# Patient Record
Sex: FEMALE | Race: WHITE | Employment: OTHER | URBAN - METROPOLITAN AREA
[De-identification: names, ages, dates, MRNs, and addresses within clinical notes are randomized per-mention and may not be internally consistent; named-entity substitution may affect disease eponyms.]

---

## 2017-03-15 ENCOUNTER — OFFICE VISIT (OUTPATIENT)
Dept: ENDOCRINOLOGY | Facility: CLINIC | Age: 52
End: 2017-03-15

## 2017-03-15 VITALS
HEIGHT: 69 IN | DIASTOLIC BLOOD PRESSURE: 86 MMHG | WEIGHT: 257 LBS | BODY MASS INDEX: 38.06 KG/M2 | SYSTOLIC BLOOD PRESSURE: 137 MMHG | HEART RATE: 71 BPM

## 2017-03-15 DIAGNOSIS — F64.9 GENDER IDENTITY DISORDER: ICD-10-CM

## 2017-03-15 RX ORDER — ESTRADIOL 2 MG/1
TABLET ORAL
Qty: 90 TABLET | Refills: 3 | Status: SHIPPED | OUTPATIENT
Start: 2017-03-15 | End: 2018-03-27

## 2017-03-15 NOTE — LETTER
3/15/2017       RE: Kylie Piper  [address] [facility] [address]     Dear Colleague,    Thank you for referring your patient, Kylie Piper, to the [facility] ENDOCRINOLOGY at [facility]. Please see a copy of my visit note below.    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      HISTORY OF PRESENT ILLNESS:  Ms. Des Austin is here for a followup visit.  We typically see her about every 6 months.  We saw her last September.  At that time, she was recovering from a gender reassignment surgery.  We continued her estradiol 2 mg daily.      She returns to follow up.  She reports things are going well.  She has had no major issues or problems, as far as hormone therapy since we last saw her.      She continues to get some facial hair.  She does shave daily.  Otherwise, she has noticed decreased body hair.  She reports no new headaches, severe or frequent headaches, no problems with leg swelling and no GI complaints.      She does not smoke tobacco.      For other medications, she is now using medical cannabis for chronic pain and depression.  She has been using it about a month or so now, and she feels it is helpful.  She has prescription for vortioxetine, vitamin D supplement, propranolol that she takes somewhat as needed for higher blood pressure, lamotrigine, in addition to her Estradiol 2 mg daily.  She is also on Topamax and simvastatin.      REVIEW OF SYSTEMS:  As above.  She has regained some weight since her last visit.  She attributes that to having more food choices available in the house.      PHYSICAL EXAMINATION:     VITAL SIGNS:  Her weight is 257 pounds.  This is the same weight she was a year ago, but she has gained about 30 pounds since her last visit.  Pulse  71, blood pressure 137/86.    GENERAL:  She has a generally feminine appearance.  Face is smooth.   CHEST:  Clear breath sounds.   CARDIAC:  Regular rate and rhythm, normal heart sounds.   ABDOMEN:  Soft and nontender, no hepatosplenomegaly.   EXTREMITIES:  She has no lower extremity edema.      LABORATORY TESTS:  We reviewed her recent records since her last visit with us.  She had some laboratory tests done in October.  She had normal electrolytes, creatinine of 0.82, calcium was normal at 8.8.  She had a lipid panel done a year ago with an LDL of 101 and triglyceride of 143.      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  The patient has been on feminizing hormone therapy since 2012.  She has also undergone orchiectomy and gender reassignment surgery.      Ms. Des Austin has a reasonable level of feminization.  Given her age, we are not overly aggressive in her estrogen replacement.  We had discussed a trial back on transdermal estrogen at last visit, but she had had problems as far as the cost, and in addition she had difficulty getting adequate levels with the patch; therefore, for now we are continuing with the oral estradiol.      PLAN:   1.  Continue current estradiol 2 mg orally once a day.   2.  We will check an estradiol level today, since it has been a year since her last check.  Otherwise, I do not think she needs any additional monitoring tests at this time.   3.  We will plan to see her back in 6 months to follow up.         Again, thank you for allowing me to participate in the care of your patient.      Sincerely,    Dallas Lo MD

## 2017-03-15 NOTE — MR AVS SNAPSHOT
After Visit Summary   3/15/2017    Kylie Piper    MRN: [id number]           Patient Information     Date Of Birth          1965        Visit Information        Provider Department      3/15/2017 7:30 AM Dallas Lo MD [facility]        Today's Diagnoses     Gender identity disorder          Care Instructions    Dr. Lo will follow up with results of lab test.        Follow-ups after your visit        Follow-up notes from your care team     Return in about 6 months (around 9/15/2017).      Your next 10 appointments already scheduled     Mar 15, 2017  8:00 AM CDT   LAB with  LAB   [facility] ([facility])    [address] [place] [id number] [phone]           Patient must bring picture ID.  Patient should be prepared to give a urine specimen  Please do not eat 10-12 hours before your appointment if you are coming in fasting for labs on lipids, cholesterol, or glucose (sugar).  Pregnant women should follow their Care Team instructions. Water with medications is okay. Do not drink coffee or other fluids.   If you have concerns about taking  your medications, please ask at office or if scheduling via [employer], send a message by clicking on Secure Messaging, Message Your Care Team.            Sep 06, 2017 11:00 AM CDT   (Arrive by 10:45 AM)   RETURN ENDOCRINE with Dallas Lo MD   [facility] ([facility])    [address] [place] [address] [phone]              Future tests that were ordered for you today     Open Future Orders        Priority Expected Expires Ordered    Estradiol ultrasensitive Routine  3/15/2018 3/15/2017            Who to contact     Please call your clinic at [phone] to:    Ask questions about your health    Make or cancel appointments    Discuss your medicines    Learn about your test results    Speak to your  "doctor   If you have compliments or concerns about an experience at your clinic, or if you wish to file a complaint, please contact [facility] Patient Relations at [phone] or email us at [email or web address]         Additional Information About Your Visit        MyChart Information     [employer] gives you secure access to your electronic health record. If you see a primary care provider, you can also send messages to your care team and make appointments. If you have questions, please call your primary care clinic.  If you do not have a primary care provider, please call [phone] and they will assist you.      Or[employer]Flex is an electronic gateway that provides easy, online access to your medical records. With [employer], you can request a clinic appointment, read your test results, renew a prescription or communicate with your care team.     To access your existing account, please contact your [facility] or call [phone] for assistance.        Care EveryWhere ID     This is your Care EveryWhere ID. This could be used by other organizations to access your [place] medical records  [id number]        Your Vitals Were     Pulse Height BMI (Body Mass Index)             71 1.753 m (5' 9\") 37.95 kg/m2          Blood Pressure from Last 3 Encounters:   03/15/17 137/86   11/03/16 98/64   10/19/16 116/77    Weight from Last 3 Encounters:   03/15/17 116.6 kg (257 lb)   11/03/16 105.7 kg (233 lb)   10/19/16 105.1 kg (231 lb 12.8 oz)                 Today's Medication Changes          These changes are accurate as of: 3/15/17  7:52 AM.  If you have any questions, ask your nurse or doctor.               These medicines have changed or have updated prescriptions.        Dose/Directions    traZODone 100 MG tablet   Commonly known as:  DESYREL   This may have changed:  how much to take   Used for:  Insomnia, unspecified insomnia        Dose:  100 mg   Take 1 " tablet (100 mg) by mouth nightly as needed for sleep   Quantity:  30 tablet   Refills:  1         Stop taking these medicines if you haven't already. Please contact your care team if you have questions.     ADVIL PO   Stopped by:  Dallas Lo MD                Where to get your medicines      These medications were sent to [id number] - [place], [place] - [address] [address]  [address] [address]    Hours:  test fax successful 4/5/04 kr Phone:  [phone]     estradiol 2 MG tablet                Primary Care Provider Office Phone # Fax #    Kristin Miner PA-C [phone]       [facility] [place] [address]  [place] [address]        Thank you!     Thank you for choosing [facility]  for your care. Our goal is always to provide you with excellent care. Hearing back from our patients is one way we can continue to improve our services. Please take a few minutes to complete the written survey that you may receive in the mail after your visit with us. Thank you!             Your Updated Medication List - Protect others around you: Learn how to safely use, store and throw away your medicines at www.disposemymeds.org.          This list is accurate as of: 3/15/17  7:52 AM.  Always use your most recent med list.                   Brand Name Dispense Instructions for use    estradiol 2 MG tablet    ESTRACE    90 tablet    1 tablet daily       ferrous sulfate 325 (65 FE) MG tablet    IRON     Take by mouth daily (with breakfast)       furosemide 20 MG tablet    LASIX    90 tablet    Take 1 tablet (20 mg) by mouth daily       LAMOTRIGINE PO      Take 150 mg by mouth daily       LORAZEPAM PO      Take 0.5 mg by mouth every 2 hours as needed for anxiety       * medical cannabis inhalation (Patient's own supply.  Not a prescription)      Inhale into the lungs 2 times daily (This is NOT a prescription, and does not certify that the patient has a  qualifying medical condition for medical cannabis.  The purpose of this order is  to document that the patient reports taking medical cannabis.)  MORNING AND AT NIGHT       * medical cannabis (Patient's own supply.  Not a prescription)      1 capsule 2 times daily (This is NOT a prescription, and does not certify that the patient has a qualifying medical condition for medical cannabis.  The purpose of this order is  to document that the patient reports taking medical cannabis.)  Morning and afternoon       MELATONIN PO          Multi-vitamin Tabs tablet      Take 1 tablet by mouth daily       oxyCODONE-acetaminophen  MG per tablet    PERCOCET    60 tablet    Take 1 tablet by mouth every 4 hours as needed for moderate to severe pain       PROPRANOLOL HCL PO      Take 20 mg by mouth as needed for high blood pressure       simvastatin 20 MG tablet    ZOCOR    90 tablet    Take 1 tablet (20 mg) by mouth At Bedtime       topiramate 200 MG tablet    TOPAMAX    180 tablet    Take 1 tablet (200 mg) by mouth 2 times daily       traZODone 100 MG tablet    DESYREL    30 tablet    Take 1 tablet (100 mg) by mouth nightly as needed for sleep       TRINTELLIX 10 MG tablet   Generic drug:  vortioxetine      Take 10 mg by mouth daily       venlafaxine 150 MG 24 hr capsule    EFFEXOR-XR     300 mg       VISTARIL PO      Take 100 mg by mouth 4 times daily as needed       VITAMIN B 12 PO          VITAMIN D (CHOLECALCIFEROL) PO      Take 5,000 Units by mouth daily       * Notice:  This list has 2 medication(s) that are the same as other medications prescribed for you. Read the directions carefully, and ask your doctor or other care provider to review them with you.

## 2017-03-15 NOTE — PROGRESS NOTES
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      HISTORY OF PRESENT ILLNESS:  Ms. Des Austin is here for a followup visit.  We typically see her about every 6 months.  We saw her last September.  At that time, she was recovering from a gender reassignment surgery.  We continued her estradiol 2 mg daily.      She returns to follow up.  She reports things are going well.  She has had no major issues or problems, as far as hormone therapy since we last saw her.      She continues to get some facial hair.  She does shave daily.  Otherwise, she has noticed decreased body hair.  She reports no new headaches, severe or frequent headaches, no problems with leg swelling and no GI complaints.      She does not smoke tobacco.      For other medications, she is now using medical cannabis for chronic pain and depression.  She has been using it about a month or so now, and she feels it is helpful.  She has prescription for vortioxetine, vitamin D supplement, propranolol that she takes somewhat as needed for higher blood pressure, lamotrigine, in addition to her Estradiol 2 mg daily.  She is also on Topamax and simvastatin.      REVIEW OF SYSTEMS:  As above.  She has regained some weight since her last visit.  She attributes that to having more food choices available in the house.      PHYSICAL EXAMINATION:     VITAL SIGNS:  Her weight is 257 pounds.  This is the same weight she was a year ago, but she has gained about 30 pounds since her last visit.  Pulse 71, blood pressure 137/86.    GENERAL:  She has a generally feminine appearance.  Face is smooth.   CHEST:  Clear breath sounds.   CARDIAC:  Regular rate and rhythm, normal heart sounds.   ABDOMEN:  Soft and nontender, no hepatosplenomegaly.   EXTREMITIES:  She has no lower extremity edema.       LABORATORY TESTS:  We reviewed her recent records since her last visit with us.  She had some laboratory tests done in October.  She had normal electrolytes, creatinine of 0.82, calcium was normal at 8.8.  She had a lipid panel done a year ago with an LDL of 101 and triglyceride of 143.      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  The patient has been on feminizing hormone therapy since 2012.  She has also undergone orchiectomy and gender reassignment surgery.      Ms. Des Austin has a reasonable level of feminization.  Given her age, we are not overly aggressive in her estrogen replacement.  We had discussed a trial back on transdermal estrogen at last visit, but she had had problems as far as the cost, and in addition she had difficulty getting adequate levels with the patch; therefore, for now we are continuing with the oral estradiol.      PLAN:   1.  Continue current estradiol 2 mg orally once a day.   2.  We will check an estradiol level today, since it has been a year since her last check.  Otherwise, I do not think she needs any additional monitoring tests at this time.   3.  We will plan to see her back in 6 months to follow up.

## 2017-03-15 NOTE — NURSING NOTE
"Chief Complaint   Patient presents with     RECHECK     ABNORMAL HORMONE LEVELS F/U        Initial /86 (BP Location: Right arm, Patient Position: Chair, Cuff Size: Adult Regular)  Pulse 71  Ht 1.753 m (5' 9\")  Wt 116.6 kg (257 lb)  BMI 37.95 kg/m2 Estimated body mass index is 37.95 kg/(m^2) as calculated from the following:    Height as of this encounter: 1.753 m (5' 9\").    Weight as of this encounter: 116.6 kg (257 lb).  Medication Reconciliation: complete         Keisha Baer CMA     "

## 2017-03-22 LAB — ESTRADIOL SERPL HS-MCNC: 45 PG/ML

## 2017-03-23 DIAGNOSIS — G89.4 CHRONIC PAIN DISORDER: ICD-10-CM

## 2017-03-23 DIAGNOSIS — M79.89 LEG SWELLING: ICD-10-CM

## 2017-03-23 DIAGNOSIS — M79.7 FIBROMYALGIA: ICD-10-CM

## 2017-03-23 RX ORDER — FUROSEMIDE 20 MG
20 TABLET ORAL DAILY
Qty: 30 TABLET | Refills: 0 | Status: SHIPPED | OUTPATIENT
Start: 2017-03-23 | End: 2017-05-04

## 2017-03-23 RX ORDER — TOPIRAMATE 200 MG/1
200 TABLET, FILM COATED ORAL 2 TIMES DAILY
Qty: 60 TABLET | Refills: 0 | Status: SHIPPED | OUTPATIENT
Start: 2017-03-23 | End: 2018-10-16

## 2017-05-04 DIAGNOSIS — M79.89 LEG SWELLING: ICD-10-CM

## 2017-05-04 RX ORDER — FUROSEMIDE 20 MG
TABLET ORAL
Qty: 60 TABLET | Refills: 0 | Status: SHIPPED | OUTPATIENT
Start: 2017-05-04 | End: 2017-07-14

## 2017-05-04 NOTE — TELEPHONE ENCOUNTER
FUROSEMIDE      Last Written Prescription Date: 3-23-17  Last Fill Quantity: 30, # refills: 0  Last Office Visit with [facility], [facility] or [facility] prescribing provider: 3-15-17       Potassium   Date Value Ref Range Status   10/19/2016 3.6 3.4 - 5.3 mmol/L Final     Creatinine   Date Value Ref Range Status   10/19/2016 0.82 0.52 - 1.04 mg/dL Final     BP Readings from Last 3 Encounters:   03/15/17 137/86   11/03/16 98/64   10/19/16 116/77

## 2017-05-05 DIAGNOSIS — E78.5 HYPERLIPIDEMIA LDL GOAL <130: ICD-10-CM

## 2017-05-05 NOTE — TELEPHONE ENCOUNTER
simvastatin     Last Written Prescription Date: 17  Last Fill Quantity: 90, # refills: 0  Last Office Visit with [facility], [facility] or [facility] prescribing provider: 10/19/16  Prescription has .       Lab Results   Component Value Date    CHOL 161 2016     Lab Results   Component Value Date    HDL 31 2016     Lab Results   Component Value Date     2016     Lab Results   Component Value Date    TRIG 143 2016     Lab Results   Component Value Date    CHOLHDLRATIO 7.9 10/20/2015

## 2017-05-07 RX ORDER — SIMVASTATIN 20 MG
20 TABLET ORAL AT BEDTIME
Qty: 90 TABLET | Refills: 3 | Status: SHIPPED | OUTPATIENT
Start: 2017-05-07 | End: 2018-04-26

## 2017-06-02 ENCOUNTER — OFFICE VISIT (OUTPATIENT)
Dept: FAMILY MEDICINE | Facility: CLINIC | Age: 52
End: 2017-06-02
Payer: COMMERCIAL

## 2017-06-02 ENCOUNTER — RADIANT APPOINTMENT (OUTPATIENT)
Dept: GENERAL RADIOLOGY | Facility: CLINIC | Age: 52
End: 2017-06-02
Attending: PHYSICIAN ASSISTANT
Payer: COMMERCIAL

## 2017-06-02 ENCOUNTER — TELEPHONE (OUTPATIENT)
Dept: FAMILY MEDICINE | Facility: CLINIC | Age: 52
End: 2017-06-02

## 2017-06-02 VITALS
DIASTOLIC BLOOD PRESSURE: 81 MMHG | BODY MASS INDEX: 37.21 KG/M2 | SYSTOLIC BLOOD PRESSURE: 132 MMHG | OXYGEN SATURATION: 95 % | TEMPERATURE: 97.4 F | HEART RATE: 80 BPM | WEIGHT: 252 LBS

## 2017-06-02 DIAGNOSIS — R05.9 COUGH: ICD-10-CM

## 2017-06-02 DIAGNOSIS — J20.9 ACUTE BRONCHITIS WITH SYMPTOMS > 10 DAYS: Primary | ICD-10-CM

## 2017-06-02 PROCEDURE — 99214 OFFICE O/P EST MOD 30 MIN: CPT | Performed by: PHYSICIAN ASSISTANT

## 2017-06-02 PROCEDURE — 71020 XR CHEST 2 VW: CPT

## 2017-06-02 RX ORDER — BENZONATATE 200 MG/1
200 CAPSULE ORAL 3 TIMES DAILY PRN
Qty: 21 CAPSULE | Refills: 0 | Status: SHIPPED | OUTPATIENT
Start: 2017-06-02 | End: 2017-07-31

## 2017-06-02 RX ORDER — AZITHROMYCIN 250 MG/1
TABLET, FILM COATED ORAL
Qty: 6 TABLET | Refills: 0 | Status: SHIPPED | OUTPATIENT
Start: 2017-06-02 | End: 2017-06-02 | Stop reason: SINTOL

## 2017-06-02 ASSESSMENT — ANXIETY QUESTIONNAIRES
1. FEELING NERVOUS, ANXIOUS, OR ON EDGE: SEVERAL DAYS
6. BECOMING EASILY ANNOYED OR IRRITABLE: SEVERAL DAYS
3. WORRYING TOO MUCH ABOUT DIFFERENT THINGS: NOT AT ALL
5. BEING SO RESTLESS THAT IT IS HARD TO SIT STILL: SEVERAL DAYS
IF YOU CHECKED OFF ANY PROBLEMS ON THIS QUESTIONNAIRE, HOW DIFFICULT HAVE THESE PROBLEMS MADE IT FOR YOU TO DO YOUR WORK, TAKE CARE OF THINGS AT HOME, OR GET ALONG WITH OTHER PEOPLE: VERY DIFFICULT
GAD7 TOTAL SCORE: 5
2. NOT BEING ABLE TO STOP OR CONTROL WORRYING: NOT AT ALL
7. FEELING AFRAID AS IF SOMETHING AWFUL MIGHT HAPPEN: NOT AT ALL

## 2017-06-02 ASSESSMENT — PATIENT HEALTH QUESTIONNAIRE - PHQ9: 5. POOR APPETITE OR OVEREATING: MORE THAN HALF THE DAYS

## 2017-06-02 NOTE — PROGRESS NOTES
SUBJECTIVE:                                                    Kylie Piper is a 51 year old female who presents to clinic today for the following health issues:      ENT Symptoms             Symptoms: cc Present Absent Comment   Fever/Chills  x  Intermittent fevers - subjective    Fatigue  x     Muscle Aches   x    Eye Irritation   x    Sneezing   x    Nasal Arnoldo/Drg  x     Sinus Pressure/Pain   x    Loss of smell   x    Dental pain   x    Sore Throat   x    Swollen Glands   x    Ear Pain/Fullness   x    Cough  x  Productive mostly - no blood   Wheeze  x  Constant - history of asthma as a child   Chest Pain  x  From coughing   Shortness of breath  x  With movement mostly    Rash   x    Other  x       Symptom duration:  x 4 weeks - intermittent, persistent symptoms for 1 week   Symptom severity:  1-10 rated bad   Treatments tried:  mucinex robitussin DM    Contacts:  daughter and grandson     She has not needed any inhalers since 2001.        Problem list and histories reviewed & adjusted, as indicated.  Additional history: as documented    Patient Active Problem List   Diagnosis     Gender identity disorder     Hyperlipidemia LDL goal <130     Abnormal results of liver function studies     Chronic pain disorder     Hypertension goal BP (blood pressure) < 130/80     Insomnia     Bipolar 2 disorder (H)     Health Care Home     Obesity     H/O hypogonadism     Breast cancer screening     Fibromyalgia     Glenohumeral arthritis     Other general symptoms(780.99)     Vitamin D deficiency     Primary osteoarthritis of left shoulder     Anxiety     Cervical spondylosis without myelopathy     Past Surgical History:   Procedure Laterality Date     [facility] REF[facility]       COLONOSCOPY  8/8/2013    Procedure: COLONOSCOPY;  COLONSCOPY SCREEN/ SE;  Surgeon: Santino Sun MD;  Location: [facility] OR     COSMETIC SURGERY       MANDIBLE SURGERY  1986     ORCHIECTOMY INGUINAL BILATERAL  7/16/2013     Procedure: ORCHIECTOMY INGUINAL BILATERAL;  Bilateral Simple Inguinal Orchiectomy ;  Surgeon: Jan Garrett MD;  Location: [facility] OR       Social History   Substance Use Topics     Smoking status: Never Smoker     Smokeless tobacco: Never Used     Alcohol use Yes      Comment: occasional//2 per month     Family History   Problem Relation Age of Onset     CANCER Father      skin     DIABETES Father      HEART DISEASE Other      Alzheimer Disease Paternal Grandmother      DIABETES Sister          Current Outpatient Prescriptions   Medication Sig Dispense Refill     simvastatin (ZOCOR) 20 MG tablet Take 1 tablet (20 mg) by mouth At Bedtime 90 tablet 3     furosemide (LASIX) 20 MG tablet TAKE 1 TABLET (20 MG) BY MOUTH DAILY 60 tablet 0     topiramate (TOPAMAX) 200 MG tablet Take 1 tablet (200 mg) by mouth 2 times daily 60 tablet 0     vortioxetine (TRINTELLIX) 10 MG tablet Take 10 mg by mouth daily       medical cannabis inhalation (Patient's own supply.  Not a prescription) Inhale into the lungs 2 times daily (This is NOT a prescription, and does not certify that the patient has a qualifying medical condition for medical cannabis.  The purpose of this order is  to document that the patient reports taking medical cannabis.)    MORNING AND AT NIGHT       medical cannabis (Patient's own supply.  Not a prescription) 1 capsule 2 times daily (This is NOT a prescription, and does not certify that the patient has a qualifying medical condition for medical cannabis.  The purpose of this order is  to document that the patient reports taking medical cannabis.)    Morning and afternoon       estradiol (ESTRACE) 2 MG tablet 1 tablet daily 90 tablet 3     oxyCODONE-acetaminophen (PERCOCET)  MG per tablet Take 1 tablet by mouth every 4 hours as needed for moderate to severe pain 60 tablet 0     VITAMIN D, CHOLECALCIFEROL, PO Take 5,000 Units by mouth daily       ferrous sulfate (IRON) 325 (65 FE) MG tablet Take by mouth  daily (with breakfast)       Cyanocobalamin (VITAMIN B 12 PO)        multivitamin, therapeutic with minerals (MULTI-VITAMIN) TABS Take 1 tablet by mouth daily       PROPRANOLOL HCL PO Take 20 mg by mouth as needed for high blood pressure       LAMOTRIGINE PO Take 150 mg by mouth daily        MELATONIN PO        LORAZEPAM PO Take 0.5 mg by mouth every 2 hours as needed for anxiety       traZODone (DESYREL) 100 MG tablet Take 1 tablet (100 mg) by mouth nightly as needed for sleep (Patient taking differently: Take 200 mg by mouth nightly as needed for sleep ) 30 tablet 1     HydrOXYzine Pamoate (VISTARIL PO) Take 100 mg by mouth 4 times daily as needed        venlafaxine (EFFEXOR-XR) 150 MG 24 hr capsule 300 mg   0     Allergies   Allergen Reactions     Amiodarone      Caused pain fatigue/amplified anxiety and depression     Fluoxetine Other (See Comments)     Night terrors     Tetracycline      Teeth rattle/saliva acidic     BP Readings from Last 3 Encounters:   06/02/17 132/81   03/15/17 137/86   11/03/16 98/64    Wt Readings from Last 3 Encounters:   06/02/17 252 lb (114.3 kg)   03/15/17 257 lb (116.6 kg)   11/03/16 233 lb (105.7 kg)                    Reviewed and updated as needed this visit by clinical staff       Reviewed and updated as needed this visit by Provider         ROS:  Constitutional, HEENT, cardiovascular, pulmonary, and integumentary systems are negative, except as otherwise noted.    OBJECTIVE:                                                    /81  Pulse 80  Temp 97.4  F (36.3  C) (Oral)  Wt 252 lb (114.3 kg)  SpO2 95%  BMI 37.21 kg/m2  Body mass index is 37.21 kg/(m^2).  GENERAL: healthy, alert and no distress  EYES: Eyes grossly normal to inspection  HENT: normal cephalic/atraumatic, ear canals and TM's normal, nose and mouth without ulcers or lesions, rhinorrhea clear and yellow, oropharynx clear and oral mucous membranes moist  NECK: no adenopathy, no asymmetry, masses, or scars and  thyroid normal to palpation  RESP: no rales , no rhonchi and expiratory wheezes throughout; able to speak in full sentences, no use of accessory muscles, no signs of respiratory distress  CV: regular rate and rhythm, normal S1 S2, no S3 or S4, no murmur, click or rub, no peripheral edema and peripheral pulses strong  MS: no gross musculoskeletal defects noted, no edema  SKIN: no suspicious lesions or rashes    Diagnostic Test Results:  CXR - unremarkable, no obvious infiltrate. Radiology read is pending     ASSESSMENT/PLAN:                                                        ICD-10-CM    1. Acute bronchitis with symptoms > 10 days J20.9 azithromycin (ZITHROMAX) 250 MG tablet     benzonatate (TESSALON) 200 MG capsule   2. Cough R05 XR Chest 2 Views     benzonatate (TESSALON) 200 MG capsule       Patient Instructions   Rest and increase fluids.    Sleep with head of bed elevated at night. Have a humidifier running at night    Alternate motrin and tylenol as needed for discomfort.    Perform nasal saline rinses to help decrease nasal congestion or breath in steam multiple times daily.     May take an over the counter antihistamine (claritin or zyrtec) daily to help decrease production of nasal secretions.    Take the tessalon perles as prescribed for the cough.    Follow up with primary care provider early next week if no improvement of symptoms. Sooner if any worsening of symptoms.           Ladonna Ferro PA-C  [facility]

## 2017-06-02 NOTE — NURSING NOTE
"Chief Complaint   Patient presents with     URI       Initial /81  Pulse 80  Temp 97.4  F (36.3  C) (Oral)  Wt 252 lb (114.3 kg)  SpO2 95%  BMI 37.21 kg/m2 Estimated body mass index is 37.21 kg/(m^2) as calculated from the following:    Height as of 3/15/17: 5' 9\" (1.753 m).    Weight as of this encounter: 252 lb (114.3 kg).  Medication Reconciliation: complete     Mili Son cma      "

## 2017-06-02 NOTE — TELEPHONE ENCOUNTER
Please send pending prescription of Augmentin to pharmacy of choice for patient. Thank you.    Ladonna Ferro PA-C

## 2017-06-02 NOTE — MR AVS SNAPSHOT
After Visit Summary   6/2/2017    Kylie Piper    MRN: [id number]           Patient Information     Date Of Birth          1965        Visit Information        Provider Department      6/2/2017 1:40 PM Ladonna Ferro PA-C [facility]        Today's Diagnoses     Acute bronchitis with symptoms > 10 days    -  1    Cough          Care Instructions    Rest and increase fluids.    Sleep with head of bed elevated at night. Have a humidifier running at night    Alternate motrin and tylenol as needed for discomfort.    Perform nasal saline rinses to help decrease nasal congestion or breath in steam multiple times daily.     May take an over the counter antihistamine (claritin or zyrtec) daily to help decrease production of nasal secretions.    Take the tessalon perles as prescribed for the cough.    Follow up with primary care provider early next week if no improvement of symptoms. Sooner if any worsening of symptoms.               Follow-ups after your visit        Your next 10 appointments already scheduled     Sep 20, 2017 10:00 AM CDT   (Arrive by 9:45 AM)   RETURN ENDOCRINE with Dallas Lo MD   [facility] ([facility])    [address] [place] [id number] [phone]              Who to contact     If you have questions or need follow up information about today's clinic visit or your schedule please contact [facility] directly at [phone].  Normal or non-critical lab and imaging results will be communicated to you by MyChart, letter or phone within 4 business days after the clinic has received the results. If you do not hear from us within 7 days, please contact the clinic through MyChart or phone. If you have a critical or abnormal lab result, we will notify you by phone as soon as possible.  Submit refill requests through [employer] or call your pharmacy and they will forward the  refill request to us. Please allow 3 business days for your refill to be completed.          Additional Information About Your Visit        [employer] Information     [employer] gives you secure access to your electronic health record. If you see a primary care provider, you can also send messages to your care team and make appointments. If you have questions, please call your primary care clinic.  If you do not have a primary care provider, please call [phone] and they will assist you.        Care EveryWhere ID     This is your Care EveryWhere ID. This could be used by other organizations to access your [facility] medical records  [id number]        Your Vitals Were     Pulse Temperature Pulse Oximetry BMI (Body Mass Index)          80 97.4  F (36.3  C) (Oral) 95% 37.21 kg/m2         Blood Pressure from Last 3 Encounters:   06/02/17 132/81   03/15/17 137/86   11/03/16 98/64    Weight from Last 3 Encounters:   06/02/17 252 lb (114.3 kg)   03/15/17 257 lb (116.6 kg)   11/03/16 233 lb (105.7 kg)              We Performed the Following     XR Chest 2 Views          Today's Medication Changes          These changes are accurate as of: 6/2/17  2:28 PM.  If you have any questions, ask your nurse or doctor.               Start taking these medicines.        Dose/Directions    azithromycin 250 MG tablet   Commonly known as:  ZITHROMAX   Used for:  Acute bronchitis with symptoms > 10 days   Started by:  Ladonna Ferro PA-C        Two tablets first day, then one tablet daily for four days.   Quantity:  6 tablet   Refills:  0       benzonatate 200 MG capsule   Commonly known as:  TESSALON   Used for:  Acute bronchitis with symptoms > 10 days, Cough   Started by:  Ladonna Ferro PA-C        Dose:  200 mg   Take 1 capsule (200 mg) by mouth 3 times daily as needed for cough   Quantity:  21 capsule   Refills:  0         These medicines have changed or have updated prescriptions.        Dose/Directions    traZODone  100 MG tablet   Commonly known as:  DESYREL   This may have changed:  how much to take   Used for:  Insomnia, unspecified insomnia        Dose:  100 mg   Take 1 tablet (100 mg) by mouth nightly as needed for sleep   Quantity:  30 tablet   Refills:  1            Where to get your medicines      These medications were sent to [id number] - [place], [place] - [address] [address] [address] [place] [address]    Hours:  test fax successful 4/5/04 kr Phone:  [phone]     azithromycin 250 MG tablet    benzonatate 200 MG capsule                Primary Care Provider Office Phone # Fax #    Kristin Miner PA-C [phone]       [facility] [place] [address]  [place] [address]        Thank you!     Thank you for choosing [facility]  for your care. Our goal is always to provide you with excellent care. Hearing back from our patients is one way we can continue to improve our services. Please take a few minutes to complete the written survey that you may receive in the mail after your visit with us. Thank you!             Your Updated Medication List - Protect others around you: Learn how to safely use, store and throw away your medicines at www.disposemymeds.org.          This list is accurate as of: 6/2/17  2:28 PM.  Always use your most recent med list.                   Brand Name Dispense Instructions for use    azithromycin 250 MG tablet    ZITHROMAX    6 tablet    Two tablets first day, then one tablet daily for four days.       benzonatate 200 MG capsule    TESSALON    21 capsule    Take 1 capsule (200 mg) by mouth 3 times daily as needed for cough       estradiol 2 MG tablet    ESTRACE    90 tablet    1 tablet daily       ferrous sulfate 325 (65 FE) MG tablet    IRON     Take by mouth daily (with breakfast)       furosemide 20 MG tablet    LASIX    60 tablet    TAKE 1 TABLET (20 MG) BY MOUTH DAILY       LAMOTRIGINE PO      Take 150 mg by mouth daily        LORAZEPAM PO      Take 0.5 mg by mouth every 2 hours as needed for anxiety       * medical cannabis inhalation (Patient's own supply.  Not a prescription)      Inhale into the lungs 2 times daily (This is NOT a prescription, and does not certify that the patient has a qualifying medical condition for medical cannabis.  The purpose of this order is  to document that the patient reports taking medical cannabis.)  MORNING AND AT NIGHT       * medical cannabis (Patient's own supply.  Not a prescription)      1 capsule 2 times daily (This is NOT a prescription, and does not certify that the patient has a qualifying medical condition for medical cannabis.  The purpose of this order is  to document that the patient reports taking medical cannabis.)  Morning and afternoon       MELATONIN PO          Multi-vitamin Tabs tablet      Take 1 tablet by mouth daily       oxyCODONE-acetaminophen  MG per tablet    PERCOCET    60 tablet    Take 1 tablet by mouth every 4 hours as needed for moderate to severe pain       PROPRANOLOL HCL PO      Take 20 mg by mouth as needed for high blood pressure       simvastatin 20 MG tablet    ZOCOR    90 tablet    Take 1 tablet (20 mg) by mouth At Bedtime       topiramate 200 MG tablet    TOPAMAX    60 tablet    Take 1 tablet (200 mg) by mouth 2 times daily       traZODone 100 MG tablet    DESYREL    30 tablet    Take 1 tablet (100 mg) by mouth nightly as needed for sleep       TRINTELLIX 10 MG tablet   Generic drug:  vortioxetine      Take 10 mg by mouth daily       venlafaxine 150 MG 24 hr capsule    EFFEXOR-XR     300 mg       VISTARIL PO      Take 100 mg by mouth 4 times daily as needed       VITAMIN B 12 PO          VITAMIN D (CHOLECALCIFEROL) PO      Take 5,000 Units by mouth daily       * Notice:  This list has 2 medication(s) that are the same as other medications prescribed for you. Read the directions carefully, and ask your doctor or other care provider to review them with you.

## 2017-06-02 NOTE — PATIENT INSTRUCTIONS
Rest and increase fluids.    Sleep with head of bed elevated at night. Have a humidifier running at night    Alternate motrin and tylenol as needed for discomfort.    Perform nasal saline rinses to help decrease nasal congestion or breath in steam multiple times daily.     May take an over the counter antihistamine (claritin or zyrtec) daily to help decrease production of nasal secretions.    Take the tessalon perles as prescribed for the cough.    Follow up with primary care provider early next week if no improvement of symptoms. Sooner if any worsening of symptoms.

## 2017-06-02 NOTE — TELEPHONE ENCOUNTER
Sent order to pharmacy with note to discontinue zithromax due to interactions.     Luna Bolaños RN

## 2017-06-02 NOTE — TELEPHONE ENCOUNTER
Reason for Call:  Other prescription    Detailed comments: Lalitha calling from [facility], states that prescription from today Zithromax interacts with Effexor and Trazadone and its called QT Prolongation. [facility] [phone]    Phone Number Patient can be reached at: Cell number on file:    Telephone Information:   Mobile [phone]       Best Time: ANY    Can we leave a detailed message on this number? YES    Call taken on 6/2/2017 at 3:17 PM by Rocío Starr

## 2017-06-03 ASSESSMENT — ANXIETY QUESTIONNAIRES: GAD7 TOTAL SCORE: 5

## 2017-06-03 ASSESSMENT — PATIENT HEALTH QUESTIONNAIRE - PHQ9: SUM OF ALL RESPONSES TO PHQ QUESTIONS 1-9: 7

## 2017-07-06 ENCOUNTER — TRANSFERRED RECORDS (OUTPATIENT)
Dept: HEALTH INFORMATION MANAGEMENT | Facility: CLINIC | Age: 52
End: 2017-07-06

## 2017-07-14 DIAGNOSIS — M79.89 LEG SWELLING: ICD-10-CM

## 2017-07-17 RX ORDER — FUROSEMIDE 20 MG
TABLET ORAL
Qty: 60 TABLET | Refills: 0 | Status: SHIPPED | OUTPATIENT
Start: 2017-07-17 | End: 2017-09-27

## 2017-07-17 NOTE — TELEPHONE ENCOUNTER
FUROSEMIDE      Last Written Prescription Date: 5-4-17  Last Fill Quantity: 60, # refills: 0  Last Office Visit with [facility], [facility] or [facility] prescribing provider: 6-2-17       Potassium   Date Value Ref Range Status   10/19/2016 3.6 3.4 - 5.3 mmol/L Final     Creatinine   Date Value Ref Range Status   10/19/2016 0.82 0.52 - 1.04 mg/dL Final     BP Readings from Last 3 Encounters:   06/02/17 132/81   03/15/17 137/86   11/03/16 98/64

## 2017-07-17 NOTE — TELEPHONE ENCOUNTER
Routing refill request to provider for review/approval because:  Pt due for annual exam in August.   Medication pended for approval, 60 day supply with reminder.

## 2017-07-31 ENCOUNTER — TELEPHONE (OUTPATIENT)
Dept: PALLIATIVE MEDICINE | Facility: CLINIC | Age: 52
End: 2017-07-31

## 2017-07-31 ENCOUNTER — OFFICE VISIT (OUTPATIENT)
Dept: FAMILY MEDICINE | Facility: CLINIC | Age: 52
End: 2017-07-31
Payer: COMMERCIAL

## 2017-07-31 VITALS
SYSTOLIC BLOOD PRESSURE: 133 MMHG | HEART RATE: 78 BPM | OXYGEN SATURATION: 97 % | WEIGHT: 259.6 LBS | BODY MASS INDEX: 38.45 KG/M2 | DIASTOLIC BLOOD PRESSURE: 70 MMHG | TEMPERATURE: 98.9 F | HEIGHT: 69 IN

## 2017-07-31 DIAGNOSIS — G89.4 CHRONIC PAIN DISORDER: ICD-10-CM

## 2017-07-31 DIAGNOSIS — Z12.83 SKIN CANCER SCREENING: ICD-10-CM

## 2017-07-31 DIAGNOSIS — J01.00 ACUTE NON-RECURRENT MAXILLARY SINUSITIS: Primary | ICD-10-CM

## 2017-07-31 PROCEDURE — 99213 OFFICE O/P EST LOW 20 MIN: CPT | Performed by: PHYSICIAN ASSISTANT

## 2017-07-31 RX ORDER — ARIPIPRAZOLE 5 MG/1
15 TABLET ORAL EVERY MORNING
COMMUNITY
End: 2019-08-13

## 2017-07-31 NOTE — NURSING NOTE
"Chief Complaint   Patient presents with     Sinus Problem       Initial /70  Pulse 78  Temp 98.9  F (37.2  C) (Oral)  Ht 5' 9\" (1.753 m)  Wt 259 lb 9.6 oz (117.8 kg)  SpO2 97%  BMI 38.34 kg/m2 Estimated body mass index is 38.34 kg/(m^2) as calculated from the following:    Height as of this encounter: 5' 9\" (1.753 m).    Weight as of this encounter: 259 lb 9.6 oz (117.8 kg).  Medication Reconciliation: complete   Kennedi Abdalla MA      "

## 2017-07-31 NOTE — PROGRESS NOTES
SUBJECTIVE:                                                    Kylie Piper is a 52 year old female who presents to clinic today for the following health issues:    ENT Symptoms             Symptoms: cc Present Absent Comment   Fever/Chills   x    Fatigue   x    Muscle Aches   x    Eye Irritation   x    Sneezing  x     Nasal Arnoldo/Drg  x     Sinus Pressure/Pain  x     Loss of smell   x    Dental pain  x     Sore Throat   x    Swollen Glands   x    Ear Pain/Fullness  x     Cough  x     Wheeze  x     Chest Pain   x    Shortness of breath  x  Mild    Rash   x    Other   x      Symptom duration:  x1.5 weeks    Symptom severity:  moderate, getting better   Treatments tried:  sudafed    Contacts:  none            Problem list and histories reviewed & adjusted, as indicated.  Additional history: as documented    Patient Active Problem List   Diagnosis     Gender identity disorder     Hyperlipidemia LDL goal <130     Abnormal results of liver function studies     Chronic pain disorder     Hypertension goal BP (blood pressure) < 130/80     Insomnia     Bipolar 2 disorder (H)     Health Care Home     Obesity     H/O hypogonadism     Breast cancer screening     Fibromyalgia     Glenohumeral arthritis     General symptoms NEC     Vitamin D deficiency     Primary osteoarthritis of left shoulder     Anxiety     Cervical spondylosis without myelopathy     Past Surgical History:   Procedure Laterality Date     [facility] REFERR[facility]       COLONOSCOPY  8/8/2013    Procedure: COLONOSCOPY;  COLONSCOPY SCREEN/ SE;  Surgeon: Santino Sun MD;  Location: [facility] OR     COSMETIC SURGERY       MANDIBLE SURGERY  1986     ORCHIECTOMY INGUINAL BILATERAL  7/16/2013    Procedure: ORCHIECTOMY INGUINAL BILATERAL;  Bilateral Simple Inguinal Orchiectomy ;  Surgeon: Jan Garrett MD;  Location:  OR       Social History   Substance Use Topics     Smoking status: Never Smoker     Smokeless tobacco: Never Used  "    Alcohol use Yes      Comment: occasional//2 per month     Family History   Problem Relation Age of Onset     CANCER Father      skin     DIABETES Father      HEART DISEASE Other      Alzheimer Disease Paternal Grandmother      DIABETES Sister              Reviewed and updated as needed this visit by clinical staffTobacco  Allergies  Meds  Med Hx  Surg Hx  Fam Hx  Soc Hx      Reviewed and updated as needed this visit by Provider         ROS:  Constitutional, HEENT, pulmonary systems are negative, except as otherwise noted.      OBJECTIVE:                                                    /70  Pulse 78  Temp 98.9  F (37.2  C) (Oral)  Ht 5' 9\" (1.753 m)  Wt 259 lb 9.6 oz (117.8 kg)  SpO2 97%  BMI 38.34 kg/m2  Body mass index is 38.34 kg/(m^2).  GENERAL APPEARANCE: alert, no distress and over weight  EYES: Eyes grossly normal to inspection and conjunctivae and sclerae normal  HENT: ear canals and TM's normal and nose and mouth without ulcers or lesions  RESP: lungs clear to auscultation - no rales, rhonchi or wheezes  CV: regular rates and rhythm, normal S1 S2, no S3 or S4 and no murmur, click or rub  LYMPHATICS: normal ant/post cervical and supraclavicular nodes       ASSESSMENT:                                                      1. Acute non-recurrent maxillary sinusitis    2. Chronic pain disorder    3. Skin cancer screening         PLAN:                                                    Augmentin BID x10 days. Supportive therapy also discussed. Follow up if symptoms fail to improve or worsen.      Referral for pain management renewed.   Derm referral placed per patient's request.     The patient was in agreement with the plan today and had no questions or concerns prior to leaving the clinic.     Kristin Miner PA-C  [facility]"

## 2017-07-31 NOTE — LETTER
July 31, 2017    Kylie Piper  [address]  [place] [address]    Dear Kylie,                                                                 Welcome to the [facility].  We are located on the 2nd floor ([address]) of the [facility], located at [address] [place], [place] [address], [place] [address].    Your appointment at the [facility] has been scheduled on November 7th at 2:00pm with Stanislav Rayo MD.    At your first visit, you will meet your team of caregivers who will help you to develop pain management strategies that will last a lifetime. You will meet with our support staff to review your insurance information, and collect your co-payment if required by your insurance company. You will also meet with a medical pain specialist and care coordinator who will assess your pain and develop a plan of care for your successful pain rehabilitation. You should expect to spend 1-2 hours at your first visit with us. Usually, patients work with us for a period of 6-12 months, and eventually return to their primary doctor once their pain management has stabilized.      To help us make your visit go as smoothly as possible, please bring the following items with you on your visit:     Completed Pain Questionnaire enclosed in this packet.  If you do not bring the completed questionnaire, we may have to reschedule your appointment.  List of any medicines that you are currently taking or have been prescribed  Important NON-[facility] medical information such as medical records or tests results (X-rays, or laboratory tests)  Your health insurance card  Financial resources to cover your co-payment or balance due at the time of service (cash, personal check, Visa, and MasterCard are acceptable methods of payment)     Due to the demand for new patient evaluations, you must notify the scheduling department 48 hours in advance if you are not able to keep this  appointment. Failure to do so could affect your ability to reschedule with our clinic. Please be aware that we will not prescribe any medications at your first visit.     Please call [phone] with any questions regarding your appointment. We look forward to meeting you and working to address your health care needs.     Sincerely,    [facility]

## 2017-07-31 NOTE — MR AVS SNAPSHOT
After Visit Summary   7/31/2017    Kylie Piper    MRN: [id number]           Patient Information     Date Of Birth          1965        Visit Information        Provider Department      7/31/2017 10:20 AM Kristin Miner PA-C [facility]        Today's Diagnoses     Chronic pain disorder    -  1    Acute non-recurrent maxillary sinusitis        Skin cancer screening           Follow-ups after your visit        Additional Services     DERMATOLOGY REFERRAL       Your provider has referred you to: FMG: [facility] [phone]   [email or web address]  UMP: [facility] [place] [phone]   [email or web address]    Please be aware that coverage of these services is subject to the terms and limitations of your health insurance plan.  Call member services at your health plan with any benefit or coverage questions.      Please bring the following with you to your appointment:    (1) Any X-Rays, CTs or MRIs which have been performed.  Contact the facility where they were done to arrange for  prior to your scheduled appointment.    (2) List of current medications  (3) This referral request   (4) Any documents/labs given to you for this referral            PAIN MANAGEMENT CENTER ([place]) REFERRAL       Your provider has referred you to the [facility].    Reason for Referral: Comprehensive Evaluation and Management    Please complete the following questions:    What is your diagnosis for the patient's pain? Chronic pain disorder    Do you have any specific questions for the pain specialist? No - patient previously seen by Dr. Moscoso    Are there any red flags that may impact the assessment or management of the patient? None    **ANY DIAGNOSTIC TESTS THAT ARE NOT IN EPIC SHOULD BE SENT TO THE PAIN CENTER**    Please note the  Pre-Op Pain Consult must be scheduled 2-3 weeks prior to the patient's surgery.  Patient's trying to schedule within 2 weeks of surgery may not be accommodated.     Pre-Op Pain Consults are only good for 30 days.    REGARDING OPIOID MEDICATIONS:  We will always address appropriateness of opioid pain medications, but we generally will not automatically take on a prescribing role. When we do take on prescribing of opioids for chronic pain, it is in collaboration with the referring physician for an intermediate period of time (months), with an expectation that the primary physician or provider will assume the prescribing role if medications are effective at stable doses with demonstrated compliance.  Therefore, please do not assume that your prescribing responsibilities end on the day of pain clinic consultation.  Is this agreeable to you? YES    For any questions, contact the [facility] at [phone].    Please be aware that coverage of these services is subject to the terms and limitations of your health insurance plan.  Call member services at your health plan with any benefit or coverage questions.      Please bring the following with you to your appointment:    (1) Any X-Rays, CTs or MRIs which have been performed.  Contact the facility where they were done to arrange for  prior to your scheduled appointment.    (2) List of current medications   (3) This referral request   (4) Any documents/labs given to you for this referral                  Your next 10 appointments already scheduled     Sep 20, 2017 10:00 AM CDT   (Arrive by 9:45 AM)   RETURN ENDOCRINE with Dallas Lo MD   [facility] ([facility])    [address] [place] [address]  3rd [facility] [address] [phone]              Who to contact     Normal or non-critical lab and imaging results will be communicated to you by MyChart, letter or phone within 4 business days after  "the clinic has received the results. If you do not hear from us within 7 days, please contact the clinic through [employer] or phone. If you have a critical or abnormal lab result, we will notify you by phone as soon as possible.  Submit refill requests through [employer] or call your pharmacy and they will forward the refill request to us. Please allow 3 business days for your refill to be completed.          If you need to speak with a  for additional information , please call: [phone]             Additional Information About Your Visit        [employer] Information     [employer] gives you secure access to your electronic health record. If you see a primary care provider, you can also send messages to your care team and make appointments. If you have questions, please call your primary care clinic.  If you do not have a primary care provider, please call [phone] and they will assist you.        Care EveryWhere ID     This is your Care EveryWhere ID. This could be used by other organizations to access your [place] medical records  [id number]        Your Vitals Were     Pulse Temperature Height Pulse Oximetry BMI (Body Mass Index)       78 98.9  F (37.2  C) (Oral) 5' 9\" (1.753 m) 97% 38.34 kg/m2        Blood Pressure from Last 3 Encounters:   07/31/17 133/70   06/02/17 132/81   03/15/17 137/86    Weight from Last 3 Encounters:   07/31/17 259 lb 9.6 oz (117.8 kg)   06/02/17 252 lb (114.3 kg)   03/15/17 257 lb (116.6 kg)              We Performed the Following     DERMATOLOGY REFERRAL     PAIN MANAGEMENT CENTER ([place]) REFERRAL          Today's Medication Changes          These changes are accurate as of: 7/31/17 10:37 AM.  If you have any questions, ask your nurse or doctor.               Start taking these medicines.        Dose/Directions    amoxicillin-clavulanate 875-125 MG per tablet   Commonly known as:  AUGMENTIN   Used for:  Acute non-recurrent maxillary sinusitis   Started by:  Kristofer, " Kristin Tariq PA-C        Dose:  1 tablet   Take 1 tablet by mouth 2 times daily   Quantity:  20 tablet   Refills:  0         These medicines have changed or have updated prescriptions.        Dose/Directions    traZODone 100 MG tablet   Commonly known as:  DESYREL   This may have changed:  how much to take   Used for:  Insomnia, unspecified insomnia        Dose:  100 mg   Take 1 tablet (100 mg) by mouth nightly as needed for sleep   Quantity:  30 tablet   Refills:  1            Where to get your medicines      These medications were sent to [id number] - [place], [place] - [address]  [address], [address] [place] [address]    Hours:  test fax successful 4/5/04 kr Phone:  [phone]     amoxicillin-clavulanate 875-125 MG per tablet                Primary Care Provider Office Phone # Fax #    Kristin Miner PA-C [phone]       [facility] [address] [facility] [address]        Equal Access to Services     [facility] AH: Hadii aad ku hadasho Soomaali, waaxda luqadaha, qaybta kaalmada adeegyada, waxay idiin hayaan adeeg kharash laqueenie . So [facility] [phone].    ATENCIÓN: Si habla español, tiene a wheeler disposición servicios gratuitos de asistencia lingüística. [facility] [phone].    We comply with applicable federal civil rights laws and [place] laws. We do not discriminate on the basis of race, color, national origin, age, disability sex, sexual orientation or gender identity.            Thank you!     Thank you for choosing [facility]  for your care. Our goal is always to provide you with excellent care. Hearing back from our patients is one way we can continue to improve our services. Please take a few minutes to complete the written survey that you may receive in the mail after your visit with us. Thank you!             Your Updated Medication List - Protect others around you: Learn how to safely use, store and throw away your medicines at  www.disposemymeds.org.          This list is accurate as of: 7/31/17 10:37 AM.  Always use your most recent med list.                   Brand Name Dispense Instructions for use Diagnosis    ABILIFY 5 MG tablet   Generic drug:  ARIPiprazole      Take 5 mg by mouth daily        amoxicillin-clavulanate 875-125 MG per tablet    AUGMENTIN    20 tablet    Take 1 tablet by mouth 2 times daily    Acute non-recurrent maxillary sinusitis       estradiol 2 MG tablet    ESTRACE    90 tablet    1 tablet daily    Gender identity disorder       ferrous sulfate 325 (65 FE) MG tablet    IRON     Take by mouth daily (with breakfast)        furosemide 20 MG tablet    LASIX    60 tablet    TAKE 1 TABLET BY MOUTH ONCE DAILY    Leg swelling       LAMOTRIGINE PO      Take 150 mg by mouth daily        LORAZEPAM PO      Take 0.5 mg by mouth every 2 hours as needed for anxiety        * medical cannabis inhalation (Patient's own supply.  Not a prescription)      Inhale into the lungs 2 times daily (This is NOT a prescription, and does not certify that the patient has a qualifying medical condition for medical cannabis.  The purpose of this order is  to document that the patient reports taking medical cannabis.)  MORNING AND AT NIGHT        * medical cannabis (Patient's own supply.  Not a prescription)      1 capsule 2 times daily (This is NOT a prescription, and does not certify that the patient has a qualifying medical condition for medical cannabis.  The purpose of this order is  to document that the patient reports taking medical cannabis.)  Morning and afternoon        MELATONIN PO           Multi-vitamin Tabs tablet      Take 1 tablet by mouth daily        simvastatin 20 MG tablet    ZOCOR    90 tablet    Take 1 tablet (20 mg) by mouth At Bedtime    Hyperlipidemia LDL goal <130       topiramate 200 MG tablet    TOPAMAX    60 tablet    Take 1 tablet (200 mg) by mouth 2 times daily    Chronic pain disorder, Fibromyalgia       traZODone 100  MG tablet    DESYREL    30 tablet    Take 1 tablet (100 mg) by mouth nightly as needed for sleep    Insomnia, unspecified insomnia       venlafaxine 150 MG 24 hr capsule    EFFEXOR-XR     300 mg        VISTARIL PO      Take 100 mg by mouth 4 times daily as needed    Gender identity disorder, General symptoms NEC, Unspecified vitamin D deficiency       VITAMIN B 12 PO           VITAMIN D (CHOLECALCIFEROL) PO      Take 5,000 Units by mouth daily        * Notice:  This list has 2 medication(s) that are the same as other medications prescribed for you. Read the directions carefully, and ask your doctor or other care provider to review them with you.

## 2017-09-20 ENCOUNTER — OFFICE VISIT (OUTPATIENT)
Dept: ENDOCRINOLOGY | Facility: CLINIC | Age: 52
End: 2017-09-20

## 2017-09-20 VITALS
HEART RATE: 80 BPM | HEIGHT: 69 IN | BODY MASS INDEX: 39.8 KG/M2 | WEIGHT: 268.7 LBS | SYSTOLIC BLOOD PRESSURE: 121 MMHG | DIASTOLIC BLOOD PRESSURE: 82 MMHG

## 2017-09-20 DIAGNOSIS — F64.9 GENDER IDENTITY DISORDER: Primary | ICD-10-CM

## 2017-09-20 DIAGNOSIS — F64.9 GENDER IDENTITY DISORDER: ICD-10-CM

## 2017-09-20 ASSESSMENT — ENCOUNTER SYMPTOMS
TREMORS: 1
FEVER: 0
MYALGIAS: 1
MUSCLE CRAMPS: 1
BLOOD IN STOOL: 0
CHILLS: 0
EYE WATERING: 0
DIARRHEA: 1
INCREASED ENERGY: 0
ALTERED TEMPERATURE REGULATION: 1
WEIGHT GAIN: 1
INSOMNIA: 1
MEMORY LOSS: 1
HALLUCINATIONS: 0
LOSS OF CONSCIOUSNESS: 0
NAIL CHANGES: 1
TINGLING: 1
NIGHT SWEATS: 0
DEPRESSION: 1
STIFFNESS: 1
JAUNDICE: 0
RECTAL PAIN: 0
PANIC: 1
NAUSEA: 0
BLOATING: 1
POOR WOUND HEALING: 0
SKIN CHANGES: 0
EYE REDNESS: 0
EYE IRRITATION: 0
WEAKNESS: 1
POLYDIPSIA: 0
POLYPHAGIA: 0
SPEECH CHANGE: 0
ARTHRALGIAS: 1
NECK PAIN: 1
DOUBLE VISION: 0
JOINT SWELLING: 1
EYE PAIN: 1
RECTAL BLEEDING: 0
BOWEL INCONTINENCE: 0
DISTURBANCES IN COORDINATION: 0
BACK PAIN: 1
HEADACHES: 0
MUSCLE WEAKNESS: 1
SEIZURES: 0
NUMBNESS: 1
NERVOUS/ANXIOUS: 1
ABDOMINAL PAIN: 0
HEARTBURN: 0
CONSTIPATION: 1
VOMITING: 0
PARALYSIS: 0
WEIGHT LOSS: 1
DECREASED CONCENTRATION: 1
FATIGUE: 1

## 2017-09-20 ASSESSMENT — PAIN SCALES - GENERAL: PAINLEVEL: NO PAIN (0)

## 2017-09-20 NOTE — MR AVS SNAPSHOT
After Visit Summary   9/20/2017    Kylie Piper    MRN: [id number]           Patient Information     Date Of Birth          1965        Visit Information        Provider Department      9/20/2017 10:00 AM Dallas Lo MD [facility]        Today's Diagnoses     Gender identity disorder    -  1      Care Instructions    Dr. Lo will follow up with results of lab tests.    If you want to follow up with Dr Mckeon that would be fine.  If you would like to continue coming here to the [facility] we would have you transition to our [facility].  If you would like to see them, let Dr. Lo know and he can arrange the referral.  Dr. Berkowitz is the physician there who manages the hormone therapy.          Follow-ups after your visit        Follow-up notes from your care team     Return Pt will decide whether to continue fu here at  or elsewhere.      Your next 10 appointments already scheduled     Oct 02, 2017  6:15 PM CDT   [facility] Visit with Lily Bertrand PA-C   [facility]    [address] [address] [phone]            Nov 07, 2017  2:00 PM CST   New Visit with Stanislav Rayo MD   [facility] ([facility])    [address] [address] [phone]              Future tests that were ordered for you today     Open Future Orders        Priority Expected Expires Ordered    Estradiol ultrasensitive Routine  9/21/2018 9/20/2017            Who to contact     Please call your clinic at [phone] to:    Ask questions about your health    Make or cancel appointments    Discuss your medicines    Learn about your test results    Speak to your doctor   If you have compliments or concerns about an experience at your clinic, or if you wish to file a complaint, please contact [facility] Patient Relations at  "[phone] or email us at [email or web address]         Additional Information About Your Visit        [email or web address] Information     [employer] gives you secure access to your electronic health record. If you see a primary care provider, you can also send messages to your care team and make appointments. If you have questions, please call your primary care clinic.  If you do not have a primary care provider, please call [phone] and they will assist you.      [employer] is an electronic gateway that provides easy, online access to your medical records. With [employer], you can request a clinic appointment, read your test results, renew a prescription or communicate with your care team.     To access your existing account, please contact your [facility] or call [phone] for assistance.        Care EveryWhere ID     This is your Care EveryWhere ID. This could be used by other organizations to access your [place] medical records  [id number]        Your Vitals Were     Pulse Height BMI (Body Mass Index)             80 1.753 m (5' 9\") 39.68 kg/m2          Blood Pressure from Last 3 Encounters:   09/20/17 121/82   07/31/17 133/70   06/02/17 132/81    Weight from Last 3 Encounters:   09/20/17 121.9 kg (268 lb 11.2 oz)   07/31/17 117.8 kg (259 lb 9.6 oz)   06/02/17 114.3 kg (252 lb)                 Today's Medication Changes          These changes are accurate as of: 9/20/17 10:35 AM.  If you have any questions, ask your nurse or doctor.               These medicines have changed or have updated prescriptions.        Dose/Directions    traZODone 100 MG tablet   Commonly known as:  DESYREL   This may have changed:  how much to take   Used for:  Insomnia, unspecified insomnia        Dose:  100 mg   Take 1 tablet (100 mg) by mouth nightly as needed for sleep   Quantity:  30 tablet   Refills:  1                Primary Care Provider Office Phone # Fax #    Kristin Miner PA-C " [phone]       [address]  [place] [address]        Equal Access to Services     KRISSY WORTHINGTON : Hadindia dedra piedra joanna Gross, waazarda luqjuan manuel, tana kagentryda yuniel, riley juarez. So [facility] [phone].    ATENCIÓN: Si habla español, tiene a wheeler disposición servicios gratuitos de asistencia lingüística. Llame al [phone].    We comply with applicable federal civil rights laws and [place] laws. We do not discriminate on the basis of race, color, national origin, age, disability sex, sexual orientation or gender identity.            Thank you!     Thank you for choosing [facility]  for your care. Our goal is always to provide you with excellent care. Hearing back from our patients is one way we can continue to improve our services. Please take a few minutes to complete the written survey that you may receive in the mail after your visit with us. Thank you!             Your Updated Medication List - Protect others around you: Learn how to safely use, store and throw away your medicines at www.disposemymeds.org.          This list is accurate as of: 9/20/17 10:35 AM.  Always use your most recent med list.                   Brand Name Dispense Instructions for use Diagnosis    ABILIFY 5 MG tablet   Generic drug:  ARIPiprazole      Take 5 mg by mouth daily        amoxicillin-clavulanate 875-125 MG per tablet    AUGMENTIN    20 tablet    Take 1 tablet by mouth 2 times daily    Acute non-recurrent maxillary sinusitis       estradiol 2 MG tablet    ESTRACE    90 tablet    1 tablet daily    Gender identity disorder       ferrous sulfate 325 (65 FE) MG tablet    IRON     Take by mouth daily (with breakfast)        furosemide 20 MG tablet    LASIX    60 tablet    TAKE 1 TABLET BY MOUTH ONCE DAILY    Leg swelling       LAMOTRIGINE PO      Take 150 mg by mouth daily        LORAZEPAM PO      Take 0.5 mg by mouth every 2 hours as needed for anxiety        *  medical cannabis inhalation (Patient's own supply.  Not a prescription)      Inhale into the lungs 2 times daily (This is NOT a prescription, and does not certify that the patient has a qualifying medical condition for medical cannabis.  The purpose of this order is  to document that the patient reports taking medical cannabis.)  MORNING AND AT NIGHT        * medical cannabis (Patient's own supply.  Not a prescription)      1 capsule 2 times daily (This is NOT a prescription, and does not certify that the patient has a qualifying medical condition for medical cannabis.  The purpose of this order is  to document that the patient reports taking medical cannabis.)  Morning and afternoon        MELATONIN PO           Multi-vitamin Tabs tablet      Take 1 tablet by mouth daily        simvastatin 20 MG tablet    ZOCOR    90 tablet    Take 1 tablet (20 mg) by mouth At Bedtime    Hyperlipidemia LDL goal <130       topiramate 200 MG tablet    TOPAMAX    60 tablet    Take 1 tablet (200 mg) by mouth 2 times daily    Chronic pain disorder, Fibromyalgia       traZODone 100 MG tablet    DESYREL    30 tablet    Take 1 tablet (100 mg) by mouth nightly as needed for sleep    Insomnia, unspecified insomnia       venlafaxine 150 MG 24 hr capsule    EFFEXOR-XR     300 mg        VISTARIL PO      Take 100 mg by mouth 4 times daily as needed    Gender identity disorder, General symptoms NEC, Unspecified vitamin D deficiency       VITAMIN B 12 PO           VITAMIN D (CHOLECALCIFEROL) PO      Take 5,000 Units by mouth daily        * Notice:  This list has 2 medication(s) that are the same as other medications prescribed for you. Read the directions carefully, and ask your doctor or other care provider to review them with you.

## 2017-09-20 NOTE — PATIENT INSTRUCTIONS
Dr. Lo will follow up with results of lab tests.    If you want to follow up with Dr Mckeon that would be fine.  [facility].  If you would like to see them, let Dr. Lo know and he can arrange the referral.  Dr. Berkowitz is the physician there who manages the hormone therapy.

## 2017-09-20 NOTE — LETTER
9/20/2017       RE: Kylie Piper  [address] [address] [facility] [address]     Dear Colleague,    Thank you for referring your patient, Kylie Piper, to the [facility] ENDO[facility]OLOGY at [facility]. Please see a copy of my visit note below.    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      HISTORY OF PRESENT ILLNESS:  Ms. Des Austin is here for a followup visit.  We typically see her about every 6 months.  We saw her last March.  At that time, she was maintained on her regimen of oral estradiol replacement. She declined changing to the patch due to past high costs and concern for lower estradiol lab values.   We continued her estradiol 2 mg daily.      She returns to follow up.  She reports things are going well.  She has had no major issues or problems, as far as hormone therapy since we last saw her. She has gained about 10 lbs since her last visit which she is not happy about. She tries to follow a low calorie diet (900-1200 camilo/day). She cannot exercise much due to chronic diffuse pain. She is on topiramate 200mg BID for wt loss/appetite suppression, but she was started on abilify May or June which helped significantly with her mental health/mood, but may be associated with wt gain.      She continues to get some facial hair but she is not bothered by it.  She does shave daily.  Otherwise, she has noticed decreased body hair.  She reports no new headaches, severe or frequent headaches, no problems with leg swelling, no chest pain/OLIVAREZ and no GI complaints.      She does not smoke tobacco.        Diet recall:  Breakfast - 730a - egg, tana pocket breakfast sandwich low camilo  Snack - not usually, occasionally veggies  Lunch - 12-1pm - 1/2 sandwich, piece chicken w/  "veggie  Snack - occasionally, leftover chicken, hard boiled egg, rye cracker   Supper - pork chop grilled, brussel sprouts, asparagus, tossed salad  Snack - porkchop small portion      Current Outpatient Prescriptions   Medication     ARIPiprazole (ABILIFY) 5 MG tablet     amoxicillin-clavulanate (AUGMENTIN) 875-125 MG per tablet     furosemide (LASIX) 20 MG tablet     simvastatin (ZOCOR) 20 MG tablet     topiramate (TOPAMAX) 200 MG tablet     medical cannabis inhalation (Patient's own supply.  Not a prescription)     medical cannabis (Patient's own supply.  Not a prescription)     estradiol (ESTRACE) 2 MG tablet     VITAMIN D, CHOLECALCIFEROL, PO     ferrous sulfate (IRON) 325 (65 FE) MG tablet     Cyanocobalamin (VITAMIN B 12 PO)     multivitamin, therapeutic with minerals (MULTI-VITAMIN) TABS     LAMOTRIGINE PO     MELATONIN PO     LORAZEPAM PO     traZODone (DESYREL) 100 MG tablet     HydrOXYzine Pamoate (VISTARIL PO)     venlafaxine (EFFEXOR-XR) 150 MG 24 hr capsule     No current facility-administered medications for this visit.      OTC/herbal meds: medical cannabis for depression/chronic pain, no other       REVIEW OF SYSTEMS:  As above.  She has some weight since her last visit.  She attributes that to having more food choices available in the house.      PHYSICAL EXAMINATION:     VITAL SIGNS:  /82  Pulse 80  Ht 1.753 m (5' 9\")  Wt 121.9 kg (268 lb 11.2 oz)  BMI 39.68 kg/m2  Her weight is 268 pounds.  Up from 257 at last visit.    GENERAL:  She has a generally feminine appearance.  Face is smooth.   HEENT: normal peripheral vision  Breasts: Significant fatty component.  Bo 4, no mass palpable.  CHEST:  Clear breath sounds.   CARDIAC:  Regular rate and rhythm, normal heart sounds.   ABDOMEN:  Soft and nontender, no hepatosplenomegaly.   EXTREMITIES:  She has no lower extremity edema.      LABORATORY TESTS:  We reviewed her recent records since her last visit with us.  Her last labs were drawn " by us, her estradiol level was 45pg/mL.      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  The patient has been on feminizing hormone therapy since 2012.  She has also undergone orchiectomy and gender reassignment surgery in 2016.     Ms. Des Austin has a reasonable level of feminization.  Given her age, we are not overly aggressive in her estrogen replacement.  We again discussed a trial of transdermal estrogen today, but she had had problems as far as the cost, and in addition she had difficulty getting adequate levels with the patch; therefore, for now we are continuing with the low dose oral estradiol.      PLAN:   1.  Continue current estradiol 2 mg orally once a day for now.   2.  We will check an estradiol level today, and will change dose if necessary.   3.  Discussed Dr. Cash alf, patient will discuss transition of care to her gynecologist Dr. Mckeon next week at her visit. If Dr. Mckeon is unable to manage the hormonal replacement for any reason, we would recommend follow up with Dr. Avinash Berkowitz at the [facility] at the [facility].      Patient was discussed with attending Dr. Wally Gross MD  Fellow in Diabetes, Endocrinology, and Metabolism  PGY4  Pager [phone]      Patient seen by me with fellow Dr. Gross.  Pt on relatively low dose of estradiol given her age and hx of gonadectomy.  Has had no adverse effects; we would prefer change to transdermal but pt wishes to continue oral for now,.  Given dose and levels, feel this is reasonable.  Findings and plan as above.    Miguel Lo MD   [phone]

## 2017-09-20 NOTE — NURSING NOTE
Chief Complaint   Patient presents with     RECHECK     return hormone levels      Brenda Figueroa CMA

## 2017-09-20 NOTE — PROGRESS NOTES
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      HISTORY OF PRESENT ILLNESS:  Ms. Des Austin is here for a followup visit.  We typically see her about every 6 months.  We saw her last March.  At that time, she was maintained on her regimen of oral estradiol replacement. She declined changing to the patch due to past high costs and concern for lower estradiol lab values.   We continued her estradiol 2 mg daily.      She returns to follow up.  She reports things are going well.  She has had no major issues or problems, as far as hormone therapy since we last saw her. She has gained about 10 lbs since her last visit which she is not happy about. She tries to follow a low calorie diet (900-1200 camilo/day). She cannot exercise much due to chronic diffuse pain. She is on topiramate 200mg BID for wt loss/appetite suppression, but she was started on abilify May or June which helped significantly with her mental health/mood, but may be associated with wt gain.      She continues to get some facial hair but she is not bothered by it.  She does shave daily.  Otherwise, she has noticed decreased body hair.  She reports no new headaches, severe or frequent headaches, no problems with leg swelling, no chest pain/OLIVAREZ and no GI complaints.      She does not smoke tobacco.        Diet recall:  Breakfast - 730a - egg, tana pocket breakfast sandwich low camilo  Snack - not usually, occasionally veggies  Lunch - 12-1pm - 1/2 sandwich, piece chicken w/ veggie  Snack - occasionally, leftover chicken, hard boiled egg, rye cracker   Supper - pork chop grilled, brussel sprouts, asparagus, tossed salad  Snack - porkchop small portion      Current Outpatient Prescriptions   Medication     ARIPiprazole (ABILIFY) 5 MG tablet     amoxicillin-clavulanate (AUGMENTIN)  "875-125 MG per tablet     furosemide (LASIX) 20 MG tablet     simvastatin (ZOCOR) 20 MG tablet     topiramate (TOPAMAX) 200 MG tablet     medical cannabis inhalation (Patient's own supply.  Not a prescription)     medical cannabis (Patient's own supply.  Not a prescription)     estradiol (ESTRACE) 2 MG tablet     VITAMIN D, CHOLECALCIFEROL, PO     ferrous sulfate (IRON) 325 (65 FE) MG tablet     Cyanocobalamin (VITAMIN B 12 PO)     multivitamin, therapeutic with minerals (MULTI-VITAMIN) TABS     LAMOTRIGINE PO     MELATONIN PO     LORAZEPAM PO     traZODone (DESYREL) 100 MG tablet     HydrOXYzine Pamoate (VISTARIL PO)     venlafaxine (EFFEXOR-XR) 150 MG 24 hr capsule     No current facility-administered medications for this visit.      OTC/herbal meds: medical cannabis for depression/chronic pain, no other       REVIEW OF SYSTEMS:  As above.  She has some weight since her last visit.  She attributes that to having more food choices available in the house.      PHYSICAL EXAMINATION:     VITAL SIGNS:  /82  Pulse 80  Ht 1.753 m (5' 9\")  Wt 121.9 kg (268 lb 11.2 oz)  BMI 39.68 kg/m2  Her weight is 268 pounds.  Up from 257 at last visit.    GENERAL:  She has a generally feminine appearance.  Face is smooth.   HEENT: normal peripheral vision  Breasts: Significant fatty component.  Bo 4, no mass palpable.  CHEST:  Clear breath sounds.   CARDIAC:  Regular rate and rhythm, normal heart sounds.   ABDOMEN:  Soft and nontender, no hepatosplenomegaly.   EXTREMITIES:  She has no lower extremity edema.      LABORATORY TESTS:  We reviewed her recent records since her last visit with us.  Her last labs were drawn by us, her estradiol level was 45pg/mL.      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  The patient has been on feminizing hormone therapy since 2012.  She has also undergone orchiectomy and gender reassignment surgery in 2016.     Ms. Des Austin has a " reasonable level of feminization.  Given her age, we are not overly aggressive in her estrogen replacement.  We again discussed a trial of transdermal estrogen today, but she had had problems as far as the cost, and in addition she had difficulty getting adequate levels with the patch; therefore, for now we are continuing with the low dose oral estradiol.      PLAN:   1.  Continue current estradiol 2 mg orally once a day for now.   2.  We will check an estradiol level today, and will change dose if necessary.   3.  Discussed Dr. Cash halfway, patient will discuss transition of care to her gynecologist Dr. Mckeon next week at her visit. If Dr. Mckeon is unable to manage the hormonal replacement for any reason, we would recommend follow up with Dr. Avin[facility] Berkowitz at the [facility] at the [facility].      Patient was discussed with attending Dr. Wally Gross MD  Fellow in Diabetes, Endocrinology, and Metabolism  PGY4  Pager [phone]      Patient seen by me with fellow Dr. Gross.  Pt on relatively low dose of estradiol given her age and hx of gonadectomy.  Has had no adverse effects; we would prefer change to transdermal but pt wishes to continue oral for now,.  Given dose and levels, feel this is reasonable.  Findings and plan as above.    Miguel Lo MD   [phone]

## 2017-09-27 DIAGNOSIS — M79.89 LEG SWELLING: ICD-10-CM

## 2017-09-27 DIAGNOSIS — I10 HYPERTENSION GOAL BP (BLOOD PRESSURE) < 130/80: Primary | ICD-10-CM

## 2017-09-27 NOTE — TELEPHONE ENCOUNTER
FUROSEMIDE      Last Written Prescription Date: 7-17-17  Last Fill Quantity: 60, # refills: 0  Last Office Visit with [facility], [facility] or [facility] prescribing provider: 9-20-17       Potassium   Date Value Ref Range Status   10/19/2016 3.6 3.4 - 5.3 mmol/L Final     Creatinine   Date Value Ref Range Status   10/19/2016 0.82 0.52 - 1.04 mg/dL Final     BP Readings from Last 3 Encounters:   09/20/17 121/82   07/31/17 133/70   06/02/17 132/81

## 2017-09-27 NOTE — TELEPHONE ENCOUNTER
Routing refill request to provider for review/approval because:  Pt due for labs in October.   Medication pended 60 tabs. Due you also want her to come in for labs or make an office visit.

## 2017-09-28 LAB — ESTRADIOL SERPL HS-MCNC: 39 PG/ML

## 2017-09-28 RX ORDER — FUROSEMIDE 20 MG
20 TABLET ORAL DAILY
Qty: 90 TABLET | Refills: 3 | Status: SHIPPED | OUTPATIENT
Start: 2017-09-28 | End: 2018-10-16

## 2017-09-29 DIAGNOSIS — Z13.220 LIPID SCREENING: ICD-10-CM

## 2017-09-29 DIAGNOSIS — Z79.899 HIGH RISK MEDICATION USE: ICD-10-CM

## 2017-09-29 DIAGNOSIS — R60.9 EDEMA: ICD-10-CM

## 2017-09-29 DIAGNOSIS — Z51.81 THERAPEUTIC DRUG MONITORING: ICD-10-CM

## 2017-09-29 DIAGNOSIS — E55.9 VITAMIN D DEFICIENCY: ICD-10-CM

## 2017-09-29 DIAGNOSIS — Z86.39 HISTORY OF VITAMIN D DEFICIENCY: ICD-10-CM

## 2017-09-29 DIAGNOSIS — Z13.29 SCREENING FOR ENDOCRINE DISORDER: ICD-10-CM

## 2017-09-29 DIAGNOSIS — Z51.81 ENCOUNTER FOR THERAPEUTIC DRUG MONITORING: ICD-10-CM

## 2017-09-29 DIAGNOSIS — Z13.1 SCREENING FOR DIABETES MELLITUS: ICD-10-CM

## 2017-09-29 DIAGNOSIS — F31.81 BIPOLAR 2 DISORDER (H): Primary | ICD-10-CM

## 2017-09-29 DIAGNOSIS — F41.1 GENERALIZED ANXIETY DISORDER: ICD-10-CM

## 2017-09-29 LAB
ALBUMIN SERPL-MCNC: 3.4 G/DL (ref 3.4–5)
ALP SERPL-CCNC: 59 U/L (ref 40–150)
ALT SERPL W P-5'-P-CCNC: 85 U/L (ref 0–50)
ANION GAP SERPL CALCULATED.3IONS-SCNC: 10 MMOL/L (ref 3–14)
AST SERPL W P-5'-P-CCNC: 46 U/L (ref 0–45)
BASOPHILS # BLD AUTO: 0.1 10E9/L (ref 0–0.2)
BASOPHILS NFR BLD AUTO: 0.5 %
BILIRUB SERPL-MCNC: 0.2 MG/DL (ref 0.2–1.3)
BUN SERPL-MCNC: 19 MG/DL (ref 7–30)
CALCIUM SERPL-MCNC: 8.2 MG/DL (ref 8.5–10.1)
CHLORIDE SERPL-SCNC: 107 MMOL/L (ref 94–109)
CHOLEST SERPL-MCNC: 196 MG/DL
CO2 SERPL-SCNC: 24 MMOL/L (ref 20–32)
CREAT SERPL-MCNC: 0.84 MG/DL (ref 0.52–1.04)
DEPRECATED CALCIDIOL+CALCIFEROL SERPL-MC: 42 UG/L (ref 20–75)
DIFFERENTIAL METHOD BLD: NORMAL
EOSINOPHIL # BLD AUTO: 0.4 10E9/L (ref 0–0.7)
EOSINOPHIL NFR BLD AUTO: 4.6 %
ERYTHROCYTE [DISTWIDTH] IN BLOOD BY AUTOMATED COUNT: 13.7 % (ref 10–15)
GFR SERPL CREATININE-BSD FRML MDRD: 71 ML/MIN/1.7M2
GLUCOSE SERPL-MCNC: 101 MG/DL (ref 70–99)
HBA1C MFR BLD: 5.4 % (ref 4.3–6)
HCT VFR BLD AUTO: 38.8 % (ref 35–47)
HDLC SERPL-MCNC: 36 MG/DL
HGB BLD-MCNC: 12.6 G/DL (ref 11.7–15.7)
LDLC SERPL CALC-MCNC: 111 MG/DL
LYMPHOCYTES # BLD AUTO: 3.2 10E9/L (ref 0.8–5.3)
LYMPHOCYTES NFR BLD AUTO: 33.7 %
MCH RBC QN AUTO: 29 PG (ref 26.5–33)
MCHC RBC AUTO-ENTMCNC: 32.5 G/DL (ref 31.5–36.5)
MCV RBC AUTO: 89 FL (ref 78–100)
MONOCYTES # BLD AUTO: 1 10E9/L (ref 0–1.3)
MONOCYTES NFR BLD AUTO: 10.1 %
NEUTROPHILS # BLD AUTO: 4.8 10E9/L (ref 1.6–8.3)
NEUTROPHILS NFR BLD AUTO: 51.1 %
NONHDLC SERPL-MCNC: 160 MG/DL
PLATELET # BLD AUTO: 400 10E9/L (ref 150–450)
POTASSIUM SERPL-SCNC: 4 MMOL/L (ref 3.4–5.3)
PROT SERPL-MCNC: 7.4 G/DL (ref 6.8–8.8)
RBC # BLD AUTO: 4.34 10E12/L (ref 3.8–5.2)
SODIUM SERPL-SCNC: 141 MMOL/L (ref 133–144)
TRIGL SERPL-MCNC: 246 MG/DL
TSH SERPL DL<=0.005 MIU/L-ACNC: 2.57 MU/L (ref 0.4–4)
WBC # BLD AUTO: 9.4 10E9/L (ref 4–11)

## 2017-09-29 PROCEDURE — 80201 ASSAY OF TOPIRAMATE: CPT | Mod: 90 | Performed by: NURSE PRACTITIONER

## 2017-09-29 PROCEDURE — 80061 LIPID PANEL: CPT | Performed by: NURSE PRACTITIONER

## 2017-09-29 PROCEDURE — 99000 SPECIMEN HANDLING OFFICE-LAB: CPT | Performed by: NURSE PRACTITIONER

## 2017-09-29 PROCEDURE — 82306 VITAMIN D 25 HYDROXY: CPT | Performed by: NURSE PRACTITIONER

## 2017-09-29 PROCEDURE — 80175 DRUG SCREEN QUAN LAMOTRIGINE: CPT | Mod: 90 | Performed by: NURSE PRACTITIONER

## 2017-09-29 PROCEDURE — 36415 COLL VENOUS BLD VENIPUNCTURE: CPT | Performed by: NURSE PRACTITIONER

## 2017-09-29 PROCEDURE — 80050 GENERAL HEALTH PANEL: CPT | Performed by: NURSE PRACTITIONER

## 2017-09-29 PROCEDURE — 83036 HEMOGLOBIN GLYCOSYLATED A1C: CPT | Performed by: NURSE PRACTITIONER

## 2017-09-30 LAB
LAMOTRIGINE SERPL-MCNC: 1.2 UG/ML (ref 2.5–15)
TOPIRAMATE SERPL-MCNC: 7.1 UG/ML (ref 5–20)

## 2017-10-02 ENCOUNTER — OFFICE VISIT (OUTPATIENT)
Dept: DERMATOLOGY | Facility: CLINIC | Age: 52
End: 2017-10-02
Payer: COMMERCIAL

## 2017-10-02 VITALS — DIASTOLIC BLOOD PRESSURE: 79 MMHG | HEIGHT: 69 IN | SYSTOLIC BLOOD PRESSURE: 133 MMHG | HEART RATE: 99 BPM

## 2017-10-02 DIAGNOSIS — L28.1 PRURIGO NODULARIS: Primary | ICD-10-CM

## 2017-10-02 DIAGNOSIS — L82.1 SEBORRHEIC KERATOSIS: ICD-10-CM

## 2017-10-02 DIAGNOSIS — L81.4 LENTIGO: ICD-10-CM

## 2017-10-02 DIAGNOSIS — D22.9 NEVUS: ICD-10-CM

## 2017-10-02 DIAGNOSIS — D18.00 ANGIOMA: ICD-10-CM

## 2017-10-02 DIAGNOSIS — L85.8 KP (KERATOSIS PILARIS): ICD-10-CM

## 2017-10-02 PROCEDURE — 99204 OFFICE O/P NEW MOD 45 MIN: CPT | Performed by: PHYSICIAN ASSISTANT

## 2017-10-02 RX ORDER — TRIAMCINOLONE ACETONIDE 1 MG/G
OINTMENT TOPICAL
Qty: 80 G | Refills: 3 | Status: SHIPPED | OUTPATIENT
Start: 2017-10-02 | End: 2019-08-13

## 2017-10-02 NOTE — PROGRESS NOTES
HPI:   Kylie Piper is a 52 year old female who presents for Full skin cancer screening.  chief complaint  Last Skin Exam: n/a      1st Baseline: yes  Personal HX of Skin Cancer: no   Personal HX of Malignant Melanoma: no   Family HX of Skin Cancer / Malignant Melanoma: Yes father and brother with unknown type of skin CA  Personal HX of Atypical Moles:   no  Risk factors: history of burns; one blistering sunburn  New / Changing lesions: few areas  Social History:   On review of systems, there are no further skin complaints, patient is feeling otherwise well.  See patient intake sheet.  ROS of the following were done and are negative: Constitutional, Eyes, Ears, Nose,   Mouth, Throat, Cardiovascular, Respiratory, GI, Genitourinary, Musculoskeletal,   Psychiatric, Endocrine, Allergic/Immunologic.    PHYSICAL EXAM:   Weight:  BP:   Skin exam performed as follows: Type 2 skin. Mood appropriate  Alert and Oriented X 3. Well developed, well nourished in no distress.  General appearance: Normal  Head including face: Normal  Eyes: conjunctiva and lids: Normal  Mouth: Lips, teeth, gums: Normal  Neck: Normal  Chest-breast/axillae: Normal  Back: Normal  Spleen and liver: Normal  Cardiovascular: Exam of peripheral vascular system by observation for swelling, varicosities, edema: Normal  Genitalia: groin, buttocks: Normal  Extremities: digits/nails (clubbing): Normal  Eccrine and Apocrine glands: Normal  Right upper extremity: Normal  Left upper extremity: Normal  Right lower extremity: Normal  Left lower extremity: Normal  Skin: Scalp and body hair: See below    Pt deferred exam of breasts, groin, buttocks: No    Other physical findings:  1. Multiple pigmented macules on extremities and trunk  2. Multiple pigmented macules on face, trunk and extremities  3. Multiple vascular papules on trunk, arms and legs  4. Multiple scattered keratotic plaques  5. Lichenified nodule on left anterior thigh, left posterior thigh  6.  Perifollicular erythema on bilateral thighs       Except as noted above, no other signs of skin cancer or melanoma.     ASSESSMENT/PLAN:   Benign Full skin cancer screening today. . Patient with history of none  Advised on monthly self exams and 1 year  Patient Education: Appropriate brochures given.    Multiple benign appearing nevi on arms, legs and trunk. Discussed ABCDEs of melanoma and sunscreen.   Multiple lentigos on arms, legs and trunk. Advised benign, no treatment needed.  Multiple scattered angiomas. Advised benign, no treatment needed.   Seborrheic keratosis on arms, legs and trunk. Advised benign, no treatment needed.  LSC/prurigo nodule on left anterior thigh and left posterior thigh - advised. Start TAC ointment BID x 2-3 weeks - advised to avoid rubbing/touching as much as possible.   Keratosis pilaris - bilateral thighs. Discussed chronic eczematous condition. Discussed need for gentle emollients and soaps. Start OTC AmLactin or CeraVe SA daily PRN        Follow-up: yearly FSE/PRN sooner    1.) Patient was asked about new and changing moles. YES  2.) Patient received a complete physical skin examination: YES  3.) Patient was counseled to perform a monthly self skin examination: YES  Scribed By: Lily Bertrand MS, PAYaminiC

## 2017-10-02 NOTE — MR AVS SNAPSHOT
After Visit Summary   10/2/2017    Kylie Piper    MRN: [id number]           Patient Information     Date Of Birth          1965        Visit Information        Provider Department      10/2/2017 6:15 PM Lily Bertrand PA-C [facility]        Today's Diagnoses     Prurigo nodularis    -  1      Care Instructions    For bumps on the backs of the legs, try OTC AmLactin or CeraVe SA 1-2 times per day when needed.           Follow-ups after your visit        Your next 10 appointments already scheduled     Nov 07, 2017  2:00 PM CST   New Visit with Stanislav Rayo MD   [facility] ([facility])    [address] [address] [phone]              Who to contact     If you have questions or need follow up information about today's clinic visit or your schedule please contact [facility] directly at [phone].  Normal or non-critical lab and imaging results will be communicated to you by Bitybean llchart, letter or phone within 4 business days after the clinic has received the results. If you do not hear from us within 7 days, please contact the clinic through Bitybean llchart or phone. If you have a critical or abnormal lab result, we will notify you by phone as soon as possible.  Submit refill requests through [facility] or call your pharmacy and they will forward the refill request to us. Please allow 3 business days for your refill to be completed.          Additional Information About Your Visit        MyChart Information     [facility] gives you secure access to your electronic health record. If you see a primary care provider, you can also send messages to your care team and make appointments. If you have questions, please call your primary care clinic.  If you do not have a primary care provider, please call [phone] and they will assist you.        Care EveryWhere ID     This is your Care EveryWhere ID. This  "could be used by other organizations to access your [place] medical records  [id number]        Your Vitals Were     Pulse Height                99 1.753 m (5' 9\")           Blood Pressure from Last 3 Encounters:   10/02/17 133/79   09/20/17 121/82   07/31/17 133/70    Weight from Last 3 Encounters:   09/20/17 121.9 kg (268 lb 11.2 oz)   07/31/17 117.8 kg (259 lb 9.6 oz)   06/02/17 114.3 kg (252 lb)              Today, you had the following     No orders found for display         Today's Medication Changes          These changes are accurate as of: 10/2/17  6:29 PM.  If you have any questions, ask your nurse or doctor.               Start taking these medicines.        Dose/Directions    triamcinolone 0.1 % ointment   Commonly known as:  KENALOG   Used for:  Prurigo nodularis   Started by:  Lily Bertrand PA-C        Apply to AA BID x 3-4 weeks then PRN   Quantity:  80 g   Refills:  3         These medicines have changed or have updated prescriptions.        Dose/Directions    traZODone 100 MG tablet   Commonly known as:  DESYREL   This may have changed:  how much to take   Used for:  Insomnia, unspecified insomnia        Dose:  100 mg   Take 1 tablet (100 mg) by mouth nightly as needed for sleep   Quantity:  30 tablet   Refills:  1            Where to get your medicines      These medications were sent to [id number] - [place] - [address] [address]  [address] [address]    Hours:  test fax successful 4/5/04  Phone:  [phone]     triamcinolone 0.1 % ointment                Primary Care Provider Office Phone # Fax #    Kristin Miner PA-C [phone] [id number] [id number]        Equal Access to Services     KRISSY WORTHINGTON AH: Dominik Gross, waazarda luqadaha, qaybta kaalmada adeegyahans, riley juarez. [facility] [phone].    ATENCIÓN: Si krunal mcneill, tiene a wheeler disposición servicios " rhonda de asistencia lingüística. Jay cedeno [phone].    We comply with applicable federal civil rights laws and [place] laws. We do not discriminate on the basis of race, color, national origin, age, disability, sex, sexual orientation, or gender identity.            Thank you!     Thank you for choosing [facility]  for your care. Our goal is always to provide you with excellent care. Hearing back from our patients is one way we can continue to improve our services. Please take a few minutes to complete the written survey that you may receive in the mail after your visit with us. Thank you!             Your Updated Medication List - Protect others around you: Learn how to safely use, store and throw away your medicines at www.disposemymeds.org.          This list is accurate as of: 10/2/17  6:29 PM.  Always use your most recent med list.                   Brand Name Dispense Instructions for use Diagnosis    ABILIFY 5 MG tablet   Generic drug:  ARIPiprazole      Take 5 mg by mouth daily        amoxicillin-clavulanate 875-125 MG per tablet    AUGMENTIN    20 tablet    Take 1 tablet by mouth 2 times daily    Acute non-recurrent maxillary sinusitis       estradiol 2 MG tablet    ESTRACE    90 tablet    1 tablet daily    Gender identity disorder       ferrous sulfate 325 (65 FE) MG tablet    IRON     Take by mouth daily (with breakfast)        furosemide 20 MG tablet    LASIX    90 tablet    Take 1 tablet (20 mg) by mouth daily - DUE FOR LABS    Leg swelling       LAMOTRIGINE PO      Take 150 mg by mouth daily        LORAZEPAM PO      Take 0.5 mg by mouth every 2 hours as needed for anxiety        * medical cannabis inhalation (Patient's own supply.  Not a prescription)      Inhale into the lungs 2 times daily (This is NOT a prescription, and does not certify that the patient has a qualifying medical condition for medical cannabis.  The purpose of this order is  to document that the patient  reports taking medical cannabis.)  MORNING AND AT NIGHT        * medical cannabis (Patient's own supply.  Not a prescription)      1 capsule 2 times daily (This is NOT a prescription, and does not certify that the patient has a qualifying medical condition for medical cannabis.  The purpose of this order is  to document that the patient reports taking medical cannabis.)  Morning and afternoon        MELATONIN PO           Multi-vitamin Tabs tablet      Take 1 tablet by mouth daily        simvastatin 20 MG tablet    ZOCOR    90 tablet    Take 1 tablet (20 mg) by mouth At Bedtime    Hyperlipidemia LDL goal <130       topiramate 200 MG tablet    TOPAMAX    60 tablet    Take 1 tablet (200 mg) by mouth 2 times daily    Chronic pain disorder, Fibromyalgia       traZODone 100 MG tablet    DESYREL    30 tablet    Take 1 tablet (100 mg) by mouth nightly as needed for sleep    Insomnia, unspecified insomnia       triamcinolone 0.1 % ointment    KENALOG    80 g    Apply to AA BID x 3-4 weeks then PRN    Prurigo nodularis       venlafaxine 150 MG 24 hr capsule    EFFEXOR-XR     300 mg        VISTARIL PO      Take 100 mg by mouth 4 times daily as needed    Gender identity disorder, Other general symptoms(780.99), Unspecified vitamin D deficiency       VITAMIN B 12 PO           VITAMIN D (CHOLECALCIFEROL) PO      Take 5,000 Units by mouth daily        * Notice:  This list has 2 medication(s) that are the same as other medications prescribed for you. Read the directions carefully, and ask your doctor or other care provider to review them with you.

## 2017-10-02 NOTE — NURSING NOTE
"Chief Complaint   Patient presents with     Derm Problem     skin check       Initial /79  Pulse 99  Ht 1.753 m (5' 9\") Estimated body mass index is 39.68 kg/(m^2) as calculated from the following:    Height as of 9/20/17: 1.753 m (5' 9\").    Weight as of 9/20/17: 121.9 kg (268 lb 11.2 oz).  Medication Reconciliation: complete    "

## 2017-10-11 ENCOUNTER — PRE VISIT (OUTPATIENT)
Dept: SURGERY | Facility: CLINIC | Age: 52
End: 2017-10-11

## 2017-10-11 NOTE — TELEPHONE ENCOUNTER
APPT INFORMATION    Date & Time:  10/18/17 at 7AM   Reason for Appt:  NBS   Referring Name/Clinic:  Kristin Kristofer -- [facility]    Yes / No COMMENT / NOTES DATE & ACTION   Patient Contacted?  No Records in Epic                    RECORDS CLINIC NAME  (use N/A if no records ) DATE & ACTION RECEIVED RECS & IMG? Y/N   (may include other helpful notes)   External Clinics:  N/A                 Internal Clinics:  [facility]   yes - records in Epic

## 2017-10-13 ENCOUNTER — CARE COORDINATION (OUTPATIENT)
Dept: SURGERY | Facility: CLINIC | Age: 52
End: 2017-10-13

## 2017-10-13 NOTE — PROGRESS NOTES
Patient contacted. phone  am  I am contacting you to confirm your appointment with  Xin NEWBERRY at 700 am on 10/18. Check in time at 645 am.    Please complete your questionnaire at least one day prior to your appointment. If you're unable to complete the questionnaire prior to your appointment please arrive 30 min prior to check-in time.    If questionarre is not completed your appointment may be canceled.     If you cannot make it to this appointment and would like to reschedule, please call [phone] option 1. For nursing questions please press option #3.    [facility], 4th floor.  [address] [place] [address]    It's a pleasure being involved in your health care      Sincerely,   Autumn Steve LPN  Bariatric and General Surgery

## 2017-10-18 ENCOUNTER — ALLIED HEALTH/NURSE VISIT (OUTPATIENT)
Dept: SURGERY | Facility: CLINIC | Age: 52
End: 2017-10-18

## 2017-10-18 ENCOUNTER — OFFICE VISIT (OUTPATIENT)
Dept: SURGERY | Facility: CLINIC | Age: 52
End: 2017-10-18

## 2017-10-18 VITALS
DIASTOLIC BLOOD PRESSURE: 80 MMHG | OXYGEN SATURATION: 100 % | HEIGHT: 69 IN | SYSTOLIC BLOOD PRESSURE: 136 MMHG | BODY MASS INDEX: 40.18 KG/M2 | TEMPERATURE: 97.9 F | HEART RATE: 81 BPM | WEIGHT: 271.3 LBS

## 2017-10-18 DIAGNOSIS — E66.01 MORBID OBESITY (H): Primary | ICD-10-CM

## 2017-10-18 DIAGNOSIS — I10 HYPERTENSION GOAL BP (BLOOD PRESSURE) < 130/80: ICD-10-CM

## 2017-10-18 DIAGNOSIS — M79.89 LEG SWELLING: ICD-10-CM

## 2017-10-18 LAB
ALBUMIN SERPL-MCNC: 3.8 G/DL (ref 3.4–5)
ALP SERPL-CCNC: 69 U/L (ref 40–150)
ALT SERPL W P-5'-P-CCNC: 106 U/L (ref 0–50)
ANION GAP SERPL CALCULATED.3IONS-SCNC: 8 MMOL/L (ref 3–14)
AST SERPL W P-5'-P-CCNC: 64 U/L (ref 0–45)
BILIRUB SERPL-MCNC: 0.4 MG/DL (ref 0.2–1.3)
BUN SERPL-MCNC: 20 MG/DL (ref 7–30)
CALCIUM SERPL-MCNC: 9 MG/DL (ref 8.5–10.1)
CHLORIDE SERPL-SCNC: 105 MMOL/L (ref 94–109)
CO2 SERPL-SCNC: 24 MMOL/L (ref 20–32)
CREAT SERPL-MCNC: 0.95 MG/DL (ref 0.52–1.04)
CREAT UR-MCNC: 136 MG/DL
ERYTHROCYTE [DISTWIDTH] IN BLOOD BY AUTOMATED COUNT: 13.2 % (ref 10–15)
GFR SERPL CREATININE-BSD FRML MDRD: 62 ML/MIN/1.7M2
GLUCOSE SERPL-MCNC: 99 MG/DL (ref 70–99)
HCT VFR BLD AUTO: 42.3 % (ref 35–47)
HGB BLD-MCNC: 13.6 G/DL (ref 11.7–15.7)
MCH RBC QN AUTO: 28.2 PG (ref 26.5–33)
MCHC RBC AUTO-ENTMCNC: 32.2 G/DL (ref 31.5–36.5)
MCV RBC AUTO: 88 FL (ref 78–100)
MICROALBUMIN UR-MCNC: 8 MG/L
MICROALBUMIN/CREAT UR: 5.58 MG/G CR (ref 0–25)
PLATELET # BLD AUTO: 402 10E9/L (ref 150–450)
POTASSIUM SERPL-SCNC: 4.5 MMOL/L (ref 3.4–5.3)
PROT SERPL-MCNC: 7.9 G/DL (ref 6.8–8.8)
PTH-INTACT SERPL-MCNC: 53 PG/ML (ref 12–72)
RBC # BLD AUTO: 4.83 10E12/L (ref 3.8–5.2)
SODIUM SERPL-SCNC: 136 MMOL/L (ref 133–144)
WBC # BLD AUTO: 12.4 10E9/L (ref 4–11)

## 2017-10-18 ASSESSMENT — PAIN SCALES - GENERAL: PAINLEVEL: MODERATE PAIN (5)

## 2017-10-18 NOTE — MR AVS SNAPSHOT
After Visit Summary   10/18/2017    Kylie Piper    MRN: [id number]           Patient Information     Date Of Birth          1965        Visit Information        Provider Department      10/18/2017 7:00 AM Xin Costa PA-C [facility]        Today's Diagnoses     Morbid obesity (H)    -  1      Care Instructions    See Lily Nieto for bariatric psych eval  Letter from psychiatrist  Clearance and plan from pain clinic  Clearance from cardiology  Labs today  Make appt with medical weight management for new consult to have in case not a surgical candidate  Our team will review psych eval and letter from psychiatrist and our team will review in interdisciplinary conference    Bariatric Task List  Status:  Is patient a candidate for bariatric surgery?:    -  Possible   Cleared to schedule surgeon consult?:    -  Meet and greet after psych   Status:  surgery evaluation in process -     Surgeon: Dr Shah -     Tentative surgery month/year: April 2018 -        Insurance: Insurance:  [email or web address] -        Patient Info: Initial Weight:  271 lb 4.8 oz -     Date of Initial Weight/Height:  10/18/2017 -     Goal Weight (lbs):  261 -     Required Weight Loss:  10 -     Surgery Type:  sleeve gastrectomy -     Multidisciplinary Meeting:  Needed -        Dietician Visits: Structured weight loss required by insurance?:  Yes -     Number of Visits:  6 -     Dietician Visit 1:  Completed -     Dietician Visit 2:  Needed -     Dietician Visit 3:  Needed -     Dietician Visit 4:  Needed -     Dietician Visit 5:  Needed -     Dietician Visit 6:  Needed -        Psychological Evaluation: Psych eval:  Needed -     Psychiatrist letter of support:  Needed -        Lab Work: Complete Blood Count:  Needed -     Comprehensive Metabolic Panel:  Needed -     Vitamin D:  Needed -     Hgb A1c:  Needed -        Consults/ Clearance: Cardiac:  Needed - hx of afib   Pain: Needed -     Medical  "Weight Management: Needed -        PCP: Establish care with PCP:  Completed -     Follow up with PCP:    -     PCP letter of support:  Needed -        Patient Education:  Information Session:  Completed -     Given \"Making your decision\" handout?:  Yes -     Given support group information?:  Yes -     Attended support group?:  Completed -     Support plan in place?:  Needed -     Research consents signed?:  Yes -        Final Tasks:  Before surgery online class:  Needed -     Before surgery online class website link:  [email or web address]   After surgery online class:  Needed -     After surgery online class website link:  [email or web address]   Nurse visit for weigh-in and information:  Needed -     Pre-assessment clinic visit with anesthesia team for H&P:  Needed -     Final labs (Hgb, plt, T&S, UA):  Needed -        Notes:   -                   Follow-ups after your visit        Your next 10 appointments already scheduled     Nov 07, 2017  2:00 PM CST   New Visit with Stanislav Rayo MD   [facility] [address] [address]   [phone]            Nov 08, 2017 11:00 AM CST   (Arrive by 10:45 AM)   NUTRITION VISIT with Merle Zamudio RD   [facility] ([facility])    [address] [place] [address]   [phone]            Nov 29, 2017 11:00 AM CST   (Arrive by 10:45 AM)   NUTRITION VISIT with Merle Zamudio RD   [facility] ([facility])    [address] [place] [address]   [phone]            Jan 19, 2018 11:00 AM CST   (Arrive by 10:45 AM)   Bariatric Surgery Evaluation Testing with Lily Nieto, PhD   [facility] ([facility])    [address] [place] [address]" [facility] [address]   [phone]            Jan 19, 2018 12:00 PM CST   (Arrive by 11:45 AM)   Bariatric Surgery Evaluation Interview with Lily Nieto, PhD   [facility] ([facility])    [address] [place] [address]   [phone]            Jan 22, 2018 11:00 AM CST   (Arrive by 10:45 AM)   New Patient Visit with Minh Mancilla MD   [facility] ([facility])    [address] [place] [address] [phone]              Who to contact     Please call your clinic at [phone] to:    Ask questions about your health    Make or cancel appointments    Discuss your medicines    Learn about your test results    Speak to your doctor   If you have compliments or concerns about an experience at your clinic, or if you wish to file a complaint, please contact [facility] Patient Relations at [phone] or email us at [email or web address]         Additional Information About Your Visit        [employer] Information     Omnistreamt gives you secure access to your electronic health record. If you see a primary care provider, you can also send messages to your care team and make appointments. If you have questions, please call your primary care clinic.  If you do not have a primary care provider, please call [phone] and they will assist you.      [email or web address] is an electronic gateway that provides easy, online access to your medical records. With [email or web address], you can request a clinic appointment, read your test results, renew a prescription or communicate with your care team.     To access your existing account, please contact your [facility] Clinic or call [phone] for assistance.        Care EveryWhere ID     This is your Care EveryWhere ID. This could be used by other organizations to  "access your [place] medical records  [id number]        Your Vitals Were     Pulse Temperature Height Pulse Oximetry BMI (Body Mass Index)       81 97.9  F (36.6  C) 1.753 m (5' 9\") 100% 40.06 kg/m2        Blood Pressure from Last 3 Encounters:   10/18/17 136/80   10/02/17 133/79   09/20/17 121/82    Weight from Last 3 Encounters:   10/18/17 123.1 kg (271 lb 4.8 oz)   09/20/17 121.9 kg (268 lb 11.2 oz)   07/31/17 117.8 kg (259 lb 9.6 oz)              We Performed the Following     CBC with platelets     Comprehensive metabolic panel     Parathyroid Hormone Intact          Today's Medication Changes          These changes are accurate as of: 10/18/17  8:34 AM.  If you have any questions, ask your nurse or doctor.               These medicines have changed or have updated prescriptions.        Dose/Directions    traZODone 100 MG tablet   Commonly known as:  DESYREL   This may have changed:  how much to take   Used for:  Insomnia, unspecified insomnia        Dose:  100 mg   Take 1 tablet (100 mg) by mouth nightly as needed for sleep   Quantity:  30 tablet   Refills:  1                Primary Care Provider Office Phone # Fax #    Kristin Miner PA-C [phone]       [address]  [place] [address]        Equal Access to Services     [facility] Hospital[facility] AH: Hadii dedra piedra hadasho Soellie, waaxda luqadaha, qaybta kaalmada yuniel, riley juarez. So [facility] [phone].    ATENCIÓN: Si habla español, tiene a wheeler disposición servicios gratuitos de asistencia lingüística. Jay al [phone].    We comply with applicable federal civil rights laws and [place] laws. We do not discriminate on the basis of race, color, national origin, age, disability, sex, sexual orientation, or gender identity.            Thank you!     Thank you for choosing [facility]  for your care. Our goal is always to provide you with excellent care. Hearing back from our patients " is one way we can continue to improve our services. Please take a few minutes to complete the written survey that you may receive in the mail after your visit with us. Thank you!             Your Updated Medication List - Protect others around you: Learn how to safely use, store and throw away your medicines at www.disposemymeds.org.          This list is accurate as of: 10/18/17  8:34 AM.  Always use your most recent med list.                   Brand Name Dispense Instructions for use Diagnosis    ABILIFY 5 MG tablet   Generic drug:  ARIPiprazole      Take 5 mg by mouth daily        amoxicillin-clavulanate 875-125 MG per tablet    AUGMENTIN    20 tablet    Take 1 tablet by mouth 2 times daily    Acute non-recurrent maxillary sinusitis       estradiol 2 MG tablet    ESTRACE    90 tablet    1 tablet daily    Gender identity disorder       ferrous sulfate 325 (65 FE) MG tablet    IRON     Take by mouth daily (with breakfast)        furosemide 20 MG tablet    LASIX    90 tablet    Take 1 tablet (20 mg) by mouth daily - DUE FOR LABS    Leg swelling       LAMOTRIGINE PO      Take 150 mg by mouth daily        LORAZEPAM PO      Take 0.5 mg by mouth every 2 hours as needed for anxiety        * medical cannabis inhalation (Patient's own supply.  Not a prescription)      Inhale into the lungs 2 times daily (This is NOT a prescription, and does not certify that the patient has a qualifying medical condition for medical cannabis.  The purpose of this order is  to document that the patient reports taking medical cannabis.)  MORNING AND AT NIGHT        * medical cannabis (Patient's own supply.  Not a prescription)      1 capsule 2 times daily (This is NOT a prescription, and does not certify that the patient has a qualifying medical condition for medical cannabis.  The purpose of this order is  to document that the patient reports taking medical cannabis.)  Morning and afternoon        Multi-vitamin Tabs tablet      Take 1 tablet  by mouth daily        simvastatin 20 MG tablet    ZOCOR    90 tablet    Take 1 tablet (20 mg) by mouth At Bedtime    Hyperlipidemia LDL goal <130       topiramate 200 MG tablet    TOPAMAX    60 tablet    Take 1 tablet (200 mg) by mouth 2 times daily    Chronic pain disorder, Fibromyalgia       traZODone 100 MG tablet    DESYREL    30 tablet    Take 1 tablet (100 mg) by mouth nightly as needed for sleep    Insomnia, unspecified insomnia       triamcinolone 0.1 % ointment    KENALOG    80 g    Apply to AA BID x 3-4 weeks then PRN    Prurigo nodularis       venlafaxine 150 MG 24 hr capsule    EFFEXOR-XR     300 mg        VITAMIN B 12 PO           VITAMIN D (CHOLECALCIFEROL) PO      Take 5,000 Units by mouth daily        * Notice:  This list has 2 medication(s) that are the same as other medications prescribed for you. Read the directions carefully, and ask your doctor or other care provider to review them with you.

## 2017-10-18 NOTE — PATIENT INSTRUCTIONS
GOALS:  Relating To Eating:  Eat slowly (20-30 minutes per meal), chewing foods well (25 chews per bite/applesauce consistency)  Focus on lean protein and non-starchy vegetables/whole fruit at each meal with no more than 1 cup of carbs or 2 slices of bread  Record food intake prior to eating throughout the day.  May use [employer]  or FitBit.    Relating to beverages:  Work on  fluids from meals by 30 minutes.     Relating to activity:  Increase activity as able depending upon knee pain. Try upper body exercises    Relating to cravings:  Rid your environment of trigger foods.

## 2017-10-18 NOTE — PATIENT INSTRUCTIONS
"See Lilymic Dominguezmaciej for bariatric psych eval  Letter from psychiatrist  Clearance and plan from pain clinic  Clearance from cardiology  Labs today  Make appt with medical weight management for new consult to have in case not a surgical candidate  Our team will review psych eval and letter from psychiatrist and our team will review in interdisciplinary conference    Bariatric Task List  Status:  Is patient a candidate for bariatric surgery?:    -  Possible   Cleared to schedule surgeon consult?:    -  Meet and greet after psych   Status:  surgery evaluation in process -     Surgeon: Dr Shah -     Tentative surgery month/year: April 2018 -        Insurance: Insurance:  [email or web address] -        Patient Info: Initial Weight:  271 lb 4.8 oz -     Date of Initial Weight/Height:  10/18/2017 -     Goal Weight (lbs):  261 -     Required Weight Loss:  10 -     Surgery Type:  sleeve gastrectomy -     Multidisciplinary Meeting:  Needed -        Dietician Visits: Structured weight loss required by insurance?:  Yes -     Number of Visits:  6 -     Dietician Visit 1:  Completed -     Dietician Visit 2:  Needed -     Dietician Visit 3:  Needed -     Dietician Visit 4:  Needed -     Dietician Visit 5:  Needed -     Dietician Visit 6:  Needed -        Psychological Evaluation: Psych eval:  Needed -     Psychiatrist letter of support:  Needed -        Lab Work: Complete Blood Count:  Needed -     Comprehensive Metabolic Panel:  Needed -     Vitamin D:  Needed -     Hgb A1c:  Needed -        Consults/ Clearance: Cardiac:  Needed - hx of afib   Pain: Needed -     Medical Weight Management: Needed -        PCP: Establish care with PCP:  Completed -     Follow up with PCP:    -     PCP letter of support:  Needed -        Patient Education:  Information Session:  Completed -     Given \"Making your decision\" handout?:  Yes -     Given support group information?:  Yes -     Attended support group?:  Completed -     Support plan in place?:  Needed -   "   Research consents signed?:  Yes -        Final Tasks:  Before surgery online class:  Needed -     Before surgery online class website link:  [email or web address]   After surgery online class:  Needed -     After surgery online class website link:  [email or web address]   Nurse visit for weigh-in and information:  Needed -     Pre-assessment clinic visit with anesthesia team for H&P:  Needed -     Final labs (Hgb, plt, T&S, UA):  Needed -        Notes:   -

## 2017-10-18 NOTE — LETTER
October 25, 2017      TO: Kylie Piper  [address] [place] [address]         Dear Ms. Kylie Piper,    We received and reviewed your test results.  You may receive more than one letter if we receive the results on multiple days.  Please share all lab and test results with your primary care provider and keep a copy for your own records.      Please follow up with your primary care provider regarding elevated AST and ALT and white blood cell count.    If you have any questions, feel free contact us at the Call Center [phone].      Resulted Orders   Comprehensive metabolic panel   Result Value Ref Range    Sodium 136 133 - 144 mmol/L    Potassium 4.5 3.4 - 5.3 mmol/L    Chloride 105 94 - 109 mmol/L    Carbon Dioxide 24 20 - 32 mmol/L    Anion Gap 8 3 - 14 mmol/L    Glucose 99 70 - 99 mg/dL    Urea Nitrogen 20 7 - 30 mg/dL    Creatinine 0.95 0.52 - 1.04 mg/dL    GFR Estimate 62 >60 mL/min/1.7m2      Comment:      Non  GFR Calc    GFR Estimate If Black 74 >60 mL/min/1.7m2      Comment:       GFR Calc    Calcium 9.0 8.5 - 10.1 mg/dL    Bilirubin Total 0.4 0.2 - 1.3 mg/dL    Albumin 3.8 3.4 - 5.0 g/dL    Protein Total 7.9 6.8 - 8.8 g/dL    Alkaline Phosphatase 69 40 - 150 U/L     (H) 0 - 50 U/L    AST 64 (H) 0 - 45 U/L   CBC with platelets   Result Value Ref Range    WBC 12.4 (H) 4.0 - 11.0 10e9/L    RBC Count 4.83 3.8 - 5.2 10e12/L    Hemoglobin 13.6 11.7 - 15.7 g/dL    Hematocrit 42.3 35.0 - 47.0 %    MCV 88 78 - 100 fl    MCH 28.2 26.5 - 33.0 pg    MCHC 32.2 31.5 - 36.5 g/dL    RDW 13.2 10.0 - 15.0 %    Platelet Count 402 150 - 450 10e9/L   Parathyroid Hormone Intact   Result Value Ref Range    Parathyroid Hormone Intact 53 12 - 72 pg/mL           Sincerely,      Xin Costa PA-C

## 2017-10-18 NOTE — PROGRESS NOTES
"New Bariatric Nutrition Consultation Note    Reason For Visit: Nutrition Assessment    Kylie Piper is a 52 year-old presenting today for new bariatric nutrition consult.  Pt is interested in laparoscopic sleeve gastrectomy.  This is pt's first of 6 required nutrition visits prior to surgery. Pt referred by Xin SLAUGHTER) on 10/18/17.      Patient is accompanied by wife who shared that she previously had a RNY GB.    She is interested in having weight loss surgery for the following reasons:  To improve overall health and wellbeing (knees, joints, breathing difficulty; needing a diuretic and low-sodium diet)    Support System Reviewed With Patient 10/17/2017   Who do you have in your support network that can be available to help you for the first 2 weeks after surgery? yes   Who can you count on for support throughout your weight loss surgery journey? yes       ANTHROPOMETRICS:    Height as of an earlier encounter on 10/18/17: 1.753 m (5' 9\").    Weight as of an earlier encounter on 10/18/17: 123.1 kg (271 lb 4.8 oz) with BMI of 40.06.     Required weight loss goal pre-op: -10 lbs from initial consult weight (goal weight 261.3 lbs or less before surgery)       10/17/2017   I have tried the following methods to lose weight Watching portions or calories, Exercise, Atkins type diet (low carb/high protein), Pre packaged meals ex: Nutrisystem, OTC Medications       Weight Loss Questions Reviewed With Patient 10/17/2017   How long have you been overweight? Since early childhood       SUPPLEMENT INFORMATION:  Liquid MVI; hair,skin, nails (biotin), Ca, Probiotics (acidophillus), B12, 1000 International Units vitamin D3, Mg  *Pt is on topiramate to help with appetite control.    NUTRITION HISTORY:  Recall Diet Questions Reviewed With Patient 10/17/2017   Describe what you typically consume for breakfast (typical or most recent): 1 egg and 1 slice dry multi grain bread   Describe what you typically consume for " lunch (typical or most recent): 2 hard boiled eggs on 1 slice of dry multi grain bread   Describe what you typically consume for supper (typical or most recent): smoked pork roast and sweet potatoes, broccoli/carrots/green beans/corn/lettuce/tomatoes/sprouts   Describe what you typically consume as snacks (typical or most recent): sunflower seeds (a few teaspoons/day)   How many ounces of water, or other low calorie drinks, do you drink daily (8 oz=1 glass)? 64 oz or more   How many ounces of caffeine (coffee, tea, pop) do you drink daily (8 oz=1 glass)? 16 oz   How often do you drink alcohol? Monthly or less   If you do drink alcohol, how many drinks might you have in a day? (one drink = 5 oz. wine, 1 can/bottle of beer, 1 shot liquor) 1 or 2   *Pt is willing to abstain from alcohol after surgery.      Eating Habits 10/17/2017   Do you have any dietary restrictions? Yes   Low-sodium, low-fat diet   Do you currently binge eat (eat a large amount of food in a short time)? Yes   Do you get up to eat after falling asleep? Yes (infrequently - she will have an egg or other protein)   What foods do you crave? chocolate, protein, chips       Dining Out History Reviewed With Patient 10/17/2017   How often do you dine out? Around once a week.   Where do you dine out? (select all that apply) sit-down restaurants   What types of food do you order when you dine out? grilled food       Physical Activity Reviewed With Patient 10/17/2017   How often do you exercise? Less than 1 time per week   What keeps you from being more active?  Pain, Shortness of breath, Too tired   *Pt sometimes tracks steps and heart rate on FitBit.  *Pt can only exercise her right arm (OA in joints of left arm).   *Pt is limited also be knee pain.    NUTRITION DIAGNOSIS:  Obesity r/t long history of self-monitoring deficit and excessive energy intake aeb BMI >30.    INTERVENTION:  Intervention Provided/Education Provided on post-op diet guidelines,  vitamins/minerals essential post-operatively, GI anatomy of bariatric surgeries, ways to help prepare for post-op diet guidelines pre-operatively, portion/calorie-control, mindful eating, exercise.  Provided pt with list of goals RD contact information.      Questions Reviewed With Patient 10/17/2017   How ready are you to make changes regarding your weight? Number 1 = Not ready at all to make changes up to 10 = very ready. 10   How confident are you that you can change? 1 = Not confident that you will be successful making changes up to 10 = very confident. 10     Patient Understanding: good  Expected Compliance: good    GOALS:  Relating To Eating:  Eat slowly (20-30 minutes per meal), chewing foods well (25 chews per bite/applesauce consistency)  Focus on lean protein and non-starchy vegetables/whole fruit at each meal with no more than 1 cup of carbs or 2 slices of bread  Record food intake prior to eating throughout the day.  May use [employer].com  or FitBit.    Relating to beverages:  Work on  fluids from meals by 30 minutes.     Relating to activity:  Increase activity as able depending upon knee pain. Try upper body exercises    Relating to cravings:  Rid your environment of trigger foods.     Follow-Up: 1 month    Time spent with patient: 30 minutes.  Merle Zamudio, MS, RDN, LDN, CLT  Pager: [phone]

## 2017-10-18 NOTE — NURSING NOTE
"(   Chief Complaint   Patient presents with     Consult     NBS    )    ( Weight: 271 lb 4.8 oz )  ( Height: 5' 9\" )  ( BMI (Calculated): 40.15 )  ( Initial Weight: 268 lb 11.2 oz )  ( Cumulative weight loss (lbs): -2.6 )  (   )  (   )  (   )  (   )    ( BP: 136/80 )  (   )  ( Temp: 97.9  F (36.6  C) )  (   )  ( Pulse: 81 )  (   )  ( SpO2: 100 % )    (   Patient Active Problem List   Diagnosis     Gender identity disorder     Hyperlipidemia LDL goal <130     Abnormal results of liver function studies     Chronic pain disorder     Hypertension goal BP (blood pressure) < 130/80     Insomnia     Bipolar 2 disorder (H)     Health Care Home     Obesity     H/O hypogonadism     Breast cancer screening     Fibromyalgia     Glenohumeral arthritis     Other general symptoms(780.99)     Vitamin D deficiency     Primary osteoarthritis of left shoulder     Anxiety     Cervical spondylosis without myelopathy    )  (   Current Outpatient Prescriptions   Medication Sig Dispense Refill     triamcinolone (KENALOG) 0.1 % ointment Apply to AA BID x 3-4 weeks then PRN 80 g 3     furosemide (LASIX) 20 MG tablet Take 1 tablet (20 mg) by mouth daily - DUE FOR LABS 90 tablet 3     ARIPiprazole (ABILIFY) 5 MG tablet Take 5 mg by mouth daily       amoxicillin-clavulanate (AUGMENTIN) 875-125 MG per tablet Take 1 tablet by mouth 2 times daily 20 tablet 0     simvastatin (ZOCOR) 20 MG tablet Take 1 tablet (20 mg) by mouth At Bedtime 90 tablet 3     topiramate (TOPAMAX) 200 MG tablet Take 1 tablet (200 mg) by mouth 2 times daily 60 tablet 0     medical cannabis inhalation (Patient's own supply.  Not a prescription) Inhale into the lungs 2 times daily (This is NOT a prescription, and does not certify that the patient has a qualifying medical condition for medical cannabis.  The purpose of this order is  to document that the patient reports taking medical cannabis.)    MORNING AND AT NIGHT       medical cannabis (Patient's own supply.  Not a " prescription) 1 capsule 2 times daily (This is NOT a prescription, and does not certify that the patient has a qualifying medical condition for medical cannabis.  The purpose of this order is  to document that the patient reports taking medical cannabis.)    Morning and afternoon       estradiol (ESTRACE) 2 MG tablet 1 tablet daily 90 tablet 3     VITAMIN D, CHOLECALCIFEROL, PO Take 5,000 Units by mouth daily       ferrous sulfate (IRON) 325 (65 FE) MG tablet Take by mouth daily (with breakfast)       Cyanocobalamin (VITAMIN B 12 PO)        multivitamin, therapeutic with minerals (MULTI-VITAMIN) TABS Take 1 tablet by mouth daily       LAMOTRIGINE PO Take 150 mg by mouth daily        LORAZEPAM PO Take 0.5 mg by mouth every 2 hours as needed for anxiety       traZODone (DESYREL) 100 MG tablet Take 1 tablet (100 mg) by mouth nightly as needed for sleep (Patient taking differently: Take 50 mg by mouth nightly as needed for sleep ) 30 tablet 1     venlafaxine (EFFEXOR-XR) 150 MG 24 hr capsule 300 mg   0    )  ( Diabetes Eval:    )    ( Pain Eval:  Moderate Pain (5) )    ( Wound Eval:       )    (   History   Smoking Status     Never Smoker   Smokeless Tobacco     Never Used    )    ( Signed By:  Jennifer Baron; October 18, 2017; 6:59 AM )

## 2017-10-18 NOTE — LETTER
"10/18/2017       RE: Kylie Piper  [address] [place] [address]     Dear Colleague,    Thank you for referring your patient, Kylie Piper, to the [facility] at [facility]. Please see a copy of my visit note below.    New Bariatric Surgery Consultation Note    RE: Kylie Piper  MR#: [id number]  : 1965      Referring provider:       10/17/2017   Who referred you? Kristin Hayward       Chief Complaint/Reason for visit: evaluation for possible weight loss surgery    Dear Kristin Miner PA-C (General),    I had the pleasure of seeing your patient, Kylie Piper, to evaluate her obesity and consider her for possible weight loss surgery. As you know, Kylie Piper is 52 year old.  She has a height of 5' 9\", a weight of 271 lbs 4.8 oz, and calculated Body mass index is 40.06 kg/(m^2).      HISTORY OF PRESENT ILLNESS:  Weight Loss History Reviewed with Patient 10/17/2017   How long have you been overweight? Since early childhood   What is the most that you have ever weighed? 296   What is the most weight you have lost? 70   I have tried the following methods to lose weight Watching portions or calories, Exercise, Atkins type diet (low carb/high protein), Pre packaged meals ex: Nutrisystem, OTC Medications   I have tried the following weight loss medications? (Check all that apply) Topamax/Topiramate   Have you ever had weight loss surgery? No     CO-MORBIDITIES OF OBESITY INCLUDE:     10/17/2017   I have the following co-morbidities associated with obesity: High Cholesterol, Fatty Liver, Weight Bearing Joint Pain       PAST MEDICAL HISTORY:  Past Medical History:   Diagnosis Date     Asthma     Resolved in  per pt     Atrial fib/flutter, transient     S/p cardioversion, and formerly on medications but due to intolerance and allergies, not currently on any medications- reports yearly " eval with Echo and Ekg?     Chronic pain     LUE pain     Gender identity disorder Started Rx 2012     H/O hypogonadism Gonadectomy 2013     Hyperlipidemia      Hypertension      Psoriasis        PAST SURGICAL HISTORY:  Past Surgical History:   Procedure Laterality Date     [facility] REFERR[facility]       COLONOSCOPY  8/8/2013    Procedure: COLONOSCOPY;  COLONSCOPY SCREEN/ SE;  Surgeon: Santino Sun MD;  Location: [facility] OR     COSMETIC SURGERY       MANDIBLE SURGERY  1986     ORCHIECTOMY INGUINAL BILATERAL  7/16/2013    Procedure: ORCHIECTOMY INGUINAL BILATERAL;  Bilateral Simple Inguinal Orchiectomy ;  Surgeon: Jan Garrett MD;  Location: [facility] OR       FAMILY HISTORY:   Family History   Problem Relation Age of Onset     CANCER Father      skin     DIABETES Father      HEART DISEASE Other      Alzheimer Disease Paternal Grandmother      DIABETES Sister        SOCIAL HISTORY:   Social History Questions Reviewed With Patient 10/17/2017   Which best describes your employment status (select all that apply) I am disabled   Which best describes your marital status:    Do you have children? Yes   Who do you have in your support network that can be available to help you for the first 2 weeks after surgery? yes   Who can you count on for support throughout your weight loss surgery journey? yes   Can you afford 3 meals a day?  Yes   Can you afford 50-60 dollars a month for vitamins? Yes   Last time working was 2004.  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    3 children with 2 previous marriages 28, 25, 9       HABITS:     10/17/2017   How often do you drink alcohol? Monthly or less   If you do drink alcohol, how many drinks might you have in a day? (one drink = 5 oz. wine, 1 can/bottle of beer, 1 shot liquor) 1 or 2   Do you currently use any of the  following Nicotine products? No   Have you ever used any of the following nicotine products? No   Have you or are you currently using street drugs or prescription strength medication for which you do not have a prescription for? No   Do you have a history of chemical dependency (alcohol or drug abuse)? No   Current use of medical marijuana for chronic pain.    PSYCHOLOGICAL HISTORY:   Psychological History Reviewed With Patient 10/17/2017   Have you ever attempted suicide? In the last 5 to 10 years., More than 10 years ago.   Have you had thoughts of suicide in the past year? No   Have you ever been hospitalized for mental illness or a suicide attempt? Never.   Do you have a history of chronic pain? Yes   Have you ever been diagnosed with fibromyalgia? Yes   Are you currently being treated for any of the following? (select all that apply) Depression, Anxiety, Bipolar   Are you currently seeing a therapist or counselor?  No   Are you currently seeing a psychiatrist? Yes   Last suicide attempt Jan 2011 when he came out as a woman.  Works with a psychiatrist but no longer a therapist.    ROS:     10/17/2017   Skin:  Leg swelling   HEENT: Headaches, Dizziness/lightheadedness   If you answered yes to missing teeth, please indicate how many: 0   Musculoskeletal: Joint Pain, Back pain, Limited mobility, Swelling of legs, Arthritis   Cardiovascular: Shortness of breath with activity   Pulmonary: Shortness of breath with activity   Gastrointestinal: Constipation   Genitourinary: None of the above   Hematological: None of the above   Neurological: None of the above   Female only: Post-menopausal       EATING BEHAVIORS:     10/17/2017   Have you or anyone else thought that you had an eating disorder? Yes   If you answered yes to the previous eating disorder question, select the types that apply from this list: Binge Eating   Do you currently binge eat (eat a large amount of food in a short time)? Yes   Do you get up to eat  "after falling asleep? Yes       EXERCISE:     10/17/2017   How often do you exercise? Less than 1 time per week   What keeps you from being more active?  Pain, Shortness of breath, Too tired       MEDICATIONS:  Current Outpatient Prescriptions   Medication     triamcinolone (KENALOG) 0.1 % ointment     furosemide (LASIX) 20 MG tablet     ARIPiprazole (ABILIFY) 5 MG tablet     amoxicillin-clavulanate (AUGMENTIN) 875-125 MG per tablet     simvastatin (ZOCOR) 20 MG tablet     topiramate (TOPAMAX) 200 MG tablet     medical cannabis inhalation (Patient's own supply.  Not a prescription)     medical cannabis (Patient's own supply.  Not a prescription)     estradiol (ESTRACE) 2 MG tablet     VITAMIN D, CHOLECALCIFEROL, PO     ferrous sulfate (IRON) 325 (65 FE) MG tablet     Cyanocobalamin (VITAMIN B 12 PO)     multivitamin, therapeutic with minerals (MULTI-VITAMIN) TABS     LAMOTRIGINE PO     LORAZEPAM PO     traZODone (DESYREL) 100 MG tablet     venlafaxine (EFFEXOR-XR) 150 MG 24 hr capsule     No current facility-administered medications for this visit.        ALLERGIES:  Allergies   Allergen Reactions     Amiodarone      Caused pain fatigue/amplified anxiety and depression     Fluoxetine Other (See Comments)     Night terrors     Tetracycline      Teeth rattle/saliva acidic       LABS/IMAGING/MEDICAL RECORDS REVIEW:     PHYSICAL EXAM:  /80 (BP Location: Left arm, Patient Position: Chair, Cuff Size: Adult Large)  Pulse 81  Temp 97.9  F (36.6  C)  Ht 5' 9\"  Wt 271 lb 4.8 oz  SpO2 100%  BMI 40.06 kg/m2  General: NAD  Neurologic: A & O x 3, gait normal  Head: normocephalic, atraumatic  HEENT: PERRL, EOMI.   Respiratory: respirations unlabored  Abdomen: centrally Obese, Soft NT ND   Extremities: No LE swelling   Skin: warm and dry.  No rashes on exposed skin  Psychiatric: Mentation and Affect appear normal    In summary, Kylie Piper has Class III obesity with a body mass index of Body mass index is " 40.06 kg/(m^2). kg/m2 and the comorbidities stated above. She completed an informational seminar and is a possible candidate for the laparoscopic gastric sleeve.  I have concerns about bipolar type II and chronic pain.  Mental health has been stable since 2011 when she transitioned to a female.  She sees [place] [facility].  She will have to complete the following pre-requisites:  See Lily Nieto for bariatric psych eval  Letter from psychiatrist  Clearance and plan from pain clinic  Clearance from cardiology  Labs today  Make appt with medical weight management for new consult to have in case not a surgical candidate  Our team will review psych eval and letter from psychiatrist and our team will review in interdisciplinary conference    Bariatric Task List  Status:  Is patient a candidate for bariatric surgery?:    -  Possible   Cleared to schedule surgeon consult?:    -  Meet and greet after psych   Status:  surgery evaluation in process -     Surgeon: Dr Shah -     Tentative surgery month/year: April 2018 -        Insurance: Insurance:  [employer] -        Patient Info: Initial Weight:  271 lb 4.8 oz -     Date of Initial Weight/Height:  10/18/2017 -     Goal Weight (lbs):  261 -     Required Weight Loss:  10 -     Surgery Type:  sleeve gastrectomy -     Multidisciplinary Meeting:  Needed -        Dietician Visits: Structured weight loss required by insurance?:  Yes -     Number of Visits:  6 -     Dietician Visit 1:  Completed -     Dietician Visit 2:  Needed -     Dietician Visit 3:  Needed -     Dietician Visit 4:  Needed -     Dietician Visit 5:  Needed -     Dietician Visit 6:  Needed -        Psychological Evaluation: Psych eval:  Needed -     Psychiatrist letter of support:  Needed -        Lab Work: Complete Blood Count:  Needed -     Comprehensive Metabolic Panel:  Needed -     Vitamin D:  Needed -     Hgb A1c:  Needed -        Consults/ Clearance: Cardiac:  Needed - hx of afib   Pain: Needed -    "  Medical Weight Management: Needed -        PCP: Establish care with PCP:  Completed -     Follow up with PCP:    -     PCP letter of support:  Needed -        Patient Education:  Information Session:  Completed -     Given \"Making your decision\" handout?:  Yes -     Given support group information?:  Yes -     Attended support group?:  Completed -     Support plan in place?:  Needed -     Research consents signed?:  Yes -        Final Tasks:  Before surgery online class:  Needed -     Before surgery online class website link:  [email or web address]   After surgery online class:  Needed -     After surgery online class website link:  [email or web address]   Nurse visit for weigh-in and information:  Needed -     Pre-assessment clinic visit with anesthesia team for H&P:  Needed -     Final labs (Hgb, plt, T&S, UA):  Needed -        Notes:   -       Today in the office we discussed gastric sleeve surgery. Pre-operative, perioperative, and postoperative processes, management, and follow up were addressed.  Risks and benefits were outlined including the risk of death, staple line leak (1-2%), PE, DVT, ulcer, worsening GERD, N/V, stricture, hernia, wound infection, weight regain, and vitamin deficiencies. I emphasized exercise and activity along with appropriate food choice as the main foundation for weight loss with surgery providing surgical reinforcement of this.  All questions were answered.  A goal sheet and support group handout were given to the patient.      Once the patient has completed the requirements in their task list and there are no further recommendations, the pt will be allowed to see the surgeon of her choice for consultation on the laparoscopic gastric sleeve surgery. Patient verbalizes understanding of the process to surgery and expectations for the postoperative period including the need for lifelong lifestyle changes, vitamin supplementation, and laboratory " monitoring.     Sincerely,    Xin Costa PA-C    I spent a total of 40 minutes face to face with Kylie during today's office visit. Over 50% of this time was spent counseling the patient and/or coordinating care.            Again, thank you for allowing me to participate in the care of your patient.      Sincerely,    Xin Costa PA-C

## 2017-10-18 NOTE — PROGRESS NOTES
"New Bariatric Surgery Consultation Note    RE: Kylie Piper  MR#: [id number]  : 1965      Referring provider:       10/17/2017   Who referred you? Kristin Hayward       Chief Complaint/Reason for visit: evaluation for possible weight loss surgery    Dear Kristin Miner PA-C (General),    I had the pleasure of seeing your patient, Kylie Piper, to evaluate her obesity and consider her for possible weight loss surgery. As you know, Kylie Piper is 52 year old.  She has a height of 5' 9\", a weight of 271 lbs 4.8 oz, and calculated Body mass index is 40.06 kg/(m^2).      HISTORY OF PRESENT ILLNESS:  Weight Loss History Reviewed with Patient 10/17/2017   How long have you been overweight? Since early childhood   What is the most that you have ever weighed? 296   What is the most weight you have lost? 70   I have tried the following methods to lose weight Watching portions or calories, Exercise, Atkins type diet (low carb/high protein), Pre packaged meals ex: Nutrisystem, OTC Medications   I have tried the following weight loss medications? (Check all that apply) Topamax/Topiramate   Have you ever had weight loss surgery? No     CO-MORBIDITIES OF OBESITY INCLUDE:     10/17/2017   I have the following co-morbidities associated with obesity: High Cholesterol, Fatty Liver, Weight Bearing Joint Pain       PAST MEDICAL HISTORY:  Past Medical History:   Diagnosis Date     Asthma     Resolved in  per pt     Atrial fib/flutter, transient     S/p cardioversion, and formerly on medications but due to intolerance and allergies, not currently on any medications- reports yearly eval with Echo and Ekg?     Chronic pain     LUE pain     Gender identity disorder Started Rx      H/O hypogonadism Gonadectomy 2013     Hyperlipidemia      Hypertension      Psoriasis        PAST SURGICAL HISTORY:  Past Surgical History:   Procedure Laterality Date     [facility] REF[facility]       " COLONOSCOPY  8/8/2013    Procedure: COLONOSCOPY;  COLONSCOPY SCREEN/ SE;  Surgeon: Santino Sun MD;  Location: [facility] OR     COSMETIC SURGERY       MANDIBLE SURGERY  1986     ORCHIECTOMY INGUINAL BILATERAL  7/16/2013    Procedure: ORCHIECTOMY INGUINAL BILATERAL;  Bilateral Simple Inguinal Orchiectomy ;  Surgeon: Jan Garrett MD;  Location: [facility] OR       FAMILY HISTORY:   Family History   Problem Relation Age of Onset     CANCER Father      skin     DIABETES Father      HEART DISEASE Other      Alzheimer Disease Paternal Grandmother      DIABETES Sister        SOCIAL HISTORY:   Social History Questions Reviewed With Patient 10/17/2017   Which best describes your employment status (select all that apply) I am disabled   Which best describes your marital status:    Do you have children? Yes   Who do you have in your support network that can be available to help you for the first 2 weeks after surgery? yes   Who can you count on for support throughout your weight loss surgery journey? yes   Can you afford 3 meals a day?  Yes   Can you afford 50-60 dollars a month for vitamins? Yes   Last time working was 2004.  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    3 children with 2 previous marriages 28, 25, 9       HABITS:     10/17/2017   How often do you drink alcohol? Monthly or less   If you do drink alcohol, how many drinks might you have in a day? (one drink = 5 oz. wine, 1 can/bottle of beer, 1 shot liquor) 1 or 2   Do you currently use any of the following Nicotine products? No   Have you ever used any of the following nicotine products? No   Have you or are you currently using street drugs or prescription strength medication for which you do not have a prescription for? No   Do you have a history of chemical dependency (alcohol or drug abuse)? No    Current use of medical marijuana for chronic pain.    PSYCHOLOGICAL HISTORY:   Psychological History Reviewed With Patient 10/17/2017   Have you ever attempted suicide? In the last 5 to 10 years., More than 10 years ago.   Have you had thoughts of suicide in the past year? No   Have you ever been hospitalized for mental illness or a suicide attempt? Never.   Do you have a history of chronic pain? Yes   Have you ever been diagnosed with fibromyalgia? Yes   Are you currently being treated for any of the following? (select all that apply) Depression, Anxiety, Bipolar   Are you currently seeing a therapist or counselor?  No   Are you currently seeing a psychiatrist? Yes   Last suicide attempt Jan 2011 when he came out as a woman.  Works with a psychiatrist but no longer a therapist.    ROS:     10/17/2017   Skin:  Leg swelling   HEENT: Headaches, Dizziness/lightheadedness   If you answered yes to missing teeth, please indicate how many: 0   Musculoskeletal: Joint Pain, Back pain, Limited mobility, Swelling of legs, Arthritis   Cardiovascular: Shortness of breath with activity   Pulmonary: Shortness of breath with activity   Gastrointestinal: Constipation   Genitourinary: None of the above   Hematological: None of the above   Neurological: None of the above   Female only: Post-menopausal       EATING BEHAVIORS:     10/17/2017   Have you or anyone else thought that you had an eating disorder? Yes   If you answered yes to the previous eating disorder question, select the types that apply from this list: Binge Eating   Do you currently binge eat (eat a large amount of food in a short time)? Yes   Do you get up to eat after falling asleep? Yes       EXERCISE:     10/17/2017   How often do you exercise? Less than 1 time per week   What keeps you from being more active?  Pain, Shortness of breath, Too tired       MEDICATIONS:  Current Outpatient Prescriptions   Medication     triamcinolone (KENALOG) 0.1 % ointment      "furosemide (LASIX) 20 MG tablet     ARIPiprazole (ABILIFY) 5 MG tablet     amoxicillin-clavulanate (AUGMENTIN) 875-125 MG per tablet     simvastatin (ZOCOR) 20 MG tablet     topiramate (TOPAMAX) 200 MG tablet     medical cannabis inhalation (Patient's own supply.  Not a prescription)     medical cannabis (Patient's own supply.  Not a prescription)     estradiol (ESTRACE) 2 MG tablet     VITAMIN D, CHOLECALCIFEROL, PO     ferrous sulfate (IRON) 325 (65 FE) MG tablet     Cyanocobalamin (VITAMIN B 12 PO)     multivitamin, therapeutic with minerals (MULTI-VITAMIN) TABS     LAMOTRIGINE PO     LORAZEPAM PO     traZODone (DESYREL) 100 MG tablet     venlafaxine (EFFEXOR-XR) 150 MG 24 hr capsule     No current facility-administered medications for this visit.        ALLERGIES:  Allergies   Allergen Reactions     Amiodarone      Caused pain fatigue/amplified anxiety and depression     Fluoxetine Other (See Comments)     Night terrors     Tetracycline      Teeth rattle/saliva acidic       LABS/IMAGING/MEDICAL RECORDS REVIEW:     PHYSICAL EXAM:  /80 (BP Location: Left arm, Patient Position: Chair, Cuff Size: Adult Large)  Pulse 81  Temp 97.9  F (36.6  C)  Ht 5' 9\"  Wt 271 lb 4.8 oz  SpO2 100%  BMI 40.06 kg/m2  General: NAD  Neurologic: A & O x 3, gait normal  Head: normocephalic, atraumatic  HEENT: PERRL, EOMI.   Respiratory: respirations unlabored  Abdomen: centrally Obese, Soft NT ND   Extremities: No LE swelling   Skin: warm and dry.  No rashes on exposed skin  Psychiatric: Mentation and Affect appear normal    In summary, Kylie Piper has Class III obesity with a body mass index of Body mass index is 40.06 kg/(m^2). kg/m2 and the comorbidities stated above. She completed an informational seminar and is a possible candidate for the laparoscopic gastric sleeve.  I have concerns about bipolar type II and chronic pain.  Mental health has been stable since 2011 when she transitioned to a female.  She sees " "[facility].  She will have to complete the following pre-requisites:  See Lily Nieto for bariatric psych eval  Letter from psychiatrist  Clearance and plan from pain clinic  Clearance from cardiology  Labs today  Make appt with medical weight management for new consult to have in case not a surgical candidate  Our team will review psych eval and letter from psychiatrist and our team will review in interdisciplinary conference    Bariatric Task List  Status:  Is patient a candidate for bariatric surgery?:    -  Possible   Cleared to schedule surgeon consult?:    -  Meet and greet after psych   Status:  surgery evaluation in process -     Surgeon: Dr Shah -     Tentative surgery month/year: April 2018 -        Insurance: Insurance:  [employer] -        Patient Info: Initial Weight:  271 lb 4.8 oz -     Date of Initial Weight/Height:  10/18/2017 -     Goal Weight (lbs):  261 -     Required Weight Loss:  10 -     Surgery Type:  sleeve gastrectomy -     Multidisciplinary Meeting:  Needed -        Dietician Visits: Structured weight loss required by insurance?:  Yes -     Number of Visits:  6 -     Dietician Visit 1:  Completed -     Dietician Visit 2:  Needed -     Dietician Visit 3:  Needed -     Dietician Visit 4:  Needed -     Dietician Visit 5:  Needed -     Dietician Visit 6:  Needed -        Psychological Evaluation: Psych eval:  Needed -     Psychiatrist letter of support:  Needed -        Lab Work: Complete Blood Count:  Needed -     Comprehensive Metabolic Panel:  Needed -     Vitamin D:  Needed -     Hgb A1c:  Needed -        Consults/ Clearance: Cardiac:  Needed - hx of afib   Pain: Needed -     Medical Weight Management: Needed -        PCP: Establish care with PCP:  Completed -     Follow up with PCP:    -     PCP letter of support:  Needed -        Patient Education:  Information Session:  Completed -     Given \"Making your decision\" handout?:  Yes -     Given support group information?:  Yes -   "   Attended support group?:  Completed -     Support plan in place?:  Needed -     Research consents signed?:  Yes -        Final Tasks:  Before surgery online class:  Needed -     Before surgery online class website link:  [email or web address]   After surgery online class:  Needed -     After surgery online class website link:  [email or web address]   Nurse visit for weigh-in and information:  Needed -     Pre-assessment clinic visit with anesthesia team for H&P:  Needed -     Final labs (Hgb, plt, T&S, UA):  Needed -        Notes:   -       Today in the office we discussed gastric sleeve surgery. Pre-operative, perioperative, and postoperative processes, management, and follow up were addressed.  Risks and benefits were outlined including the risk of death, staple line leak (1-2%), PE, DVT, ulcer, worsening GERD, N/V, stricture, hernia, wound infection, weight regain, and vitamin deficiencies. I emphasized exercise and activity along with appropriate food choice as the main foundation for weight loss with surgery providing surgical reinforcement of this.  All questions were answered.  A goal sheet and support group handout were given to the patient.      Once the patient has completed the requirements in their task list and there are no further recommendations, the pt will be allowed to see the surgeon of her choice for consultation on the laparoscopic gastric sleeve surgery. Patient verbalizes understanding of the process to surgery and expectations for the postoperative period including the need for lifelong lifestyle changes, vitamin supplementation, and laboratory monitoring.     Sincerely,    Xin Costa PA-C    I spent a total of 40 minutes face to face with Kylie during today's office visit. Over 50% of this time was spent counseling the patient and/or coordinating care.

## 2017-11-07 ENCOUNTER — OFFICE VISIT (OUTPATIENT)
Dept: PALLIATIVE MEDICINE | Facility: CLINIC | Age: 52
End: 2017-11-07
Payer: COMMERCIAL

## 2017-11-07 VITALS
DIASTOLIC BLOOD PRESSURE: 80 MMHG | SYSTOLIC BLOOD PRESSURE: 121 MMHG | BODY MASS INDEX: 40.17 KG/M2 | WEIGHT: 272 LBS | HEART RATE: 80 BPM

## 2017-11-07 DIAGNOSIS — M19.012 PRIMARY OSTEOARTHRITIS OF LEFT SHOULDER: ICD-10-CM

## 2017-11-07 DIAGNOSIS — M25.512 CHRONIC LEFT SHOULDER PAIN: ICD-10-CM

## 2017-11-07 DIAGNOSIS — M54.5 CHRONIC BILATERAL LOW BACK PAIN, WITH SCIATICA PRESENCE UNSPECIFIED: Primary | ICD-10-CM

## 2017-11-07 DIAGNOSIS — G89.29 CHRONIC LEFT SHOULDER PAIN: ICD-10-CM

## 2017-11-07 DIAGNOSIS — M17.12 PRIMARY OSTEOARTHRITIS OF LEFT KNEE: ICD-10-CM

## 2017-11-07 DIAGNOSIS — G89.4 CHRONIC PAIN DISORDER: ICD-10-CM

## 2017-11-07 DIAGNOSIS — M47.812 CERVICAL SPONDYLOSIS WITHOUT MYELOPATHY: ICD-10-CM

## 2017-11-07 DIAGNOSIS — F64.9 GENDER IDENTITY DISORDER: ICD-10-CM

## 2017-11-07 DIAGNOSIS — G89.29 CHRONIC BILATERAL LOW BACK PAIN, WITH SCIATICA PRESENCE UNSPECIFIED: Primary | ICD-10-CM

## 2017-11-07 DIAGNOSIS — M79.7 FIBROMYALGIA: ICD-10-CM

## 2017-11-07 PROCEDURE — 99215 OFFICE O/P EST HI 40 MIN: CPT | Performed by: ANESTHESIOLOGY

## 2017-11-07 ASSESSMENT — PATIENT HEALTH QUESTIONNAIRE - PHQ9: SUM OF ALL RESPONSES TO PHQ QUESTIONS 1-9: 6

## 2017-11-07 ASSESSMENT — PAIN SCALES - GENERAL: PAINLEVEL: EXTREME PAIN (8)

## 2017-11-07 NOTE — MR AVS SNAPSHOT
After Visit Summary   11/7/2017    Kylie Piper    MRN: [id number]           Patient Information     Date Of Birth          1965        Visit Information        Provider Department      11/7/2017 2:00 PM Stanislav Morton MD [facility]        Today's Diagnoses     Chronic bilateral low back pain, with sciatica presence unspecified    -  1    Chronic left shoulder pain          Care Instructions    Assessment:  1.  Chronic pain syndrome  2.  Chronic neck and back pain  3.  Left shoulder pain/impingement  4.  Chronic left knee pain  5.  Osteoarthritis in multiple joints  6.  Cervical spondylosis and degenerative disc disease  7.  Myofascial pain  8.  Sacroiliac joint pain  9.  Lumbar facet joint pain  10.  Medical cannabis use      Plan:  Diagnosis reviewed, treatment option addressed, and risk/benefits discussed.  Self-care instructions given.  I am recommending a multidisciplinary treatment plan to help this patient better manage her pain.      1. Physical Therapy: continue home exercise program - trial of home Yoga, Pilates, or Raz Chi  2. Clinical Health Psychologist to address issues of relaxation, behavioral change, coping style, and other factors important to improvement: continue behavioral health treatments  3. Diagnostic Studies:   1. Lumbar spine MRI  2. Left shoulder MRI  4. Medication Management:   1. Continue Medical Cannabis per dispensing pharmacy - will recertify  2. Continue Abilify 5 mg daily  3. Continue Topamax 200 mg BID  4. Continue Trazodone 50 mg QHS  5. Continue Effexor-XR as prescribed  5. Further procedures recommended:   1. Consider subacromial bursa injection and/or acromioclavicular joint injection  2. Consider left knee joint injection  3. Consider Sacroiliac joint injections vs lumbar facet joint injections  4. Consider repeat cervical medial branch procedures  6. Acupuncture: consider in the future  7. Urine toxicology screen today:  deferred - not prescribing opioids   8. Recommendations/follow-up for PCP:  Continue current treatment  9. Release of information: n/a  10. Follow up: for injection and in 3 months in follow up      ----------------------------------------------------------------  Nurse Triage line:  [phone]   Call this number with any questions or concerns. You may leave a detailed message anytime. Calls are typically returned Monday through Friday between 8 AM and 4:30 PM. We usually get back to you within 2 business days depending on the issue/request.       Medication refills:    For non-narcotic medications, call your pharmacy directly to request a refill. The pharmacy will contact the [facility] for authorization. Please allow 3-4 days for these refills to be processed.     For narcotic refills, call the nurse triage line or send a [employer] message. Please contact us 7-10 days before your refill is due. The message MUST include the name of the specific medication(s) requested and how you would like to receive the prescription(s). The options are as follows:    Pain Clinic staff can mail the prescription to your pharmacy. Please tell us the name of the pharmacy.    You may pick the prescription up at the Pain Clinic (tell us the location) or during a clinic visit with your pain provider    Pain Clinic staff can deliver the prescription to the [facility] pharmacy in the clinic building. Please tell us the location.      Scheduling number: [phone].  Call this number to schedule or change appointments.    We believe regular attendance is key to your success in our program.    Any time you are unable to keep your appointment we ask that you call us at least 24 hours in advance to let us know. This will allow us to offer the appointment time to another patient.               Follow-ups after your visit        Additional Services     PAIN INJECTION EVAL/TREAT/FOLLOW UP                 Your next 10 appointments  already scheduled     Nov 08, 2017 11:00 AM CST   (Arrive by 10:45 AM)   NUTRITION VISIT with Merle Zamudio RD   [facility] ([facility])    [address] [place] [id number]   [phone]            Nov 29, 2017 11:00 AM CST   (Arrive by 10:45 AM)   NUTRITION VISIT with Merle Zamudio RD   [facility] ([facility])    [address] [place] [address]   [phone]            Jan 19, 2018 11:00 AM CST   (Arrive by 10:45 AM)   Bariatric Surgery Evaluation Testing with Lily Nieto, PhD   [facility] ([facility])    [address] [place] [id number]   [phone]            Jan 19, 2018 12:00 PM CST   (Arrive by 11:45 AM)   Bariatric Surgery Evaluation Interview with Lily Nieto, PhD   [facility] ([facility])    [address] [place] [id number]   [phone]            Jan 22, 2018 11:00 AM CST   (Arrive by 10:45 AM)   New Patient Visit with Minh Mancilla MD   [facility] ([facility])    [address] [place] [id number]   [phone]              Future tests that were ordered for you today     Open Future Orders        Priority Expected Expires Ordered    MR Lumbar Spine w/o Contrast Routine  11/7/2018 11/7/2017    MR Shoulder Left w/o Contrast Routine  11/7/2018 11/7/2017            Who to contact     If you have questions or need follow up information about today's clinic visit or your schedule please contact [facility] [place] directly at [phone].  Normal or non-critical lab and imaging results will be communicated to you by MyChart, letter or phone within 4 business days after the clinic  has received the results. If you do not hear from us within 7 days, please contact the clinic through [employer] or phone. If you have a critical or abnormal lab result, we will notify you by phone as soon as possible.  Submit refill requests through [employer] or call your pharmacy and they will forward the refill request to us. Please allow 3 business days for your refill to be completed.          Additional Information About Your Visit        [employer] Information     [employer] gives you secure access to your electronic health record. If you see a primary care provider, you can also send messages to your care team and make appointments. If you have questions, please call your primary care clinic.  If you do not have a primary care provider, please call [phone] and they will assist you.        Care EveryWhere ID     This is your Care EveryWhere ID. This could be used by other organizations to access your [facility] medical records  [id number]        Your Vitals Were     Pulse BMI (Body Mass Index)                80 40.17 kg/m2           Blood Pressure from Last 3 Encounters:   11/07/17 121/80   10/18/17 136/80   10/02/17 133/79    Weight from Last 3 Encounters:   11/07/17 123.4 kg (272 lb)   10/18/17 123.1 kg (271 lb 4.8 oz)   09/20/17 121.9 kg (268 lb 11.2 oz)              We Performed the Following     PAIN INJECTION EVAL/TREAT/FOLLOW UP          Today's Medication Changes          These changes are accurate as of: 11/7/17  2:51 PM.  If you have any questions, ask your nurse or doctor.               These medicines have changed or have updated prescriptions.        Dose/Directions    traZODone 100 MG tablet   Commonly known as:  DESYREL   This may have changed:  how much to take   Used for:  Insomnia, unspecified insomnia        Dose:  100 mg   Take 1 tablet (100 mg) by mouth nightly as needed for sleep   Quantity:  30 tablet   Refills:  1                Primary Care Provider Office Phone # Fax #    Kristin Tariq  JES Miner [phone]       [id number]  [place] [address]        Equal Access to Services     SHARON WORTHINGTON : Hadii dedra piedra jairoo Soellie, waazarda luqadaha, qaybta kaalmada adeharris, riley gonzalez labraydonmic larry. So [facility] [phone].    ATENCIÓN: Si habla español, tiene a wheeler disposición servicios gratuitos de asistencia lingüística. Llame al [phone].    We comply with applicable federal civil rights laws and [place] laws. We do not discriminate on the basis of race, color, national origin, age, disability, sex, sexual orientation, or gender identity.            Thank you!     Thank you for choosing [facility]  for your care. Our goal is always to provide you with excellent care. Hearing back from our patients is one way we can continue to improve our services. Please take a few minutes to complete the written survey that you may receive in the mail after your visit with us. Thank you!             Your Updated Medication List - Protect others around you: Learn how to safely use, store and throw away your medicines at www.disposemymeds.org.          This list is accurate as of: 11/7/17  2:51 PM.  Always use your most recent med list.                   Brand Name Dispense Instructions for use Diagnosis    ABILIFY 5 MG tablet   Generic drug:  ARIPiprazole      Take 5 mg by mouth daily        amoxicillin-clavulanate 875-125 MG per tablet    AUGMENTIN    20 tablet    Take 1 tablet by mouth 2 times daily    Acute non-recurrent maxillary sinusitis       estradiol 2 MG tablet    ESTRACE    90 tablet    1 tablet daily    Gender identity disorder       ferrous sulfate 325 (65 FE) MG tablet    IRON     Take by mouth daily (with breakfast)        furosemide 20 MG tablet    LASIX    90 tablet    Take 1 tablet (20 mg) by mouth daily - DUE FOR LABS    Leg swelling       LAMOTRIGINE PO      Take 150 mg by mouth daily        LORAZEPAM PO      Take 0.5 mg by mouth every 2  hours as needed for anxiety        * medical cannabis inhalation (Patient's own supply.  Not a prescription)      Inhale into the lungs 2 times daily (This is NOT a prescription, and does not certify that the patient has a qualifying medical condition for medical cannabis.  The purpose of this order is  to document that the patient reports taking medical cannabis.)  MORNING AND AT NIGHT        * medical cannabis (Patient's own supply.  Not a prescription)      1 capsule 2 times daily (This is NOT a prescription, and does not certify that the patient has a qualifying medical condition for medical cannabis.  The purpose of this order is  to document that the patient reports taking medical cannabis.)  Morning and afternoon        Multi-vitamin Tabs tablet      Take 1 tablet by mouth daily        simvastatin 20 MG tablet    ZOCOR    90 tablet    Take 1 tablet (20 mg) by mouth At Bedtime    Hyperlipidemia LDL goal <130       topiramate 200 MG tablet    TOPAMAX    60 tablet    Take 1 tablet (200 mg) by mouth 2 times daily    Chronic pain disorder, Fibromyalgia       traZODone 100 MG tablet    DESYREL    30 tablet    Take 1 tablet (100 mg) by mouth nightly as needed for sleep    Insomnia, unspecified insomnia       triamcinolone 0.1 % ointment    KENALOG    80 g    Apply to AA BID x 3-4 weeks then PRN    Prurigo nodularis       venlafaxine 150 MG 24 hr capsule    EFFEXOR-XR     300 mg        VITAMIN B 12 PO           VITAMIN D (CHOLECALCIFEROL) PO      Take 5,000 Units by mouth daily        * Notice:  This list has 2 medication(s) that are the same as other medications prescribed for you. Read the directions carefully, and ask your doctor or other care provider to review them with you.

## 2017-11-07 NOTE — PATIENT INSTRUCTIONS
Assessment:  1.  Chronic pain syndrome  2.  Chronic neck and back pain  3.  Left shoulder pain/impingement  4.  Chronic left knee pain  5.  Osteoarthritis in multiple joints  6.  Cervical spondylosis and degenerative disc disease  7.  Myofascial pain  8.  Sacroiliac joint pain  9.  Lumbar facet joint pain  10.  Medical cannabis use      Plan:  Diagnosis reviewed, treatment option addressed, and risk/benefits discussed.  Self-care instructions given.  I am recommending a multidisciplinary treatment plan to help this patient better manage her pain.      1. Physical Therapy: continue home exercise program - trial of home Yoga, Pilates, or Raz Chi  2. Clinical Health Psychologist to address issues of relaxation, behavioral change, coping style, and other factors important to improvement: continue behavioral health treatments  3. Diagnostic Studies:   1. Lumbar spine MRI  2. Left shoulder MRI  4. Medication Management:   1. Continue Medical Cannabis per dispensing pharmacy - will recertify  2. Continue Abilify 5 mg daily  3. Continue Topamax 200 mg BID  4. Continue Trazodone 50 mg QHS  5. Continue Effexor-XR as prescribed  5. Further procedures recommended:   1. Consider subacromial bursa injection and/or acromioclavicular joint injection  2. Consider left knee joint injection  3. Consider Sacroiliac joint injections vs lumbar facet joint injections  4. Consider repeat cervical medial branch procedures  6. Acupuncture: consider in the future  7. Urine toxicology screen today: deferred - not prescribing opioids   8. Recommendations/follow-up for PCP:  Continue current treatment  9. Release of information: n/a  10. Follow up: for injection and in 3 months in follow up      ----------------------------------------------------------------  Nurse Triage line:  [phone]   Call this number with any questions or concerns. You may leave a detailed message anytime. Calls are typically returned Monday through Friday between 8  AM and 4:30 PM. We usually get back to you within 2 business days depending on the issue/request.       Medication refills:    For non-narcotic medications, call your pharmacy directly to request a refill. The pharmacy will contact the [facility] for authorization. Please allow 3-4 days for these refills to be processed.     For narcotic refills, call the nurse triage line or send a P[employer] message. Please contact us 7-10 days before your refill is due. The message MUST include the name of the specific medication(s) requested and how you would like to receive the prescription(s). The options are as follows:    Pain Clinic staff can mail the prescription to your pharmacy. Please tell us the name of the pharmacy.    You may pick the prescription up at the Pain Clinic (tell us the location) or during a clinic visit with your pain provider    Pain Clinic staff can deliver the prescription to the [facility] pharmacy in the clinic building. Please tell us the location.      Scheduling number: [phone].  Call this number to schedule or change appointments.    We believe regular attendance is key to your success in our program.    Any time you are unable to keep your appointment we ask that you call us at least 24 hours in advance to let us know. This will allow us to offer the appointment time to another patient.

## 2017-11-07 NOTE — PROGRESS NOTES
[facility] Consultation    Date of visit: 11/7/2017    Reason for consultation:    Kylie Piper is a 52 year old transgender female who is seen in consultation today at the request of her provider, Kristin Miner PA-C for evaluation of chronic pain.    Primary Care Provider is Kristin Miner.  Pain medications are being prescribed by:  None at this time.    Please see the [facility] health questionnaire which the patient completed and reviewed with me in detail.    Chief Complaint:    Chief Complaint   Patient presents with     Pain       Pain history:  Kylie Piper is a 52 year old transgender female who first started having problems with diffuse body pain since 2005 without significant injury.  She states she had an allergic reaction to Amiodarone which lead to her chronic pain.      Her worst pain varies from day to day.  Her worst pain today is in the back of her neck and shoulders, left greater than right.  She also has pain in the lower back and knees, left greater than right, and has numbness and tingling in the feet bilaterally.  She complains of left elbow pain since an injury in the past as well as some left wrist pain that comes and goes.      Her pain is described as achy, and occasional sharp and shooting pain in the left shoulder.    She states that her main concern with being seen in the pain clinic today is so that she can get re-certified for medical cannabis.    Pain rating: intensity ranges from 3/10 to 10/10, and Averages 6/10 on a 0-10 scale.  Aggravating factors include: raking, prolonged walking, home repair, pretty much any activity  Relieving factors include: sitting, lying down, medical cannabis, rest, CBD balm, hot baths  Any bowel or bladder incontinence: denies changes    Current treatments include:  Medical cannabis  Abilify 5 mg daily  Topamax 200 mg BID  Trazodone 50 mg QHS  Effexor- mg two tabs  "daily    Previous medication treatments included:  Previous medication treatments included:  Tramadol  Vicodin   Advil- states he's been told by cardiology to not take any more NSAIDs   No other muscle relaxers   Prednisone   Sumatriptan   Buproprion  Prozac, Paxil, Zoloft, Cymbalta, Effexor   No TCA, SNRI   Ambien (currently), Lunesta, Trazodone   No prior AED    Other treatments have included:  Kylie Piper has been seen at a pain clinic in the past.  Was followed by Dr Moscoso at [facility] - medications and procedures  PT: none recent - states \"it doesn't work\" and couldn't afford copay  Chiropractic: helps - not able to see as often as she would like  Acupuncture: long time ago - not sure if it helped  TENs Unit: has one - helps  Injections: multiple injections including cervical RFA - short term relief with RFA    Past Medical History:  Past Medical History:   Diagnosis Date     Asthma     Resolved in 2002 per pt     Atrial fib/flutter, transient     S/p cardioversion, and formerly on medications but due to intolerance and allergies, not currently on any medications- reports yearly eval with Echo and Ekg?     Chronic pain     LUE pain     Gender identity disorder Started Rx 2012     H/O hypogonadism Gonadectomy 2013     Hyperlipidemia      Hypertension      Psoriasis      Past Surgical History:  Past Surgical History:   Procedure Laterality Date     [facility] REF[facility]       COLONOSCOPY  8/8/2013    Procedure: COLONOSCOPY;  COLONSCOPY SCREEN/ SE;  Surgeon: Santino Sun MD;  Location: [facility] OR     COSMETIC SURGERY       MANDIBLE SURGERY  1986     ORCHIECTOMY INGUINAL BILATERAL  7/16/2013    Procedure: ORCHIECTOMY INGUINAL BILATERAL;  Bilateral Simple Inguinal Orchiectomy ;  Surgeon: Jan Garrett MD;  Location: [facility] OR     Medications:  Current Outpatient Prescriptions   Medication Sig Dispense Refill     triamcinolone (KENALOG) 0.1 % ointment Apply to AA " BID x 3-4 weeks then PRN 80 g 3     furosemide (LASIX) 20 MG tablet Take 1 tablet (20 mg) by mouth daily - DUE FOR LABS 90 tablet 3     ARIPiprazole (ABILIFY) 5 MG tablet Take 5 mg by mouth daily       amoxicillin-clavulanate (AUGMENTIN) 875-125 MG per tablet Take 1 tablet by mouth 2 times daily 20 tablet 0     simvastatin (ZOCOR) 20 MG tablet Take 1 tablet (20 mg) by mouth At Bedtime 90 tablet 3     topiramate (TOPAMAX) 200 MG tablet Take 1 tablet (200 mg) by mouth 2 times daily 60 tablet 0     medical cannabis inhalation (Patient's own supply.  Not a prescription) Inhale into the lungs 2 times daily (This is NOT a prescription, and does not certify that the patient has a qualifying medical condition for medical cannabis.  The purpose of this order is  to document that the patient reports taking medical cannabis.)    MORNING AND AT NIGHT       medical cannabis (Patient's own supply.  Not a prescription) 1 capsule 2 times daily (This is NOT a prescription, and does not certify that the patient has a qualifying medical condition for medical cannabis.  The purpose of this order is  to document that the patient reports taking medical cannabis.)    Morning and afternoon       estradiol (ESTRACE) 2 MG tablet 1 tablet daily 90 tablet 3     VITAMIN D, CHOLECALCIFEROL, PO Take 5,000 Units by mouth daily       ferrous sulfate (IRON) 325 (65 FE) MG tablet Take by mouth daily (with breakfast)       Cyanocobalamin (VITAMIN B 12 PO)        multivitamin, therapeutic with minerals (MULTI-VITAMIN) TABS Take 1 tablet by mouth daily       LAMOTRIGINE PO Take 150 mg by mouth daily        LORAZEPAM PO Take 0.5 mg by mouth every 2 hours as needed for anxiety       traZODone (DESYREL) 100 MG tablet Take 1 tablet (100 mg) by mouth nightly as needed for sleep (Patient taking differently: Take 50 mg by mouth nightly as needed for sleep ) 30 tablet 1     venlafaxine (EFFEXOR-XR) 150 MG 24 hr capsule 300 mg   0     Allergies:     Allergies    Allergen Reactions     Amiodarone      Caused pain fatigue/amplified anxiety and depression     Fluoxetine Other (See Comments)     Night terrors     Tetracycline      Teeth rattle/saliva acidic     Social History:  Home situation: , lives with wife and niece and nephew  Occupation/Schooling: Associates degree, , farming, disabled since 2005  Tobacco use: denies  Alcohol use: occasional  Drug use: Medical marijuana as prescribed  History of chemical dependency treatment: denies    Family history:  Family History   Problem Relation Age of Onset     CANCER Father      skin     DIABETES Father      HEART DISEASE Other      Alzheimer Disease Paternal Grandmother      DIABETES Sister      Family history of headaches: n/a    Review of Systems:  General:  Feeling very tired, weight gain  Skin: negative  Eyes: visual blurring  Ears/Nose/Throat: tinnitus  Respiratory: shortness of breath, no dyspnea on exertion, cough, or hemoptysis  Cardiovascular: negative  Gastrointestinal: negative  Genitourinary: negative  Musculoskeletal: back pain, neck pain, joint pain, joint stiffness and gout  Neurologic: local weakness, numbness or tingling of hands, numbness or tingling of feet, memory problems and tremor  Psychiatric: anxiety and depression  Hematologic/Lymphatic/Immunologic: negative  Endocrine: osteoporosis    Physical Exam:  Vitals:    11/07/17 1349   BP: 121/80   Pulse: 80   Weight: 123.4 kg (272 lb)     Exam:  Constitutional: healthy, alert, active, no distress, cooperative, smiling and over weight  Head: normocephalic. Atraumatic.   Eyes: no redness or jaundice noted   ENT: oropharnx normal.  MMM.  Neck supple.    Cardiovascular: no edema or JVD appreciated, palpable pulses  Respiratory: non-labored, equal expansion, no audible wheezing, speaking in full sentences  Gastrointestinal: soft, obese, non-tender  : deferred  Skin: no suspicious lesions or rashes  Psychiatric: mentation appears normal  and affect normal/bright    Musculoskeletal exam:  Gait/Station/Posture: grossly intact and non-antalgic, toe and heel walk intact  Cervical spine: good range of motion, tender to palpation lower cervical paraspinal muscles and left cervical articular pillars.  Facet loading is positive on the left    Thoracic spine:  Non-tender    Lumbar spine: tender lower lumbar paraspinal muscles and SI joints bilaterally, flexes well, extension and lateral rotation is mildly positive bilaterally for facet loading pain, straight leg raise is negative bilaterally    Myofascial tenderness:  Cervical and lumbar paraspinal muscles  Straight leg exam: negative bilaterally  Wili's maneuver: not assessed    Neurologic exam:  CN:  Cranial nerves 2-12 are grossly intact  Motor:  5/5 UE and LE strength  Reflexes:  Not assessed  Other reflexes:  Plantar response downgoing bilaterally, no clonus   Sensory:  (upper and lower extremities):   Light touch: grossly intact throughout   Vibration: not assessed   Allodynia: absent    Dysethesia: absent    Hyperalgesia: absent     Diagnostic tests:  MRI OF THE CERVICAL SPINE WITHOUT CONTRAST 4/16/2015 1:14 PM    FINDINGS: There is normal alignment of the cervical vertebrae;  however, there is straightening of normal cervical lordosis. Vertebral  body heights of the cervical spine are normal. There is reactive,  degenerative marrow signal change in the opposing endplates at the  C6-C7 level. Marrow signal throughout the cervical vertebrae is  otherwise within normal limits. There is no evidence for fracture or  pathologic bony lesion of the cervical spine.     There is loss of disc height, disc desiccation and posterior disc  bulging to varying degrees at all levels of the cervical spine.     The cervical spinal cord is minimally deformed by degenerative changes  at the C5-C6 and C6-C7 levels. The cervical spinal cord is otherwise  normal in contour. There is no abnormal signal in the cervical  spinal  cord.     Level by level:     C2-C3: There is facet arthropathy bilaterally, uncinate hypertrophy  bilaterally and a posterior broad-based disc-osteophyte complex. These  findings result in severe left foraminal stenosis and minimal right  foraminal narrowing, but no spinal canal stenosis.     C3-C4: There is facet arthropathy bilaterally, uncinate hypertrophy  bilaterally and a posterior broad-based disc-osteophyte complex. These  findings result in mild-moderate left foraminal narrowing and mild  right foraminal narrowing, but no spinal canal stenosis.     C4-C5: There is facet arthropathy bilaterally, uncinate hypertrophy  bilaterally and a posterior broad-based disc-osteophyte complex. These  findings result in moderate right foraminal stenosis and mild left  foraminal narrowing as well as mild spinal canal narrowing.     C5-C6: There is facet arthropathy bilaterally, uncinate hypertrophy  bilaterally and a posterior broad-based disc-osteophyte complex with a  superimposed probable minimal posterior broad-based disc herniation  (protrusion). This presumed herniated disc material mildly indents the  anterior central aspect of the cervical spinal cord but does not  result in signal abnormality within the cord. Overall spinal canal  stenosis is moderate. There is moderate-severe right foraminal  stenosis and moderate left foraminal stenosis.     C6-C7: There is facet arthropathy bilaterally, uncinate hypertrophy  bilaterally and a posterior broad-based disc-osteophyte complex. These  findings result in moderate spinal canal stenosis with mild flattening  of the anterior surface of the cervical spinal cord, severe left  foraminal stenosis and moderate-severe right foraminal stenosis.     C7-T1: There is facet arthropathy bilaterally, uncinate hypertrophy  bilaterally and a posterior broad-based disc-osteophyte complex. These  findings result in moderate bilateral neural foraminal stenosis and  moderate  spinal canal stenosis.     IMPRESSION  IMPRESSION: Degenerative changes of the cervical spine as described    LEFT KNEE WITHOUT CONTRAST  1/15/2015  2:49 PM    FINDINGS:   Menisci:  The posterior horn and body of the medial meniscus are  diminished in size, presumably related to degeneration or mechanical  wearing. Intermediate signal intensity at the tip of the body of the  medial meniscus may represent degenerative fraying. No displaced  meniscal flap is demonstrated. The lateral meniscus appears within  normal limits.     Cruciate ligaments:  The anterior and posterior cruciate ligaments  appear within normal limits.     Collateral supporting structures:  The tibial and fibular collateral  ligaments, biceps femoris and popliteal tendons, and iliotibial tract  appear intact.     Osseous structures and cartilaginous surfaces:  Mild, less than 50%  articular cartilage thinning is noted along the patellar apex. There  is near full-thickness loss of articular cartilage along the  weightbearing aspect of the medial compartment. The articular  cartilage in the lateral compartment appears grossly normal.     Additional findings: There is a lobulated septated 1.5 cm cyst, likely  a ganglion cyst, adjacent to the femoral attachment of the lateral  head of the gastrocnemius. There is a small joint effusion which could  be physiologic. The quadriceps and infrapatellar tendons appear within  normal limits.     IMPRESSION  IMPRESSION:    1. Medial compartment degenerative arthrosis with near full-thickness  loss of articular cartilage.  2. Medial meniscus appears diminished in size compatible with  degeneration and mechanical wearing. Degenerative fraying is noted at  the tip of the body of the medial meniscus. No displaced meniscal  flap.  3. 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  4. No cruciate or collateral ligament tear.  5. Small ganglion cyst adjacent to the femoral attachment of the  lateral head of the  gastrocnemius.    Exam: 4 views of the left shoulder dated 6/9/2016.    Findings: External rotation, Grashey, axillary, and Y. views of the  left shoulder were obtained. No evidence of fracture or dislocation  involving the left shoulder. Degenerative osteoarthrosis at the left  shoulder glenohumeral joint with presumed joint bodies in the region  of the subcoracoid/subscapularis recess.       Impression: Degenerative osteoarthrosis in the left shoulder  glenohumeral joint with no evidence of fracture or dislocation    Other testing (labs, diagnostics):  Component Value Flag Ref Range Units Status Collected Lab   WBC 12.4 (H) 4.0 - 11.0 10e9/L Final 10/18/2017  8:55 AM 1740   RBC Count 4.83  3.8 - 5.2 10e12/L Final 10/18/2017  8:55 AM 1740   Hemoglobin 13.6  11.7 - 15.7 g/dL Final 10/18/2017  8:55 AM 1740   Hematocrit 42.3  35.0 - 47.0 % Final 10/18/2017  8:55 AM 1740   MCV 88  78 - 100 fl Final 10/18/2017  8:55 AM 1740   MCH 28.2  26.5 - 33.0 pg Final 10/18/2017  8:55 AM 1740   MCHC 32.2  31.5 - 36.5 g/dL Final 10/18/2017  8:55 AM 1740   RDW 13.2  10.0 - 15.0 % Final 10/18/2017  8:55 AM 1740   Platelet Count 402  150 - 450 10e9/L Final 10/18/2017  8:55 AM 1740     Component Value Flag Ref Range Units Status Collected Lab   Sodium 136  133 - 144 mmol/L Final 10/18/2017  8:55 AM 1740   Potassium 4.5  3.4 - 5.3 mmol/L Final 10/18/2017  8:55 AM 1740   Chloride 105  94 - 109 mmol/L Final 10/18/2017  8:55 AM 1740   Carbon Dioxide 24  20 - 32 mmol/L Final 10/18/2017  8:55 AM 1740   Anion Gap 8  3 - 14 mmol/L Final 10/18/2017  8:55 AM 1740   Glucose 99  70 - 99 mg/dL Final 10/18/2017  8:55 AM 1740   Urea Nitrogen 20  7 - 30 mg/dL Final 10/18/2017  8:55 AM 1740   Creatinine 0.95  0.52 - 1.04 mg/dL Final 10/18/2017  8:55 AM 1740   GFR Estimate 62  >60 mL/min/1.7m2 Final 10/18/2017  8:55 AM 1740   Comment:   Non  GFR Calc   GFR Estimate If Black 74  >60 mL/min/1.7m2 Final 10/18/2017  8:55 AM 1740   Comment:    African American GFR Calc   Calcium 9.0  8.5 - 10.1 mg/dL Final 10/18/2017  8:55 AM 1740   Bilirubin Total 0.4  0.2 - 1.3 mg/dL Final 10/18/2017  8:55 AM 1740   Albumin 3.8  3.4 - 5.0 g/dL Final 10/18/2017  8:55 AM 1740   Protein Total 7.9  6.8 - 8.8 g/dL Final 10/18/2017  8:55 AM 1740   Alkaline Phosphatase 69  40 - 150 U/L Final 10/18/2017  8:55 AM 1740    (H) 0 - 50 U/L Final 10/18/2017  8:55 AM 1740   AST 64 (H) 0 - 45 U/L Final 10/18/2017  8:55 AM 1740     Component Value Flag Ref Range Units Status Collected Lab   Vitamin D Deficiency screening 42  20 - 75 ug/L Final 09/29/2017  7:55 AM 51     Component Value Flag Ref Range Units Status Collected Lab   Hemoglobin A1C 5.4  4.3 - 6.0 % Final 09/29/2017  7:55 AM BE     Screening tools:  DIRE Score for ongoing opioid management is calculated as follows:    Diagnosis = 1    Intractability = 2    Risk: Psych = 2  Chem Hlth = 2  Reliability = 3  Social = 3    Efficacy = 2    Total DIRE Score = 15 (14 or higher predicts good candidate for ongoing opioid management; 13 or lower predicts poor candidate for opioid management)     Assessment:  1.  Chronic pain syndrome  2.  Chronic neck and back pain  3.  Left shoulder pain/impingement  4.  Chronic left knee pain  5.  Osteoarthritis in multiple joints  6.  Cervical spondylosis and degenerative disc disease  7.  Myofascial pain  8.  Sacroiliac joint pain  9.  Lumbar facet joint pain  10.  Medical cannabis use    Kylie Piper is a 52 year old female who presents with the complaints of chronic, diffuse body and joint pains. She states that her shoulder is hindering activity and would like to try an injection.  Her back and left knee have been giving her more troubles as well and would like imaging of these areas.  She does very well with medical cannabis and wants to make sure she can be re-certified when the time comes.  Our treatment plan is as outlined below.    Plan:  Diagnosis reviewed, treatment  option addressed, and risk/benefits discussed.  Self-care instructions given.  I am recommending a multidisciplinary treatment plan to help this patient better manage her pain.      1. Physical Therapy: continue home exercise program - trial of home Yoga, Pilates, or Raz Chi  2. Clinical Health Psychologist to address issues of relaxation, behavioral change, coping style, and other factors important to improvement: continue behavioral health treatments  3. Diagnostic Studies:   1. Lumbar spine MRI  2. Left shoulder MRI  4. Medication Management:   1. Continue Medical Cannabis per dispensing pharmacy - will recertify  2. Continue Abilify 5 mg daily  3. Continue Topamax 200 mg BID  4. Continue Trazodone 50 mg QHS  5. Continue Effexor-XR as prescribed  5. Further procedures recommended:   1. Consider subacromial bursa injection and/or acromioclavicular joint injection  2. Consider left knee joint injection  3. Consider Sacroiliac joint injections vs lumbar facet joint injections  4. Consider repeat cervical medial branch procedures  6. Acupuncture: consider in the future  7. Urine toxicology screen today: deferred - not prescribing opioids   8. Recommendations/follow-up for PCP:  Continue current treatment  9. Release of information: n/a  10. Follow up: for injection and in 3 months in follow up    Total time spent was 60 minutes, and more than 50% of face to face time was spent in counseling and/or coordination of care regarding diagnosis, principles of multidisciplinary care, medication management, and interventional procedures.    Stanislav Rayo MD  [facility]

## 2017-11-08 ENCOUNTER — TELEPHONE (OUTPATIENT)
Dept: PALLIATIVE MEDICINE | Facility: CLINIC | Age: 52
End: 2017-11-08

## 2017-11-08 ENCOUNTER — ALLIED HEALTH/NURSE VISIT (OUTPATIENT)
Dept: SURGERY | Facility: CLINIC | Age: 52
End: 2017-11-08

## 2017-11-08 NOTE — MR AVS SNAPSHOT
MRN:[id number]                      After Visit Summary   11/8/2017    Kylie Piper    MRN: [id number]           Visit Information        Provider Department      11/8/2017 11:00 AM Merle Zamudio RD M [facility]        Your next 10 appointments already scheduled     Nov 29, 2017 11:00 AM CST   (Arrive by 10:45 AM)   NUTRITION VISIT with Merle Zamudio RD   [facility] ([facility])    [address] [place] [address]   [phone]            Jan 19, 2018 11:00 AM CST   (Arrive by 10:45 AM)   Bariatric Surgery Evaluation Testing with Lily Nieto, PhD   [facility] ([facility])    [address] [place] [id number]   [phone]            Jan 19, 2018 12:00 PM CST   (Arrive by 11:45 AM)   Bariatric Surgery Evaluation Interview with Lily Nieto, PhD   [facility] ([facility])    [address] [place] [address]   [phone]            Jan 22, 2018 11:00 AM CST   (Arrive by 10:45 AM)   New Patient Visit with Mnih Mancilla MD   [facility] ([facility])    [address] [place] [phone]   [phone]              Care Instructions    GOALS:  Relating To Eating:  Eat slowly (20-30 minutes per meal), chewing foods well (25 chews per bite/applesauce consistency)  Focus on lean protein and non-starchy vegetables/whole fruit at each meal with no more than 1 cup of carbs or 2 slices of bread  Record food intake prior to eating throughout the day.  May use STW[employer]al.com  or FitBit. (Especially track the challenging days such as Thanksgiving.)    Relating to beverages:  Work on  fluids from meals by 30 minutes.      Relating to activity:  Increase activity as able depending upon knee pain. Try upper body exercises    Relating to cravings:  Rid your environment of trigger foods.            [employer] Information     [employer] gives you secure access to your electronic health record. If you see a primary care provider, you can also send messages to your care team and make appointments. If you have questions, please call your primary care clinic.  If you do not have a primary care provider, please call [phone] and they will assist you.      [employer] is an electronic gateway that provides easy, online access to your medical records. With [employer], you can request a clinic appointment, read your test results, renew a prescription or communicate with your care team.     To access your existing account, please contact your [facility] or call [phone] for assistance.        Care EveryWhere ID     This is your Care EveryWhere ID. This could be used by other organizations to access your [facility] medical records  [id number]        Equal Access to Services     SHARON WORTHINGTON : Dominik Gross, wamichi lester, tana brice, riley juarez. So [facility] [phone].    ATENCIÓN: Si habla español, tiene a wheeler disposición servicios gratuitos de asistencia lingüística. Llame al [phone].    We comply with applicable federal civil rights laws and [place] laws. We do not discriminate on the basis of race, color, national origin, age, disability, sex, sexual orientation, or gender identity.

## 2017-11-08 NOTE — PATIENT INSTRUCTIONS
GOALS:  Relating To Eating:  Eat slowly (20-30 minutes per meal), chewing foods well (25 chews per bite/applesauce consistency)  Focus on lean protein and non-starchy vegetables/whole fruit at each meal with no more than 1 cup of carbs or 2 slices of bread  Record food intake prior to eating throughout the day.  May use [employer] D[employer].com  or FitBit. (Especially track the challenging days such as Thanksgiving.)    Relating to beverages:  Work on  fluids from meals by 30 minutes.     Relating to activity:  Increase activity as able depending upon knee pain. Try upper body exercises and yoga.     Relating to cravings:  Rid your environment of trigger foods.

## 2017-11-08 NOTE — TELEPHONE ENCOUNTER
LVM to schedule left shoulder subacromial bursa injection       Leah ORLANDO    [facility]

## 2017-11-08 NOTE — PROGRESS NOTES
"Bariatric Nutrition Consultation Note    Reason For Visit: Nutrition Reassessment    Kylie Piper is a 52 year-old presenting today for new bariatric nutrition consult.  Pt is interested in laparoscopic sleeve gastrectomy.  This is pt's second of 6 required nutrition visits prior to surgery. Pt referred by Xin SLAUGHTER) on 10/18/17.      Coordination Note: Pt plans to  her PCP and psychologist letter of support soon (by 11/27/17).  Plans to get a referral from cardiology from her PCP as well.  Encouraged Pt to make other appointments to get task list items planned out.    She is interested in having weight loss surgery for the following reasons:  To improve overall health and wellbeing (knees, joints, breathing difficulty; needing a diuretic and low-sodium diet)    Support: Wife Anuradha (who had bariatric surgery in the past)    ANTHROPOMETRICS:    Height as of an earlier encounter on 10/18/17: 1.753 m (5' 9\").    Initial Weight as of an earlier encounter on 10/18/17: 123.1 kg (271 lb 4.8 oz) with BMI of 40.06.   Current Weight: 270.9 lbs (-0.4 lbs from initial weight over the past 3 weeks)    Required weight loss goal pre-op: -10 lbs from initial consult weight (goal weight 261.3 lbs or less before surgery)    SUPPLEMENT INFORMATION:  Liquid MVI; hair,skin, nails (biotin), Ca, Probiotics (acidophillus), B12, 1000 International Units vitamin D3, Mg  *Pt is on topiramate to help with appetite control.    NUTRITION HISTORY:  Progress with Previous Goals:   Relating To Eating:  Eat slowly (20-30 minutes per meal), chewing foods well (25 chews per bite/applesauce consistency)  - Improving; still working on it.   Focus on lean protein and non-starchy vegetables/whole fruit at each meal with no more than 1 cup of carbs or 2 slices of bread - Meeting.   Record food intake prior to eating throughout the day.  May use MyFitnessPal.com  or Chain. - intermittently recording.     Relating to beverages:  Work " on  fluids from meals by 30 minutes.  - Meeting for the most part; has to take a sip if getting the hiccups.     Relating to activity:  Increase activity as able depending upon knee pain. Try upper body exercises  - Pt has started fixing a  (upper body exercise).     Relating to cravings:  Rid your environment of trigger foods.  - Improving; only had a few pieces of candy for Halloween.       Eating Habits 10/17/2017   Do you have any dietary restrictions? Yes   Low-sodium, low-fat diet   Do you currently binge eat (eat a large amount of food in a short time)? Yes   Do you get up to eat after falling asleep? Yes (infrequently - she will have an egg or other protein)   What foods do you crave? chocolate, protein, chips   *Pt is willing to abstain from alcohol after surgery.      Physical Activity Notes:   *Pt sometimes tracks steps and heart rate on FitBit.  *Pt can only exercise her right arm (OA in joints of left arm).   *Pt is limited also be knee pain.    NUTRITION DIAGNOSIS:  Obesity r/t long history of self-monitoring deficit and excessive energy intake aeb BMI >30. - improving.     INTERVENTION:  Intervention Provided/Education Provided: Praised Pt on progress with goals.  Reviewed post-op diet guidelines, ways to help prepare for post-op diet guidelines pre-operatively, portion/calorie-control, mindful eating, and exercise.  Provided pt with list of goals RD contact information.      Patient Understanding: good  Expected Compliance: good    GOALS:  Relating To Eating:  Eat slowly (20-30 minutes per meal), chewing foods well (25 chews per bite/applesauce consistency)  Focus on lean protein and non-starchy vegetables/whole fruit at each meal with no more than 1 cup of carbs or 2 slices of bread  Record food intake prior to eating throughout the day.  May use [employer] TechnologiesPal.com  or FitBit. (Especially track the challenging days such as Thanksgiving.)    Relating to beverages:  Work on   fluids from meals by 30 minutes.     Relating to activity:  Increase activity as able depending upon knee pain. Try upper body exercises or yoga.     Relating to cravings:  Rid your environment of trigger foods.     Follow-Up: 1 month    Time spent with patient: 30 minutes.  Merle Zamudio MS, RDN, LDN, CLT  Pager: [phone]

## 2017-11-13 ENCOUNTER — RADIANT APPOINTMENT (OUTPATIENT)
Dept: MRI IMAGING | Facility: CLINIC | Age: 52
End: 2017-11-13
Attending: ANESTHESIOLOGY
Payer: COMMERCIAL

## 2017-11-13 ENCOUNTER — OFFICE VISIT (OUTPATIENT)
Dept: FAMILY MEDICINE | Facility: CLINIC | Age: 52
End: 2017-11-13
Payer: COMMERCIAL

## 2017-11-13 VITALS
HEART RATE: 90 BPM | SYSTOLIC BLOOD PRESSURE: 134 MMHG | TEMPERATURE: 98.1 F | HEIGHT: 69 IN | WEIGHT: 273 LBS | BODY MASS INDEX: 40.43 KG/M2 | OXYGEN SATURATION: 98 % | DIASTOLIC BLOOD PRESSURE: 81 MMHG

## 2017-11-13 DIAGNOSIS — G89.29 CHRONIC BILATERAL LOW BACK PAIN, WITH SCIATICA PRESENCE UNSPECIFIED: ICD-10-CM

## 2017-11-13 DIAGNOSIS — R53.82 CHRONIC FATIGUE: ICD-10-CM

## 2017-11-13 DIAGNOSIS — M25.512 CHRONIC LEFT SHOULDER PAIN: ICD-10-CM

## 2017-11-13 DIAGNOSIS — M54.5 CHRONIC BILATERAL LOW BACK PAIN, WITH SCIATICA PRESENCE UNSPECIFIED: ICD-10-CM

## 2017-11-13 DIAGNOSIS — Z86.79 HISTORY OF ATRIAL FIBRILLATION: ICD-10-CM

## 2017-11-13 DIAGNOSIS — G89.4 CHRONIC PAIN DISORDER: ICD-10-CM

## 2017-11-13 DIAGNOSIS — G89.29 CHRONIC LEFT SHOULDER PAIN: ICD-10-CM

## 2017-11-13 DIAGNOSIS — E66.01 MORBID OBESITY (H): Primary | ICD-10-CM

## 2017-11-13 PROCEDURE — 99214 OFFICE O/P EST MOD 30 MIN: CPT | Performed by: PHYSICIAN ASSISTANT

## 2017-11-13 PROCEDURE — 72148 MRI LUMBAR SPINE W/O DYE: CPT | Mod: TC

## 2017-11-13 PROCEDURE — 73221 MRI JOINT UPR EXTREM W/O DYE: CPT | Mod: TC

## 2017-11-13 NOTE — MR AVS SNAPSHOT
After Visit Summary   11/13/2017    Kylie Piper    MRN: [id number]           Patient Information     Date Of Birth          1965        Visit Information        Provider Department      11/13/2017 3:00 PM Kristin Miner PA-C [facility]        Today's Diagnoses     Morbid obesity (H)    -  1    History of atrial fibrillation        Chronic pain disorder        Chronic fatigue           Follow-ups after your visit        Additional Services     CARDIOLOGY EVAL ADULT REFERRAL       Your provider has referred you to:  [facility]: [facility] [phone]   [email or web address]    Please be aware that coverage of these services is subject to the terms and limitations of your health insurance plan.  Call member services at your health plan with any benefit or coverage questions.      Type of Referral:  New Cardiology Consult    Timeframe requested:  1 Week    Please bring the following to your appointment:  >>   Any x-rays, CTs or MRIs which have been performed.  Contact the facility where they were done to arrange for  prior to your scheduled appointment.    >>   List of current medications  >>   This referral request   >>   Any documents/labs given to you for this referral            CARE COORDINATION REFERRAL       Services are provided by a Care Coordinator for people with complex needs such as: medical, social, or financial troubles.  The Care Coordinator works with the patient and their Primary Care Provider to determine health goals, obtain resources, achieve outcomes, and develop care plans that help coordinate the patient's care.     Reason for Referral: Other: patient needing to meet with a provider who does long term disability exams    Provide additional details for Care Coordination to best meet the patient's current needs:     Clinical Staff have discussed the Care Coordination Referral  with the patient and/or caregiver: yes                  Your next 10 appointments already scheduled     Dec 06, 2017 10:30 AM CST   (Arrive by 10:15 AM)   NUTRITION VISIT with Merle Zamudio RD   [facility] ([facility])    [address] [place] [phone]   [phone]            Jan 17, 2018  3:00 PM CST   PROCEDURE with Stanislav Rayo MD   [facility] ([facility])    [address]  [address]  [facility] [address]   [phone]            Jan 19, 2018 11:00 AM CST   (Arrive by 10:45 AM)   Bariatric Surgery Evaluation Testing with Lily Nieto, PhD   [facility] ([facility])    [address] [place] [address]   [phone]            Jan 19, 2018 12:00 PM CST   (Arrive by 11:45 AM)   Bariatric Surgery Evaluation Interview with Lily Nieto, PhD   [facility] ([facility])    [address] [place] [address]   [phone]            Jan 22, 2018 11:00 AM CST   (Arrive by 10:45 AM)   New Patient Visit with Minh Mancilla MD   [facility] ([facility])    [address] [place] [address]   [phone]              Who to contact     Normal or non-critical lab and imaging results will be communicated to you by MyChart, letter or phone within 4 business days after the clinic has received the results. If you do not hear from us within 7 days, please contact the clinic through [employer] or phone. If you have a critical or abnormal lab result, we will notify you by phone as soon as possible.  Submit refill requests through [employer] or call your pharmacy and they will forward the refill request to us. Please allow 3  "business days for your refill to be completed.          If you need to speak with a  for additional information , please call: [phone]             Additional Information About Your Visit        MyChart Information     [employer] gives you secure access to your electronic health record. If you see a primary care provider, you can also send messages to your care team and make appointments. If you have questions, please call your primary care clinic.  If you do not have a primary care provider, please call [phone] and they will assist you.        Care EveryWhere ID     This is your Care EveryWhere ID. This could be used by other organizations to access your [facility] medical records  [id number]        Your Vitals Were     Pulse Temperature Height Pulse Oximetry BMI (Body Mass Index)       90 98.1  F (36.7  C) (Oral) 5' 9\" (1.753 m) 98% 40.32 kg/m2        Blood Pressure from Last 3 Encounters:   11/13/17 134/81   11/07/17 121/80   10/18/17 136/80    Weight from Last 3 Encounters:   11/13/17 273 lb (123.8 kg)   11/07/17 272 lb (123.4 kg)   10/18/17 271 lb 4.8 oz (123.1 kg)              We Performed the Following     CARDIOLOGY EVAL ADULT REFERRAL     CARE COORDINATION REFERRAL          Today's Medication Changes          These changes are accurate as of: 11/13/17  3:14 PM.  If you have any questions, ask your nurse or doctor.               These medicines have changed or have updated prescriptions.        Dose/Directions    traZODone 100 MG tablet   Commonly known as:  DESYREL   This may have changed:  how much to take   Used for:  Insomnia, unspecified insomnia        Dose:  100 mg   Take 1 tablet (100 mg) by mouth nightly as needed for sleep   Quantity:  30 tablet   Refills:  1                Primary Care Provider Office Phone # Fax #    Kristin Miner PA-C [phone]       [address] [place] [place] [id number]        Equal Access to Services     SHARON WORTHINGTON AH: Riteshii dedra piedra " joanna Gross, waazarda luqadaha, qaybta kaalmada yuniel, riley edgarin hayaan drejessica chanolouise labraydonmic larry. So [facility] [phone].    ATENCIÓN: Si habla charito, tiene a wheeler disposición servicios gratuitos de asistencia lingüística. Jay al [phone].    We comply with applicable federal civil rights laws and [place] laws. We do not discriminate on the basis of race, color, national origin, age, disability, sex, sexual orientation, or gender identity.            Thank you!     Thank you for choosing [facility]  for your care. Our goal is always to provide you with excellent care. Hearing back from our patients is one way we can continue to improve our services. Please take a few minutes to complete the written survey that you may receive in the mail after your visit with us. Thank you!             Your Updated Medication List - Protect others around you: Learn how to safely use, store and throw away your medicines at www.disposemymeds.org.          This list is accurate as of: 11/13/17  3:14 PM.  Always use your most recent med list.                   Brand Name Dispense Instructions for use Diagnosis    ABILIFY 5 MG tablet   Generic drug:  ARIPiprazole      Take 5 mg by mouth daily        amoxicillin-clavulanate 875-125 MG per tablet    AUGMENTIN    20 tablet    Take 1 tablet by mouth 2 times daily    Acute non-recurrent maxillary sinusitis       estradiol 2 MG tablet    ESTRACE    90 tablet    1 tablet daily    Gender identity disorder       ferrous sulfate 325 (65 FE) MG tablet    IRON     Take by mouth daily (with breakfast)        furosemide 20 MG tablet    LASIX    90 tablet    Take 1 tablet (20 mg) by mouth daily - DUE FOR LABS    Leg swelling       LAMOTRIGINE PO      Take 150 mg by mouth daily        LORAZEPAM PO      Take 0.5 mg by mouth every 2 hours as needed for anxiety        * medical cannabis inhalation (Patient's own supply.  Not a prescription)      Inhale into the lungs 2 times daily  (This is NOT a prescription, and does not certify that the patient has a qualifying medical condition for medical cannabis.  The purpose of this order is  to document that the patient reports taking medical cannabis.)  MORNING AND AT NIGHT        * medical cannabis (Patient's own supply.  Not a prescription)      1 capsule 2 times daily (This is NOT a prescription, and does not certify that the patient has a qualifying medical condition for medical cannabis.  The purpose of this order is  to document that the patient reports taking medical cannabis.)  Morning and afternoon        Multi-vitamin Tabs tablet      Take 1 tablet by mouth daily        simvastatin 20 MG tablet    ZOCOR    90 tablet    Take 1 tablet (20 mg) by mouth At Bedtime    Hyperlipidemia LDL goal <130       topiramate 200 MG tablet    TOPAMAX    60 tablet    Take 1 tablet (200 mg) by mouth 2 times daily    Chronic pain disorder, Fibromyalgia       traZODone 100 MG tablet    DESYREL    30 tablet    Take 1 tablet (100 mg) by mouth nightly as needed for sleep    Insomnia, unspecified insomnia       triamcinolone 0.1 % ointment    KENALOG    80 g    Apply to AA BID x 3-4 weeks then PRN    Prurigo nodularis       venlafaxine 150 MG 24 hr capsule    EFFEXOR-XR     300 mg        VITAMIN B 12 PO           VITAMIN D (CHOLECALCIFEROL) PO      Take 5,000 Units by mouth daily        * Notice:  This list has 2 medication(s) that are the same as other medications prescribed for you. Read the directions carefully, and ask your doctor or other care provider to review them with you.

## 2017-11-13 NOTE — Clinical Note
Leola, I drafted a letter of support of Kylie's decision to have weight loss surgery. Her updated histories are included in my note from today. You can see her weight trends over the last 2 years by reviewing flowsheets. Is there anything else you need me to do? Thanks, Kristin

## 2017-11-13 NOTE — PROGRESS NOTES
SUBJECTIVE:   Kylie Piper is a 52 year old female who presents to clinic today for the following health issues:      Referrals:   -Discuss referral for gastric bypass and cardiology  Needing a letter of support from myself for bariatric surgery  Needing clearance from cardiology due to a history of Afib        Problem list and histories reviewed & adjusted, as indicated.  Additional history: as documented    Patient Active Problem List   Diagnosis     Gender identity disorder     Hyperlipidemia LDL goal <130     Abnormal results of liver function studies     Chronic pain disorder     Hypertension goal BP (blood pressure) < 130/80     Insomnia     Bipolar 2 disorder (H)     Morbid obesity (H)     H/O hypogonadism     Fibromyalgia     Glenohumeral arthritis     Vitamin D deficiency     Primary osteoarthritis of left shoulder     Anxiety     Cervical spondylosis without myelopathy     Chronic fatigue     Past Surgical History:   Procedure Laterality Date     [facility] REFERR[facility]       COLONOSCOPY  8/8/2013    Procedure: COLONOSCOPY;  COLONSCOPY SCREEN/ SE;  Surgeon: Santino Sun MD;  Location: [facility] OR     COSMETIC SURGERY       MANDIBLE SURGERY  1986     ORCHIECTOMY INGUINAL BILATERAL  7/16/2013    Procedure: ORCHIECTOMY INGUINAL BILATERAL;  Bilateral Simple Inguinal Orchiectomy ;  Surgeon: Jan Garrett MD;  Location: [facility] OR     TONSILLECTOMY         Social History   Substance Use Topics     Smoking status: Never Smoker     Smokeless tobacco: Never Used     Alcohol use Yes      Comment: occasional//2 per month     Family History   Problem Relation Age of Onset     CANCER Father      skin     DIABETES Father      HEART DISEASE Other      Alzheimer Disease Paternal Grandmother      DIABETES Sister          Current Outpatient Prescriptions   Medication Sig Dispense Refill     triamcinolone (KENALOG) 0.1 % ointment Apply to AA BID x 3-4 weeks then PRN 80 g 3     furosemide  (LASIX) 20 MG tablet Take 1 tablet (20 mg) by mouth daily - DUE FOR LABS 90 tablet 3     ARIPiprazole (ABILIFY) 5 MG tablet Take 5 mg by mouth daily       simvastatin (ZOCOR) 20 MG tablet Take 1 tablet (20 mg) by mouth At Bedtime 90 tablet 3     topiramate (TOPAMAX) 200 MG tablet Take 1 tablet (200 mg) by mouth 2 times daily 60 tablet 0     medical cannabis inhalation (Patient's own supply.  Not a prescription) Inhale into the lungs 2 times daily (This is NOT a prescription, and does not certify that the patient has a qualifying medical condition for medical cannabis.  The purpose of this order is  to document that the patient reports taking medical cannabis.)    MORNING AND AT NIGHT       medical cannabis (Patient's own supply.  Not a prescription) 1 capsule 2 times daily (This is NOT a prescription, and does not certify that the patient has a qualifying medical condition for medical cannabis.  The purpose of this order is  to document that the patient reports taking medical cannabis.)    Morning and afternoon       estradiol (ESTRACE) 2 MG tablet 1 tablet daily 90 tablet 3     VITAMIN D, CHOLECALCIFEROL, PO Take 5,000 Units by mouth daily       ferrous sulfate (IRON) 325 (65 FE) MG tablet Take by mouth daily (with breakfast)       Cyanocobalamin (VITAMIN B 12 PO)        multivitamin, therapeutic with minerals (MULTI-VITAMIN) TABS Take 1 tablet by mouth daily       LAMOTRIGINE PO Take 150 mg by mouth daily        LORAZEPAM PO Take 0.5 mg by mouth every 2 hours as needed for anxiety       traZODone (DESYREL) 100 MG tablet Take 1 tablet (100 mg) by mouth nightly as needed for sleep (Patient taking differently: Take 50 mg by mouth nightly as needed for sleep ) 30 tablet 1     venlafaxine (EFFEXOR-XR) 150 MG 24 hr capsule 300 mg   0     amoxicillin-clavulanate (AUGMENTIN) 875-125 MG per tablet Take 1 tablet by mouth 2 times daily (Patient not taking: Reported on 11/13/2017) 20 tablet 0         Reviewed and updated as  "needed this visit by clinical staffTobacco  Allergies  Meds  Med Hx  Surg Hx  Fam Hx  Soc Hx      Reviewed and updated as needed this visit by Provider         ROS:  Constitutional, psych systems are negative, except as otherwise noted.      OBJECTIVE:                                                    /81  Pulse 90  Temp 98.1  F (36.7  C) (Oral)  Ht 5' 9\" (1.753 m)  Wt 273 lb (123.8 kg)  SpO2 98%  BMI 40.32 kg/m2  Body mass index is 40.32 kg/(m^2).  Constitutional: healthy, alert, active, no distress.    Head: Normocephalic  Musculoskeletal: extremities normal- no gross deformities noted, gait normal, normal muscle tone and able to move about the exam room without difficulty.    Skin: no suspicious lesions or rashes appreciated on exposed areas  Neurologic: Gait normal. Moving all extremities spontaneously, no apparent weakness.    Psychiatric: mentation appears normal, thoughts are clear and concise. Converses appropriately. Affect is Appropriate/mood-congruent       ASSESSMENT:                                                      1. Morbid obesity (H)    2. History of atrial fibrillation    3. Chronic pain disorder    4. Chronic fatigue         PLAN:                                                    Letter drafted, will send to Lizeth Ham RN. Cardiology referral placed for clearance.     Care coordination referral placed for resources regarding long term disability.    The patient was in agreement with the plan today and had no questions or concerns prior to leaving the clinic.     Kristin Miner PA-C  [facility]  "

## 2017-11-13 NOTE — LETTER
[facility]  [address]  [place] [address]  Phone: [phone]    November 13, 2017        Kylie Piper  [address] [address]          To whom it may concern:    RE: Kylie Piper    Patient was seen and treated today at our clinic. She has been under my care since 3/9/16. Today we discussed her struggles with weight loss and her desire for weight loss surgery. I am in support of her pursuit of weight loss surgery. Per the patient, she has struggled with obesity and weight loss all of her life. Her records here at [facility] indicate that she's been morbidly obese since 2013. Her weight loss attempts have included regular exercises, various diets/programs (low carb, low fat, high protein, Paleo, Nutrisystem, and Weight Watchers), OTC weight loss supplements and prescription medication including topiramate.    You'll find her problem list, past surgical history, and medication list in the attached office note. She is currently up to date with her age and gender appropriate screenings. Separately I have attached her weight trends over the last 2 years.    Please contact me for questions or concerns. [facility] [place] podline: [phone].      Sincerely,          Kristin Miner PA-C

## 2017-11-27 ENCOUNTER — MEDICAL CORRESPONDENCE (OUTPATIENT)
Dept: HEALTH INFORMATION MANAGEMENT | Facility: CLINIC | Age: 52
End: 2017-11-27

## 2017-12-05 ENCOUNTER — OFFICE VISIT (OUTPATIENT)
Dept: CARDIOLOGY | Facility: CLINIC | Age: 52
End: 2017-12-05
Payer: COMMERCIAL

## 2017-12-05 VITALS — HEART RATE: 82 BPM | DIASTOLIC BLOOD PRESSURE: 83 MMHG | SYSTOLIC BLOOD PRESSURE: 124 MMHG | OXYGEN SATURATION: 95 %

## 2017-12-05 DIAGNOSIS — Z01.810 PRE-OPERATIVE CARDIOVASCULAR EXAMINATION: Primary | ICD-10-CM

## 2017-12-05 PROCEDURE — 99203 OFFICE O/P NEW LOW 30 MIN: CPT | Performed by: INTERNAL MEDICINE

## 2017-12-05 NOTE — PATIENT INSTRUCTIONS
Thank you for coming to the [facility] @ [facility]; please note the following instructions:    1. Dr. Hugo Chaidez has cleared you for bariatric surgery from a cardiac standpoint.    2.  Dr. Hugo Chaidez is recommending to see you on an as needed basis regarding your heart care.  It was a pleasuring seeing you today at the [facility] Care @ the [facility].            If you have any questions regarding your visit please contact your care team:     Cardiology  Telephone Number   Eliza BERNARD, RN  [place], [place], [place], [facility]   [phone]    *After hours: [phone]   For scheduling appts:     [phone] or    [phone] (select option 1)    *After hours: [phone]     For the Device Clinic (Pacemakers and ICD's)  RN's :  Carmen Russ   During business hours: [phone]    *After business hours:  [phone] (select option 4)      Normal test result notifications will be released via [employer] or mailed within 7 business days.  All other test results, will be communicated via telephone once reviewed by your cardiologist.    If you need a medication refill please contact your pharmacy.  Please allow 3 business days for your refill to be completed.    As always, thank you for trusting us with your health care needs!

## 2017-12-05 NOTE — MR AVS SNAPSHOT
After Visit Summary   12/5/2017    Kylie Piper    MRN: [id number]           Patient Information     Date Of Birth          1965        Visit Information        Provider Department      12/5/2017 3:30 PM Hugo Chaidez MD [facility] PHYSICIANS HEART AT [facility] Instructions    Thank you for coming to the [facility] @ [facility]; please note the following instructions:    1. Dr. Hugo Chaidez has cleared you for bariatric surgery from a cardiac standpoint.    2.  Dr. Hugo Chaidez is recommending to see you on an as needed basis regarding your heart care.  It was a pleasuring seeing you today at the [facility] Care @ the [facility].            If you have any questions regarding your visit please contact your care team:     Cardiology  Telephone Number   Eliza BERNARD, [place], [place], A  [place], [facility]   [phone]    *After hours: [phone]   For scheduling appts:     [phone] or    [phone] (select option 1)    *After hours: [phone]     For the Device Clinic (Pacemakers and ICD's)  RN's :  Carmen Russ   During business hours: [phone]    *After business hours:  [phone] (select option 4)      Normal test result notifications will be released via [employer] or mailed within 7 business days.  All other test results, will be communicated via telephone once reviewed by your cardiologist.    If you need a medication refill please contact your pharmacy.  Please allow 3 business days for your refill to be completed.    As always, thank you for trusting us with your health care needs!              Follow-ups after your visit        Your next 10 appointments already scheduled     Dec 06, 2017 10:30 AM CST   (Arrive by 10:15 AM)   NUTRITION VISIT with Merle Zamudio RD   [facility] ([facility])    [address] [place] [address]  [address] [address]   [phone]            Jan 17, 2018  3:00 PM CST   PROCEDURE with Stanislav Rayo MD   [facility] ([facility])    [address]  [address]  [facility] [address]   [phone]            Jan 19, 2018 11:00 AM CST   (Arrive by 10:45 AM)   Bariatric Surgery Evaluation Testing with Lily Nieto, PhD   [facility] ([facility])    [address] [place] [address]   [phone]            Jan 19, 2018 12:00 PM CST   (Arrive by 11:45 AM)   Bariatric Surgery Evaluation Interview with Lily Nieto, PhD   [facility] ([facility])    [address] [place] [address]   [phone]            Jan 22, 2018 11:00 AM CST   (Arrive by 10:45 AM)   New Patient Visit with Minh Mancilla MD   [facility] ([facility])    [address] [place] [address]   [phone]              Who to contact     If you have questions or need follow up information about today's clinic visit or your schedule please contact [facility] PHYSICIANS HEART AT [facility] directly at [phone].  Normal or non-critical lab and imaging results will be communicated to you by MyChart, letter or phone within 4 business days after the clinic has received the results. If you do not hear from us within 7 days, please contact the clinic through MyChart or phone. If you have a critical or abnormal lab result, we will notify you by phone as soon as possible.  Submit refill requests through [email or web address] or call your pharmacy and they will forward the refill request to us. Please allow 3 business days for your refill to be completed.          Additional Information About Your Visit        [facility]  Information     [employer] gives you secure access to your electronic health record. If you see a primary care provider, you can also send messages to your care team and make appointments. If you have questions, please call your primary care clinic.  If you do not have a primary care provider, please call [phone] and they will assist you.        Care EveryWhere ID     This is your Care EveryWhere ID. This could be used by other organizations to access your [place] medical records  [id number]        Your Vitals Were     Pulse Pulse Oximetry                82 95%           Blood Pressure from Last 3 Encounters:   12/05/17 124/83   11/13/17 134/81   11/07/17 121/80    Weight from Last 3 Encounters:   11/13/17 123.8 kg (273 lb)   11/07/17 123.4 kg (272 lb)   10/18/17 123.1 kg (271 lb 4.8 oz)              Today, you had the following     No orders found for display         Today's Medication Changes          These changes are accurate as of: 12/5/17  4:34 PM.  If you have any questions, ask your nurse or doctor.               These medicines have changed or have updated prescriptions.        Dose/Directions    traZODone 100 MG tablet   Commonly known as:  DESYREL   This may have changed:  how much to take   Used for:  Insomnia, unspecified insomnia        Dose:  100 mg   Take 1 tablet (100 mg) by mouth nightly as needed for sleep   Quantity:  30 tablet   Refills:  1                Primary Care Provider Office Phone # Fax #    Kristin Miner PA-C [phone]       [address] [facility] [address]        Equal Access to Services     KRISSY WORTHINGTON AH: Hadii dedra ku hadasho Soellie, waaxda luqadaha, qaybta kaalmada adeharris, riley juarez. So [facility] [phone].    ATENCIÓN: Si habla español, tiene a wheeler disposición servicios gratuitos de asistencia lingüística. Llame al [phone].    We comply with applicable federal civil rights laws and [place] laws. We do not  discriminate on the basis of race, color, national origin, age, disability, sex, sexual orientation, or gender identity.            Thank you!     Thank you for choosing [facility] PHYSICIANS HEART AT [facility]  for your care. Our goal is always to provide you with excellent care. Hearing back from our patients is one way we can continue to improve our services. Please take a few minutes to complete the written survey that you may receive in the mail after your visit with us. Thank you!             Your Updated Medication List - Protect others around you: Learn how to safely use, store and throw away your medicines at www.disposemymeds.org.          This list is accurate as of: 12/5/17  4:34 PM.  Always use your most recent med list.                   Brand Name Dispense Instructions for use Diagnosis    ABILIFY 5 MG tablet   Generic drug:  ARIPiprazole      Take 5 mg by mouth daily        estradiol 2 MG tablet    ESTRACE    90 tablet    1 tablet daily    Gender identity disorder       ferrous sulfate 325 (65 FE) MG tablet    IRON     Take by mouth daily as needed        furosemide 20 MG tablet    LASIX    90 tablet    Take 1 tablet (20 mg) by mouth daily - DUE FOR LABS    Leg swelling       LAMOTRIGINE PO      Take 150 mg by mouth daily        LORAZEPAM PO      Take 0.5 mg by mouth every 2 hours as needed for anxiety        * medical cannabis inhalation (Patient's own supply.  Not a prescription)      Inhale into the lungs 2 times daily (This is NOT a prescription, and does not certify that the patient has a qualifying medical condition for medical cannabis.  The purpose of this order is  to document that the patient reports taking medical cannabis.)  MORNING AND AT NIGHT        * medical cannabis (Patient's own supply.  Not a prescription)      1 capsule 2 times daily (This is NOT a prescription, and does not certify that the patient has a qualifying medical condition for medical cannabis.  The  purpose of this order is  to document that the patient reports taking medical cannabis.)  Morning and afternoon        Multi-vitamin Tabs tablet      Take 1 tablet by mouth daily        simvastatin 20 MG tablet    ZOCOR    90 tablet    Take 1 tablet (20 mg) by mouth At Bedtime    Hyperlipidemia LDL goal <130       topiramate 200 MG tablet    TOPAMAX    60 tablet    Take 1 tablet (200 mg) by mouth 2 times daily    Chronic pain disorder, Fibromyalgia       traZODone 100 MG tablet    DESYREL    30 tablet    Take 1 tablet (100 mg) by mouth nightly as needed for sleep    Insomnia, unspecified insomnia       triamcinolone 0.1 % ointment    KENALOG    80 g    Apply to AA BID x 3-4 weeks then PRN    Prurigo nodularis       venlafaxine 150 MG 24 hr capsule    EFFEXOR-XR     300 mg        VITAMIN B 12 PO           VITAMIN D (CHOLECALCIFEROL) PO      Take 5,000 Units by mouth daily        * Notice:  This list has 2 medication(s) that are the same as other medications prescribed for you. Read the directions carefully, and ask your doctor or other care provider to review them with you.

## 2017-12-05 NOTE — LETTER
12/5/2017      RE: Kylie Piper  [address] [facility] [address]       Dear Colleague,    Thank you for the opportunity to participate in the care of your patient, Kylie Piper, at the [facility] HEART AT [facility]. Please see a copy of my visit note below.    Referring provider: Kristin Miner PA-C    Chief complaint: Pre-operative evaluation prior to gastric sleeve surgery.    HPI: Ms. Kylie Piper is a 52 year old  female with PMH significant for atrial fibrillation and DCCV in 2005.    Kylie is a 52 year old transgender female with PMH of atrial fibrillation and DCCVx1 in 2005. She states that she had pneumonia at that time and atrial fibrillation came after. She was cardioverted and placed on amiodarone briefly for 8 months (Because of concern about side effects she stopped taking amiodarone after 8 months). She never had any other episode since then. She denies hx of CP, palpitations, LE edema, PND, ortopnea and syncope. She feels OLIVAREZ but this is not new to her. She also has chronic fatigue. She is on furosemide 20 mg once a day since 2005 and she says she gets LE swelling if she doesnot take it. The patient's risk factor profile is: (-) HTN, (-) diabetes, (+) hyperlipidemia, (-) tobacco use, (+) family Hx CAD. Her father had SCD at the age of 55 years.    She had a normal exercise stress test in 2014. Her ECG dated 3/9/2016 is normal.     Medications, personal, family, and social history reviewed with patient and revised.    PAST MEDICAL HISTORY:  Past Medical History:   Diagnosis Date     Asthma     Resolved in 2002 per pt     Atrial fib/flutter, transient     S/p cardioversion, and formerly on medications but due to intolerance and allergies, not currently on any medications- reports yearly eval with Echo and Ekg?     Chronic pain     LUE pain     Gender identity disorder  Started Rx 2012     H/O hypogonadism Gonadectomy 2013     Hyperlipidemia      Hypertension      Psoriasis        CURRENT MEDICATIONS:  Current Outpatient Prescriptions   Medication Sig Dispense Refill     triamcinolone (KENALOG) 0.1 % ointment Apply to AA BID x 3-4 weeks then PRN 80 g 3     furosemide (LASIX) 20 MG tablet Take 1 tablet (20 mg) by mouth daily - DUE FOR LABS 90 tablet 3     ARIPiprazole (ABILIFY) 5 MG tablet Take 5 mg by mouth daily       simvastatin (ZOCOR) 20 MG tablet Take 1 tablet (20 mg) by mouth At Bedtime 90 tablet 3     topiramate (TOPAMAX) 200 MG tablet Take 1 tablet (200 mg) by mouth 2 times daily 60 tablet 0     medical cannabis inhalation (Patient's own supply.  Not a prescription) Inhale into the lungs 2 times daily (This is NOT a prescription, and does not certify that the patient has a qualifying medical condition for medical cannabis.  The purpose of this order is  to document that the patient reports taking medical cannabis.)    MORNING AND AT NIGHT       medical cannabis (Patient's own supply.  Not a prescription) 1 capsule 2 times daily (This is NOT a prescription, and does not certify that the patient has a qualifying medical condition for medical cannabis.  The purpose of this order is  to document that the patient reports taking medical cannabis.)    Morning and afternoon       estradiol (ESTRACE) 2 MG tablet 1 tablet daily 90 tablet 3     VITAMIN D, CHOLECALCIFEROL, PO Take 5,000 Units by mouth daily       ferrous sulfate (IRON) 325 (65 FE) MG tablet Take by mouth daily as needed        Cyanocobalamin (VITAMIN B 12 PO)        multivitamin, therapeutic with minerals (MULTI-VITAMIN) TABS Take 1 tablet by mouth daily       LAMOTRIGINE PO Take 150 mg by mouth daily        LORAZEPAM PO Take 0.5 mg by mouth every 2 hours as needed for anxiety       traZODone (DESYREL) 100 MG tablet Take 1 tablet (100 mg) by mouth nightly as needed for sleep (Patient taking differently: Take 50 mg by  mouth nightly as needed for sleep ) 30 tablet 1     venlafaxine (EFFEXOR-XR) 150 MG 24 hr capsule 300 mg   0       PAST SURGICAL HISTORY:  Past Surgical History:   Procedure Laterality Date     [facility]       COLONOSCOPY  8/8/2013    Procedure: COLONOSCOPY;  COLONSCOPY SCREEN/ SE;  Surgeon: Santino Sun MD;  Location: [facility] OR     COSMETIC SURGERY       MANDIBLE SURGERY  1986     ORCHIECTOMY INGUINAL BILATERAL  7/16/2013    Procedure: ORCHIECTOMY INGUINAL BILATERAL;  Bilateral Simple Inguinal Orchiectomy ;  Surgeon: Jan Garrett MD;  Location: [facility] OR     TONSILLECTOMY         ALLERGIES:     Allergies   Allergen Reactions     Amiodarone      Caused pain fatigue/amplified anxiety and depression     Fluoxetine Other (See Comments)     Night terrors     Tetracycline      Teeth rattle/saliva acidic       FAMILY HISTORY:  Family History   Problem Relation Age of Onset     CANCER Father      skin     DIABETES Father      HEART DISEASE Father 55     scd     HEART DISEASE Other      Alzheimer Disease Paternal Grandmother      DIABETES Sister          SOCIAL HISTORY:  Social History   Substance Use Topics     Smoking status: Never Smoker     Smokeless tobacco: Never Used     Alcohol use Yes      Comment: occasional//2 per month       ROS:   Constitutional: No fever, chills, or sweats. Weight stable.   ENT: No visual disturbance, ear ache, epistaxis, sore throat.   Cardiovascular: As per HPI.   Respiratory: No cough, hemoptysis.    GI: No nausea, vomiting, hematemesis, melena, or hematochezia.   : No hematuria.   Integument: Negative.   Psychiatric: Negative.   Hematologic:  Easy bruising, no easy bleeding.  Neuro: Negative.   Endocrinology: No significant heat or cold intolerance   Musculoskeletal: No myalgia.    Exam:  /83 (BP Location: Left arm, Patient Position: Chair, Cuff Size: Adult Large)  Pulse 82  SpO2 95%  GENERAL APPEARANCE: healthy, alert and no  distress  HEENT: no icterus, no central cyanosis  LYMPH/NECK: no adenopathy, no asymmetry, JVP not elevated, no carotid bruits.  RESPIRATORY: lungs clear to auscultation - no rales, rhonchi or wheezes, no use of accessory muscles, no retractions, respirations are unlabored, normal respiratory rate  CARDIOVASCULAR: regular rhythm, normal S1, S2, no S3 or S4 and no murmur, click or rub, precordium quiet with normal PMI.  GI: soft, non tender  EXTREMITIES: peripheral pulses normal, no edema  NEURO: alert and oriented to person/place/time, normal speech,and affect  VASC: Radial, dorsalis pedis and posterior tibialis pulses are normal in volumes and symmetric bilaterally.   SKIN: no ecchymoses, no rashes     I have reviewed the labs and made my comment in the assessment and plan.    Labs:  CBC RESULTS:   Lab Results   Component Value Date    WBC 12.4 (H) 10/18/2017    RBC 4.83 10/18/2017    HGB 13.6 10/18/2017    HCT 42.3 10/18/2017    MCV 88 10/18/2017    MCH 28.2 10/18/2017    MCHC 32.2 10/18/2017    RDW 13.2 10/18/2017     10/18/2017       BMP RESULTS:  Lab Results   Component Value Date     10/18/2017    POTASSIUM 4.5 10/18/2017    CHLORIDE 105 10/18/2017    CO2 24 10/18/2017    ANIONGAP 8 10/18/2017    GLC 99 10/18/2017    BUN 20 10/18/2017    CR 0.95 10/18/2017    GFRESTIMATED 62 10/18/2017    GFRESTBLACK 74 10/18/2017    MAGALI 9.0 10/18/2017      Exercise Stress Echocardiogram (7/25/2014)  NL exercise echo without evidence of CAD or stress induced ischemia. LV   function is normal at rest (LVEF 60-65%) and with exercise (70-75%). There   were no exercise induced wall motion abnormalities. Exercise capacity and   blood pressure response to exericse were normal.    EKG dated 3/9/2016 is normal.    Assessment and Plan:   Ms. Kylie Piper is a 52 year old  transgender female with PMH significant for atrial fibrillation in 2005 s/p DCCVx1.  No recurrence since then. She is only on furosemide 20 mg  for LE edema. No heart failure symptoms. Euvolemic on exam today. Regular rate and rhythm. Normal CBC and kidney function tests. Her TG level is mildly elevated and her ALT level is 101.  She is here for pre-operative evaluation prior to gastric sleeve surgery.  Normal functional capacity (can climb up to 2-3 flights of stairs). No chest pain.  She is low risk (dior-operative risk of MACE is <1%). She can undergo surgery without any further testing.    RTC PRN    It was my absolute pleasure to meet Mrs. Piper in the office today. Please donot hesitate to contact me if you have any questions or concerns.    Hugo SHULTZ MD  [facility] Division of Cardiology  Pager [phone]    Kristin Chilel PA-C, RD          Please do not hesitate to contact me if you have any questions/concerns.     Sincerely,     Hugo Shultz MD

## 2017-12-05 NOTE — PROGRESS NOTES
Referring provider: Kristin Miner PA-C    Chief complaint: Pre-operative evaluation prior to gastric sleeve surgery.    HPI: Ms. Kylie Piper is a 52 year old  female with PMH significant for atrial fibrillation and DCCV in 2005.    Kylie is a 52 year old transgender female with PMH of atrial fibrillation and DCCVx1 in 2005. She states that she had pneumonia at that time and atrial fibrillation came after. She was cardioverted and placed on amiodarone briefly for 8 months (Because of concern about side effects she stopped taking amiodarone after 8 months). She never had any other episode since then. She denies hx of CP, palpitations, LE edema, PND, ortopnea and syncope. She feels OLIVAREZ but this is not new to her. She also has chronic fatigue. She is on furosemide 20 mg once a day since 2005 and she says she gets LE swelling if she doesnot take it. The patient's risk factor profile is: (-) HTN, (-) diabetes, (+) hyperlipidemia, (-) tobacco use, (+) family Hx CAD. Her father had SCD at the age of 55 years.    She had a normal exercise stress test in 2014. Her ECG dated 3/9/2016 is normal.     Medications, personal, family, and social history reviewed with patient and revised.    PAST MEDICAL HISTORY:  Past Medical History:   Diagnosis Date     Asthma     Resolved in 2002 per pt     Atrial fib/flutter, transient     S/p cardioversion, and formerly on medications but due to intolerance and allergies, not currently on any medications- reports yearly eval with Echo and Ekg?     Chronic pain     LUE pain     Gender identity disorder Started Rx 2012     H/O hypogonadism Gonadectomy 2013     Hyperlipidemia      Hypertension      Psoriasis        CURRENT MEDICATIONS:  Current Outpatient Prescriptions   Medication Sig Dispense Refill     triamcinolone (KENALOG) 0.1 % ointment Apply to AA BID x 3-4 weeks then PRN 80 g 3     furosemide (LASIX) 20 MG tablet Take 1 tablet (20 mg) by mouth daily - DUE FOR LABS 90  tablet 3     ARIPiprazole (ABILIFY) 5 MG tablet Take 5 mg by mouth daily       simvastatin (ZOCOR) 20 MG tablet Take 1 tablet (20 mg) by mouth At Bedtime 90 tablet 3     topiramate (TOPAMAX) 200 MG tablet Take 1 tablet (200 mg) by mouth 2 times daily 60 tablet 0     medical cannabis inhalation (Patient's own supply.  Not a prescription) Inhale into the lungs 2 times daily (This is NOT a prescription, and does not certify that the patient has a qualifying medical condition for medical cannabis.  The purpose of this order is  to document that the patient reports taking medical cannabis.)    MORNING AND AT NIGHT       medical cannabis (Patient's own supply.  Not a prescription) 1 capsule 2 times daily (This is NOT a prescription, and does not certify that the patient has a qualifying medical condition for medical cannabis.  The purpose of this order is  to document that the patient reports taking medical cannabis.)    Morning and afternoon       estradiol (ESTRACE) 2 MG tablet 1 tablet daily 90 tablet 3     VITAMIN D, CHOLECALCIFEROL, PO Take 5,000 Units by mouth daily       ferrous sulfate (IRON) 325 (65 FE) MG tablet Take by mouth daily as needed        Cyanocobalamin (VITAMIN B 12 PO)        multivitamin, therapeutic with minerals (MULTI-VITAMIN) TABS Take 1 tablet by mouth daily       LAMOTRIGINE PO Take 150 mg by mouth daily        LORAZEPAM PO Take 0.5 mg by mouth every 2 hours as needed for anxiety       traZODone (DESYREL) 100 MG tablet Take 1 tablet (100 mg) by mouth nightly as needed for sleep (Patient taking differently: Take 50 mg by mouth nightly as needed for sleep ) 30 tablet 1     venlafaxine (EFFEXOR-XR) 150 MG 24 hr capsule 300 mg   0       PAST SURGICAL HISTORY:  Past Surgical History:   Procedure Laterality Date     [facility] REFERR[facility]       COLONOSCOPY  8/8/2013    Procedure: COLONOSCOPY;  COLONSCOPY SCREEN/ SE;  Surgeon: Santino Sun MD;  Location: [facility] OR     COSMETIC  SURGERY       MANDIBLE SURGERY  1986     ORCHIECTOMY INGUINAL BILATERAL  7/16/2013    Procedure: ORCHIECTOMY INGUINAL BILATERAL;  Bilateral Simple Inguinal Orchiectomy ;  Surgeon: Jan Garrett MD;  Location: [facility]     TONSILLECTOMY         ALLERGIES:     Allergies   Allergen Reactions     Amiodarone      Caused pain fatigue/amplified anxiety and depression     Fluoxetine Other (See Comments)     Night terrors     Tetracycline      Teeth rattle/saliva acidic       FAMILY HISTORY:  Family History   Problem Relation Age of Onset     CANCER Father      skin     DIABETES Father      HEART DISEASE Father 55     scd     HEART DISEASE Other      Alzheimer Disease Paternal Grandmother      DIABETES Sister          SOCIAL HISTORY:  Social History   Substance Use Topics     Smoking status: Never Smoker     Smokeless tobacco: Never Used     Alcohol use Yes      Comment: occasional//2 per month       ROS:   Constitutional: No fever, chills, or sweats. Weight stable.   ENT: No visual disturbance, ear ache, epistaxis, sore throat.   Cardiovascular: As per HPI.   Respiratory: No cough, hemoptysis.    GI: No nausea, vomiting, hematemesis, melena, or hematochezia.   : No hematuria.   Integument: Negative.   Psychiatric: Negative.   Hematologic:  Easy bruising, no easy bleeding.  Neuro: Negative.   Endocrinology: No significant heat or cold intolerance   Musculoskeletal: No myalgia.    Exam:  /83 (BP Location: Left arm, Patient Position: Chair, Cuff Size: Adult Large)  Pulse 82  SpO2 95%  GENERAL APPEARANCE: healthy, alert and no distress  HEENT: no icterus, no central cyanosis  LYMPH/NECK: no adenopathy, no asymmetry, JVP not elevated, no carotid bruits.  RESPIRATORY: lungs clear to auscultation - no rales, rhonchi or wheezes, no use of accessory muscles, no retractions, respirations are unlabored, normal respiratory rate  CARDIOVASCULAR: regular rhythm, normal S1, S2, no S3 or S4 and no murmur, click or rub,  precordium quiet with normal PMI.  GI: soft, non tender  EXTREMITIES: peripheral pulses normal, no edema  NEURO: alert and oriented to person/place/time, normal speech,and affect  VASC: Radial, dorsalis pedis and posterior tibialis pulses are normal in volumes and symmetric bilaterally.   SKIN: no ecchymoses, no rashes     I have reviewed the labs and made my comment in the assessment and plan.    Labs:  CBC RESULTS:   Lab Results   Component Value Date    WBC 12.4 (H) 10/18/2017    RBC 4.83 10/18/2017    HGB 13.6 10/18/2017    HCT 42.3 10/18/2017    MCV 88 10/18/2017    MCH 28.2 10/18/2017    MCHC 32.2 10/18/2017    RDW 13.2 10/18/2017     10/18/2017       BMP RESULTS:  Lab Results   Component Value Date     10/18/2017    POTASSIUM 4.5 10/18/2017    CHLORIDE 105 10/18/2017    CO2 24 10/18/2017    ANIONGAP 8 10/18/2017    GLC 99 10/18/2017    BUN 20 10/18/2017    CR 0.95 10/18/2017    GFRESTIMATED 62 10/18/2017    GFRESTBLACK 74 10/18/2017    MAGALI 9.0 10/18/2017      Exercise Stress Echocardiogram (7/25/2014)  NL exercise echo without evidence of CAD or stress induced ischemia. LV   function is normal at rest (LVEF 60-65%) and with exercise (70-75%). There   were no exercise induced wall motion abnormalities. Exercise capacity and   blood pressure response to exericse were normal.    EKG dated 3/9/2016 is normal.    Assessment and Plan:   Ms. Kylie Piper is a 52 year old  transgender female with PMH significant for atrial fibrillation in 2005 s/p DCCVx1.  No recurrence since then. She is only on furosemide 20 mg for LE edema. No heart failure symptoms. Euvolemic on exam today. Regular rate and rhythm. Normal CBC and kidney function tests. Her TG level is mildly elevated and her ALT level is 101.  She is here for pre-operative evaluation prior to gastric sleeve surgery.  Normal functional capacity (can climb up to 2-3 flights of stairs). No chest pain.  She is low risk (dior-operative risk of  MACE is <1%). She can undergo surgery without any further testing.    RTC PRN    It was my absolute pleasure to meet Mrs. Piper in the office today. Please donot hesitate to contact me if you have any questions or concerns.    Hugo SHULTZ MD  [facility] Division of Cardiology  Pager [phone]    Cc Kristin Miner PA-C, RD

## 2017-12-05 NOTE — NURSING NOTE
"Chief Complaint   Patient presents with     Pre-Op Exam     cardiology consult for cardiac clearance for bariatric surgery. Hx of afib.  Patient reports last episode of AFIB in 2005 with electrical conversion.       Initial /83 (BP Location: Left arm, Patient Position: Chair, Cuff Size: Adult Large)  Pulse 82  SpO2 95% Estimated body mass index is 40.32 kg/(m^2) as calculated from the following:    Height as of 11/13/17: 1.753 m (5' 9\").    Weight as of 11/13/17: 123.8 kg (273 lb)..  BP completed using cuff size: PINEDA Saini        "

## 2017-12-06 ENCOUNTER — ALLIED HEALTH/NURSE VISIT (OUTPATIENT)
Dept: SURGERY | Facility: CLINIC | Age: 52
End: 2017-12-06

## 2017-12-06 NOTE — PROGRESS NOTES
"Bariatric Nutrition Consultation Note    Reason For Visit: Nutrition Reassessment    Kylie Piper is a 52 year-old presenting today for bariatric nutrition consult.  Pt is interested in laparoscopic sleeve gastrectomy.  This is pt's third of 6 required nutrition visits prior to surgery. Pt referred by Xin SLAUGHTER) on 10/18/17.      Coordination Note: Updated task list.  Faxed over psych note to care coordinator and surgery scheduler.     She is interested in having weight loss surgery for the following reasons:  To improve overall health and wellbeing (knees, joints, breathing difficulty; needing a diuretic and low-sodium diet)    Support: Wife Anuradha (who had bariatric surgery in the past)    ANTHROPOMETRICS:    Height as of an earlier encounter on 10/18/17: 1.753 m (5' 9\").    Initial Weight as of an earlier encounter on 10/18/17: 123.1 kg (271 lb 4.8 oz) with BMI of 40.06.   Current Weight: 270.3 lbs (-0.6 lbs over the past month; -1 lb from initial weight)    Required weight loss goal pre-op: -10 lbs from initial consult weight (goal weight 261.3 lbs or less before surgery)    SUPPLEMENT INFORMATION:  Liquid MVI; hair,skin, nails (biotin), Ca, Probiotics (acidophillus), B12, 1000 International Units vitamin D3, Mg  *Pt is on topiramate to help with appetite control.    NUTRITION HISTORY:  Progress with Previous Goals:   Relating To Eating:  Eat slowly (20-30 minutes per meal), chewing foods well (25 chews per bite/applesauce consistency)- meeting.   Focus on lean protein and non-starchy vegetables/whole fruit at each meal with no more than 1 cup of carbs or 2 slices of bread - meeting.   Record food intake prior to eating throughout the day.  May use [employer]qwisePal.com  or Fitin2nite. (Especially track the challenging days such as Thanksgiving.)- inconsistent with this.     Relating to beverages:  Work on  fluids from meals by 30 minutes. - 15 min separate currently; Pt went back to coffee for " awhile, but now is back to herbal tea.     Relating to activity:  Increase activity as able depending upon knee pain. Try upper body exercises or yoga.  - Pt has been stretching more often (had a knot in back of leg).      Relating to cravings:  Rid your environment of trigger foods. - Pt struggles with this as Wife Anuradha tends to keep baking goodies.  Pt still has 1-2 baked goods maximum in a day.       Eating Habits 10/17/2017   Do you have any dietary restrictions? Yes   Low-sodium, low-fat diet   Do you currently binge eat (eat a large amount of food in a short time)? Yes   Do you get up to eat after falling asleep? Yes (infrequently - she will have an egg or other protein)   What foods do you crave? chocolate, protein, chips   *Pt is willing to abstain from alcohol after surgery.      Physical Activity Notes:   *Pt sometimes tracks steps and heart rate on FitBit.  *Pt can only exercise her right arm (OA in joints of left arm).   *Pt is limited also be knee pain.    NUTRITION DIAGNOSIS:  Obesity r/t long history of self-monitoring deficit and excessive energy intake aeb BMI >30. - improving.     INTERVENTION:  Intervention Provided/Education Provided: Praised Pt on progress with goals.  Discussed weight loss progress and how to help with increasing rate of weight loss.  Educated Pt on modified liquid diet.  Reviewed post-op diet guidelines, ways to help prepare for post-op diet guidelines pre-operatively, portion/calorie-control, mindful eating, and exercise.  Provided pt with list of goals RD contact information.      Patient Understanding: good  Expected Compliance: good    GOALS:  Relating To Eating:  Follow the Modified Liquid Diet for weight loss:  Breakfast: Protein Shake/Bar  Lunch: Protein Shake/Bar  Supper: 3-4 oz lean protein + non-starchy vegetables  Snack: non-starchy vegetables (no calorie-containing dips/condiments)  Beverages: at least 48-64 oz water between meals daily    *Protein Shake Criteria:  ~200 Calories, at least 20 grams of protein, and less than 10 grams of sugar     Eat slowly (20-30 minutes per meal), chewing foods well (25 chews per bite/applesauce consistency)    Relating to beverages:  Work on  fluids from meals by 30 minutes.     Relating to activity:  Increase activity as able depending upon knee pain/energy level. Try upper body exercises or yoga.     Relating to cravings:  Rid your environment of trigger foods.     Follow-Up: 1 month    Time spent with patient: 30 minutes.  Merle Zamudio, MS, RDN, LDN, CLT  Pager: [phone]

## 2017-12-06 NOTE — MR AVS SNAPSHOT
MRN:[id number]                      After Visit Summary   12/6/2017    Kylie Piper    MRN: [id number]           Visit Information        Provider Department      12/6/2017 10:30 AM Merle Zamudio RD [facility] Surgical Weight Management        Your next 10 appointments already scheduled     Jan 17, 2018  3:00 PM CST   PROCEDURE with Stanislav Rayo MD   [facility] ([facility])    [address]  [address]  [facility] [address]   [phone]            Jan 19, 2018 11:00 AM CST   (Arrive by 10:45 AM)   Bariatric Surgery Evaluation Testing with Lily Nieto, PhD   [facility] ([facility])    [address] [place] [id number]   [phone]            Jan 19, 2018 12:00 PM CST   (Arrive by 11:45 AM)   Bariatric Surgery Evaluation Interview with Lily Nieto, PhD   [facility] ([facility])    [address] [place] [id number]   [phone]            Jan 22, 2018 11:00 AM CST   (Arrive by 10:45 AM)   New Patient Visit with Minh Mancilla MD   [facility] Medical Weight Management ([facility])    [address] [place] [id number]   [phone]              Care Instructions    GOALS:  Relating To Eating:  Follow the Modified Liquid Diet for weight loss:  Breakfast: Protein Shake/Bar  Lunch: Protein Shake/Bar  Supper: 3-4 oz lean protein + non-starchy vegetables  Snack: non-starchy vegetables (no calorie-containing dips/condiments)  Beverages: at least 48-64 oz water between meals daily    *Protein Shake Criteria: ~200 Calories, at least 20 grams of protein, and less than 10 grams of sugar     Eat slowly (20-30 minutes per meal), chewing foods well (25 chews per bite/applesauce  consistency)    Relating to beverages:  Work on  fluids from meals by 30 minutes.     Relating to activity:  Increase activity as able depending upon knee pain/energy level. Try upper body exercises or yoga.     Relating to cravings:  Rid your environment of trigger foods.       Bariatric Task List  Status:  Is patient a candidate for bariatric surgery?:    -     Cleared to schedule surgeon consult?:    -     Status:  surgery evaluation in process -     Surgeon: Dr Shah -     Tentative surgery month/year: April 2018 -        Insurance: Insurance:  [facility] -     [employer]ign[employer]: PCP Recommendation and Medical Clearance:    -      Referral:    -     Other:    -        Patient Info: Initial Weight:  271 lb 4.8 oz -     Date of Initial Weight/Height:  10/18/2017 -     Goal Weight (lbs):  261 -     Required Weight Loss:  10 -     Surgery Type:  sleeve gastrectomy -     Multidisciplinary Meeting:  Needed -        Dietician Visits: Structured weight loss required by insurance?:  Yes -     Dietician Visit 1:  Completed -     Dietician Visit 2:  Completed -     Dietician Visit 3:  Completed - 12/6/17 \[facility]\""   Dietician Visit 4:  Needed -     Dietician Visit 5:  Needed -     Dietician Visit 6:  Needed -     Dietician Visit additional:    -     Clearance from dietician to see surgeon?:    -     Dietician Notes:    -        Psychological Evaluation: Psych eval:  Needed - 11/27/17 done with letter of support from Pastora Álvarez (\[facility]\"")   Therapist letter of support:    -     Psychiatrist letter of support:  Needed - [employer] [place], [place] - clearance faxed on 11/27/17 (\[facility]\"")   Establish care with therapist:    -     Complete eating disorder evaluation:    -     Letter of clearance from therapist/eating disorder program:    -     Other:    -        Lab Work: completed Sept and Oct (\[facility]\"") Complete Blood Count:  Needed -     Comprehensive Metabolic Panel:  Needed -     Vitamin D:  Needed -     Hgb A1c:  Needed -     PTH:   "  -     H. pylori:    -     TSH:    -     Nicotine Testing:    -     Other:    -        Consults/ Clearance: Sleep Medicine:    -     Cardiac:  Needed - hx of afib; clearance from Dr. Hugo Chaidez in [place], [place] on 12/5/17 ([facility])   Pain: Needed - already sent from [facility] (Dr. Stanislav Rayo)   Dental:    -     Endocrine:    -     Gastroenterology:    -     Vascular Medicine:    -     Hematology:    -     Medical Weight Management: Needed -     Physical Therapy/Exercise:    -     Nephrology:    -     Neurology:    -     Pulmonology:    -     Rheumatology:    -     Other    -     Other    -     Other    -           Testing: UGI:    -     EGD:    -     Other:    -        PCP: Establish care with PCP:  Completed -     Follow up with PCP:    -     PCP letter of support:  Needed - [facility] - faxed already ([facility])      Smoking: Quit tobacco use (3 months smoke free)?:    -     Quit date:    -        Patient Education:  Information Session:  Completed -     Given \"Making your decision\" handout?:  Yes -     Given support group information?:  Yes -     Attended support group?:  Completed -     Support plan in place?:  Needed -     Research consents signed?:  Yes -        Additional Surgery Requirements: Review Coag plan:    -     HgA1c <8:    -     Inpatient pain consult:    -     Final nicotine screen:    -     Dental work complete:    -     Birth control plan:    -     Other Requirements:    -     Other Requirements:    -        Final Tasks:  Before surgery online class:  Needed -     Before surgery online class website link:  [email or web address]   After surgery online class:  Needed -     After surgery online class website link:  [email or web address]   Nurse visit for weigh-in and information:  Needed -     Pre-assessment clinic visit with anesthesia team for H&P:  Needed -     Final labs (Hgb, plt, T&S, UA):  Needed -        Notes:   -                  MyChart " Information     [employer] gives you secure access to your electronic health record. If you see a primary care provider, you can also send messages to your care team and make appointments. If you have questions, please call your primary care clinic.  If you do not have a primary care provider, please call [phone] and they will assist you.      [employer] is an electronic gateway that provides easy, online access to your medical records. With [employer], you can request a clinic appointment, read your test results, renew a prescription or communicate with your care team.     To access your existing account, please contact your [facility] or call [phone] for assistance.        Care EveryWhere ID     This is your Care EveryWhere ID. This could be used by other organizations to access your [facility] medical records  [id number]        Equal Access to Services     SHARON WORTHINGTON : Dominik Gross, milton lester, tana brice, riley juarez. So [facility] [phone].    ATENCIÓN: Si habla español, tiene a wheeler disposición servicios gratuitos de asistencia lingüística. Llame al [phone].    We comply with applicable federal civil rights laws and [place] laws. We do not discriminate on the basis of race, color, national origin, age, disability, sex, sexual orientation, or gender identity.

## 2017-12-15 ENCOUNTER — CARE COORDINATION (OUTPATIENT)
Dept: SURGERY | Facility: CLINIC | Age: 52
End: 2017-12-15

## 2017-12-15 NOTE — PROGRESS NOTES
Discussed at [facility] conference.   Struggling with preop weight loss.  On medical THC for anxiety. We have done WLS for others on medical THC, concern for increased appetite.      Plan:  1. To see Dr Nieto on 1/19/18.  2. To see Dr Mancilla on 1/22/18.    Bariatric Task List  Status:  Is patient a candidate for bariatric surgery?:    -     Cleared to schedule surgeon consult?:    -     Status:  surgery evaluation in process -     Surgeon: Dr Shah -     Tentative surgery month/year: April 2018 -        Insurance: Insurance:  [employer] -        Patient Info: Initial Weight:  271 lb 4.8 oz -     Date of Initial Weight/Height:  10/18/2017 -     Goal Weight (lbs):  261 -     Required Weight Loss:  10 -     Surgery Type:  sleeve gastrectomy -     Multidisciplinary Meeting:  Needed -  To see Dr Nieto and Dr Mancilla.      Dietician Visits: Structured weight loss required by insurance?:  Yes -     Dietician Visit 1:  Completed - 10/18/17 in Epic. S   Dietician Visit 2:  Completed - 11/8/17 in [facility]. S   Dietician Visit 3:  Completed - 12/6/17 [facility]   Dietician Visit 4:  Needed -     Dietician Visit 5:  Needed -     Dietician Visit 6:  Needed -        Psychological Evaluation: Psych eval:  Needed - 1/19/18 Appt with Dr Nieto.   Therapist letter of support:    -     Psychiatrist letter of support:  Completed - 11/27/17 letter of support from Pastora Álvarez ([facility]);[employer] [place], [place] - clearance faxed on 11/27/17 ([facility])      Lab Work: Complete Blood Count:  Completed - 10/8/17 in Epic. B[facility]   Comprehensive Metabolic Panel:  Completed - 10/8/17 in Epic. [facility]   Vitamin D:  Completed - 10/8/17 in Epic. [facility]KS   PTH:  Completed - 10/8/17 in Epic. S      Consults/ Clearance: Cardiac:  Completed - hx of afib; clearance from Dr. Hugo Chaidez in [place] on 12/5/17 ([facility])   Pain: Completed - already sent from [facility] in [place] (11/21/17 = Dr. Stanislav Rayo)   Medical Weight Management: Needed - 1/22/18   "Laurent URIBE      PCP: Establish care with PCP:  Completed -     Follow up with PCP:    -     PCP letter of support:  Completed - [facility] - faxed already (j)11/13/17 Kristin Miner PA-C      Patient Education:  Information Session:  Completed -     Given \"Making your decision\" handout?:  Yes -     Given support group information?:  Yes -     Attended support group?:  Completed -     Support plan in place?:  Needed -     Research consents signed?:  Yes -            Final Tasks:  Before surgery online class:  Needed -     Before surgery online class website link:  [email or web address]   After surgery online class:  Needed -     After surgery online class website link:  [email or web address]   Nurse visit for weigh-in and information:  Needed -     Pre-assessment clinic visit with anesthesia team for H&P:  Needed -     Final labs (Hgb, plt, T&S, UA):  Needed -        Notes:   -           "

## 2017-12-28 ENCOUNTER — TELEPHONE (OUTPATIENT)
Dept: FAMILY MEDICINE | Facility: CLINIC | Age: 52
End: 2017-12-28

## 2017-12-28 DIAGNOSIS — Z02.71 DISABILITY EXAMINATION: Primary | ICD-10-CM

## 2017-12-28 NOTE — TELEPHONE ENCOUNTER
Spoke with patient, she will drop forms off at  for Kristin to review. Advised patient Kristin typically does not complete disability forms. Patient states she was given phone number to [employer]. Patient called them and is not what she needs. Will await forms

## 2017-12-28 NOTE — TELEPHONE ENCOUNTER
Patient is calling to speak with nurse wondering if pcp would fill out disability forms for charmaine  Please call to advice  Thank you

## 2017-12-29 ENCOUNTER — CARE COORDINATION (OUTPATIENT)
Dept: CARE COORDINATION | Facility: CLINIC | Age: 52
End: 2017-12-29

## 2017-12-29 NOTE — TELEPHONE ENCOUNTER
I do not do long term disability - she will need to see one of our occupation health providers. Will place a care coordination referral for assistance with this

## 2017-12-29 NOTE — LETTER
Health Care Home - Access Care Plan    About Me  Patient Name:  Kylie Piper    YOB: 1965  Age:                             52 year old   Atul MRN:            [id number] Telephone Information:     Home Phone [phone]   Mobile [phone]       Address:    [address] [place] [address] Email address:  [email or web address]      Emergency Contact(s)  Name Relationship Lgl Grd Work Phone Home Phone Mobile Phone   Hugo CHOW * Life Partner No  [phone]             Health Maintenance:      My Access Plan  Medical Emergency 911   Questions or concerns during clinic hours Primary Clinic Line, I will call the clinic directly: Primary Clinic: [facility] [place] [phone]   24 Hour Appointment Line [phone] or  [phone]) (toll free)   24 Hour Nurse Line [phone] (toll free)   Questions or concerns outside clinic hours 24 Hour Appointment Line, I will call the after-hours on-call line:   [facility] [phone] or [phone]) (toll-free)   Preferred Urgent Care Preferred Urgent Care: [facility] [phone]   Preferred Hospital Preferred Hospital: [place], [place]  [phone]   Preferred Pharmacy [facility] [place] - [address] [address]     Behavioral Health Crisis Line The National Suicide Prevention Lifeline at 1-697.536.9431 or 911     My Care Team Members  Patient Care Team       Relationship Specialty Notifications Start End    Kristin Miner PA-C PCP - General Physician Assistant  6/9/16     Phone: [phone] Fax: [phone]         [address] [place] [address]    Dallas Lo MD Referring Physician Internal Medicine  5/22/13     Phone: [phone] Fax: [phone]         [address] [address]    Lizeth Ham, RN Nurse Coordinator Bariatric  11/10/17      Comment:  pre WLS RN, fax [phone].    Phone: [phone] Fax: [phone]         [address] [place] [address]        My Medical and Care Information  Problem List   Patient Active Problem List   Diagnosis     Gender identity disorder     Hyperlipidemia LDL goal <130     Abnormal results of liver function studies     Chronic pain disorder     Insomnia     Bipolar 2 disorder (H)     Morbid obesity (H)     H/O hypogonadism     Fibromyalgia     Glenohumeral arthritis     Vitamin D deficiency     Primary osteoarthritis of left shoulder     Anxiety     Cervical spondylosis without myelopathy     Chronic fatigue      Current Medications and Allergies:  See printed Medication Report

## 2017-12-29 NOTE — PROGRESS NOTES
Clinic Care Coordination Contact  OUTREACH    Referral Information:  Referral Source: PCP  Reason for Contact: follow up     Clinical Concerns:  Current Medical Concerns: Patient wanting long term disability paperwork filled out. PCP can not fill these forms out they will need to done by a third party . Resources will be provided to Patient for follow up. Patient requested resources to be put together with forms and placed at  for  on Tuesday. CC will generate resources and contact letters.     Current Behavioral Concerns: none    Education Provided to patient: SSDI process.         Plan: 1) Patient will continue to follow treatment plan as directed and follow up with PCP with concerns ongoing. Patient will follow up with resources given.   2) Care Coordination to remain available for future needs. Follow up planned for next week.    Isaak Whelan RN  Clinic Care Coordinator  [phone] or [phone]      Resources placed with forms on PCP desk and will be at the  for .

## 2017-12-29 NOTE — LETTER
Sylva CARE COORDINATION  [facility]  [address] [place] [address]  [phone]    December 29, 2017      Kylie Piper  [address] [place] [address]      Dear Kylie,    I am a clinic care coordinator who works with Kristin Miner PA-C at [facility]. I wanted to introduce myself and provide you with my contact information so that you can call me with questions or concerns about your health care. Below is a description of clinic care coordination and how I can further assist you.     The clinic care coordinator is a registered nurse and/or  who understand the health care system. The goal of clinic care coordination is to help you manage your health and improve access to the [facility] system in the most efficient manner. The registered nurse can assist you in meeting your health care goals by providing education, coordinating services, and strengthening the communication among your providers. The  can assist you with financial, behavioral, psychosocial, chemical dependency, counseling, and/or psychiatric resources.    Please feel free to contact me at [phone], [phone], with any questions or concerns. We at [facility] are focused on providing you with the highest-quality healthcare experience possible and that all starts with you.     Sincerely,     Isaak Whelan RN  Clinic Care Coordinator    Enclosed: I have enclosed a copy of a 24 Hour Access Plan. This has helpful phone numbers for you to call when needed. Please keep this in an easy to access place to use as needed.      Below are some resources you might find helpful.  COMMUNITY RESOURCE LINE:  Disability Linkage Line     [phone]     & Assistance in applying for SSDI:    [place]  [email or web address].lawhelpmn.org/   [facility]   [phone]  [facility] Services            [facility]   [phone]  [place]  office - [facility] - new client           [phone]  Apply on line for free legal help - to find out more go to  [email or web address].org/a2j     The [place] Disability Law Center ([place]) provides free civil legal assistance to individuals with disabilities statewide on legal issues related to their disabilities. All individuals with disabilities are eligible to receive help, regardless of age or income level.          [facility] Legal Assistance                          [phone]  Free to low income people  Foreclosure Prevention  http://mylegalaid.org/       [place] Department of Human Rights          112-365-7697/459.639.5727  Discrimination complaints in the areas of employment, housing, public accommodations    [place] Disability Law Center                                     918.521.3840/1-314.359.1749    [place]  www.ag[place]state.[place]. 662-675-9598     Referral Service     [phone]            Legal advice, $30 fee first half hour            [facility] / services [facility]    [phone]    [facility] Free disability evaluation [phone]     [email or web address]-  [phone]    [facility] Newell [facility]    [phone]

## 2018-01-08 ENCOUNTER — ALLIED HEALTH/NURSE VISIT (OUTPATIENT)
Dept: SURGERY | Facility: CLINIC | Age: 53
End: 2018-01-08
Payer: COMMERCIAL

## 2018-01-08 NOTE — PATIENT INSTRUCTIONS
GOALS:  Relating To Eating:  Follow the Modified Liquid Diet for weight loss:  Breakfast: Protein Shake/Bar  Lunch: 3-4 oz lean protein + non-starchy vegetables  Supper: Protein Shake/Bar  Snack: non-starchy vegetables (no calorie-containing dips/condiments)  Beverages: at least 48-64 oz water between meals daily    *Protein Shake Criteria: ~200 Calories, at least 20 grams of protein, and less than 10 grams of sugar     Eat slowly (20-30 minutes per meal), chewing foods well (25 chews per bite/applesauce consistency)    Relating to beverages:  Work on  fluids from meals by 30 minutes.     Relating to activity:  Increase activity as able depending upon knee pain/energy level. Try upper body exercises or yoga.     Relating to cravings:  Rid your environment of trigger foods.

## 2018-01-08 NOTE — PROGRESS NOTES
"Bariatric Nutrition Consultation Note    Reason For Visit: Nutrition Reassessment    Kylie Piper is a 52 year-old presenting today for bariatric nutrition consult.  Pt is interested in laparoscopic sleeve gastrectomy.  This is pt's fourth of 6 required nutrition visits prior to surgery. Pt referred by Xin SLAUGHTER) on 10/18/17.      Coordination Note: Updated task list.      She is interested in having weight loss surgery for the following reasons:  To improve overall health and wellbeing (knees, joints, breathing difficulty; needing a diuretic and low-sodium diet)    Support: Wife Anuradha (who had bariatric surgery in the past)    ANTHROPOMETRICS:    Height as of an earlier encounter on 10/18/17: 1.753 m (5' 9\").    Initial Weight as of an earlier encounter on 10/18/17: 123.1 kg (271 lb 4.8 oz) with BMI of 40.06.   Current Weight: 266.7 lbs (-3.6 lbs over the past month; -4.6 lb from initial weight)    Required weight loss goal pre-op: -10 lbs from initial consult weight (goal weight 261.3 lbs or less before surgery)    SUPPLEMENT INFORMATION:  Liquid MVI; hair,skin, nails (biotin), Ca, Probiotics (acidophillus), B12, 1000 International Units vitamin D3, Mg  *Pt is on topiramate to help with appetite control.    NUTRITION HISTORY:  Progress with Previous Goals:   Relating To Eating:  Follow the Modified Liquid Diet for weight loss:  - Pt has been having a shake or bar or 1 egg and 1 slice toast at breakfast, a light salad with lean protein, and a shake/bar for supper.  Breakfast: Protein Shake/Bar  Lunch: Protein Shake/Bar  Supper: 3-4 oz lean protein + non-starchy vegetables  Snack: non-starchy vegetables (no calorie-containing dips/condiments)  Beverages: at least 48-64 oz water between meals daily    *Protein Shake Criteria: ~200 Calories, at least 20 grams of protein, and less than 10 grams of sugar     Eat slowly (20-30 minutes per meal), chewing foods well (25 chews per bite/applesauce " consistency) - Meeting.     Relating to beverages:  Work on  fluids from meals by 30 minutes. - Meeting.  Kylie is down to 1 c coffee (black) and switching to herbal teas; drinking plenty of water between meals.     Relating to activity:  Increase activity as able depending upon knee pain/energy level. Try upper body exercises or yoga.  -Pt has been doing yoga stretches.       Relating to cravings:  Rid your environment of trigger foods.  - Pt's wife Anuradha continues to bake, but Kylie doesn't partake in it with the exception of Mom's peanut brittle during the holidays.        Eating Habits 10/17/2017   Do you have any dietary restrictions? Yes   Low-sodium, low-fat diet   Do you currently binge eat (eat a large amount of food in a short time)? Yes   Do you get up to eat after falling asleep? Yes (infrequently - she will have an egg or other protein)   What foods do you crave? chocolate, protein, chips   *Pt is willing to abstain from alcohol after surgery.      Physical Activity Notes:   *Pt sometimes tracks steps and heart rate on FitBit.  *Pt can only exercise her right arm (OA in joints of left arm).   *Pt is limited also be knee pain.    NUTRITION DIAGNOSIS:  Obesity r/t long history of self-monitoring deficit and excessive energy intake aeb BMI >30. - improving.     INTERVENTION:  Intervention Provided/Education Provided: Praised Pt on progress with goals and weight lost.  Pt is willing to continue on the modified liquid diet.  Reviewed post-op diet guidelines, ways to help prepare for post-op diet guidelines pre-operatively, portion/calorie-control, mindful eating, and exercise.  Gave encouragement and support to continue.  Provided pt with list of goals RD contact information.      Patient Understanding: good  Expected Compliance: good    GOALS:  Relating To Eating:  Follow the Modified Liquid Diet for weight loss:  Breakfast: Protein Shake/Bar  Lunch: 3-4 oz lean protein + non-starchy  vegetables  Supper: Protein Shake/Bar  Snack: non-starchy vegetables (no calorie-containing dips/condiments)  Beverages: at least 48-64 oz water between meals daily    *Protein Shake Criteria: ~200 Calories, at least 20 grams of protein, and less than 10 grams of sugar     Eat slowly (20-30 minutes per meal), chewing foods well (25 chews per bite/applesauce consistency)    Relating to beverages:  Work on  fluids from meals by 30 minutes.     Relating to activity:  Increase activity as able depending upon knee pain/energy level. Try upper body exercises or yoga.     Relating to cravings:  Rid your environment of trigger foods.     Follow-Up: 1 month    Time spent with patient: 30 minutes.  Merle Zamudio, MS, RDN, LDN, CLT  Pager: [phone]

## 2018-01-08 NOTE — MR AVS SNAPSHOT
MRN:[id number]                      After Visit Summary   1/8/2018    Kylie Piper    MRN: [id number]           Visit Information        Provider Department      1/8/2018 11:00 AM Merle Zamudio RD [facility]        Your next 10 appointments already scheduled     Jan 17, 2018  3:00 PM CST   PROCEDURE with Stanislav Rayo MD   [facility] [place] ([facility])    [address]  [address]  [facility] [address]   [phone]            Jan 19, 2018 11:00 AM CST   (Arrive by 10:45 AM)   Bariatric Surgery Evaluation Testing with Lily Nieto, PhD   [facility] ([facility])    [address] [place] [address]   [phone]            Jan 19, 2018 12:00 PM CST   (Arrive by 11:45 AM)   Bariatric Surgery Evaluation Interview with Lily Nieto, PhD   [facility] ([facility])    [address] [place] [address]   [phone]            Jan 22, 2018 11:00 AM CST   (Arrive by 10:45 AM)   New Patient Visit with Minh Mancilla MD   [facility] ([facility])    [address] [place] [address]   [phone]              Care Instructions    GOALS:  Relating To Eating:  Follow the Modified Liquid Diet for weight loss:  Breakfast: Protein Shake/Bar  Lunch: 3-4 oz lean protein + non-starchy vegetables  Supper: Protein Shake/Bar  Snack: non-starchy vegetables (no calorie-containing dips/condiments)  Beverages: at least 48-64 oz water between meals daily    *Protein Shake Criteria: ~200 Calories, at least 20 grams of protein, and less than 10 grams of sugar     Eat slowly (20-30 minutes per meal), chewing foods well (25 chews per bite/applesauce  consistency)    Relating to beverages:  Work on  fluids from meals by 30 minutes.     Relating to activity:  Increase activity as able depending upon knee pain/energy level. Try upper body exercises or yoga.     Relating to cravings:  Rid your environment of trigger foods.          [id number] Information     [id number] gives you secure access to your electronic health record. If you see a primary care provider, you can also send messages to your care team and make appointments. If you have questions, please call your primary care clinic.  If you do not have a primary care provider, please call [phone] and they will assist you.      LOG607 is an electronic gateway that provides easy, online access to your medical records. With LOG607, you can request a clinic appointment, read your test results, renew a prescription or communicate with your care team.     To access your existing account, please contact your [facility] or call [phone] for assistance.        Care EveryWhere ID     This is your Care EveryWhere ID. This could be used by other organizations to access your [facility] medical records  [id number]        Equal Access to Services     SHARON WORTHINGTON : Hadii dedra marshall Soellie, waaxda luqadaha, qaybta kaalmada adeharris, riley juarez. So [facility] [phone].    ATENCIÓN: Si habla español, tiene a wheeler disposición servicios gratuitos de asistencia lingüística. Llame al [phone].    We comply with applicable federal civil rights laws and [place] laws. We do not discriminate on the basis of race, color, national origin, age, disability, sex, sexual orientation, or gender identity.

## 2018-01-17 ENCOUNTER — OFFICE VISIT (OUTPATIENT)
Dept: PALLIATIVE MEDICINE | Facility: CLINIC | Age: 53
End: 2018-01-17
Payer: COMMERCIAL

## 2018-01-17 VITALS — SYSTOLIC BLOOD PRESSURE: 124 MMHG | DIASTOLIC BLOOD PRESSURE: 85 MMHG | HEART RATE: 77 BPM

## 2018-01-17 DIAGNOSIS — M75.42 IMPINGEMENT SYNDROME, SHOULDER, LEFT: ICD-10-CM

## 2018-01-17 DIAGNOSIS — M25.512 CHRONIC LEFT SHOULDER PAIN: Primary | ICD-10-CM

## 2018-01-17 DIAGNOSIS — G89.29 CHRONIC LEFT SHOULDER PAIN: Primary | ICD-10-CM

## 2018-01-17 PROCEDURE — 20610 DRAIN/INJ JOINT/BURSA W/O US: CPT | Mod: LT | Performed by: ANESTHESIOLOGY

## 2018-01-17 ASSESSMENT — PAIN SCALES - GENERAL: PAINLEVEL: MODERATE PAIN (4)

## 2018-01-17 NOTE — PROGRESS NOTES
Pre procedure Diagnosis: chronic left shoulder pain, left shoulder impingement   Post procedure Diagnosis: Same  Procedure performed: left shoulder subacromial bursa injection  Anesthesia: none  Complications: none  Operators: Stanislav Rayo MD     Indications:   Kylie Piper is a 52 year old year old female who is known to me that presents today for left shoulder subacromial bursa injection.  The patient has a history chronic left shoulder pain and impingement.  Exam shows decreased left shoulder range of motion in all planes with pain.  She has tried conservative treatment including physical therapy, activity modifications, medications, and interventional procedures.     Options/alternatives, benefits and risks were discussed with the patient including but not limited to bleeding, infection, tissue trauma, exposure to radiation, reaction to medications, spinal cord injury, weakness, numbness and paralysis.  Questions were answered to her satisfaction and she wishes to proceed. Voluntary informed consent was obtained and signed.      Vitals were reviewed: Yes  /85  Pulse 77  Allergies were reviewed:  Yes   Medications were reviewed:  Yes   Pre-procedure pain score: 4/10     Procedure:  After obtaining signed, informed consent, the patient was taken to the procedure room and placed in a seated position with the left arm dangling.  A Pause for the Cause was completed prior to procedure start.       A posterior window was identified for left shoulder subacromial injection.  The skin was prepped with Chloroprep.  Then, a 27 gauge 3.5 inch spinal needle was advanced into the subacromial space in an anteromedial direction.  Aspiration was negative.  Then, 5 ml of a combination of Kenalog 40 mg and Bupivicaine 0.5% 4 ml was injected into the subacromial space without resistance and the needle was withdrawn.       The injection site was cleaned and sterile dressing was applied where indicated.     The  patient tolerated the procedure well without complications and was taken to the recovery area for continued observation.  Fluoroscopic images were saved to PACS.     The patient was re-evaluated following the procedure and they noted decreased pain and improved shoulder range of motion.     Post-procedure pain:  2/10     Follow-up includes:    - post procedure call at one week  - follow up with PCP and in the pain clinic as scheduled     Stanislav Rayo MD  [facility]

## 2018-01-17 NOTE — PATIENT INSTRUCTIONS
[facility]   Post Procedure Instructions    Today you had:  trigger point injections         Medications used: bupivicaine   kenolog           Monitor the injection sites for signs and symptoms of infection-fever, chills, redness, swelling, warmth, or drainage to areas.    You may have soreness at injection sites for up to 24 hours.    You may apply ice to the painful areas to help minimize the discomfort of the needle pokes.    Do not apply heat to sites for at least 12 hours.    You may use anti-inflammatory medications or Tylenol for pain control if necessary  Nurse line: [phone]  After hours provider line: [phone]  Appointment line: [phone]

## 2018-01-17 NOTE — MR AVS SNAPSHOT
After Visit Summary   1/17/2018    Kyile Piper    MRN: [id number]           Patient Information     Date Of Birth          1965        Visit Information        Provider Department      1/17/2018 3:00 PM Stanislav Morton MD [facility]        Care Instructions    [facility]   Post Procedure Instructions    Today you had:  trigger point injections         Medications used: bupivicaine   kenolog           Monitor the injection sites for signs and symptoms of infection-fever, chills, redness, swelling, warmth, or drainage to areas.    You may have soreness at injection sites for up to 24 hours.    You may apply ice to the painful areas to help minimize the discomfort of the needle pokes.    Do not apply heat to sites for at least 12 hours.    You may use anti-inflammatory medications or Tylenol for pain control if necessary  Nurse line: [phone]  After hours provider line: [phone]  Appointment line: [phone]              Follow-ups after your visit        Your next 10 appointments already scheduled     Jan 19, 2018 11:00 AM CST   (Arrive by 10:45 AM)   Bariatric Surgery Evaluation Testing with Lily Nieto, PhD   [facility] ([facility])    [address] [place] [address]   [phone]            Jan 19, 2018 12:00 PM CST   (Arrive by 11:45 AM)   Bariatric Surgery Evaluation Interview with Lily Nieto, PhD   [facility] ([facility])    [address] [place] [address]   [phone]            Jan 22, 2018 11:00 AM CST   (Arrive by 10:45 AM)   New Patient Visit with Minh Mancilla MD   [facility] ([facility])    [address] [place] [address]   [phone]            Feb 08,  2018 11:00 AM CST   (Arrive by 10:45 AM)   NUTRITION VISIT with Merle Zamudio RD   [facility] ([facility])    [address] [address] [phone]            Mar 05, 2018 10:30 AM CST   (Arrive by 10:15 AM)   NUTRITION VISIT with LUCILLE Lovett [facility] ([facility])    [address] [address] [phone]              Who to contact     If you have questions or need follow up information about today's clinic visit or your schedule please contact [facility] directly at [phone].  Normal or non-critical lab and imaging results will be communicated to you by MyChart, letter or phone within 4 business days after the clinic has received the results. If you do not hear from us within 7 days, please contact the clinic through MyChart or phone. If you have a critical or abnormal lab result, we will notify you by phone as soon as possible.  Submit refill requests through [employer] or call your pharmacy and they will forward the refill request to us. Please allow 3 business days for your refill to be completed.          Additional Information About Your Visit        Charlie Apphart Information     [employer] gives you secure access to your electronic health record. If you see a primary care provider, you can also send messages to your care team and make appointments. If you have questions, please call your primary care clinic.  If you do not have a primary care provider, please call [phone] and they will assist you.        Care EveryWhere ID     This is your Care EveryWhere ID. This could be used by other organizations to access your [facility] medical records  [id number]        Your Vitals Were     Pulse                   77            Blood Pressure from Last 3 Encounters:   01/17/18 124/85   12/05/17  124/83   11/13/17 134/81    Weight from Last 3 Encounters:   11/13/17 123.8 kg (273 lb)   11/07/17 123.4 kg (272 lb)   10/18/17 123.1 kg (271 lb 4.8 oz)              Today, you had the following     No orders found for display         Today's Medication Changes          These changes are accurate as of: 1/17/18  3:40 PM.  If you have any questions, ask your nurse or doctor.               These medicines have changed or have updated prescriptions.        Dose/Directions    traZODone 100 MG tablet   Commonly known as:  DESYREL   This may have changed:  how much to take   Used for:  Insomnia, unspecified insomnia        Dose:  100 mg   Take 1 tablet (100 mg) by mouth nightly as needed for sleep   Quantity:  30 tablet   Refills:  1                Primary Care Provider Office Phone # Fax #    Kristin Miner PA-C [phone]       [address] [address] [facility] [address]  [place] [address]        Equal Access to Services     SHARON WORTHINGTON : Hadii aad ku hadasho Soomaali, waaxda luqadaha, qaybta kaalmada adeegyada, waxay idiin hayedgardn lg yuan . So [facility] [phone].    ATENCIÓN: Si habla español, tiene a wheeler disposición servicios gratuitos de asistencia lingüística. [facility] [phone].    We comply with applicable federal civil rights laws and [place] laws. We do not discriminate on the basis of race, color, national origin, age, disability, sex, sexual orientation, or gender identity.            Thank you!     Thank you for choosing [facility]  for your care. Our goal is always to provide you with excellent care. Hearing back from our patients is one way we can continue to improve our services. Please take a few minutes to complete the written survey that you may receive in the mail after your visit with us. Thank you!             Your Updated Medication List - Protect others around you: Learn how to safely use, store and throw away your medicines at www.disposemymeds.org.           This list is accurate as of: 1/17/18  3:40 PM.  Always use your most recent med list.                   Brand Name Dispense Instructions for use Diagnosis    ABILIFY 5 MG tablet   Generic drug:  ARIPiprazole      Take 5 mg by mouth daily        estradiol 2 MG tablet    ESTRACE    90 tablet    1 tablet daily    Gender identity disorder       ferrous sulfate 325 (65 FE) MG tablet    IRON     Take by mouth daily as needed        furosemide 20 MG tablet    LASIX    90 tablet    Take 1 tablet (20 mg) by mouth daily - DUE FOR LABS    Leg swelling       LAMOTRIGINE PO      Take 150 mg by mouth daily        LORAZEPAM PO      Take 0.5 mg by mouth every 2 hours as needed for anxiety        * medical cannabis inhalation (Patient's own supply.  Not a prescription)      Inhale into the lungs 2 times daily (This is NOT a prescription, and does not certify that the patient has a qualifying medical condition for medical cannabis.  The purpose of this order is  to document that the patient reports taking medical cannabis.)  MORNING AND AT NIGHT        * medical cannabis (Patient's own supply.  Not a prescription)      1 capsule 2 times daily (This is NOT a prescription, and does not certify that the patient has a qualifying medical condition for medical cannabis.  The purpose of this order is  to document that the patient reports taking medical cannabis.)  Morning and afternoon        Multi-vitamin Tabs tablet      Take 1 tablet by mouth daily        simvastatin 20 MG tablet    ZOCOR    90 tablet    Take 1 tablet (20 mg) by mouth At Bedtime    Hyperlipidemia LDL goal <130       topiramate 200 MG tablet    TOPAMAX    60 tablet    Take 1 tablet (200 mg) by mouth 2 times daily    Chronic pain disorder, Fibromyalgia       traZODone 100 MG tablet    DESYREL    30 tablet    Take 1 tablet (100 mg) by mouth nightly as needed for sleep    Insomnia, unspecified insomnia       triamcinolone 0.1 % ointment    KENALOG    80 g    Apply to AA  BID x 3-4 weeks then PRN    Prurigo nodularis       venlafaxine 150 MG 24 hr capsule    EFFEXOR-XR     300 mg        VITAMIN B 12 PO           VITAMIN D (CHOLECALCIFEROL) PO      Take 5,000 Units by mouth daily        * Notice:  This list has 2 medication(s) that are the same as other medications prescribed for you. Read the directions carefully, and ask your doctor or other care provider to review them with you.

## 2018-01-19 ENCOUNTER — OFFICE VISIT (OUTPATIENT)
Dept: GASTROENTEROLOGY | Facility: CLINIC | Age: 53
End: 2018-01-19
Payer: MEDICARE

## 2018-01-19 DIAGNOSIS — F31.81 BIPOLAR 2 DISORDER (H): ICD-10-CM

## 2018-01-19 DIAGNOSIS — F41.9 ANXIETY: ICD-10-CM

## 2018-01-19 DIAGNOSIS — F54 PSYCHOLOGICAL FACTORS AFFECTING MEDICAL CONDITION: Primary | ICD-10-CM

## 2018-01-19 NOTE — PROGRESS NOTES
The patient completed the following battery of assessments during this pre-operative bariatric surgery psychological evaluation: World Health Organization Disability Assessment Schedule 2.0 12-item (WHODAS), Patient Health Questionnaire-9 (PHQ-9), Generalized Anxiety Disorder-7 screener (MURPHY-7), CAGE Questionnaire Adapted to Include Drugs (CAGE-AID), Suicide Behavior Questionnaire-Revised (SBQ-R), and the [place] Multiphasic Personality Inventory-2 (MMPI-2). Results will be included in the full report to follow.     Lily Nieto, PhD,   Clinical Health Psychologist

## 2018-01-19 NOTE — LETTER
1/19/2018       RE: Kylie Piper  [address] [facility] [address]     Dear Colleague,    Thank you for referring your patient, Kylie Piper, to the [facility] at [facility]. Please see a copy of my visit note below.    The patient completed the following battery of assessments during this pre-operative bariatric surgery psychological evaluation: World Health Organization Disability Assessment Schedule 2.0 12-item (WHODAS), Patient Health Questionnaire-9 (PHQ-9), Generalized Anxiety Disorder-7 screener (MURPHY-7), CAGE Questionnaire Adapted to Include Drugs (CAGE-AID), Suicide Behavior Questionnaire-Revised (SBQ-R), and the [place] Multiphasic Personality Inventory-2 (MMPI-2). Results will be included in the full report to follow.       Again, thank you for allowing me to participate in the care of your patient.      Sincerely,    Lily Nieto, PhD
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
## 2018-01-19 NOTE — MR AVS SNAPSHOT
After Visit Summary   1/19/2018    Kylie Piper    MRN: [id number]           Patient Information     Date Of Birth          1965        Visit Information        Provider Department      1/19/2018 11:00 AM Lily Nieto, PhD [facility]        Today's Diagnoses     Psychological factors affecting medical condition    -  1       Follow-ups after your visit        Your next 10 appointments already scheduled     Jan 22, 2018 11:00 AM CST   (Arrive by 10:45 AM)   New Patient Visit with Minh Mancilla MD   [facility] ([facility])    [address] [place] [id number]   [phone]            Feb 08, 2018 11:00 AM CST   (Arrive by 10:45 AM)   NUTRITION VISIT with Merle Zamudio RD   [facility] ([facility])    [address] [place] [id number]   [phone]            Mar 05, 2018 10:30 AM CST   (Arrive by 10:15 AM)   NUTRITION VISIT with Merle Zamudio RD   [facility] ([facility])    [address] [place] [id number]   [phone]            Mar 12, 2018  3:00 PM CDT   Return Visit with Stanislav Rayo MD   [facility] ([facility])    [id number] [place] [id number] [phone]              Who to contact     Please call your clinic at [phone] to:    Ask questions about your health    Make or cancel appointments    Discuss your medicines    Learn about your test results    Speak to your doctor   If you have compliments or concerns about an experience at your clinic, or if you wish to file a complaint, please contact [facility] Patient Relations at [phone] or email us at [email or web address]         Additional  Information About Your Visit        BlogGluehart Information     incuBET gives you secure access to your electronic health record. If you see a primary care provider, you can also send messages to your care team and make appointments. If you have questions, please call your primary care clinic.  If you do not have a primary care provider, please call [phone] and they will assist you.      incuBET is an electronic gateway that provides easy, online access to your medical records. With incuBET, you can request a clinic appointment, read your test results, renew a prescription or communicate with your care team.     To access your existing account, please contact your [facility] or call [phone] for assistance.        Care EveryWhere ID     This is your Care EveryWhere ID. This could be used by other organizations to access your [facility] medical records  [id number]         Blood Pressure from Last 3 Encounters:   01/17/18 124/85   12/05/17 124/83   11/13/17 134/81    Weight from Last 3 Encounters:   11/13/17 123.8 kg (273 lb)   11/07/17 123.4 kg (272 lb)   10/18/17 123.1 kg (271 lb 4.8 oz)              Today, you had the following     No orders found for display         Today's Medication Changes          These changes are accurate as of: 1/19/18  1:20 PM.  If you have any questions, ask your nurse or doctor.               These medicines have changed or have updated prescriptions.        Dose/Directions    traZODone 100 MG tablet   Commonly known as:  DESYREL   This may have changed:  how much to take   Used for:  Insomnia, unspecified insomnia        Dose:  100 mg   Take 1 tablet (100 mg) by mouth nightly as needed for sleep   Quantity:  30 tablet   Refills:  1                Primary Care Provider Office Phone # Fax #    Kristin Miner PA-C [phone]       [address] [address]        Equal Access to Services     SHARON WORTHINGTON AH: Dominik piedra  joanna Gross, waazarda luaniadaha, qaabelardota kalevi brice, riley pradomic larry. So [facility] [phone].    ATENCIÓN: Si habla charito, tiene a wheeler disposición servicios gratuitos de asistencia lingüística. Jay al [phone].    We comply with applicable federal civil rights laws and [place] laws. We do not discriminate on the basis of race, color, national origin, age, disability, sex, sexual orientation, or gender identity.            Thank you!     Thank you for choosing [facility]  for your care. Our goal is always to provide you with excellent care. Hearing back from our patients is one way we can continue to improve our services. Please take a few minutes to complete the written survey that you may receive in the mail after your visit with us. Thank you!             Your Updated Medication List - Protect others around you: Learn how to safely use, store and throw away your medicines at www.disposemymeds.org.          This list is accurate as of: 1/19/18  1:20 PM.  Always use your most recent med list.                   Brand Name Dispense Instructions for use Diagnosis    ABILIFY 5 MG tablet   Generic drug:  ARIPiprazole      Take 5 mg by mouth daily        estradiol 2 MG tablet    ESTRACE    90 tablet    1 tablet daily    Gender identity disorder       ferrous sulfate 325 (65 FE) MG tablet    IRON     Take by mouth daily as needed        furosemide 20 MG tablet    LASIX    90 tablet    Take 1 tablet (20 mg) by mouth daily - DUE FOR LABS    Leg swelling       LAMOTRIGINE PO      Take 150 mg by mouth daily        LORAZEPAM PO      Take 0.5 mg by mouth every 2 hours as needed for anxiety        * medical cannabis inhalation (Patient's own supply.  Not a prescription)      Inhale into the lungs 2 times daily (This is NOT a prescription, and does not certify that the patient has a qualifying medical condition for medical cannabis.  The purpose of this order  is  to document that the patient reports taking medical cannabis.)  MORNING AND AT NIGHT        * medical cannabis (Patient's own supply.  Not a prescription)      1 capsule 2 times daily (This is NOT a prescription, and does not certify that the patient has a qualifying medical condition for medical cannabis.  The purpose of this order is  to document that the patient reports taking medical cannabis.)  Morning and afternoon        Multi-vitamin Tabs tablet      Take 1 tablet by mouth daily        simvastatin 20 MG tablet    ZOCOR    90 tablet    Take 1 tablet (20 mg) by mouth At Bedtime    Hyperlipidemia LDL goal <130       topiramate 200 MG tablet    TOPAMAX    60 tablet    Take 1 tablet (200 mg) by mouth 2 times daily    Chronic pain disorder, Fibromyalgia       traZODone 100 MG tablet    DESYREL    30 tablet    Take 1 tablet (100 mg) by mouth nightly as needed for sleep    Insomnia, unspecified insomnia       triamcinolone 0.1 % ointment    KENALOG    80 g    Apply to AA BID x 3-4 weeks then PRN    Prurigo nodularis       venlafaxine 150 MG 24 hr capsule    EFFEXOR-XR     300 mg        VITAMIN B 12 PO           VITAMIN D (CHOLECALCIFEROL) PO      Take 5,000 Units by mouth daily        * Notice:  This list has 2 medication(s) that are the same as other medications prescribed for you. Read the directions carefully, and ask your doctor or other care provider to review them with you.
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
## 2018-01-19 NOTE — MR AVS SNAPSHOT
After Visit Summary   1/19/2018    Kylie Piper    MRN: [id number]           Patient Information     Date Of Birth          1965        Visit Information        Provider Department      1/19/2018 12:00 PM Lily Nieto, PhD [facility]        Today's Diagnoses     Psychological factors affecting medical condition    -  1    Bipolar 2 disorder (H)        Anxiety           Follow-ups after your visit        Your next 10 appointments already scheduled     Feb 08, 2018 11:00 AM CST   (Arrive by 10:45 AM)   NUTRITION VISIT with Merle Zamudio RD   [facility] ([facility])    [address] [place] [id number]   [phone]            Mar 05, 2018 10:30 AM CST   (Arrive by 10:15 AM)   NUTRITION VISIT with Merle Zamudio RD   [facility] ([facility])    [address] [place] [id number]   [phone]            Mar 12, 2018  3:00 PM CDT   Return Visit with Stanislav Rayo MD   [facility] ([facility])    [address] [place] [id number] [phone]            Jun 11, 2018 10:30 AM CDT   (Arrive by 10:15 AM)   Return Visit with iMnh Mancilla MD   [facility] ([facility])    [address] [place] [id number] [phone]              Who to contact     Please call your clinic at [phone] to:    Ask questions about your health    Make or cancel appointments    Discuss your medicines    Learn about your test results    Speak to your doctor   If you have compliments or concerns about an experience at your clinic, or if you wish to file a complaint, please contact [facility] Patient Relations at [phone] or email us at  [email or web address]         Additional Information About Your Visit        devsistershart Information     [employer] gives you secure access to your electronic health record. If you see a primary care provider, you can also send messages to your care team and make appointments. If you have questions, please call your primary care clinic.  If you do not have a primary care provider, please call [phone] and they will assist you.      [employer] is an electronic gateway that provides easy, online access to your medical records. With [employer], you can request a clinic appointment, read your test results, renew a prescription or communicate with your care team.     To access your existing account, please contact your [facility] or call [phone] for assistance.        Care EveryWhere ID     This is your Care EveryWhere ID. This could be used by other organizations to access your [place] medical records  [id number]         Blood Pressure from Last 3 Encounters:   01/22/18 128/78   01/17/18 124/85   12/05/17 124/83    Weight from Last 3 Encounters:   01/22/18 120.2 kg (265 lb 1.6 oz)   11/13/17 123.8 kg (273 lb)   11/07/17 123.4 kg (272 lb)              Today, you had the following     No orders found for display         Today's Medication Changes          These changes are accurate as of 1/19/18 11:59 PM.  If you have any questions, ask your nurse or doctor.               These medicines have changed or have updated prescriptions.        Dose/Directions    traZODone 100 MG tablet   Commonly known as:  DESYREL   This may have changed:  how much to take   Used for:  Insomnia, unspecified insomnia        Dose:  100 mg   Take 1 tablet (100 mg) by mouth nightly as needed for sleep   Quantity:  30 tablet   Refills:  1                Primary Care Provider Office Phone # Fax #    Kristin Miner PA-C [phone]       [address] [place] [address]        Equal  Access to Services     [facility]: Hadii aad ku hadsukhalexy Sonalellie, waazarda luqjuan manuel, qaren gisellegentryriley cutler. So [facility] [phone].    ATENCIÓN: Si habla español, tiene a wheeler disposición servicios gratuitos de asistencia lingüística. Llame al [phone].    We comply with applicable federal civil rights laws and [place] laws. We do not discriminate on the basis of race, color, national origin, age, disability, sex, sexual orientation, or gender identity.            Thank you!     Thank you for choosing [facility]  for your care. Our goal is always to provide you with excellent care. Hearing back from our patients is one way we can continue to improve our services. Please take a few minutes to complete the written survey that you may receive in the mail after your visit with us. Thank you!             Your Updated Medication List - Protect others around you: Learn how to safely use, store and throw away your medicines at www.disposemymeds.org.          This list is accurate as of 1/19/18 11:59 PM.  Always use your most recent med list.                   Brand Name Dispense Instructions for use Diagnosis    ABILIFY 5 MG tablet   Generic drug:  ARIPiprazole      Take 5 mg by mouth daily        estradiol 2 MG tablet    ESTRACE    90 tablet    1 tablet daily    Gender identity disorder       ferrous sulfate 325 (65 FE) MG tablet    IRON     Take by mouth daily as needed        furosemide 20 MG tablet    LASIX    90 tablet    Take 1 tablet (20 mg) by mouth daily - DUE FOR LABS    Leg swelling       LAMOTRIGINE PO      Take 150 mg by mouth daily        LORAZEPAM PO      Take 0.5 mg by mouth every 2 hours as needed for anxiety        * medical cannabis inhalation (Patient's own supply.  Not a prescription)      Inhale into the lungs 2 times daily (This is NOT a prescription, and does not certify that the patient has a qualifying medical condition  for medical cannabis.  The purpose of this order is  to document that the patient reports taking medical cannabis.)  MORNING AND AT NIGHT        * medical cannabis (Patient's own supply.  Not a prescription)      1 capsule 2 times daily (This is NOT a prescription, and does not certify that the patient has a qualifying medical condition for medical cannabis.  The purpose of this order is  to document that the patient reports taking medical cannabis.)  Morning and afternoon        Multi-vitamin Tabs tablet      Take 1 tablet by mouth daily        simvastatin 20 MG tablet    ZOCOR    90 tablet    Take 1 tablet (20 mg) by mouth At Bedtime    Hyperlipidemia LDL goal <130       topiramate 200 MG tablet    TOPAMAX    60 tablet    Take 1 tablet (200 mg) by mouth 2 times daily    Chronic pain disorder, Fibromyalgia       traZODone 100 MG tablet    DESYREL    30 tablet    Take 1 tablet (100 mg) by mouth nightly as needed for sleep    Insomnia, unspecified insomnia       triamcinolone 0.1 % ointment    KENALOG    80 g    Apply to AA BID x 3-4 weeks then PRN    Prurigo nodularis       venlafaxine 150 MG 24 hr capsule    EFFEXOR-XR     300 mg        VITAMIN B 12 PO           VITAMIN D (CHOLECALCIFEROL) PO      Take 5,000 Units by mouth daily        * Notice:  This list has 2 medication(s) that are the same as other medications prescribed for you. Read the directions carefully, and ask your doctor or other care provider to review them with you.

## 2018-01-22 ENCOUNTER — OFFICE VISIT (OUTPATIENT)
Dept: ENDOCRINOLOGY | Facility: CLINIC | Age: 53
End: 2018-01-22
Payer: MEDICARE

## 2018-01-22 VITALS
DIASTOLIC BLOOD PRESSURE: 78 MMHG | WEIGHT: 265.1 LBS | BODY MASS INDEX: 39.27 KG/M2 | SYSTOLIC BLOOD PRESSURE: 128 MMHG | HEART RATE: 70 BPM | HEIGHT: 69 IN | OXYGEN SATURATION: 100 %

## 2018-01-22 DIAGNOSIS — E66.01 MORBID OBESITY (H): Primary | ICD-10-CM

## 2018-01-22 ASSESSMENT — ENCOUNTER SYMPTOMS
NECK PAIN: 1
TREMORS: 1
LOSS OF CONSCIOUSNESS: 0
MUSCLE CRAMPS: 1
ARTHRALGIAS: 1
NERVOUS/ANXIOUS: 1
MUSCLE WEAKNESS: 1
PARALYSIS: 0
SPEECH CHANGE: 0
SEIZURES: 0
MYALGIAS: 1
TINGLING: 0
MEMORY LOSS: 0
INSOMNIA: 0
HEADACHES: 1
JOINT SWELLING: 0
STIFFNESS: 1
DISTURBANCES IN COORDINATION: 0
NUMBNESS: 0
DIZZINESS: 0
BACK PAIN: 1
DECREASED CONCENTRATION: 0
WEAKNESS: 1
DEPRESSION: 1
PANIC: 0

## 2018-01-22 ASSESSMENT — PAIN SCALES - GENERAL: PAINLEVEL: NO PAIN (0)

## 2018-01-22 NOTE — MR AVS SNAPSHOT
After Visit Summary   1/22/2018    Kylie Piper    MRN: [id number]           Patient Information     Date Of Birth          1965        Visit Information        Provider Department      1/22/2018 11:00 AM Minh Mancilla MD M [facility]         Follow-ups after your visit        Your next 10 appointments already scheduled     Feb 08, 2018 11:00 AM CST   (Arrive by 10:45 AM)   NUTRITION VISIT with LUCILLE Lovett [facility] ([facility])    [address] [place] [phone]            Mar 05, 2018 10:30 AM CST   (Arrive by 10:15 AM)   NUTRITION VISIT with LUCILLE Lovett [facility] ([facility])    [address] [place] [phone]            Mar 12, 2018  3:00 PM CDT   Return Visit with Stanislav Rayo MD   [facility] ([facility])    [id number] [place] [id number] [phone]            Jun 11, 2018 10:30 AM CDT   (Arrive by 10:15 AM)   Return Visit with MD CHARLOTTE Roy [facility] ([facility])    [address] [place] [id number] [phone]              Who to contact     Please call your clinic at [phone] to:    Ask questions about your health    Make or cancel appointments    Discuss your medicines    Learn about your test results    Speak to your doctor   If you have compliments or concerns about an experience at your clinic, or if you wish to file a complaint, please contact [facility] Patient Relations at [phone] or email us at [email or web address]         Additional Information About Your Visit        MyChart Information     [employer] gives you secure  "access to your electronic health record. If you see a primary care provider, you can also send messages to your care team and make appointments. If you have questions, please call your primary care clinic.  If you do not have a primary care provider, please call [phone] and they will assist you.      [employer] is an electronic gateway that provides easy, online access to your medical records. With [employer], you can request a clinic appointment, read your test results, renew a prescription or communicate with your care team.     To access your existing account, please contact your [facility] or call [phone] for assistance.        Care EveryWhere ID     This is your Care EveryWhere ID. This could be used by other organizations to access your [place] medical records  [id number]        Your Vitals Were     Pulse Height Pulse Oximetry BMI (Body Mass Index)          70 5' 9\" 100% 39.15 kg/m2         Blood Pressure from Last 3 Encounters:   01/22/18 128/78   01/17/18 124/85   12/05/17 124/83    Weight from Last 3 Encounters:   01/22/18 265 lb 1.6 oz   11/13/17 273 lb   11/07/17 272 lb              Today, you had the following     No orders found for display         Today's Medication Changes          These changes are accurate as of: 1/22/18 12:03 PM.  If you have any questions, ask your nurse or doctor.               These medicines have changed or have updated prescriptions.        Dose/Directions    traZODone 100 MG tablet   Commonly known as:  DESYREL   This may have changed:  how much to take   Used for:  Insomnia, unspecified insomnia        Dose:  100 mg   Take 1 tablet (100 mg) by mouth nightly as needed for sleep   Quantity:  30 tablet   Refills:  1                Primary Care Provider Office Phone # Fax #    Kristin Miner PA-C [phone]       [address] [address]        Equal Access to Services     [facility]: Dominik marshall " Matheusali, annabelleda luqadaha, tana kalevi brice, riley pradomic larry. So [facility] [phone].    ATENCIÓN: Si krunal mcneill, tiene a wheeler disposición servicios gratuitos de asistencia lingüística. Jay al [phone].    We comply with applicable federal civil rights laws and [place] laws. We do not discriminate on the basis of race, color, national origin, age, disability, sex, sexual orientation, or gender identity.            Thank you!     Thank you for choosing [facility]  for your care. Our goal is always to provide you with excellent care. Hearing back from our patients is one way we can continue to improve our services. Please take a few minutes to complete the written survey that you may receive in the mail after your visit with us. Thank you!             Your Updated Medication List - Protect others around you: Learn how to safely use, store and throw away your medicines at www.disposemymeds.org.          This list is accurate as of: 1/22/18 12:03 PM.  Always use your most recent med list.                   Brand Name Dispense Instructions for use Diagnosis    ABILIFY 5 MG tablet   Generic drug:  ARIPiprazole      Take 5 mg by mouth daily        estradiol 2 MG tablet    ESTRACE    90 tablet    1 tablet daily    Gender identity disorder       ferrous sulfate 325 (65 FE) MG tablet    IRON     Take by mouth daily as needed        furosemide 20 MG tablet    LASIX    90 tablet    Take 1 tablet (20 mg) by mouth daily - DUE FOR LABS    Leg swelling       LAMOTRIGINE PO      Take 150 mg by mouth daily        LORAZEPAM PO      Take 0.5 mg by mouth every 2 hours as needed for anxiety        * medical cannabis inhalation (Patient's own supply.  Not a prescription)      Inhale into the lungs 2 times daily (This is NOT a prescription, and does not certify that the patient has a qualifying medical condition for medical cannabis.  The purpose of this order is  to  document that the patient reports taking medical cannabis.)  MORNING AND AT NIGHT        * medical cannabis (Patient's own supply.  Not a prescription)      1 capsule 2 times daily (This is NOT a prescription, and does not certify that the patient has a qualifying medical condition for medical cannabis.  The purpose of this order is  to document that the patient reports taking medical cannabis.)  Morning and afternoon        Multi-vitamin Tabs tablet      Take 1 tablet by mouth daily        simvastatin 20 MG tablet    ZOCOR    90 tablet    Take 1 tablet (20 mg) by mouth At Bedtime    Hyperlipidemia LDL goal <130       topiramate 200 MG tablet    TOPAMAX    60 tablet    Take 1 tablet (200 mg) by mouth 2 times daily    Chronic pain disorder, Fibromyalgia       traZODone 100 MG tablet    DESYREL    30 tablet    Take 1 tablet (100 mg) by mouth nightly as needed for sleep    Insomnia, unspecified insomnia       triamcinolone 0.1 % ointment    KENALOG    80 g    Apply to AA BID x 3-4 weeks then PRN    Prurigo nodularis       venlafaxine 150 MG 24 hr capsule    EFFEXOR-XR     300 mg        VITAMIN B 12 PO           VITAMIN D (CHOLECALCIFEROL) PO      Take 5,000 Units by mouth daily        * Notice:  This list has 2 medication(s) that are the same as other medications prescribed for you. Read the directions carefully, and ask your doctor or other care provider to review them with you.
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
## 2018-01-22 NOTE — NURSING NOTE
"(   Chief Complaint   Patient presents with     Consult     weight man.    )    ( Weight: 265 lb 1.6 oz )  ( Height: 5' 9\" )  ( BMI (Calculated): 39.23 )  (   )  (   )  (   )  (   )  (   )  (   )    ( BP: 128/78 )  (   )  (   )  (   )  ( Pulse: 70 )  (   )  ( SpO2: 100 % )    (   Patient Active Problem List   Diagnosis     Gender identity disorder     Hyperlipidemia LDL goal <130     Abnormal results of liver function studies     Chronic pain disorder     Insomnia     Bipolar 2 disorder (H)     Morbid obesity (H)     H/O hypogonadism     Fibromyalgia     Glenohumeral arthritis     Vitamin D deficiency     Primary osteoarthritis of left shoulder     Anxiety     Cervical spondylosis without myelopathy     Chronic fatigue    )  (   Current Outpatient Prescriptions   Medication Sig Dispense Refill     triamcinolone (KENALOG) 0.1 % ointment Apply to AA BID x 3-4 weeks then PRN 80 g 3     furosemide (LASIX) 20 MG tablet Take 1 tablet (20 mg) by mouth daily - DUE FOR LABS 90 tablet 3     ARIPiprazole (ABILIFY) 5 MG tablet Take 5 mg by mouth daily       simvastatin (ZOCOR) 20 MG tablet Take 1 tablet (20 mg) by mouth At Bedtime 90 tablet 3     topiramate (TOPAMAX) 200 MG tablet Take 1 tablet (200 mg) by mouth 2 times daily 60 tablet 0     medical cannabis inhalation (Patient's own supply.  Not a prescription) Inhale into the lungs 2 times daily (This is NOT a prescription, and does not certify that the patient has a qualifying medical condition for medical cannabis.  The purpose of this order is  to document that the patient reports taking medical cannabis.)    MORNING AND AT NIGHT       medical cannabis (Patient's own supply.  Not a prescription) 1 capsule 2 times daily (This is NOT a prescription, and does not certify that the patient has a qualifying medical condition for medical cannabis.  The purpose of this order is  to document that the patient reports taking medical cannabis.)    Morning and afternoon       estradiol " (ESTRACE) 2 MG tablet 1 tablet daily 90 tablet 3     VITAMIN D, CHOLECALCIFEROL, PO Take 5,000 Units by mouth daily       ferrous sulfate (IRON) 325 (65 FE) MG tablet Take by mouth daily as needed        Cyanocobalamin (VITAMIN B 12 PO)        multivitamin, therapeutic with minerals (MULTI-VITAMIN) TABS Take 1 tablet by mouth daily       LAMOTRIGINE PO Take 150 mg by mouth daily        LORAZEPAM PO Take 0.5 mg by mouth every 2 hours as needed for anxiety       traZODone (DESYREL) 100 MG tablet Take 1 tablet (100 mg) by mouth nightly as needed for sleep (Patient taking differently: Take 50 mg by mouth nightly as needed for sleep ) 30 tablet 1     venlafaxine (EFFEXOR-XR) 150 MG 24 hr capsule 300 mg   0    )  ( Diabetes Eval:    )    ( Pain Eval:  No Pain (0) )    ( Wound Eval:       )    (   History   Smoking Status     Never Smoker   Smokeless Tobacco     Never Used    )    ( Signed By:  Srikanth Mary; January 22, 2018; 11:23 AM )
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
## 2018-01-29 RX ORDER — NALTREXONE HYDROCHLORIDE 50 MG/1
TABLET, FILM COATED ORAL
Qty: 30 TABLET | Refills: 5 | Status: ON HOLD | OUTPATIENT
Start: 2018-01-29 | End: 2018-04-11

## 2018-02-08 ENCOUNTER — ALLIED HEALTH/NURSE VISIT (OUTPATIENT)
Dept: SURGERY | Facility: CLINIC | Age: 53
End: 2018-02-08
Payer: MEDICARE

## 2018-02-08 NOTE — PROGRESS NOTES
"Bariatric Nutrition Consultation Note    Reason For Visit: Nutrition Reassessment    Kylie Piper is a 52 year-old presenting today for bariatric nutrition consult.  Pt is interested in laparoscopic sleeve gastrectomy.  This is pt's fifth of 6 required nutrition visits prior to surgery. Pt referred by Xin SLAUGHTER) on 10/18/17.      Coordination Note: Updated task list.      She is interested in having weight loss surgery for the following reasons:  To improve overall health and wellbeing (knees, joints, breathing difficulty; needing a diuretic and low-sodium diet)    Support: Wife Anuradha (who had RNY GB in the past)    ANTHROPOMETRICS:    Height as of an earlier encounter on 10/18/17: 1.753 m (5' 9\").    Initial Weight as of an earlier encounter on 10/18/17: 123.1 kg (271 lb 4.8 oz) with BMI of 40.06.   Current Weight: 257.5 lbs (-9.2 lbs over the past month; -13.8 lb from initial weight)    Required weight loss goal pre-op: -10 lbs from initial consult weight (goal weight 261.3 lbs or less before surgery) Exceeding     SUPPLEMENT INFORMATION:  Liquid MVI; hair,skin, nails (biotin), Ca, Probiotics (acidophillus), B12, 1000 International Units vitamin D3, Mg  *Pt is on topiramate to help with appetite control.    NUTRITION HISTORY:  Progress with Previous Goals:   Relating To Eating:    Follow the Modified Liquid Diet for weight loss: Meeting - Drinking 1 protein shake (homemade, ace seeds/whey protein/stevia),  2 meals   Breakfast: Protein Shake/Bar  Lunch: 3-4 oz lean protein + non-starchy vegetables  Supper: Protein Shake/Bar  Snack: non-starchy vegetables (no calorie-containing dips/condiments)  Beverages: at least 48-64 oz water between meals daily Meeting    *Protein Shake Criteria: ~200 Calories, at least 20 grams of protein, and less than 10 grams of sugar     Eat slowly (20-30 minutes per meal), chewing foods well (25 chews per bite/applesauce consistency) Meeting    Relating to " "beverages:  Work on  fluids from meals by 30 minutes. Meeting     Relating to activity:  Increase activity as able depending upon knee pain/energy level. Try upper body exercises or yoga. Meeting, pt practicing yoga as able. Kylie is looking into getting [employer] membership for group classes.    Relating to cravings:  Rid your environment of trigger foods. Meeting. Pt's wife, Anuradha, has recently joined Kylie's goal of weight loss/healthful eating. She has stopped bringing \"junk food\" into the house, which has been helpful.        Eating Habits 10/17/2017   Do you have any dietary restrictions? Yes   Low-sodium, low-fat diet   Do you currently binge eat (eat a large amount of food in a short time)? Yes   Do you get up to eat after falling asleep? Yes (infrequently - she will have an egg or other protein)   What foods do you crave? chocolate, protein, chips   *Pt is willing to abstain from alcohol after surgery.      Physical Activity Notes:   *Pt sometimes tracks steps and heart rate on FitBit.  *Pt can only exercise her right arm (OA in joints of left arm).   *Pt is limited also be knee pain.    NUTRITION DIAGNOSIS:  Obesity r/t long history of self-monitoring deficit and excessive energy intake aeb BMI >30. - improving.     INTERVENTION:  Intervention Provided/Education Provided: Praised Pt on progress with goals and weight lost. Reviewed previous goals. Pt is willing to continue on the modified liquid diet.  Reviewed post-op diet guidelines, answered pt/wife's questions regarding diet advancement after surgery and micronutrient supplementation. Gave encouragement and support to continue.     Patient Understanding: good  Expected Compliance: good    GOALS:  Relating To Eating:   Follow the Modified Liquid Diet for weight loss:   Breakfast: Protein Shake/Bar  Lunch: 3-4 oz lean protein + non-starchy vegetables  Supper: Protein Shake/Bar  Snack: non-starchy vegetables (no calorie-containing " dips/condiments)  Beverages: at least 48-64 oz water between meals daily    *Protein Shake Criteria: ~200 Calories, at least 20 grams of protein, and less than 10 grams of sugar     Eat slowly (20-30 minutes per meal), chewing foods well (25 chews per bite/applesauce consistency)    Relating to beverages:  Work on  fluids from meals by 30 minutes.     Relating to activity:  Increase activity as able depending upon knee pain/energy level. Try upper body exercises or yoga.     Relating to cravings:  Rid your environment of trigger foods.      Follow-Up: 1 month    Time spent with patient: 30 minutes    Queta Chaney RD, LD

## 2018-02-08 NOTE — MR AVS SNAPSHOT
MRN:[id number]                      After Visit Summary   2/8/2018    Kylie Piper    MRN: [id number]           Visit Information        Provider Department      2/8/2018 11:00 AM Merle Zamudio RD M [facility] Surgical Weight Management        Your next 10 appointments already scheduled     Mar 05, 2018 10:30 AM CST   (Arrive by 10:15 AM)   NUTRITION VISIT with LUCILLE Lovett [facility] Surgical Weight Management ([facility])    [address] [id number] [phone]            Mar 12, 2018  3:00 PM CDT   Return Visit with Stanislav Rayo MD   [facility] ([facility])    [address] [id number] [phone]            Jun 11, 2018 10:30 AM CDT   (Arrive by 10:15 AM)   Return Visit with Minh Mancilla MD   [facility] Medical Weight Management ([facility])    [address] [place] [id number] [phone]              Care Instructions    CONTACT INFORMATION  Call FORTINO Bhagat at [phone]:   -If questions about completing tasks on the Tasklist.   -To confirm if tasks are done. If tasks are done, then she can schedule  you to meet the surgeon to review risks and benefits of the surgery, the  clinic nurse for preop teaching and the dietician for postop diet teaching.   -If you have watched the on-line classes prior to meeting the surgeon, call  her or send a [employer] message to confirm that you watched them. The  online classes are at [email or web address] and  [email or web address].    Call Ben at [phone]:   -If questions about insurance and the prior authorization process.   -After meeting the surgeon to get a surgery date, schedule the  appointments with the anesthesia team and the first postop appointments.    Call the Call Center at [phone]:   -If you need to  schedule additional monthly dietician visits before or after  surgery.   -If questions after surgery (ask for the clinic nurse).   -To schedule additional follow-up visits to help keep you on track (1 wk, 1  mo, 3 mo, 6  mo, every 6 months until year 5, then every year at your  anniversary month).    Fax # [phone]:    -preoperative documents to complete tasks on Tasklist.   -FMLA or return to work forms        *Your weight will be checked at your surgeon visit and also again at your anesthesia visit.  It is required that you be at or below your final weight goal up until the day of surgery or your surgery will be cancelled.    DISCLAIMER: Based on the evaluation results, the bariatric team decides whether and which bariatric surgery is the best option for you. Sometimes, even if you meet all of the pre-operative criteria, the bariatric team may still determine that in their medical judgement surgery is not recommended because of the risks to you.             [facility] Information     [facility] gives you secure access to your electronic health record. If you see a primary care provider, you can also send messages to your care team and make appointments. If you have questions, please call your primary care clinic.  If you do not have a primary care provider, please call [phone] and they will assist you.      [facility] is an electronic gateway that provides easy, online access to your medical records. With [facility], you can request a clinic appointment, read your test results, renew a prescription or communicate with your care team.     To access your existing account, please contact your [facility] or call [phone] for assistance.        Care EveryWhere ID     This is your Care EveryWhere ID. This could be used by other organizations to access your [facility] medical records  [id number]        Equal Access to Services     SHARON WORTHINGTON AH: milton Gillespie  tana lester waxay idiin hayaan adeeg kharash la'aan ah. So [facility] [phone].    ATENCIÓN: Si habla español, tiene a wheeler disposición servicios gratuitos de asistencia lingüística. Llame al [phone].    We comply with applicable federal civil rights laws and [place] laws. We do not discriminate on the basis of race, color, national origin, age, disability, sex, sexual orientation, or gender identity.

## 2018-02-16 ENCOUNTER — CARE COORDINATION (OUTPATIENT)
Dept: SURGERY | Facility: CLINIC | Age: 53
End: 2018-02-16

## 2018-02-16 NOTE — PROGRESS NOTES
Tasklist updated.  To see Dr Shah on 3/1/18.  Working on recommendations notes in Dr Nieto's psychological evaluation. (See [employer] message to client).  Informed client that we currently do not have a protocol for clients that are using medical marijuana, will need to review her case at our 3/9/18 Weight Management Team Meeting before being able to clear her for surgery.  In the meantime she has met goal weight and will continue to work on weight loss.  To watch the online classes and obtain preop teaching.    Bariatric Task List  Status:  Is patient a candidate for bariatric surgery?:    -     Cleared to schedule surgeon consult?:    -     Status:  surgery evaluation in process -     Surgeon: Dr Shah -     Tentative surgery month/year: April 2018 -        Insurance: Insurance:  [employer] -        Patient Info: Initial Weight:  271 lb 4.8 oz -     Date of Initial Weight/Height:  10/18/2017 -     Goal Weight (lbs):  261 -     Required Weight Loss:  10 -     Surgery Type:  sleeve gastrectomy -     Multidisciplinary Meeting:  Needed -        Dietician Visits: Structured weight loss required by insurance?:  Yes -     Dietician Visit 1:  Completed - 10/18/17 in Epic. [facility]   Dietician Visit 2:  Completed - 11/8/17 in [facility]   Dietician Visit 3:  Completed - 12/6/17 [facility]   Dietician Visit 4:  Completed - 1/8/18 done ([facility])   Dietician Visit 5:  Completed - 2/8/18 ()   Dietician Visit 6:  Needed - 3/1/18 appt      Psychological Evaluation: Psych eval:  Completed - 1/19/18 Appt with Dr Nieto.   Therapist letter of support:    -     Psychiatrist letter of support:  Completed - 11/27/17 letter of support from Pastora Álvarez ([facility]);[employer] [place], [place] - clearance faxed on 11/27/17 ([facility])      Lab Work: Complete Blood Count:  Completed - 10/8/17 in Epic. [facility]   Comprehensive Metabolic Panel:  Completed - 10/8/17 in Epic. [facility]   Vitamin D:  Completed - 10/8/17 in Epic. [facility]   Hgb A1c:    -  9/29/17 = 5.4   PTH:   "Completed - 10/8/17 in [facility]. [facility]      Consults/ Clearance: Sleep Medicine:    -     Cardiac:  Completed - hx of afib; clearance from Dr. Hugo Chaidez in [place], [place] on 12/5/17 ([facility])   Pain: Completed - already sent from [facility] in [place] (Dr. Stanislav Rayo)   Medical Weight Management: Completed - 1/22/18 Dr Laurent URIBE. Ongoing.      PCP: Establish care with PCP:  Completed -     PCP letter of support:  Completed - [facility] - faxed already ([facility])11/13/17 Kristin Miner PA-C      Patient Education:  Information Session:  Completed -     Given \"Making your decision\" handout?:  Yes -     Given support group information?:  Yes -     Attended support group?:  Completed -     Support plan in place?:  Needed -     Research consents signed?:  Yes -        Final Tasks:  Before surgery online class:  Needed -     Before surgery online class website link:  [email or web address]   After surgery online class:  Needed -     After surgery online class website link:  [email or web address]   Nurse visit for weigh-in and information:  Needed -     Pre-assessment clinic visit with anesthesia team for H&P:  Needed -     Final labs (Hgb, plt, T&S, UA):  Needed -        Notes:   -           "

## 2018-03-01 ENCOUNTER — ALLIED HEALTH/NURSE VISIT (OUTPATIENT)
Dept: SURGERY | Facility: CLINIC | Age: 53
End: 2018-03-01
Payer: MEDICARE

## 2018-03-01 ENCOUNTER — OFFICE VISIT (OUTPATIENT)
Dept: SURGERY | Facility: CLINIC | Age: 53
End: 2018-03-01
Payer: MEDICARE

## 2018-03-01 VITALS
OXYGEN SATURATION: 95 % | HEART RATE: 73 BPM | WEIGHT: 252.9 LBS | SYSTOLIC BLOOD PRESSURE: 126 MMHG | TEMPERATURE: 98.3 F | BODY MASS INDEX: 37.46 KG/M2 | HEIGHT: 69 IN | DIASTOLIC BLOOD PRESSURE: 74 MMHG

## 2018-03-01 DIAGNOSIS — E66.01 MORBID OBESITY DUE TO EXCESS CALORIES (H): Primary | ICD-10-CM

## 2018-03-01 NOTE — PROGRESS NOTES
"Bariatric Nutrition Consultation Note    Reason For Visit: Nutrition Reassessment    Kylie Piper is a 52 year-old presenting today for bariatric nutrition consult.  Pt is interested in laparoscopic sleeve gastrectomy.  This is pt's sixth of 6 required nutrition visits prior to surgery. Pt referred by Xin SLAUGHTER) on 10/18/17.      Coordination Note: All tasks completed.      She is interested in having weight loss surgery for the following reasons:  To improve overall health and wellbeing (knees, joints, breathing difficulty; needing a diuretic and low-sodium diet)    Support: Wife Anuradha (reports had RNY GB in the past)    ANTHROPOMETRICS:    Height as of an earlier encounter on 10/18/17: 1.753 m (5' 9\").    Initial Weight as of an earlier encounter on 10/18/17: 123.1 kg (271 lb 4.8 oz) with BMI of 40.06.   Current Weight: 252.9 lbs (-4.6 lbs over the past month; -18.4 lb from initial weight)    Required weight loss goal pre-op: -10 lbs from initial consult weight (goal weight 261.3 lbs or less before surgery) Exceeding     SUPPLEMENT INFORMATION:  Liquid MVI; hair,skin, nails (biotin), Ca, Probiotics (acidophillus), B12, 1000 International Units vitamin D3, Mg  *Pt is on topiramate to help with appetite control.    NUTRITION HISTORY:  Progress with Previous Goals:  Relating To Eating:    Follow the Modified Liquid Diet for weight loss - Pt hasn't been following consistently. Eating low-carb, high-protein solid meals and some protein shakes depending on convenience.   Eat slowly (20-30 minutes per meal), chewing foods well (25 chews per bite/applesauce consistency) Meeting.     Relating to beverages:  Work on  fluids from meals by 30 minutes. Meeting      Relating to activity:  Increase activity as able depending upon knee pain/energy level. Try upper body exercises or yoga. - Renewed membership to [employer]. Walking on treadmill for at least 30 minutes, 3 times per week. Wife also going to gym " with her.     Relating to cravings:  Rid your environment of trigger foods. Meeting       Eating Habits 10/17/2017   Do you have any dietary restrictions? Yes   Low-sodium, low-fat diet   Do you currently binge eat (eat a large amount of food in a short time)? Yes   Do you get up to eat after falling asleep? Yes (infrequently - she will have an egg or other protein)   What foods do you crave? chocolate, protein, chips   *Pt is willing to abstain from alcohol after surgery.      Physical Activity Notes:   *Pt sometimes tracks steps and heart rate on [employer].  *Pt can only exercise her right arm (OA in joints of left arm).   *Pt is limited also be knee pain.    NUTRITION DIAGNOSIS:  Obesity r/t long history of self-monitoring deficit and excessive energy intake aeb BMI >30. - improving.     &  New: Food- and Nutrition-related knowledge deficit r/t lack of prior exposure to information AEB pt unable to verbalize main points of bariatric clear and low-fat full liquid diet.  Interventions:  Provided instruction on bariatric clear and low-fat full liquid diets.  Provided the following handouts: Diet Guidelines for Bariatric Surgery, Sources of Protein, Keeping Track of Your Fluids, list of recommended vitamin/mineral supplementation after SG surgery and RD contact information.  Patient Understanding: Good  Expected Compliance: Good     Goals:   1. Follow the bariatric post-op diet advancement schedule:  - Bariatric clear liquid diet through post-op day 1 (while in the hospital).  - Bariatric low-fat full liquid diet on post-op days 2 through 13 (2 weeks).  2. Sip on 48-64 oz (or greater) fluids daily, recording intake to help stay on-track.  - Drink at least 2 oz of fluid every 30 min.    Follow-Up: 1 week post-op    Time spent with patient: 45 minutes    Queta Chaney, LUCILLE, LD

## 2018-03-01 NOTE — PROGRESS NOTES
Pre-Bariatric Surgery Note    Kristin Miner    Date: 3/1/2018     RE: Kylie Piper    MR#: [id number]   : 1965   Date of Visit: Mar 1, 2018    REASON FOR VISIT: Preoperative evaluation for possible weight loss surgery    I had the pleasure of seeing your patient, Kylie Austin, in my preoperative bariatric clinic.    As you know, she is morbidly obese and considering weight loss surgery to treat obesity in association with her medical conditions of obesity.  Her consult weight was 271 lbs.  She has lost 19 pounds since her consult weight. She has met her required pre-surgery weight.    Patient has been able to lose 19lbs through diet and increasing activity with stretching and yoga. She has felt better with the weight loss. She has more energy and less pain. She has been using medical marijuana for about 1 years and reports significant improvement in her mental health. She does not think that marijuana affects her appetite. Patient would like to lose weight to keep up with her children and improve her overall health. Her partner had a RYGB in the  and is supportive of Kylie's attempts at weight loss.    Most recent weights:  Wt Readings from Last 4 Encounters:   18 252 lb 14.4 oz   18 265 lb 1.6 oz   17 273 lb   17 272 lb       Please refer to initial consult note from date 10/18/17 for patient's weight history and co-morbidities.    Review of Systems     All other systems reviewed and are negative.      Past Medical History:   Diagnosis Date     Asthma     Resolved in  per pt     Atrial fib/flutter, transient     S/p cardioversion, and formerly on medications but due to intolerance and allergies, not currently on any medications- reports yearly eval with Echo and Ekg?     Chronic pain     LUE pain     Gender identity disorder Started Rx      H/O hypogonadism Gonadectomy 2013     Hyperlipidemia      Hypertension      Psoriasis        Past  "Surgical History:   Procedure Laterality Date     [facility] RE[facility]       COLONOSCOPY  8/8/2013    Procedure: COLONOSCOPY;  COLONSCOPY SCREEN/ SE;  Surgeon: Santino Sun MD;  Location: [facility] OR     COSMETIC SURGERY       MANDIBLE SURGERY  1986     ORCHIECTOMY INGUINAL BILATERAL  7/16/2013    Procedure: ORCHIECTOMY INGUINAL BILATERAL;  Bilateral Simple Inguinal Orchiectomy ;  Surgeon: Jan Garrett MD;  Location: [facility] OR     TONSILLECTOMY         Current Outpatient Prescriptions   Medication     naltrexone (DEPADE;REVIA) 50 MG tablet     triamcinolone (KENALOG) 0.1 % ointment     furosemide (LASIX) 20 MG tablet     ARIPiprazole (ABILIFY) 5 MG tablet     simvastatin (ZOCOR) 20 MG tablet     topiramate (TOPAMAX) 200 MG tablet     medical cannabis inhalation (Patient's own supply.  Not a prescription)     medical cannabis (Patient's own supply.  Not a prescription)     estradiol (ESTRACE) 2 MG tablet     VITAMIN D, CHOLECALCIFEROL, PO     ferrous sulfate (IRON) 325 (65 FE) MG tablet     Cyanocobalamin (VITAMIN B 12 PO)     multivitamin, therapeutic with minerals (MULTI-VITAMIN) TABS     LAMOTRIGINE PO     LORAZEPAM PO     traZODone (DESYREL) 100 MG tablet     venlafaxine (EFFEXOR-XR) 150 MG 24 hr capsule     No current facility-administered medications for this visit.        Allergies   Allergen Reactions     Amiodarone      Caused pain fatigue/amplified anxiety and depression     Fluoxetine Other (See Comments)     Night terrors     Tetracycline      Teeth rattle/saliva acidic       PHYSICAL EXAMINATION:  /74  Pulse 73  Temp 98.3  F (36.8  C) (Oral)  Ht 5' 9\"  Wt 252 lb 14.4 oz  SpO2 95%  BMI 37.35 kg/m2   Body mass index is 37.35 kg/(m^2).   NAD NCAT  Respiratory: breathing unlabored  Abdomen: obese, soft/nt/nd    Special testing: not indicated    She reviewed the choice of surgery and she would like to undergo laparoscopic sleeve gastrectomy surgery.    Rreviewed the risks " of surgery related to the procedure.    Sleeve Gastrectomy: Risks and Side Effects    The complications or risks of surgery include but are not limited to: death, heart attack, infection in the surgical site (wound infection), abdomen (abscess), bladder (urinary tract infection), lungs (pneumonia), clots in legs (deep vein thrombosis) or lungs (pulmonary emboli),  injury to the bowels or other organs, bowel obstruction, hernia at the incision and gastrointestional bleeding.    More specific risks related to vertical sleeve gastrectomy were detailed at the bariatric informational seminar and include the following: leak at the vertical sleeve staple line, leak at the anastomoses,  nausea, vomiting, and dehydration for several months,  adhesions causing bowel obstruction, rapid weight loss causing a higher rate of gallstone formation during the first 6 months after surgery, decreased absorption of vitamins and protein because of the reduced stomach size, weight regain if inappropriate food intake occurs, stricture, injury to other organs, hernia,  and ulcers.       Side effects of bariatric surgery include but are not limited to: abdominal pain, cramping, bloating, constipation, nausea, vomiting, diarrhea, difficulty swallowing,  dehydration, hair loss, excess skin, protein, iron and vitamin deficiencies, heartburn, transfer of addictions, increased anxiety and worsening depression.       I emphasized exercise and activity behavior along with appropriate food choice as the main foundation for weight loss with surgery providing surgical reinforcement of the appropriate behavior set.    PLAN:  - Will discuss patient's medical marijuana use at the Weight Management Team Meeting but this will not preclude her from proceeding with prior authorization and scheduling of lap sleeve gastrectomy.  I have asked my team to proceed with scheduling surgery.  - Pre op teaching done  - schedule for lap sleeve gastrectomy after prior  authorization.    Updated task list given to patient:    Review of general surgery weight loss process    1. Complete preoperative requirements, including weight loss.  Final weight check to confirm MANDATORY weight loss requirement must be documented on a clinic scale.    2. Discuss prior authorization with .    3. History and physical evaluation by PCP of PAC clinic within 30 days of surgery date, preoperative class, and weight check (weigh-in visit) to be scheduled by patient.  Pre-anesthesia clinic for risk evaluation to be scheduled by anesthesia clinic.    4. We cannot guarantee that patient will qualify for surgery unless all preoperative requirements are met, prior authorization from primary insurance company is granted, and insurance changes do not occur.    5. It is possible for patients to regain all weight after weight loss surgery unless they follow guidelines prescribed by our bariatric center.    6. All patients with gastrointestinal complaints after weight loss surgery must have complaints conveyed to the bariatric team for appropriate treatment.    7. Vitamin deficiencies may develop post-bariatric surgery and annual laboratory testing should be performed.    8. Persistent nausea/vomiting after bariatric surgery entails risk of thiamine deficiency and should be treated early.  Vitamin B12 deficiency may develop, especially after gastric bypass surgery and must be recognized.      If you have any questions about our plans please don't hesitate to contact me.    Sincerely,    Jani Shah MD  Surgery  [phone] (hospital )  [phone] (clinic nurses)

## 2018-03-01 NOTE — PATIENT INSTRUCTIONS
It was a pleasure meeting with you today.     Thank you for allowing us the privilege of caring for you. We hope we provided you with the excellent service you deserve.     Please let us know if there is anything else we can do for you so that we can be sure you are leaving completely satisfied with your care experience.      You saw Dr Shah today.     Instructions per today's visit:     1) call leonard for surgery/Pac date and time      To schedule appointments with our team, please call [phone] option #1    Please call during clinic hours Monday through Friday 8:00a - 4:00p if you have questions or you can contact us via [employer] at anytime.      Nurses: [phone] Option # 3 for nurse advice line.  Fax: [phone]  Surgery Scheduler: [phone]    Please call the hospital at [phone] to speak with our on call MDs if you have urgent needs after hours, during weekends, or holidays.    *For patients who are currently enrolled in our program and have not yet had weight loss surgery please note*:    You must be at your required goal weight at the time of your Anesthesia clinic visit as well as the day of surgery or your surgery will be cancelled. You will not be able to obtain a new surgery date until you have met your goal weight.    Weight loss medications before and after surgery protocol:    Adipex (phentermine): Stop two days before surgery. Do not restart after surgery. May reconsider restarting as needed in the future.    Topimax (topiramate): Can take right up to surgery including morning of surgery and restart post op day #1.    Qsymia (phentermine/topiramate): Hold morning of surgery. Restart after surgery post op day #1    Contrave (bupropion/naltrxone): Fast taper before surgery. 1 tablet twice daily for 1 week and then discontinue 4 days before surgery.  Will reconsider restarting at postop appt once no longer taking narcotic pain meds.  Will slowly ramp up again.    Naltrexone: Stop 4 days  before surgery.  Will reconsider starting again at postop appts when no longer taking narcotic pain meds.    Belviq (locaserin): Stop 2 days before surgery and restart immediately after surgery    Victoza(liraglutide)/Saxenda(liraglutide 3mg)/Trulicity (dulaglutide) (Any GLP-1): Can take up to surgery date and restart immediately after surgery.    Main follow-up parameters for all bariatric patients     Patients who have undergone Bariatric surgery:    1. Follow-up interval during the first year: ~1 week, 1 month, 3 month, 6 month,12 months.  Every 6 months until 5 years; and then annually thereafter.  The goal of follow-up sessions is to ensure that food intake behavior (choice of food and eating speed) and exercise/activity level are set appropriately.    2. Yearly lab tests ordered by our clinic.      3. The bariatric team should be aware and potentially evaluate all adverse gastrointestinal symptoms (dysphagia, abdominal pain, nausea, vomiting, diarrhea, heartburn, reflux, etc), which can be a sign of complication.  The bariatric surgeons perform general surgery procedures in addition to bariatric surgery (laparoscopic cholecystectomy, etc.)    4. Inability to tolerate textured food (chicken, steak, fish, eggs, beans) is NOT normal and may need to be evaluated by endoscopy performed by the bariatric surgeon.

## 2018-03-01 NOTE — MR AVS SNAPSHOT
After Visit Summary   3/1/2018    Kylie Piper    MRN: [id number]           Patient Information     Date Of Birth          1965        Visit Information        Provider Department      3/1/2018 11:40 AM Jani Shah MD [facility] Surgical Weight Management        Care Instructions    It was a pleasure meeting with you today.     Thank you for allowing us the privilege of caring for you. We hope we provided you with the excellent service you deserve.     Please let us know if there is anything else we can do for you so that we can be sure you are leaving completely satisfied with your care experience.      You saw Dr Shah today.     Instructions per today's visit:     1) call leonard for surgery/Pac date and time      To schedule appointments with our team, please call [phone] option #1    Please call during clinic hours Monday through Friday 8:00a - 4:00p if you have questions or you can contact us via [employer] at anytime.      Nurses: [phone] Option # 3 for nurse advice line.  Fax: [phone]  Surgery Scheduler: [phone]    Please call the hospital at [phone] to speak with our on call MDs if you have urgent needs after hours, during weekends, or holidays.    *For patients who are currently enrolled in our program and have not yet had weight loss surgery please note*:    You must be at your required goal weight at the time of your Anesthesia clinic visit as well as the day of surgery or your surgery will be cancelled. You will not be able to obtain a new surgery date until you have met your goal weight.    Weight loss medications before and after surgery protocol:    Adipex (phentermine): Stop two days before surgery. Do not restart after surgery. May reconsider restarting as needed in the future.    Topimax (topiramate): Can take right up to surgery including morning of surgery and restart post op day #1.    Qsymia (phentermine/topiramate): Hold morning of  surgery. Restart after surgery post op day #1    Contrave (bupropion/naltrxone): Fast taper before surgery. 1 tablet twice daily for 1 week and then discontinue 4 days before surgery.  Will reconsider restarting at postop appt once no longer taking narcotic pain meds.  Will slowly ramp up again.    Naltrexone: Stop 4 days before surgery.  Will reconsider starting again at postop appts when no longer taking narcotic pain meds.    Belviq (locaserin): Stop 2 days before surgery and restart immediately after surgery    Victoza(liraglutide)/Saxenda(liraglutide 3mg)/Trulicity (dulaglutide) (Any GLP-1): Can take up to surgery date and restart immediately after surgery.    Main follow-up parameters for all bariatric patients     Patients who have undergone Bariatric surgery:    1. Follow-up interval during the first year: ~1 week, 1 month, 3 month, 6 month,12 months.  Every 6 months until 5 years; and then annually thereafter.  The goal of follow-up sessions is to ensure that food intake behavior (choice of food and eating speed) and exercise/activity level are set appropriately.    2. Yearly lab tests ordered by our clinic.      3. The bariatric team should be aware and potentially evaluate all adverse gastrointestinal symptoms (dysphagia, abdominal pain, nausea, vomiting, diarrhea, heartburn, reflux, etc), which can be a sign of complication.  The bariatric surgeons perform general surgery procedures in addition to bariatric surgery (laparoscopic cholecystectomy, etc.)    4. Inability to tolerate textured food (chicken, steak, fish, eggs, beans) is NOT normal and may need to be evaluated by endoscopy performed by the bariatric surgeon.                        Follow-ups after your visit        Your next 10 appointments already scheduled     Apr 10, 2018  3:00 PM CDT   Return Visit with Stanislav Rayo MD   [facility] [place] ([facility] [place])    [address]  [place] [address]  "  [phone]            Jun 11, 2018 10:30 AM CDT   (Arrive by 10:15 AM)   Return Visit with Minh Mancilla MD   [facility] Medical [facility] Management ([facility])    [address] [address] [phone]              Who to contact     Please call your clinic at [phone] to:    Ask questions about your health    Make or cancel appointments    Discuss your medicines    Learn about your test results    Speak to your doctor            Additional Information About Your Visit        [employer] Information     [employer] gives you secure access to your electronic health record. If you see a primary care provider, you can also send messages to your care team and make appointments. If you have questions, please call your primary care clinic.  If you do not have a primary care provider, please call [phone] and they will assist you.      [employer] is an electronic gateway that provides easy, online access to your medical records. With [employer], you can request a clinic appointment, read your test results, renew a prescription or communicate with your care team.     To access your existing account, please contact your [facility] or call [phone] for assistance.        Care EveryWhere ID     This is your Care EveryWhere ID. This could be used by other organizations to access your [facility] medical records  [id number]        Your Vitals Were     Pulse Temperature Height Pulse Oximetry BMI (Body Mass Index)       73 98.3  F (36.8  C) (Oral) 5' 9\" 95% 37.35 kg/m2        Blood Pressure from Last 3 Encounters:   03/01/18 126/74   01/22/18 128/78   01/17/18 124/85    Weight from Last 3 Encounters:   03/01/18 252 lb 14.4 oz   01/22/18 265 lb 1.6 oz   11/13/17 273 lb              Today, you had the following     No orders found for display         Today's Medication Changes          These changes are accurate as of 3/1/18 12:08 " PM.  If you have any questions, ask your nurse or doctor.               These medicines have changed or have updated prescriptions.        Dose/Directions    traZODone 100 MG tablet   Commonly known as:  DESYREL   This may have changed:  how much to take   Used for:  Insomnia, unspecified insomnia        Dose:  100 mg   Take 1 tablet (100 mg) by mouth nightly as needed for sleep   Quantity:  30 tablet   Refills:  1                Primary Care Provider Office Phone # Fax #    Kristin Miner PA-C [phone]       [address]  [place] [address]        Equal Access to Services     [facility]: Hadii aad ku hadasho Soomaali, waaxda luqadaha, qaybta kaalmada adejessicayada, riley yuan . So [facility] [phone].    ATENCIÓN: Si habla español, tiene a wheeler disposición servicios gratuitos de asistencia lingüística. [facility] [phone].    We comply with applicable federal civil rights laws and [place] laws. We do not discriminate on the basis of race, color, national origin, age, disability, sex, sexual orientation, or gender identity.            Thank you!     Thank you for choosing [facility] SURGICAL WEIGHT MANAGEMENT  for your care. Our goal is always to provide you with excellent care. Hearing back from our patients is one way we can continue to improve our services. Please take a few minutes to complete the written survey that you may receive in the mail after your visit with us. Thank you!             Your Updated Medication List - Protect others around you: Learn how to safely use, store and throw away your medicines at www.disposemymeds.org.          This list is accurate as of 3/1/18 12:08 PM.  Always use your most recent med list.                   Brand Name Dispense Instructions for use Diagnosis    ABILIFY 5 MG tablet   Generic drug:  ARIPiprazole      Take 5 mg by mouth daily        estradiol 2 MG tablet    ESTRACE    90 tablet    1 tablet daily    Gender  identity disorder       ferrous sulfate 325 (65 FE) MG tablet    IRON     Take by mouth daily as needed        furosemide 20 MG tablet    LASIX    90 tablet    Take 1 tablet (20 mg) by mouth daily - DUE FOR LABS    Leg swelling       LAMOTRIGINE PO      Take 150 mg by mouth daily        LORAZEPAM PO      Take 0.5 mg by mouth every 2 hours as needed for anxiety        * medical cannabis inhalation (Patient's own supply.  Not a prescription)      Inhale into the lungs 2 times daily (This is NOT a prescription, and does not certify that the patient has a qualifying medical condition for medical cannabis.  The purpose of this order is  to document that the patient reports taking medical cannabis.)  MORNING AND AT NIGHT        * medical cannabis (Patient's own supply.  Not a prescription)      1 capsule 2 times daily (This is NOT a prescription, and does not certify that the patient has a qualifying medical condition for medical cannabis.  The purpose of this order is  to document that the patient reports taking medical cannabis.)  Morning and afternoon        Multi-vitamin Tabs tablet      Take 1 tablet by mouth daily        naltrexone 50 MG tablet    DEPADE;REVIA    30 tablet    Take 1/2 tablet.  Time it one to two hours prior to worst cravings.  Then increase to one full tablet as instructed.    Morbid obesity (H)       simvastatin 20 MG tablet    ZOCOR    90 tablet    Take 1 tablet (20 mg) by mouth At Bedtime    Hyperlipidemia LDL goal <130       topiramate 200 MG tablet    TOPAMAX    60 tablet    Take 1 tablet (200 mg) by mouth 2 times daily    Chronic pain disorder, Fibromyalgia       traZODone 100 MG tablet    DESYREL    30 tablet    Take 1 tablet (100 mg) by mouth nightly as needed for sleep    Insomnia, unspecified insomnia       triamcinolone 0.1 % ointment    KENALOG    80 g    Apply to AA BID x 3-4 weeks then PRN    Prurigo nodularis       venlafaxine 150 MG 24 hr capsule    EFFEXOR-XR     300 mg         VITAMIN B 12 PO           VITAMIN D (CHOLECALCIFEROL) PO      Take 5,000 Units by mouth daily        * Notice:  This list has 2 medication(s) that are the same as other medications prescribed for you. Read the directions carefully, and ask your doctor or other care provider to review them with you.

## 2018-03-01 NOTE — MR AVS SNAPSHOT
MRN:[id number]                      After Visit Summary   3/1/2018    Kylie Piper    MRN: [id number]           Visit Information        Provider Department      3/1/2018 10:30 AM Judy Chaney RD [facility] Surgical Weight Management        Your next 10 appointments already scheduled     Apr 10, 2018  3:00 PM CDT   Return Visit with Stanislav Rayo MD   [facility] ([facility])    [address] [id number] [phone]            Jun 11, 2018 10:30 AM CDT   (Arrive by 10:15 AM)   Return Visit with Minh Mancilla MD   [facility] Medical [facility] Management ([facility])    [address] [address] [phone]              MyChart Information     [employer] gives you secure access to your electronic health record. If you see a primary care provider, you can also send messages to your care team and make appointments. If you have questions, please call your primary care clinic.  If you do not have a primary care provider, please call [phone] and they will assist you.      [employer] is an electronic gateway that provides easy, online access to your medical records. With [employer], you can request a clinic appointment, read your test results, renew a prescription or communicate with your care team.     To access your existing account, please contact your [facility] or call [phone] for assistance.        Care EveryWhere ID     This is your Care EveryWhere ID. This could be used by other organizations to access your [facility] medical records  [id number]        Equal Access to Services     [facility] : Hadindia marshall Soellie, waaxda luqadaha, qaybta kaalmariley cutler idiniko juarez. So [facility] [phone].    ATENCIÓN: Si habla español, tiene a wheeler disposición servicios gratuitos de asistencia  lingüística. Jay al [phone].    We comply with applicable federal civil rights laws and [place] laws. We do not discriminate on the basis of race, color, national origin, age, disability, sex, sexual orientation, or gender identity.

## 2018-03-01 NOTE — LETTER
3/1/2018       RE: Kylie [place]  [address] [facility] [address]     Dear Colleague,    Thank you for referring your patient, Kylie [place], to the [facility] SURGICAL WEIGHT MANAGEMENT at [facility]. Please see a copy of my visit note below.          Pre-Bariatric Surgery Note    Kristofer Kristin Tariq    Date: 3/1/2018     RE: Kylie Piper    MR#: [id number]   : 1965   Date of Visit: Mar 1, 2018    REASON FOR VISIT: Preoperative evaluation for possible weight loss surgery    I had the pleasure of seeing your patient, Kylie [place], in my preoperative bariatric clinic.    As you know, she is morbidly obese and considering weight loss surgery to treat obesity in association with her medical conditions of obesity.  Her consult weight was 271 lbs.  She has lost 19 pounds since her consult weight. She has met her required pre-surgery weight.    Patient has been able to lose 19lbs through diet and increasing activity with stretching and yoga. She has felt better with the weight loss. She has more energy and less pain. She has been using medical marijuana for about 1 years and reports significant improvement in her mental health. She does not think that marijuana affects her appetite. Patient would like to lose weight to keep up with her children and improve her overall health. Her partner had a RYGB in the s and is supportive of Kylie's attempts at weight loss.    Most recent weights:  Wt Readings from Last 4 Encounters:   18 252 lb 14.4 oz   18 265 lb 1.6 oz   17 273 lb   17 272 lb       Please refer to initial consult note from date 10/18/17 for patient's weight history and co-morbidities.    Review of Systems     All other systems reviewed and are negative.      Past Medical History:   Diagnosis Date     Asthma     Resolved in  per pt     Atrial fib/flutter, transient     S/p cardioversion,  "and formerly on medications but due to intolerance and allergies, not currently on any medications- reports yearly eval with Echo and Ekg?     Chronic pain     LUE pain     Gender identity disorder Started Rx 2012     H/O hypogonadism Gonadectomy 2013     Hyperlipidemia      Hypertension      Psoriasis        Past Surgical History:   Procedure Laterality Date     [facility] REFERR[facility]       COLONOSCOPY  8/8/2013    Procedure: COLONOSCOPY;  COLONSCOPY SCREEN/ SE;  Surgeon: Santino Sun MD;  Location: [facility] OR     COSMETIC SURGERY       MANDIBLE SURGERY  1986     ORCHIECTOMY INGUINAL BILATERAL  7/16/2013    Procedure: ORCHIECTOMY INGUINAL BILATERAL;  Bilateral Simple Inguinal Orchiectomy ;  Surgeon: Jan Garrett MD;  Location: [facility] OR     TONSILLECTOMY         Current Outpatient Prescriptions   Medication     naltrexone (DEPADE;REVIA) 50 MG tablet     triamcinolone (KENALOG) 0.1 % ointment     furosemide (LASIX) 20 MG tablet     ARIPiprazole (ABILIFY) 5 MG tablet     simvastatin (ZOCOR) 20 MG tablet     topiramate (TOPAMAX) 200 MG tablet     medical cannabis inhalation (Patient's own supply.  Not a prescription)     medical cannabis (Patient's own supply.  Not a prescription)     estradiol (ESTRACE) 2 MG tablet     VITAMIN D, CHOLECALCIFEROL, PO     ferrous sulfate (IRON) 325 (65 FE) MG tablet     Cyanocobalamin (VITAMIN B 12 PO)     multivitamin, therapeutic with minerals (MULTI-VITAMIN) TABS     LAMOTRIGINE PO     LORAZEPAM PO     traZODone (DESYREL) 100 MG tablet     venlafaxine (EFFEXOR-XR) 150 MG 24 hr capsule     No current facility-administered medications for this visit.        Allergies   Allergen Reactions     Amiodarone      Caused pain fatigue/amplified anxiety and depression     Fluoxetine Other (See Comments)     Night terrors     Tetracycline      Teeth rattle/saliva acidic       PHYSICAL EXAMINATION:  /74  Pulse 73  Temp 98.3  F (36.8  C) (Oral)  Ht 5' 9\"  Wt 252 " lb 14.4 oz  SpO2 95%  BMI 37.35 kg/m2   Body mass index is 37.35 kg/(m^2).   NAD NCAT  Respiratory: breathing unlabored  Abdomen: obese, soft/nt/nd    Special testing: not indicated    She reviewed the choice of surgery and she would like to undergo laparoscopic sleeve gastrectomy surgery.    Rreviewed the risks of surgery related to the procedure.    Sleeve Gastrectomy: Risks and Side Effects    The complications or risks of surgery include but are not limited to: death, heart attack, infection in the surgical site (wound infection), abdomen (abscess), bladder (urinary tract infection), lungs (pneumonia), clots in legs (deep vein thrombosis) or lungs (pulmonary emboli),  injury to the bowels or other organs, bowel obstruction, hernia at the incision and gastrointestional bleeding.    More specific risks related to vertical sleeve gastrectomy were detailed at the bariatric informational seminar and include the following: leak at the vertical sleeve staple line, leak at the anastomoses,  nausea, vomiting, and dehydration for several months,  adhesions causing bowel obstruction, rapid weight loss causing a higher rate of gallstone formation during the first 6 months after surgery, decreased absorption of vitamins and protein because of the reduced stomach size, weight regain if inappropriate food intake occurs, stricture, injury to other organs, hernia,  and ulcers.       Side effects of bariatric surgery include but are not limited to: abdominal pain, cramping, bloating, constipation, nausea, vomiting, diarrhea, difficulty swallowing,  dehydration, hair loss, excess skin, protein, iron and vitamin deficiencies, heartburn, transfer of addictions, increased anxiety and worsening depression.       I emphasized exercise and activity behavior along with appropriate food choice as the main foundation for weight loss with surgery providing surgical reinforcement of the appropriate behavior set.    PLAN:  - Will discuss  patient's medical marijuana use at the Weight Management Team Meeting but this will not preclude her from proceeding with prior authorization and scheduling of lap sleeve gastrectomy.  I have asked my team to proceed with scheduling surgery.  - Pre op teaching done  - schedule for lap sleeve gastrectomy after prior authorization.    Updated task list given to patient:    Review of general surgery weight loss process    1. Complete preoperative requirements, including weight loss.  Final weight check to confirm MANDATORY weight loss requirement must be documented on a clinic scale.    2. Discuss prior authorization with .    3. History and physical evaluation by PCP of PAC clinic within 30 days of surgery date, preoperative class, and weight check (weigh-in visit) to be scheduled by patient.  Pre-anesthesia clinic for risk evaluation to be scheduled by anesthesia clinic.    4. We cannot guarantee that patient will qualify for surgery unless all preoperative requirements are met, prior authorization from primary insurance company is granted, and insurance changes do not occur.    5. It is possible for patients to regain all weight after weight loss surgery unless they follow guidelines prescribed by our bariatric center.    6. All patients with gastrointestinal complaints after weight loss surgery must have complaints conveyed to the bariatric team for appropriate treatment.    7. Vitamin deficiencies may develop post-bariatric surgery and annual laboratory testing should be performed.    8. Persistent nausea/vomiting after bariatric surgery entails risk of thiamine deficiency and should be treated early.  Vitamin B12 deficiency may develop, especially after gastric bypass surgery and must be recognized.      If you have any questions about our plans please don't hesitate to contact me.      Again, thank you for allowing me to participate in the care of your patient.      Sincerely,    Jani FALLON  MD Alyssa

## 2018-03-01 NOTE — NURSING NOTE
This patient is having sleeve by Dr. Shah.    The following handouts were reviewed with the patient :  Before Your Surgery, Patient Checklist, Weight Loss Surgery Pre-operative Class, Preop Recommendations Quick Reference Guide, History and Physical, Medications to Avoid, Shower or Bathing Before Surgery, Bowel Preparation, Powerful Choices and [place] Advance Health Care Directive.  Questions were addressed and understanding of content was verbalized.  Contact information was provided.    Patient goal weight: 261  Weight today: 252    Advised to call leonard for surgery and pac appt.

## 2018-03-01 NOTE — NURSING NOTE
"(   Chief Complaint   Patient presents with     RECHECK     Needs pre op teaching    )    ( Weight: 252 lb 14.4 oz )  ( Height: 5' 9\" )  ( BMI (Calculated): 37.42 )  ( Initial Weight: 268 lb 11.2 oz )  ( Cumulative weight loss (lbs): 15.8 )  ( Last Visits Weight: 271 lb 4.8 oz )  ( Wt change since last visit (lbs): -18.4 )  (   )  (   )    ( BP: 126/74 )  (   )  ( Temp: 98.3  F (36.8  C) )  ( Temp src: Oral )  ( Pulse: 73 )  (   )  ( SpO2: 95 % )    (   Patient Active Problem List   Diagnosis     Gender identity disorder     Hyperlipidemia LDL goal <130     Abnormal results of liver function studies     Chronic pain disorder     Insomnia     Bipolar 2 disorder (H)     Morbid obesity (H)     H/O hypogonadism     Fibromyalgia     Glenohumeral arthritis     Vitamin D deficiency     Primary osteoarthritis of left shoulder     Anxiety     Cervical spondylosis without myelopathy     Chronic fatigue    )  (   Current Outpatient Prescriptions   Medication Sig Dispense Refill     naltrexone (DEPADE;REVIA) 50 MG tablet Take 1/2 tablet.  Time it one to two hours prior to worst cravings.  Then increase to one full tablet as instructed. 30 tablet 5     triamcinolone (KENALOG) 0.1 % ointment Apply to AA BID x 3-4 weeks then PRN 80 g 3     furosemide (LASIX) 20 MG tablet Take 1 tablet (20 mg) by mouth daily - DUE FOR LABS 90 tablet 3     ARIPiprazole (ABILIFY) 5 MG tablet Take 5 mg by mouth daily       simvastatin (ZOCOR) 20 MG tablet Take 1 tablet (20 mg) by mouth At Bedtime 90 tablet 3     topiramate (TOPAMAX) 200 MG tablet Take 1 tablet (200 mg) by mouth 2 times daily 60 tablet 0     medical cannabis inhalation (Patient's own supply.  Not a prescription) Inhale into the lungs 2 times daily (This is NOT a prescription, and does not certify that the patient has a qualifying medical condition for medical cannabis.  The purpose of this order is  to document that the patient reports taking medical cannabis.)    MORNING AND AT NIGHT  "      medical cannabis (Patient's own supply.  Not a prescription) 1 capsule 2 times daily (This is NOT a prescription, and does not certify that the patient has a qualifying medical condition for medical cannabis.  The purpose of this order is  to document that the patient reports taking medical cannabis.)    Morning and afternoon       estradiol (ESTRACE) 2 MG tablet 1 tablet daily 90 tablet 3     VITAMIN D, CHOLECALCIFEROL, PO Take 5,000 Units by mouth daily       ferrous sulfate (IRON) 325 (65 FE) MG tablet Take by mouth daily as needed        Cyanocobalamin (VITAMIN B 12 PO)        multivitamin, therapeutic with minerals (MULTI-VITAMIN) TABS Take 1 tablet by mouth daily       LAMOTRIGINE PO Take 150 mg by mouth daily        LORAZEPAM PO Take 0.5 mg by mouth every 2 hours as needed for anxiety       traZODone (DESYREL) 100 MG tablet Take 1 tablet (100 mg) by mouth nightly as needed for sleep (Patient taking differently: Take 50 mg by mouth nightly as needed for sleep ) 30 tablet 1     venlafaxine (EFFEXOR-XR) 150 MG 24 hr capsule 300 mg   0    )  ( Diabetes Eval:    )    ( Pain Eval:  Data Unavailable )    ( Wound Eval:       )    (   History   Smoking Status     Never Smoker   Smokeless Tobacco     Never Used    )    ( Signed By:  Haja Tong; March 1, 2018; 10:18 AM )

## 2018-03-19 ENCOUNTER — ANESTHESIA EVENT (OUTPATIENT)
Dept: SURGERY | Facility: CLINIC | Age: 53
DRG: 621 | End: 2018-03-19
Payer: MEDICARE

## 2018-03-19 ENCOUNTER — OFFICE VISIT (OUTPATIENT)
Dept: SURGERY | Facility: CLINIC | Age: 53
End: 2018-03-19
Payer: MEDICARE

## 2018-03-19 ENCOUNTER — ALLIED HEALTH/NURSE VISIT (OUTPATIENT)
Dept: SURGERY | Facility: CLINIC | Age: 53
End: 2018-03-19
Payer: MEDICARE

## 2018-03-19 ENCOUNTER — APPOINTMENT (OUTPATIENT)
Dept: SURGERY | Facility: CLINIC | Age: 53
End: 2018-03-19
Payer: MEDICARE

## 2018-03-19 VITALS
TEMPERATURE: 98 F | DIASTOLIC BLOOD PRESSURE: 84 MMHG | RESPIRATION RATE: 18 BRPM | HEIGHT: 69 IN | OXYGEN SATURATION: 97 % | SYSTOLIC BLOOD PRESSURE: 124 MMHG | HEART RATE: 88 BPM | WEIGHT: 252.2 LBS | BODY MASS INDEX: 37.36 KG/M2

## 2018-03-19 DIAGNOSIS — Z01.818 PREOP EXAMINATION: Primary | ICD-10-CM

## 2018-03-19 DIAGNOSIS — Z01.818 PREOP GENERAL PHYSICAL EXAM: Primary | ICD-10-CM

## 2018-03-19 DIAGNOSIS — Z01.818 PREOP GENERAL PHYSICAL EXAM: ICD-10-CM

## 2018-03-19 LAB
ALBUMIN UR-MCNC: NEGATIVE MG/DL
ANION GAP SERPL CALCULATED.3IONS-SCNC: 8 MMOL/L (ref 3–14)
APPEARANCE UR: CLEAR
BILIRUB UR QL STRIP: NEGATIVE
BUN SERPL-MCNC: 18 MG/DL (ref 7–30)
CALCIUM SERPL-MCNC: 9 MG/DL (ref 8.5–10.1)
CHLORIDE SERPL-SCNC: 102 MMOL/L (ref 94–109)
CO2 SERPL-SCNC: 27 MMOL/L (ref 20–32)
COLOR UR AUTO: YELLOW
CREAT SERPL-MCNC: 0.88 MG/DL (ref 0.52–1.04)
ERYTHROCYTE [DISTWIDTH] IN BLOOD BY AUTOMATED COUNT: 14 % (ref 10–15)
GFR SERPL CREATININE-BSD FRML MDRD: 67 ML/MIN/1.7M2
GLUCOSE SERPL-MCNC: 98 MG/DL (ref 70–99)
GLUCOSE UR STRIP-MCNC: NEGATIVE MG/DL
HCT VFR BLD AUTO: 45.9 % (ref 35–47)
HGB BLD-MCNC: 15 G/DL (ref 11.7–15.7)
HGB UR QL STRIP: NEGATIVE
KETONES UR STRIP-MCNC: NEGATIVE MG/DL
LEUKOCYTE ESTERASE UR QL STRIP: NEGATIVE
MCH RBC QN AUTO: 28.4 PG (ref 26.5–33)
MCHC RBC AUTO-ENTMCNC: 32.7 G/DL (ref 31.5–36.5)
MCV RBC AUTO: 87 FL (ref 78–100)
NITRATE UR QL: NEGATIVE
PH UR STRIP: 5 PH (ref 5–7)
PLATELET # BLD AUTO: 356 10E9/L (ref 150–450)
POTASSIUM SERPL-SCNC: 3.8 MMOL/L (ref 3.4–5.3)
RBC # BLD AUTO: 5.28 10E12/L (ref 3.8–5.2)
SODIUM SERPL-SCNC: 137 MMOL/L (ref 133–144)
SOURCE: NORMAL
SP GR UR STRIP: 1.01 (ref 1–1.03)
UROBILINOGEN UR STRIP-MCNC: 0 MG/DL (ref 0–2)
WBC # BLD AUTO: 11.2 10E9/L (ref 4–11)

## 2018-03-19 NOTE — H&P
Pre-Operative H & P     CC:  Preoperative exam to assess for increased cardiopulmonary risk while undergoing surgery and anesthesia.    Reason for visit:  Preop general physical exam  Morbid obesity       Date of Encounter: 3/19/2018  Primary Care Physician:  Kristin Miner    DAVID Nunes is a 53 yo female scheduled for Laparoscopic Sleeve Gastrectomy on 4/10/2018 by Dr. Shah. PAC referral by Dr. Shah for assessment and optimization of anesthesia. Previous anesthesia without complications.  She is morbidly obese and considering weight loss surgery to treat obesity in association with her medical conditions of obesity.  Her consult weight was 271 lbs.  She has lost 19 pounds since her consult weight.   1) Cardiac: HTN, well managed with Lasix. EKG 10/26/2016 NSR. Negative stress test 2014. METS well over 4 without cardiac symptoms. History of atrial fib in 2004, s/p ablation and has been SR since that time.  2) Pulmonary: Never smoked, denies pulmonary symptoms.  3) Psych: Bipolar 2, well manages with current treatment.  4) Endo: BMI 37.35.  5) Other: male to female gender reassignment     She also uses medical marijuana daily        Past Medical History  Past Medical History:   Diagnosis Date     Atrial fib/flutter, transient     S/p cardioversion 2004     Bipolar 2 disorder (H)      Chronic pain     LUE pain     Fibromyalgia      Gender identity disorder Started Rx 2012     H/O hypogonadism Gonadectomy 2013     Hyperlipidemia      Hypertension      Psoriasis        Past Surgical History  Past Surgical History:   Procedure Laterality Date     COLONOSCOPY  8/8/2013    Procedure: COLONOSCOPY;  COLONSCOPY SCREEN/ SE;  Surgeon: Santino Sun MD;  Location: [facility] OR     gender reasignement       MANDIBLE SURGERY  1986     ORCHIECTOMY INGUINAL BILATERAL  7/16/2013    Procedure: ORCHIECTOMY INGUINAL BILATERAL;  Bilateral Simple Inguinal Orchiectomy ;  Surgeon: Jan Garrett MD;   Location: [facility] OR     TONSILLECTOMY         Hx of Blood transfusions/reactions: none    Hx of abnormal bleeding or anti-platelet use: none    Menstrual history: No LMP recorded.:     Steroid use in the last year: none    Personal or FH with difficulty with Anesthesia:  None        Prior to Admission Medications  Current Outpatient Prescriptions   Medication Sig Dispense Refill     triamcinolone (KENALOG) 0.1 % ointment Apply to AA BID x 3-4 weeks then PRN 80 g 3     furosemide (LASIX) 20 MG tablet Take 1 tablet (20 mg) by mouth daily - DUE FOR LABS (Patient taking differently: Take 20 mg by mouth every morning - DUE FOR LABS) 90 tablet 3     ARIPiprazole (ABILIFY) 5 MG tablet Take 5 mg by mouth every morning        simvastatin (ZOCOR) 20 MG tablet Take 1 tablet (20 mg) by mouth At Bedtime 90 tablet 3     topiramate (TOPAMAX) 200 MG tablet Take 1 tablet (200 mg) by mouth 2 times daily 60 tablet 0     medical cannabis inhalation (Patient's own supply.  Not a prescription) Inhale into the lungs 2 times daily as needed (This is NOT a prescription, and does not certify that the patient has a qualifying medical condition for medical cannabis.  The purpose of this order is  to document that the patient reports taking medical cannabis.)    MORNING AND AT NIGHT        medical cannabis (Patient's own supply.  Not a prescription) 2 capsules 2 times daily (This is NOT a prescription, and does not certify that the patient has a qualifying medical condition for medical cannabis.  The purpose of this order is  to document that the patient reports taking medical cannabis.)    Morning and afternoon        estradiol (ESTRACE) 2 MG tablet 1 tablet daily (Patient taking differently: Take 2 mg by mouth every morning 1 tablet daily) 90 tablet 3     VITAMIN D, CHOLECALCIFEROL, PO Take 5,000 Units by mouth every morning        Cyanocobalamin (VITAMIN B 12 PO) Take 1,000 mcg by mouth every morning        multivitamin, therapeutic with  minerals (MULTI-VITAMIN) TABS Take 1 tablet by mouth every morning        LAMOTRIGINE PO Take 150 mg by mouth every morning        traZODone (DESYREL) 100 MG tablet Take 1 tablet (100 mg) by mouth nightly as needed for sleep (Patient taking differently: Take 100 mg by mouth At Bedtime ) 30 tablet 1     venlafaxine (EFFEXOR-XR) 150 MG 24 hr capsule Take 300 mg by mouth every morning   0     naltrexone (DEPADE;REVIA) 50 MG tablet Take 1/2 tablet.  Time it one to two hours prior to worst cravings.  Then increase to one full tablet as instructed. 30 tablet 5       Allergies  Allergies   Allergen Reactions     Amiodarone      Caused pain fatigue/amplified anxiety and depression     Fluoxetine Other (See Comments)     Night terrors     Tetracycline      Teeth rattle/saliva acidic       Social History  Social History     Social History     Marital status:      Spouse name: N/A     Number of children: N/A     Years of education: N/A     Occupational History     Not on file.     Social History Main Topics     Smoking status: Never Smoker     Smokeless tobacco: Never Used     Alcohol use Yes      Comment: occasional//2 per month     Drug use: Yes     Special: Marijuana      Comment: medical marijuana, daily     Sexual activity: Yes     Partners: Female     Other Topics Concern     Parent/Sibling W/ Cabg, Mi Or Angioplasty Before 65f 55m? Yes     father     Social History Narrative       Family History  Family History   Problem Relation Age of Onset     CANCER Father      skin     DIABETES Father      HEART DISEASE Father 55     scd     HEART DISEASE Other      Alzheimer Disease Paternal Grandmother      DIABETES Sister            Anesthesia Evaluation     . Pt has had prior anesthetic. Type: General and MAC           ROS/MED HX    ENT/Pulmonary:  - neg pulmonary ROS     Neurologic:  - neg neurologic ROS     Cardiovascular: Comment: Atrial flutter, s/p ablation in 2005 and has been SR since that time    (+)  "hypertension----. : . . . :. . Previous cardiac testing date:results:date: results:ECG reviewed date:10/26/2016 results:NSR date: results:          METS/Exercise Tolerance:  >4 METS   Hematologic:  - neg hematologic  ROS       Musculoskeletal:   (+) , , other musculoskeletal- chronic knee pain      GI/Hepatic:  - neg GI/hepatic ROS       Renal/Genitourinary:  - ROS Renal section negative       Endo:     (+) Obesity, .      Psychiatric:     (+) psychiatric history bipolar      Infectious Disease:  - neg infectious disease ROS       Malignancy:      - no malignancy   Other:    (+) No chance of pregnancy C-spine cleared: N/A, no H/O Chronic Pain,no other significant disability   - neg other ROS           Physical Exam  Normal systems: cardiovascular, pulmonary and dental    Airway   Mallampati: II  TM distance: >3 FB  Neck ROM: full  Comment: Surgery for mandible many years ago, no issues with mouth opening    Dental   Normal    Cardiovascular   Rhythm and rate: regular and normal      Pulmonary    breath sounds clear to auscultation        The complete review of systems is negative other than noted in the HPI or here.   Temp: 98  F (36.7  C) Temp src: Oral BP: 124/84 Pulse: 88   Resp: 18 SpO2: 97 %         252 lbs 3.2 oz  5' 9\"   Body mass index is 37.24 kg/(m^2).         Physical Exam  Constitutional: Awake, alert, cooperative, no apparent distress, and appears stated age.  Eyes: Pupils equal, round and reactive to light, extra ocular muscles intact, sclera clear, conjunctiva normal.  HENT: Normocephalic, oral pharynx with moist mucus membranes, good dentition. No goiter appreciated.   Respiratory: Clear to auscultation bilaterally, no crackles or wheezing.  Cardiovascular: Regular rate and rhythm, normal S1 and S2, and no murmur noted.  Carotids +2, no bruits. No edema. Palpable pulses to radial  DP and PT arteries.   GI: Normal bowel sounds, soft, non-distended, non-tender, no masses palpated, no " hepatosplenomegaly.    Lymph/Hematologic: No cervical lymphadenopathy and no supraclavicular lymphadenopathy.  Genitourinary:  deferred  Skin: Warm and dry.  No rashes at anticipated surgical site.   Musculoskeletal: Full ROM of neck. There is no redness, warmth, or swelling of the joints. Gross motor strength is normal.    Neurologic: Awake, alert, oriented to name, place and time. Cranial nerves II-XII are grossly intact. Gait is normal.   Neuropsychiatric: Calm, cooperative. Normal affect.     Labs: (personally reviewed)  Results for SARANYA DAVILA (MRN [id number]) as of 3/19/2018 11:29   Ref. Range 3/19/2018 08:51 3/19/2018 10:06   Sodium Latest Ref Range: 133 - 144 mmol/L  137   Potassium Latest Ref Range: 3.4 - 5.3 mmol/L  3.8   Chloride Latest Ref Range: 94 - 109 mmol/L  102   Carbon Dioxide Latest Ref Range: 20 - 32 mmol/L  27   Urea Nitrogen Latest Ref Range: 7 - 30 mg/dL  18   Creatinine Latest Ref Range: 0.52 - 1.04 mg/dL  0.88   GFR Estimate Latest Ref Range: >60 mL/min/1.7m2  67   GFR Estimate If Black Latest Ref Range: >60 mL/min/1.7m2  81   Calcium Latest Ref Range: 8.5 - 10.1 mg/dL  9.0   Anion Gap Latest Ref Range: 3 - 14 mmol/L  8   Glucose Latest Ref Range: 70 - 99 mg/dL  98   WBC Latest Ref Range: 4.0 - 11.0 10e9/L  11.2 (H)   Hemoglobin Latest Ref Range: 11.7 - 15.7 g/dL  15.0   Hematocrit Latest Ref Range: 35.0 - 47.0 %  45.9   Platelet Count Latest Ref Range: 150 - 450 10e9/L  356   RBC Count Latest Ref Range: 3.8 - 5.2 10e12/L  5.28 (H)   MCV Latest Ref Range: 78 - 100 fl  87   MCH Latest Ref Range: 26.5 - 33.0 pg  28.4   MCHC Latest Ref Range: 31.5 - 36.5 g/dL  32.7   RDW Latest Ref Range: 10.0 - 15.0 %  14.0   Color Urine Unknown Yellow    Appearance Urine Unknown Clear    Glucose Urine Latest Ref Range: NEG^Negative mg/dL Negative    Bilirubin Urine Latest Ref Range: NEG^Negative  Negative    Ketones Urine Latest Ref Range: NEG^Negative mg/dL Negative    Specific Gravity Urine  Latest Ref Range: 1.003 - 1.035  1.011    pH Urine Latest Ref Range: 5.0 - 7.0 pH 5.0    Protein Albumin Urine Latest Ref Range: NEG^Negative mg/dL Negative    Urobilinogen mg/dL Latest Ref Range: 0.0 - 2.0 mg/dL 0.0    Nitrite Urine Latest Ref Range: NEG^Negative  Negative    Blood Urine Latest Ref Range: NEG^Negative  Negative    Leukocyte Esterase Urine Latest Ref Range: NEG^Negative  Negative    Source Unknown Midstream Urine        EKG: Personally reviewed but formal cardiology read pending: 10/26/2016 NSR    Stress test: 7/25/2014  Interpretation Summary  NL exercise echo without evidence of CAD or stress induced ischemia. LV   function is normal at rest (LVEF 60-65%) and with exercise (70-75%). There   were no exercise induced wall motion abnormalities. Exercise capacity and   blood pressure response to exericse were normal.      Stress Findings  Maximum workload 175 frost.  Peak MVO2 21.4 ml/kg/min .  Percent predicted  MVO2 63 %.  RPP 24875.  Patient was given 6ml mixture of 1.5ml Definity and 8.5ml saline.  IV start location RAC .  No electrocardiographic evidence of ischemia.     Baseline EKG/Symptoms  Normal baseline electrocardiogram.     Left Ventricle  Ejection Fraction = >55%.  The left ventricle is normal in size.  There is normal left ventricular wall thickness.  No regional wall motion abnormalities noted.      Wall Motion Analysis  Stage 1:  No Wall Motion Abnormalities Defined     Aortic Valve  The aortic valve is normal in structure and function.     Mitral Valve  The mitral valve is normal in structure and function.     Atria  The left atrial size is normal.     Tricuspid Valve  The tricuspid valve is normal in structure and function.     Right Ventricle  The right ventricle is normal in size and function.     Pericardium  There is no pericardial effusion.           ASSESSMENT and PLAN  Kylie Austin is a 52 year old female scheduled to undergo Laparoscopic Sleeve Gastrectomy on  4/10/2018. She has the following specific operative considerations:   - RCRI : Intermedaite serious cardiac risks.  0.9% risk of major adverse cardiac event.   - Anesthesia considerations:  Refer to PAC assessment in anesthesia records  - VTE risk: low risk   - DEVANTE # of risks 2/8 = low risk  - Post-op delirium risk: low risk    - Risk of PONV score = 2.  If > 2, anti-emetic intervention recommended.        I spent 30 minutes with patient, greater than 50% educating on pre-op meds, counseling on anesthesia and coordinating care for gastric sleeve.    Patient was discussed with Dr Duarte.    TALIB Guerrero CNS  Preoperative Assessment Center  [facility]  Phone: [phone]  Fax: [phone]

## 2018-03-19 NOTE — MR AVS SNAPSHOT
After Visit Summary   3/19/2018    Kylie Austin    MRN: [id number]           Patient Information     Date Of Birth          1965        Visit Information        Provider Department      3/19/2018 10:00 AM Rn, [facility]        Care Instructions    Preparing for Your Surgery      Name:  Kylie Austin   MRN:  [id number]   :  1965   Today's Date:  3/19/2018     Arriving for surgery:  Surgery date:  4/10/18  Surgery time:  10:30 am  Arrival time:  8:30 am  Please come to:       [facility] [address]  [address] [place]  [address]    -   parking is available in front of the hospital from 5:15 am to 8:00 pm    -  Stop at the Information Desk in the lobby    -   Inform the information person that you are here for surgery. An escort to 3c will be provided. If you would not like an escort, please proceed to 3C on the 3rd floor. [phone]     What can I eat or drink? Followsurgeons Diet instructions including any bowel prep. In general, for Laparoscopic Gastric Sleeve:  -  Start clear liquid diet 2 days before surgery  -  You may have water until 3 hours prior to your surgery. Stop water by 7:30 am on the morning of surgery.    Which medicines can I take? Follow surgeon's instructions regarding medications but in general: No Aspirin, vitamins or supplements for 7 days before surgery. Stop Naltrexone 4 days before surgery.  -  Do NOT take these medications the day of surgery: Furosemide      -  It is OKAY to take these medications on the morning of surgery if you usually take them in the morning: Abilify, Simvastatin, Topiramate, Estradiol, Lamotrigine, Venlafaxine      How do I prepare myself?  -  Take two showers: one the night before surgery; and one the morning of surgery.         Use Scrubcare or Hibiclens to wash from neck down.  You may use your own shampoo and conditioner. No other hair  products like gel/spray.   -  Do NOT use lotion, powder, deodorant, or antiperspirant the day of your surgery.  -  Do NOT wear any makeup, fingernail polish or jewelry.  -  Begin using Incentive Spirometer 1 week prior to surgery.  Use 4 times per day, up to 5-10 breaths each time.  Bring Incentive Spirometer to hospital.  -Do not bring your own medications to the hospital, except for inhalers.  -  Bring your ID and insurance card.    Questions or Concerns:  If you have questions or concerns, please call the  Preoperative Assessment Center, Monday-Friday 7AM-7PM:  [phone]    AFTER YOUR SURGERY  Breathing exercises   Breathing exercises help you recover faster. Take deep breaths and let the air out slowly. This will:     Help you wake up after surgery.    Help prevent complications like pneumonia.  Preventing complications will help you go home sooner.   We may give you a breathing device (incentive spirometer) to encourage you to breathe deeply.   Nausea and vomiting   You may feel sick to your stomach after surgery; if so, let your nurse know.    Pain control:  After surgery, you may have pain. Our goal is to help you manage your pain. Pain medicine will help you feel comfortable enough to do activities that will help you heal.  These activities may include breathing exercises, walking and physical therapy.   To help your health care team treat your pain we will ask: 1) If you have pain  2) where it is located 3) describe your pain in your words  Methods of pain control include medications given by mouth, vein or by nerve block for some surgeries.  We may give you a pain control pump that will:  1) Deliver the medicine through a tube placed in your vein  2) Control the amount of medicine you receive  3) Allow you to push a button to deliver a dose of pain medicine  Sequential Compression Device (SCD) or Pneumo Boots:  You may need to wear SCD S on your legs or feet. These are wraps connected to a machine that  pumps in air and releases it. The repeated pumping helps prevent blood clots from forming.                     Follow-ups after your visit        Your next 10 appointments already scheduled     Mar 19, 2018 10:00 AM CDT   (Arrive by 9:45 AM)   PAC RN ASSESSMENT with Virginia Pac Rn   [facility] ([facility])    [address] [place] [address]  [address] [facility] [address]   [phone]            Mar 19, 2018 10:20 AM CDT   (Arrive by 10:05 AM)   PAC Anesthesia Consult with Virginia Pac Anesthesiologist   [facility] ([facility])    [address] [place] [address]  [address] [facility] [address]   [phone]            Mar 19, 2018 10:45 AM CDT   LAB with [place] LAB   [facility] ([facility])    [address] [place] [id number] [phone]           Please do not eat 10-12 hours before your appointment if you are coming in fasting for labs on lipids, cholesterol, or glucose (sugar). This does not apply to pregnant women. Water, hot tea and black coffee (with nothing added) are okay. Do not drink other fluids, diet soda or chew gum.            Apr 10, 2018   Procedure with Jani Shah MD   [facility] [place], Same Day Surgery (--)    [address] [facility] [address]   [phone]            Apr 10, 2018  3:00 PM CDT   Return Visit with Stanislav Rayo MD   [facility] ([place] [facility])    [address] [facility] [id number] [phone]            Apr 19, 2018 10:40 AM CDT   (Arrive by 10:25 AM)   RETURN BARIATRIC SURGERY with Jani Shah MD   [facility] ([facility])    [address] [place] [address]   [phone]            Apr 19, 2018 11:00 AM CDT   (Arrive by 10:45 AM)   NUTRITION VISIT with Juyd Chaney RD   [facility] ([facility] [place])    [address] [address]   [phone]            May 17, 2018 12:45 PM CDT   (Arrive by 12:30 PM)   RETURN BARIATRIC SURGERY with Xin Costa PA-C   [facility] ([facility] [place])    [address] [place] [address]   [phone]            May 17, 2018  1:00 PM CDT   (Arrive by 12:45 PM)   NUTRITION VISIT with Judy Chaney RD   [facility] ([facility])    [address] [place] [address]   [phone]            Jun 11, 2018 10:30 AM CDT   (Arrive by 10:15 AM)   Return Visit with Minh Mancilla MD   [facility] ([facility] [place])    [address] [place] [id number] [phone]              Who to contact     Please call your clinic at [phone] to:    Ask questions about your health    Make or cancel appointments    Discuss your medicines    Learn about your test results    Speak to your doctor            Additional Information About Your Visit        [employer] Information     [employer] gives you secure access to your electronic health record. If you see a primary care provider, you can also send messages to your care team and make appointments. If you have questions, please call your primary care clinic.  If you do not have a primary care provider, please call [phone] and they will assist you.      [employer] is an electronic gateway that provides easy, online access to your medical records. With [employer], you can request a clinic appointment, read your test results, renew a prescription or communicate with your care team.     To access your existing account, please contact your [facility] or call [phone] for assistance.        Care EveryWhere ID      This is your Care EveryWhere ID. This could be used by other organizations to access your [facility] medical records  [id number]         Blood Pressure from Last 3 Encounters:   03/19/18 124/84   03/01/18 126/74   01/22/18 128/78    Weight from Last 3 Encounters:   03/19/18 114.4 kg (252 lb 3.2 oz)   03/01/18 114.7 kg (252 lb 14.4 oz)   01/22/18 120.2 kg (265 lb 1.6 oz)              Today, you had the following     No orders found for display         Today's Medication Changes          These changes are accurate as of 3/19/18  9:14 AM.  If you have any questions, ask your nurse or doctor.               These medicines have changed or have updated prescriptions.        Dose/Directions    estradiol 2 MG tablet   Commonly known as:  ESTRACE   This may have changed:    - how much to take  - how to take this  - when to take this  - additional instructions   Used for:  Gender identity disorder        1 tablet daily   Quantity:  90 tablet   Refills:  3       furosemide 20 MG tablet   Commonly known as:  LASIX   This may have changed:    - when to take this  - additional instructions   Used for:  Leg swelling        Dose:  20 mg   Take 1 tablet (20 mg) by mouth daily - DUE FOR LABS   Quantity:  90 tablet   Refills:  3       traZODone 100 MG tablet   Commonly known as:  DESYREL   This may have changed:  when to take this   Used for:  Insomnia, unspecified insomnia        Dose:  100 mg   Take 1 tablet (100 mg) by mouth nightly as needed for sleep   Quantity:  30 tablet   Refills:  1                Primary Care Provider Office Phone # Fax #    Kristin Miner PA-C [phone]       [address]  [place] [address]        Equal Access to Services     [facility]: Hadii dedra marshall Soellie, waaxda luqadaha, qaybta kaalmada riley brice. So [facility] [phone].    ATENCIÓN: Si habla español, tiene a wheeler disposición servicios gratuitos de asistencia lingüística.  Jay cedeno [phone].    We comply with applicable federal civil rights laws and [place] laws. We do not discriminate on the basis of race, color, national origin, age, disability, sex, sexual orientation, or gender identity.            Thank you!     Thank you for choosing [facility]  for your care. Our goal is always to provide you with excellent care. Hearing back from our patients is one way we can continue to improve our services. Please take a few minutes to complete the written survey that you may receive in the mail after your visit with us. Thank you!             Your Updated Medication List - Protect others around you: Learn how to safely use, store and throw away your medicines at www.disposemymeds.org.          This list is accurate as of 3/19/18  9:14 AM.  Always use your most recent med list.                   Brand Name Dispense Instructions for use Diagnosis    ABILIFY 5 MG tablet   Generic drug:  ARIPiprazole      Take 5 mg by mouth every morning        estradiol 2 MG tablet    ESTRACE    90 tablet    1 tablet daily    Gender identity disorder       furosemide 20 MG tablet    LASIX    90 tablet    Take 1 tablet (20 mg) by mouth daily - DUE FOR LABS    Leg swelling       LAMOTRIGINE PO      Take 150 mg by mouth every morning        * medical cannabis inhalation (Patient's own supply.  Not a prescription)      Inhale into the lungs 2 times daily as needed (This is NOT a prescription, and does not certify that the patient has a qualifying medical condition for medical cannabis.  The purpose of this order is  to document that the patient reports taking medical cannabis.)  MORNING AND AT NIGHT        * medical cannabis (Patient's own supply.  Not a prescription)      2 capsules 2 times daily (This is NOT a prescription, and does not certify that the patient has a qualifying medical condition for medical cannabis.  The purpose of this order is  to document that the patient  reports taking medical cannabis.)  Morning and afternoon        Multi-vitamin Tabs tablet      Take 1 tablet by mouth every morning        naltrexone 50 MG tablet    DEPADE;REVIA    30 tablet    Take 1/2 tablet.  Time it one to two hours prior to worst cravings.  Then increase to one full tablet as instructed.    Morbid obesity (H)       simvastatin 20 MG tablet    ZOCOR    90 tablet    Take 1 tablet (20 mg) by mouth At Bedtime    Hyperlipidemia LDL goal <130       topiramate 200 MG tablet    TOPAMAX    60 tablet    Take 1 tablet (200 mg) by mouth 2 times daily    Chronic pain disorder, Fibromyalgia       traZODone 100 MG tablet    DESYREL    30 tablet    Take 1 tablet (100 mg) by mouth nightly as needed for sleep    Insomnia, unspecified insomnia       triamcinolone 0.1 % ointment    KENALOG    80 g    Apply to AA BID x 3-4 weeks then PRN    Prurigo nodularis       venlafaxine 150 MG 24 hr capsule    EFFEXOR-XR     Take 300 mg by mouth every morning        VITAMIN B 12 PO      Take 1,000 mcg by mouth every morning        VITAMIN D (CHOLECALCIFEROL) PO      Take 5,000 Units by mouth every morning        * Notice:  This list has 2 medication(s) that are the same as other medications prescribed for you. Read the directions carefully, and ask your doctor or other care provider to review them with you.

## 2018-03-19 NOTE — PATIENT INSTRUCTIONS
Preparing for Your Surgery      Name:  Kylie Austin   MRN:  [id number]   :  1965   Today's Date:  3/19/2018     Arriving for surgery:  Surgery date:  4/10/18  Surgery time:  10:30 am  Arrival time:  8:30 am  Please come to:       [facility] [address]  [address] [place]  [address]    -   parking is available in front of the hospital from 5:15 am to 8:00 pm    -  Stop at the Information Desk in the lobby    -   Inform the information person that you are here for surgery. An escort to 3c will be provided. If you would not like an escort, please proceed to 3C on the 3rd floor. [phone]     What can I eat or drink? Followsurgeons Diet instructions including any bowel prep. In general, for Laparoscopic Gastric Sleeve:  -  Start clear liquid diet 2 days before surgery  -  You may have water until 3 hours prior to your surgery. Stop water by 7:30 am on the morning of surgery.    Which medicines can I take? Follow surgeon's instructions regarding medications but in general: No Aspirin, vitamins or supplements for 7 days before surgery. Stop Naltrexone 4 days before surgery.  -  Do NOT take these medications the day of surgery: Furosemide      -  It is OKAY to take these medications on the morning of surgery if you usually take them in the morning: Abilify, Simvastatin, Topiramate, Estradiol, Lamotrigine, Venlafaxine      How do I prepare myself?  -  Take two showers: one the night before surgery; and one the morning of surgery.         Use Scrubcare or Hibiclens to wash from neck down.  You may use your own shampoo and conditioner. No other hair products like gel/spray.   -  Do NOT use lotion, powder, deodorant, or antiperspirant the day of your surgery.  -  Do NOT wear any makeup, fingernail polish or jewelry.  -  Begin using Incentive Spirometer 1 week prior to surgery.  Use 4 times per day, up to 5-10 breaths each time.  Bring Incentive Spirometer to  hospital.  -Do not bring your own medications to the hospital, except for inhalers.  -  Bring your ID and insurance card.    Questions or Concerns:  If you have questions or concerns, please call the  Preoperative Assessment Center, Monday-Friday 7AM-7PM:  [phone]    AFTER YOUR SURGERY  Breathing exercises   Breathing exercises help you recover faster. Take deep breaths and let the air out slowly. This will:     Help you wake up after surgery.    Help prevent complications like pneumonia.  Preventing complications will help you go home sooner.   We may give you a breathing device (incentive spirometer) to encourage you to breathe deeply.   Nausea and vomiting   You may feel sick to your stomach after surgery; if so, let your nurse know.    Pain control:  After surgery, you may have pain. Our goal is to help you manage your pain. Pain medicine will help you feel comfortable enough to do activities that will help you heal.  These activities may include breathing exercises, walking and physical therapy.   To help your health care team treat your pain we will ask: 1) If you have pain  2) where it is located 3) describe your pain in your words  Methods of pain control include medications given by mouth, vein or by nerve block for some surgeries.  We may give you a pain control pump that will:  1) Deliver the medicine through a tube placed in your vein  2) Control the amount of medicine you receive  3) Allow you to push a button to deliver a dose of pain medicine  Sequential Compression Device (SCD) or Pneumo Boots:  You may need to wear SCD S on your legs or feet. These are wraps connected to a machine that pumps in air and releases it. The repeated pumping helps prevent blood clots from forming.

## 2018-03-19 NOTE — MR AVS SNAPSHOT
After Visit Summary   3/19/2018    Kylie Austin    MRN: [id number]           Patient Information     Date Of Birth          1965        Visit Information        Provider Department      3/19/2018 8:30 AM Pharmacist, Kevin Hutchins [facility] [place]        Today's Diagnoses     Preop examination    -  1       Follow-ups after your visit        Your next 10 appointments already scheduled     Mar 19, 2018  8:30 AM CDT   (Arrive by 8:15 AM)   PAC Pharmacist with Kevin Pac Pharmacist   [facility] [place] ([facility])    [address] [place] [id number]   [phone]            Mar 19, 2018  9:00 AM CDT   (Arrive by 8:45 AM)   PAC EVALUATION with  Pac Hugo 7   [facility] [place] ([facility])    [address] [place] [id number]   [phone]            Mar 19, 2018 10:00 AM CDT   (Arrive by 9:45 AM)   PAC RN ASSESSMENT with Kevin Pac Rn   [facility] [place] ([facility])    [address] [place] [id number]   [phone]            Mar 19, 2018 10:20 AM CDT   (Arrive by 10:05 AM)   PAC Anesthesia Consult with Kevin Pac Anesthesiologist   [facility] [place] ([facility])    [address] [place] [address]   [phone]            Mar 19, 2018 10:45 AM CDT   LAB with KEVIN LAB   [facility])    [address] [place] [address]   [phone]           Please do not eat 10-12 hours before your appointment if you are coming in fasting for labs on lipids, cholesterol, or glucose (sugar). This does not apply to pregnant women. Water, hot tea and black coffee (with nothing added) are okay. Do not drink other fluids, diet soda or chew gum.             Apr 10, 2018   Procedure with Jani Shah MD   [facility] [place], Same Day Surgery (--)    [address]  [place] [address] [phone]            Apr 10, 2018  3:00 PM CDT   Return Visit with Stanislav Rayo MD   [facility] [place] ([facility] [place])    [address]  [place] [id number] [phone]            Apr 19, 2018 10:40 AM CDT   (Arrive by 10:25 AM)   RETURN BARIATRIC SURGERY with Jani Shah MD   [facility] ([facility])    [address] [place] [phone] [phone]            Apr 19, 2018 11:00 AM CDT   (Arrive by 10:45 AM)   NUTRITION VISIT with Judy Chaney RD   [facility] ([facility])    [address] [id number] [phone]            May 17, 2018 12:45 PM CDT   (Arrive by 12:30 PM)   RETURN BARIATRIC SURGERY with Xin Costa PA-C   [facility] ([facility])    [address] [place] [id number] [phone]              Who to contact     Please call your clinic at [phone] to:    Ask questions about your health    Make or cancel appointments    Discuss your medicines    Learn about your test results    Speak to your doctor            Additional Information About Your Visit        Evernote Information     Evernote gives you secure access to your electronic health record. If you see a primary care provider, you can also send messages to your care team and make appointments. If you have questions, please call your primary care clinic.  If you do not have a primary care provider, please call [phone] and they will assist you.      Evernote is an electronic gateway that provides easy, online access to your medical records. With Evernote, you can request a clinic appointment, read your  test results, renew a prescription or communicate with your care team.     To access your existing account, please contact your [facility] or call [phone] for assistance.        Care EveryWhere ID     This is your Care EveryWhere ID. This could be used by other organizations to access your [facility] medical records  [id number]         Blood Pressure from Last 3 Encounters:   03/01/18 126/74   01/22/18 128/78   01/17/18 124/85    Weight from Last 3 Encounters:   03/01/18 114.7 kg (252 lb 14.4 oz)   01/22/18 120.2 kg (265 lb 1.6 oz)   11/13/17 123.8 kg (273 lb)              Today, you had the following     No orders found for display       Primary Care Provider Office Phone # Fax #    Kristin Miner PA-C [phone]       [address]  [place] [address]        Equal Access to Services     [facility] : Hadii aad ku hadasho Soomaali, waaxda luqadaha, qaybta kaalmada adeegyada, waxay idiin hayaan dreeg carlos yuan . So [facility] [phone].    ATENCIÓN: Si habla español, tiene a wheeler disposición servicios gratuitos de asistencia lingüística. Michaelame al [phone].    We comply with applicable federal civil rights laws and [place] laws. We do not discriminate on the basis of race, color, national origin, age, disability, sex, sexual orientation, or gender identity.            Thank you!     Thank you for choosing [facility]  for your care. Our goal is always to provide you with excellent care. Hearing back from our patients is one way we can continue to improve our services. Please take a few minutes to complete the written survey that you may receive in the mail after your visit with us. Thank you!             Your Updated Medication List - Protect others around you: Learn how to safely use, store and throw away your medicines at www.disposemymeds.org.          This list is accurate as of 3/19/18  8:19 AM.  Always use your most  recent med list.                   Brand Name Dispense Instructions for use Diagnosis    ABILIFY 5 MG tablet   Generic drug:  ARIPiprazole      Take 5 mg by mouth daily        estradiol 2 MG tablet    ESTRACE    90 tablet    1 tablet daily    Gender identity disorder       ferrous sulfate 325 (65 FE) MG tablet    IRON     Take by mouth daily as needed        furosemide 20 MG tablet    LASIX    90 tablet    Take 1 tablet (20 mg) by mouth daily - DUE FOR LABS    Leg swelling       LAMOTRIGINE PO      Take 150 mg by mouth daily        LORAZEPAM PO      Take 0.5 mg by mouth daily as needed for anxiety        * medical cannabis inhalation (Patient's own supply.  Not a prescription)      Inhale into the lungs 2 times daily (This is NOT a prescription, and does not certify that the patient has a qualifying medical condition for medical cannabis.  The purpose of this order is  to document that the patient reports taking medical cannabis.)  MORNING AND AT NIGHT        * medical cannabis (Patient's own supply.  Not a prescription)      2 capsules 2 times daily (This is NOT a prescription, and does not certify that the patient has a qualifying medical condition for medical cannabis.  The purpose of this order is  to document that the patient reports taking medical cannabis.)  Morning and afternoon        Multi-vitamin Tabs tablet      Take 1 tablet by mouth daily        naltrexone 50 MG tablet    DEPADE;REVIA    30 tablet    Take 1/2 tablet.  Time it one to two hours prior to worst cravings.  Then increase to one full tablet as instructed.    Morbid obesity (H)       simvastatin 20 MG tablet    ZOCOR    90 tablet    Take 1 tablet (20 mg) by mouth At Bedtime    Hyperlipidemia LDL goal <130       topiramate 200 MG tablet    TOPAMAX    60 tablet    Take 1 tablet (200 mg) by mouth 2 times daily    Chronic pain disorder, Fibromyalgia       traZODone 100 MG tablet    DESYREL    30 tablet    Take 1 tablet (100 mg) by mouth nightly as  needed for sleep    Insomnia, unspecified insomnia       triamcinolone 0.1 % ointment    KENALOG    80 g    Apply to AA BID x 3-4 weeks then PRN    Prurigo nodularis       venlafaxine 150 MG 24 hr capsule    EFFEXOR-XR     Take 300 mg by mouth daily        VITAMIN B 12 PO      Take 1,000 mcg by mouth daily        VITAMIN D (CHOLECALCIFEROL) PO      Take 5,000 Units by mouth daily        * Notice:  This list has 2 medication(s) that are the same as other medications prescribed for you. Read the directions carefully, and ask your doctor or other care provider to review them with you.

## 2018-03-19 NOTE — ANESTHESIA PREPROCEDURE EVALUATION
Anesthesia Evaluation     . Pt has had prior anesthetic. Type: General and MAC           ROS/MED HX    ENT/Pulmonary:  - neg pulmonary ROS     Neurologic:  - neg neurologic ROS     Cardiovascular: Comment: Atrial flutter, s/p ablation in 2005 and has been SR since that time    (+) hypertension----. : . . . :. . Previous cardiac testing date:results:date:7/25/2014 results:ECG reviewed date:10/26/2016 results:NSR date: results:          METS/Exercise Tolerance:  >4 METS   Hematologic:  - neg hematologic  ROS       Musculoskeletal:   (+) , , other musculoskeletal- chronic knee pain      GI/Hepatic:  - neg GI/hepatic ROS       Renal/Genitourinary:  - ROS Renal section negative       Endo:     (+) Obesity, .      Psychiatric:     (+) psychiatric history bipolar      Infectious Disease:  - neg infectious disease ROS       Malignancy:      - no malignancy   Other:    (+) No chance of pregnancy C-spine cleared: N/A, no H/O Chronic Pain,no other significant disability   - neg other ROS                 Physical Exam  Normal systems: cardiovascular, pulmonary and dental    Airway   Mallampati: II  TM distance: >3 FB  Neck ROM: full  Comment: Surgery for mandible many years ago, no issues with mouth opening    Dental     Cardiovascular   Rhythm and rate: regular and normal      Pulmonary    breath sounds clear to auscultation    Other findings:   Stress test 7/25/2014    Interpretation Summary  NL exercise echo without evidence of CAD or stress induced ischemia. LV   function is normal at rest (LVEF 60-65%) and with exercise (70-75%). There   were no exercise induced wall motion abnormalities. Exercise capacity and   blood pressure response to exericse were normal.      Stress Findings  Maximum workload 175 frost.  Peak MVO2 21.4 ml/kg/min .  Percent predicted  MVO2 63 %.  RPP 15171.  Patient was given 6ml mixture of 1.5ml Definity and 8.5ml saline.  IV start location RAC .  No electrocardiographic evidence of ischemia.      Baseline EKG/Symptoms  Normal baseline electrocardiogram.     Left Ventricle  Ejection Fraction = >55%.  The left ventricle is normal in size.  There is normal left ventricular wall thickness.  No regional wall motion abnormalities noted.      Wall Motion Analysis  Stage 1:  No Wall Motion Abnormalities Defined     Aortic Valve  The aortic valve is normal in structure and function.     Mitral Valve  The mitral valve is normal in structure and function.     Atria  The left atrial size is normal.     Tricuspid Valve  The tricuspid valve is normal in structure and function.     Right Ventricle  The right ventricle is normal in size and function.     Pericardium  There is no pericardial effusion.         Results for SARANYA DAVILA (MRN [id number]) as of 3/19/2018 11:29    3/19/2018 08:51  Color Urine: Yellow  Appearance Urine: Clear  Glucose Urine: Negative  Bilirubin Urine: Negative  Ketones Urine: Negative  Specific Gravity Urine: 1.011  pH Urine: 5.0  Protein Albumin Urine: Negative  Urobilinogen mg/dL: 0.0  Nitrite Urine: Negative  Blood Urine: Negative  Leukocyte Esterase Urine: Negative  Source: Midstream Urine    3/19/2018 10:06  Sodium: 137  Potassium: 3.8  Chloride: 102  Carbon Dioxide: 27  Urea Nitrogen: 18  Creatinine: 0.88  GFR Estimate: 67  GFR Estimate If Black: 81  Calcium: 9.0  Anion Gap: 8  Glucose: 98  WBC: 11.2 (H)  Hemoglobin: 15.0  Hematocrit: 45.9  Platelet Count: 356  RBC Count: 5.28 (H)  MCV: 87  MCH: 28.4  MCHC: 32.7  RDW: 14.0           PAC Discussion and Assessment    ASA Classification: 2  Case is suitable for: [place]  Anesthetic techniques and relevant risks discussed: GA  Invasive monitoring and risk discussed: Yes  Types:   Possibility and Risk of blood transfusion discussed: Yes  NPO instructions given:   Additional anesthetic preparation and risks discussed:   Needs early admission to pre-op area:   Other:     PAC Resident/NP Anesthesia Assessment:  Saranya Nunes is a 52  yo female scheduled for Laparoscopic Sleeve Gastrectomy on 4/10/2018 by Dr. Shah. PAC referral by Dr. Shah for assessment and optimization of anesthesia. Previous anesthesia without complications.    She is morbidly obese and considering weight loss surgery to treat obesity in association with her medical conditions of obesity.  Her consult weight was 271 lbs.  She has lost 19 pounds since her consult weight.     1) Cardiac: HTN, well managed with Lasix. EKG 10/26/2016 NSR. Negative stress test 2014. METS well over 4 without cardiac symptoms. History of atrial fib in 2004, s/p ablation and has been SR since that time.  2) Pulmonary: Never smoked, denies pulmonary symptoms.  3) Psych: Bipolar 2, well manages with current treatment.  4) Endo: BMI 37.35.  5) Other: male to female gender reassignment     She also uses medical marijuana daily         Reviewed and Signed by PAC Mid-Level Provider/Resident  Mid-Level Provider/Resident: TALIB Beck  Date: 3/19/2018  Time: 0900    Attending Anesthesiologist Anesthesia Assessment:        Anesthesiologist:   Date:   Time:   Pass/Fail:   Disposition:     PAC Pharmacist Assessment:        Pharmacist:   Date:   Time:      Anesthesia Plan      History & Physical Review  History and physical reviewed and following examination; no interval change.    ASA Status:  3 .    NPO Status:  > 8 hours    Plan for General and ETT with Propofol induction. Maintenance will be Balanced.    PONV prophylaxis:  Ondansetron (or other 5HT-3) and Dexamethasone or Solumedrol       Postoperative Care  Postoperative pain management:  IV analgesics.      Consents  Anesthetic plan, risks, benefits and alternatives discussed with:  Patient.  Use of blood products discussed: Yes.   Use of blood products discussed with Patient.  Consented to blood products.  .                          .

## 2018-03-19 NOTE — PROGRESS NOTES
[facility] medication history for March 19, 2018 is complete.    See Epic admission navigator for allergy information, pharmacy and prior to admission medications.    Operating room staff will still need to confirm medications and last dose information on day of surgery.     Medication history interview sources:  patient interview    Changes made to PTA medication list (reason)  Added: None    Deleted: None    Changed:   -- updated directions for Vit B12  -- updated directions for venlafaxine    Additional medication history information (including reliability of information, actions taken by pharmacist):    -- No recent (within 30 days) course of antibiotics  -- No recent (within 30 days) course of steroids  -- No recent (within 30 days) chronic daily medications stopped   -- Patient declines being on any other prescription or over-the-counter medications  -- patient informed to stop naltrexone 4 days prior to procedure per bariatric     Prior to Admission medications    Medication Sig Last Dose Taking? Auth Provider   naltrexone (DEPADE;REVIA) 50 MG tablet Take 1/2 tablet.  Time it one to two hours prior to worst cravings.  Then increase to one full tablet as instructed. Taking Yes Minh Mancilla MD   triamcinolone (KENALOG) 0.1 % ointment Apply to AA BID x 3-4 weeks then PRN Taking Yes Lily Bertrand PA-C   furosemide (LASIX) 20 MG tablet Take 1 tablet (20 mg) by mouth daily - DUE FOR LABS Taking Yes Kristin Miner PA-C   ARIPiprazole (ABILIFY) 5 MG tablet Take 5 mg by mouth daily Taking Yes Reported, Patient   simvastatin (ZOCOR) 20 MG tablet Take 1 tablet (20 mg) by mouth At Bedtime Taking Yes Marcell Nixon PA-C   topiramate (TOPAMAX) 200 MG tablet Take 1 tablet (200 mg) by mouth 2 times daily Taking Yes Kristin Miner PA-C   medical cannabis inhalation (Patient's own supply.  Not a prescription) Inhale into the lungs 2 times daily (This is NOT a  prescription, and does not certify that the patient has a qualifying medical condition for medical cannabis.  The purpose of this order is  to document that the patient reports taking medical cannabis.)    MORNING AND AT NIGHT Taking Yes Reported, Patient   medical cannabis (Patient's own supply.  Not a prescription) 2 capsules 2 times daily (This is NOT a prescription, and does not certify that the patient has a qualifying medical condition for medical cannabis.  The purpose of this order is  to document that the patient reports taking medical cannabis.)    Morning and afternoon  Taking Yes Reported, Patient   estradiol (ESTRACE) 2 MG tablet 1 tablet daily Taking Yes Dallas Lo MD   VITAMIN D, CHOLECALCIFEROL, PO Take 5,000 Units by mouth daily Taking Yes Reported, Patient   Cyanocobalamin (VITAMIN B 12 PO) Take 1,000 mcg by mouth daily  Taking Yes Reported, Patient   multivitamin, therapeutic with minerals (MULTI-VITAMIN) TABS Take 1 tablet by mouth daily Taking Yes Reported, Patient   LAMOTRIGINE PO Take 150 mg by mouth daily  Taking Yes Reported, Patient   LORAZEPAM PO Take 0.5 mg by mouth daily as needed for anxiety  Taking Yes Reported, Patient   traZODone (DESYREL) 100 MG tablet Take 1 tablet (100 mg) by mouth nightly as needed for sleep Taking Yes Kristin Miner PA-C   venlafaxine (EFFEXOR-XR) 150 MG 24 hr capsule Take 300 mg by mouth daily  Taking Yes Reported, Patient   ferrous sulfate (IRON) 325 (65 FE) MG tablet Take by mouth daily as needed  Not Taking  Reported, Patient         Medication history completed by: Marciano Hussein RPH

## 2018-03-27 DIAGNOSIS — F64.9 GENDER IDENTITY DISORDER: ICD-10-CM

## 2018-03-28 RX ORDER — ESTRADIOL 2 MG/1
TABLET ORAL
Qty: 90 TABLET | Refills: 3 | Status: SHIPPED | OUTPATIENT
Start: 2018-03-28 | End: 2021-08-03

## 2018-03-28 NOTE — TELEPHONE ENCOUNTER
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 rf?

## 2018-04-10 ENCOUNTER — HOSPITAL ENCOUNTER (INPATIENT)
Facility: CLINIC | Age: 53
LOS: 1 days | Discharge: HOME | DRG: 621 | End: 2018-04-11
Attending: SURGERY | Admitting: SURGERY
Payer: MEDICARE

## 2018-04-10 ENCOUNTER — SURGERY (OUTPATIENT)
Age: 53
End: 2018-04-10

## 2018-04-10 ENCOUNTER — ANESTHESIA (OUTPATIENT)
Dept: SURGERY | Facility: CLINIC | Age: 53
DRG: 621 | End: 2018-04-10
Payer: MEDICARE

## 2018-04-10 DIAGNOSIS — Z98.84 S/P LAPAROSCOPIC SLEEVE GASTRECTOMY: Primary | ICD-10-CM

## 2018-04-10 LAB
CREAT SERPL-MCNC: 0.77 MG/DL (ref 0.52–1.04)
GFR SERPL CREATININE-BSD FRML MDRD: 78 ML/MIN/1.7M2
GLUCOSE BLDC GLUCOMTR-MCNC: 97 MG/DL (ref 70–99)
INTERPRETATION ECG - MUSE: NORMAL
PLATELET # BLD AUTO: 283 10E9/L (ref 150–450)

## 2018-04-10 PROCEDURE — 25000128 H RX IP 250 OP 636: Performed by: STUDENT IN AN ORGANIZED HEALTH CARE EDUCATION/TRAINING PROGRAM

## 2018-04-10 PROCEDURE — 71000015 ZZH RECOVERY PHASE 1 LEVEL 2 EA ADDTL HR: Performed by: SURGERY

## 2018-04-10 PROCEDURE — 25000566 ZZH SEVOFLURANE, EA 15 MIN: Performed by: SURGERY

## 2018-04-10 PROCEDURE — 37000008 ZZH ANESTHESIA TECHNICAL FEE, 1ST 30 MIN: Performed by: SURGERY

## 2018-04-10 PROCEDURE — 93010 ELECTROCARDIOGRAM REPORT: CPT | Performed by: INTERNAL MEDICINE

## 2018-04-10 PROCEDURE — 25000128 H RX IP 250 OP 636: Performed by: NURSE ANESTHETIST, CERTIFIED REGISTERED

## 2018-04-10 PROCEDURE — 88305 TISSUE EXAM BY PATHOLOGIST: CPT | Performed by: SURGERY

## 2018-04-10 PROCEDURE — 0DB64Z3 EXCISION OF STOMACH, PERCUTANEOUS ENDOSCOPIC APPROACH, VERTICAL: ICD-10-PCS | Performed by: SURGERY

## 2018-04-10 PROCEDURE — 82565 ASSAY OF CREATININE: CPT | Performed by: STUDENT IN AN ORGANIZED HEALTH CARE EDUCATION/TRAINING PROGRAM

## 2018-04-10 PROCEDURE — 27210794 ZZH OR GENERAL SUPPLY STERILE: Performed by: SURGERY

## 2018-04-10 PROCEDURE — 36000062 ZZH SURGERY LEVEL 4 1ST 30 MIN - UMMC: Performed by: SURGERY

## 2018-04-10 PROCEDURE — 00000146 ZZHCL STATISTIC GLUCOSE BY METER IP

## 2018-04-10 PROCEDURE — A9270 NON-COVERED ITEM OR SERVICE: HCPCS | Mod: GY | Performed by: SURGERY

## 2018-04-10 PROCEDURE — 25000128 H RX IP 250 OP 636: Performed by: ANESTHESIOLOGY

## 2018-04-10 PROCEDURE — 36000064 ZZH SURGERY LEVEL 4 EA 15 ADDTL MIN - UMMC: Performed by: SURGERY

## 2018-04-10 PROCEDURE — 93005 ELECTROCARDIOGRAM TRACING: CPT

## 2018-04-10 PROCEDURE — 25000132 ZZH RX MED GY IP 250 OP 250 PS 637: Mod: GY | Performed by: SURGERY

## 2018-04-10 PROCEDURE — A9270 NON-COVERED ITEM OR SERVICE: HCPCS | Mod: GY | Performed by: STUDENT IN AN ORGANIZED HEALTH CARE EDUCATION/TRAINING PROGRAM

## 2018-04-10 PROCEDURE — 85049 AUTOMATED PLATELET COUNT: CPT | Performed by: STUDENT IN AN ORGANIZED HEALTH CARE EDUCATION/TRAINING PROGRAM

## 2018-04-10 PROCEDURE — 12000003 ZZH R&B CRITICAL UMMC

## 2018-04-10 PROCEDURE — 71000014 ZZH RECOVERY PHASE 1 LEVEL 2 FIRST HR: Performed by: SURGERY

## 2018-04-10 PROCEDURE — 25000125 ZZHC RX 250: Performed by: NURSE ANESTHETIST, CERTIFIED REGISTERED

## 2018-04-10 PROCEDURE — C9399 UNCLASSIFIED DRUGS OR BIOLOG: HCPCS | Performed by: NURSE ANESTHETIST, CERTIFIED REGISTERED

## 2018-04-10 PROCEDURE — 36415 COLL VENOUS BLD VENIPUNCTURE: CPT | Performed by: STUDENT IN AN ORGANIZED HEALTH CARE EDUCATION/TRAINING PROGRAM

## 2018-04-10 PROCEDURE — 25000132 ZZH RX MED GY IP 250 OP 250 PS 637: Mod: GY | Performed by: STUDENT IN AN ORGANIZED HEALTH CARE EDUCATION/TRAINING PROGRAM

## 2018-04-10 PROCEDURE — 25000125 ZZHC RX 250: Performed by: SURGERY

## 2018-04-10 PROCEDURE — 37000009 ZZH ANESTHESIA TECHNICAL FEE, EACH ADDTL 15 MIN: Performed by: SURGERY

## 2018-04-10 PROCEDURE — 40000170 ZZH STATISTIC PRE-PROCEDURE ASSESSMENT II: Performed by: SURGERY

## 2018-04-10 RX ORDER — PROPOFOL 10 MG/ML
INJECTION, EMULSION INTRAVENOUS PRN
Status: DISCONTINUED | OUTPATIENT
Start: 2018-04-10 | End: 2018-04-10

## 2018-04-10 RX ORDER — BUPIVACAINE HYDROCHLORIDE 2.5 MG/ML
INJECTION, SOLUTION EPIDURAL; INFILTRATION; INTRACAUDAL PRN
Status: DISCONTINUED | OUTPATIENT
Start: 2018-04-10 | End: 2018-04-10 | Stop reason: HOSPADM

## 2018-04-10 RX ORDER — ARIPIPRAZOLE ORAL 1 MG/ML
5 SOLUTION ORAL DAILY
Status: DISCONTINUED | OUTPATIENT
Start: 2018-04-10 | End: 2018-04-11 | Stop reason: HOSPADM

## 2018-04-10 RX ORDER — FENTANYL CITRATE 50 UG/ML
25-50 INJECTION, SOLUTION INTRAMUSCULAR; INTRAVENOUS
Status: DISCONTINUED | OUTPATIENT
Start: 2018-04-10 | End: 2018-04-10 | Stop reason: HOSPADM

## 2018-04-10 RX ORDER — DEXAMETHASONE SODIUM PHOSPHATE 4 MG/ML
INJECTION, SOLUTION INTRA-ARTICULAR; INTRALESIONAL; INTRAMUSCULAR; INTRAVENOUS; SOFT TISSUE PRN
Status: DISCONTINUED | OUTPATIENT
Start: 2018-04-10 | End: 2018-04-10

## 2018-04-10 RX ORDER — CEFAZOLIN SODIUM 2 G/100ML
2 INJECTION, SOLUTION INTRAVENOUS
Status: COMPLETED | OUTPATIENT
Start: 2018-04-10 | End: 2018-04-10

## 2018-04-10 RX ORDER — SODIUM CHLORIDE, SODIUM LACTATE, POTASSIUM CHLORIDE, CALCIUM CHLORIDE 600; 310; 30; 20 MG/100ML; MG/100ML; MG/100ML; MG/100ML
INJECTION, SOLUTION INTRAVENOUS CONTINUOUS
Status: DISCONTINUED | OUTPATIENT
Start: 2018-04-10 | End: 2018-04-10 | Stop reason: HOSPADM

## 2018-04-10 RX ORDER — ONDANSETRON 2 MG/ML
INJECTION INTRAMUSCULAR; INTRAVENOUS PRN
Status: DISCONTINUED | OUTPATIENT
Start: 2018-04-10 | End: 2018-04-10

## 2018-04-10 RX ORDER — SODIUM CHLORIDE, SODIUM LACTATE, POTASSIUM CHLORIDE, CALCIUM CHLORIDE 600; 310; 30; 20 MG/100ML; MG/100ML; MG/100ML; MG/100ML
INJECTION, SOLUTION INTRAVENOUS CONTINUOUS
Status: DISCONTINUED | OUTPATIENT
Start: 2018-04-10 | End: 2018-04-11 | Stop reason: HOSPADM

## 2018-04-10 RX ORDER — HYDRALAZINE HYDROCHLORIDE 20 MG/ML
2.5-5 INJECTION INTRAMUSCULAR; INTRAVENOUS EVERY 10 MIN PRN
Status: DISCONTINUED | OUTPATIENT
Start: 2018-04-10 | End: 2018-04-10 | Stop reason: HOSPADM

## 2018-04-10 RX ORDER — CEFAZOLIN SODIUM 1 G/3ML
1 INJECTION, POWDER, FOR SOLUTION INTRAMUSCULAR; INTRAVENOUS SEE ADMIN INSTRUCTIONS
Status: DISCONTINUED | OUTPATIENT
Start: 2018-04-10 | End: 2018-04-10 | Stop reason: HOSPADM

## 2018-04-10 RX ORDER — ONDANSETRON 2 MG/ML
4 INJECTION INTRAMUSCULAR; INTRAVENOUS EVERY 30 MIN PRN
Status: DISCONTINUED | OUTPATIENT
Start: 2018-04-10 | End: 2018-04-10 | Stop reason: HOSPADM

## 2018-04-10 RX ORDER — LIDOCAINE 40 MG/G
CREAM TOPICAL
Status: DISCONTINUED | OUTPATIENT
Start: 2018-04-10 | End: 2018-04-11 | Stop reason: HOSPADM

## 2018-04-10 RX ORDER — FENTANYL CITRATE 50 UG/ML
INJECTION, SOLUTION INTRAMUSCULAR; INTRAVENOUS PRN
Status: DISCONTINUED | OUTPATIENT
Start: 2018-04-10 | End: 2018-04-10

## 2018-04-10 RX ORDER — VENLAFAXINE HYDROCHLORIDE 150 MG/1
300 CAPSULE, EXTENDED RELEASE ORAL EVERY MORNING
Status: DISCONTINUED | OUTPATIENT
Start: 2018-04-11 | End: 2018-04-10

## 2018-04-10 RX ORDER — OXYCODONE HCL 5 MG/5 ML
5-10 SOLUTION, ORAL ORAL EVERY 4 HOURS PRN
Status: DISCONTINUED | OUTPATIENT
Start: 2018-04-10 | End: 2018-04-11 | Stop reason: HOSPADM

## 2018-04-10 RX ORDER — LIDOCAINE HYDROCHLORIDE 20 MG/ML
INJECTION, SOLUTION INFILTRATION; PERINEURAL PRN
Status: DISCONTINUED | OUTPATIENT
Start: 2018-04-10 | End: 2018-04-10

## 2018-04-10 RX ORDER — ONDANSETRON 4 MG/1
4 TABLET, ORALLY DISINTEGRATING ORAL EVERY 30 MIN PRN
Status: DISCONTINUED | OUTPATIENT
Start: 2018-04-10 | End: 2018-04-10 | Stop reason: HOSPADM

## 2018-04-10 RX ORDER — SODIUM CHLORIDE, SODIUM LACTATE, POTASSIUM CHLORIDE, CALCIUM CHLORIDE 600; 310; 30; 20 MG/100ML; MG/100ML; MG/100ML; MG/100ML
500 INJECTION, SOLUTION INTRAVENOUS CONTINUOUS
Status: DISCONTINUED | OUTPATIENT
Start: 2018-04-10 | End: 2018-04-10 | Stop reason: HOSPADM

## 2018-04-10 RX ORDER — HYDROMORPHONE HCL/0.9% NACL/PF 0.2MG/0.2
0.2 SYRINGE (ML) INTRAVENOUS
Status: DISCONTINUED | OUTPATIENT
Start: 2018-04-10 | End: 2018-04-11 | Stop reason: HOSPADM

## 2018-04-10 RX ORDER — NALOXONE HYDROCHLORIDE 0.4 MG/ML
.1-.4 INJECTION, SOLUTION INTRAMUSCULAR; INTRAVENOUS; SUBCUTANEOUS
Status: ACTIVE | OUTPATIENT
Start: 2018-04-10 | End: 2018-04-11

## 2018-04-10 RX ORDER — ONDANSETRON 2 MG/ML
4 INJECTION INTRAMUSCULAR; INTRAVENOUS EVERY 6 HOURS PRN
Status: DISCONTINUED | OUTPATIENT
Start: 2018-04-10 | End: 2018-04-11 | Stop reason: HOSPADM

## 2018-04-10 RX ORDER — METOPROLOL TARTRATE 1 MG/ML
1-2 INJECTION, SOLUTION INTRAVENOUS EVERY 5 MIN PRN
Status: DISCONTINUED | OUTPATIENT
Start: 2018-04-10 | End: 2018-04-10 | Stop reason: HOSPADM

## 2018-04-10 RX ORDER — VENLAFAXINE 50 MG/1
100 TABLET ORAL
Status: DISCONTINUED | OUTPATIENT
Start: 2018-04-10 | End: 2018-04-11 | Stop reason: HOSPADM

## 2018-04-10 RX ORDER — SODIUM CHLORIDE, SODIUM LACTATE, POTASSIUM CHLORIDE, CALCIUM CHLORIDE 600; 310; 30; 20 MG/100ML; MG/100ML; MG/100ML; MG/100ML
INJECTION, SOLUTION INTRAVENOUS CONTINUOUS PRN
Status: DISCONTINUED | OUTPATIENT
Start: 2018-04-10 | End: 2018-04-10

## 2018-04-10 RX ORDER — ESTRADIOL 2 MG/1
2 TABLET ORAL DAILY
Status: DISCONTINUED | OUTPATIENT
Start: 2018-04-10 | End: 2018-04-11 | Stop reason: HOSPADM

## 2018-04-10 RX ADMIN — HYDROMORPHONE HYDROCHLORIDE 0.3 MG: 1 INJECTION, SOLUTION INTRAMUSCULAR; INTRAVENOUS; SUBCUTANEOUS at 10:10

## 2018-04-10 RX ADMIN — TOPIRAMATE 200 MG: 100 TABLET ORAL at 15:09

## 2018-04-10 RX ADMIN — HYDROMORPHONE HYDROCHLORIDE 0.3 MG: 1 INJECTION, SOLUTION INTRAMUSCULAR; INTRAVENOUS; SUBCUTANEOUS at 09:56

## 2018-04-10 RX ADMIN — LAMOTRIGINE 150 MG: 100 TABLET ORAL at 16:43

## 2018-04-10 RX ADMIN — SODIUM CHLORIDE, POTASSIUM CHLORIDE, SODIUM LACTATE AND CALCIUM CHLORIDE: 600; 310; 30; 20 INJECTION, SOLUTION INTRAVENOUS at 09:07

## 2018-04-10 RX ADMIN — HYDRALAZINE HYDROCHLORIDE 2.5 MG: 20 INJECTION INTRAMUSCULAR; INTRAVENOUS at 09:56

## 2018-04-10 RX ADMIN — OXYCODONE HYDROCHLORIDE 5 MG: 5 SOLUTION ORAL at 19:42

## 2018-04-10 RX ADMIN — SODIUM CHLORIDE, POTASSIUM CHLORIDE, SODIUM LACTATE AND CALCIUM CHLORIDE: 600; 310; 30; 20 INJECTION, SOLUTION INTRAVENOUS at 21:06

## 2018-04-10 RX ADMIN — SODIUM CHLORIDE, POTASSIUM CHLORIDE, SODIUM LACTATE AND CALCIUM CHLORIDE: 600; 310; 30; 20 INJECTION, SOLUTION INTRAVENOUS at 08:13

## 2018-04-10 RX ADMIN — LIDOCAINE HYDROCHLORIDE 90 MG: 20 INJECTION, SOLUTION INFILTRATION; PERINEURAL at 08:28

## 2018-04-10 RX ADMIN — Medication 0.2 MG: at 14:51

## 2018-04-10 RX ADMIN — ESTRADIOL 2 MG: 2 TABLET ORAL at 16:43

## 2018-04-10 RX ADMIN — HYDROMORPHONE HYDROCHLORIDE 0.4 MG: 1 INJECTION, SOLUTION INTRAMUSCULAR; INTRAVENOUS; SUBCUTANEOUS at 10:23

## 2018-04-10 RX ADMIN — MIDAZOLAM 2 MG: 1 INJECTION INTRAMUSCULAR; INTRAVENOUS at 08:14

## 2018-04-10 RX ADMIN — DEXAMETHASONE SODIUM PHOSPHATE 4 MG: 4 INJECTION, SOLUTION INTRA-ARTICULAR; INTRALESIONAL; INTRAMUSCULAR; INTRAVENOUS; SOFT TISSUE at 08:38

## 2018-04-10 RX ADMIN — ARIPIPRAZOLE 5 MG: 1 SOLUTION ORAL at 16:42

## 2018-04-10 RX ADMIN — Medication 0.2 MG: at 11:45

## 2018-04-10 RX ADMIN — VENLAFAXINE 100 MG: 50 TABLET ORAL at 19:43

## 2018-04-10 RX ADMIN — BUPIVACAINE HYDROCHLORIDE 30 ML: 2.5 INJECTION, SOLUTION EPIDURAL; INFILTRATION; INTRACAUDAL at 09:19

## 2018-04-10 RX ADMIN — ENOXAPARIN SODIUM 40 MG: 40 INJECTION SUBCUTANEOUS at 07:49

## 2018-04-10 RX ADMIN — FENTANYL CITRATE 50 MCG: 50 INJECTION, SOLUTION INTRAMUSCULAR; INTRAVENOUS at 08:57

## 2018-04-10 RX ADMIN — VENLAFAXINE 100 MG: 50 TABLET ORAL at 13:21

## 2018-04-10 RX ADMIN — PROPOFOL 170 MG: 10 INJECTION, EMULSION INTRAVENOUS at 08:28

## 2018-04-10 RX ADMIN — FENTANYL CITRATE 100 MCG: 50 INJECTION, SOLUTION INTRAMUSCULAR; INTRAVENOUS at 08:28

## 2018-04-10 RX ADMIN — FENTANYL CITRATE 50 MCG: 50 INJECTION, SOLUTION INTRAMUSCULAR; INTRAVENOUS at 08:48

## 2018-04-10 RX ADMIN — Medication 0.2 MG: at 16:56

## 2018-04-10 RX ADMIN — ROCURONIUM BROMIDE 50 MG: 10 INJECTION INTRAVENOUS at 08:28

## 2018-04-10 RX ADMIN — ONDANSETRON 4 MG: 2 INJECTION INTRAMUSCULAR; INTRAVENOUS at 09:16

## 2018-04-10 RX ADMIN — HYDROMORPHONE HYDROCHLORIDE 0.5 MG: 1 INJECTION, SOLUTION INTRAMUSCULAR; INTRAVENOUS; SUBCUTANEOUS at 09:15

## 2018-04-10 RX ADMIN — SUGAMMADEX 200 MG: 100 INJECTION, SOLUTION INTRAVENOUS at 09:20

## 2018-04-10 RX ADMIN — TOPIRAMATE 200 MG: 100 TABLET ORAL at 22:19

## 2018-04-10 RX ADMIN — SODIUM CHLORIDE, POTASSIUM CHLORIDE, SODIUM LACTATE AND CALCIUM CHLORIDE: 600; 310; 30; 20 INJECTION, SOLUTION INTRAVENOUS at 10:15

## 2018-04-10 RX ADMIN — CEFAZOLIN SODIUM 2 G: 2 INJECTION, SOLUTION INTRAVENOUS at 08:42

## 2018-04-10 RX ADMIN — FENTANYL CITRATE 50 MCG: 50 INJECTION, SOLUTION INTRAMUSCULAR; INTRAVENOUS at 09:04

## 2018-04-10 NOTE — IP AVS SNAPSHOT
MRN:[id number]                      After Visit Summary   4/10/2018    Kylie Austin    MRN: [id number]           Thank you!     Thank you for choosing [facility] for your care. Our goal is always to provide you with excellent care. Hearing back from our patients is one way we can continue to improve our services. Please take a few minutes to complete the written survey that you may receive in the mail after you visit with us. Thank you!        Patient Information     Date Of Birth          1965        About your hospital stay     You were admitted on:  April 10, 2018 You last received care in the:  [address] [facility]    You were discharged on:  April 11, 2018        Reason for your hospital stay       S/p sleeve gastrectomy            Reason for your hospital stay       Weight loss surgery                  Who to Call     For medical emergencies, please call 911.  For non-urgent questions about your medical care, please call your primary care provider or clinic, [phone]  For questions related to your surgery, please call your surgery clinic        Attending Provider     Provider Specialty    Jani Shah MD Surgery       Primary Care Provider Office Phone # Fax #    Kristin Miner PA-C [phone]      After Care Instructions     Activity       No lifting over 10 pounds for 4-6 weeks.            Diet       See general instructions            Discharge Instructions       Diet on discharge   Continue bariatric clear liquids for 1 DAY and then advance to full liquids two days after surgery.    No lifting >10 pounds for 4-6 weeks. Discuss with your surgeon at follow up appointment  May shower starting postoperative day #1 but no scrubbing incisions. No bathing or soaking incisions for 2 weeks or until incisions completely healed.  Wound care: Keep clean and dry. Steri strips or glue will fall off on their own.   After surgery it is ok to swallow  medications smaller than 1/4 inch(size of pencil eraser) for all procedures.  If medication is larger than 1/4 inch then it will need to be crushed, cut or in liquid form for 1-2 months after surgery. This can be discussed with surgeon team at the 1 month follow up appointment and will depend on patient tolerance.  Follow-up with your surgeon team in 1-2 weeks. If this appointment was not previous made for you please call [phone] and choose option #1 to schedule your follow-up appointment.   You will receive a call from our clinic nurse after surgery.  If you have any questions and would like to speak with a nurse please call [phone] and choose option #3 to speak with a triage nurse.  Call [phone] and ask to speak with surgery resident if you are having troubles in the evenings, at night, or on weekends. Please call if you experience increasing abdominal pain, nausea, vomiting, increasing drainage from your wounds, chills, or fever >101.5  Take stool softener while taking narcotic pain medication.  No driving for at least 12 hours after taking narcotic pain medication.  The following medications have been changed: Do not take multivitamins till seen in clinic next week. Your effexor has been switched to a tablet form ( three times a day dosing) and you have been given a 2 week supply of these meds. Please follow-up with your PCP in 1-2 weeks for refill of these meds.  Appointments located at [facility]:  [address]  [place] [address]            Wound care and dressings       Shower, pat dry, no soaking. Steri strips will fall off on their own or can be removed in 1 week                  Follow-up Appointments     Adult [facility] Follow-up and recommended labs and tests       Follow up in clinic in 1-2 weeks after surgery with bariatric team (Xin Costa PA-C)    Appointments on [place] and/or [facility] (with [facility] or [facility] provider or service). Call  [phone] if you haven't heard regarding these appointments within 7 days of discharge.            Adult [facility] Follow-up and recommended labs and tests       Follow up in weight loss clinic in 1-2 weeks with Dr. Shah.     Appointments on [place] and/or [facility] (with [facility] or [facility] provider or service). Call [phone] if you haven't heard regarding these appointments within 7 days of discharge.                  Your next 10 appointments already scheduled     Apr 19, 2018 10:40 AM CDT   (Arrive by 10:25 AM)   RETURN BARIATRIC SURGERY with Jani Shah MD   [facility] ([facility])    [address] [place] [address]   [phone]            Apr 19, 2018 11:00 AM CDT   (Arrive by 10:45 AM)   NUTRITION VISIT with Merle Zamudio RD   [facility] ([facility])    [address] [place] [address]   [phone]            May 14, 2018  1:30 PM CDT   Return Visit with Stanislav Rayo MD   [facility] ([facility])    [address] [facility] [id number] [phone]            May 17, 2018 12:45 PM CDT   (Arrive by 12:30 PM)   RETURN BARIATRIC SURGERY with Xin Costa PA-C   [facility] ([facility])    [address] [place] [address]   [phone]            May 17, 2018  1:00 PM CDT   (Arrive by 12:45 PM)   NUTRITION VISIT with Merle Zamudio RD   [facility] ([facility])    [address] [place] [address]   [phone]            Jun 11, 2018 10:30 AM CDT   (Arrive by 10:15 AM)   Return Visit with Minh Mancilla MD   [facility] ([facility])    [address] [place] [address]  "[address]  [address]th Floor  [facility] [address] [phone]              Additional Information     If you use hormonal birth control (such as the pill, patch, ring or implants): You'll need a second form of birth control for 7 days (condoms, a diaphragm or contraceptive foam). While in the hospital, you received a medicine called Bridion. Your normal birth control will not work as well for a week after taking this medicine.          Pending Results     Date and Time Order Name Status Description    4/10/2018 0915 Surgical pathology exam In process             Statement of Approval     Ordered          04/11/18 1111  I have reviewed and agree with all the recommendations and orders detailed in this document.  EFFECTIVE NOW     Approved and electronically signed by:  Leandro Choi MD             Admission Information     Date & Time Provider Department Dept. Phone    4/10/2018 Jani Shah MD [address] [facility] [phone]      Your Vitals Were     Blood Pressure Temperature Respirations Height Weight Pulse Oximetry    151/77 (BP Location: Right arm) 97.7  F (36.5  C) (Oral) 16 1.778 m (5' 10\") 111.2 kg (245 lb 2.4 oz) 95%    BMI (Body Mass Index)                   35.18 kg/m2           [facility] Information     [employer] gives you secure access to your electronic health record. If you see a primary care provider, you can also send messages to your care team and make appointments. If you have questions, please call your primary care clinic.  If you do not have a primary care provider, please call [phone] and they will assist you.        Care EveryWhere ID     This is your Care EveryWhere ID. This could be used by other organizations to access your [facility] medical records  [id number]        Equal Access to Services     KRISSY WORTHINGTON : milton Gillespie, riley melo. So [facility] [phone].    ATENCIÓN: Si habla " español, tiene a wheeler disposición servicios gratuitos de asistencia lingüística. Jay cedeno [phone].    We comply with applicable federal civil rights laws and [place] laws. We do not discriminate on the basis of race, color, national origin, age, disability, sex, sexual orientation, or gender identity.               Review of your medicines      START taking        Dose / Directions    acetaminophen 32 mg/mL solution   Commonly known as:  TYLENOL        Dose:  650 mg   Take 20.3 mLs (650 mg) by mouth every 4 hours as needed for mild pain or fever   Quantity:  400 mL   Refills:  0       oxyCODONE 5 MG/5ML solution   Commonly known as:  ROXICODONE        Dose:  5-10 mg   Take 5-10 mLs (5-10 mg) by mouth every 4 hours as needed for moderate to severe pain   Quantity:  150 mL   Refills:  0       polyethylene glycol powder   Commonly known as:  MIRALAX        Dose:  1 capful   Take 17 g (1 capful) by mouth daily   Quantity:  510 g   Refills:  1       venlafaxine 100 MG tablet   Commonly known as:  EFFEXOR   Replaces:  venlafaxine 150 MG 24 hr capsule        Dose:  100 mg   Take 1 tablet (100 mg) by mouth 3 times daily (with meals)   Quantity:  21 tablet   Refills:  1         CONTINUE these medicines which may have CHANGED, or have new prescriptions. If we are uncertain of the size of tablets/capsules you have at home, strength may be listed as something that might have changed.        Dose / Directions    furosemide 20 MG tablet   Commonly known as:  LASIX   This may have changed:    - when to take this  - additional instructions   Used for:  Leg swelling        Dose:  20 mg   Take 1 tablet (20 mg) by mouth daily - DUE FOR LABS   Quantity:  90 tablet   Refills:  3       traZODone 100 MG tablet   Commonly known as:  DESYREL   This may have changed:  when to take this   Used for:  Insomnia, unspecified insomnia        Dose:  100 mg   Take 1 tablet (100 mg) by mouth nightly as needed for sleep   Quantity:  30 tablet    Refills:  1         CONTINUE these medicines which have NOT CHANGED        Dose / Directions    ABILIFY 5 MG tablet   Generic drug:  ARIPiprazole        Dose:  5 mg   Take 5 mg by mouth every morning   Refills:  0       estradiol 2 MG tablet   Commonly known as:  ESTRACE   Used for:  Gender identity disorder        1 tablet daily   Quantity:  90 tablet   Refills:  3       LAMOTRIGINE PO        Dose:  150 mg   Take 150 mg by mouth every morning   Refills:  0       * medical cannabis inhalation (Patient's own supply.  Not a prescription)        Inhale into the lungs 2 times daily as needed (This is NOT a prescription, and does not certify that the patient has a qualifying medical condition for medical cannabis.  The purpose of this order is  to document that the patient reports taking medical cannabis.)  MORNING AND AT NIGHT   Refills:  0       * medical cannabis (Patient's own supply.  Not a prescription)        Dose:  2 capsule   2 capsules 2 times daily (This is NOT a prescription, and does not certify that the patient has a qualifying medical condition for medical cannabis.  The purpose of this order is  to document that the patient reports taking medical cannabis.)  Morning and afternoon   Refills:  0       simvastatin 20 MG tablet   Commonly known as:  ZOCOR   Used for:  Hyperlipidemia LDL goal <130        Dose:  20 mg   Take 1 tablet (20 mg) by mouth At Bedtime   Quantity:  90 tablet   Refills:  3       topiramate 200 MG tablet   Commonly known as:  TOPAMAX   Used for:  Chronic pain disorder, Fibromyalgia        Dose:  200 mg   Take 1 tablet (200 mg) by mouth 2 times daily   Quantity:  60 tablet   Refills:  0       triamcinolone 0.1 % ointment   Commonly known as:  KENALOG   Used for:  Prurigo nodularis        Apply to AA BID x 3-4 weeks then PRN   Quantity:  80 g   Refills:  3       * Notice:  This list has 2 medication(s) that are the same as other medications prescribed for you. Read the directions  carefully, and ask your doctor or other care provider to review them with you.      STOP taking     Multi-vitamin Tabs tablet           naltrexone 50 MG tablet   Commonly known as:  DEPADE;REVIA           venlafaxine 150 MG 24 hr capsule   Commonly known as:  EFFEXOR-XR   Replaced by:  venlafaxine 100 MG tablet           VITAMIN B 12 PO           VITAMIN D (CHOLECALCIFEROL) PO                Where to get your medicines      These medications were sent to [facility] - [place], [place] - [address] [address]     Phone:  [phone]     polyethylene glycol powder    venlafaxine 100 MG tablet         Some of these will need a paper prescription and others can be bought over the counter. Ask your nurse if you have questions.     Bring a paper prescription for each of these medications     oxyCODONE 5 MG/5ML solution       You don't need a prescription for these medications     acetaminophen 32 mg/mL solution                Protect others around you: Learn how to safely use, store and throw away your medicines at www.disposemymeds.org.        Information about OPIOIDS     PRESCRIPTION OPIOIDS: WHAT YOU NEED TO KNOW    Prescription opioids can be used to help relieve moderate to severe pain and are often prescribed following a surgery or injury, or for certain health conditions. These medications can be an important part of treatment but also come with serious risks. It is important to work with your health care provider to make sure you are getting the safest, most effective care.    WHAT ARE THE RISKS AND SIDE EFFECTS OF OPIOID USE?  Prescription opioids carry serious risks of addiction and overdose, especially with prolonged use. An opioid overdose, often marked by slowed breathing can cause sudden death. The use of prescription opioids can have a number of side effects as well, even when taken as directed:      Tolerance - meaning you might need to take more  of a medication for the same pain relief    Physical dependence - meaning you have symptoms of withdrawal when a medication is stopped    Increased sensitivity to pain    Constipation    Nausea, vomiting, and dry mouth    Sleepiness and dizziness    Confusion    Depression    Low levels of testosterone that can result in lower sex drive, energy, and strength    Itching and sweating    RISKS ARE GREATER WITH:    History of drug misuse, substance use disorder, or overdose    Mental health conditions (such as depression or anxiety)    Sleep apnea    Older age (65 years or older)    Pregnancy    Avoid alcohol while taking prescription opioids.   Also, unless specifically advised by your health care provider, medications to avoid include:    Benzodiazepines (such as Xanax or Valium)    Muscle relaxants (such as Soma or Flexeril)    Hypnotics (such as Ambien or Lunesta)    Other prescription opioids    KNOW YOUR OPTIONS:  Talk to your health care provider about ways to manage your pain that do not involve prescription opioids. Some of these options may actually work better and have fewer risks and side effects:    Pain relievers such as acetaminophen, ibuprofen, and naproxen    Some medications that are also used for depression or seizures    Physical therapy and exercise    Cognitive behavioral therapy, a psychological, goal-directed approach, in which patients learn how to modify physical, behavioral, and emotional triggers of pain and stress    IF YOU ARE PRESCRIBED OPIOIDS FOR PAIN:    Never take opioids in greater amounts or more often than prescribed    Follow up with your primary health care provider and work together to create a plan on how to manage your pain.    Talk about ways to help manage your pain that do not involve prescription opioids    Talk about all concerns and side effects    Help prevent misuse and abuse    Never sell or share prescription opioids    Never use another person's prescription  opioids    Store prescription opioids in a secure place and out of reach of others (this may include visitors, children, friends, and family)    Visit www.cdc.gov/drugoverdose to learn about risks of opioid abuse and overdose    If you believe you may be struggling with addiction, tell your health care provider and ask for guidance or call [facility]'s National Helpline at 0-261-403-HELP    LEARN MORE / www.cdc.gov/drugoverdose/prescribing/guideline.html    Safely dispose of unused prescription opioids: Find your local drug take-back programs and more information about the importance of safe disposal at www.doseofreality.[place].gov             Medication List: This is a list of all your medications and when to take them. Check marks below indicate your daily home schedule. Keep this list as a reference.      Medications           Morning Afternoon Evening Bedtime As Needed    ABILIFY 5 MG tablet   Take 5 mg by mouth every morning   Generic drug:  ARIPiprazole                                acetaminophen 32 mg/mL solution   Commonly known as:  TYLENOL   Take 20.3 mLs (650 mg) by mouth every 4 hours as needed for mild pain or fever                                estradiol 2 MG tablet   Commonly known as:  ESTRACE   1 tablet daily   Last time this was given:  2 mg on 4/10/2018  4:43 PM                                furosemide 20 MG tablet   Commonly known as:  LASIX   Take 1 tablet (20 mg) by mouth daily - DUE FOR LABS                                LAMOTRIGINE PO   Take 150 mg by mouth every morning                                * medical cannabis inhalation (Patient's own supply.  Not a prescription)   Inhale into the lungs 2 times daily as needed (This is NOT a prescription, and does not certify that the patient has a qualifying medical condition for medical cannabis.  The purpose of this order is  to document that the patient reports taking medical cannabis.)  MORNING AND AT NIGHT                                * medical  cannabis (Patient's own supply.  Not a prescription)   2 capsules 2 times daily (This is NOT a prescription, and does not certify that the patient has a qualifying medical condition for medical cannabis.  The purpose of this order is  to document that the patient reports taking medical cannabis.)  Morning and afternoon                                oxyCODONE 5 MG/5ML solution   Commonly known as:  ROXICODONE   Take 5-10 mLs (5-10 mg) by mouth every 4 hours as needed for moderate to severe pain   Last time this was given:  10 mg on 4/11/2018 10:51 AM                                polyethylene glycol powder   Commonly known as:  MIRALAX   Take 17 g (1 capful) by mouth daily                                simvastatin 20 MG tablet   Commonly known as:  ZOCOR   Take 1 tablet (20 mg) by mouth At Bedtime                                topiramate 200 MG tablet   Commonly known as:  TOPAMAX   Take 1 tablet (200 mg) by mouth 2 times daily                                traZODone 100 MG tablet   Commonly known as:  DESYREL   Take 1 tablet (100 mg) by mouth nightly as needed for sleep                                triamcinolone 0.1 % ointment   Commonly known as:  KENALOG   Apply to AA BID x 3-4 weeks then PRN                                venlafaxine 100 MG tablet   Commonly known as:  EFFEXOR   Take 1 tablet (100 mg) by mouth 3 times daily (with meals)   Last time this was given:  100 mg on 4/11/2018  9:22 AM                                * Notice:  This list has 2 medication(s) that are the same as other medications prescribed for you. Read the directions carefully, and ask your doctor or other care provider to review them with you.

## 2018-04-10 NOTE — ANESTHESIA CARE TRANSFER NOTE
Patient: Kylie Austin    Procedure(s):  Laparoscopic Sleeve Gastrectomy - Wound Class: II-Clean Contaminated    Diagnosis: Obesity   Diagnosis Additional Information: No value filed.    Anesthesia Type:   No value filed.     Note:  Airway :Face Mask  Patient transferred to:PACU  Comments: Transferred to: PACU with supplemental O2 6L FM    Patient vital signs: stable    Airway: none    Monitors applied. Report to RN.        Handoff Report: Identifed the Patient, Identified the Reponsible Provider, Reviewed the pertinent medical history, Discussed the surgical course, Reviewed Intra-OP anesthesia mangement and issues during anesthesia, Set expectations for post-procedure period and Allowed opportunity for questions and acknowledgement of understanding      Vitals: (Last set prior to Anesthesia Care Transfer)    CRNA VITALS  4/10/2018 0900 - 4/10/2018 0936      4/10/2018             Pulse: 82    SpO2: 97 %    Resp Rate (observed): (!)  5    EKG: NSR                Electronically Signed By: TALIB Arciniega CRNA  April 10, 2018  9:36 AM

## 2018-04-10 NOTE — OP NOTE
[facility], [place]    Operative Note    Pre-operative diagnosis: Morbid obesity    Post-operative diagnosis Morbid obesity   Procedure: Procedure(s):  Laparoscopic Sleeve Gastrectomy - Wound Class: II-Clean Contaminated   Surgeon: Surgeon(s) and Role:     * Jani Shah MD - Primary     * Leandro Choi MD - Resident - Assisting   Anesthesia: General    Estimated blood loss: 20 cc   Drains: None   Specimens:   ID Type Source Tests Collected by Time Destination   A : Partial Gastrectomy Tissue Stomach, Body SURGICAL PATHOLOGY EXAM Jani Shah MD 4/10/2018  9:14 AM       Findings: See below.   Complications: None.   Implants: None.         BOUGIE SIZE: 40 FR  DISTANCE FROM PYLORUS: 10 CM  STAPLE LINE REINFORCEMENT: NO  STAPLE LINE OVERSEW: NO    COMORBIDITIES:   Past Medical History:   Diagnosis Date     Atrial fib/flutter, transient     S/p cardioversion      Bipolar 2 disorder (H)      Chronic pain     LUE pain     Fibromyalgia      Gender identity disorder Started Rx      H/O hypogonadism Gonadectomy      Hyperlipidemia      Hypertension      Psoriasis        INDICATIONS FOR PROCEDURE  Kylie Austin is a 52 year old female who is morbidly obese.  Numerous weight loss attempts without surgery have been without success.     After understanding the risks and benefits of proceeding with a laparoscopic vertical sleeve gastrectomy, she agreed to an operation as outlined by me.    I reviewed the risks of surgery with Kylie Austin.    These include, but are not limited to, death, myocardial infarction, pneumonia, urinary tract infection, deep venous thrombosis with or without pulmonary embolus, abdominal infection from bowel injury or abscess, bowel obstruction, wound infection, and bleeding.    More specific risks related to vertical sleeve gastrectomy were detailed at the bariatric informational seminar and include the followin.)  "leak at the vertical sleeve staple line, 2.) stricture in the sleeve, 3.) nausea, vomiting, and dehydration for several months, 4.) adhesions causing bowel obstruction, 5.) rapid weight loss causing a higher rate of gallstone formation during the first 6 months after surgery, 6.) decreased absorption of vitamins because of the reduced stomach size, 7.) weight regain if inappropriate food intake occurs.    The BMI that we are treating this patient for was measured at the initial consultation visit in our bariatric program and it was: 38.9 kg/m2 (as calculated just below).      The initial consult height, weight, and BMI are as follows: (10/18/2017):    She had a height of 5' 9\", a weight of 271 lbs 4.8 oz, and calculated Body mass index of 40.06 kg/(m^2); 40.1 kg/m2 is entering BMI that we are treating her for.    Our weight loss surgery program requires weight loss prior to bariatric surgery and currently the height, weight, and BMI are as follows:    Height: 177.8 cm (5' 10\"), Weight: 111.2 kg (245 lb 2.4 oz), and currently the Body mass index is 35.18 kg/(m^2).    Bariatric surgery has been recommended and is being performed today given morbid obesity and co-morbidities of hypertension and hyperlipidemia.    OPERATIVE PROCEDURE:     Kylie Austin was brought to the operating room and prepared in routine fashion. Under the benefits of general anesthesia, a left upper quadrant Veress needle was inserted and pneumoperitoneum was established using carbon dioxide gas to a maximum pressure of 15 mmHg. A total of five ports were placed into the abdomen.     A liver retractor was placed through the rightmost port and this provided a view of the upper stomach. The operation was started by dividing the short gastric vessels off the greater curvature of the stomach. This dissection was carried up to the angle of His, and ligasure dissector was used for hemostasis.     A bougie (size noted above) was passed into the " stomach and I used 5 purple loads of the Endo GUILLAUME stapler device to create a vertical sleeve gastrectomy with the bougie as a template. The bougie was removed.    The sleeve gastrectomy specimen (partial gastrectomy) was now removed from the abdomen through the 15 mm port.     Hemostasis was secured by placing additional 5 mm clips along the staple line. Subsequently the liver retractor and all ports were removed from the abdomen under direct visualization.     All needle and sponge counts were correct x2 at the end of the operation, and I was present for all critical components of the procedure.     Skin incisions were closed using skin staples, and sterile dressings were placed.     Jani Shah MD  Surgery  [phone] (hospital )  [phone] (clinic nurses)

## 2018-04-10 NOTE — LETTER
Transition Communication Hand-off for Care Transitions to Next Level of Care Provider    Name: Kylie Austin  : 1965  MRN #: [id number]  Primary Care Provider: Kristin Miner     Primary Clinic[place] [address] [address]     Reason for Hospitalization:  Obesity   S/P laparoscopic sleeve gastrectomy  Admit Date/Time: 4/10/2018  6:20 AM  Discharge Date: 2018  Payor Source: Payor: BCBS / Plan: BCBS PLATINUM BLUE / Product Type: PPO /          Reason for Communication Hand-off Referral: Other continuity of care    Discharge Plan: discharged to home with plan for clinic f/u     Follow-up plan:  Future Appointments  Date Time Provider Department Center   2018 10:40 AM Jani Shah MD [facility]   2018 11:00 AM Merle Zamudio RD [facility]   2018 1:30 PM Stanislav Morton MD [facility]   2018 12:45 PM Xin Costa PA-C [facility]   2018 1:00 PM Merle Zamudio RD [facility]   2018 10:30 AM Minh Mancilla MD [facility]     Aislinn Guo, RNCC  [phone]    AVS/Discharge Summary is the source of truth; this is a helpful guide for improved communication of patient story

## 2018-04-10 NOTE — IP AVS SNAPSHOT
[address] [address]    Phone:  [phone]                                       After Visit Summary   4/10/2018    Kylie Austin    MRN: [id number]           After Visit Summary Signature Page     I have received my discharge instructions, and my questions have been answered. I have discussed any challenges I see with this plan with the nurse or doctor.    ..........................................................................................................................................  Patient/Patient Representative Signature      ..........................................................................................................................................  Patient Representative Print Name and Relationship to Patient    ..................................................               ................................................  Date                                            Time    ..........................................................................................................................................  Reviewed by Signature/Title    ...................................................              ..............................................  Date                                                            Time

## 2018-04-10 NOTE — BRIEF OP NOTE
[facility], [place]    Brief Operative Note    Pre-operative diagnosis: Obesity   Post-operative diagnosis Obesity  Procedure: Procedure(s):  Laparoscopic Sleeve Gastrectomy - Wound Class: II-Clean Contaminated  Surgeon: Surgeon(s) and Role:     * Jani Shah MD - Primary     * Leandro Choi MD - Resident - Assisting  Anesthesia: General   Estimated blood loss: 20 cc  Drains: None  Specimens:   ID Type Source Tests Collected by Time Destination   A : Partial Gastrectomy Tissue Stomach, Body SURGICAL PATHOLOGY EXAM Jani Shah MD 4/10/2018  9:14 AM      Findings:   See op note .  Complications: None.  Implants: None.

## 2018-04-10 NOTE — LETTER
"April 12, 2018      TO: Kylie Austin  [address] [facility] [address]         Dear Ms. Kylie Austin,    We received and reviewed your test results done on 03/06/2018.  You may receive more than one letter if we receive the results on multiple days.  Please share all lab and test results with your primary care provider and keep a copy for your own records.        Your test results are normal.    If you have any questions, feel free contact us at the Call Center [phone].      Resulted Orders   Surgical pathology exam   Result Value Ref Range    Copath Report       Patient Name: KYLIE DAVILA  MR#: [id number]  Specimen #: [id number]  Collected: 4/10/2018  Received: 4/10/2018  Reported: 4/12/2018 09:01  Ordering Phy(s): YONI ROBBINS    For improved result formatting, select 'View Enhanced Report Format' under   Linked Documents section.    SPECIMEN(S):  Partial gastrectomy    FINAL DIAGNOSIS:  Stomach, sleeve gastrectomy  - Portion of unremarkable stomach    I have personally reviewed all specimens and/or slides, including the   listed special stains, and used them  with my medical judgement to determine or confirm the final diagnosis.    Electronically signed out by:  Jordon Gonzales M.D., [facility]    CLINICAL HISTORY:  Obesity  GROSS:  The specimen is received in formalin with proper patient identification,   labeled \"partial gastrectomy\".  The  specimen consists of 15.0 x 4.5 x 4.5 cm disrupted pouch of gastric tissue   with an 11.1 cm staple line.  Specimen is disrupted for an area measuring 5.1 cm.  The pouch is filled   wi th red tan semisolid material.  The  rugal folds are unremarkable with no polyps or ulcerations identified.    Representative sections are submitted  in cassette A1. (Dictated by: Sara Cates 4/10/2018 05:14 PM)    MICROSCOPIC:  A formal microscopic examination was performed.    CPT Codes:  A: 84732-RS3    TESTING LAB " LOCATION:  [facility], [address]   [address]  [phone]    COLLECTION SITE:  Client: [facility]  Location: [facility] ([facility])           Sincerely,       Jani Shah MD

## 2018-04-10 NOTE — ANESTHESIA POSTPROCEDURE EVALUATION
Patient: Kylie Austin    Procedure(s):  Laparoscopic Sleeve Gastrectomy - Wound Class: II-Clean Contaminated    Diagnosis:Obesity   Diagnosis Additional Information: No value filed.    Anesthesia Type:  No value filed.    Note:  Anesthesia Post Evaluation    Patient location during evaluation: PACU  Patient participation: Able to fully participate in evaluation  Level of consciousness: awake and alert  Pain management: satisfactory to patient  Airway patency: patent  Cardiovascular status: blood pressure returned to baseline and hemodynamically stable  Respiratory status: room air  Hydration status: euvolemic  PONV: none     Anesthetic complications: None          Last vitals:  Vitals:    04/10/18 0945 04/10/18 1000 04/10/18 1015   BP: 165/88 (!) 162/92 152/85   Resp: 18 16 16   Temp:  36.7  C (98.1  F)    SpO2: 97% 96% 96%         Electronically Signed By: Julian Taveras MD  April 10, 2018  10:26 AM

## 2018-04-10 NOTE — PLAN OF CARE
Problem: Patient Care Overview  Goal: Plan of Care/Patient Progress Review  Outcome: Improving  Arrived on 7B approximately 1120. Alert and oriented. Lap sites x5 clean and dry. Medicated x2 with Dilaudid 0.2mg since arrival with good pain management. Pt has not had any urge to empty bladder yet and has declined help to the BR to void. Spouse at bedside. Lungs clear. Tolerated ice chips, sips of H2O and progressed to chicken broth in 30cc amts Q 1/2 hour. Demonstrated use of IS up to 4000.

## 2018-04-10 NOTE — PHARMACY-CONSULT NOTE
Bariatric Consult    Medications evaluated as requested per Bariatric Consult. Patient may swallow tablets/capsules ONLY if less than   inch.  Larger tablets/capsules should be broken, crushed or split.    The following changes have been made based on the Bariatric Medication Management Policy. Venlafaxine  mg capsules changed to venlafaxine immediate release tablets 100 mg TID    The pharmacist will continue to follow as new medications are ordered.

## 2018-04-10 NOTE — OR NURSING
Pt ready for transport to 7B. Awaiting 7B RN availability to take report. Will continue to monitor.

## 2018-04-11 VITALS
BODY MASS INDEX: 35.1 KG/M2 | RESPIRATION RATE: 16 BRPM | DIASTOLIC BLOOD PRESSURE: 77 MMHG | WEIGHT: 245.15 LBS | HEIGHT: 70 IN | SYSTOLIC BLOOD PRESSURE: 151 MMHG | OXYGEN SATURATION: 95 % | TEMPERATURE: 97.7 F

## 2018-04-11 PROCEDURE — 25000128 H RX IP 250 OP 636: Performed by: STUDENT IN AN ORGANIZED HEALTH CARE EDUCATION/TRAINING PROGRAM

## 2018-04-11 PROCEDURE — 25000132 ZZH RX MED GY IP 250 OP 250 PS 637: Mod: GY | Performed by: STUDENT IN AN ORGANIZED HEALTH CARE EDUCATION/TRAINING PROGRAM

## 2018-04-11 PROCEDURE — A9270 NON-COVERED ITEM OR SERVICE: HCPCS | Mod: GY | Performed by: STUDENT IN AN ORGANIZED HEALTH CARE EDUCATION/TRAINING PROGRAM

## 2018-04-11 RX ORDER — POLYETHYLENE GLYCOL 3350 17 G/17G
1 POWDER, FOR SOLUTION ORAL DAILY
Qty: 510 G | Refills: 1 | Status: SHIPPED | OUTPATIENT
Start: 2018-04-11 | End: 2019-10-16

## 2018-04-11 RX ORDER — VENLAFAXINE 100 MG/1
100 TABLET ORAL
Qty: 21 TABLET | Refills: 1 | Status: SHIPPED | OUTPATIENT
Start: 2018-04-11 | End: 2019-08-13

## 2018-04-11 RX ORDER — OXYCODONE HCL 5 MG/5 ML
5-10 SOLUTION, ORAL ORAL EVERY 4 HOURS PRN
Qty: 150 ML | Refills: 0 | Status: SHIPPED | OUTPATIENT
Start: 2018-04-11 | End: 2018-04-19

## 2018-04-11 RX ORDER — VENLAFAXINE 100 MG/1
100 TABLET ORAL
Qty: 21 TABLET | Refills: 0 | Status: SHIPPED | OUTPATIENT
Start: 2018-04-11 | End: 2018-04-11

## 2018-04-11 RX ORDER — OXYCODONE HCL 5 MG/5 ML
5-10 SOLUTION, ORAL ORAL EVERY 6 HOURS PRN
Qty: 150 ML | Refills: 0 | Status: SHIPPED | OUTPATIENT
Start: 2018-04-11 | End: 2018-04-11

## 2018-04-11 RX ADMIN — ENOXAPARIN SODIUM 40 MG: 40 INJECTION SUBCUTANEOUS at 09:21

## 2018-04-11 RX ADMIN — OXYCODONE HYDROCHLORIDE 5 MG: 5 SOLUTION ORAL at 00:26

## 2018-04-11 RX ADMIN — VENLAFAXINE 100 MG: 50 TABLET ORAL at 09:22

## 2018-04-11 RX ADMIN — OXYCODONE HYDROCHLORIDE 10 MG: 5 SOLUTION ORAL at 10:51

## 2018-04-11 RX ADMIN — OXYCODONE HYDROCHLORIDE 5 MG: 5 SOLUTION ORAL at 06:09

## 2018-04-11 ASSESSMENT — PAIN DESCRIPTION - DESCRIPTORS: DESCRIPTORS: DISCOMFORT

## 2018-04-11 NOTE — DISCHARGE SUMMARY
[facility]  Discharge Summary  MIS/ Bariatric Surgery     Kylie Austin MRN# [id number]   YOB: 1965 Age: 52 year old     Date of Admission:  4/10/2018  Date of Discharge::  4/11/2018  Admitting Physician:  Jani Shah MD  Discharge Physician:  Jani Shah MD  Primary Care Physician:        Kristin Miner          Admission Diagnoses:   Morbid obesity           Discharge Diagnosis:   Morbid obesity          Procedures:   Laparoscopic vertical sleeve gastrectomy ( 4/10/2018)          Consultations:   PHARMACY BARIATRIC IP CONSULT  RESPIRATORY CARE IP CONSULT         Imaging Studies:     Results for orders placed or performed in visit on 11/13/17   MR Lumbar Spine w/o Contrast    Narrative    MR LUMBAR SPINE WITHOUT CONTRAST November 13, 2017 12:12 PM    HISTORY: Chronic bilateral back pain and left leg pain. No specific  injury.    TECHNIQUE: Sagittal T1 and T2 and STIR, axial proton density and T2  images.    COMPARISON: None.    FINDINGS: Five functional lumbar vertebral segments are assumed. The  caudal thecal sac contents appear intrinsically normal. Alignment in  the sagittal view appears normal. Vertebral bone marrow signal is  unremarkable.    T12-L1: Normal.    L1-L2: Normal.    L2-L3: Normal.    L3-L4: Very early signal loss and subtle disc space narrowing with  very mild disc bulging. Moderate right and minimal left posterior  facet degenerative changes. No disc protrusion or central stenosis.  Mild bilateral foraminal stenosis, right greater than left.    L4-L5: Very early signal loss without disc space narrowing. Minimal  disc bulging and no disc protrusion or central stenosis. Moderate  posterior facet degenerative changes bilaterally. Mild bilateral  foraminal stenosis.    L5-S1: Early signal loss, mild posterior disc space narrowing.  Posterior disc bulging or minimal broad-based central disc protrusion  associated with an annular  fissure abuts but does not displace or  compress neural structures. No central or foraminal stenosis.  Posterior facets show minimal degenerative change on the left.    Paraspinal soft tissues are unremarkable.      Impression    IMPRESSION:   1. Early degenerative disc disease and moderate right posterior facet  degenerative changes at L3-L4 with mild bilateral foraminal stenosis,  right greater than left.  2. Early degenerative disc disease and moderate posterior facet  degenerative changes L4-L5 with mild bilateral foraminal stenosis.  3. Early degenerative disc disease with no direct impingement on  neural structures at L5-S1.    BART METZGER MD              Medications Prior to Admission:     Prescriptions Prior to Admission   Medication Sig Dispense Refill Last Dose     estradiol (ESTRACE) 2 MG tablet 1 tablet daily 90 tablet 3 4/9/2018 at 1200     triamcinolone (KENALOG) 0.1 % ointment Apply to AA BID x 3-4 weeks then PRN 80 g 3 Past Week at Unknown time     furosemide (LASIX) 20 MG tablet Take 1 tablet (20 mg) by mouth daily - DUE FOR LABS (Patient taking differently: Take 20 mg by mouth every morning - DUE FOR LABS) 90 tablet 3 4/9/2018 at 1200     ARIPiprazole (ABILIFY) 5 MG tablet Take 5 mg by mouth every morning    4/9/2018 at 1200     simvastatin (ZOCOR) 20 MG tablet Take 1 tablet (20 mg) by mouth At Bedtime 90 tablet 3 4/9/2018 at 2100     topiramate (TOPAMAX) 200 MG tablet Take 1 tablet (200 mg) by mouth 2 times daily 60 tablet 0 4/9/2018 at 2100     medical cannabis inhalation (Patient's own supply.  Not a prescription) Inhale into the lungs 2 times daily as needed (This is NOT a prescription, and does not certify that the patient has a qualifying medical condition for medical cannabis.  The purpose of this order is  to document that the patient reports taking medical cannabis.)    MORNING AND AT NIGHT    4/9/2018 at 2100     medical cannabis (Patient's own supply.  Not a prescription) 2 capsules  2 times daily (This is NOT a prescription, and does not certify that the patient has a qualifying medical condition for medical cannabis.  The purpose of this order is  to document that the patient reports taking medical cannabis.)    Morning and afternoon    4/9/2018 at 1200     LAMOTRIGINE PO Take 150 mg by mouth every morning    4/9/2018 at 1200     traZODone (DESYREL) 100 MG tablet Take 1 tablet (100 mg) by mouth nightly as needed for sleep (Patient taking differently: Take 100 mg by mouth At Bedtime ) 30 tablet 1 4/9/2018 at 2100     [DISCONTINUED] naltrexone (DEPADE;REVIA) 50 MG tablet Take 1/2 tablet.  Time it one to two hours prior to worst cravings.  Then increase to one full tablet as instructed. 30 tablet 5 4/5/2018     [DISCONTINUED] VITAMIN D, CHOLECALCIFEROL, PO Take 5,000 Units by mouth every morning    4/3/2018     [DISCONTINUED] Cyanocobalamin (VITAMIN B 12 PO) Take 1,000 mcg by mouth every morning    4/3/2018 at Unknown time     [DISCONTINUED] multivitamin, therapeutic with minerals (MULTI-VITAMIN) TABS Take 1 tablet by mouth every morning    4/3/2018 at Unknown time     [DISCONTINUED] venlafaxine (EFFEXOR-XR) 150 MG 24 hr capsule Take 300 mg by mouth every morning   0 4/9/2018 at 1200              Discharge Medications:     Current Discharge Medication List      START taking these medications    Details   acetaminophen (TYLENOL) 32 mg/mL solution Take 20.3 mLs (650 mg) by mouth every 4 hours as needed for mild pain or fever  Qty: 400 mL    Associated Diagnoses: S/P laparoscopic sleeve gastrectomy      venlafaxine (EFFEXOR) 100 MG tablet Take 1 tablet (100 mg) by mouth 3 times daily (with meals)  Qty: 21 tablet, Refills: 1    Associated Diagnoses: S/P laparoscopic sleeve gastrectomy      polyethylene glycol (MIRALAX) powder Take 17 g (1 capful) by mouth daily  Qty: 510 g, Refills: 1    Comments: Take while on narcotics. Stop when having 1-2 soft BMs daily  Associated Diagnoses: S/P laparoscopic  sleeve gastrectomy      oxyCODONE (ROXICODONE) 5 MG/5ML solution Take 5-10 mLs (5-10 mg) by mouth every 4 hours as needed for moderate to severe pain  Qty: 150 mL, Refills: 0    Associated Diagnoses: S/P laparoscopic sleeve gastrectomy         CONTINUE these medications which have NOT CHANGED    Details   estradiol (ESTRACE) 2 MG tablet 1 tablet daily  Qty: 90 tablet, Refills: 3    Associated Diagnoses: Gender identity disorder      triamcinolone (KENALOG) 0.1 % ointment Apply to AA BID x 3-4 weeks then PRN  Qty: 80 g, Refills: 3    Associated Diagnoses: Prurigo nodularis      furosemide (LASIX) 20 MG tablet Take 1 tablet (20 mg) by mouth daily - DUE FOR LABS  Qty: 90 tablet, Refills: 3    Associated Diagnoses: Leg swelling      ARIPiprazole (ABILIFY) 5 MG tablet Take 5 mg by mouth every morning       simvastatin (ZOCOR) 20 MG tablet Take 1 tablet (20 mg) by mouth At Bedtime  Qty: 90 tablet, Refills: 3    Associated Diagnoses: Hyperlipidemia LDL goal <130      topiramate (TOPAMAX) 200 MG tablet Take 1 tablet (200 mg) by mouth 2 times daily  Qty: 60 tablet, Refills: 0    Comments: Due for appointment for further refills, please give reminder.  Associated Diagnoses: Chronic pain disorder; Fibromyalgia      medical cannabis inhalation (Patient's own supply.  Not a prescription) Inhale into the lungs 2 times daily as needed (This is NOT a prescription, and does not certify that the patient has a qualifying medical condition for medical cannabis.  The purpose of this order is  to document that the patient reports taking medical cannabis.)    MORNING AND AT NIGHT       medical cannabis (Patient's own supply.  Not a prescription) 2 capsules 2 times daily (This is NOT a prescription, and does not certify that the patient has a qualifying medical condition for medical cannabis.  The purpose of this order is  to document that the patient reports taking medical cannabis.)    Morning and afternoon       LAMOTRIGINE PO Take 150  "mg by mouth every morning       traZODone (DESYREL) 100 MG tablet Take 1 tablet (100 mg) by mouth nightly as needed for sleep  Qty: 30 tablet, Refills: 1    Associated Diagnoses: Insomnia, unspecified insomnia         STOP taking these medications       naltrexone (DEPADE;REVIA) 50 MG tablet Comments:   Reason for Stopping:         VITAMIN D, CHOLECALCIFEROL, PO Comments:   Reason for Stopping:         Cyanocobalamin (VITAMIN B 12 PO) Comments:   Reason for Stopping:         multivitamin, therapeutic with minerals (MULTI-VITAMIN) TABS Comments:   Reason for Stopping:         venlafaxine (EFFEXOR-XR) 150 MG 24 hr capsule Comments:   Reason for Stopping:                          Brief History of Illness:   Kylie Austin is a 52 year old female who is morbidly obese.  Numerous weight loss attempts without surgery have been without success. The BMI that we are treating this patient for was measured at the initial consultation visit in our bariatric program and it was: 38.9 kg/m2. Bariatric surgery was recommended given morbid obesity and co-morbidities of hypertension and hyperlipidemia          Hospital Course:   Postoperatively, the patient was transferred to the general surgical floor. Her diet was advanced to bariatric clears on POD 0 and to full liquids on POD1. On day of discharge, patient had well controlled pain , was spontaneously voiding and was passing gas.          Day of Discharge Physical Exam:   Blood pressure 151/77, temperature 97.7  F (36.5  C), temperature source Oral, resp. rate 16, height 1.778 m (5' 10\"), weight 111.2 kg (245 lb 2.4 oz), SpO2 95 %, not currently breastfeeding.    Gen: AAOx3, NAD  Pulm: Non-labored breathing  Abd: Soft, appropriately tender, no guarding              Incision C/D/I with staples in place. Staples removed and steri-strips placed      Ext:  Warm and well-perfused         Final Pathology Result:   Pending at time of discharge           Discharge Instructions " and Follow-Up:                            Discharge Procedure Orders  Reason for your hospital stay   Order Comments: S/p sleeve gastrectomy     Adult [facility] Follow-up and recommended labs and tests   Order Comments: Follow up in clinic in 1-2 weeks after surgery with bariatric team (Xin Costa PA-C)    Appointments on [facility] and[facility]or [facility] (with [facility] or [facility] provider or service). Call [phone] if you haven't heard regarding these appointments within 7 days of discharge.     Discharge Instructions   Order Comments: Diet on discharge   Continue bariatric clear liquids for 1 DAY and then advance to full liquids two days after surgery.    No lifting >10 pounds for 4-6 weeks. Discuss with your surgeon at follow up appointment  May shower starting postoperative day #1 but no scrubbing incisions. No bathing or soaking incisions for 2 weeks or until incisions completely healed.  Wound care: Keep clean and dry. Steri strips or glue will fall off on their own.   After surgery it is ok to swallow medications smaller than 1/4 inch(size of pencil eraser) for all procedures.  If medication is larger than 1/4 inch then it will need to be crushed, cut or in liquid form for 1-2 months after surgery. This can be discussed with surgeon team at the 1 month follow up appointment and will depend on patient tolerance.  Follow-up with your surgeon team in 1-2 weeks. If this appointment was not previous made for you please call [phone] and choose option #1 to schedule your follow-up appointment.   You will receive a call from our clinic nurse after surgery.  If you have any questions and would like to speak with a nurse please call [phone] and choose option #3 to speak with a triage nurse.  Call [phone] and ask to speak with surgery resident if you are having troubles in the evenings, at night, or on weekends. Please call if you experience increasing abdominal pain, nausea, vomiting, increasing drainage  from your wounds, chills, or fever >101.5  Take stool softener while taking narcotic pain medication.  No driving for at least 12 hours after taking narcotic pain medication.  The following medications have been changed: Do not take multivitamins till seen in clinic next week. Your effexor has been switched to a tablet form ( three times a day dosing) and you have been given a 2 week supply of these meds. Please follow-up with your PCP in 1-2 weeks for refill of these meds.  Appointments located at [facility] clinic:  [address] [address]  [place] [address]     Full Code              Home Health Care:   Not needed           Discharge Disposition:   Discharged to home      Condition at discharge: Stable      RYLEE RALPH MD  General Surgery Resident PGY-2    Pt was seen and discussed with Dr. Shah

## 2018-04-11 NOTE — PLAN OF CARE
Problem: Patient Care Overview  Goal: Plan of Care/Patient Progress Review  Outcome: No Change  A&O x4. O2 sats 95-97% on 2LNC Other VSS. Capnography WNL. Pt c/o epigastric Pain. Pain managed with IV dialudid x1 and oxycodone x1 with adequate relief. Lap sites With priam pore dressing, cdi. Tolerating Bariatric Clears. Had broth and water. Denied N/V. Voided 400cc. Ambulated in halls. Continue POC.

## 2018-04-11 NOTE — PLAN OF CARE
Problem: Bariatric Surgery (Adult,Pediatric)  Goal: Signs and Symptoms of Listed Potential Problems Will be Absent, Minimized or Managed (Bariatric Surgery)  Signs and symptoms of listed potential problems will be absent, minimized or managed by discharge/transition of care (reference Bariatric Surgery (Adult,Pediatric) [facility]).  Patient Afebrile. BP slightly elevated in the 150's. Patient doing well this am. Ready for discharge to home. Laps sites to abdomen with steri strips dry and intact. Oxycodone elixer for pain with adequate control. Tolerating clear liquids. No nausea. Void adequate.  Wife at bedside. Discharge orders reviewed with patient and wife. Encouraged to call with any questions or concerns. Follow up with md next week.

## 2018-04-11 NOTE — PLAN OF CARE
Problem: Patient Care Overview  Goal: Plan of Care/Patient Progress Review  Outcome: Improving  Afeb, vitals stable, 02 sats 95-96% on 2LPM/nc, c/o abd pain, oxycodone x2 with relief, abd lap sites dry and intact, belly soft with hypo bowel sounds, lungs clear, using IS per self, up to bathroom, voiding in large amts, offers no further c/o, appeared to rest/sleep between cares, hopes for dc later today

## 2018-04-12 ENCOUNTER — CARE COORDINATION (OUTPATIENT)
Dept: CARE COORDINATION | Facility: CLINIC | Age: 53
End: 2018-04-12

## 2018-04-12 LAB — COPATH REPORT: NORMAL

## 2018-04-19 ENCOUNTER — ALLIED HEALTH/NURSE VISIT (OUTPATIENT)
Dept: SURGERY | Facility: CLINIC | Age: 53
End: 2018-04-19
Payer: MEDICARE

## 2018-04-19 ENCOUNTER — OFFICE VISIT (OUTPATIENT)
Dept: SURGERY | Facility: CLINIC | Age: 53
End: 2018-04-19
Payer: MEDICARE

## 2018-04-19 VITALS
OXYGEN SATURATION: 96 % | BODY MASS INDEX: 34.35 KG/M2 | WEIGHT: 231.9 LBS | TEMPERATURE: 98.2 F | SYSTOLIC BLOOD PRESSURE: 109 MMHG | HEART RATE: 86 BPM | DIASTOLIC BLOOD PRESSURE: 77 MMHG | HEIGHT: 69 IN

## 2018-04-19 DIAGNOSIS — Z98.84 S/P LAPAROSCOPIC SLEEVE GASTRECTOMY: Primary | ICD-10-CM

## 2018-04-19 RX ORDER — URSODIOL 300 MG/1
300 CAPSULE ORAL 2 TIMES DAILY
Qty: 180 CAPSULE | Refills: 1 | Status: SHIPPED | OUTPATIENT
Start: 2018-04-19 | End: 2019-08-13

## 2018-04-19 ASSESSMENT — PAIN SCALES - GENERAL: PAINLEVEL: NO PAIN (0)

## 2018-04-19 NOTE — MR AVS SNAPSHOT
MRN:[id number]                      After Visit Summary   4/19/2018    Kylie Austin    MRN: [id number]           Visit Information        Provider Department      4/19/2018 11:00 AM Merle Zamudio RD [facility] Surgical Weight Management        Your next 10 appointments already scheduled     May 14, 2018  1:30 PM CDT   Return Visit with Stanislav Rayo MD   [facility] ([facility])    [address] [facility] [address]   [phone]            May 17, 2018 12:45 PM CDT   (Arrive by 12:30 PM)   RETURN BARIATRIC SURGERY with Xin Costa PA-C   [facility] Surgical Weight Management ([facility] [place])    [address] [place] [address]   [phone]            May 17, 2018  1:00 PM CDT   (Arrive by 12:45 PM)   NUTRITION VISIT with LUCILLE Lovett [facility] Surgical Weight Management ([facility] [place])    [address] [place] [address]   [phone]            Jun 11, 2018 10:30 AM CDT   (Arrive by 10:15 AM)   Return Visit with Minh Mancilla MD   [facility] Medical Weight Management ([facility] [place])    [address] [place] [address]   [phone]              Care Instructions    Goals:  1) Follow bariatric low-fat full liquid diet through day 13 post-op, then to progress to pureed diet x 2 weeks.  If tolerating, may advance on day 29 post-op to bariatric soft diet.   2) Work towards 60 gm protein/day.  3) Consume 48-64 oz fluids daily- between meals.  4) Eat slowly (>20 min/meal), chewing well to smooth consistency once on the bariatric soft diet.  5) Limit portions to 1/2 cup/meal.  6) Start chewable/liquid multivitamin/minerals twice daily.           MyChart Information     [employer] gives you secure access to your electronic health record. If you see a  primary care provider, you can also send messages to your care team and make appointments. If you have questions, please call your primary care clinic.  If you do not have a primary care provider, please call [phone] and they will assist you.      [employer] is an electronic gateway that provides easy, online access to your medical records. With [employer], you can request a clinic appointment, read your test results, renew a prescription or communicate with your care team.     To access your existing account, please contact your [facility] or call [phone] for assistance.        Care EveryWhere ID     This is your Care EveryWhere ID. This could be used by other organizations to access your [facility] medical records  [id number]        Equal Access to Services     SHARON WORTHINGTON : Dominik Gross, milton lester, tana brice, riley juarez. So [facility] [phone].    ATENCIÓN: Si habla español, tiene a wheeler disposición servicios gratuitos de asistencia lingüística. Llame al [phone].    We comply with applicable federal civil rights laws and [place] laws. We do not discriminate on the basis of race, color, national origin, age, disability, sex, sexual orientation, or gender identity.

## 2018-04-19 NOTE — LETTER
2018       RE: Kylie Austin  [address] [facility] [address]     Dear Colleague,    Thank you for referring your patient, Kylie Austin, to the [facility] SURGICAL WEIGHT MANAGEMENT at [facility]. Please see a copy of my visit note below.        Return Bariatric Surgery Note    RE: Kylie Austin  MR#: [id number]  : 1965  VISIT DATE: 2018      I had the pleasure of seeing your patient, Kylie Austin, in my post-bariatric surgery assessment clinic.    CHIEF COMPLAINT: Post-bariatric surgery follow-up    HISTORY OF PRESENT ILLNESS:    Underwent lap sleeve gastrectomy on 4/10/2018. She reports 13 pounds weight loss in the last week and would like to talk to the dietician about this and decreased energy. She is able to stay hydrated and tolerated full liquid diet. She denies reflux or dysphagia, denies nausea or vomiting. She reports one incision drained some but now all are healing well without issues.    Questions Regarding Prior Weight Loss Surgery Reviewed With Patient 2018   I had the following weight loss procedure: Sleeve Gastrectomy   What year was your surgery? 2018   How has your weight changed since your last visit? I have lost weight   Are you currently taking any weight loss medications? No   Do you currently have any of the following: None of the above   Have you been to the Emergency room since your last visit with us? No   Were you in the hospital since your last visit with us? No   Do you have any concerns today? no       Weight History:     2018   What is your highest lifetime weight? 297   What is your lowest weight since surgery? (In pounds) 231.6     Initial Weight: 121.7 kg (268 lb 4.8 oz)  Current Weight: Weight: 105.2 kg (231 lb 14.4 oz)  Cumulative weight loss (lbs): 36.4  Last Visits Weight: 114.7 kg (252 lb 14.4 oz)    Questions Regarding Co-Morbidities and Health Concerns  Reviewed With Patient 4/19/2018   Pre-diabetes: Never   Diabetes II: Never   High Blood Pressure: Improved   High cholesterol: Improved   Heartburn/Reflux: Never   Sleep apnea: Never   PCOS: Never   Back pain: Stayed the same   Joint pain: Stayed the same   Lower leg swelling: Improved       Eating Habits 4/19/2018   How many meals do you eat per day? 3   Do you snack between meals? No   How much food are you eating at each meal? Less than 1/2 cup   Are you able to separate your meals and liquids by at least 30 minutes? Yes   Are you able to avoid liquid calories? No       Exercise Questions Reviewed With Patient 4/19/2018   How often do you exercise? 1 to 2 times per week   What is the duration of your exercise (in minutes)? 10 Minutes   What types of exercise do you do? walking   What keeps you from being more active?  I am as active as I can possbily be       Social History:      4/19/2018   Are you smoking? No   Are you drinking alcohol? No       Medications:  Current Outpatient Prescriptions   Medication     ARIPiprazole (ABILIFY) 5 MG tablet     estradiol (ESTRACE) 2 MG tablet     furosemide (LASIX) 20 MG tablet     LAMOTRIGINE PO     medical cannabis (Patient's own supply.  Not a prescription)     medical cannabis inhalation (Patient's own supply.  Not a prescription)     polyethylene glycol (MIRALAX) powder     simvastatin (ZOCOR) 20 MG tablet     topiramate (TOPAMAX) 200 MG tablet     traZODone (DESYREL) 100 MG tablet     triamcinolone (KENALOG) 0.1 % ointment     ursodiol (ACTIGALL) 300 MG capsule     venlafaxine (EFFEXOR) 100 MG tablet     No current facility-administered medications for this visit.          4/19/2018   Do you avoid NSAIDs such as (Ibuprofen, Aleve, Naproxen, Advil)?   Yes       ROS:  GI:      4/19/2018   Vomiting: No   Diarrhea: Yes   Constipation: No   Swallowing trouble: No   Abdominal pain: No   Heartburn: No   Rash in skin folds: No   Depression: No   Stress urinary incontinence No  "    Skin:   RAFAELA CHENEY ROS - SKIN 4/19/2018   Rash in skin folds: No     Psych:      4/19/2018   Depression: No   Anxiety: No     Female Only:   BAR RBS ROS - FEMALE ONLY 4/19/2018   Female only: None of the above       LABS/IMAGING/MEDICAL RECORDS REVIEW: none applicable.    PHYSICAL EXAMINATION:  /77  Pulse 86  Temp 98.2  F (36.8  C) (Oral)  Ht 1.753 m (5' 9\")  Wt 105.2 kg (231 lb 14.4 oz)  SpO2 96%  BMI 34.25 kg/m2   NAD  NLB on RA  Soft, nontender. Incisions c/d/i with steristrips in place  WWP    ASSESSMENT AND PLAN:      1. 1 week status laparoscopic gastric sleeve  2. Morbid Obesity current BMI: Body mass index is 34.25 kg/(m^2).  3. Post surgical malabsorption:   Follow food plan per dietitian recommendations.   Continue taking recommended post-op vitamins.  4. Ursodiol for 6 months to prevent gallstone formation  5. Return to clinic in 3 weeks.      Again, thank you for allowing me to participate in the care of your patient.      Sincerely,    Jani Shah MD      "

## 2018-04-19 NOTE — PATIENT INSTRUCTIONS
Goals:  1) Follow bariatric low-fat full liquid diet through day 13 post-op, then to progress to pureed diet x 2 weeks.  If tolerating, may advance on day 29 post-op to bariatric soft diet.   2) Work towards 60 gm protein/day.  3) Consume 48-64 oz fluids daily- between meals.  4) Eat slowly (>20 min/meal), chewing well to smooth consistency once on the bariatric soft diet.  5) Limit portions to 1/2 cup/meal.  6) Start chewable/liquid multivitamin/minerals twice daily.

## 2018-04-19 NOTE — MR AVS SNAPSHOT
After Visit Summary   4/19/2018    Kylie Austin    MRN: [id number]           Patient Information     Date Of Birth          1965        Visit Information        Provider Department      4/19/2018 10:40 AM Jani Shah MD [facility]        Today's Diagnoses     S/P laparoscopic sleeve gastrectomy    -  1       Follow-ups after your visit        Follow-up notes from your care team     Return in about 4 weeks (around 5/17/2018).      Your next 10 appointments already scheduled     May 14, 2018  1:30 PM CDT   Return Visit with Stanislav Rayo MD   [facility] ([facility])    [address] [id number] [phone]            May 17, 2018 12:45 PM CDT   (Arrive by 12:30 PM)   RETURN BARIATRIC SURGERY with Xin Costa PA-C   [facility] ([facility])    [address] [place] [id number] [phone]            May 17, 2018  1:00 PM CDT   (Arrive by 12:45 PM)   NUTRITION VISIT with Mrele Zamudio RD   [facility] ([facility])    [address] [place] [id number] [phone]            Jun 11, 2018 10:30 AM CDT   (Arrive by 10:15 AM)   Return Visit with Minh Mancilla MD   [facility] ([facility])    [address] [place] [id number] [phone]              Who to contact     Please call your clinic at [phone] to:    Ask questions about your health    Make or cancel appointments    Discuss your medicines    Learn about your test results    Speak to your doctor            Additional Information About Your Visit        MyChart Information     MyChart gives you secure access to your electronic health record. If you see a primary care  "provider, you can also send messages to your care team and make appointments. If you have questions, please call your primary care clinic.  If you do not have a primary care provider, please call [phone] and they will assist you.      [employer] is an electronic gateway that provides easy, online access to your medical records. With [employer], you can request a clinic appointment, read your test results, renew a prescription or communicate with your care team.     To access your existing account, please contact your [facility] or call [phone] for assistance.        Care EveryWhere ID     This is your Care EveryWhere ID. This could be used by other organizations to access your [place] medical records  [id number]        Your Vitals Were     Pulse Temperature Height Pulse Oximetry BMI (Body Mass Index)       86 98.2  F (36.8  C) (Oral) 5' 9\" 96% 34.25 kg/m2        Blood Pressure from Last 3 Encounters:   04/19/18 109/77   04/11/18 151/77   03/19/18 124/84    Weight from Last 3 Encounters:   04/19/18 231 lb 14.4 oz   04/10/18 245 lb 2.4 oz   03/19/18 252 lb 3.2 oz              Today, you had the following     No orders found for display         Today's Medication Changes          These changes are accurate as of 4/19/18  4:48 PM.  If you have any questions, ask your nurse or doctor.               Start taking these medicines.        Dose/Directions    ursodiol 300 MG capsule   Commonly known as:  ACTIGALL   Used for:  S/P laparoscopic sleeve gastrectomy   Started by:  Jani Shah MD        Dose:  300 mg   Take 1 capsule (300 mg) by mouth 2 times daily   Quantity:  180 capsule   Refills:  1         These medicines have changed or have updated prescriptions.        Dose/Directions    furosemide 20 MG tablet   Commonly known as:  LASIX   This may have changed:    - when to take this  - additional instructions   Used for:  Leg swelling        Dose:  20 mg   Take 1 tablet (20 " mg) by mouth daily - DUE FOR LABS   Quantity:  90 tablet   Refills:  3       traZODone 100 MG tablet   Commonly known as:  DESYREL   This may have changed:  when to take this   Used for:  Insomnia, unspecified insomnia        Dose:  100 mg   Take 1 tablet (100 mg) by mouth nightly as needed for sleep   Quantity:  30 tablet   Refills:  1         Stop taking these medicines if you haven't already. Please contact your care team if you have questions.     acetaminophen 32 mg/mL solution   Commonly known as:  TYLENOL   Stopped by:  Jani Shah MD           oxyCODONE 5 MG/5ML solution   Commonly known as:  ROXICODONE   Stopped by:  Jani Shah MD                Where to get your medicines      These medications were sent to [id number] - [place], [place] - [address] [address] [facility]  [address], [place] [address]    Hours:  test fax successful 4/5/04 kr Phone:  [phone]     ursodiol 300 MG capsule                Primary Care Provider Office Phone # Fax #    Kristin Miner PA-C [phone]       [address]  [place] [address]        Equal Access to Services     [facility] AH: Hadii dedra ku hadasho Soomaali, waaxda luqadaha, qaybta kaalmada adeegyada, riley juarez. So [facility] [phone].    ATENCIÓN: Si habla español, tiene a wheeler disposición servicios gratuitos de asistencia lingüística. [facility] [phone].    We comply with applicable federal civil rights laws and [place] laws. We do not discriminate on the basis of race, color, national origin, age, disability, sex, sexual orientation, or gender identity.            Thank you!     Thank you for choosing [facility]  for your care. Our goal is always to provide you with excellent care. Hearing back from our patients is one way we can continue to improve our services. Please take a few minutes to complete the written survey that you may receive in the  mail after your visit with us. Thank you!             Your Updated Medication List - Protect others around you: Learn how to safely use, store and throw away your medicines at www.disposemymeds.org.          This list is accurate as of 4/19/18  4:48 PM.  Always use your most recent med list.                   Brand Name Dispense Instructions for use Diagnosis    ABILIFY 5 MG tablet   Generic drug:  ARIPiprazole      Take 5 mg by mouth every morning        estradiol 2 MG tablet    ESTRACE    90 tablet    1 tablet daily    Gender identity disorder       furosemide 20 MG tablet    LASIX    90 tablet    Take 1 tablet (20 mg) by mouth daily - DUE FOR LABS    Leg swelling       LAMOTRIGINE PO      Take 150 mg by mouth every morning        * medical cannabis inhalation (Patient's own supply.  Not a prescription)      Inhale into the lungs 2 times daily as needed (This is NOT a prescription, and does not certify that the patient has a qualifying medical condition for medical cannabis.  The purpose of this order is  to document that the patient reports taking medical cannabis.)  MORNING AND AT NIGHT        * medical cannabis (Patient's own supply.  Not a prescription)      2 capsules 2 times daily (This is NOT a prescription, and does not certify that the patient has a qualifying medical condition for medical cannabis.  The purpose of this order is  to document that the patient reports taking medical cannabis.)  Morning and afternoon        polyethylene glycol powder    MIRALAX    510 g    Take 17 g (1 capful) by mouth daily    S/P laparoscopic sleeve gastrectomy       simvastatin 20 MG tablet    ZOCOR    90 tablet    Take 1 tablet (20 mg) by mouth At Bedtime    Hyperlipidemia LDL goal <130       topiramate 200 MG tablet    TOPAMAX    60 tablet    Take 1 tablet (200 mg) by mouth 2 times daily    Chronic pain disorder, Fibromyalgia       traZODone 100 MG tablet    DESYREL    30 tablet    Take 1 tablet (100 mg) by mouth  nightly as needed for sleep    Insomnia, unspecified insomnia       triamcinolone 0.1 % ointment    KENALOG    80 g    Apply to AA BID x 3-4 weeks then PRN    Prurigo nodularis       ursodiol 300 MG capsule    ACTIGALL    180 capsule    Take 1 capsule (300 mg) by mouth 2 times daily    S/P laparoscopic sleeve gastrectomy       venlafaxine 100 MG tablet    EFFEXOR    21 tablet    Take 1 tablet (100 mg) by mouth 3 times daily (with meals)    S/P laparoscopic sleeve gastrectomy       * Notice:  This list has 2 medication(s) that are the same as other medications prescribed for you. Read the directions carefully, and ask your doctor or other care provider to review them with you.

## 2018-04-19 NOTE — PROGRESS NOTES
Nutrition Assessment  Reason For Visit:  Kylie Austin is a 52 year-old female presenting today for nutrition follow-up, 1 week s/p SG (4/10/18) with Dr. Shah.  Pt was accompanied by Wife Anuradha.    Anthropometrics  Initial Consult Weight: 271.3 lbs  Day of Surgery Weight: 245.1 lbs  Current Weight: 231.9 lbs  Weight loss: -39.4 lbs from initial consult; -13.2 lbs from day of surgery    Current Vitamins/Minerals: gummy MVI BID (twice adult dose as recommended; does not have iron, so Pt to switch to pill form with iron once out of gummies)     Nutrition History  Pt reports consuming and tolerating bariatric clear and low-fat full liquid diets. Fluid intake appears adequate, consuming 48-64 oz/day.    Nutrition Prescription:  Grams Protein: 60 (minimum)  Amount of Fluid: 48-64 oz    Nutrition Diagnosis  Food and nutrition-related knowledge deficit r/t lack of prior exposure to diet advancements beyond bariatric low-fat full liquid diet aeb pt unable to verbalize understanding of bariatric pureed and soft diets.    Intervention  Intervention At Appointment:  Materials/education provided on bariatric pureed and soft diets, protein intake, fluid intake, eating pace, portion control, avoiding excess sugar and fat, recommended vitamin/mineral supplements.  Provided Pt with list of goals and pureed recipes handout.    Patient Understanding: good  Expected Compliance: good    Goals:  1) Follow bariatric low-fat full liquid diet through day 13 post-op, then to progress to pureed diet x 2 weeks.  If tolerating, may advance on day 29 post-op to bariatric soft diet.   2) Work towards 60 gm protein/day.  3) Consume 48-64 oz fluids daily- between meals.  4) Eat slowly (>20 min/meal), chewing well to smooth consistency once on the bariatric soft diet.  5) Limit portions to 1/2 cup/meal.  6) Start chewable/liquid multivitamin/minerals twice daily.    Follow-Up: 3 weeks or prn    Time spent with patient: 30  minutes.  Merle Zamudio MS, RDN, LDN, CLT  Pager: [phone]

## 2018-04-19 NOTE — PROGRESS NOTES
Return Bariatric Surgery Note    RE: Kylie Austin  MR#: [id number]  : 1965  VISIT DATE: 2018      I had the pleasure of seeing your patient, Kylie Austin, in my post-bariatric surgery assessment clinic.    CHIEF COMPLAINT: Post-bariatric surgery follow-up    HISTORY OF PRESENT ILLNESS:    Underwent lap sleeve gastrectomy on 4/10/2018. She reports 13 pounds weight loss in the last week and would like to talk to the dietician about this and decreased energy. She is able to stay hydrated and tolerated full liquid diet. She denies reflux or dysphagia, denies nausea or vomiting. She reports one incision drained some but now all are healing well without issues.    Questions Regarding Prior Weight Loss Surgery Reviewed With Patient 2018   I had the following weight loss procedure: Sleeve Gastrectomy   What year was your surgery? 2018   How has your weight changed since your last visit? I have lost weight   Are you currently taking any weight loss medications? No   Do you currently have any of the following: None of the above   Have you been to the Emergency room since your last visit with us? No   Were you in the hospital since your last visit with us? No   Do you have any concerns today? no       Weight History:     2018   What is your highest lifetime weight? 297   What is your lowest weight since surgery? (In pounds) 231.6     Initial Weight: 121.7 kg (268 lb 4.8 oz)  Current Weight: Weight: 105.2 kg (231 lb 14.4 oz)  Cumulative weight loss (lbs): 36.4  Last Visits Weight: 114.7 kg (252 lb 14.4 oz)    Questions Regarding Co-Morbidities and Health Concerns Reviewed With Patient 2018   Pre-diabetes: Never   Diabetes II: Never   High Blood Pressure: Improved   High cholesterol: Improved   Heartburn/Reflux: Never   Sleep apnea: Never   PCOS: Never   Back pain: Stayed the same   Joint pain: Stayed the same   Lower leg swelling: Improved       Eating Habits 2018    How many meals do you eat per day? 3   Do you snack between meals? No   How much food are you eating at each meal? Less than 1/2 cup   Are you able to separate your meals and liquids by at least 30 minutes? Yes   Are you able to avoid liquid calories? No       Exercise Questions Reviewed With Patient 4/19/2018   How often do you exercise? 1 to 2 times per week   What is the duration of your exercise (in minutes)? 10 Minutes   What types of exercise do you do? walking   What keeps you from being more active?  I am as active as I can possbily be       Social History:      4/19/2018   Are you smoking? No   Are you drinking alcohol? No       Medications:  Current Outpatient Prescriptions   Medication     ARIPiprazole (ABILIFY) 5 MG tablet     estradiol (ESTRACE) 2 MG tablet     furosemide (LASIX) 20 MG tablet     LAMOTRIGINE PO     medical cannabis (Patient's own supply.  Not a prescription)     medical cannabis inhalation (Patient's own supply.  Not a prescription)     polyethylene glycol (MIRALAX) powder     simvastatin (ZOCOR) 20 MG tablet     topiramate (TOPAMAX) 200 MG tablet     traZODone (DESYREL) 100 MG tablet     triamcinolone (KENALOG) 0.1 % ointment     ursodiol (ACTIGALL) 300 MG capsule     venlafaxine (EFFEXOR) 100 MG tablet     No current facility-administered medications for this visit.          4/19/2018   Do you avoid NSAIDs such as (Ibuprofen, Aleve, Naproxen, Advil)?   Yes       ROS:  GI:      4/19/2018   Vomiting: No   Diarrhea: Yes   Constipation: No   Swallowing trouble: No   Abdominal pain: No   Heartburn: No   Rash in skin folds: No   Depression: No   Stress urinary incontinence No     Skin:   BAR RBS ROS - SKIN 4/19/2018   Rash in skin folds: No     Psych:      4/19/2018   Depression: No   Anxiety: No     Female Only:   BAR RBS ROS - FEMALE ONLY 4/19/2018   Female only: None of the above       LABS/IMAGING/MEDICAL RECORDS REVIEW: none applicable.    PHYSICAL EXAMINATION:  /77  Pulse 86  " Temp 98.2  F (36.8  C) (Oral)  Ht 1.753 m (5' 9\")  Wt 105.2 kg (231 lb 14.4 oz)  SpO2 96%  BMI 34.25 kg/m2   NAD  NLB on RA  Soft, nontender. Incisions c/d/i with steristrips in place  WWP    ASSESSMENT AND PLAN:      1. 1 week status laparoscopic gastric sleeve  2. Morbid Obesity current BMI: Body mass index is 34.25 kg/(m^2).  3. Post surgical malabsorption:   Follow food plan per dietitian recommendations.   Continue taking recommended post-op vitamins.  4. Ursodiol for 6 months to prevent gallstone formation  5. Return to clinic in 3 weeks.    Sincerely,     Jani Shah MD  Surgery  [phone] (hospital )  [phone] (clinic nurses)      "

## 2018-04-19 NOTE — NURSING NOTE
"(   Chief Complaint   Patient presents with     Surgical Followup     1 wk post op LSG 4/10/18    )    ( Weight: 231 lb 14.4 oz )  ( Height: 5' 9\" )  ( BMI (Calculated): 34.32 )  ( Initial Weight: 268 lb 4.8 oz )  ( Cumulative weight loss (lbs): 36.4 )  ( Last Visits Weight: 252 lb 14.4 oz )  ( Wt change since last visit (lbs): -21 )  (   )  (   )    ( BP: 109/77 )  (   )  ( Temp: 98.2  F (36.8  C) )  ( Temp src: Oral )  ( Pulse: 86 )  (   )  ( SpO2: 96 % )    (   Patient Active Problem List   Diagnosis     Gender identity disorder     Hyperlipidemia LDL goal <130     Abnormal results of liver function studies     Chronic pain disorder     Insomnia     Bipolar 2 disorder (H)     Morbid obesity (H)     H/O hypogonadism     Fibromyalgia     Glenohumeral arthritis     Vitamin D deficiency     Primary osteoarthritis of left shoulder     Anxiety     Cervical spondylosis without myelopathy     Chronic fatigue     S/P laparoscopic sleeve gastrectomy    )  (   Current Outpatient Prescriptions   Medication Sig Dispense Refill     ARIPiprazole (ABILIFY) 5 MG tablet Take 5 mg by mouth every morning        estradiol (ESTRACE) 2 MG tablet 1 tablet daily 90 tablet 3     furosemide (LASIX) 20 MG tablet Take 1 tablet (20 mg) by mouth daily - DUE FOR LABS (Patient taking differently: Take 20 mg by mouth every morning - DUE FOR LABS) 90 tablet 3     LAMOTRIGINE PO Take 150 mg by mouth every morning        medical cannabis (Patient's own supply.  Not a prescription) 2 capsules 2 times daily (This is NOT a prescription, and does not certify that the patient has a qualifying medical condition for medical cannabis.  The purpose of this order is  to document that the patient reports taking medical cannabis.)    Morning and afternoon        medical cannabis inhalation (Patient's own supply.  Not a prescription) Inhale into the lungs 2 times daily as needed (This is NOT a prescription, and does not certify that the patient has a qualifying " medical condition for medical cannabis.  The purpose of this order is  to document that the patient reports taking medical cannabis.)    MORNING AND AT NIGHT        polyethylene glycol (MIRALAX) powder Take 17 g (1 capful) by mouth daily 510 g 1     simvastatin (ZOCOR) 20 MG tablet Take 1 tablet (20 mg) by mouth At Bedtime 90 tablet 3     topiramate (TOPAMAX) 200 MG tablet Take 1 tablet (200 mg) by mouth 2 times daily 60 tablet 0     traZODone (DESYREL) 100 MG tablet Take 1 tablet (100 mg) by mouth nightly as needed for sleep (Patient taking differently: Take 100 mg by mouth At Bedtime ) 30 tablet 1     triamcinolone (KENALOG) 0.1 % ointment Apply to AA BID x 3-4 weeks then PRN 80 g 3     venlafaxine (EFFEXOR) 100 MG tablet Take 1 tablet (100 mg) by mouth 3 times daily (with meals) 21 tablet 1    )  ( Diabetes Eval:    )    ( Pain Eval:  No Pain (0) )    ( Wound Eval:       )    (   History   Smoking Status     Never Smoker   Smokeless Tobacco     Never Used     Comment: NEVER SMOKED! Grew up with heavy smokers which did not help my Asthma!    )    ( Signed By:  Haja Tong; April 19, 2018; 10:41 AM )

## 2018-04-20 ENCOUNTER — MYC MEDICAL ADVICE (OUTPATIENT)
Dept: SURGERY | Facility: CLINIC | Age: 53
End: 2018-04-20

## 2018-04-26 DIAGNOSIS — E78.5 HYPERLIPIDEMIA LDL GOAL <130: ICD-10-CM

## 2018-04-26 NOTE — TELEPHONE ENCOUNTER
Routing refill request to provider for review/approval because:  Unsure when patient needs to follow up with PCP.  Pended for completion and approval.  Lilliana Lucio RN

## 2018-04-26 NOTE — TELEPHONE ENCOUNTER
"Requested Prescriptions   Pending Prescriptions Disp Refills     simvastatin (ZOCOR) 20 MG tablet [Pharmacy Med Name: Simvastatin Oral Tablet 20 MG] 90 tablet 2    Last Written Prescription Date:  01/31/18  Last Fill Quantity: 90,  # refills: 2   Last office visit: 11/13/2017 with prescribing provider:  ?   Future Office Visit:   Next 5 appointments (look out 90 days)     May 14, 2018  1:30 PM CDT   Return Visit with Stanislav Rayo MD   [facility] ([facility])    [address] [address]   [phone]                  Sig: TAKE 1 TABLET BY MOUTH AT BEDTIME    Statins Protocol Passed    4/26/2018  9:40 AM       Passed - LDL on file in past 12 months    Recent Labs   Lab Test  09/29/17   0755   LDL  111*            Passed - No abnormal creatine kinase in past 12 months    No lab results found.            Passed - Recent (12 mo) or future (30 days) visit within the authorizing provider's specialty    Patient had office visit in the last 12 months or has a visit in the next 30 days with authorizing provider or within the authorizing provider's specialty.  See \"Patient Info\" tab in inbasket, or \"Choose Columns\" in Meds & Orders section of the refill encounter.           Passed - Patient is age 18 or older       Passed - No active pregnancy on record       Passed - No positive pregnancy test in past 12 months          "

## 2018-04-27 RX ORDER — SIMVASTATIN 20 MG
TABLET ORAL
Qty: 30 TABLET | Refills: 0 | Status: SHIPPED | OUTPATIENT
Start: 2018-04-27 | End: 2018-05-31

## 2018-05-14 ENCOUNTER — OFFICE VISIT (OUTPATIENT)
Dept: PALLIATIVE MEDICINE | Facility: CLINIC | Age: 53
End: 2018-05-14
Attending: ANESTHESIOLOGY
Payer: COMMERCIAL

## 2018-05-14 VITALS
SYSTOLIC BLOOD PRESSURE: 111 MMHG | BODY MASS INDEX: 32.78 KG/M2 | WEIGHT: 222 LBS | HEART RATE: 91 BPM | DIASTOLIC BLOOD PRESSURE: 78 MMHG

## 2018-05-14 DIAGNOSIS — F31.81 BIPOLAR 2 DISORDER (H): ICD-10-CM

## 2018-05-14 DIAGNOSIS — M25.512 CHRONIC LEFT SHOULDER PAIN: ICD-10-CM

## 2018-05-14 DIAGNOSIS — G89.4 CHRONIC PAIN DISORDER: ICD-10-CM

## 2018-05-14 DIAGNOSIS — G89.29 CHRONIC LEFT SHOULDER PAIN: ICD-10-CM

## 2018-05-14 DIAGNOSIS — M75.42 IMPINGEMENT SYNDROME OF SHOULDER REGION, LEFT: Primary | ICD-10-CM

## 2018-05-14 DIAGNOSIS — M47.812 CERVICAL SPONDYLOSIS WITHOUT MYELOPATHY: ICD-10-CM

## 2018-05-14 DIAGNOSIS — F41.9 ANXIETY: ICD-10-CM

## 2018-05-14 DIAGNOSIS — M79.7 FIBROMYALGIA: ICD-10-CM

## 2018-05-14 DIAGNOSIS — R53.82 CHRONIC FATIGUE: ICD-10-CM

## 2018-05-14 DIAGNOSIS — F64.9 GENDER IDENTITY DISORDER: ICD-10-CM

## 2018-05-14 PROCEDURE — 99213 OFFICE O/P EST LOW 20 MIN: CPT | Performed by: ANESTHESIOLOGY

## 2018-05-14 NOTE — PROGRESS NOTES
[facility]    Date of visit: 5/14/2018    Chief complaint:   Chief Complaint   Patient presents with     Pain     Follow up        Interval history:  Kylie Austin was last seen by me on 11/7/17.      Recommendations/plan at the last visit included:  1. Physical Therapy: continue home exercise program - trial of home Yoga, Pilates, or [employer]  2. Clinical Health Psychologist to address issues of relaxation, behavioral change, coping style, and other factors important to improvement: continue behavioral health treatments  3. Diagnostic Studies:   1. Lumbar spine MRI  2. Left shoulder MRI  4. Medication Management:   1. Continue Medical Cannabis per dispensing pharmacy - will recertify  2. Continue Abilify 5 mg daily  3. Continue Topamax 200 mg BID  4. Continue Trazodone 50 mg QHS  5. Continue Effexor-XR as prescribed  5. Further procedures recommended:   1. Consider subacromial bursa injection and/or acromioclavicular joint injection  2. Consider left knee joint injection  3. Consider Sacroiliac joint injections vs lumbar facet joint injections  4. Consider repeat cervical medial branch procedures  6. Acupuncture: consider in the future  7. Urine toxicology screen today: deferred - not prescribing opioids   8. Recommendations/follow-up for PCP:  Continue current treatment  9. Release of information: n/a  10. Follow up: for injection and in 3 months in follow up       Since her last visit, Kylie Austin reports:  -Kylie had a gastric bypass done on April 10, 2018- no complications and she has lost about 68 pounds since then  -Patient has had pain in the left shoulder and arm.  Status post left subacromial injection on 1/17/18 which gave patient 3-4 months of relief.    -Patient's shoulder pain can affect her neck and hand as well. No numbness/tingling associated with this.   -Patient still takes medical marijuana which helps a lot with pain, sleep, and mood.   -Patient still  "with left knee pain that is worse with walking long distances.  Previously had an injection in the left knee which helped for a short term.    -Pain is described as aching, burning, shooting, sharp, and stabbing.  It is continuous  -Patient states cracking her neck and moving the left arm to a new position helps.    Pain scores:  Pain intensity on average is 3 on a scale of 0-10.  Ranges from 2/10 to 6/10    Current treatments include:  Medical cannabis  Abilify 5 mg daily  Topamax 200 mg BID  Trazodone 50 mg QHS  Effexor- mg two tabs daily     Previous medication treatments included:  Previous medication treatments included:  Tramadol  Vicodin   Advil- states he's been told by cardiology to not take any more NSAIDs   No other muscle relaxers   Prednisone   Sumatriptan   Buproprion  Prozac, Paxil, Zoloft, Cymbalta, Effexor   No TCA, SNRI   Ambien (currently), Lunesta, Trazodone   No prior AED     Other treatments have included:  Kylie NunesGeovanna has been seen at a pain clinic in the past.  Was followed by Dr Moscoso at [facility] - medications and procedures  PT: none recent - states \"it doesn't work\" and couldn't afford copay  Chiropractic: helps - not able to see as often as she would like  Acupuncture: long time ago - not sure if it helped  TENs Unit: has one - helps  Injections: multiple injections including cervical RFA - short term relief with RFA  -1/17/18 left subacromial bursa injection - worked well    Side Effects: Dry mouth    Medications:  Current Outpatient Prescriptions   Medication Sig Dispense Refill     ARIPiprazole (ABILIFY) 5 MG tablet Take 5 mg by mouth every morning        estradiol (ESTRACE) 2 MG tablet 1 tablet daily 90 tablet 3     furosemide (LASIX) 20 MG tablet Take 1 tablet (20 mg) by mouth daily - DUE FOR LABS (Patient taking differently: Take 20 mg by mouth every morning - DUE FOR LABS) 90 tablet 3     LAMOTRIGINE PO Take 150 mg by mouth every morning        " medical cannabis (Patient's own supply.  Not a prescription) 2 capsules 2 times daily (This is NOT a prescription, and does not certify that the patient has a qualifying medical condition for medical cannabis.  The purpose of this order is  to document that the patient reports taking medical cannabis.)    Morning and afternoon        medical cannabis inhalation (Patient's own supply.  Not a prescription) Inhale into the lungs 2 times daily as needed (This is NOT a prescription, and does not certify that the patient has a qualifying medical condition for medical cannabis.  The purpose of this order is  to document that the patient reports taking medical cannabis.)    MORNING AND AT NIGHT        polyethylene glycol (MIRALAX) powder Take 17 g (1 capful) by mouth daily 510 g 1     simvastatin (ZOCOR) 20 MG tablet TAKE 1 TABLET BY MOUTH AT BEDTIME 30 tablet 0     topiramate (TOPAMAX) 200 MG tablet Take 1 tablet (200 mg) by mouth 2 times daily 60 tablet 0     traZODone (DESYREL) 100 MG tablet Take 1 tablet (100 mg) by mouth nightly as needed for sleep (Patient taking differently: Take 100 mg by mouth At Bedtime ) 30 tablet 1     triamcinolone (KENALOG) 0.1 % ointment Apply to AA BID x 3-4 weeks then PRN 80 g 3     venlafaxine (EFFEXOR) 100 MG tablet Take 1 tablet (100 mg) by mouth 3 times daily (with meals) 21 tablet 1     ursodiol (ACTIGALL) 300 MG capsule Take 1 capsule (300 mg) by mouth 2 times daily (Patient not taking: Reported on 5/14/2018) 180 capsule 1       Medical History: any changes in medical history since they were last seen? No    Review of Systems:  The 14 system ROS was reviewed from the intake questionnaire, and is positive for: fatigue, weight loss, headache, nausea, arthritis, weakness, back pain, neck pain, tremor, depression, anxiety  Any bowel or bladder problems: denies  Mood: stable    Physical Exam:  /78  Pulse 91  Wt 100.7 kg (222 lb)  BMI 32.78 kg/m2  Constitutional: alert and no  distress, overweight   Head: Normocephalic. No masses, lesions, tenderness or abnormalities  ENT: EOMI, mucosal surfaces moist.  Neck with full ROM, posture fair  Cardiovascular: No edema or JVD appreciated.  Respiratory: Good diaphragmatic excursion. No wheezes appreciated.  Speaking in complete sentences without shortness of breath.  No accessory muscle use.   Musculoskeletal: no gross deformities noted, normal muscle tone and able to move about the exam room without difficulty.    ROM limited of left shoulder in flexion and abduction  Positive Neers and Vogel sign on left    Skin: no suspicious lesions or rashes appreciated on exposed areas  Neurologic: Gait normal. Moving all extremities spontaneously, no apparent weakness.    Psychiatric: mentation appears normal, affect full and good eye contact.      Cervical Spine:  Good range of motion, exam limited  Lumbar Spine:  Moves well, gait non-antalgic, exam limited    Assessment:  1.  Chronic pain syndrome  2.  Chronic neck and back pain  3.  Left shoulder pain/impingement  4.  Chronic left knee pain  5.  Osteoarthritis in multiple joints  6.  Cervical spondylosis and degenerative disc disease  7.  Myofascial pain  8.  Sacroiliac joint pain  9.  Lumbar facet joint pain  10.  Medical cannabis use    Kylie Piper is a 52 year old female who presents with the complaints of chronic, diffuse body and joint pains. Patient is status post gastric sleeve and doing well.  Patient had good relief with left subacromial injection in January and is reasonable to schedule for repeat at this time.  Her back and left knee have intermittent flares but her medical cannabis helps her pain.  Our treatment plan is as outlined below.      Plan:  1. Physical Therapy:  Continue home exercises.  Maintain ROM and stretching for left shoulder to prevent frozen shoulder.   2. Clinical Health Psychologist to address issues of relaxation, behavioral change, coping style, and other  factors important to improvement.  Continue treatment with psychiatrist.  3. Diagnostic Studies:  Not indicated at this time.  4. Medication Management:    1. Continue medical cannabis.   2. Continue Abilify 5 mg daily  3. Continue Topamax 200 mg BID  4. Continue Trazodone 50 mg QHS  5. Continue Effexor-XR as prescribed  5. Further procedures recommended:   1. Schedule repeat left subacromial injection  6. Recommendations to PCP: continue current treatment.  7. Follow up: 6 months    Gilbert Xie DO  Pain Medicine Fellow    I saw and examined the patient with the Pain Fellow/Resident. I have reviewed and agree with the resident's note and plan of care and made changes and corrections directly to the body of the note.    TIME SPENT:  BY FELLOW/RESIDENT ALONE 20 MIN  BY MYSELF AND FELLOW/RESIDENT TOGETHER 10 MIN  BY MYSELF WITHOUT THE FELLOW/RESIDENT 0 MIN    These times included 50% of the time spent counseling her about her diagnosis and treatment options and coordination of care with the primary team    Stanislav Rayo MD  [facility]

## 2018-05-14 NOTE — PATIENT INSTRUCTIONS
Assessment:  1.  Chronic pain syndrome  2.  Chronic neck and back pain  3.  Left shoulder pain/impingement  4.  Chronic left knee pain  5.  Osteoarthritis in multiple joints  6.  Cervical spondylosis and degenerative disc disease  7.  Myofascial pain  8.  Sacroiliac joint pain  9.  Lumbar facet joint pain  10.  Medical cannabis use    Plan:  1. Physical Therapy:  Continue home exercises.  Maintain ROM and stretching for left shoulder to prevent frozen shoulder.   2. Clinical Health Psychologist to address issues of relaxation, behavioral change, coping style, and other factors important to improvement.  Continue treatment with psychiatrist.  3. Diagnostic Studies:  Not indicated at this time.  4. Medication Management:    1. Continue medical cannabis.   2. Continue Abilify 5 mg daily  3. Continue Topamax 200 mg BID  4. Continue Trazodone 50 mg QHS  5. Continue Effexor-XR as prescribed  5. Further procedures recommended:   1. Schedule repeat left subacromial injection  6. Recommendations to PCP: continue current treatment.  7. Follow up: 6 months    ----------------------------------------------------------------  Nurse Triage line:  [phone]   Call this number with any questions or concerns. You may leave a detailed message anytime. Calls are typically returned Monday through Friday between 8 AM and 4:30 PM. We usually get back to you within 2 business days depending on the issue/request.       Medication refills:    For non-narcotic medications, call your pharmacy directly to request a refill. The pharmacy will contact the [facility] for authorization. Please allow 3-4 days for these refills to be processed.     For narcotic refills, call the nurse triage line or send a [employer] message. Please contact us 7-10 days before your refill is due. The message MUST include the name of the specific medication(s) requested and how you would like to receive the prescription(s). The options are as  follows:    Pain Clinic staff can mail the prescription to your pharmacy. Please tell us the name of the pharmacy.    You may pick the prescription up at the Pain Clinic (tell us the location) or during a clinic visit with your pain provider    Pain Clinic staff can deliver the prescription to the [place] [facility] pharmacy in the clinic building. Please tell us the location.      Scheduling number: [phone].  Call this number to schedule or change appointments.    We believe regular attendance is key to your success in our program.    Any time you are unable to keep your appointment we ask that you call us at least 24 hours in advance to let us know. This will allow us to offer the appointment time to another patient.

## 2018-05-14 NOTE — MR AVS SNAPSHOT
After Visit Summary   5/14/2018    Kylie Austin    MRN: [id number]           Patient Information     Date Of Birth          1965        Visit Information        Provider Department      5/14/2018 1:30 PM Stanislav Morton MD [facility]        Today's Diagnoses     Impingement syndrome of shoulder region, left    -  1    Chronic left shoulder pain          Care Instructions    Assessment:  1.  Chronic pain syndrome  2.  Chronic neck and back pain  3.  Left shoulder pain/impingement  4.  Chronic left knee pain  5.  Osteoarthritis in multiple joints  6.  Cervical spondylosis and degenerative disc disease  7.  Myofascial pain  8.  Sacroiliac joint pain  9.  Lumbar facet joint pain  10.  Medical cannabis use    Plan:  1. Physical Therapy:  Continue home exercises.  Maintain ROM and stretching for left shoulder to prevent frozen shoulder.   2. Clinical Health Psychologist to address issues of relaxation, behavioral change, coping style, and other factors important to improvement.  Continue treatment with psychiatrist.  3. Diagnostic Studies:  Not indicated at this time.  4. Medication Management:    1. Continue medical cannabis.   2. Continue Abilify 5 mg daily  3. Continue Topamax 200 mg BID  4. Continue Trazodone 50 mg QHS  5. Continue Effexor-XR as prescribed  5. Further procedures recommended:   1. Schedule repeat left subacromial injection  6. Recommendations to PCP: continue current treatment.  7. Follow up: 6 months    ----------------------------------------------------------------  Nurse Triage line:  [phone]   Call this number with any questions or concerns. You may leave a detailed message anytime. Calls are typically returned Monday through Friday between 8 AM and 4:30 PM. We usually get back to you within 2 business days depending on the issue/request.       Medication refills:    For non-narcotic medications, call your pharmacy directly to request a refill.  The pharmacy will contact the [facility] for authorization. Please allow 3-4 days for these refills to be processed.     For narcotic refills, call the nurse triage line or send a [employer] message. Please contact us 7-10 days before your refill is due. The message MUST include the name of the specific medication(s) requested and how you would like to receive the prescription(s). The options are as follows:    Pain Clinic staff can mail the prescription to your pharmacy. Please tell us the name of the pharmacy.    You may pick the prescription up at the Pain Clinic (tell us the location) or during a clinic visit with your pain provider    Pain Clinic staff can deliver the prescription to the [facility] pharmacy in the clinic building. Please tell us the location.      Scheduling number: [phone].  Call this number to schedule or change appointments.    We believe regular attendance is key to your success in our program.    Any time you are unable to keep your appointment we ask that you call us at least 24 hours in advance to let us know. This will allow us to offer the appointment time to another patient.               Follow-ups after your visit        Additional Services     PAIN INJECTION EVAL/TREAT/FOLLOW UP                 Your next 10 appointments already scheduled     May 17, 2018 12:45 PM CDT   (Arrive by 12:30 PM)   RETURN BARIATRIC SURGERY with Xin Costa PA-C   [facility] ([facility] [place])    [address] [place] [id number]   [phone]            May 17, 2018  1:00 PM CDT   (Arrive by 12:45 PM)   NUTRITION VISIT with Merle Zamudio RD   [facility] ([facility] [place])    [address] [place] [address]   [phone]            Jun 11, 2018 10:30 AM CDT   (Arrive by 10:15 AM)   Return Visit with Minh Mancilla MD     [facility] ([facility] [place])    [address]  4th Floor  [facility] [address] [phone]              Who to contact     If you have questions or need follow up information about today's clinic visit or your schedule please contact [facility] directly at [phone].  Normal or non-critical lab and imaging results will be communicated to you by MyChart, letter or phone within 4 business days after the clinic has received the results. If you do not hear from us within 7 days, please contact the clinic through [employer] or phone. If you have a critical or abnormal lab result, we will notify you by phone as soon as possible.  Submit refill requests through [employer] or call your pharmacy and they will forward the refill request to us. Please allow 3 business days for your refill to be completed.          Additional Information About Your Visit        [employer] Information     [employer] gives you secure access to your electronic health record. If you see a primary care provider, you can also send messages to your care team and make appointments. If you have questions, please call your primary care clinic.  If you do not have a primary care provider, please call [phone] and they will assist you.        Care EveryWhere ID     This is your Care EveryWhere ID. This could be used by other organizations to access your [facility] medical records  [id number]         Blood Pressure from Last 3 Encounters:   04/19/18 109/77   04/11/18 151/77   03/19/18 124/84    Weight from Last 3 Encounters:   04/19/18 105.2 kg (231 lb 14.4 oz)   04/10/18 111.2 kg (245 lb 2.4 oz)   03/19/18 114.4 kg (252 lb 3.2 oz)              We Performed the Following     PAIN INJECTION EVAL/TREAT/FOLLOW UP          Today's Medication Changes          These changes are accurate as of 5/14/18  1:48 PM.  If you have any questions, ask your nurse or doctor.               These medicines have  changed or have updated prescriptions.        Dose/Directions    furosemide 20 MG tablet   Commonly known as:  LASIX   This may have changed:    - when to take this  - additional instructions   Used for:  Leg swelling        Dose:  20 mg   Take 1 tablet (20 mg) by mouth daily - DUE FOR LABS   Quantity:  90 tablet   Refills:  3       traZODone 100 MG tablet   Commonly known as:  DESYREL   This may have changed:  when to take this   Used for:  Insomnia, unspecified insomnia        Dose:  100 mg   Take 1 tablet (100 mg) by mouth nightly as needed for sleep   Quantity:  30 tablet   Refills:  1                Primary Care Provider Office Phone # Fax #    Kristin Miner PA-C [phone]       [address] [place] [address]        Equal Access to Services     KRISSY WORTHINGTON : Hadii aad ku hadasho Soomaali, waaxda luqadaha, qaybta kaalmada adeegyada, riley yuan . So [facility] [place] [phone].    ATENCIÓN: Si habla español, tiene a wheeler disposición servicios gratuitos de asistencia lingüística. [facility] [phone].    We comply with applicable federal civil rights laws and [place] laws. We do not discriminate on the basis of race, color, national origin, age, disability, sex, sexual orientation, or gender identity.            Thank you!     Thank you for choosing [facility]  for your care. Our goal is always to provide you with excellent care. Hearing back from our patients is one way we can continue to improve our services. Please take a few minutes to complete the written survey that you may receive in the mail after your visit with us. Thank you!             Your Updated Medication List - Protect others around you: Learn how to safely use, store and throw away your medicines at www.disposemymeds.org.          This list is accurate as of 5/14/18  1:48 PM.  Always use your most recent med list.                   Brand Name Dispense Instructions for use Diagnosis     ABILIFY 5 MG tablet   Generic drug:  ARIPiprazole      Take 5 mg by mouth every morning        estradiol 2 MG tablet    ESTRACE    90 tablet    1 tablet daily    Gender identity disorder       furosemide 20 MG tablet    LASIX    90 tablet    Take 1 tablet (20 mg) by mouth daily - DUE FOR LABS    Leg swelling       LAMOTRIGINE PO      Take 150 mg by mouth every morning        * medical cannabis inhalation (Patient's own supply.  Not a prescription)      Inhale into the lungs 2 times daily as needed (This is NOT a prescription, and does not certify that the patient has a qualifying medical condition for medical cannabis.  The purpose of this order is  to document that the patient reports taking medical cannabis.)  MORNING AND AT NIGHT        * medical cannabis (Patient's own supply.  Not a prescription)      2 capsules 2 times daily (This is NOT a prescription, and does not certify that the patient has a qualifying medical condition for medical cannabis.  The purpose of this order is  to document that the patient reports taking medical cannabis.)  Morning and afternoon        polyethylene glycol powder    MIRALAX    510 g    Take 17 g (1 capful) by mouth daily    S/P laparoscopic sleeve gastrectomy       simvastatin 20 MG tablet    ZOCOR    30 tablet    TAKE 1 TABLET BY MOUTH AT BEDTIME    Hyperlipidemia LDL goal <130       topiramate 200 MG tablet    TOPAMAX    60 tablet    Take 1 tablet (200 mg) by mouth 2 times daily    Chronic pain disorder, Fibromyalgia       traZODone 100 MG tablet    DESYREL    30 tablet    Take 1 tablet (100 mg) by mouth nightly as needed for sleep    Insomnia, unspecified insomnia       triamcinolone 0.1 % ointment    KENALOG    80 g    Apply to AA BID x 3-4 weeks then PRN    Prurigo nodularis       ursodiol 300 MG capsule    ACTIGALL    180 capsule    Take 1 capsule (300 mg) by mouth 2 times daily    S/P laparoscopic sleeve gastrectomy       venlafaxine 100 MG tablet    EFFEXOR    21  tablet    Take 1 tablet (100 mg) by mouth 3 times daily (with meals)    S/P laparoscopic sleeve gastrectomy       * Notice:  This list has 2 medication(s) that are the same as other medications prescribed for you. Read the directions carefully, and ask your doctor or other care provider to review them with you.

## 2018-05-17 ENCOUNTER — OFFICE VISIT (OUTPATIENT)
Dept: SURGERY | Facility: CLINIC | Age: 53
End: 2018-05-17
Payer: MEDICARE

## 2018-05-17 ENCOUNTER — ALLIED HEALTH/NURSE VISIT (OUTPATIENT)
Dept: SURGERY | Facility: CLINIC | Age: 53
End: 2018-05-17
Payer: MEDICARE

## 2018-05-17 VITALS
SYSTOLIC BLOOD PRESSURE: 112 MMHG | WEIGHT: 222.2 LBS | BODY MASS INDEX: 33.68 KG/M2 | HEART RATE: 80 BPM | HEIGHT: 68 IN | DIASTOLIC BLOOD PRESSURE: 67 MMHG | OXYGEN SATURATION: 97 %

## 2018-05-17 DIAGNOSIS — Z98.84 S/P LAPAROSCOPIC SLEEVE GASTRECTOMY: Primary | ICD-10-CM

## 2018-05-17 ASSESSMENT — PAIN SCALES - GENERAL: PAINLEVEL: NO PAIN (0)

## 2018-05-17 NOTE — MR AVS SNAPSHOT
MRN:[id number]                      After Visit Summary   5/17/2018    Kylie Austin    MRN: [id number]           Visit Information        Provider Department      5/17/2018 1:00 PM Merle Zamudio RD [facility] Surgical [facility] Management        Your next 10 appointments already scheduled     May 21, 2018  1:00 PM CDT   Return Visit with Stanislav Rayo MD   [facility] ([facility])    [address] [place] [id number] [phone]            Jun 11, 2018 10:30 AM CDT   (Arrive by 10:15 AM)   Return Visit with Minh Mancilla MD   [facility] ([facility])    [address] [address] [phone]              Care Instructions    Goals:  1) Follow soft diet for 4 weeks, then to progress to bariatric regular diet.   2) Consume 60 grams of protein/day.  3) Sip on 48-64 oz of fluids/day- between meals only.  4) Eat slowly (>20 min/meal), chewing foods well (to applesauce-like consistency).  5) Limit portions to 1/2 cup/meal.  6) Take the following after a Sleeve Gastrectomy:    Multivitamin/minerals: adult dose 2 times daily    Iron: 45-60 mg elemental (18-36 mg if low risk) - may partly or fully be covered in multivitamin     Calcium Citrate containing vitamin D: 500 mg 3 times daily or 600 mg 2 times daily    Vitamin B12: sublingual form of at least 500 mcg daily or injection of 1000 mcg monthly          MyChart Information     [employer] gives you secure access to your electronic health record. If you see a primary care provider, you can also send messages to your care team and make appointments. If you have questions, please call your primary care clinic.  If you do not have a primary care provider, please call [phone] and they will assist you.      [employer] is an electronic gateway that provides easy, online access to your medical  records. With [employer], you can request a clinic appointment, read your test results, renew a prescription or communicate with your care team.     To access your existing account, please contact your [facility] or call [phone] for assistance.        Care EveryWhere ID     This is your Care EveryWhere ID. This could be used by other organizations to access your [place] medical records  [id number]        Equal Access to Services     SHARON WORTHINGTON : Dominik Gross, wamichi lester, tana kaalmahans brice, riley juarez. So [facility] [phone].    ATENCIÓN: Si habla español, tiene a wheeler disposición servicios gratuitos de asistencia lingüística. Llame al [phone].    We comply with applicable federal civil rights laws and [place] laws. We do not discriminate on the basis of race, color, national origin, age, disability, sex, sexual orientation, or gender identity.

## 2018-05-17 NOTE — PATIENT INSTRUCTIONS
Goals:  1) Follow soft diet for 4 weeks, then to progress to bariatric regular diet.   2) Consume 60 grams of protein/day.  3) Sip on 48-64 oz of fluids/day- between meals only.  4) Eat slowly (>20 min/meal), chewing foods well (to applesauce-like consistency).  5) Limit portions to 1/2 cup/meal.  6) Take the following after a Sleeve Gastrectomy:    Multivitamin/minerals: adult dose 2 times daily    Iron: 45-60 mg elemental (18-36 mg if low risk) - may partly or fully be covered in multivitamin     Calcium Citrate containing vitamin D: 500 mg 3 times daily or 600 mg 2 times daily    Vitamin B12: sublingual form of at least 500 mcg daily or injection of 1000 mcg monthly

## 2018-05-17 NOTE — PROGRESS NOTES
Return Bariatric Surgery Note    RE: Kylie Austin  MR#: [id number]  : 1965  VISIT DATE: May 17, 2018    Dear Kristin Miner,    I had the pleasure of seeing your patient, Kylie Austin, in my post-bariatric surgery assessment clinic.    CHIEF COMPLAINT: Post-bariatric surgery follow-up. 1 mo follow up.    HISTORY OF PRESENT ILLNESS:  Questions Regarding Prior Weight Loss Surgery Reviewed With Patient 2018   I had the following weight loss procedure: Sleeve Gastrectomy   What year was your surgery? 2018   How has your weight changed since your last visit? I have lost weight   Are you currently taking any weight loss medications? No   Do you currently have any of the following: None of the above   Have you been to the Emergency room since your last visit with us? No   Were you in the hospital since your last visit with us? No   Do you have any concerns today? no       Weight History:     2018   What is your highest lifetime weight? 298   What is your lowest weight since surgery? (In pounds) 221.6     Initial Weight: 268 lb  Current Weight: Weight: 222 lb 3.2 oz     Last Visits Weight: 222 lb    Questions Regarding Co-Morbidities and Health Concerns Reviewed With Patient 2018   Pre-diabetes: Never   Diabetes II: Never   High Blood Pressure: Improved   High cholesterol: Stayed the same   Heartburn/Reflux: Never   Sleep apnea: Never   PCOS: Never   Back pain: Stayed the same   Joint pain: Stayed the same   Lower leg swelling: Improved       Eating Habits 2018   How many meals do you eat per day? 3   Do you snack between meals? No   How much food are you eating at each meal? Less than 1/2 cup   Are you able to separate your meals and liquids by at least 30 minutes? Yes   Are you able to avoid liquid calories? Yes       Exercise Questions Reviewed With Patient 2018   How often do you exercise? Daily   What is the duration of your exercise (in minutes)? 60+  "Minutes   What types of exercise do you do? walking, gym membership   What keeps you from being more active?  I am as active as I can possbily be, Pain       Social History:      5/17/2018   Are you smoking? No   Are you drinking alcohol? No       Medications:  Current Outpatient Prescriptions   Medication     ARIPiprazole (ABILIFY) 5 MG tablet     estradiol (ESTRACE) 2 MG tablet     furosemide (LASIX) 20 MG tablet     LAMOTRIGINE PO     medical cannabis (Patient's own supply.  Not a prescription)     medical cannabis inhalation (Patient's own supply.  Not a prescription)     polyethylene glycol (MIRALAX) powder     simvastatin (ZOCOR) 20 MG tablet     topiramate (TOPAMAX) 200 MG tablet     traZODone (DESYREL) 100 MG tablet     triamcinolone (KENALOG) 0.1 % ointment     ursodiol (ACTIGALL) 300 MG capsule     venlafaxine (EFFEXOR) 100 MG tablet     No current facility-administered medications for this visit.          5/17/2018   Do you avoid NSAIDs such as (Ibuprofen, Aleve, Naproxen, Advil)?   Yes       ROS:  GI:      5/17/2018   Vomiting: Yes   Diarrhea: No   Constipation: No   Swallowing trouble: No   Abdominal pain: Yes   Heartburn: No   Rash in skin folds: No   Depression: No   Stress urinary incontinence No     Skin:   BAR RBS ROS - SKIN 5/17/2018   Rash in skin folds: No     Psych:      5/17/2018   Depression: No   Anxiety: No     Female Only:   BAR RBS ROS - FEMALE ONLY 5/17/2018   Female only: None of the above       LABS/IMAGING/MEDICAL RECORDS REVIEW:     PHYSICAL EXAMINATION:  /67 (BP Location: Left arm, Patient Position: Chair, Cuff Size: Adult Large)  Pulse 80  Ht 5' 7.72\"  Wt 222 lb 3.2 oz  SpO2 97%  BMI 34.07 kg/m2   General: No apparent distress  Neuro: A & O x 3  Head: Atraumatic, normocephalic  Eyes: PERRL, EOMI  Skin: warm and dry, no rashes on exposed skin  Respiratory: respirations unlabored  Abdomen: soft NT ND lap sites well healed  Extremities: No LE swelling      ASSESSMENT AND " PLAN:      1. 1 months status laparoscopic gastric sleeve. She is doing well.  2. Morbid Obesity  current BMI: Body mass index is 34.07 kg/(m^2).  3. Post surgical malabsorption:   Labs ordered per protocol.   Follow food plan per dietitian recommendations.   Continue taking recommended post-op vitamins.  4. Return to clinic in 2 months with labs prior.  5. See RD today      Sincerely,    Xin Costa PA-C

## 2018-05-17 NOTE — PROGRESS NOTES
Nutrition Assessment  Reason For Visit:  Kylie Austin is a 52 year-old female presenting today for nutrition follow-up, 1 month s/p SG (4/10/18) with Dr. Shah.  Pt was accompanied by Wife Anuradha.    Anthropometrics  Initial Consult Weight: 271.3 lbs  Day of Surgery Weight: 245.1 lbs  Current Weight: 222.2 lbs with BMI of 34.07.  Weight loss: -49.1 lbs from initial consult; -22.9 lbs from day of surgery    Current Vitamins/Minerals: gummy MVI BID (twice adult dose as recommended; does not have iron, so Pt to switch to pill form with iron once out of gummies)  Liquid B12 daily  Iron     Nutrition History    Progress with Previous Goals:  1) Follow bariatric low-fat full liquid diet through day 13 post-op, then to progress to pureed diet x 2 weeks.  If tolerating, may advance on day 29 post-op to bariatric soft diet.  - Pt has been tolerating pureed and soft foods well.  2) Work towards 60 gm protein/day. - Meeting; utilizing a protein shake (Premier Protein 30 g protein).   3) Consume 48-64 oz fluids daily- between meals. -Meeting.   4) Eat slowly (>20 min/meal), chewing well to smooth consistency once on the bariatric soft diet. - Meeting.   5) Limit portions to 1/2 cup/meal. - Meeting.  6) Start chewable/liquid multivitamin/minerals twice daily. - Meeting.     Exercise: walking several times/day 45-60 min/day    Nutrition Prescription:  Grams Protein: 60 (minimum)  Amount of Fluid: 48-64 oz    Nutrition Diagnosis  Previous: Food and nutrition-related knowledge deficit r/t lack of prior exposure to diet advancements beyond bariatric low-fat full liquid diet aeb pt unable to verbalize understanding of bariatric pureed and soft diets.    Current: Food and nutrition-related knowledge deficit r/t lack of prior exposure to diet advancements beyond bariatric pureed diet aeb pt unable to verbalize full understanding of bariatric soft and regular consistency diets.    Intervention  Materials/Education provided on  bariatric soft and regular consistency diets, protein intake, fluid intake, eating pace, chewing foods well, portion control, sugar/fat intake, recommended vitamin/mineral supplements.     Patient Understanding: good  Expected Compliance: good    Goals:  1) Follow soft diet for 4 weeks, then to progress to bariatric regular diet.   2) Consume 60 grams of protein/day.  3) Sip on 48-64 oz of fluids/day- between meals only.  4) Eat slowly (>20 min/meal), chewing foods well (to applesauce-like consistency).  5) Limit portions to 1/2 cup/meal.  6) Take the following after a Sleeve Gastrectomy:    Multivitamin/minerals: adult dose 2 times daily    Iron: 45-60 mg elemental (18-36 mg if low risk) - may partly or fully be covered in multivitamin     Calcium Citrate containing vitamin D: 500 mg 3 times daily or 600 mg 2 times daily    Vitamin B12: sublingual form of at least 500 mcg daily or injection of 1000 mcg monthly        Follow-Up: 3 months post-op or PRN    Time spent with patient: 30 minutes.  Merle Zamudio, MS, RDN, LDN, CLT  Pager: [phone]

## 2018-05-17 NOTE — MR AVS SNAPSHOT
After Visit Summary   5/17/2018    Kylie Austin    MRN: [id number]           Patient Information     Date Of Birth          1965        Visit Information        Provider Department      5/17/2018 12:45 PM Xin Costa PA-C [facility] Surgical Weight Management        Today's Diagnoses     S/P laparoscopic sleeve gastrectomy    -  1      Care Instructions    See Xin in 2 months for RBS same day as RD with labs prior to appt.          Follow-ups after your visit        Your next 10 appointments already scheduled     May 21, 2018  1:00 PM CDT   Return Visit with Stanislav Rayo MD   [facility] ([facility])    [address] [address] [phone]            Jun 11, 2018 10:30 AM CDT   (Arrive by 10:15 AM)   Return Visit with Minh Mancilla MD   [facility] Medical [facility] ([facility])    [address] [address] [phone]              Future tests that were ordered for you today     Open Future Orders        Priority Expected Expires Ordered    CBC with platelets Routine 7/9/2018 9/17/2018 5/17/2018    Comprehensive metabolic panel Routine 7/9/2018 9/17/2018 5/17/2018    Vitamin D Deficiency Routine 7/9/2018 9/17/2018 5/17/2018    Parathyroid Hormone Intact Routine 7/9/2018 9/17/2018 5/17/2018    Vitamin B12 Routine 7/9/2018 9/17/2018 5/17/2018    Vitamin A Routine 7/9/2018 9/17/2018 5/17/2018            Who to contact     Please call your clinic at [phone] to:    Ask questions about your health    Make or cancel appointments    Discuss your medicines    Learn about your test results    Speak to your doctor            Additional Information About Your Visit        MyChart Information     [employer] gives you secure access to your electronic health record. If you see a primary care provider, you can also send messages to your care  "team and make appointments. If you have questions, please call your primary care clinic.  If you do not have a primary care provider, please call [phone] and they will assist you.      [employer] is an electronic gateway that provides easy, online access to your medical records. With [employer], you can request a clinic appointment, read your test results, renew a prescription or communicate with your care team.     To access your existing account, please contact your [facility] or call [phone] for assistance.        Care EveryWhere ID     This is your Care EveryWhere ID. This could be used by other organizations to access your [place] medical records  [id number]        Your Vitals Were     Pulse Height Pulse Oximetry BMI (Body Mass Index)          80 5' 7.72\" 97% 34.07 kg/m2         Blood Pressure from Last 3 Encounters:   05/17/18 112/67   05/14/18 111/78   04/19/18 109/77    Weight from Last 3 Encounters:   05/17/18 222 lb 3.2 oz   05/14/18 222 lb   04/19/18 231 lb 14.4 oz              We Performed the Following     Mammogram - HIM Scan          Today's Medication Changes          These changes are accurate as of 5/17/18  1:05 PM.  If you have any questions, ask your nurse or doctor.               These medicines have changed or have updated prescriptions.        Dose/Directions    furosemide 20 MG tablet   Commonly known as:  LASIX   This may have changed:    - when to take this  - additional instructions   Used for:  Leg swelling        Dose:  20 mg   Take 1 tablet (20 mg) by mouth daily - DUE FOR LABS   Quantity:  90 tablet   Refills:  3       traZODone 100 MG tablet   Commonly known as:  DESYREL   This may have changed:  when to take this   Used for:  Insomnia, unspecified insomnia        Dose:  100 mg   Take 1 tablet (100 mg) by mouth nightly as needed for sleep   Quantity:  30 tablet   Refills:  1                Primary Care Provider Office Phone # Fax #    Kristin Tariq " JES Mienr [phone]       [address] [place] [address]        Equal Access to Services     SHARON WORTHINGTON : Hadindia dedra piedra joanna Gross, waazarda luqadaha, qaybta kaalmada yuniel, riley pradomic larry. So [facility] [phone].    ATENCIÓN: Si habla español, tiene a wheeler disposición servicios gratuitos de asistencia lingüística. Llame al [phone].    We comply with applicable federal civil rights laws and [place] laws. We do not discriminate on the basis of race, color, national origin, age, disability, sex, sexual orientation, or gender identity.            Thank you!     Thank you for choosing [facility] SURGICAL WEIGHT MANAGEMENT  for your care. Our goal is always to provide you with excellent care. Hearing back from our patients is one way we can continue to improve our services. Please take a few minutes to complete the written survey that you may receive in the mail after your visit with us. Thank you!             Your Updated Medication List - Protect others around you: Learn how to safely use, store and throw away your medicines at www.disposemymeds.org.          This list is accurate as of 5/17/18  1:05 PM.  Always use your most recent med list.                   Brand Name Dispense Instructions for use Diagnosis    ABILIFY 5 MG tablet   Generic drug:  ARIPiprazole      Take 5 mg by mouth every morning        estradiol 2 MG tablet    ESTRACE    90 tablet    1 tablet daily    Gender identity disorder       furosemide 20 MG tablet    LASIX    90 tablet    Take 1 tablet (20 mg) by mouth daily - DUE FOR LABS    Leg swelling       LAMOTRIGINE PO      Take 150 mg by mouth every morning        * medical cannabis inhalation (Patient's own supply.  Not a prescription)      Inhale into the lungs 2 times daily as needed (This is NOT a prescription, and does not certify that the patient has a qualifying medical condition for medical cannabis.  The purpose of this order  is  to document that the patient reports taking medical cannabis.)  MORNING AND AT NIGHT        * medical cannabis (Patient's own supply.  Not a prescription)      2 capsules 2 times daily (This is NOT a prescription, and does not certify that the patient has a qualifying medical condition for medical cannabis.  The purpose of this order is  to document that the patient reports taking medical cannabis.)  Morning and afternoon        polyethylene glycol powder    MIRALAX    510 g    Take 17 g (1 capful) by mouth daily    S/P laparoscopic sleeve gastrectomy       simvastatin 20 MG tablet    ZOCOR    30 tablet    TAKE 1 TABLET BY MOUTH AT BEDTIME    Hyperlipidemia LDL goal <130       topiramate 200 MG tablet    TOPAMAX    60 tablet    Take 1 tablet (200 mg) by mouth 2 times daily    Chronic pain disorder, Fibromyalgia       traZODone 100 MG tablet    DESYREL    30 tablet    Take 1 tablet (100 mg) by mouth nightly as needed for sleep    Insomnia, unspecified insomnia       triamcinolone 0.1 % ointment    KENALOG    80 g    Apply to AA BID x 3-4 weeks then PRN    Prurigo nodularis       ursodiol 300 MG capsule    ACTIGALL    180 capsule    Take 1 capsule (300 mg) by mouth 2 times daily    S/P laparoscopic sleeve gastrectomy       venlafaxine 100 MG tablet    EFFEXOR    21 tablet    Take 1 tablet (100 mg) by mouth 3 times daily (with meals)    S/P laparoscopic sleeve gastrectomy       * Notice:  This list has 2 medication(s) that are the same as other medications prescribed for you. Read the directions carefully, and ask your doctor or other care provider to review them with you.

## 2018-05-17 NOTE — LETTER
2018       RE: Kylie Austin  [address] [facility] [address]     Dear Colleague,    Thank you for referring your patient, Kylie Austin, to the [facility]. Please see a copy of my visit note below.  Return Bariatric Surgery Note    RE: Kylie Austin  MR#: [id number]  : 1965  VISIT DATE: May 17, 2018    Dear Kristin Miner,    I had the pleasure of seeing your patient, Kylie Austin, in my post-bariatric surgery assessment clinic.    CHIEF COMPLAINT: Post-bariatric surgery follow-up. 1 mo follow up.    HISTORY OF PRESENT ILLNESS:  Questions Regarding Prior Weight Loss Surgery Reviewed With Patient 2018   I had the following weight loss procedure: Sleeve Gastrectomy   What year was your surgery? 2018   How has your weight changed since your last visit? I have lost weight   Are you currently taking any weight loss medications? No   Do you currently have any of the following: None of the above   Have you been to the Emergency room since your last visit with us? No   Were you in the hospital since your last visit with us? No   Do you have any concerns today? no       Weight History:     2018   What is your highest lifetime weight? 298   What is your lowest weight since surgery? (In pounds) 221.6     Initial Weight: 268 lb  Current Weight: Weight: 222 lb 3.2 oz     Last Visits Weight: 222 lb    Questions Regarding Co-Morbidities and Health Concerns Reviewed With Patient 2018   Pre-diabetes: Never   Diabetes II: Never   High Blood Pressure: Improved   High cholesterol: Stayed the same   Heartburn/Reflux: Never   Sleep apnea: Never   PCOS: Never   Back pain: Stayed the same   Joint pain: Stayed the same   Lower leg swelling: Improved       Eating Habits 2018   How many meals do you eat per day? 3   Do you snack between meals? No   How much food are you  "eating at each meal? Less than 1/2 cup   Are you able to separate your meals and liquids by at least 30 minutes? Yes   Are you able to avoid liquid calories? Yes       Exercise Questions Reviewed With Patient 5/17/2018   How often do you exercise? Daily   What is the duration of your exercise (in minutes)? 60+ Minutes   What types of exercise do you do? walking, gym membership   What keeps you from being more active?  I am as active as I can possbily be, Pain       Social History:      5/17/2018   Are you smoking? No   Are you drinking alcohol? No       Medications:  Current Outpatient Prescriptions   Medication     ARIPiprazole (ABILIFY) 5 MG tablet     estradiol (ESTRACE) 2 MG tablet     furosemide (LASIX) 20 MG tablet     LAMOTRIGINE PO     medical cannabis (Patient's own supply.  Not a prescription)     medical cannabis inhalation (Patient's own supply.  Not a prescription)     polyethylene glycol (MIRALAX) powder     simvastatin (ZOCOR) 20 MG tablet     topiramate (TOPAMAX) 200 MG tablet     traZODone (DESYREL) 100 MG tablet     triamcinolone (KENALOG) 0.1 % ointment     ursodiol (ACTIGALL) 300 MG capsule     venlafaxine (EFFEXOR) 100 MG tablet     No current facility-administered medications for this visit.          5/17/2018   Do you avoid NSAIDs such as (Ibuprofen, Aleve, Naproxen, Advil)?   Yes       ROS:  GI:      5/17/2018   Vomiting: Yes   Diarrhea: No   Constipation: No   Swallowing trouble: No   Abdominal pain: Yes   Heartburn: No   Rash in skin folds: No   Depression: No   Stress urinary incontinence No     Skin:   BAR RBS ROS - SKIN 5/17/2018   Rash in skin folds: No     Psych:      5/17/2018   Depression: No   Anxiety: No     Female Only:   BAR RBS ROS - FEMALE ONLY 5/17/2018   Female only: None of the above       LABS/IMAGING/MEDICAL RECORDS REVIEW:     PHYSICAL EXAMINATION:  /67 (BP Location: Left arm, Patient Position: Chair, Cuff Size: Adult Large)  Pulse 80  Ht 5' 7.72\"  Wt 222 lb 3.2 " oz  SpO2 97%  BMI 34.07 kg/m2   General: No apparent distress  Neuro: A & O x 3  Head: Atraumatic, normocephalic  Eyes: PERRL, EOMI  Skin: warm and dry, no rashes on exposed skin  Respiratory: respirations unlabored  Abdomen: soft NT ND lap sites well healed  Extremities: No LE swelling      ASSESSMENT AND PLAN:      1. 1 months status laparoscopic gastric sleeve. She is doing well.  2. Morbid Obesity  current BMI: Body mass index is 34.07 kg/(m^2).  3. Post surgical malabsorption:   Labs ordered per protocol.   Follow food plan per dietitian recommendations.   Continue taking recommended post-op vitamins.  4. Return to clinic in 2 months with labs prior.  5. See RD today      Sincerely,    Xin Costa PA-C

## 2018-05-17 NOTE — NURSING NOTE
"(   Chief Complaint   Patient presents with     Weight Loss     Return patient bariatric surgery.    )    ( Weight: 222 lb 3.2 oz )  ( Height: 5' 7.72\" )  ( BMI (Calculated): 34.14 )  (   )  (   )  ( Last Visits Weight: 222 lb )  ( Wt change since last visit (lbs): 0.2 )  (   )  (   )    ( BP: 112/67 )  (   )  (   )  (   )  ( Pulse: 80 )  (   )  ( SpO2: 97 % )    (   Patient Active Problem List   Diagnosis     Gender identity disorder     Hyperlipidemia LDL goal <130     Abnormal results of liver function studies     Chronic pain disorder     Insomnia     Bipolar 2 disorder (H)     Morbid obesity (H)     H/O hypogonadism     Fibromyalgia     Glenohumeral arthritis     Vitamin D deficiency     Primary osteoarthritis of left shoulder     Anxiety     Cervical spondylosis without myelopathy     Chronic fatigue     S/P laparoscopic sleeve gastrectomy    )  (   Current Outpatient Prescriptions   Medication Sig Dispense Refill     ARIPiprazole (ABILIFY) 5 MG tablet Take 5 mg by mouth every morning        estradiol (ESTRACE) 2 MG tablet 1 tablet daily 90 tablet 3     furosemide (LASIX) 20 MG tablet Take 1 tablet (20 mg) by mouth daily - DUE FOR LABS (Patient taking differently: Take 20 mg by mouth every morning - DUE FOR LABS) 90 tablet 3     LAMOTRIGINE PO Take 150 mg by mouth every morning        medical cannabis (Patient's own supply.  Not a prescription) 2 capsules 2 times daily (This is NOT a prescription, and does not certify that the patient has a qualifying medical condition for medical cannabis.  The purpose of this order is  to document that the patient reports taking medical cannabis.)    Morning and afternoon        medical cannabis inhalation (Patient's own supply.  Not a prescription) Inhale into the lungs 2 times daily as needed (This is NOT a prescription, and does not certify that the patient has a qualifying medical condition for medical cannabis.  The purpose of this order is  to document that the patient " reports taking medical cannabis.)    MORNING AND AT NIGHT        polyethylene glycol (MIRALAX) powder Take 17 g (1 capful) by mouth daily 510 g 1     simvastatin (ZOCOR) 20 MG tablet TAKE 1 TABLET BY MOUTH AT BEDTIME 30 tablet 0     topiramate (TOPAMAX) 200 MG tablet Take 1 tablet (200 mg) by mouth 2 times daily 60 tablet 0     traZODone (DESYREL) 100 MG tablet Take 1 tablet (100 mg) by mouth nightly as needed for sleep (Patient taking differently: Take 100 mg by mouth At Bedtime ) 30 tablet 1     triamcinolone (KENALOG) 0.1 % ointment Apply to AA BID x 3-4 weeks then PRN 80 g 3     ursodiol (ACTIGALL) 300 MG capsule Take 1 capsule (300 mg) by mouth 2 times daily 180 capsule 1     venlafaxine (EFFEXOR) 100 MG tablet Take 1 tablet (100 mg) by mouth 3 times daily (with meals) 21 tablet 1    )  ( Diabetes Eval:    )    ( Pain Eval:  No Pain (0) )    ( Wound Eval:       )    (   History   Smoking Status     Never Smoker   Smokeless Tobacco     Never Used     Comment: NEVER SMOKED! Grew up with heavy smokers which did not help my Asthma!    )    ( Signed By:  Anselmo Dunham; May 17, 2018; 12:46 PM )

## 2018-05-21 ENCOUNTER — OFFICE VISIT (OUTPATIENT)
Dept: PALLIATIVE MEDICINE | Facility: CLINIC | Age: 53
End: 2018-05-21
Payer: COMMERCIAL

## 2018-05-21 VITALS
SYSTOLIC BLOOD PRESSURE: 116 MMHG | DIASTOLIC BLOOD PRESSURE: 78 MMHG | BODY MASS INDEX: 31.5 KG/M2 | HEART RATE: 80 BPM | WEIGHT: 220 LBS | HEIGHT: 70 IN

## 2018-05-21 DIAGNOSIS — M25.512 CHRONIC LEFT SHOULDER PAIN: ICD-10-CM

## 2018-05-21 DIAGNOSIS — G89.29 CHRONIC LEFT SHOULDER PAIN: ICD-10-CM

## 2018-05-21 DIAGNOSIS — M19.012 PRIMARY OSTEOARTHRITIS OF LEFT SHOULDER: Primary | ICD-10-CM

## 2018-05-21 DIAGNOSIS — M75.50 SUBACROMIAL BURSITIS: ICD-10-CM

## 2018-05-21 PROCEDURE — 20610 DRAIN/INJ JOINT/BURSA W/O US: CPT | Mod: LT | Performed by: ANESTHESIOLOGY

## 2018-05-21 ASSESSMENT — PAIN SCALES - GENERAL
PAINLEVEL: MODERATE PAIN (4)
PAINLEVEL: MODERATE PAIN (5)

## 2018-05-21 NOTE — MR AVS SNAPSHOT
After Visit Summary   5/21/2018    Kylie Austin    MRN: [id number]           Patient Information     Date Of Birth          1965        Visit Information        Provider Department      5/21/2018 1:00 PM Stanislav Morton MD [facility]        Care Instructions    [facility]   Post Procedure Instructions    Today you had:  trigger point injections:  Bursa injection      Medications used: Bupivicaine   kenolog           Go to the emergency room if you develop any shortness of breath    Monitor the injection sites for signs and symptoms of infection-fever, chills, redness, swelling, warmth, or drainage to areas.    You may have soreness at injection sites for up to 24 hours.    You may apply ice to the painful areas to help minimize the discomfort of the needle pokes.    Do not apply heat to sites for at least 12 hours.    You may use anti-inflammatory medications or Tylenol for pain control if necessary    Pain Clinic phone number during work hours Monday-Friday:  [phone]    After hours provider line: [phone]                Follow-ups after your visit        Your next 10 appointments already scheduled     May 21, 2018  1:00 PM CDT   Return Visit with Stanislav Rayo MD   [facility] ([facility])    [address] [facility] [address] [phone]            Jun 11, 2018 10:30 AM CDT   (Arrive by 10:15 AM)   Return Visit with Minh Mancilla MD   [facility] ([facility])    [address] [place] [address] [phone]              Who to contact     If you have questions or need follow up information about today's clinic visit or your schedule please contact Alexandria SUSHILA COLON directly at [phone].  Normal or non-critical lab and imaging results will be communicated to you by MyChart, letter or  "phone within 4 business days after the clinic has received the results. If you do not hear from us within 7 days, please contact the clinic through [employer] or phone. If you have a critical or abnormal lab result, we will notify you by phone as soon as possible.  Submit refill requests through [employer] or call your pharmacy and they will forward the refill request to us. Please allow 3 business days for your refill to be completed.          Additional Information About Your Visit        [employer] Information     [employer] gives you secure access to your electronic health record. If you see a primary care provider, you can also send messages to your care team and make appointments. If you have questions, please call your primary care clinic.  If you do not have a primary care provider, please call [phone] and they will assist you.        Care EveryWhere ID     This is your Care EveryWhere ID. This could be used by other organizations to access your [place] medical records  [id number]        Your Vitals Were     Pulse Height BMI (Body Mass Index)             80 1.778 m (5' 10\") 31.57 kg/m2          Blood Pressure from Last 3 Encounters:   05/21/18 116/78   05/17/18 112/67   05/14/18 111/78    Weight from Last 3 Encounters:   05/21/18 99.8 kg (220 lb)   05/17/18 100.8 kg (222 lb 3.2 oz)   05/14/18 100.7 kg (222 lb)              Today, you had the following     No orders found for display         Today's Medication Changes          These changes are accurate as of 5/21/18 12:57 PM.  If you have any questions, ask your nurse or doctor.               These medicines have changed or have updated prescriptions.        Dose/Directions    furosemide 20 MG tablet   Commonly known as:  LASIX   This may have changed:    - when to take this  - additional instructions   Used for:  Leg swelling        Dose:  20 mg   Take 1 tablet (20 mg) by mouth daily - DUE FOR LABS   Quantity:  90 tablet   Refills:  3       traZODone 100 MG " tablet   Commonly known as:  DESYREL   This may have changed:  when to take this   Used for:  Insomnia, unspecified insomnia        Dose:  100 mg   Take 1 tablet (100 mg) by mouth nightly as needed for sleep   Quantity:  30 tablet   Refills:  1                Primary Care Provider Office Phone # Fax #    Kristin Miner PA-C [phone]       [address] [facility] [address]        Equal Access to Services     [facility]: Hadii aad ku hadasho Soomaali, waaxda luqadaha, qaybta kaalmada adeegyada, waxay idiin hayaan adeeg kharash la'edgardn . So [facility] [phone].    ATENCIÓN: Si habla español, tiene a wheeler disposición servicios gratuitos de asistencia lingüística. [facility] [phone].    We comply with applicable federal civil rights laws and [place] laws. We do not discriminate on the basis of race, color, national origin, age, disability, sex, sexual orientation, or gender identity.            Thank you!     Thank you for choosing [facility]  for your care. Our goal is always to provide you with excellent care. Hearing back from our patients is one way we can continue to improve our services. Please take a few minutes to complete the written survey that you may receive in the mail after your visit with us. Thank you!             Your Updated Medication List - Protect others around you: Learn how to safely use, store and throw away your medicines at www.disposemymeds.org.          This list is accurate as of 5/21/18 12:57 PM.  Always use your most recent med list.                   Brand Name Dispense Instructions for use Diagnosis    ABILIFY 5 MG tablet   Generic drug:  ARIPiprazole      Take 5 mg by mouth every morning        estradiol 2 MG tablet    ESTRACE    90 tablet    1 tablet daily    Gender identity disorder       furosemide 20 MG tablet    LASIX    90 tablet    Take 1 tablet (20 mg) by mouth daily - DUE FOR LABS    Leg swelling       LAMOTRIGINE PO      Take 150 mg  by mouth every morning        * medical cannabis inhalation (Patient's own supply.  Not a prescription)      Inhale into the lungs 2 times daily as needed (This is NOT a prescription, and does not certify that the patient has a qualifying medical condition for medical cannabis.  The purpose of this order is  to document that the patient reports taking medical cannabis.)  MORNING AND AT NIGHT        * medical cannabis (Patient's own supply.  Not a prescription)      2 capsules 2 times daily (This is NOT a prescription, and does not certify that the patient has a qualifying medical condition for medical cannabis.  The purpose of this order is  to document that the patient reports taking medical cannabis.)  Morning and afternoon        polyethylene glycol powder    MIRALAX    510 g    Take 17 g (1 capful) by mouth daily    S/P laparoscopic sleeve gastrectomy       simvastatin 20 MG tablet    ZOCOR    30 tablet    TAKE 1 TABLET BY MOUTH AT BEDTIME    Hyperlipidemia LDL goal <130       topiramate 200 MG tablet    TOPAMAX    60 tablet    Take 1 tablet (200 mg) by mouth 2 times daily    Chronic pain disorder, Fibromyalgia       traZODone 100 MG tablet    DESYREL    30 tablet    Take 1 tablet (100 mg) by mouth nightly as needed for sleep    Insomnia, unspecified insomnia       triamcinolone 0.1 % ointment    KENALOG    80 g    Apply to AA BID x 3-4 weeks then PRN    Prurigo nodularis       ursodiol 300 MG capsule    ACTIGALL    180 capsule    Take 1 capsule (300 mg) by mouth 2 times daily    S/P laparoscopic sleeve gastrectomy       venlafaxine 100 MG tablet    EFFEXOR    21 tablet    Take 1 tablet (100 mg) by mouth 3 times daily (with meals)    S/P laparoscopic sleeve gastrectomy       * Notice:  This list has 2 medication(s) that are the same as other medications prescribed for you. Read the directions carefully, and ask your doctor or other care provider to review them with you.

## 2018-05-21 NOTE — PATIENT INSTRUCTIONS
[facility]   Post Procedure Instructions    Today you had:  trigger point injections:  Bursa injection      Medications used: Bupivicaine   kenolog           Go to the emergency room if you develop any shortness of breath    Monitor the injection sites for signs and symptoms of infection-fever, chills, redness, swelling, warmth, or drainage to areas.    You may have soreness at injection sites for up to 24 hours.    You may apply ice to the painful areas to help minimize the discomfort of the needle pokes.    Do not apply heat to sites for at least 12 hours.    You may use anti-inflammatory medications or Tylenol for pain control if necessary    Pain Clinic phone number during work hours Monday-Friday:  [phone]    After hours provider line: [phone]

## 2018-05-21 NOTE — PROGRESS NOTES
"Pre procedure Diagnosis: chronic left shoulder pain, left shoulder impingement   Post procedure Diagnosis: Same  Procedure performed: left shoulder subacromial bursa injection  Anesthesia: none  Complications: none  Operators: Stanislav Rayo MD      Indications:   Kylie Piper is a 52 year old year old female who is known to me that presents today for left shoulder subacromial bursa injection.  The patient has a history chronic left shoulder pain and impingement.  Exam shows decreased left shoulder range of motion in all planes with pain.  She has tried conservative treatment including physical therapy, activity modifications, medications, and interventional procedures.      Options/alternatives, benefits and risks were discussed with the patient including but not limited to bleeding, infection, tissue trauma, exposure to radiation, reaction to medications, spinal cord injury, weakness, numbness and paralysis.  Questions were answered to her satisfaction and she wishes to proceed. Voluntary informed consent was obtained and signed.       Vitals were reviewed: Yes  /78  Pulse 80  Ht 1.778 m (5' 10\")  Wt 99.8 kg (220 lb)  BMI 31.57 kg/m2  Allergies were reviewed:  Yes   Medications were reviewed:  Yes   Pre-procedure pain score: 4/10      Procedure:  After obtaining signed, informed consent, the patient was taken to the procedure room and placed in a seated position with the left arm dangling.  A Pause for the Cause was completed prior to procedure start.        A posterior window was identified for left shoulder subacromial injection.  The skin was prepped with Chloroprep.  Then, a 27 gauge 3.5 inch spinal needle was advanced into the subacromial space in an anteromedial direction.  Aspiration was negative.  Then, 5 ml of a combination of Kenalog 40 mg and Bupivicaine 0.5% 4 ml was injected into the subacromial space without resistance and the needle was withdrawn.        The injection site " was cleaned and sterile dressing was applied where indicated.      The patient tolerated the procedure well without complications and was taken to the recovery area for continued observation.  Fluoroscopic images were saved to PACS.      The patient was re-evaluated following the procedure and they noted decreased pain and improved shoulder range of motion.      Post-procedure pain:  5/10      Follow-up includes:    - post procedure call at one week  - follow up with PCP and in the pain clinic as scheduled      Stanislav Rayo MD  [facility]

## 2018-06-07 DIAGNOSIS — Z98.84 S/P LAPAROSCOPIC SLEEVE GASTRECTOMY: ICD-10-CM

## 2018-06-07 LAB
ALBUMIN SERPL-MCNC: 3.9 G/DL (ref 3.4–5)
ALP SERPL-CCNC: 75 U/L (ref 40–150)
ALT SERPL W P-5'-P-CCNC: 30 U/L (ref 0–50)
ANION GAP SERPL CALCULATED.3IONS-SCNC: 8 MMOL/L (ref 3–14)
AST SERPL W P-5'-P-CCNC: 22 U/L (ref 0–45)
BILIRUB SERPL-MCNC: 0.4 MG/DL (ref 0.2–1.3)
BUN SERPL-MCNC: 13 MG/DL (ref 7–30)
CALCIUM SERPL-MCNC: 9.3 MG/DL (ref 8.5–10.1)
CHLORIDE SERPL-SCNC: 106 MMOL/L (ref 94–109)
CO2 SERPL-SCNC: 28 MMOL/L (ref 20–32)
CREAT SERPL-MCNC: 0.84 MG/DL (ref 0.52–1.04)
ERYTHROCYTE [DISTWIDTH] IN BLOOD BY AUTOMATED COUNT: 14.3 % (ref 10–15)
GFR SERPL CREATININE-BSD FRML MDRD: 70 ML/MIN/1.7M2
GLUCOSE SERPL-MCNC: 94 MG/DL (ref 70–99)
HCT VFR BLD AUTO: 45 % (ref 35–47)
HGB BLD-MCNC: 15 G/DL (ref 11.7–15.7)
MCH RBC QN AUTO: 29 PG (ref 26.5–33)
MCHC RBC AUTO-ENTMCNC: 33.3 G/DL (ref 31.5–36.5)
MCV RBC AUTO: 87 FL (ref 78–100)
PLATELET # BLD AUTO: 355 10E9/L (ref 150–450)
POTASSIUM SERPL-SCNC: 4 MMOL/L (ref 3.4–5.3)
PROT SERPL-MCNC: 7.7 G/DL (ref 6.8–8.8)
PTH-INTACT SERPL-MCNC: 47 PG/ML (ref 18–80)
RBC # BLD AUTO: 5.17 10E12/L (ref 3.8–5.2)
SODIUM SERPL-SCNC: 142 MMOL/L (ref 133–144)
WBC # BLD AUTO: 10 10E9/L (ref 4–11)

## 2018-06-07 PROCEDURE — 84590 ASSAY OF VITAMIN A: CPT | Mod: 90 | Performed by: PHYSICIAN ASSISTANT

## 2018-06-07 PROCEDURE — 82306 VITAMIN D 25 HYDROXY: CPT | Performed by: PHYSICIAN ASSISTANT

## 2018-06-07 PROCEDURE — 82607 VITAMIN B-12: CPT | Performed by: PHYSICIAN ASSISTANT

## 2018-06-07 PROCEDURE — 80053 COMPREHEN METABOLIC PANEL: CPT | Performed by: PHYSICIAN ASSISTANT

## 2018-06-07 PROCEDURE — 85027 COMPLETE CBC AUTOMATED: CPT | Performed by: PHYSICIAN ASSISTANT

## 2018-06-07 PROCEDURE — 83970 ASSAY OF PARATHORMONE: CPT | Performed by: PHYSICIAN ASSISTANT

## 2018-06-07 PROCEDURE — 36415 COLL VENOUS BLD VENIPUNCTURE: CPT | Performed by: PHYSICIAN ASSISTANT

## 2018-06-07 PROCEDURE — 99000 SPECIMEN HANDLING OFFICE-LAB: CPT | Performed by: PHYSICIAN ASSISTANT

## 2018-06-08 LAB
DEPRECATED CALCIDIOL+CALCIFEROL SERPL-MC: 68 UG/L (ref 20–75)
VIT B12 SERPL-MCNC: 847 PG/ML (ref 193–986)

## 2018-06-09 LAB
ANNOTATION COMMENT IMP: NORMAL
RETINYL PALMITATE SERPL-MCNC: <0.02 MG/L (ref 0–0.1)
VIT A SERPL-MCNC: 0.47 MG/L (ref 0.3–1.2)

## 2018-06-11 ENCOUNTER — OFFICE VISIT (OUTPATIENT)
Dept: ENDOCRINOLOGY | Facility: CLINIC | Age: 53
End: 2018-06-11
Payer: MEDICARE

## 2018-06-11 VITALS
OXYGEN SATURATION: 100 % | SYSTOLIC BLOOD PRESSURE: 127 MMHG | TEMPERATURE: 98.6 F | HEIGHT: 70 IN | HEART RATE: 71 BPM | BODY MASS INDEX: 29.94 KG/M2 | WEIGHT: 209.1 LBS | DIASTOLIC BLOOD PRESSURE: 87 MMHG

## 2018-06-11 DIAGNOSIS — E66.01 MORBID OBESITY (H): Primary | ICD-10-CM

## 2018-06-11 ASSESSMENT — ENCOUNTER SYMPTOMS
RECTAL PAIN: 0
POLYPHAGIA: 0
BLOATING: 0
DIARRHEA: 0
DECREASED APPETITE: 0
CHILLS: 0
VOMITING: 1
WEIGHT GAIN: 0
ABDOMINAL PAIN: 0
CONSTIPATION: 0
BOWEL INCONTINENCE: 0
HALLUCINATIONS: 0
FATIGUE: 1
POLYDIPSIA: 0
HEARTBURN: 1
NAUSEA: 1
INCREASED ENERGY: 0
FEVER: 0
BLOOD IN STOOL: 0
NIGHT SWEATS: 0
WEIGHT LOSS: 1
JAUNDICE: 0
ALTERED TEMPERATURE REGULATION: 0

## 2018-06-11 ASSESSMENT — PAIN SCALES - GENERAL: PAINLEVEL: NO PAIN (0)

## 2018-06-11 NOTE — MR AVS SNAPSHOT
After Visit Summary   6/11/2018    Kylie Austin    MRN: [id number]           Patient Information     Date Of Birth          1965        Visit Information        Provider Department      6/11/2018 10:30 AM Minh Mancilla MD [facility]        Today's Diagnoses     Morbid obesity (H)    -  1       Follow-ups after your visit        Follow-up notes from your care team     Return in about 3 months (around 9/11/2018).      Your next 10 appointments already scheduled     Sep 10, 2018  1:00 PM CDT   Return Visit with Stanislav Rayo MD   [facility] ([place] [facility])    [address] [place] [address] [phone]              Who to contact     Please call your clinic at [phone] to:    Ask questions about your health    Make or cancel appointments    Discuss your medicines    Learn about your test results    Speak to your doctor            Additional Information About Your Visit        MyChart Information     [employer] gives you secure access to your electronic health record. If you see a primary care provider, you can also send messages to your care team and make appointments. If you have questions, please call your primary care clinic.  If you do not have a primary care provider, please call [phone] and they will assist you.      [employer] is an electronic gateway that provides easy, online access to your medical records. With [employer], you can request a clinic appointment, read your test results, renew a prescription or communicate with your care team.     To access your existing account, please contact your [facility] or call [phone] for assistance.        Care EveryWhere ID     This is your Care EveryWhere ID. This could be used by other organizations to access your [place] medical records  [id number]        Your Vitals Were     Pulse Temperature Height Pulse  "Oximetry BMI (Body Mass Index)       71 98.6  F (37  C) (Oral) 1.778 m (5' 10\") 100% 30 kg/m2        Blood Pressure from Last 3 Encounters:   06/11/18 127/87   05/21/18 116/78   05/17/18 112/67    Weight from Last 3 Encounters:   06/11/18 94.8 kg (209 lb 1.6 oz)   05/21/18 99.8 kg (220 lb)   05/17/18 100.8 kg (222 lb 3.2 oz)              Today, you had the following     No orders found for display         Today's Medication Changes          These changes are accurate as of 6/11/18 11:22 AM.  If you have any questions, ask your nurse or doctor.               These medicines have changed or have updated prescriptions.        Dose/Directions    furosemide 20 MG tablet   Commonly known as:  LASIX   This may have changed:    - when to take this  - additional instructions   Used for:  Leg swelling        Dose:  20 mg   Take 1 tablet (20 mg) by mouth daily - DUE FOR LABS   Quantity:  90 tablet   Refills:  3       traZODone 100 MG tablet   Commonly known as:  DESYREL   This may have changed:  when to take this   Used for:  Insomnia, unspecified insomnia        Dose:  100 mg   Take 1 tablet (100 mg) by mouth nightly as needed for sleep   Quantity:  30 tablet   Refills:  1                Primary Care Provider Office Phone # Fax #    Kristin Miner PA-C [phone] [address] [facility] [address]        Equal Access to Services     [facility]: Hadii dedra gillo Soellie, waaxda luqadaha, qaybta kaalmada adeegyada, waxay clarissa alan adejessica yuan . So [facility] [phone].    ATENCIÓN: Si habla español, tiene a wheeler disposición servicios gratuitos de asistencia lingüística. Jay al [phone].    We comply with applicable federal civil rights laws and [place] laws. We do not discriminate on the basis of race, color, national origin, age, disability, sex, sexual orientation, or gender identity.            Thank you!     Thank you for choosing [facility]  for " your care. Our goal is always to provide you with excellent care. Hearing back from our patients is one way we can continue to improve our services. Please take a few minutes to complete the written survey that you may receive in the mail after your visit with us. Thank you!             Your Updated Medication List - Protect others around you: Learn how to safely use, store and throw away your medicines at www.disposemymeds.org.          This list is accurate as of 6/11/18 11:22 AM.  Always use your most recent med list.                   Brand Name Dispense Instructions for use Diagnosis    ABILIFY 5 MG tablet   Generic drug:  ARIPiprazole      Take 5 mg by mouth every morning        estradiol 2 MG tablet    ESTRACE    90 tablet    1 tablet daily    Gender identity disorder       furosemide 20 MG tablet    LASIX    90 tablet    Take 1 tablet (20 mg) by mouth daily - DUE FOR LABS    Leg swelling       LAMOTRIGINE PO      Take 150 mg by mouth every morning        * medical cannabis inhalation (Patient's own supply.  Not a prescription)      Inhale into the lungs 2 times daily as needed (This is NOT a prescription, and does not certify that the patient has a qualifying medical condition for medical cannabis.  The purpose of this order is  to document that the patient reports taking medical cannabis.)  MORNING AND AT NIGHT        * medical cannabis (Patient's own supply.  Not a prescription)      2 capsules 2 times daily (This is NOT a prescription, and does not certify that the patient has a qualifying medical condition for medical cannabis.  The purpose of this order is  to document that the patient reports taking medical cannabis.)  Morning and afternoon        polyethylene glycol powder    MIRALAX    510 g    Take 17 g (1 capful) by mouth daily    S/P laparoscopic sleeve gastrectomy       simvastatin 20 MG tablet    ZOCOR    90 tablet    TAKE 1 TABLET BY MOUTH AT BEDTIME    Hyperlipidemia LDL goal <130        topiramate 200 MG tablet    TOPAMAX    60 tablet    Take 1 tablet (200 mg) by mouth 2 times daily    Chronic pain disorder, Fibromyalgia       traZODone 100 MG tablet    DESYREL    30 tablet    Take 1 tablet (100 mg) by mouth nightly as needed for sleep    Insomnia, unspecified insomnia       triamcinolone 0.1 % ointment    KENALOG    80 g    Apply to AA BID x 3-4 weeks then PRN    Prurigo nodularis       ursodiol 300 MG capsule    ACTIGALL    180 capsule    Take 1 capsule (300 mg) by mouth 2 times daily    S/P laparoscopic sleeve gastrectomy       venlafaxine 100 MG tablet    EFFEXOR    21 tablet    Take 1 tablet (100 mg) by mouth 3 times daily (with meals)    S/P laparoscopic sleeve gastrectomy       * Notice:  This list has 2 medication(s) that are the same as other medications prescribed for you. Read the directions carefully, and ask your doctor or other care provider to review them with you.

## 2018-06-11 NOTE — PROGRESS NOTES
"    Return Medical Weight Management Note     Kylie Austin  MRN:  [id number]  :  1965  BLANCA:  2018    Dear Kristin Miner PA-C,    I had the pleasure of seeing your patient Kylie Austin.  She is a 52 year old female who I am continuing to see for treatment of obesity related to:       2018   I have the following co-morbidities associated with obesity: High Cholesterol, Fatty Liver, Weight Bearing Joint Pain       CURRENT WEIGHT:   209 lbs 1.6 oz    Wt Readings from Last 4 Encounters:   18 94.8 kg (209 lb 1.6 oz)   18 99.8 kg (220 lb)   18 100.8 kg (222 lb 3.2 oz)   18 100.7 kg (222 lb)       Height:  5' 10\"  Body Mass Index:  Body mass index is 30 kg/(m^2).  Vitals:  B/P: 127/87, P: 71    Initial consult weight was 265 on 2018.  Weight change since last seen on 2018 is down 56 pounds.   Total loss is 56 pounds.    INTERVAL HISTORY:  Had bariatric surgery 2018 and is tolerating fairly well. Following dietitian plan, she says. Feels topiramate is helping with food thought suppression.    Diet and Activity Changes Since Last Visit Reviewed With Patient 2018   I have made the following changes to my diet since my last visit: starting to eat some veggies   With regards to my diet, I am still struggling with: eating veggies   I have made the following changes to my activity/exercise since my last visit: walking more   With regards to my activity/exercise, I am still struggling with: none       MEDICATIONS:   Current Outpatient Prescriptions   Medication     ARIPiprazole (ABILIFY) 5 MG tablet     estradiol (ESTRACE) 2 MG tablet     furosemide (LASIX) 20 MG tablet     LAMOTRIGINE PO     medical cannabis (Patient's own supply.  Not a prescription)     medical cannabis inhalation (Patient's own supply.  Not a prescription)     polyethylene glycol (MIRALAX) powder     simvastatin (ZOCOR) 20 MG tablet     topiramate (TOPAMAX) 200 MG tablet "     traZODone (DESYREL) 100 MG tablet     triamcinolone (KENALOG) 0.1 % ointment     ursodiol (ACTIGALL) 300 MG capsule     venlafaxine (EFFEXOR) 100 MG tablet     No current facility-administered medications for this visit.        Weight Loss Medication History Reviewed With Patient 6/11/2018   Which weight loss medications are you currently taking on a regular basis?  Topamax (topiramate)   Are you having any side effects from the weight loss medication that we have prescribed you? No       ASSESSMENT:   Good weight loss post barisurgery with topiramate support.    FOLLOW-UP:    12 weeks.  I spent 15 minutes with this patient face to face and explained the conditions and plans (more than 50% of time was counseling/coordination of weight management).    Sincerely,    Minh Mancilla MD

## 2018-06-11 NOTE — LETTER
"2018       RE: Kylie Austin  [address] [facility] [address]     Dear Colleague,    Thank you for referring your patient, Kylie Austin, to the [facility] MEDICAL WEIGHT MANAGEMENT at [facility]. Please see a copy of my visit note below.        Return Medical Weight Management Note     Kylie Austin  MRN:  [id number]  :  1965  BLANCA:  2018    Dear Kristin Miner PA-C,    I had the pleasure of seeing your patient Kylie Austin.  She is a 52 year old female who I am continuing to see for treatment of obesity related to:       2018   I have the following co-morbidities associated with obesity: High Cholesterol, Fatty Liver, Weight Bearing Joint Pain       CURRENT WEIGHT:   209 lbs 1.6 oz    Wt Readings from Last 4 Encounters:   18 94.8 kg (209 lb 1.6 oz)   18 99.8 kg (220 lb)   18 100.8 kg (222 lb 3.2 oz)   18 100.7 kg (222 lb)       Height:  5' 10\"  Body Mass Index:  Body mass index is 30 kg/(m^2).  Vitals:  B/P: 127/87, P: 71    Initial consult weight was 265 on 2018.  Weight change since last seen on 2018 is down 56 pounds.   Total loss is 56 pounds.    INTERVAL HISTORY:  Had bariatric surgery 2018 and is tolerating fairly well. Following dietitian plan, she says. Feels topiramate is helping with food thought suppression.    Diet and Activity Changes Since Last Visit Reviewed With Patient 2018   I have made the following changes to my diet since my last visit: starting to eat some veggies   With regards to my diet, I am still struggling with: eating veggies   I have made the following changes to my activity/exercise since my last visit: walking more   With regards to my activity/exercise, I am still struggling with: none       MEDICATIONS:   Current Outpatient Prescriptions   Medication     ARIPiprazole (ABILIFY) 5 MG tablet     estradiol (ESTRACE) 2 MG " tablet     furosemide (LASIX) 20 MG tablet     LAMOTRIGINE PO     medical cannabis (Patient's own supply.  Not a prescription)     medical cannabis inhalation (Patient's own supply.  Not a prescription)     polyethylene glycol (MIRALAX) powder     simvastatin (ZOCOR) 20 MG tablet     topiramate (TOPAMAX) 200 MG tablet     traZODone (DESYREL) 100 MG tablet     triamcinolone (KENALOG) 0.1 % ointment     ursodiol (ACTIGALL) 300 MG capsule     venlafaxine (EFFEXOR) 100 MG tablet     No current facility-administered medications for this visit.        Weight Loss Medication History Reviewed With Patient 6/11/2018   Which weight loss medications are you currently taking on a regular basis?  Topamax (topiramate)   Are you having any side effects from the weight loss medication that we have prescribed you? No       ASSESSMENT:   Good weight loss post barisurgery with topiramate support.    FOLLOW-UP:    12 weeks.  I spent 15 minutes with this patient face to face and explained the conditions and plans (more than 50% of time was counseling/coordination of weight management).    Sincerely,    Minh Mancilla MD

## 2018-06-11 NOTE — NURSING NOTE
"(   Chief Complaint   Patient presents with     Weight Check     f/u     )    ( Weight: 94.8 kg (209 lb 1.6 oz) )  ( Height: 177.8 cm (5' 10\") )  ( BMI (Calculated): 30.07 )  (   )  (   )  (   )  (   )  (   )  (   )    ( BP: 127/87 )  (   )  ( Temp: 98.6  F (37  C) )  ( Temp src: Oral )  ( Pulse: 71 )  (   )  ( SpO2: 100 % )    (   Patient Active Problem List   Diagnosis     Gender identity disorder     Hyperlipidemia LDL goal <130     Abnormal results of liver function studies     Chronic pain disorder     Insomnia     Bipolar 2 disorder (H)     Morbid obesity (H)     H/O hypogonadism     Fibromyalgia     Glenohumeral arthritis     Vitamin D deficiency     Primary osteoarthritis of left shoulder     Anxiety     Cervical spondylosis without myelopathy     Chronic fatigue     S/P laparoscopic sleeve gastrectomy    )  (   Current Outpatient Prescriptions   Medication Sig Dispense Refill     ARIPiprazole (ABILIFY) 5 MG tablet Take 5 mg by mouth every morning        estradiol (ESTRACE) 2 MG tablet 1 tablet daily 90 tablet 3     furosemide (LASIX) 20 MG tablet Take 1 tablet (20 mg) by mouth daily - DUE FOR LABS (Patient taking differently: Take 20 mg by mouth every morning - DUE FOR LABS) 90 tablet 3     LAMOTRIGINE PO Take 150 mg by mouth every morning        medical cannabis (Patient's own supply.  Not a prescription) 2 capsules 2 times daily (This is NOT a prescription, and does not certify that the patient has a qualifying medical condition for medical cannabis.  The purpose of this order is  to document that the patient reports taking medical cannabis.)    Morning and afternoon        medical cannabis inhalation (Patient's own supply.  Not a prescription) Inhale into the lungs 2 times daily as needed (This is NOT a prescription, and does not certify that the patient has a qualifying medical condition for medical cannabis.  The purpose of this order is  to document that the patient reports taking medical " cannabis.)    MORNING AND AT NIGHT        polyethylene glycol (MIRALAX) powder Take 17 g (1 capful) by mouth daily 510 g 1     simvastatin (ZOCOR) 20 MG tablet TAKE 1 TABLET BY MOUTH AT BEDTIME 90 tablet 0     topiramate (TOPAMAX) 200 MG tablet Take 1 tablet (200 mg) by mouth 2 times daily 60 tablet 0     traZODone (DESYREL) 100 MG tablet Take 1 tablet (100 mg) by mouth nightly as needed for sleep (Patient taking differently: Take 100 mg by mouth At Bedtime ) 30 tablet 1     triamcinolone (KENALOG) 0.1 % ointment Apply to AA BID x 3-4 weeks then PRN 80 g 3     ursodiol (ACTIGALL) 300 MG capsule Take 1 capsule (300 mg) by mouth 2 times daily 180 capsule 1     venlafaxine (EFFEXOR) 100 MG tablet Take 1 tablet (100 mg) by mouth 3 times daily (with meals) 21 tablet 1    )  ( Diabetes Eval:    )    ( Pain Eval:  No Pain (0) )    ( Wound Eval:       )    (   History   Smoking Status     Never Smoker   Smokeless Tobacco     Never Used     Comment: NEVER SMOKED! Grew up with heavy smokers which did not help my Asthma!    )    ( Signed By:  Srikanth Mary; June 11, 2018; 10:40 AM )

## 2018-09-01 DIAGNOSIS — E78.5 HYPERLIPIDEMIA LDL GOAL <130: ICD-10-CM

## 2018-09-04 RX ORDER — SIMVASTATIN 20 MG
TABLET ORAL
Qty: 30 TABLET | Refills: 0 | Status: SHIPPED | OUTPATIENT
Start: 2018-09-04 | End: 2018-10-16

## 2018-09-04 NOTE — TELEPHONE ENCOUNTER
"Requested Prescriptions   Pending Prescriptions Disp Refills     simvastatin (ZOCOR) 20 MG tablet [Pharmacy Med Name: Simvastatin Oral Tablet 20 MG] 90 tablet 0    Last Written Prescription Date:  05/31/18  Last Fill Quantity: 90,  # refills: 0   Last office visit: 11/13/2017 with prescribing provider:  ?   Future Office Visit:   Next 5 appointments (look out 90 days)     Nov 26, 2018  2:30 PM CST   Return Visit with Stanislav Rayo MD   [facility] ([facility])    [address] [address] [id number] [phone]                  Sig: TAKE 1 TABLET BY MOUTH AT BEDTIME    Statins Protocol Passed    9/1/2018 11:11 AM       Passed - LDL on file in past 12 months    Recent Labs   Lab Test  09/29/17   0755   LDL  111*            Passed - No abnormal creatine kinase in past 12 months    No lab results found.            Passed - Recent (12 mo) or future (30 days) visit within the authorizing provider's specialty    Patient had office visit in the last 12 months or has a visit in the next 30 days with authorizing provider or within the authorizing provider's specialty.  See \"Patient Info\" tab in inbasket, or \"Choose Columns\" in Meds & Orders section of the refill encounter.           Passed - Patient is age 18 or older       Passed - No active pregnancy on record       Passed - No positive pregnancy test in past 12 months          "

## 2018-09-04 NOTE — TELEPHONE ENCOUNTER
Medication is being filled for 1 time refill only due to:  Due for labs end of month, med approved for 30 day supply with reminder.

## 2018-09-14 DIAGNOSIS — Z13.29 SCREENING FOR ENDOCRINE DISORDER: ICD-10-CM

## 2018-09-14 DIAGNOSIS — Z13.1 DIABETES MELLITUS SCREENING: ICD-10-CM

## 2018-09-14 DIAGNOSIS — Z86.39 H/O VITAMIN D DEFICIENCY: ICD-10-CM

## 2018-09-14 DIAGNOSIS — F41.1 GENERALIZED ANXIETY DISORDER: ICD-10-CM

## 2018-09-14 DIAGNOSIS — Z13.220 LIPID SCREENING: ICD-10-CM

## 2018-09-14 DIAGNOSIS — Z79.899 HIGH RISK MEDICATION USE: ICD-10-CM

## 2018-09-14 DIAGNOSIS — Z51.81 THERAPEUTIC DRUG MONITORING: ICD-10-CM

## 2018-09-14 DIAGNOSIS — F31.81 BIPOLAR 2 DISORDER (H): Primary | ICD-10-CM

## 2018-09-14 LAB
ALBUMIN SERPL-MCNC: 3.7 G/DL (ref 3.4–5)
ALP SERPL-CCNC: 63 U/L (ref 40–150)
ALT SERPL W P-5'-P-CCNC: 23 U/L (ref 0–50)
ANION GAP SERPL CALCULATED.3IONS-SCNC: 10 MMOL/L (ref 3–14)
AST SERPL W P-5'-P-CCNC: 18 U/L (ref 0–45)
BASOPHILS # BLD AUTO: 0 10E9/L (ref 0–0.2)
BASOPHILS NFR BLD AUTO: 0.4 %
BILIRUB SERPL-MCNC: 0.5 MG/DL (ref 0.2–1.3)
BUN SERPL-MCNC: 16 MG/DL (ref 7–30)
CALCIUM SERPL-MCNC: 9 MG/DL (ref 8.5–10.1)
CHLORIDE SERPL-SCNC: 105 MMOL/L (ref 94–109)
CHOLEST SERPL-MCNC: 217 MG/DL
CO2 SERPL-SCNC: 25 MMOL/L (ref 20–32)
CREAT SERPL-MCNC: 0.92 MG/DL (ref 0.52–1.04)
DEPRECATED CALCIDIOL+CALCIFEROL SERPL-MC: 53 UG/L (ref 20–75)
DIFFERENTIAL METHOD BLD: NORMAL
EOSINOPHIL # BLD AUTO: 0.2 10E9/L (ref 0–0.7)
EOSINOPHIL NFR BLD AUTO: 2.4 %
ERYTHROCYTE [DISTWIDTH] IN BLOOD BY AUTOMATED COUNT: 13.2 % (ref 10–15)
GFR SERPL CREATININE-BSD FRML MDRD: 63 ML/MIN/1.7M2
GLUCOSE SERPL-MCNC: 91 MG/DL (ref 70–99)
HBA1C MFR BLD: 5.2 % (ref 0–5.6)
HCT VFR BLD AUTO: 42.9 % (ref 35–47)
HDLC SERPL-MCNC: 37 MG/DL
HGB BLD-MCNC: 14.5 G/DL (ref 11.7–15.7)
LDLC SERPL CALC-MCNC: 143 MG/DL
LYMPHOCYTES # BLD AUTO: 2.9 10E9/L (ref 0.8–5.3)
LYMPHOCYTES NFR BLD AUTO: 36.5 %
MCH RBC QN AUTO: 29.8 PG (ref 26.5–33)
MCHC RBC AUTO-ENTMCNC: 33.8 G/DL (ref 31.5–36.5)
MCV RBC AUTO: 88 FL (ref 78–100)
MONOCYTES # BLD AUTO: 0.7 10E9/L (ref 0–1.3)
MONOCYTES NFR BLD AUTO: 8.3 %
NEUTROPHILS # BLD AUTO: 4.2 10E9/L (ref 1.6–8.3)
NEUTROPHILS NFR BLD AUTO: 52.4 %
NONHDLC SERPL-MCNC: 180 MG/DL
PLATELET # BLD AUTO: 337 10E9/L (ref 150–450)
POTASSIUM SERPL-SCNC: 3.8 MMOL/L (ref 3.4–5.3)
PROT SERPL-MCNC: 7.4 G/DL (ref 6.8–8.8)
RBC # BLD AUTO: 4.87 10E12/L (ref 3.8–5.2)
SODIUM SERPL-SCNC: 140 MMOL/L (ref 133–144)
T4 FREE SERPL-MCNC: 0.82 NG/DL (ref 0.76–1.46)
TRIGL SERPL-MCNC: 184 MG/DL
TSH SERPL DL<=0.005 MIU/L-ACNC: 1.16 MU/L (ref 0.4–4)
WBC # BLD AUTO: 7.9 10E9/L (ref 4–11)

## 2018-09-14 PROCEDURE — 36415 COLL VENOUS BLD VENIPUNCTURE: CPT | Performed by: NURSE PRACTITIONER

## 2018-09-14 PROCEDURE — 80053 COMPREHEN METABOLIC PANEL: CPT | Performed by: NURSE PRACTITIONER

## 2018-09-14 PROCEDURE — 84439 ASSAY OF FREE THYROXINE: CPT | Performed by: NURSE PRACTITIONER

## 2018-09-14 PROCEDURE — 85025 COMPLETE CBC W/AUTO DIFF WBC: CPT | Performed by: NURSE PRACTITIONER

## 2018-09-14 PROCEDURE — 82306 VITAMIN D 25 HYDROXY: CPT | Performed by: NURSE PRACTITIONER

## 2018-09-14 PROCEDURE — 99000 SPECIMEN HANDLING OFFICE-LAB: CPT | Performed by: NURSE PRACTITIONER

## 2018-09-14 PROCEDURE — 80201 ASSAY OF TOPIRAMATE: CPT | Mod: 90 | Performed by: NURSE PRACTITIONER

## 2018-09-14 PROCEDURE — 83036 HEMOGLOBIN GLYCOSYLATED A1C: CPT | Performed by: NURSE PRACTITIONER

## 2018-09-14 PROCEDURE — 84443 ASSAY THYROID STIM HORMONE: CPT | Performed by: NURSE PRACTITIONER

## 2018-09-14 PROCEDURE — 80061 LIPID PANEL: CPT | Performed by: NURSE PRACTITIONER

## 2018-09-15 LAB — TOPIRAMATE SERPL-MCNC: 12.3 UG/ML (ref 5–20)

## 2018-09-19 DIAGNOSIS — E66.01 MORBID OBESITY (H): ICD-10-CM

## 2018-09-20 NOTE — TELEPHONE ENCOUNTER
Called and left message for patient in regards to Naltrexone refill request. Patient had stopped med at discharge in April. Patient saw Xin Costa and Dr. Mancilla after with no mention of restarting medication. Need clarification. Direct number left for call back.

## 2018-09-20 NOTE — TELEPHONE ENCOUNTER
naltrexone (DEPADE;REVIA) 50 MG tablet     Last Written Prescription Date: -- not on current med list--  Last Fill Quantity: ,   # refills:   Last Office Visit : 6-11-18  Future Office visit:  10-15-18    Routing refill request to provider for review/approval because:  Drug not active on patient's medication list

## 2018-09-20 NOTE — TELEPHONE ENCOUNTER
Patient called back stating she did request the Naltrexone to be refilled. Patient has been off for a couple months and would like to restart. Not currently taking any opioids or narcotics. Will send to Xin Costa for sign off (Dr. Mancilla is out of the office). Patient has follow up appointment with Dr. Mancilla next month.

## 2018-09-21 ENCOUNTER — TELEPHONE (OUTPATIENT)
Dept: ENDOCRINOLOGY | Facility: CLINIC | Age: 53
End: 2018-09-21

## 2018-09-21 RX ORDER — NALTREXONE HYDROCHLORIDE 50 MG/1
TABLET, FILM COATED ORAL
Qty: 30 TABLET | Refills: 4 | Status: SHIPPED | OUTPATIENT
Start: 2018-09-21 | End: 2019-10-16

## 2018-09-21 NOTE — TELEPHONE ENCOUNTER
CLARIBEL Costa patient:    Reason for call:  Medication   If this is a refill request, has the caller requested the refill from the pharmacy already? N/A  Will the patient be using a [facility]? No  Name of the pharmacy and phone [facility] for the current [facility], [phone]    Name of the medication requested: Naltrexone    Other request: Pharmacy is unclear as to what the maximum daily dosage is for the script    Phone number to reach patient:  Other phone number:  [phone]*    Best Time:  Anytime    Can we leave a detailed message on this number?  Not Applicable

## 2018-09-21 NOTE — TELEPHONE ENCOUNTER
Contact [facility] pharmacy, inform pharmacist, max daily dose is 1 tablet or 50mg.    Murtaza BAHENA LPN

## 2018-10-15 ENCOUNTER — OFFICE VISIT (OUTPATIENT)
Dept: ENDOCRINOLOGY | Facility: CLINIC | Age: 53
End: 2018-10-15
Payer: MEDICARE

## 2018-10-15 VITALS
HEIGHT: 70 IN | RESPIRATION RATE: 18 BRPM | DIASTOLIC BLOOD PRESSURE: 79 MMHG | WEIGHT: 188 LBS | TEMPERATURE: 98.4 F | HEART RATE: 98 BPM | BODY MASS INDEX: 26.92 KG/M2 | OXYGEN SATURATION: 71 % | SYSTOLIC BLOOD PRESSURE: 120 MMHG

## 2018-10-15 DIAGNOSIS — E66.01 MORBID OBESITY (H): Primary | ICD-10-CM

## 2018-10-15 ASSESSMENT — PAIN SCALES - GENERAL: PAINLEVEL: NO PAIN (0)

## 2018-10-15 NOTE — MR AVS SNAPSHOT
After Visit Summary   10/15/2018    Kylie Austin    MRN: [id number]           Patient Information     Date Of Birth          1965        Visit Information        Provider Department      10/15/2018 8:00 AM Minh Mancilla MD M [facility] Medical Weight Management        Today's Diagnoses     Morbid obesity (H)    -  1       Follow-ups after your visit        Follow-up notes from your care team     Return in about 4 months (around 2/15/2019).      Your next 10 appointments already scheduled     Oct 16, 2018  7:40 AM CDT   [facility]    [address] [facility] [address] [facility] [address]   [phone]           Bring a current list of meds and any records pertaining to this visit. For Physicals, please bring immunization records and any forms needing to be filled out. Please arrive 10 minutes early to complete paperwork.            Nov 26, 2018  2:30 PM CST   Return Visit with Stanislav Rayo MD   [facility] ([facility])    [address] [address]   [phone]            Jan 21, 2019  8:15 AM CST   (Arrive by 8:00 AM)   Return Weight Management Visit with MD CHARLOTTE Roy [facility] Medical Weight Management ([facility])    [address] [place] [address] [phone]              Who to contact     Please call your clinic at [phone] to:    Ask questions about your health    Make or cancel appointments    Discuss your medicines    Learn about your test results    Speak to your doctor            Additional Information About Your Visit        MyChart Information     [employer] gives you secure access to your electronic health record. If you see a primary care provider, you can also send messages to your care team and make appointments. If you have questions, please call  "your primary care clinic.  If you do not have a primary care provider, please call [phone] and they will assist you.      [employer] is an electronic gateway that provides easy, online access to your medical records. With [employer], you can request a clinic appointment, read your test results, renew a prescription or communicate with your care team.     To access your existing account, please contact your [facility] or call [phone] for assistance.        Care EveryWhere ID     This is your Care EveryWhere ID. This could be used by other organizations to access your [place] medical records  [id number]        Your Vitals Were     Pulse Temperature Respirations Height Pulse Oximetry BMI (Body Mass Index)    98 98.4  F (36.9  C) (Oral) 18 1.778 m (5' 10\") 71% 26.98 kg/m2       Blood Pressure from Last 3 Encounters:   10/15/18 120/79   06/11/18 127/87   05/21/18 116/78    Weight from Last 3 Encounters:   10/15/18 85.3 kg (188 lb)   06/11/18 94.8 kg (209 lb 1.6 oz)   05/21/18 99.8 kg (220 lb)              Today, you had the following     No orders found for display         Today's Medication Changes          These changes are accurate as of 10/15/18  8:37 AM.  If you have any questions, ask your nurse or doctor.               These medicines have changed or have updated prescriptions.        Dose/Directions    furosemide 20 MG tablet   Commonly known as:  LASIX   This may have changed:    - when to take this  - additional instructions   Used for:  Leg swelling        Dose:  20 mg   Take 1 tablet (20 mg) by mouth daily - DUE FOR LABS   Quantity:  90 tablet   Refills:  3       traZODone 100 MG tablet   Commonly known as:  DESYREL   This may have changed:  when to take this   Used for:  Insomnia, unspecified insomnia        Dose:  100 mg   Take 1 tablet (100 mg) by mouth nightly as needed for sleep   Quantity:  30 tablet   Refills:  1                Primary Care Provider Office Phone # " Fax #    Kristin Miner PA-C [phone]       [address] [place] [address]        Equal Access to Services     SHARON WORTHINGTON : Hadii aad ku jairoo Sotianaali, waaxda luqadaha, qaybta kaalmada adeegyada, riley pradomic larry. So [facility] [phone].    ATENCIÓN: Si habla español, tiene a wheeler disposición servicios gratuitos de asistencia lingüística. Llame al [phone].    We comply with applicable federal civil rights laws and [place] laws. We do not discriminate on the basis of race, color, national origin, age, disability, sex, sexual orientation, or gender identity.            Thank you!     Thank you for choosing [facility] MEDICAL WEIGHT MANAGEMENT  for your care. Our goal is always to provide you with excellent care. Hearing back from our patients is one way we can continue to improve our services. Please take a few minutes to complete the written survey that you may receive in the mail after your visit with us. Thank you!             Your Updated Medication List - Protect others around you: Learn how to safely use, store and throw away your medicines at www.disposemymeds.org.          This list is accurate as of 10/15/18  8:37 AM.  Always use your most recent med list.                   Brand Name Dispense Instructions for use Diagnosis    ABILIFY 5 MG tablet   Generic drug:  ARIPiprazole      Take 15 mg by mouth every morning        estradiol 2 MG tablet    ESTRACE    90 tablet    1 tablet daily    Gender identity disorder       furosemide 20 MG tablet    LASIX    90 tablet    Take 1 tablet (20 mg) by mouth daily - DUE FOR LABS    Leg swelling       LAMOTRIGINE PO      Take 150 mg by mouth every morning        * medical cannabis inhalation (Patient's own supply.  Not a prescription)      Inhale into the lungs 2 times daily as needed (This is NOT a prescription, and does not certify that the patient has a qualifying medical condition for medical cannabis.  The  purpose of this order is  to document that the patient reports taking medical cannabis.)  MORNING AND AT NIGHT        * medical cannabis (Patient's own supply.  Not a prescription)      2 capsules 2 times daily (This is NOT a prescription, and does not certify that the patient has a qualifying medical condition for medical cannabis.  The purpose of this order is  to document that the patient reports taking medical cannabis.)  Morning and afternoon        naltrexone 50 MG tablet    DEPADE;REVIA    30 tablet    TAKE 1/2 TABLET BY MOUTH 1-2 HOURS PRIOR TO WORST CRAVING, THEN INCREASE TO 1 FULL TABLET AS INSTRUCTED.    Morbid obesity (H)       polyethylene glycol powder    MIRALAX    510 g    Take 17 g (1 capful) by mouth daily    S/P laparoscopic sleeve gastrectomy       simvastatin 20 MG tablet    ZOCOR    30 tablet    TAKE 1 TABLET BY MOUTH AT BEDTIME    Hyperlipidemia LDL goal <130       topiramate 200 MG tablet    TOPAMAX    60 tablet    Take 1 tablet (200 mg) by mouth 2 times daily    Chronic pain disorder, Fibromyalgia       traZODone 100 MG tablet    DESYREL    30 tablet    Take 1 tablet (100 mg) by mouth nightly as needed for sleep    Insomnia, unspecified insomnia       triamcinolone 0.1 % ointment    KENALOG    80 g    Apply to AA BID x 3-4 weeks then PRN    Prurigo nodularis       ursodiol 300 MG capsule    ACTIGALL    180 capsule    Take 1 capsule (300 mg) by mouth 2 times daily    S/P laparoscopic sleeve gastrectomy       venlafaxine 100 MG tablet    EFFEXOR    21 tablet    Take 1 tablet (100 mg) by mouth 3 times daily (with meals)    S/P laparoscopic sleeve gastrectomy       * Notice:  This list has 2 medication(s) that are the same as other medications prescribed for you. Read the directions carefully, and ask your doctor or other care provider to review them with you.

## 2018-10-15 NOTE — NURSING NOTE
"Chief Complaint   Patient presents with     Weight Problem     Pt here for follow up with weight management       Vitals:    10/15/18 0757   BP: 120/79   BP Location: Left arm   Patient Position: Sitting   Cuff Size: Adult Regular   Pulse: 98   Resp: 18   Temp: 98.4  F (36.9  C)   TempSrc: Oral   SpO2: (!) 71%   Weight: 188 lb   Height: 5' 10\"       Body mass index is 26.98 kg/(m^2).      GERRI Johnson, EMT                      "

## 2018-10-15 NOTE — LETTER
"10/15/2018     RE: Kylie Austin  [address] [address]     Dear Colleague,    Thank you for referring your patient, Kylie Austin, to the [facility] MEDICAL WEIGHT MANAGEMENT at [facility]. Please see a copy of my visit note below.      Return Medical Weight Management Note     Kylie Austin  MRN:  [id number]  :  1965  BLANCA:  2018    Dear Kristin Miner PA-C,    I had the pleasure of seeing your patient Kylie Austin.  She is a 52 year old female who I am continuing to see for treatment of obesity related to:       2018   I have the following co-morbidities associated with obesity: High Cholesterol, Fatty Liver, Weight Bearing Joint Pain       CURRENT WEIGHT:   188 lbs 0 oz    Wt Readings from Last 4 Encounters:   10/15/18 85.3 kg (188 lb)   18 94.8 kg (209 lb 1.6 oz)   18 99.8 kg (220 lb)   18 100.8 kg (222 lb 3.2 oz)       Height:  5' 10\"  Body Mass Index:  Body mass index is 26.98 kg/(m^2).  Vitals:  B/P: 127/87, P: 71    Initial consult weight was 265 on 2018.  Weight change since last seen on 2018 is down 21 pounds.   Total loss is 77 pounds.    INTERVAL HISTORY:  Had bariatric surgery 2018 and is tolerating fairly well. Following dietitian plan, she says. Feels topiramate is helping with food thought suppression.    Diet and Activity Changes Since Last Visit Reviewed With Patient 10/12/2018   I have made the following changes to my diet since my last visit: Still eating like the dietitian recommended   With regards to my diet, I am still struggling with: carbs   I have made the following changes to my activity/exercise since my last visit: struggle with exercise   With regards to my activity/exercise, I am still struggling with: getting to gym       MEDICATIONS:   Current Outpatient Prescriptions   Medication     ARIPiprazole (ABILIFY) 5 MG tablet     " estradiol (ESTRACE) 2 MG tablet     furosemide (LASIX) 20 MG tablet     LAMOTRIGINE PO     medical cannabis (Patient's own supply.  Not a prescription)     medical cannabis inhalation (Patient's own supply.  Not a prescription)     naltrexone (DEPADE;REVIA) 50 MG tablet     polyethylene glycol (MIRALAX) powder     simvastatin (ZOCOR) 20 MG tablet     topiramate (TOPAMAX) 200 MG tablet     traZODone (DESYREL) 100 MG tablet     triamcinolone (KENALOG) 0.1 % ointment     ursodiol (ACTIGALL) 300 MG capsule     venlafaxine (EFFEXOR) 100 MG tablet     No current facility-administered medications for this visit.        Weight Loss Medication History Reviewed With Patient 10/12/2018   Which weight loss medications are you currently taking on a regular basis?  Naltrexone, Topamax (topiramate)   Are you having any side effects from the weight loss medication that we have prescribed you? No     ASSESSMENT:   Good weight loss post barisurgery with topiramate and naltrexone support.    FOLLOW-UP:    12 weeks.  I spent 15 minutes with this patient face to face and explained the conditions and plans (more than 50% of time was counseling/coordination of weight management).    Sincerely,    Minh Mancilla MD

## 2018-10-15 NOTE — PROGRESS NOTES
"    Return Medical Weight Management Note     Kylie Austin  MRN:  [id number]  :  1965  BLANCA:  2018    Dear Kristin Miner PA-C,    I had the pleasure of seeing your patient Kylie Austin.  She is a 52 year old female who I am continuing to see for treatment of obesity related to:       2018   I have the following co-morbidities associated with obesity: High Cholesterol, Fatty Liver, Weight Bearing Joint Pain       CURRENT WEIGHT:   188 lbs 0 oz    Wt Readings from Last 4 Encounters:   10/15/18 85.3 kg (188 lb)   18 94.8 kg (209 lb 1.6 oz)   18 99.8 kg (220 lb)   18 100.8 kg (222 lb 3.2 oz)       Height:  5' 10\"  Body Mass Index:  Body mass index is 26.98 kg/(m^2).  Vitals:  B/P: 127/87, P: 71    Initial consult weight was 265 on 2018.  Weight change since last seen on 2018 is down 21 pounds.   Total loss is 77 pounds.    INTERVAL HISTORY:  Had bariatric surgery 2018 and is tolerating fairly well. Following dietitian plan, she says. Feels topiramate is helping with food thought suppression.    Diet and Activity Changes Since Last Visit Reviewed With Patient 10/12/2018   I have made the following changes to my diet since my last visit: Still eating like the dietitian recommended   With regards to my diet, I am still struggling with: carbs   I have made the following changes to my activity/exercise since my last visit: struggle with exercise   With regards to my activity/exercise, I am still struggling with: getting to gym       MEDICATIONS:   Current Outpatient Prescriptions   Medication     ARIPiprazole (ABILIFY) 5 MG tablet     estradiol (ESTRACE) 2 MG tablet     furosemide (LASIX) 20 MG tablet     LAMOTRIGINE PO     medical cannabis (Patient's own supply.  Not a prescription)     medical cannabis inhalation (Patient's own supply.  Not a prescription)     naltrexone (DEPADE;REVIA) 50 MG tablet     polyethylene glycol (MIRALAX) powder     " simvastatin (ZOCOR) 20 MG tablet     topiramate (TOPAMAX) 200 MG tablet     traZODone (DESYREL) 100 MG tablet     triamcinolone (KENALOG) 0.1 % ointment     ursodiol (ACTIGALL) 300 MG capsule     venlafaxine (EFFEXOR) 100 MG tablet     No current facility-administered medications for this visit.        Weight Loss Medication History Reviewed With Patient 10/12/2018   Which weight loss medications are you currently taking on a regular basis?  Naltrexone, Topamax (topiramate)   Are you having any side effects from the weight loss medication that we have prescribed you? No       ASSESSMENT:   Good weight loss post barisurgery with topiramate and naltrexone support.    FOLLOW-UP:    12 weeks.  I spent 15 minutes with this patient face to face and explained the conditions and plans (more than 50% of time was counseling/coordination of weight management).    Sincerely,    Minh Mancilla MD

## 2018-10-16 ENCOUNTER — OFFICE VISIT (OUTPATIENT)
Dept: FAMILY MEDICINE | Facility: CLINIC | Age: 53
End: 2018-10-16
Payer: COMMERCIAL

## 2018-10-16 VITALS
DIASTOLIC BLOOD PRESSURE: 77 MMHG | WEIGHT: 186.8 LBS | TEMPERATURE: 96.3 F | RESPIRATION RATE: 18 BRPM | SYSTOLIC BLOOD PRESSURE: 115 MMHG | BODY MASS INDEX: 26.8 KG/M2 | HEART RATE: 66 BPM | OXYGEN SATURATION: 100 %

## 2018-10-16 DIAGNOSIS — Z00.01 ENCOUNTER FOR ROUTINE ADULT HEALTH EXAMINATION WITH ABNORMAL FINDINGS: Primary | ICD-10-CM

## 2018-10-16 DIAGNOSIS — E78.5 HYPERLIPIDEMIA LDL GOAL <130: ICD-10-CM

## 2018-10-16 DIAGNOSIS — G89.4 CHRONIC PAIN DISORDER: ICD-10-CM

## 2018-10-16 DIAGNOSIS — Z51.81 ENCOUNTER FOR THERAPEUTIC DRUG MONITORING: ICD-10-CM

## 2018-10-16 DIAGNOSIS — Z23 NEED FOR VACCINATION: ICD-10-CM

## 2018-10-16 DIAGNOSIS — Z11.4 SCREENING FOR HIV (HUMAN IMMUNODEFICIENCY VIRUS): ICD-10-CM

## 2018-10-16 DIAGNOSIS — M79.7 FIBROMYALGIA: ICD-10-CM

## 2018-10-16 DIAGNOSIS — Z23 NEED FOR PROPHYLACTIC VACCINATION AND INOCULATION AGAINST INFLUENZA: ICD-10-CM

## 2018-10-16 DIAGNOSIS — M79.89 LEG SWELLING: ICD-10-CM

## 2018-10-16 LAB
ANION GAP SERPL CALCULATED.3IONS-SCNC: 10 MMOL/L (ref 3–14)
BUN SERPL-MCNC: 16 MG/DL (ref 7–30)
CALCIUM SERPL-MCNC: 9.3 MG/DL (ref 8.5–10.1)
CHLORIDE SERPL-SCNC: 106 MMOL/L (ref 94–109)
CO2 SERPL-SCNC: 25 MMOL/L (ref 20–32)
CREAT SERPL-MCNC: 0.8 MG/DL (ref 0.52–1.04)
GFR SERPL CREATININE-BSD FRML MDRD: 74 ML/MIN/1.7M2
GLUCOSE SERPL-MCNC: 81 MG/DL (ref 70–99)
HIV 1+2 AB+HIV1 P24 AG SERPL QL IA: NONREACTIVE
POTASSIUM SERPL-SCNC: 3.4 MMOL/L (ref 3.4–5.3)
PSA SERPL-ACNC: <0.01 UG/L
SODIUM SERPL-SCNC: 141 MMOL/L (ref 133–144)

## 2018-10-16 PROCEDURE — 36415 COLL VENOUS BLD VENIPUNCTURE: CPT | Performed by: PHYSICIAN ASSISTANT

## 2018-10-16 PROCEDURE — 87389 HIV-1 AG W/HIV-1&-2 AB AG IA: CPT | Performed by: PHYSICIAN ASSISTANT

## 2018-10-16 PROCEDURE — 80048 BASIC METABOLIC PNL TOTAL CA: CPT | Performed by: PHYSICIAN ASSISTANT

## 2018-10-16 PROCEDURE — 90670 PCV13 VACCINE IM: CPT | Performed by: PHYSICIAN ASSISTANT

## 2018-10-16 PROCEDURE — 90686 IIV4 VACC NO PRSV 0.5 ML IM: CPT | Performed by: PHYSICIAN ASSISTANT

## 2018-10-16 PROCEDURE — G0009 ADMIN PNEUMOCOCCAL VACCINE: HCPCS | Performed by: PHYSICIAN ASSISTANT

## 2018-10-16 PROCEDURE — G0103 PSA SCREENING: HCPCS | Performed by: PHYSICIAN ASSISTANT

## 2018-10-16 PROCEDURE — G0008 ADMIN INFLUENZA VIRUS VAC: HCPCS | Performed by: PHYSICIAN ASSISTANT

## 2018-10-16 PROCEDURE — 99396 PREV VISIT EST AGE 40-64: CPT | Mod: 25 | Performed by: PHYSICIAN ASSISTANT

## 2018-10-16 RX ORDER — FUROSEMIDE 20 MG
20 TABLET ORAL EVERY MORNING
Qty: 90 TABLET | Refills: 3 | Status: SHIPPED | OUTPATIENT
Start: 2018-10-16 | End: 2019-10-02

## 2018-10-16 RX ORDER — TOPIRAMATE 200 MG/1
200 TABLET, FILM COATED ORAL 2 TIMES DAILY
Qty: 180 TABLET | Refills: 3 | Status: SHIPPED | OUTPATIENT
Start: 2018-10-16 | End: 2019-10-16

## 2018-10-16 RX ORDER — SIMVASTATIN 40 MG
40 TABLET ORAL AT BEDTIME
Qty: 90 TABLET | Refills: 3 | Status: SHIPPED | OUTPATIENT
Start: 2018-10-16 | End: 2019-10-02

## 2018-10-16 NOTE — MR AVS SNAPSHOT
After Visit Summary   10/16/2018    Kylie Austin    MRN: [id number]           Patient Information     Date Of Birth          1965        Visit Information        Provider Department      10/16/2018 7:40 AM Kristin Miner PA-C [facility]        Today's Diagnoses     Encounter for routine adult health examination with abnormal findings    -  1    Screening for HIV (human immunodeficiency virus)        Need for prophylactic vaccination and inoculation against influenza        Hyperlipidemia LDL goal <130        Chronic pain disorder        Fibromyalgia        Need for vaccination        Encounter for therapeutic drug monitoring          Care Instructions      Preventive Health Recommendations  Female Ages 50 - 64    Yearly exam: See your health care provider every year in order to  o Review health changes.   o Discuss preventive care.    o Review your medicines if your doctor has prescribed any.      Get a Pap test every three years (unless you have an abnormal result and your provider advises testing more often).    If you get Pap tests with HPV test, you only need to test every 5 years, unless you have an abnormal result.     You do not need a Pap test if your uterus was removed (hysterectomy) and you have not had cancer.    You should be tested each year for STDs (sexually transmitted diseases) if you're at risk.     Have a mammogram every 1 to 2 years.    Have a colonoscopy at age 50, or have a yearly FIT test (stool test). These exams screen for colon cancer.      Have a cholesterol test every 5 years, or more often if advised.    Have a diabetes test (fasting glucose) every three years. If you are at risk for diabetes, you should have this test more often.     If you are at risk for osteoporosis (brittle bone disease), think about having a bone density scan (DEXA).    Shots: Get a flu shot each year. Get a tetanus shot every 10 years.    Nutrition:     Eat at least  5 servings of fruits and vegetables each day.    Eat whole-grain bread, whole-wheat pasta and brown rice instead of white grains and rice.    Get adequate Calcium and Vitamin D.     Lifestyle    Exercise at least 150 minutes a week (30 minutes a day, 5 days a week). This will help you control your weight and prevent disease.    Limit alcohol to one drink per day.    No smoking.     Wear sunscreen to prevent skin cancer.     See your dentist every six months for an exam and cleaning.    See your eye doctor every 1 to 2 years.            Follow-ups after your visit        Additional Services     GASTROENTEROLOGY ADULT REF PROCEDURE ONLY       Last Lab Result: Creatinine (mg/dL)       Date                     Value                 09/14/2018               0.92             ----------  There is no height or weight on file to calculate BMI.     Needed:  No  Language:  English    Patient will be contacted to schedule procedure.     Please be aware that coverage of these services is subject to the terms and limitations of your health insurance plan.  Call member services at your health plan with any benefit or coverage questions.  Any procedures must be performed at a [facility] facility OR coordinated by your clinic's referral office.    Please bring the following with you to your appointment:    (1) Any X-Rays, CTs or MRIs which have been performed.  Contact the facility where they were done to arrange for  prior to your scheduled appointment.    (2) List of current medications   (3) This referral request   (4) Any documents/labs given to you for this referral                  Follow-up notes from your care team     Return in about 2 months (around 12/16/2018) for repeat labs.      Your next 10 appointments already scheduled     Nov 26, 2018  2:30 PM CST   Return Visit with Stanislav Rayo MD   [facility] [place] ([facility] [place])    [address]  [place]  [address]   [phone]            Jan 21, 2019  8:15 AM CST   (Arrive by 8:00 AM)   Return Weight Management Visit with Minh Mancilla MD   [facility] ([facility])    [address] [id number] [phone]              Future tests that were ordered for you today     Open Future Orders        Priority Expected Expires Ordered    Lipid panel reflex to direct LDL Fasting Routine 12/15/2018 2/13/2019 10/16/2018            Who to contact     Normal or non-critical lab and imaging results will be communicated to you by Just [employer], letter or phone within 4 business days after the clinic has received the results. If you do not hear from us within 7 days, please contact the clinic through ZoomSaf[employer] or phone. If you have a critical or abnormal lab result, we will notify you by phone as soon as possible.  Submit refill requests through [employer] or call your pharmacy and they will forward the refill request to us. Please allow 3 business days for your refill to be completed.          If you need to speak with a  for additional information , please call: [phone]             Additional Information About Your Visit        [employer] Information     [employer] gives you secure access to your electronic health record. If you see a primary care provider, you can also send messages to your care team and make appointments. If you have questions, please call your primary care clinic.  If you do not have a primary care provider, please call [phone] and they will assist you.        Care EveryWhere ID     This is your Care EveryWhere ID. This could be used by other organizations to access your [place] medical records  [id number]        Your Vitals Were     Pulse Temperature Respirations Pulse Oximetry BMI (Body Mass Index)       66 96.3  F (35.7  C) (Tympanic) 18 100% 26.8 kg/m2        Blood Pressure from Last 3 Encounters:    10/16/18 115/77   10/15/18 120/79   06/11/18 127/87    Weight from Last 3 Encounters:   10/16/18 186 lb 12.8 oz (84.7 kg)   10/15/18 188 lb (85.3 kg)   06/11/18 209 lb 1.6 oz (94.8 kg)              We Performed the Following     Basic metabolic panel     GASTROENTEROLOGY ADULT REF PROCEDURE ONLY     HC FLU VAC PRESRV FREE QUAD SPLIT VIR 3+YRS IM  [[id number]]     HIV Screening     PNEUMOCOCCAL CONJ VACCINE 13 VALENT IM     Prostate spec antigen screen          Today's Medication Changes          These changes are accurate as of 10/16/18  8:13 AM.  If you have any questions, ask your nurse or doctor.               These medicines have changed or have updated prescriptions.        Dose/Directions    furosemide 20 MG tablet   Commonly known as:  LASIX   This may have changed:    - when to take this  - additional instructions   Used for:  Leg swelling        Dose:  20 mg   Take 1 tablet (20 mg) by mouth daily - DUE FOR LABS   Quantity:  90 tablet   Refills:  3       simvastatin 40 MG tablet   Commonly known as:  ZOCOR   This may have changed:    - medication strength  - See the new instructions.   Used for:  Hyperlipidemia LDL goal <130   Changed by:  Kristin Miner PA-C        Dose:  40 mg   Take 1 tablet (40 mg) by mouth At Bedtime   Quantity:  90 tablet   Refills:  3       traZODone 100 MG tablet   Commonly known as:  DESYREL   This may have changed:  when to take this   Used for:  Insomnia, unspecified insomnia        Dose:  100 mg   Take 1 tablet (100 mg) by mouth nightly as needed for sleep   Quantity:  30 tablet   Refills:  1            Where to get your medicines      These medications were sent to [id number] - [address] [place] - [address]  [address] [place] [address]    Hours:  test fax successful 4/5/04  Phone:  [phone]     simvastatin 40 MG tablet    topiramate 200 MG tablet                Primary Care Provider Office Phone # Fax #    Kristin Miner PA-C  [phone]       [address] [facility] [place] [address]        Equal Access to Services     SHARON WORTHINGTON : Hadii dedra piedra joanna Gross, waazarda luqjuan manuel, jamarta kagentryda yuniel, riley juarez. So [facility] [place] [facility] [phone].    ATENCIÓN: Si habla español, tiene a wheeler disposición servicios gratuitos de asistencia lingüística. Llame al [phone].    We comply with applicable federal civil rights laws and [place] laws. We do not discriminate on the basis of race, color, national origin, age, disability, sex, sexual orientation, or gender identity.            Thank you!     Thank you for choosing [facility]  for your care. Our goal is always to provide you with excellent care. Hearing back from our patients is one way we can continue to improve our services. Please take a few minutes to complete the written survey that you may receive in the mail after your visit with us. Thank you!             Your Updated Medication List - Protect others around you: Learn how to safely use, store and throw away your medicines at www.disposemymeds.org.          This list is accurate as of 10/16/18  8:13 AM.  Always use your most recent med list.                   Brand Name Dispense Instructions for use Diagnosis    ABILIFY 5 MG tablet   Generic drug:  ARIPiprazole      Take 15 mg by mouth every morning        estradiol 2 MG tablet    ESTRACE    90 tablet    1 tablet daily    Gender identity disorder       furosemide 20 MG tablet    LASIX    90 tablet    Take 1 tablet (20 mg) by mouth daily - DUE FOR LABS    Leg swelling       LAMOTRIGINE PO      Take 150 mg by mouth every morning        * medical cannabis inhalation (Patient's own supply.  Not a prescription)      Inhale into the lungs 2 times daily as needed (This is NOT a prescription, and does not certify that the patient has a qualifying medical condition for medical cannabis.  The purpose of this order is  to document that the  patient reports taking medical cannabis.)  MORNING AND AT NIGHT        * medical cannabis (Patient's own supply.  Not a prescription)      2 capsules 2 times daily (This is NOT a prescription, and does not certify that the patient has a qualifying medical condition for medical cannabis.  The purpose of this order is  to document that the patient reports taking medical cannabis.)  Morning and afternoon        naltrexone 50 MG tablet    DEPADE;REVIA    30 tablet    TAKE 1/2 TABLET BY MOUTH 1-2 HOURS PRIOR TO WORST CRAVING, THEN INCREASE TO 1 FULL TABLET AS INSTRUCTED.    Morbid obesity (H)       polyethylene glycol powder    MIRALAX    510 g    Take 17 g (1 capful) by mouth daily    S/P laparoscopic sleeve gastrectomy       simvastatin 40 MG tablet    ZOCOR    90 tablet    Take 1 tablet (40 mg) by mouth At Bedtime    Hyperlipidemia LDL goal <130       topiramate 200 MG tablet    TOPAMAX    180 tablet    Take 1 tablet (200 mg) by mouth 2 times daily    Chronic pain disorder, Fibromyalgia       traZODone 100 MG tablet    DESYREL    30 tablet    Take 1 tablet (100 mg) by mouth nightly as needed for sleep    Insomnia, unspecified insomnia       triamcinolone 0.1 % ointment    KENALOG    80 g    Apply to AA BID x 3-4 weeks then PRN    Prurigo nodularis       ursodiol 300 MG capsule    ACTIGALL    180 capsule    Take 1 capsule (300 mg) by mouth 2 times daily    S/P laparoscopic sleeve gastrectomy       venlafaxine 100 MG tablet    EFFEXOR    21 tablet    Take 1 tablet (100 mg) by mouth 3 times daily (with meals)    S/P laparoscopic sleeve gastrectomy       * Notice:  This list has 2 medication(s) that are the same as other medications prescribed for you. Read the directions carefully, and ask your doctor or other care provider to review them with you.
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
## 2018-10-16 NOTE — NURSING NOTE
Injectable Influenza Immunization Documentation    1. Is the person to be vaccinated sick today? No    2. Does the person to be vaccinated have an allergy to eggs or a component of the vaccine? No    3. Has the person to be vaccinated today ever had a serious reaction to an influenza vaccine in the past? No    4. Has the person to be vaccinated ever had Guillan-Phillipsburg syndrome? No    Form completed by Ventura Cazares MA      Screening Questionnaire for Adult Immunization    Are you sick today?   No   Do you have allergies to medications, food, a vaccine component or latex?   No   Have you ever had a serious reaction after receiving a vaccination?   No   Do you have a long-term health problem with heart disease, lung disease, asthma, kidney disease, metabolic disease (e.g. diabetes), anemia, or other blood disorder?   No   Do you have cancer, leukemia, HIV/AIDS, or any other immune system problem?   No   In the past 3 months, have you taken medications that affect  your immune system, such as prednisone, other steroids, or anticancer drugs; drugs for the treatment of rheumatoid arthritis, Crohn s disease, or psoriasis; or have you had radiation treatments?   No   Have you had a seizure, or a brain or other nervous system problem?   No   During the past year, have you received a transfusion of blood or blood     products, or been given immune (gamma) globulin or antiviral drug?   No   For women: Are you pregnant or is there a chance you could become        pregnant during the next month?   No   Have you received any vaccinations in the past 4 weeks?   No     Immunization questionnaire answers were all negative.        Per orders of Kristin Miner PA-C, injection of PVC13 given by Ventura Cazares. Patient instructed to remain in clinic for 15 minutes afterwards, and to report any adverse reaction to me immediately.       Screening performed by Ventura Cazares on 10/16/2018 at 8:13 AM.

## 2018-10-22 ENCOUNTER — HEALTH MAINTENANCE LETTER (OUTPATIENT)
Age: 53
End: 2018-10-22

## 2018-11-01 ENCOUNTER — SURGERY (OUTPATIENT)
Age: 53
End: 2018-11-01

## 2018-11-01 ENCOUNTER — HOSPITAL ENCOUNTER (OUTPATIENT)
Facility: AMBULATORY SURGERY CENTER | Age: 53
Discharge: HOME | End: 2018-11-01
Attending: SURGERY | Admitting: SURGERY
Payer: COMMERCIAL

## 2018-11-01 VITALS
OXYGEN SATURATION: 98 % | SYSTOLIC BLOOD PRESSURE: 118 MMHG | DIASTOLIC BLOOD PRESSURE: 66 MMHG | RESPIRATION RATE: 14 BRPM

## 2018-11-01 LAB — COLONOSCOPY: NORMAL

## 2018-11-01 PROCEDURE — 88305 TISSUE EXAM BY PATHOLOGIST: CPT | Performed by: PATHOLOGY

## 2018-11-01 PROCEDURE — 45385 COLONOSCOPY W/LESION REMOVAL: CPT | Mod: PT | Performed by: SURGERY

## 2018-11-01 PROCEDURE — 45385 COLONOSCOPY W/LESION REMOVAL: CPT | Mod: PT

## 2018-11-01 PROCEDURE — 45381 COLONOSCOPY SUBMUCOUS NJX: CPT | Mod: PT | Performed by: SURGERY

## 2018-11-01 PROCEDURE — G0500 MOD SEDAT ENDO SERVICE >5YRS: HCPCS | Performed by: SURGERY

## 2018-11-01 PROCEDURE — G8918 PT W/O PREOP ORDER IV AB PRO: HCPCS

## 2018-11-01 PROCEDURE — 45381 COLONOSCOPY SUBMUCOUS NJX: CPT | Mod: PT

## 2018-11-01 PROCEDURE — G8907 PT DOC NO EVENTS ON DISCHARG: HCPCS

## 2018-11-01 RX ORDER — LIDOCAINE 40 MG/G
CREAM TOPICAL
Status: DISCONTINUED | OUTPATIENT
Start: 2018-11-01 | End: 2018-11-02 | Stop reason: HOSPADM

## 2018-11-01 RX ORDER — FENTANYL CITRATE 50 UG/ML
INJECTION, SOLUTION INTRAMUSCULAR; INTRAVENOUS PRN
Status: DISCONTINUED | OUTPATIENT
Start: 2018-11-01 | End: 2018-11-01 | Stop reason: HOSPADM

## 2018-11-01 RX ADMIN — FENTANYL CITRATE 50 MCG: 50 INJECTION, SOLUTION INTRAMUSCULAR; INTRAVENOUS at 10:19

## 2018-11-01 RX ADMIN — FENTANYL CITRATE 50 MCG: 50 INJECTION, SOLUTION INTRAMUSCULAR; INTRAVENOUS at 10:14

## 2018-11-01 RX ADMIN — FENTANYL CITRATE 100 MCG: 50 INJECTION, SOLUTION INTRAMUSCULAR; INTRAVENOUS at 10:10

## 2018-11-05 LAB — COPATH REPORT: NORMAL

## 2018-11-26 ENCOUNTER — TELEPHONE (OUTPATIENT)
Dept: PALLIATIVE MEDICINE | Facility: CLINIC | Age: 53
End: 2018-11-26

## 2018-11-26 ENCOUNTER — OFFICE VISIT (OUTPATIENT)
Dept: PALLIATIVE MEDICINE | Facility: CLINIC | Age: 53
End: 2018-11-26
Payer: COMMERCIAL

## 2018-11-26 VITALS
WEIGHT: 186 LBS | SYSTOLIC BLOOD PRESSURE: 113 MMHG | HEART RATE: 85 BPM | DIASTOLIC BLOOD PRESSURE: 79 MMHG | BODY MASS INDEX: 26.69 KG/M2

## 2018-11-26 DIAGNOSIS — M25.512 CHRONIC LEFT SHOULDER PAIN: ICD-10-CM

## 2018-11-26 DIAGNOSIS — M47.812 CERVICAL SPONDYLOSIS WITHOUT MYELOPATHY: ICD-10-CM

## 2018-11-26 DIAGNOSIS — M19.012 PRIMARY OSTEOARTHRITIS OF LEFT SHOULDER: ICD-10-CM

## 2018-11-26 DIAGNOSIS — M54.2 CHRONIC NECK PAIN: ICD-10-CM

## 2018-11-26 DIAGNOSIS — G89.29 CHRONIC NECK PAIN: ICD-10-CM

## 2018-11-26 DIAGNOSIS — M79.7 FIBROMYALGIA: ICD-10-CM

## 2018-11-26 DIAGNOSIS — F31.81 BIPOLAR 2 DISORDER (H): ICD-10-CM

## 2018-11-26 DIAGNOSIS — Z79.899 MEDICAL CANNABIS USE: ICD-10-CM

## 2018-11-26 DIAGNOSIS — M19.019 GLENOHUMERAL ARTHRITIS: ICD-10-CM

## 2018-11-26 DIAGNOSIS — G89.29 CHRONIC LEFT SHOULDER PAIN: ICD-10-CM

## 2018-11-26 DIAGNOSIS — G89.4 CHRONIC PAIN DISORDER: ICD-10-CM

## 2018-11-26 DIAGNOSIS — M54.12 CERVICAL RADICULOPATHY: Primary | ICD-10-CM

## 2018-11-26 PROCEDURE — 99214 OFFICE O/P EST MOD 30 MIN: CPT | Performed by: ANESTHESIOLOGY

## 2018-11-26 ASSESSMENT — PAIN SCALES - GENERAL: PAINLEVEL: EXTREME PAIN (8)

## 2018-11-26 NOTE — PATIENT INSTRUCTIONS
Assessment:   1.  Chronic pain syndrome  2.  Chronic neck and back pain  3.  Left shoulder pain/impingement  4.  Chronic left knee pain  5.  Osteoarthritis in multiple joints  6.  Cervical spondylosis and degenerative disc disease  7.  Myofascial pain  8.  Sacroiliac joint pain  9.  Lumbar facet joint pain  10.  Medical cannabis use      Plan:  1. Physical Therapy:  Continue home exercise program  2. Clinical Health Psychologist to address issues of relaxation, behavioral change, coping style, and other factors important to improvement.  Continue behavioral health treatments  3. Diagnostic Studies:    1. Updated cervical spine MRI ordered today  4. Medication Management:    1. Continue Medical Cannabis  2. Continue Abilify 5 mg daily  3. Continue Topamax 200 mg BID  4. Continue Trazodone 100 mg QHS  5. Continue Effexor-XR as prescribed  5. Further procedures recommended:   1. Left shoulder glenohumeral joint injection ordered today  2. Consider cervical injections after review of MRI  3. Repeat subacromial bursa injections if needed  6. Recommendations to PCP: continue current treatment  7. Follow up: for injection and in 3 months in follow up        ----------------------------------------------------------------  Clinic Number:  [phone]   Call this number with any questions about your care and for scheduling assistance. Calls are returned Monday through Friday between 8 AM and 4:30 PM. We usually get back to you within 2 business days depending on the issue/request.       Medication refills:    For non-narcotic medications, call your pharmacy directly to request a refill. The pharmacy will contact the [facility] for authorization. Please allow 3-4 days for these refills to be processed.     For narcotic refills, call the clinic number or send a [employer] message. Please contact us 7-10 days before your refill is due. The message MUST include the name of the specific medication(s) requested and how  you would like to receive the prescription(s). The options are as follows:    Pain Clinic staff can mail the prescription to your pharmacy. Please tell us the name of the pharmacy.    You may pick the prescription up at the Pain Clinic (tell us the location) or during a clinic visit with your pain provider    Pain Clinic staff can deliver the prescription to the [place] pharmacy in the clinic building. Please tell us the location.      We believe regular attendance is key to your success in our program.    Any time you are unable to keep your appointment we ask that you call us at least 24 hours in advance to let us know. This will allow us to offer the appointment time to another patient.

## 2018-11-26 NOTE — TELEPHONE ENCOUNTER
Pre-screening Questions for Radiology Injections:    Injection to be done at which interventional clinic site? [facility]    Instruct patient to arrive as directed prior to the scheduled appointment time:    [place]: 30 minutes before      Procedure ordered by Dr. Rosie Rayo    Procedure ordered? Left Shoulder Injection    What insurance would patient like us to bill for this procedure? BCBS      Worker's comp or MVA (motor vehicle accident) -Any injection DO NOT SCHEDULE and route to Merle Benjie.      [employer] - For SI joint injections, DO NOT SCHEDULE and route Merle Benjie. [employer] FREEDOM NO PA REQUIRED EFFECTIVE 11/1/2017      [employer]- MBB's must be scheduled at LEAST two weeks apart      [employer] - Any injection besides hip/shoulder/knee joint DO NOT SCHEDULE and route to Merle Benjie. She will obtain PA and call pt back to schedule procedure or notify pt of denial.       [employer]-Route to Merle for review    Any chance of pregnancy? NO   If YES, do NOT schedule and route to RN pool    Is an  needed? No     Patient has a drive home? (mandatory) YES: INFORMED    Is patient taking any blood thinners (plavix, coumadin, jantoven, warfarin, heparin, pradaxa or dabigatran )? No   If hold needed, do NOT schedule, route to RN pool     Is patient taking any aspirin products (includes Excedrin and Fiorinal)? No     If more than 325mg/day do NOT schedule; route to RN pool     For CERVICAL procedures, hold all aspirin products for 6 days.     Tell pt that if aspirin product is not held for 6 days, the procedure WILL BE cancelled.      Does the patient have a bleeding or clotting disorder? No     If YES, okay to schedule AND route to RN nurse pool    For any patients with platelet count <100, must be forwarded to provider    Is patient diabetic?  No  If YES, have them bring their glucometer.    Does patient have an active infection or treated for one within  the past week? No     Is patient currently taking any antibiotics?  No     For patients on chronic, preventative, or prophylactic antibiotics, procedures may be scheduled.     For patients on antibiotics for active or recent infection:    Malcolm Zelaya Burton, Snitzer-antibiotic course must have been completed for 4 days    Is patient currently taking any steroid medications? (i.e. Prednisone, Medrol)  No     For patients on steroid medications:    Malcolm Zelaya Burton, [place]itzer-steroid course must have been completed for 4 days    Reviewed with patient:  If you are started on any steroids or antibiotics between now and your appointment, you must contact us because the procedure may need to be cancelled.  Yes    Is patient actively being treated for cancer or immunocompromised? No  If YES, do NOT schedule and route to RN pool     Are you able to get on and off an exam table with minimal or no assistance? Yes  If NO, do NOT schedule and route to RN pool    Are you able to roll over and lay on your stomach with minimal or no assistance? Yes  If NO, do NOT schedule and route to RN pool     Any allergies to contrast dye, iodine, shellfish, or numbing and steroid medications? No  If YES, route to RN pool AND add allergy information to appointment notes    Allergies: Amiodarone; Fluoxetine; and Tetracycline      Has the patient had a flu shot or any other vaccinations within 7 days before or after the procedure.  No     Does patient have an MRI/CT?  Not Applicable  (SI joint, hip injections, lumbar sympathetic blocks, and stellate ganglion blocks do not require an MRI)    Was the MRI done w/in the last 3 years?  NA    Was MRI done at [place]? No      If not, where was it done? N/A       If MRI was not done at [place], [facility] or [facility] do NOT schedule and route to nursing.  If pt has an imaging disc, the injection may be scheduled but pt has to bring disc to appt. If they show up w/out disc  the injection cannot be done    Reminders (please tell patient if applicable):       Instructed pt to arrive 30 minutes early for IV start if this is for a cervical procedure, ALL sympathetic (stellate ganglion, hypogastric, or lumbar sympathetic block) and all sedation procedures (RFA, spinal cord stimulation trials).  Not Applicable   -IVs are not routinely placed for Dr. Owens cervical cases   -Dr. Marcus: IVs for cervical ESIs and cervical TBDs (not CMBBs/facet inj)      If NPO for sedation, informed patient that it is okay to take medications with sips of water (except if they are to hold blood thinners).  Not Applicable   *DO take blood pressure medication if it is prescribed*      If this is for a cervical KAYLYN, informed patient that aspirin needs to be held for 6 days.   Not Applicable      For all patients not having spinal cord stimulator (SCS) trials or radiofrequency ablations (RFAs), informed patient:    IV sedation is not provided for this procedure.  If you feel that an oral anti-anxiety medication is needed, you can discuss this further with your referring provider or primary care provider.  The Pain Clinic provider will discuss specifics of what the procedure includes at your appointment.  Most procedures last 10-20 minutes.  We use numbing medications to help with any discomfort during the procedure.  Not Applicable      Do not schedule procedures requiring IV placement in the first appointment of the day or first appointment after lunch. Do NOT schedule at 0745, 0815 or 1245. N/A      For patients 85 or older we recommend having an adult stay w/ them for the remainder of the day.   N/A    Does the patient have any questions?  NO  Lola Pastrana  [facility]

## 2018-11-26 NOTE — MR AVS SNAPSHOT
After Visit Summary   11/26/2018    Kylie Austin    MRN: [id number]           Patient Information     Date Of Birth          1965        Visit Information        Provider Department      11/26/2018 2:30 PM Stanislav Morton MD [facility]        Today's Diagnoses     Cervical radiculopathy    -  1    Primary osteoarthritis of left shoulder          Care Instructions    Assessment:   1.  Chronic pain syndrome  2.  Chronic neck and back pain  3.  Left shoulder pain/impingement  4.  Chronic left knee pain  5.  Osteoarthritis in multiple joints  6.  Cervical spondylosis and degenerative disc disease  7.  Myofascial pain  8.  Sacroiliac joint pain  9.  Lumbar facet joint pain  10.  Medical cannabis use      Plan:  1. Physical Therapy:  Continue home exercise program  2. Clinical Health Psychologist to address issues of relaxation, behavioral change, coping style, and other factors important to improvement.  Continue behavioral health treatments  3. Diagnostic Studies:    1. Updated cervical spine MRI ordered today  4. Medication Management:    1. Continue Medical Cannabis  2. Continue Abilify 5 mg daily  3. Continue Topamax 200 mg BID  4. Continue Trazodone 100 mg QHS  5. Continue Effexor-XR as prescribed  5. Further procedures recommended:   1. Left shoulder glenohumeral joint injection ordered today  2. Consider cervical injections after review of MRI  3. Repeat subacromial bursa injections if needed  6. Recommendations to PCP: continue current treatment  7. Follow up: for injection and in 3 months in follow up        ----------------------------------------------------------------  Clinic Number:  [phone]   Call this number with any questions about your care and for scheduling assistance. Calls are returned Monday through Friday between 8 AM and 4:30 PM. We usually get back to you within 2 business days depending on the issue/request.       Medication refills:    For  non-narcotic medications, call your pharmacy directly to request a refill. The pharmacy will contact the [facility] for authorization. Please allow 3-4 days for these refills to be processed.     For narcotic refills, call the clinic number or send a [employer] message. Please contact us 7-10 days before your refill is due. The message MUST include the name of the specific medication(s) requested and how you would like to receive the prescription(s). The options are as follows:    Pain Clinic staff can mail the prescription to your pharmacy. Please tell us the name of the pharmacy.    You may pick the prescription up at the Pain Clinic (tell us the location) or during a clinic visit with your pain provider    Pain Clinic staff can deliver the prescription to the [facility] pharmacy in the clinic building. Please tell us the location.      We believe regular attendance is key to your success in our program.    Any time you are unable to keep your appointment we ask that you call us at least 24 hours in advance to let us know. This will allow us to offer the appointment time to another patient.               Follow-ups after your visit        Additional Services     PAIN INJECTION EVAL/TREAT/FOLLOW UP                 Your next 10 appointments already scheduled     Dec 17, 2018  7:30 AM CST   [facility] with [facility] LAB   [facility]    [address] [address] [phone]           Please do not eat 10-12 hours before your appointment if you are coming in fasting for labs on lipids, cholesterol, or glucose (sugar). This does not apply to pregnant women. Water, hot tea and black coffee (with nothing added) are okay. Do not drink other fluids, diet soda or chew gum.            Jan 21, 2019  8:15 AM CST   (Arrive by 8:00 AM)   Return Weight Management Visit with Minh Mancilla MD   [facility] ([facility]  [place])    [address] [address] [phone]              Future tests that were ordered for you today     Open Future Orders        Priority Expected Expires Ordered    MR Cervical Spine w/o Contrast Routine  11/26/2019 11/26/2018            Who to contact     If you have questions or need follow up information about today's clinic visit or your schedule please contact [facility] directly at [phone].  Normal or non-critical lab and imaging results will be communicated to you by [employer], letter or phone within 4 business days after the clinic has received the results. If you do not hear from us within 7 days, please contact the clinic through [employer] or phone. If you have a critical or abnormal lab result, we will notify you by phone as soon as possible.  Submit refill requests through [employer] or call your pharmacy and they will forward the refill request to us. Please allow 3 business days for your refill to be completed.          Additional Information About Your Visit        [employer] Information     [employer] gives you secure access to your electronic health record. If you see a primary care provider, you can also send messages to your care team and make appointments. If you have questions, please call your primary care clinic.  If you do not have a primary care provider, please call [phone] and they will assist you.        Care EveryWhere ID     This is your Care EveryWhere ID. This could be used by other organizations to access your [place] medical records  [id number]        Your Vitals Were     Pulse BMI (Body Mass Index)                85 26.69 kg/m2           Blood Pressure from Last 3 Encounters:   11/26/18 113/79   11/01/18 118/66   10/16/18 115/77    Weight from Last 3 Encounters:   11/26/18 84.4 kg (186 lb)   10/16/18 84.7 kg (186 lb 12.8 oz)   10/15/18 85.3 kg (188 lb)              We Performed the Following     PAIN INJECTION  EVAL/TREAT/FOLLOW UP          Today's Medication Changes          These changes are accurate as of 11/26/18  2:58 PM.  If you have any questions, ask your nurse or doctor.               These medicines have changed or have updated prescriptions.        Dose/Directions    traZODone 100 MG tablet   Commonly known as:  DESYREL   This may have changed:  when to take this   Used for:  Insomnia, unspecified insomnia        Dose:  100 mg   Take 1 tablet (100 mg) by mouth nightly as needed for sleep   Quantity:  30 tablet   Refills:  1                Primary Care Provider Office Phone # Fax #    Kristin Miner PA-C [phone]       [address] [facility] [address]        Equal Access to Services     [facility] : Hadii dedra piedra hadsukho Soellie, waaxda luqadaha, qaybta kaalmada adeegyada, riley yuan . So [facility] [phone].    ATENCIÓN: Si habla español, tiene a wheeler disposición servicios gratuitos de asistencia lingüística. Llame al [phone].    We comply with applicable federal civil rights laws and [place] laws. We do not discriminate on the basis of race, color, national origin, age, disability, sex, sexual orientation, or gender identity.            Thank you!     Thank you for choosing [facility]  for your care. Our goal is always to provide you with excellent care. Hearing back from our patients is one way we can continue to improve our services. Please take a few minutes to complete the written survey that you may receive in the mail after your visit with us. Thank you!             Your Updated Medication List - Protect others around you: Learn how to safely use, store and throw away your medicines at www.disposemymeds.org.          This list is accurate as of 11/26/18  2:58 PM.  Always use your most recent med list.                   Brand Name Dispense Instructions for use Diagnosis    ABILIFY 5 MG tablet   Generic drug:  ARIPiprazole      Take  15 mg by mouth every morning        estradiol 2 MG tablet    ESTRACE    90 tablet    1 tablet daily    Gender identity disorder       furosemide 20 MG tablet    LASIX    90 tablet    Take 1 tablet (20 mg) by mouth every morning    Leg swelling       LAMOTRIGINE PO      Take 150 mg by mouth every morning        * medical cannabis inhalation (Patient's own supply.  Not a prescription)      Inhale into the lungs 2 times daily as needed (This is NOT a prescription, and does not certify that the patient has a qualifying medical condition for medical cannabis.  The purpose of this order is  to document that the patient reports taking medical cannabis.)  MORNING AND AT NIGHT        * medical cannabis (Patient's own supply.  Not a prescription)      2 capsules 2 times daily (This is NOT a prescription, and does not certify that the patient has a qualifying medical condition for medical cannabis.  The purpose of this order is  to document that the patient reports taking medical cannabis.)  Morning and afternoon        naltrexone 50 MG tablet    DEPADE/REVIA    30 tablet    TAKE 1/2 TABLET BY MOUTH 1-2 HOURS PRIOR TO WORST CRAVING, THEN INCREASE TO 1 FULL TABLET AS INSTRUCTED.    Morbid obesity (H)       polyethylene glycol powder    MIRALAX    510 g    Take 17 g (1 capful) by mouth daily    S/P laparoscopic sleeve gastrectomy       simvastatin 40 MG tablet    ZOCOR    90 tablet    Take 1 tablet (40 mg) by mouth At Bedtime    Hyperlipidemia LDL goal <130       topiramate 200 MG tablet    TOPAMAX    180 tablet    Take 1 tablet (200 mg) by mouth 2 times daily    Chronic pain disorder, Fibromyalgia       traZODone 100 MG tablet    DESYREL    30 tablet    Take 1 tablet (100 mg) by mouth nightly as needed for sleep    Insomnia, unspecified insomnia       triamcinolone 0.1 % ointment    KENALOG    80 g    Apply to AA BID x 3-4 weeks then PRN    Prurigo nodularis       ursodiol 300 MG capsule    ACTIGALL    180 capsule    Take 1  capsule (300 mg) by mouth 2 times daily    S/P laparoscopic sleeve gastrectomy       venlafaxine 100 MG tablet    EFFEXOR    21 tablet    Take 1 tablet (100 mg) by mouth 3 times daily (with meals)    S/P laparoscopic sleeve gastrectomy       * Notice:  This list has 2 medication(s) that are the same as other medications prescribed for you. Read the directions carefully, and ask your doctor or other care provider to review them with you.

## 2018-11-26 NOTE — TELEPHONE ENCOUNTER
LM on  for pt to schedule left shoulder glenohumeral joint injection.      Luz PORTER    [facility] [place]

## 2018-11-26 NOTE — PROGRESS NOTES
[facility]    Date of visit: 11/26/2018    Chief complaint:   Chief Complaint   Patient presents with     Pain       Interval history:  Kylie Austin was last seen by me on 5/21/18 for left shoulder subacromial bursa injection.  Her last office visit was on 5/14/18.     Recommendations/plan at the last visit included:  Plan:  1. Physical Therapy:  Continue home exercises.  Maintain ROM and stretching for left shoulder to prevent frozen shoulder.   2. Clinical Health Psychologist to address issues of relaxation, behavioral change, coping style, and other factors important to improvement.  Continue treatment with psychiatrist.  3. Diagnostic Studies:  Not indicated at this time.  4. Medication Management:    1. Continue medical cannabis.   2. Continue Abilify 5 mg daily  3. Continue Topamax 200 mg BID  4. Continue Trazodone 50 mg QHS  5. Continue Effexor-XR as prescribed  5. Further procedures recommended:   1. Schedule repeat left subacromial injection  6. Recommendations to PCP: continue current treatment.  7. Follow up: 6 months    Since her last visit, Kylie Austin reports:  She feels that the left shoulder injection helped a lot with her shoulder pain.  She had to miss an appointment due to an important meeting.  She states that she had been awakened a couple of times with severe left shoulder pain when it felt like the shoulder popped out and back in place.  She also complains of numbness down the arm into the last two fingers on the left had.  She denies significant changes in shoulder range of motion.  She has been doing exercises and yoga that seem to be helping with her shoulder and general pain.  She feels that the left upper extremity may be weaker than the right, most noticeable with lifting activities. She does have chronic neck pain as well that is also more on the left side.    Her pain is described as aching, burning, shooting, throbbing, stabbing, miserable,  tiring, exhausting, penetrating, gnawing, nagging, and unbearable at times.    Pain scores:  Pain intensity on average is 7 on a scale of 0-10.  Ranges from 5/10 to 10/10.    [place] Board of Pharmacy Data Base Reviewed:    N/A; not on opioids    Current pain treatments:   Medical cannabis - very helpful with pain and mood  Abilify 5 mg daily  Topamax 200 mg BID  Trazodone 100 mg QHS  Effexor- mg two tabs daily    Past pain treatments:  Tramadol  Vicodin   Advil- states he's been told by cardiology to not take any more NSAIDs   No other muscle relaxers   Prednisone   Sumatriptan   Buproprion  Prozac, Paxil, Zoloft, Cymbalta, Effexor   No TCA, SNRI   Ambien (currently), Lunesta, Trazodone   No prior AED    Injections:  - 5/21/18 left shoulder subacromial bursa injection - helpful  - 1/17/18 left shoulder subacromial bursa injection - helpful  - multiple injections including cervical RFA - short term relief with RFA    Date of last UDS:  Not on opioids    Side Effects: no side effect    Medications:  Current Outpatient Prescriptions   Medication Sig Dispense Refill     ARIPiprazole (ABILIFY) 5 MG tablet Take 15 mg by mouth every morning        estradiol (ESTRACE) 2 MG tablet 1 tablet daily 90 tablet 3     furosemide (LASIX) 20 MG tablet Take 1 tablet (20 mg) by mouth every morning 90 tablet 3     LAMOTRIGINE PO Take 150 mg by mouth every morning        medical cannabis (Patient's own supply.  Not a prescription) 2 capsules 2 times daily (This is NOT a prescription, and does not certify that the patient has a qualifying medical condition for medical cannabis.  The purpose of this order is  to document that the patient reports taking medical cannabis.)    Morning and afternoon        medical cannabis inhalation (Patient's own supply.  Not a prescription) Inhale into the lungs 2 times daily as needed (This is NOT a prescription, and does not certify that the patient has a qualifying medical condition for medical  cannabis.  The purpose of this order is  to document that the patient reports taking medical cannabis.)    MORNING AND AT NIGHT        naltrexone (DEPADE;REVIA) 50 MG tablet TAKE 1/2 TABLET BY MOUTH 1-2 HOURS PRIOR TO WORST CRAVING, THEN INCREASE TO 1 FULL TABLET AS INSTRUCTED.  30 tablet 4     polyethylene glycol (MIRALAX) powder Take 17 g (1 capful) by mouth daily 510 g 1     simvastatin (ZOCOR) 40 MG tablet Take 1 tablet (40 mg) by mouth At Bedtime 90 tablet 3     topiramate (TOPAMAX) 200 MG tablet Take 1 tablet (200 mg) by mouth 2 times daily 180 tablet 3     traZODone (DESYREL) 100 MG tablet Take 1 tablet (100 mg) by mouth nightly as needed for sleep (Patient taking differently: Take 100 mg by mouth At Bedtime ) 30 tablet 1     triamcinolone (KENALOG) 0.1 % ointment Apply to AA BID x 3-4 weeks then PRN 80 g 3     ursodiol (ACTIGALL) 300 MG capsule Take 1 capsule (300 mg) by mouth 2 times daily 180 capsule 1     venlafaxine (EFFEXOR) 100 MG tablet Take 1 tablet (100 mg) by mouth 3 times daily (with meals) 21 tablet 1       Medical History: any changes in medical history since they were last seen? No    Review of Systems:  The 14 system ROS was reviewed from the intake questionnaire, and is positive for: weight loss, joint pain, stiffness, neck pain, numbness, tingling  Any bowel or bladder problems: denies changes  Mood: good    Physical Exam:  /79  Pulse 85  Wt 84.4 kg (186 lb)  BMI 26.69 kg/m2  Constitutional: alert and no distress.  Pt is overweight  Head: Normocephalic. No masses, lesions, tenderness or abnormalities  ENT: EOMI, mucosal surfaces moist.  Neck with full ROM, posture fair  Cardiovascular: No edema or JVD appreciated.  Respiratory: Good diaphragmatic excursion. No wheezes appreciated.  Speaking in complete sentences without shortness of breath.  No accessory muscle use.   Musculoskeletal: extremities normal- no gross deformities noted, normal muscle tone and able to move about the exam  room without difficulty.    Skin: no suspicious lesions or rashes appreciated on exposed areas  Neurologic: Gait normal and non-antalgic. Moving all extremities spontaneously, no apparent weakness.    Psychiatric: mentation appears normal, affect full and good eye contact.      Cervical Spine:  Good ROM, tender left paraspinal muscles and articular pillars, facet loading is positive on the left, Spurling's was negative   Lumbar Spine:  Moves well, exam limited    Left shoulder with mildly decreased ROM on abduction and external rotation, Apley scratch testing positive on the left    Tinel's positive on the left at the wrist and elbow, Phalen's negative    Assessment:   1.  Chronic pain syndrome  2.  Chronic neck and back pain  3.  Left shoulder pain/impingement  4.  Chronic left knee pain  5.  Osteoarthritis in multiple joints  6.  Cervical spondylosis and degenerative disc disease  7.  Myofascial pain  8.  Sacroiliac joint pain  9.  Lumbar facet joint pain  10.  Medical cannabis use    Kylie Austin is a 53 year old female who is seen at the pain clinic for chronic neck and shoulder pain.  She has been having the feeling of her left shoulder popping in an out of joint associated with increased pain and is interested in an injection.  She also feels that her left upper extremity numbness has worsened and may be associated with some weakness.  Our treatment plan is as outlined below.    Plan:  1. Physical Therapy:  Continue home exercise program  2. Clinical Health Psychologist to address issues of relaxation, behavioral change, coping style, and other factors important to improvement.  Continue behavioral health treatments  3. Diagnostic Studies:    1. Updated cervical spine MRI ordered today  4. Medication Management:    1. Continue Medical Cannabis - re-certification completed today  2. Continue Abilify 5 mg daily  3. Continue Topamax 200 mg BID  4. Continue Trazodone 100 mg QHS  5. Continue Effexor-XR  as prescribed  5. Further procedures recommended:   1. Left shoulder glenohumeral joint injection ordered today  2. Consider cervical injections after review of MRI  3. Repeat subacromial bursa injections if needed  6. Recommendations to PCP: continue current treatment  7. Follow up: for injection and in 3 months in follow up    Total time spent was 30 minutes, and more than 50% of face to face time was spent in counseling and/or coordination of care regarding diagnosis, physical activity, medication management, and interventional procedures.    Stanislav Rayo MD  [facility]

## 2018-11-29 ENCOUNTER — RADIANT APPOINTMENT (OUTPATIENT)
Dept: MRI IMAGING | Facility: CLINIC | Age: 53
End: 2018-11-29
Attending: ANESTHESIOLOGY
Payer: COMMERCIAL

## 2018-11-29 DIAGNOSIS — M54.12 CERVICAL RADICULOPATHY: ICD-10-CM

## 2018-11-29 PROCEDURE — 72141 MRI NECK SPINE W/O DYE: CPT | Mod: TC

## 2018-12-07 ENCOUNTER — RADIOLOGY INJECTION OFFICE VISIT (OUTPATIENT)
Dept: PALLIATIVE MEDICINE | Facility: CLINIC | Age: 53
End: 2018-12-07
Payer: COMMERCIAL

## 2018-12-07 ENCOUNTER — HOSPITAL ENCOUNTER (OUTPATIENT)
Dept: RADIOLOGY | Facility: CLINIC | Age: 53
Discharge: HOME | End: 2018-12-07
Attending: ANESTHESIOLOGY | Admitting: ANESTHESIOLOGY
Payer: MEDICARE

## 2018-12-07 VITALS — RESPIRATION RATE: 16 BRPM | SYSTOLIC BLOOD PRESSURE: 136 MMHG | HEART RATE: 66 BPM | DIASTOLIC BLOOD PRESSURE: 79 MMHG

## 2018-12-07 DIAGNOSIS — M25.512 CHRONIC LEFT SHOULDER PAIN: ICD-10-CM

## 2018-12-07 DIAGNOSIS — G89.29 CHRONIC LEFT SHOULDER PAIN: ICD-10-CM

## 2018-12-07 DIAGNOSIS — M19.012 PRIMARY OSTEOARTHRITIS OF LEFT SHOULDER: ICD-10-CM

## 2018-12-07 DIAGNOSIS — M19.012 PRIMARY OSTEOARTHRITIS OF LEFT SHOULDER: Primary | ICD-10-CM

## 2018-12-07 PROCEDURE — 25000128 H RX IP 250 OP 636: Performed by: ANESTHESIOLOGY

## 2018-12-07 PROCEDURE — 27210202 XR JOINT INJECTION MAJOR LEFT: Mod: TC

## 2018-12-07 PROCEDURE — 20610 DRAIN/INJ JOINT/BURSA W/O US: CPT | Mod: LT | Performed by: ANESTHESIOLOGY

## 2018-12-07 RX ORDER — TRIAMCINOLONE ACETONIDE 40 MG/ML
40 INJECTION, SUSPENSION INTRA-ARTICULAR; INTRAMUSCULAR ONCE
Status: COMPLETED | OUTPATIENT
Start: 2018-12-07 | End: 2018-12-07

## 2018-12-07 RX ORDER — LIDOCAINE HYDROCHLORIDE 10 MG/ML
5 INJECTION, SOLUTION EPIDURAL; INFILTRATION; INTRACAUDAL; PERINEURAL ONCE
Status: COMPLETED | OUTPATIENT
Start: 2018-12-07 | End: 2018-12-07

## 2018-12-07 RX ORDER — BUPIVACAINE HYDROCHLORIDE 5 MG/ML
10 INJECTION, SOLUTION PERINEURAL ONCE
Status: COMPLETED | OUTPATIENT
Start: 2018-12-07 | End: 2018-12-07

## 2018-12-07 RX ORDER — IOPAMIDOL 612 MG/ML
15 INJECTION, SOLUTION INTRATHECAL ONCE
Status: COMPLETED | OUTPATIENT
Start: 2018-12-07 | End: 2018-12-07

## 2018-12-07 RX ADMIN — BUPIVACAINE HYDROCHLORIDE 5.5 MG: 5 INJECTION, SOLUTION EPIDURAL; INTRACAUDAL; PERINEURAL at 09:20

## 2018-12-07 RX ADMIN — IOPAMIDOL 1.5 ML: 612 INJECTION, SOLUTION INTRATHECAL at 09:20

## 2018-12-07 RX ADMIN — TRIAMCINOLONE ACETONIDE 40 MG: 40 INJECTION, SUSPENSION INTRA-ARTICULAR; INTRAMUSCULAR at 09:22

## 2018-12-07 RX ADMIN — LIDOCAINE HYDROCHLORIDE 1 ML: 10 INJECTION, SOLUTION EPIDURAL; INFILTRATION; INTRACAUDAL; PERINEURAL at 09:20

## 2018-12-07 ASSESSMENT — PAIN SCALES - GENERAL
PAINLEVEL: SEVERE PAIN (7)
PAINLEVEL: MILD PAIN (3)

## 2018-12-07 NOTE — IP AVS SNAPSHOT
[facility]    [address]    [facility] [address]    Phone:  [phone]    Fax:  [phone]                                       After Visit Summary   12/7/2018    Kylie Austin    MRN: [id number]           After Visit Summary Signature Page     I have received my discharge instructions, and my questions have been answered. I have discussed any challenges I see with this plan with the nurse or doctor.    ..........................................................................................................................................  Patient/Patient Representative Signature      ..........................................................................................................................................  Patient Representative Print Name and Relationship to Patient    ..................................................               ................................................  Date                                   Time    ..........................................................................................................................................  Reviewed by Signature/Title    ...................................................              ..............................................  Date                                               Time          22EPIC Rev 08/18

## 2018-12-07 NOTE — PROGRESS NOTES
Pre-procedure Intake    Have you been fasting? No     If yes, for how long?     Are you taking a prescribed blood thinner such as coumadin, Plavix, Xarelto?    No    If yes, when did you take your last dose?     Do you take aspirin?  No    If cervical procedure, have you held aspirin for 6 days?   NA    Do you have any allergies to contrast dye, iodine, steroid and/or numbing medications?  NO    Are you currently taking antibiotics or have an active infection?  NO    Have you had a fever/elevated temperature within the past week? NO    Are you currently taking oral steroids? NO    Do you have a ? Yes       Are you pregnant or breastfeeding?  NO    Are the vital signs normal?  Yes

## 2018-12-07 NOTE — IP AVS SNAPSHOT
MRN:[id number]                      After Visit Summary   12/7/2018    Kylie Austin    MRN: [id number]           Visit Information        Provider Department      12/7/2018  9:15 AM [facility]           Review of your medicines      UNREVIEWED medicines. Ask your doctor about these medicines        Dose / Directions    ABILIFY 5 MG tablet   Generic drug:  ARIPiprazole        Dose:  15 mg   Take 15 mg by mouth every morning   Refills:  0       estradiol 2 MG tablet   Commonly known as:  ESTRACE   Used for:  Gender identity disorder        1 tablet daily   Quantity:  90 tablet   Refills:  3       furosemide 20 MG tablet   Commonly known as:  LASIX   Used for:  Leg swelling        Dose:  20 mg   Take 1 tablet (20 mg) by mouth every morning   Quantity:  90 tablet   Refills:  3       LAMOTRIGINE PO        Dose:  150 mg   Take 150 mg by mouth every morning   Refills:  0       naltrexone 50 MG tablet   Commonly known as:  DEPADE/REVIA   Used for:  Morbid obesity (H)        TAKE 1/2 TABLET BY MOUTH 1-2 HOURS PRIOR TO WORST CRAVING, THEN INCREASE TO 1 FULL TABLET AS INSTRUCTED.   Quantity:  30 tablet   Refills:  4       polyethylene glycol powder   Commonly known as:  MIRALAX   Used for:  S/P laparoscopic sleeve gastrectomy        Dose:  1 capful   Take 17 g (1 capful) by mouth daily   Quantity:  510 g   Refills:  1       simvastatin 40 MG tablet   Commonly known as:  ZOCOR   Used for:  Hyperlipidemia LDL goal <130        Dose:  40 mg   Take 1 tablet (40 mg) by mouth At Bedtime   Quantity:  90 tablet   Refills:  3       topiramate 200 MG tablet   Commonly known as:  TOPAMAX   Used for:  Chronic pain disorder, Fibromyalgia        Dose:  200 mg   Take 1 tablet (200 mg) by mouth 2 times daily   Quantity:  180 tablet   Refills:  3       traZODone 100 MG tablet   Commonly known as:  DESYREL   Used for:  Insomnia, unspecified insomnia        Dose:  100 mg   Take 1 tablet (100  mg) by mouth nightly as needed for sleep   Quantity:  30 tablet   Refills:  1       triamcinolone 0.1 % external ointment   Commonly known as:  KENALOG   Used for:  Prurigo nodularis        Apply to AA BID x 3-4 weeks then PRN   Quantity:  80 g   Refills:  3       ursodiol 300 MG capsule   Commonly known as:  ACTIGALL   Used for:  S/P laparoscopic sleeve gastrectomy        Dose:  300 mg   Take 1 capsule (300 mg) by mouth 2 times daily   Quantity:  180 capsule   Refills:  1       venlafaxine 100 MG tablet   Commonly known as:  EFFEXOR   Used for:  S/P laparoscopic sleeve gastrectomy        Dose:  100 mg   Take 1 tablet (100 mg) by mouth 3 times daily (with meals)   Quantity:  21 tablet   Refills:  1         CONTINUE these medicines which have NOT CHANGED        Dose / Directions    * medical cannabis inhalation (Patient's own supply.  Not a prescription)        Inhale into the lungs 2 times daily as needed (This is NOT a prescription, and does not certify that the patient has a qualifying medical condition for medical cannabis.  The purpose of this order is  to document that the patient reports taking medical cannabis.)  MORNING AND AT NIGHT   Refills:  0       * medical cannabis (Patient's own supply.  Not a prescription)        Dose:  2 capsule   2 capsules 2 times daily (This is NOT a prescription, and does not certify that the patient has a qualifying medical condition for medical cannabis.  The purpose of this order is  to document that the patient reports taking medical cannabis.)  Morning and afternoon   Refills:  0       * Notice:  This list has 2 medication(s) that are the same as other medications prescribed for you. Read the directions carefully, and ask your doctor or other care provider to review them with you.             Protect others around you: Learn how to safely use, store and throw away your medicines at www.disposemymeds.org.         Follow-ups after your visit        Your next 10 appointments  already scheduled     Dec 17, 2018  7:30 AM CST   LAB with BE LAB   [facility] [place] ([facility])    [address] [place] [address]   [phone]           Please do not eat 10-12 hours before your appointment if you are coming in fasting for labs on lipids, cholesterol, or glucose (sugar). This does not apply to pregnant women. Water, hot tea and black coffee (with nothing added) are okay. Do not drink other fluids, diet soda or chew gum.            Jan 21, 2019  8:15 AM CST   (Arrive by 8:00 AM)   Return Weight Management Visit with Minh Mancilla MD   [facility] ([facility])    [address] [facility] [address] [phone]            Feb 25, 2019  2:30 PM CST   Return Visit with Stanislav Rayo MD   [facility] ([facility])    [address]  [place] [address]   [phone]               Care Instructions        Further instructions from your care team       [facility]   Procedure Discharge Instructions    Today you saw:    Dr. Stanislav Rayo      You had an: left shoulder glenohumeral joint injection    Medications used:  Lidocaine   Bupivacaine  Kenalog            Do not drive for 6 hours. The effect of the local anesthetic could slow your reflexes.     You may resume your regular activities after 24 hours    Avoid strenuous activity for the first 24 hours    You may shower, however avoid swimming, tub baths or hot tubs for 24 hours following your procedure    You may have a mild to moderate increase in pain for several days following the injection.    It may take up to 14 days for the steroid medication to start working although you may feel the effect as early as a few days after the procedure.       You may use ice packs for 10-15 minutes, 3 to 4 times a day at the injection site for comfort    Do not use  heat to painful areas for 6 to 8 hours. This will give the local anesthetic time to wear off and prevent you from accidentally burning your skin.     You may use anti-inflammatory medications (such as Ibuprofen or Aleve or Advil) or Tylenol for pain control if necessary    If you experience any of the following, call the Pain Clinic during work hours at [phone] or the Provider Line after hours at [phone]:  -Fever over 100 degree F  -Swelling, bleeding, redness, drainage, warmth at the injection site  -Progressive weakness or numbness in your  arms  -Loss of bowel or bladder function  -Unusual headache that is not relieved by Tylenol or other pain reliever  -Unusual new onset of pain that is not improving         Additional Information About Your Visit        [facility] Information     [employer] gives you secure access to your electronic health record. If you see a primary care provider, you can also send messages to your care team and make appointments. If you have questions, please call your primary care clinic.  If you do not have a primary care provider, please call [phone] and they will assist you.        Care EveryWhere ID     This is your Care EveryWhere ID. This could be used by other organizations to access your [facility] medical records  [id number]         Primary Care Provider Office Phone # Fax #    Kristin Miner PA-C [phone]      Equal Access to Services     SHARON WORTHINGTON : Dominik gillo Soellie, waaxda luqadaha, qaybta kaalmahans brice, riley juarez. So [facility] [phone].    ATENCIÓN: Si habla español, tiene a wheeler disposición servicios gratuitos de asistencia lingüística. Llame al [phone].    We comply with applicable federal civil rights laws and [place] laws. We do not discriminate on the basis of race, color, national origin, age, disability, sex, sexual orientation, or gender identity.            Thank you!     Thank you  for choosing [facility] for your care. Our goal is always to provide you with excellent care. Hearing back from our patients is one way we can continue to improve our services. Please take a few minutes to complete the written survey that you may receive in the mail after you visit with us. Thank you!             Medication List: This is a list of all your medications and when to take them. Check marks below indicate your daily home schedule. Keep this list as a reference.      Medications           Morning Afternoon Evening Bedtime As Needed    ABILIFY 5 MG tablet   Take 15 mg by mouth every morning   Generic drug:  ARIPiprazole                                estradiol 2 MG tablet   Commonly known as:  ESTRACE   1 tablet daily                                furosemide 20 MG tablet   Commonly known as:  LASIX   Take 1 tablet (20 mg) by mouth every morning                                LAMOTRIGINE PO   Take 150 mg by mouth every morning                                * medical cannabis inhalation (Patient's own supply.  Not a prescription)   Inhale into the lungs 2 times daily as needed (This is NOT a prescription, and does not certify that the patient has a qualifying medical condition for medical cannabis.  The purpose of this order is  to document that the patient reports taking medical cannabis.)  MORNING AND AT NIGHT                                * medical cannabis (Patient's own supply.  Not a prescription)   2 capsules 2 times daily (This is NOT a prescription, and does not certify that the patient has a qualifying medical condition for medical cannabis.  The purpose of this order is  to document that the patient reports taking medical cannabis.)  Morning and afternoon                                naltrexone 50 MG tablet   Commonly known as:  DEPADE/REVIA   TAKE 1/2 TABLET BY MOUTH 1-2 HOURS PRIOR TO WORST CRAVING, THEN INCREASE TO 1 FULL TABLET AS INSTRUCTED.                                polyethylene  glycol powder   Commonly known as:  MIRALAX   Take 17 g (1 capful) by mouth daily                                simvastatin 40 MG tablet   Commonly known as:  ZOCOR   Take 1 tablet (40 mg) by mouth At Bedtime                                topiramate 200 MG tablet   Commonly known as:  TOPAMAX   Take 1 tablet (200 mg) by mouth 2 times daily                                traZODone 100 MG tablet   Commonly known as:  DESYREL   Take 1 tablet (100 mg) by mouth nightly as needed for sleep                                triamcinolone 0.1 % external ointment   Commonly known as:  KENALOG   Apply to AA BID x 3-4 weeks then PRN                                ursodiol 300 MG capsule   Commonly known as:  ACTIGALL   Take 1 capsule (300 mg) by mouth 2 times daily                                venlafaxine 100 MG tablet   Commonly known as:  EFFEXOR   Take 1 tablet (100 mg) by mouth 3 times daily (with meals)                                * Notice:  This list has 2 medication(s) that are the same as other medications prescribed for you. Read the directions carefully, and ask your doctor or other care provider to review them with you.

## 2018-12-07 NOTE — DISCHARGE INSTRUCTIONS
[facility]   Procedure Discharge Instructions    Today you saw:    Dr. Stanislav Rayo      You had an: left shoulder glenohumeral joint injection    Medications used:  Lidocaine   Bupivacaine  Kenalog            Do not drive for 6 hours. The effect of the local anesthetic could slow your reflexes.     You may resume your regular activities after 24 hours    Avoid strenuous activity for the first 24 hours    You may shower, however avoid swimming, tub baths or hot tubs for 24 hours following your procedure    You may have a mild to moderate increase in pain for several days following the injection.    It may take up to 14 days for the steroid medication to start working although you may feel the effect as early as a few days after the procedure.       You may use ice packs for 10-15 minutes, 3 to 4 times a day at the injection site for comfort    Do not use heat to painful areas for 6 to 8 hours. This will give the local anesthetic time to wear off and prevent you from accidentally burning your skin.     You may use anti-inflammatory medications (such as Ibuprofen or Aleve or Advil) or Tylenol for pain control if necessary    If you experience any of the following, call the Pain Clinic during work hours at [phone] or the Provider Line after hours at [phone]:  -Fever over 100 degree F  -Swelling, bleeding, redness, drainage, warmth at the injection site  -Progressive weakness or numbness in your  arms  -Loss of bowel or bladder function  -Unusual headache that is not relieved by Tylenol or other pain reliever  -Unusual new onset of pain that is not improving

## 2018-12-07 NOTE — PROGRESS NOTES
Pre procedure Diagnosis: left shoulder osteoarthritis, chronic left shoulder pain   Post procedure Diagnosis: Same  Procedure performed: left shoulder glenohumeral joint injection, fluoroscopically guided, contrast controlled  Anesthesia: local  Complications: none   Operators: Stanislav Rayo MD      Indications:   Kylie Austin is a 53 year old year old female who is known to me that presents today for left shoulder joint injection.  They have a history of chronic left shoulder pain with impingement.  Exam shows tenderness to palpation of the left shoulder with significantly decreased ROM in all planes and they have tried conservative treatment including physical therapy, medications, and interventional procedures.     MRI was done on 11/13/17 which showed   FINDINGS:  Osseous acromial outlet: Mild hypertrophic degenerative change of the  acromioclavicular joint does not directly impinge on the  supraspinatus. The distal acromion is a type 1 morphology with neutral  slope on oblique sagittal images and no significant lateral  downsloping on oblique coronal images. No abnormal fluid in the  subacromial/subdeltoid bursa.     Rotator cuff: Signal heterogeneity in the lateral supraspinatus tendon  consistent with degenerative tendinopathy. Tiny foci of intrasubstance  tearing are possible, but there is no significant partial-thickness  tear, full-thickness tear, tendon retraction or muscle atrophy. The  infraspinatus muscle and tendon and teres minor muscle are normal.  Mild degenerative tendinopathy of the subscapularis tendon with no  muscle atrophy.     Labrum: The glenoid labrum is normal as visualized.     Biceps tendon: The biceps tendon is normal proximally at the biceps  anchor and distally in the bicipital groove.       Osseous structures and cartilaginous surfaces: Grade III and grade IV  chondromalacia related to the glenoid. Grade II and grade III  chondromalacia related to the humeral head.  Prominent degenerative  marginal bony spurring at the inferomedial humeral head and mild  degenerative bony spurring at the glenoid.     Additional findings: There are several (at least five) low signal  intensity foci within the fluid-filled subcoracoid recess of the  glenohumeral joint consistent with loose bodies. These correlate with  ossification seen on the prior plain film. The largest osseous loose  body measures 2.6 x 1.5 x 1.1 cm (images 14 through 16 of series 10).  No significant joint space effusion.       IMPRESSION:   1. Supraspinatus and subscapularis degenerative tendinopathy without  significant tear.  2. Moderately advanced glenohumeral osteoarthritis.  3. Several loose bodies in the subcoracoid recess region, largest  measuring up to 2.6 cm.     Options/alternatives, benefits and risks were discussed with the patient including bleeding, infection, nerve injury, reaction to medications, lack of expected response.  Questions were answered to his/her satisfaction and he/she agrees to proceed. Voluntary informed consent was obtained and signed.      Vitals were reviewed: Yes  /75  Pulse 75  Resp 16  Allergies were reviewed:  Yes   Medications were reviewed:  Yes   Pre-procedure pain score: 7/10     Procedure:  After getting informed consent, patient was brought into the procedure suite and was placed in a supine position on the procedure table.   A Pause for the Cause was performed.  Patient was prepped and draped in the usual sterile fashion.       The left glenohumeral joint was identified with fluoroscopy.  Local skin and subcutaneous tissue anesthesia was obtained by injecting Lidocaine 1% 1 ml.  Then, a 25 gauge 3.5 inch spinal needle was advanced into the joint space utilizing fluoroscopic guidance.  Aspiration was negative.  Needle location was verified by injecting Isovue-300 1.5 ml utilizing real time fluoroscopy that showed good needle position and adequate spread within the  glenohumeral joint without vascular uptake.  Then, 4 ml of a combination of Kenalog 40 mg and Bupivicaine 0.5% 3 ml was injected into the joint without resistance and the needle was flushed with Lidocaine as it was withdrawn.  Isovue wasted:  13.5 ml.     Hemostasis was achieved, the area was cleaned, and bandaids were placed when appropriate.  The patient tolerated the procedure well, and was taken to the recovery room.    Images were saved to PACS.     Post-procedure pain score: 0/10  Follow-up includes:   -f/u phone call in one week  -f/u with PCP and in the pain clinic as scheduled     Stanislav Rayo MD  [facility]

## 2018-12-19 DIAGNOSIS — E78.5 HYPERLIPIDEMIA LDL GOAL <130: ICD-10-CM

## 2018-12-19 LAB
CHOLEST SERPL-MCNC: 196 MG/DL
HDLC SERPL-MCNC: 49 MG/DL
LDLC SERPL CALC-MCNC: 119 MG/DL
NONHDLC SERPL-MCNC: 147 MG/DL
TRIGL SERPL-MCNC: 142 MG/DL

## 2018-12-19 PROCEDURE — 36415 COLL VENOUS BLD VENIPUNCTURE: CPT | Performed by: PHYSICIAN ASSISTANT

## 2018-12-19 PROCEDURE — 80061 LIPID PANEL: CPT | Performed by: PHYSICIAN ASSISTANT

## 2019-01-03 ENCOUNTER — TELEPHONE (OUTPATIENT)
Dept: PALLIATIVE MEDICINE | Facility: CLINIC | Age: 54
End: 2019-01-03

## 2019-01-03 NOTE — TELEPHONE ENCOUNTER
[facility] medical report filled out by Dr. Rosie Rayo. Copy made and sent to scanning. Original mailed to patient's home address.

## 2019-01-21 ENCOUNTER — OFFICE VISIT (OUTPATIENT)
Dept: ENDOCRINOLOGY | Facility: CLINIC | Age: 54
End: 2019-01-21
Payer: COMMERCIAL

## 2019-01-21 VITALS
SYSTOLIC BLOOD PRESSURE: 106 MMHG | DIASTOLIC BLOOD PRESSURE: 72 MMHG | WEIGHT: 192.1 LBS | OXYGEN SATURATION: 98 % | HEIGHT: 70 IN | BODY MASS INDEX: 27.5 KG/M2 | TEMPERATURE: 97.9 F | HEART RATE: 78 BPM | RESPIRATION RATE: 18 BRPM

## 2019-01-21 DIAGNOSIS — E66.01 MORBID OBESITY (H): Primary | ICD-10-CM

## 2019-01-21 ASSESSMENT — PAIN SCALES - GENERAL: PAINLEVEL: NO PAIN (0)

## 2019-01-21 ASSESSMENT — MIFFLIN-ST. JEOR: SCORE: 1546.61

## 2019-01-21 NOTE — LETTER
"2019       RE: Kylie Austin  [address] [facility] [address]     Dear Colleague,    Thank you for referring your patient, Kylie Austin, to the [facility] at [facility]. Please see a copy of my visit note below.        Return Medical Weight Management Note     Kylie Austin  MRN:  [id number]  :  1965  BLANCA:  19    Dear Kristin Miner PA-C,    I had the pleasure of seeing your patient Kylie Austin.  She is a 52 year old female who I am continuing to see for treatment of obesity related to:       2018   I have the following co-morbidities associated with obesity: High Cholesterol, Fatty Liver, Weight Bearing Joint Pain       CURRENT WEIGHT:   192 lbs 1.6 oz    Wt Readings from Last 4 Encounters:   19 87.1 kg (192 lb 1.6 oz)   18 84.4 kg (186 lb)   10/16/18 84.7 kg (186 lb 12.8 oz)   10/15/18 85.3 kg (188 lb)       Height:  5' 10\"  Body Mass Index:  Body mass index is 27.56 kg/m .  Vitals:  B/P: 127/87, P: 71    Initial consult weight was 265 on 2018.  Weight change since last seen on 10/15/18 is up 4 pounds.   Total loss is 73 pounds.    INTERVAL HISTORY:  Had bariatric surgery 2018 and is tolerating fairly well. Following dietitian plan, she says. Feels topiramate is helping with food thought suppression. Stopped naltrexone as did not feel was having cravings.    Diet and Activity Changes Since Last Visit Reviewed With Patient 2019   I have made the following changes to my diet since my last visit: ratio of portion size   With regards to my diet, I am still struggling with: -   I have made the following changes to my activity/exercise since my last visit: yoga   With regards to my activity/exercise, I am still struggling with: do yoga       MEDICATIONS:   Current Outpatient Medications   Medication     ARIPiprazole (ABILIFY) 5 MG tablet     " estradiol (ESTRACE) 2 MG tablet     furosemide (LASIX) 20 MG tablet     LAMOTRIGINE PO     medical cannabis (Patient's own supply.  Not a prescription)     medical cannabis inhalation (Patient's own supply.  Not a prescription)     polyethylene glycol (MIRALAX) powder     simvastatin (ZOCOR) 40 MG tablet     topiramate (TOPAMAX) 200 MG tablet     traZODone (DESYREL) 100 MG tablet     triamcinolone (KENALOG) 0.1 % ointment     ursodiol (ACTIGALL) 300 MG capsule     venlafaxine (EFFEXOR) 100 MG tablet     naltrexone (DEPADE;REVIA) 50 MG tablet     No current facility-administered medications for this visit.        Weight Loss Medication History Reviewed With Patient 1/21/2019   Which weight loss medications are you currently taking on a regular basis?  Topamax (topiramate)   Are you having any side effects from the weight loss medication that we have prescribed you? No       ASSESSMENT:   She is still happy with weight loss post barisurgery with topiramate support but feels no longer needs naltrexone .    FOLLOW-UP:    12 weeks.  I spent 15 minutes with this patient face to face and explained the conditions and plans (more than 50% of time was counseling/coordination of weight management).    Sincerely,    Minh Mancilla MD

## 2019-01-21 NOTE — PROGRESS NOTES
"    Return Medical Weight Management Note     Kylie Austin  MRN:  [id number]  :  1965  BLANCA:  19    Dear Kristin Miner PA-C,    I had the pleasure of seeing your patient Kylie Austin.  She is a 52 year old female who I am continuing to see for treatment of obesity related to:       2018   I have the following co-morbidities associated with obesity: High Cholesterol, Fatty Liver, Weight Bearing Joint Pain       CURRENT WEIGHT:   192 lbs 1.6 oz    Wt Readings from Last 4 Encounters:   19 87.1 kg (192 lb 1.6 oz)   18 84.4 kg (186 lb)   10/16/18 84.7 kg (186 lb 12.8 oz)   10/15/18 85.3 kg (188 lb)       Height:  5' 10\"  Body Mass Index:  Body mass index is 27.56 kg/m .  Vitals:  B/P: 127/87, P: 71    Initial consult weight was 265 on 2018.  Weight change since last seen on 10/15/18 is up 4 pounds.   Total loss is 73 pounds.    INTERVAL HISTORY:  Had bariatric surgery 2018 and is tolerating fairly well. Following dietitian plan, she says. Feels topiramate is helping with food thought suppression. Stopped naltrexone as did not feel was having cravings.    Diet and Activity Changes Since Last Visit Reviewed With Patient 2019   I have made the following changes to my diet since my last visit: ratio of portion size   With regards to my diet, I am still struggling with: -   I have made the following changes to my activity/exercise since my last visit: yoga   With regards to my activity/exercise, I am still struggling with: do yoga       MEDICATIONS:   Current Outpatient Medications   Medication     ARIPiprazole (ABILIFY) 5 MG tablet     estradiol (ESTRACE) 2 MG tablet     furosemide (LASIX) 20 MG tablet     LAMOTRIGINE PO     medical cannabis (Patient's own supply.  Not a prescription)     medical cannabis inhalation (Patient's own supply.  Not a prescription)     polyethylene glycol (MIRALAX) powder     simvastatin (ZOCOR) 40 MG tablet     topiramate " (TOPAMAX) 200 MG tablet     traZODone (DESYREL) 100 MG tablet     triamcinolone (KENALOG) 0.1 % ointment     ursodiol (ACTIGALL) 300 MG capsule     venlafaxine (EFFEXOR) 100 MG tablet     naltrexone (DEPADE;REVIA) 50 MG tablet     No current facility-administered medications for this visit.        Weight Loss Medication History Reviewed With Patient 1/21/2019   Which weight loss medications are you currently taking on a regular basis?  Topamax (topiramate)   Are you having any side effects from the weight loss medication that we have prescribed you? No       ASSESSMENT:   She is still happy with weight loss post barisurgery with topiramate support but feels no longer needs naltrexone .    FOLLOW-UP:    12 weeks.  I spent 15 minutes with this patient face to face and explained the conditions and plans (more than 50% of time was counseling/coordination of weight management).    Sincerely,    Minh Mancilla MD

## 2019-01-21 NOTE — NURSING NOTE
"Chief Complaint   Patient presents with     Weight Problem     Pt here for weight management follow up       Vitals:    01/21/19 0807   BP: 106/72   BP Location: Left arm   Patient Position: Sitting   Cuff Size: Adult Large   Pulse: 78   Resp: 18   Temp: 97.9  F (36.6  C)   TempSrc: Oral   SpO2: 98%   Weight: 87.1 kg (192 lb 1.6 oz)   Height: 1.778 m (5' 10\")       Body mass index is 27.56 kg/m .      GERRI Johnson, EMT                      "

## 2019-02-25 ENCOUNTER — OFFICE VISIT (OUTPATIENT)
Dept: PALLIATIVE MEDICINE | Facility: CLINIC | Age: 54
End: 2019-02-25
Payer: MEDICARE

## 2019-02-25 VITALS
HEART RATE: 78 BPM | RESPIRATION RATE: 16 BRPM | BODY MASS INDEX: 27.55 KG/M2 | SYSTOLIC BLOOD PRESSURE: 110 MMHG | OXYGEN SATURATION: 98 % | DIASTOLIC BLOOD PRESSURE: 75 MMHG | WEIGHT: 192 LBS

## 2019-02-25 DIAGNOSIS — F31.81 BIPOLAR 2 DISORDER (H): ICD-10-CM

## 2019-02-25 DIAGNOSIS — M25.562 CHRONIC PAIN OF LEFT KNEE: ICD-10-CM

## 2019-02-25 DIAGNOSIS — M19.012 PRIMARY OSTEOARTHRITIS OF LEFT SHOULDER: ICD-10-CM

## 2019-02-25 DIAGNOSIS — F64.9 GENDER IDENTITY DISORDER: ICD-10-CM

## 2019-02-25 DIAGNOSIS — M79.7 FIBROMYALGIA: ICD-10-CM

## 2019-02-25 DIAGNOSIS — G89.29 CHRONIC PAIN OF LEFT KNEE: ICD-10-CM

## 2019-02-25 DIAGNOSIS — M47.812 CERVICAL SPONDYLOSIS WITHOUT MYELOPATHY: ICD-10-CM

## 2019-02-25 DIAGNOSIS — G89.4 CHRONIC PAIN DISORDER: Primary | ICD-10-CM

## 2019-02-25 PROCEDURE — 99214 OFFICE O/P EST MOD 30 MIN: CPT | Performed by: ANESTHESIOLOGY

## 2019-02-25 ASSESSMENT — PAIN SCALES - GENERAL: PAINLEVEL: MILD PAIN (3)

## 2019-02-25 NOTE — PATIENT INSTRUCTIONS
Assessment:   1.  Chronic pain syndrome  2.  Chronic neck and back pain  3.  Left shoulder pain/impingement  4.  Chronic left knee pain  5.  Osteoarthritis in multiple joints  6.  Cervical spondylosis and degenerative disc disease  7.  Myofascial pain  8.  Sacroiliac joint pain  9.  Lumbar facet joint pain  10.  Medical cannabis use    Plan:  1. Physical Therapy:  Continue self directed exercise  2. Clinical Health Psychologist to address issues of relaxation, behavioral change, coping style, and other factors important to improvement.  Continue behavioral health treatments  3. Diagnostic Studies:  n/a  4. Medication Management:    1. Continue Medical Cannabis  2. Continue Abilify 5 mg daily  3. Continue Topamax 200 mg BID  4. Continue Trazodone 100 mg QHS  5. Continue Effexor-XR as prescribed  5. Further procedures recommended:   1. Consider left knee joint injection  2. Repeat shoulder injections if needed  3. Consider trigger point injections for muscle pain and spasm  6. Recommendations to PCP: continue current treatment  7. Follow up: 3 months    ----------------------------------------------------------------  Clinic Number:  [phone]   Call this number with any questions about your care and for scheduling assistance. Calls are returned Monday through Friday between 8 AM and 4:30 PM. We usually get back to you within 2 business days depending on the issue/request.       Medication refills:    For non-narcotic medications, call your pharmacy directly to request a refill. The pharmacy will contact the [facility] for authorization. Please allow 3-4 days for these refills to be processed.     For narcotic refills, call the clinic number or send a [email or web address] message. Please contact us 7-10 days before your refill is due. The message MUST include the name of the specific medication(s) requested and how you would like to receive the prescription(s). The options are as follows:    Pain Clinic staff can  mail the prescription to your pharmacy. Please tell us the name of the pharmacy.    You may pick the prescription up at the Pain Clinic (tell us the location) or during a clinic visit with your pain provider    Pain Clinic staff can deliver the prescription to the [facility] pharmacy in the clinic building. Please tell us the location.      We believe regular attendance is key to your success in our program.    Any time you are unable to keep your appointment we ask that you call us at least 24 hours in advance to let us know. This will allow us to offer the appointment time to another patient.

## 2019-02-25 NOTE — PROGRESS NOTES
[facility]    Date of visit: 2/25/2019    Chief complaint:   Chief Complaint   Patient presents with     Pain       Interval history:  Kylie Austin was last seen by me on 12/7/18 for left shoulder joint injection.  Her last office visit was on 11/26/18      Recommendations/plan at the last visit included:  Plan:  1. Physical Therapy:  Continue home exercise program  2. Clinical Health Psychologist to address issues of relaxation, behavioral change, coping style, and other factors important to improvement.  Continue behavioral health treatments  3. Diagnostic Studies:    1. Updated cervical spine MRI ordered today  4. Medication Management:    1. Continue Medical Cannabis - re-certification completed today  2. Continue Abilify 5 mg daily  3. Continue Topamax 200 mg BID  4. Continue Trazodone 100 mg QHS  5. Continue Effexor-XR as prescribed  5. Further procedures recommended:   1. Left shoulder glenohumeral joint injection ordered today  2. Consider cervical injections after review of MRI  3. Repeat subacromial bursa injections if needed  6. Recommendations to PCP: continue current treatment  7. Follow up: for injection and in 3 months in follow up    Since her last visit, Kylie Austin reports:  She states that her shoulder injection helped with the shoulder pain, but the shoulder continues to be achy.  She states that she was walking the dog when the dog pulled away and caused her left arm to go numb for most of the day and then when the arm woke up there was increased pain.  Overall, the pain is still better even with shoveling snow.  Her pain is achy in nature.    She continues with pain in the neck and interscapular area, the lower back, and in the left knee.  She complains of decreased ROM in the left knee with pain on flexion.  She has intermittent locking sensation in the knee and intermittent swelling with increased activity.    She finds that the marijuana topical  medication helps with the pain and swelling in the knee and that medical cannabis helps significantly with pain and mood.    Pain scores:  Pain intensity on average is 5 on a scale of 0-10.  Ranges from 2/10 to 10/10.    [place] Board of Pharmacy Data Base Reviewed:    N/A; not on opioids    Current pain treatments:   Medical cannabis - very helpful with pain and mood  Abilify 5 mg daily  Topamax 200 mg BID  Trazodone 100 mg QHS  Effexor- mg two tabs daily    Past pain treatments:  Tramadol  Vicodin   Advil- states he's been told by cardiology to not take any more NSAIDs   No other muscle relaxers   Prednisone   Sumatriptan   Buproprion  Prozac, Paxil, Zoloft, Cymbalta, Effexor   No TCA, SNRI   Ambien (currently), Lunesta, Trazodone   No prior AED     Injections:  - 12/7/18 left shoulder glenohumeral joint injection  - 5/21/18 left shoulder subacromial bursa injection - helpful  - 1/17/18 left shoulder subacromial bursa injection - helpful  - multiple injections including cervical RFA - short term relief with RFA    Date of last UDS:  Not on opioids    Side Effects: no side effect    Medications:  Current Outpatient Medications   Medication Sig Dispense Refill     ARIPiprazole (ABILIFY) 5 MG tablet Take 15 mg by mouth every morning        estradiol (ESTRACE) 2 MG tablet 1 tablet daily 90 tablet 3     furosemide (LASIX) 20 MG tablet Take 1 tablet (20 mg) by mouth every morning 90 tablet 3     LAMOTRIGINE PO Take 150 mg by mouth every morning        medical cannabis (Patient's own supply.  Not a prescription) 2 capsules 2 times daily (This is NOT a prescription, and does not certify that the patient has a qualifying medical condition for medical cannabis.  The purpose of this order is  to document that the patient reports taking medical cannabis.)    Morning and afternoon        medical cannabis inhalation (Patient's own supply.  Not a prescription) Inhale into the lungs 2 times daily as needed (This is NOT a  prescription, and does not certify that the patient has a qualifying medical condition for medical cannabis.  The purpose of this order is  to document that the patient reports taking medical cannabis.)    MORNING AND AT NIGHT        naltrexone (DEPADE;REVIA) 50 MG tablet TAKE 1/2 TABLET BY MOUTH 1-2 HOURS PRIOR TO WORST CRAVING, THEN INCREASE TO 1 FULL TABLET AS INSTRUCTED.  30 tablet 4     polyethylene glycol (MIRALAX) powder Take 17 g (1 capful) by mouth daily 510 g 1     simvastatin (ZOCOR) 40 MG tablet Take 1 tablet (40 mg) by mouth At Bedtime 90 tablet 3     topiramate (TOPAMAX) 200 MG tablet Take 1 tablet (200 mg) by mouth 2 times daily 180 tablet 3     traZODone (DESYREL) 100 MG tablet Take 1 tablet (100 mg) by mouth nightly as needed for sleep (Patient taking differently: Take 100 mg by mouth At Bedtime ) 30 tablet 1     triamcinolone (KENALOG) 0.1 % ointment Apply to AA BID x 3-4 weeks then PRN 80 g 3     ursodiol (ACTIGALL) 300 MG capsule Take 1 capsule (300 mg) by mouth 2 times daily 180 capsule 1     venlafaxine (EFFEXOR) 100 MG tablet Take 1 tablet (100 mg) by mouth 3 times daily (with meals) 21 tablet 1       Medical History: any changes in medical history since they were last seen? No    Review of Systems:  The 14 system ROS was reviewed from the intake questionnaire, and is positive for: weight loss, headache, joint pain, arthritis, back pain, neck pain, numbness, tingling,   Any bowel or bladder problems: denies changes  Mood: good    Physical Exam:  /75 (BP Location: Left arm, Patient Position: Chair, Cuff Size: Adult Regular)   Pulse 78   Resp 16   Wt 87.1 kg (192 lb)   SpO2 98%   BMI 27.55 kg/m    Constitutional: alert and no distress.  Pt is overweight  Head: Normocephalic. No masses, lesions, tenderness or abnormalities  ENT: EOMI, mucosal surfaces moist.  Neck with full ROM, posture fair  Cardiovascular: No edema or JVD appreciated.  Respiratory: Good diaphragmatic excursion. No  wheezes appreciated.  Speaking in complete sentences without shortness of breath.  No accessory muscle use.   Musculoskeletal: extremities normal- no gross deformities noted, normal muscle tone and able to move about the exam room without difficulty.    Skin: no suspicious lesions or rashes appreciated on exposed areas  Neurologic: Gait normal and non-antalgic. Moving all extremities spontaneously, no apparent weakness.    Psychiatric: mentation appears normal, affect full and good eye contact.      Cervical Spine:  Good ROM, paraspinal tenderness, facet loading negative  Lumbar Spine:  Tender paraspinal muscles and SI joints, flexes well, lateral bending negative for facet loading pain    Left knee tender at the medial joint space and at the medial femoral condyle, good ROM, no crepitus, no instability or effusion appreciated    Assessment:   1.  Chronic pain syndrome  2.  Chronic neck and back pain  3.  Left shoulder pain/impingement  4.  Chronic left knee pain  5.  Osteoarthritis in multiple joints  6.  Cervical spondylosis and degenerative disc disease  7.  Myofascial pain  8.  Sacroiliac joint pain  9.  Lumbar facet joint pain  10.  Medical cannabis use    Kylie Austin is a 53 year old female who is seen at the pain clinic for chronic neck, back and joint pain.  She is doing well since her shoulder injection and continues to get good pain relief with medical cannabis.  Our treatment plan is as outlined below.    Plan:  1. Physical Therapy:  Continue self directed exercise  2. Clinical Health Psychologist to address issues of relaxation, behavioral change, coping style, and other factors important to improvement.  Continue behavioral health treatments  3. Diagnostic Studies:  n/a  4. Medication Management:    1. Continue Medical Cannabis  2. Continue Abilify 5 mg daily  3. Continue Topamax 200 mg BID  4. Continue Trazodone 100 mg QHS  5. Continue Effexor-XR as prescribed  5. Further procedures  recommended:   1. Consider left knee joint injection  2. Repeat shoulder injections if needed  3. Consider trigger point injections for muscle pain and spasm  6. Recommendations to PCP: continue current treatment  7. Follow up: 3 months    Total time spent was 30 minutes, and more than 50% of face to face time was spent in counseling and/or coordination of care regarding diagnosis, physical activity, medication management, and interventional procedures.    Stanislav Rayo MD  [facility]

## 2019-04-03 ENCOUNTER — DOCUMENTATION ONLY (OUTPATIENT)
Dept: CARE COORDINATION | Facility: CLINIC | Age: 54
End: 2019-04-03

## 2019-04-19 ENCOUNTER — HEALTH MAINTENANCE LETTER (OUTPATIENT)
Age: 54
End: 2019-04-19

## 2019-05-20 ENCOUNTER — OFFICE VISIT (OUTPATIENT)
Dept: ENDOCRINOLOGY | Facility: CLINIC | Age: 54
End: 2019-05-20
Payer: COMMERCIAL

## 2019-05-20 VITALS
DIASTOLIC BLOOD PRESSURE: 75 MMHG | BODY MASS INDEX: 27.2 KG/M2 | HEART RATE: 72 BPM | TEMPERATURE: 98.1 F | OXYGEN SATURATION: 100 % | RESPIRATION RATE: 18 BRPM | HEIGHT: 70 IN | WEIGHT: 190 LBS | SYSTOLIC BLOOD PRESSURE: 113 MMHG

## 2019-05-20 DIAGNOSIS — E66.09 CLASS 1 OBESITY DUE TO EXCESS CALORIES WITHOUT SERIOUS COMORBIDITY IN ADULT, UNSPECIFIED BMI: Primary | ICD-10-CM

## 2019-05-20 DIAGNOSIS — E66.811 CLASS 1 OBESITY DUE TO EXCESS CALORIES WITHOUT SERIOUS COMORBIDITY IN ADULT, UNSPECIFIED BMI: Primary | ICD-10-CM

## 2019-05-20 ASSESSMENT — MIFFLIN-ST. JEOR: SCORE: 1537.08

## 2019-05-20 ASSESSMENT — PAIN SCALES - GENERAL: PAINLEVEL: NO PAIN (0)

## 2019-05-20 NOTE — PROGRESS NOTES
"    Return Medical Weight Management Note     Kylie Austin  MRN:  [id number]  :  1965  BLANCA:  19    Dear Kristin Miner PA-C,    I had the pleasure of seeing your patient Kylie Austin.  She is a 52 year old female who I am continuing to see for treatment of obesity related to:       2018   I have the following co-morbidities associated with obesity: High Cholesterol, Fatty Liver, Weight Bearing Joint Pain     CURRENT WEIGHT:   190 lbs 0 oz    Wt Readings from Last 4 Encounters:   19 86.2 kg (190 lb)   19 87.1 kg (192 lb)   19 87.1 kg (192 lb 1.6 oz)   18 84.4 kg (186 lb)     Height:  5' 10\"  Body Mass Index:  Body mass index is 27.26 kg/m .  Vitals:  B/P: 127/87, P: 71    Initial consult weight was 265 on 2018.  Weight change since last seen on 19 is down 2 pounds.   Total loss is 75 pounds.    INTERVAL HISTORY:  Had bariatric surgery 2018 and is tolerating fairly well. Following dietitian plan, she says. Feels topiramate is helping with food thought suppression. Stopped naltrexone 6 months ago as did not feel was having cravings.    Diet and Activity Changes Since Last Visit Reviewed With Patient 2019   I have made the following changes to my diet since my last visit: making sure I watch what I eatand don't over eat.   With regards to my diet, I am still struggling with: my struggles with carbs has improved a lot   I have made the following changes to my activity/exercise since my last visit: walking more   With regards to my activity/exercise, I am still struggling with: exercise due to chronic pain and fatigue     MEDICATIONS:   Current Outpatient Medications   Medication     ARIPiprazole (ABILIFY) 5 MG tablet     estradiol (ESTRACE) 2 MG tablet     furosemide (LASIX) 20 MG tablet     LAMOTRIGINE PO     medical cannabis (Patient's own supply.  Not a prescription)     medical cannabis inhalation (Patient's own supply.  Not a " prescription)     naltrexone (DEPADE;REVIA) 50 MG tablet     polyethylene glycol (MIRALAX) powder     simvastatin (ZOCOR) 40 MG tablet     topiramate (TOPAMAX) 200 MG tablet     traZODone (DESYREL) 100 MG tablet     triamcinolone (KENALOG) 0.1 % ointment     ursodiol (ACTIGALL) 300 MG capsule     venlafaxine (EFFEXOR) 100 MG tablet     No current facility-administered medications for this visit.      Weight Loss Medication History Reviewed With Patient 5/20/2019   Which weight loss medications are you currently taking on a regular basis?  None   If you are not taking a weight loss medication that was prescribed to you, please indicate why: Other   Are you having any side effects from the weight loss medication that we have prescribed you? No     ASSESSMENT:   She is content with current weight, feels at goal, is happy with weight loss post barisurgery with topiramate support.    FOLLOW-UP:    12 weeks.  I spent 15 minutes with this patient face to face and explained the conditions and plans (more than 50% of time was counseling/coordination of weight management).    Sincerely,    Minh Mancilla MD

## 2019-05-20 NOTE — NURSING NOTE
"Chief Complaint   Patient presents with     Weight Problem     PT here for weight managament follow up       Vitals:    05/20/19 0810   BP: 113/75   BP Location: Left arm   Patient Position: Sitting   Cuff Size: Adult Regular   Pulse: 72   Resp: 18   Temp: 98.1  F (36.7  C)   TempSrc: Oral   SpO2: 100%   Weight: 86.2 kg (190 lb)   Height: 1.778 m (5' 10\")       Body mass index is 27.26 kg/m .      GERRI JohnsonT                      "

## 2019-05-20 NOTE — LETTER
"2019     RE: Kylie Austin  [address] [facility] [address]     Dear Colleague,    Thank you for referring your patient, Kylie Austin, to the [facility] MEDICAL WEIGHT MANAGEMENT at [facility]. Please see a copy of my visit note below.    Return Medical Weight Management Note     Kylie Austin  MRN:  [id number]  :  1965  BLANCA:  19    Dear Kristin Miner PA-C,    I had the pleasure of seeing your patient Kylie Austin.  She is a 52 year old female who I am continuing to see for treatment of obesity related to:       2018   I have the following co-morbidities associated with obesity: High Cholesterol, Fatty Liver, Weight Bearing Joint Pain     CURRENT WEIGHT:   190 lbs 0 oz    Wt Readings from Last 4 Encounters:   19 86.2 kg (190 lb)   19 87.1 kg (192 lb)   19 87.1 kg (192 lb 1.6 oz)   18 84.4 kg (186 lb)     Height:  5' 10\"  Body Mass Index:  Body mass index is 27.26 kg/m .  Vitals:  B/P: 127/87, P: 71    Initial consult weight was 265 on 2018.  Weight change since last seen on 19 is down 2 pounds.   Total loss is 75 pounds.    INTERVAL HISTORY:  Had bariatric surgery 2018 and is tolerating fairly well. Following dietitian plan, she says. Feels topiramate is helping with food thought suppression. Stopped naltrexone 6 months ago as did not feel was having cravings.    Diet and Activity Changes Since Last Visit Reviewed With Patient 2019   I have made the following changes to my diet since my last visit: making sure I watch what I eatand don't over eat.   With regards to my diet, I am still struggling with: my struggles with carbs has improved a lot   I have made the following changes to my activity/exercise since my last visit: walking more   With regards to my activity/exercise, I am still struggling with: exercise due to chronic pain and fatigue "     MEDICATIONS:   Current Outpatient Medications   Medication     ARIPiprazole (ABILIFY) 5 MG tablet     estradiol (ESTRACE) 2 MG tablet     furosemide (LASIX) 20 MG tablet     LAMOTRIGINE PO     medical cannabis (Patient's own supply.  Not a prescription)     medical cannabis inhalation (Patient's own supply.  Not a prescription)     naltrexone (DEPADE;REVIA) 50 MG tablet     polyethylene glycol (MIRALAX) powder     simvastatin (ZOCOR) 40 MG tablet     topiramate (TOPAMAX) 200 MG tablet     traZODone (DESYREL) 100 MG tablet     triamcinolone (KENALOG) 0.1 % ointment     ursodiol (ACTIGALL) 300 MG capsule     venlafaxine (EFFEXOR) 100 MG tablet     No current facility-administered medications for this visit.      Weight Loss Medication History Reviewed With Patient 5/20/2019   Which weight loss medications are you currently taking on a regular basis?  None   If you are not taking a weight loss medication that was prescribed to you, please indicate why: Other   Are you having any side effects from the weight loss medication that we have prescribed you? No     ASSESSMENT:   She is content with current weight, feels at goal, is happy with weight loss post barisurgery with topiramate support.    FOLLOW-UP:    12 weeks.    I spent 15 minutes with this patient face to face and explained the conditions and plans (more than 50% of time was counseling/coordination of weight management).    Sincerely,    Minh Mancilla MD

## 2019-07-22 ENCOUNTER — OFFICE VISIT (OUTPATIENT)
Dept: PALLIATIVE MEDICINE | Facility: CLINIC | Age: 54
End: 2019-07-22
Payer: MEDICARE

## 2019-07-22 VITALS
DIASTOLIC BLOOD PRESSURE: 78 MMHG | SYSTOLIC BLOOD PRESSURE: 115 MMHG | WEIGHT: 195 LBS | BODY MASS INDEX: 27.98 KG/M2 | HEART RATE: 79 BPM

## 2019-07-22 DIAGNOSIS — F64.9 GENDER IDENTITY DISORDER: ICD-10-CM

## 2019-07-22 DIAGNOSIS — M19.012 PRIMARY OSTEOARTHRITIS OF LEFT SHOULDER: Primary | ICD-10-CM

## 2019-07-22 DIAGNOSIS — M79.18 MYOFASCIAL PAIN: ICD-10-CM

## 2019-07-22 DIAGNOSIS — M19.019 GLENOHUMERAL ARTHRITIS: ICD-10-CM

## 2019-07-22 DIAGNOSIS — M79.7 FIBROMYALGIA: ICD-10-CM

## 2019-07-22 DIAGNOSIS — M47.812 CERVICAL SPONDYLOSIS WITHOUT MYELOPATHY: ICD-10-CM

## 2019-07-22 DIAGNOSIS — F31.81 BIPOLAR 2 DISORDER (H): ICD-10-CM

## 2019-07-22 DIAGNOSIS — G89.29 CHRONIC PAIN OF LEFT KNEE: ICD-10-CM

## 2019-07-22 DIAGNOSIS — M25.562 CHRONIC PAIN OF LEFT KNEE: ICD-10-CM

## 2019-07-22 DIAGNOSIS — G89.4 CHRONIC PAIN DISORDER: ICD-10-CM

## 2019-07-22 PROCEDURE — 99214 OFFICE O/P EST MOD 30 MIN: CPT | Performed by: ANESTHESIOLOGY

## 2019-07-22 ASSESSMENT — PAIN SCALES - GENERAL: PAINLEVEL: MILD PAIN (3)

## 2019-07-22 NOTE — PROGRESS NOTES
[facility]    Date of visit: 7/22/2019    Chief complaint:   Chief Complaint   Patient presents with     Pain       Interval history:  Kylie Austin was last seen by me on 2/25/19.      Recommendations/plan at the last visit included:  Plan:  1. Physical Therapy:  Continue self directed exercise  2. Clinical Health Psychologist to address issues of relaxation, behavioral change, coping style, and other factors important to improvement.  Continue behavioral health treatments  3. Diagnostic Studies:  n/a  4. Medication Management:    1. Continue Medical Cannabis  2. Continue Abilify 5 mg daily  3. Continue Topamax 200 mg BID  4. Continue Trazodone 100 mg QHS  5. Continue Effexor-XR as prescribed  5. Further procedures recommended:   1. Consider left knee joint injection  2. Repeat shoulder injections if needed  3. Consider trigger point injections for muscle pain and spasm  6. Recommendations to PCP: continue current treatment  7. Follow up: 3 months    Since her last visit, Kylie Austin reports:  She continues with multiple joint pains and muscle aches and pains that are essentially the same as at her last visit.  She is having increased pain in the left shoulder and feels that the injection is wearing off.  She feels that the joint is popping when she rolls over in bed at night and she has burning sensations in the shoulder with the popping.    She is having worsened lower back pain with pain into the left hip and down the left lower extremity.  She feels that the hip is going to come out of the socket and that her left knee gets red and swollen with prolonged walking associated with increased pain.    Her pain is aching, burning, shooting, throbbing, stabbing, miserable, and unbearable.    Pain scores:  Pain intensity on average is 5 on a scale of 0-10.  Ranges from 3/10 to 10/10.    [place] Board of Pharmacy Data Base Reviewed:    N/A; not on opioids    Current pain  treatments:   Medical cannabis - very helpful with pain and mood  Abilify 5 mg daily  Topamax 200 mg BID  Trazodone 100 mg QHS  Effexor- mg two tabs daily    Past pain treatments:  Tramadol  Vicodin   Advil- states he's been told by cardiology to not take any more NSAIDs   No other muscle relaxers   Prednisone   Sumatriptan   Buproprion  Prozac, Paxil, Zoloft, Cymbalta, Effexor   No TCA, SNRI   Ambien (currently), Lunesta, Trazodone   No prior AED     Injections:  - 12/7/18 left shoulder glenohumeral joint injection  - 5/21/18 left shoulder subacromial bursa injection - helpful  - 1/17/18 left shoulder subacromial bursa injection - helpful  - multiple injections including cervical RFA - short term relief with RFA    Date of last UDS:  Not on opioids    Side Effects: denies any problems    Medications:  Current Outpatient Medications   Medication Sig Dispense Refill     ARIPiprazole (ABILIFY) 5 MG tablet Take 15 mg by mouth every morning        estradiol (ESTRACE) 2 MG tablet 1 tablet daily 90 tablet 3     furosemide (LASIX) 20 MG tablet Take 1 tablet (20 mg) by mouth every morning 90 tablet 3     LAMOTRIGINE PO Take 150 mg by mouth every morning        medical cannabis (Patient's own supply.  Not a prescription) 2 capsules 2 times daily (This is NOT a prescription, and does not certify that the patient has a qualifying medical condition for medical cannabis.  The purpose of this order is  to document that the patient reports taking medical cannabis.)    Morning and afternoon        medical cannabis inhalation (Patient's own supply.  Not a prescription) Inhale into the lungs 2 times daily as needed (This is NOT a prescription, and does not certify that the patient has a qualifying medical condition for medical cannabis.  The purpose of this order is  to document that the patient reports taking medical cannabis.)    MORNING AND AT NIGHT        naltrexone (DEPADE;REVIA) 50 MG tablet TAKE 1/2 TABLET BY MOUTH 1-2  HOURS PRIOR TO WORST CRAVING, THEN INCREASE TO 1 FULL TABLET AS INSTRUCTED.  30 tablet 4     simvastatin (ZOCOR) 40 MG tablet Take 1 tablet (40 mg) by mouth At Bedtime 90 tablet 3     topiramate (TOPAMAX) 200 MG tablet Take 1 tablet (200 mg) by mouth 2 times daily 180 tablet 3     traZODone (DESYREL) 100 MG tablet Take 1 tablet (100 mg) by mouth nightly as needed for sleep (Patient taking differently: Take 100 mg by mouth At Bedtime ) 30 tablet 1     triamcinolone (KENALOG) 0.1 % ointment Apply to AA BID x 3-4 weeks then PRN 80 g 3     venlafaxine (EFFEXOR) 100 MG tablet Take 1 tablet (100 mg) by mouth 3 times daily (with meals) 21 tablet 1     polyethylene glycol (MIRALAX) powder Take 17 g (1 capful) by mouth daily (Patient not taking: Reported on 7/22/2019) 510 g 1     ursodiol (ACTIGALL) 300 MG capsule Take 1 capsule (300 mg) by mouth 2 times daily (Patient not taking: Reported on 7/22/2019) 180 capsule 1       Medical History: any changes in medical history since they were last seen? No    Review of Systems:  The 14 system ROS was reviewed from the intake questionnaire, and is positive for: fatigue, weight gain, tinnitus, joint pain, stiffness, back pain, neck pain, weakness, numbness, tingling, depression, anxiety  Any bowel or bladder problems: denies changes  Mood: good    Physical Exam:  /78   Pulse 79   Wt 88.5 kg (195 lb)   BMI 27.98 kg/m    Constitutional: alert and no distress.  Pt is overweight  Head: Normocephalic. No masses, lesions, tenderness or abnormalities  ENT: EOMI, mucosal surfaces moist.  Neck with full ROM, posture fair  Cardiovascular: No edema or JVD appreciated.  Respiratory: Good diaphragmatic excursion. No wheezes appreciated.  Speaking in complete sentences without shortness of breath.  No accessory muscle use.   Musculoskeletal: extremities normal- no gross deformities noted, normal muscle tone and able to move about the exam room without difficulty.    Skin: no suspicious  lesions or rashes appreciated on exposed areas  Neurologic: Gait mildly antalgic, favors the left knee and hip. Moving all extremities spontaneously, no apparent weakness.    Psychiatric: mentation appears normal, affect full and good eye contact.      Cervical Spine:  Mildly decreased cervical ROM on left lateral rotation with left shoulder pain, tender paraspinal muscles and trapezius muscles, facet loading equivocal  Lumbar Spine:  Tender sacral area, flexes well, facet loading negative,    Left shoulder with mildly decreased ROM on abduction and external rotation, Apley scratch testing positive on the left    Left hip with mildly decreased ROM on external rotation with pain    Left knee tender at the medial joint space, no effusion, instability, redness, or Baker's cyst, mild crepitus on ROM    Assessment:   1.  Chronic pain syndrome  2.  Chronic neck and back pain  3.  Left shoulder pain/impingement  4.  Chronic left knee pain  5.  Osteoarthritis in multiple joints  6.  Cervical spondylosis and degenerative disc disease  7.  Myofascial pain  8.  Sacroiliac joint pain  9.  Lumbar facet joint pain  10.  Medical cannabis use    Kylie Austin is a 54 year old female who is seen at the pain clinic for chronic multiple joint pain and neck and back pain.  She did well with her last shoulder injection and feels it is time to repeat the injection.  She would also like a knee injection on the left.  She continues with good response to Medical Cannabis without side effects.  Our treatment plan is as outlined below.    Plan:  1. Physical Therapy:  Continue home exercise program  2. Clinical Health Psychologist to address issues of relaxation, behavioral change, coping style, and other factors important to improvement.  Continue behavioral health treatments  3. Diagnostic Studies:  Reviewed imaging - consider hip and knee x-rays  4. Medication Management:    1. Continue Medical cannabis  2. Continue Topamax 200  mg BID  3. Continue Trazodone 100 mg QHS  4. Continue Abilify 5 mg daily  5. Continue Effexor as prescribed  5. Further procedures recommended:   1. Left shoulder joint and left knee joint injections  2. Consider left hip joint injection  3. Consider lumbar epidural steroid injection left L4-5  6. Recommendations to PCP: continue current treatment  7. Follow up: for injections - 3 months - will need to establish with new pain provider due to my leaving [place]    Total time spent was 30 minutes, and more than 50% of face to face time was spent in counseling and/or coordination of care regarding diagnosis, physical activity, medication management, and interventional procedures.    Stanislav Rayo MD  [facility]

## 2019-07-22 NOTE — PATIENT INSTRUCTIONS
Assessment:   1.  Chronic pain syndrome  2.  Chronic neck and back pain  3.  Left shoulder pain/impingement  4.  Chronic left knee pain  5.  Osteoarthritis in multiple joints  6.  Cervical spondylosis and degenerative disc disease  7.  Myofascial pain  8.  Sacroiliac joint pain  9.  Lumbar facet joint pain  10.  Medical cannabis use      Plan:  1. Physical Therapy:  Continue home exercise program  2. Clinical Health Psychologist to address issues of relaxation, behavioral change, coping style, and other factors important to improvement.  Continue behavioral health treatments  3. Diagnostic Studies:  Reviewed imaging - consider hip and knee x-rays  4. Medication Management:    1. Continue Medical cannabis  2. Continue Topamax 200 mg BID  3. Continue Trazodone 100 mg QHS  4. Continue Abilify 5 mg daily  5. Continue Effexor as prescribed  5. Further procedures recommended:   1. Left shoulder joint and left knee joint injections  2. Consider left hip joint injection  3. Consider lumbar epidural steroid injection left L4-5  6. Recommendations to PCP: continue current treatment  7. Follow up: for injections - 3 months - will need to establish with new pain provider due to my leaving [place]    ----------------------------------------------------------------  Clinic Number:  [phone]     Call with any questions about your care and for scheduling assistance.     Calls are returned Monday through Friday between 8 AM and 4:30 PM. We usually get back to you within 2 business days depending on the issue/request.    If we are prescribing your medications:    For opioid medication refills, call the clinic or send a [employer] message 7 days in advance.  Please include:    Name of requested medication    Name of the pharmacy.    For non-opioid medications, call your pharmacy directly to request a refill. Please allow 3-4 days to be processed.     Per [place] State Law:    All controlled substance prescriptions must be filled within  30 days of being written.      For those controlled substances allowing refills, pickup must occur within 30 days of last fill.      We believe regular attendance is key to your success in our program!      Any time you are unable to keep your appointment we ask that you call us at least 24 hours in advance to cancel.This will allow us to offer the appointment time to another patient.     Multiple missed appointments may lead to dismissal from the clinic.

## 2019-08-05 ENCOUNTER — ANCILLARY PROCEDURE (OUTPATIENT)
Dept: RADIOLOGY | Facility: CLINIC | Age: 54
End: 2019-08-05
Attending: ANESTHESIOLOGY
Payer: MEDICARE

## 2019-08-05 ENCOUNTER — OFFICE VISIT (OUTPATIENT)
Dept: PALLIATIVE MEDICINE | Facility: CLINIC | Age: 54
End: 2019-08-05
Payer: MEDICARE

## 2019-08-05 VITALS
RESPIRATION RATE: 16 BRPM | HEART RATE: 63 BPM | SYSTOLIC BLOOD PRESSURE: 108 MMHG | DIASTOLIC BLOOD PRESSURE: 68 MMHG | OXYGEN SATURATION: 97 %

## 2019-08-05 DIAGNOSIS — M17.12 PRIMARY OSTEOARTHRITIS OF LEFT KNEE: Primary | ICD-10-CM

## 2019-08-05 DIAGNOSIS — G89.29 CHRONIC PAIN OF LEFT KNEE: ICD-10-CM

## 2019-08-05 DIAGNOSIS — G89.29 CHRONIC LEFT SHOULDER PAIN: ICD-10-CM

## 2019-08-05 DIAGNOSIS — M25.562 CHRONIC PAIN OF LEFT KNEE: ICD-10-CM

## 2019-08-05 DIAGNOSIS — M19.012 PRIMARY OSTEOARTHRITIS OF LEFT SHOULDER: ICD-10-CM

## 2019-08-05 DIAGNOSIS — M25.512 CHRONIC LEFT SHOULDER PAIN: ICD-10-CM

## 2019-08-05 PROCEDURE — 77002 NEEDLE LOCALIZATION BY XRAY: CPT | Mod: 26 | Performed by: ANESTHESIOLOGY

## 2019-08-05 PROCEDURE — 20610 DRAIN/INJ JOINT/BURSA W/O US: CPT | Mod: LT | Performed by: ANESTHESIOLOGY

## 2019-08-05 ASSESSMENT — PAIN SCALES - GENERAL: PAINLEVEL: EXTREME PAIN (8)

## 2019-08-05 NOTE — PATIENT INSTRUCTIONS
[facility]   Procedure Discharge Instructions    Today you saw:    Dr. Stanislav Rayo    You had an:    hip injection      Medications used:  Lidocaine   Bupivacaine  Omnipaque Kenalog            Be cautious when walking. Numbness and/or weakness in the lower extremities may occur for up to 6-8 hours after the procedure due to effect of the local anesthetic    Do not drive for 6 hours. The effect of the local anesthetic could slow your reflexes.     You may resume your regular activities after 24 hours    Avoid strenuous activity for the first 24 hours    You may shower, however avoid swimming, tub baths or hot tubs for 24 hours following your procedure    You may have a mild to moderate increase in pain for several days following the injection.    It may take up to 14 days for the steroid medication to start working although you may feel the effect as early as a few days after the procedure.       You may use ice packs for 10-15 minutes, 3 to 4 times a day at the injection site for comfort    Do not use heat to painful areas for 6 to 8 hours. This will give the local anesthetic time to wear off and prevent you from accidentally burning your skin.     Unless you have been directed to avoid the use of anti-inflammatory medications (NSAIDS), you may use medications such as ibuprofen, Aleve or Tylenol for pain control if needed.     Possible side effects of steroids that you may experience include flushing, elevated blood pressure, increased appetite, mild headaches and restlessness.  All of these symptoms will get better with time.    If you experience any of the following, call the Pain Clinic during work hours at [phone] or the Provider Line after hours at [phone]:  -Fever over 100 degree F  -Swelling, bleeding, redness, drainage, warmth at the injection site  -Progressive weakness or numbness in your legs or arms  -Unusual new onset of pain that is not improving

## 2019-08-05 NOTE — NURSING NOTE
Discharge Information    IV Discontiued Time:  NA    Amount of Fluid Infused:  NA    Discharge Criteria = When patient returns to baseline or as per MD order    Consciousness:  Pt is fully awake    Circulation:  BP +/- 20% of pre-procedure level    Respiration:  Patient is able to breathe deeply    O2 Sat:  Patient is able to maintain O2 Sat >92% on room air    Activity:  Moves 4 extremities on command    Ambulation:  Patient is able to stand and walk or stand and pivot into wheelchair    Dressing:  Clean/dry or No Dressing    Notes:   Discharge instructions and AVS given to patient    Patient meets criteria for discharge?  YES    Admitted to PCU?  No    Responsible adult present to accompany patient home?  Yes    Signature/Title:    Darion Sampson RN Care Coordinator  [facility] [place]

## 2019-08-05 NOTE — PROGRESS NOTES
Pre procedure Diagnosis: left shoulder osteoarthritis, chronic left shoulder pain, chronic left knee pain, left knee osteoarthritis   Post procedure Diagnosis: Same  Procedure performed: left shoulder glenohumeral joint injection, fluoroscopically guided, contrast controlled and left knee joint injection  Anesthesia: local  Complications: none   Operators: Stanislav Rayo MD, Lucina Pdeerson DO, MBA      Indications:   Kylie Austin is a 53 year old year old female who is known to me that presents today for left shoulder joint injection.  They have a history of chronic left shoulder pain with impingement.  Exam shows tenderness to palpation of the left shoulder with significantly decreased ROM in all planes, tenderness along the left knee joint space, medially greater than laterally without effusion or instability and they have tried conservative treatment including physical therapy, medications, and interventional procedures.     MRI was done on 11/13/17 which showed   FINDINGS:  IMPRESSION:   1. Supraspinatus and subscapularis degenerative tendinopathy without  significant tear.  2. Moderately advanced glenohumeral osteoarthritis.  3. Several loose bodies in the subcoracoid recess region, largest  measuring up to 2.6 cm.    LEFT KNEE WITHOUT CONTRAST  1/15/2015  2:49 PM  IMPRESSION:    1. Medial compartment degenerative arthrosis with near full-thickness  loss of articular cartilage.  2. Medial meniscus appears diminished in size compatible with  degeneration and mechanical wearing. Degenerative fraying is noted at  the tip of the body of the medial meniscus. No displaced meniscal  flap.  3. 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  4. No cruciate or collateral ligament tear.  5. Small ganglion cyst adjacent to the femoral attachment of the  lateral head of the gastrocnemius.     Options/alternatives, benefits and risks were discussed with the patient including bleeding, infection, nerve injury, reaction  to medications, lack of expected response.  Questions were answered to his/her satisfaction and he/she agrees to proceed. Voluntary informed consent was obtained and signed.      Vitals were reviewed: Yes  /68   Pulse 63   Resp 16   SpO2 97%   Allergies were reviewed:  Yes   Medications were reviewed:  Yes   Pre-procedure pain score: 8/10     Procedure:  After getting informed consent, patient was brought into the procedure suite and was placed in a supine position on the procedure table.   A Pause for the Cause was performed.  Patient was prepped and draped in the usual sterile fashion.       The left glenohumeral joint was identified with fluoroscopy.  Local skin and subcutaneous tissue anesthesia was obtained by injecting Lidocaine 1% 1 ml.  Then, a 25 gauge 3.5 inch spinal needle was advanced into the joint space utilizing fluoroscopic guidance.  Aspiration was negative.  Needle location was verified by injecting Isovue-300 1.5 ml utilizing real time fluoroscopy that showed good needle position and adequate spread within the glenohumeral joint without vascular uptake.  Then, 5 ml of a combination of Kenalog 20 mg and Bupivicaine 0.5% 4.5 ml was injected into the joint without resistance and the needle was flushed with Lidocaine as it was withdrawn.  Isovue wasted:  8.5 ml.    The patient was then placed in a seated position on the procedure table with the legs dangling.   The medial joint space was identified and marked on the left knee.  Patient was prepped and draped in the usual sterile fashion.      Then, a 25 gauge 1.5 inch needle was advanced in a posterolateral approach into the medial joint space of the left knee.  Aspiration was negative.  Then, 5 ml of a combination of Kenalog 20 mg and bupivicaine 0.5% 4.5 ml was injected into the joint without resistance and the needle was withdrawn.       Hemostasis was achieved, the area was cleaned, and bandaids were placed when appropriate.  The patient  tolerated the procedure well, and was taken to the recovery room.    Images were saved to PACS.     Post-procedure pain score: 2/10 with improved ROM of both joints  Follow-up includes:   -f/u with PCP and in the pain clinic as scheduled    Lucina Pederson DO, MBA  Pain Medicine Fellow     Stanislav Rayo MD  [facility]

## 2019-08-09 ENCOUNTER — TRANSFERRED RECORDS (OUTPATIENT)
Dept: HEALTH INFORMATION MANAGEMENT | Facility: CLINIC | Age: 54
End: 2019-08-09

## 2019-08-12 ENCOUNTER — TELEPHONE (OUTPATIENT)
Dept: FAMILY MEDICINE | Facility: CLINIC | Age: 54
End: 2019-08-12

## 2019-08-12 DIAGNOSIS — Z98.84 S/P LAPAROSCOPIC SLEEVE GASTRECTOMY: ICD-10-CM

## 2019-08-13 RX ORDER — VENLAFAXINE HYDROCHLORIDE 75 MG/1
225 CAPSULE, EXTENDED RELEASE ORAL EVERY MORNING
COMMUNITY
Start: 2019-08-13

## 2019-08-13 RX ORDER — ARIPIPRAZOLE 20 MG/1
20 TABLET ORAL EVERY MORNING
COMMUNITY
Start: 2019-08-13

## 2019-10-02 DIAGNOSIS — M79.89 LEG SWELLING: ICD-10-CM

## 2019-10-02 DIAGNOSIS — E78.5 HYPERLIPIDEMIA LDL GOAL <130: ICD-10-CM

## 2019-10-02 RX ORDER — FUROSEMIDE 20 MG
TABLET ORAL
Qty: 30 TABLET | Refills: 0 | Status: SHIPPED | OUTPATIENT
Start: 2019-10-02 | End: 2019-10-16

## 2019-10-02 RX ORDER — SIMVASTATIN 40 MG
TABLET ORAL
Qty: 30 TABLET | Refills: 0 | Status: SHIPPED | OUTPATIENT
Start: 2019-10-02 | End: 2019-10-16

## 2019-10-02 NOTE — TELEPHONE ENCOUNTER
Patient due for physical this month.   30 day reji supply approved with [employer] message sent and reminder to pharmacy sent.     Ela Herrera RN, BSN, PHN

## 2019-10-02 NOTE — TELEPHONE ENCOUNTER
"Requested Prescriptions   Pending Prescriptions Disp Refills     simvastatin (ZOCOR) 40 MG tablet [Pharmacy Med Name: Simvastatin Oral Tablet 40 MG] 90 tablet 2     Sig: TAKE 1 TABLET BY MOUTH AT BEDTIME   Last Written Prescription Date:  7-3-19  Last Fill Quantity: 90,  # refills: 2   Last office visit: 10/16/2018 with prescribing provider:  10-16-18   Future Office Visit:      Statins Protocol Passed - 10/2/2019  8:19 AM        Passed - LDL on file in past 12 months     Recent Labs   Lab Test 12/19/18  0935   *             Passed - No abnormal creatine kinase in past 12 months     No lab results found.             Passed - Recent (12 mo) or future (30 days) visit within the authorizing provider's specialty     Patient has had an office visit with the authorizing provider or a provider within the authorizing providers department within the previous 12 mos or has a future within next 30 days. See \"Patient Info\" tab in inbasket, or \"Choose Columns\" in Meds & Orders section of the refill encounter.              Passed - Medication is active on med list        Passed - Patient is age 18 or older        Passed - No active pregnancy on record        Passed - No positive pregnancy test in past 12 months        furosemide (LASIX) 20 MG tablet [Pharmacy Med Name: Furosemide Oral Tablet 20 MG] 90 tablet 2     Sig: TAKE 1 TABLET BY MOUTH EVERY MORNING       Diuretics (Including Combos) Protocol Passed - 10/2/2019  8:19 AM   Last Written Prescription Date:  7-3-19  Last Fill Quantity: 90,  # refills: 2   Last office visit: 10/16/2018 with prescribing provider:  10-16-18   Future Office Visit:       Passed - Blood pressure under 140/90 in past 12 months     BP Readings from Last 3 Encounters:   08/05/19 108/68   07/22/19 115/78   05/20/19 113/75                 Passed - Recent (12 mo) or future (30 days) visit within the authorizing provider's specialty     Patient has had an office visit with the authorizing provider or " "a provider within the authorizing providers department within the previous 12 mos or has a future within next 30 days. See \"Patient Info\" tab in inbasket, or \"Choose Columns\" in Meds & Orders section of the refill encounter.              Passed - Medication is active on med list        Passed - Patient is age 18 or older        Passed - No active pregancy on record        Passed - Normal serum creatinine on file in past 12 months     Recent Labs   Lab Test 10/16/18  0820   CR 0.80              Passed - Normal serum potassium on file in past 12 months     Recent Labs   Lab Test 10/16/18  0820   POTASSIUM 3.4                    Passed - Normal serum sodium on file in past 12 months     Recent Labs   Lab Test 10/16/18  0820                 Passed - No positive pregnancy test in past 12 months        "

## 2019-10-16 ENCOUNTER — OFFICE VISIT (OUTPATIENT)
Dept: FAMILY MEDICINE | Facility: CLINIC | Age: 54
End: 2019-10-16
Payer: MEDICARE

## 2019-10-16 VITALS
TEMPERATURE: 96.2 F | BODY MASS INDEX: 28.55 KG/M2 | DIASTOLIC BLOOD PRESSURE: 73 MMHG | WEIGHT: 199 LBS | HEART RATE: 70 BPM | RESPIRATION RATE: 18 BRPM | OXYGEN SATURATION: 99 % | SYSTOLIC BLOOD PRESSURE: 108 MMHG

## 2019-10-16 DIAGNOSIS — R94.5 ABNORMAL RESULTS OF LIVER FUNCTION STUDIES: ICD-10-CM

## 2019-10-16 DIAGNOSIS — G89.4 CHRONIC PAIN DISORDER: ICD-10-CM

## 2019-10-16 DIAGNOSIS — Z12.31 ENCOUNTER FOR SCREENING MAMMOGRAM FOR BREAST CANCER: ICD-10-CM

## 2019-10-16 DIAGNOSIS — E78.5 HYPERLIPIDEMIA LDL GOAL <130: ICD-10-CM

## 2019-10-16 DIAGNOSIS — Z00.01 ENCOUNTER FOR ROUTINE ADULT MEDICAL EXAM WITH ABNORMAL FINDINGS: Primary | ICD-10-CM

## 2019-10-16 DIAGNOSIS — M79.89 LEG SWELLING: ICD-10-CM

## 2019-10-16 DIAGNOSIS — M79.7 FIBROMYALGIA: ICD-10-CM

## 2019-10-16 DIAGNOSIS — Z12.5 SCREENING PSA (PROSTATE SPECIFIC ANTIGEN): ICD-10-CM

## 2019-10-16 LAB
ALBUMIN SERPL-MCNC: 3.9 G/DL (ref 3.4–5)
ALP SERPL-CCNC: 47 U/L (ref 40–150)
ALT SERPL W P-5'-P-CCNC: 24 U/L (ref 0–50)
ANION GAP SERPL CALCULATED.3IONS-SCNC: 7 MMOL/L (ref 3–14)
AST SERPL W P-5'-P-CCNC: 13 U/L (ref 0–45)
BILIRUB SERPL-MCNC: 0.4 MG/DL (ref 0.2–1.3)
BUN SERPL-MCNC: 16 MG/DL (ref 7–30)
CALCIUM SERPL-MCNC: 9.3 MG/DL (ref 8.5–10.1)
CHLORIDE SERPL-SCNC: 110 MMOL/L (ref 94–109)
CHOLEST SERPL-MCNC: 181 MG/DL
CO2 SERPL-SCNC: 24 MMOL/L (ref 20–32)
CREAT SERPL-MCNC: 0.71 MG/DL (ref 0.52–1.04)
CREAT UR-MCNC: 214 MG/DL
GFR SERPL CREATININE-BSD FRML MDRD: >90 ML/MIN/{1.73_M2}
GLUCOSE SERPL-MCNC: 87 MG/DL (ref 70–99)
HDLC SERPL-MCNC: 48 MG/DL
LDLC SERPL CALC-MCNC: 104 MG/DL
MICROALBUMIN UR-MCNC: 11 MG/L
MICROALBUMIN/CREAT UR: 5.09 MG/G CR (ref 0–25)
NONHDLC SERPL-MCNC: 133 MG/DL
POTASSIUM SERPL-SCNC: 4.2 MMOL/L (ref 3.4–5.3)
PROT SERPL-MCNC: 7.2 G/DL (ref 6.8–8.8)
PSA SERPL-ACNC: <0.01 UG/L
SODIUM SERPL-SCNC: 141 MMOL/L (ref 133–144)
TRIGL SERPL-MCNC: 144 MG/DL

## 2019-10-16 PROCEDURE — 82043 UR ALBUMIN QUANTITATIVE: CPT | Performed by: PHYSICIAN ASSISTANT

## 2019-10-16 PROCEDURE — G0008 ADMIN INFLUENZA VIRUS VAC: HCPCS | Performed by: PHYSICIAN ASSISTANT

## 2019-10-16 PROCEDURE — 90686 IIV4 VACC NO PRSV 0.5 ML IM: CPT | Performed by: PHYSICIAN ASSISTANT

## 2019-10-16 PROCEDURE — 36415 COLL VENOUS BLD VENIPUNCTURE: CPT | Performed by: PHYSICIAN ASSISTANT

## 2019-10-16 PROCEDURE — 99213 OFFICE O/P EST LOW 20 MIN: CPT | Mod: 25 | Performed by: PHYSICIAN ASSISTANT

## 2019-10-16 PROCEDURE — 99396 PREV VISIT EST AGE 40-64: CPT | Mod: 25 | Performed by: PHYSICIAN ASSISTANT

## 2019-10-16 PROCEDURE — 80061 LIPID PANEL: CPT | Performed by: PHYSICIAN ASSISTANT

## 2019-10-16 PROCEDURE — 80053 COMPREHEN METABOLIC PANEL: CPT | Performed by: PHYSICIAN ASSISTANT

## 2019-10-16 PROCEDURE — G0103 PSA SCREENING: HCPCS | Performed by: PHYSICIAN ASSISTANT

## 2019-10-16 RX ORDER — TOPIRAMATE 200 MG/1
200 TABLET, FILM COATED ORAL 2 TIMES DAILY
Qty: 180 TABLET | Refills: 3 | Status: SHIPPED | OUTPATIENT
Start: 2019-10-16 | End: 2019-12-02

## 2019-10-16 RX ORDER — FUROSEMIDE 20 MG
20 TABLET ORAL EVERY MORNING
Qty: 90 TABLET | Refills: 3 | Status: SHIPPED | OUTPATIENT
Start: 2019-10-16 | End: 2020-11-16

## 2019-10-16 RX ORDER — TOPIRAMATE 200 MG/1
200 TABLET, FILM COATED ORAL 2 TIMES DAILY
Qty: 180 TABLET | Refills: 3 | Status: SHIPPED | OUTPATIENT
Start: 2019-10-16 | End: 2019-10-16

## 2019-10-16 RX ORDER — SIMVASTATIN 40 MG
40 TABLET ORAL AT BEDTIME
Qty: 90 TABLET | Refills: 3 | Status: SHIPPED | OUTPATIENT
Start: 2019-10-16 | End: 2020-10-16

## 2019-10-16 ASSESSMENT — ENCOUNTER SYMPTOMS
SHORTNESS OF BREATH: 0
FEVER: 0
WEAKNESS: 0
HEMATOCHEZIA: 0
HEARTBURN: 0
COUGH: 0
DYSURIA: 0
BREAST MASS: 0
HEMATURIA: 0
FREQUENCY: 0
ABDOMINAL PAIN: 0
DIZZINESS: 0
PARESTHESIAS: 0
NAUSEA: 0
NERVOUS/ANXIOUS: 0
MYALGIAS: 0
DIARRHEA: 0
SORE THROAT: 0
HEADACHES: 0
ARTHRALGIAS: 1
JOINT SWELLING: 1
EYE PAIN: 0
CHILLS: 0
CONSTIPATION: 0
PALPITATIONS: 0

## 2019-10-16 ASSESSMENT — ANXIETY QUESTIONNAIRES
GAD7 TOTAL SCORE: 2
7. FEELING AFRAID AS IF SOMETHING AWFUL MIGHT HAPPEN: NOT AT ALL
1. FEELING NERVOUS, ANXIOUS, OR ON EDGE: SEVERAL DAYS
IF YOU CHECKED OFF ANY PROBLEMS ON THIS QUESTIONNAIRE, HOW DIFFICULT HAVE THESE PROBLEMS MADE IT FOR YOU TO DO YOUR WORK, TAKE CARE OF THINGS AT HOME, OR GET ALONG WITH OTHER PEOPLE: SOMEWHAT DIFFICULT
5. BEING SO RESTLESS THAT IT IS HARD TO SIT STILL: NOT AT ALL
2. NOT BEING ABLE TO STOP OR CONTROL WORRYING: NOT AT ALL
6. BECOMING EASILY ANNOYED OR IRRITABLE: NOT AT ALL
3. WORRYING TOO MUCH ABOUT DIFFERENT THINGS: NOT AT ALL

## 2019-10-16 ASSESSMENT — PATIENT HEALTH QUESTIONNAIRE - PHQ9
SUM OF ALL RESPONSES TO PHQ QUESTIONS 1-9: 4
5. POOR APPETITE OR OVEREATING: SEVERAL DAYS

## 2019-10-16 ASSESSMENT — ACTIVITIES OF DAILY LIVING (ADL): CURRENT_FUNCTION: NO ASSISTANCE NEEDED

## 2019-10-16 NOTE — PROGRESS NOTES
"   SUBJECTIVE:   CC: Kylie Austin is an 54 year old woman who presents for preventive health visit.     Healthy Habits:     In general, how would you rate your overall health?  Good    Frequency of exercise:  None    Do you usually eat at least 4 servings of fruit and vegetables a day, include whole grains    & fiber and avoid regularly eating high fat or \"junk\" foods?  No    Taking medications regularly:  Yes    Medication side effects:  None    Ability to successfully perform activities of daily living:  No assistance needed    Home Safety:  No safety concerns identified    Hearing Impairment:  No hearing concerns    In the past 6 months, have you been bothered by leaking of urine?  No    In general, how would you rate your overall mental or emotional health?  Good      PHQ-2 Total Score: 0    Additional concerns today:  No      PROBLEMS TO ADD ON...  Patient informed that anything we discuss that is not related to preventative medicine, may be billed for; patient verbalizes understanding.    Fasting    Needing refills and/or labs for:  Hyperlipidemia  Any dietary restrictions? No If so, what type of restriction?   Medication side effects? No     Lasix - uses for JUSTIN  -------------------------------------    Today's PHQ-2 Score:   PHQ-2 ( 1999 [employer]) 10/16/2019   Q1: Little interest or pleasure in doing things 0   Q2: Feeling down, depressed or hopeless 0   PHQ-2 Score 0   Q1: Little interest or pleasure in doing things Not at all   Q2: Feeling down, depressed or hopeless Not at all   PHQ-2 Score 0       Abuse: Current or Past(Physical, Sexual or Emotional)- No  Do you feel safe in your environment? Yes    Social History     Tobacco Use     Smoking status: Never Smoker     Smokeless tobacco: Never Used     Tobacco comment: NEVER SMOKED! Grew up with heavy smokers which did not help my Asthma!   Substance Use Topics     Alcohol use: Yes     Comment: occasional//2 per month         Alcohol Use " 10/16/2019   Prescreen: >3 drinks/day or >7 drinks/week? No       Reviewed orders with patient.  Reviewed health maintenance and updated orders accordingly - Yes  Lab work is in process    Mammogram Screening: Patient over age 50, mutual decision to screen reflected in health maintenance.    Pertinent mammograms are reviewed under the imaging tab.  History of abnormal Pap smear: Born male, cervix not present     Reviewed and updated as needed this visit by clinical staff  Tobacco  Allergies  Meds         Reviewed and updated as needed this visit by Provider        Past Medical History:   Diagnosis Date     Anxiety 2005    Brought on by Amioderone     Arthritis 8/2005     Atrial fib/flutter, transient     S/p cardioversion 2004     Bipolar 2 disorder (H)      Bleeding disorder (H) 1987    nose bleeds     Chronic pain     LUE pain     Congestive heart failure (H) 11/18/2004    cured 2005     Depressive disorder     on and off most of my life!     Fibromyalgia      Gender identity disorder Started Rx 2012     H/O hypogonadism Gonadectomy 2013     Hyperlipidemia      Hypertension      Other mental problems     bipolar     Psoriasis      Uncomplicated asthma 1964    cured in 4/2001     Vision disorder 6/2005        Review of Systems   Constitutional: Negative for chills and fever.   HENT: Negative for congestion, ear pain, hearing loss and sore throat.    Eyes: Negative for pain and visual disturbance.   Respiratory: Negative for cough and shortness of breath.    Cardiovascular: Negative for chest pain, palpitations and peripheral edema.   Gastrointestinal: Negative for abdominal pain, constipation, diarrhea, heartburn, hematochezia and nausea.   Breasts:  Negative for tenderness, breast mass and discharge.   Genitourinary: Negative for dysuria, frequency, genital sores, hematuria, pelvic pain, urgency, vaginal bleeding and vaginal discharge.   Musculoskeletal: Positive for arthralgias and joint swelling. Negative for  myalgias.   Skin: Negative for rash.   Neurological: Negative for dizziness, weakness, headaches and paresthesias.   Psychiatric/Behavioral: Negative for mood changes. The patient is not nervous/anxious.      Sees pain management      OBJECTIVE:   /73   Pulse 70   Temp 96.2  F (35.7  C) (Tympanic)   Resp 18   Wt 90.3 kg (199 lb)   SpO2 99%   BMI 28.55 kg/m    Physical Exam  GENERAL: healthy, alert and no distress  EYES: Eyes grossly normal to inspection, PERRL and conjunctivae and sclerae normal  HENT: ear canals and TM's normal, nose and mouth without ulcers or lesions  NECK: no adenopathy, no asymmetry, masses, or scars and thyroid normal to palpation  RESP: lungs clear to auscultation - no rales, rhonchi or wheezes  BREAST: normal without masses, tenderness or nipple discharge and no palpable axillary masses or adenopathy  CV: regular rate and rhythm, normal S1 S2, no S3 or S4, no murmur, click or rub  ABDOMEN: soft, nontender, no hepatosplenomegaly, no masses and bowel sounds normal  MS: no gross musculoskeletal defects noted, no edema  SKIN: no suspicious lesions or rashes  NEURO: Normal strength and tone, mentation intact and speech normal  PSYCH: mentation appears normal, affect normal/bright    ASSESSMENT/PLAN:       ICD-10-CM    1. Encounter for routine adult medical exam with abnormal findings Z00.01    2. Encounter for screening mammogram for breast cancer Z12.31 MA SCREENING DIGITAL BILAT - Future  (s+30)   3. Screening PSA (prostate specific antigen) Z12.5 PROSTATE SPEC ANTIGEN SCREEN   4. Hyperlipidemia LDL goal <130 E78.5 Lipid panel reflex to direct LDL Fasting     Comprehensive metabolic panel     simvastatin (ZOCOR) 40 MG tablet   5. Abnormal results of liver function studies R94.5    6. Leg swelling M79.89 Albumin Random Urine Quantitative with Creat Ratio     furosemide (LASIX) 20 MG tablet   7. Chronic pain disorder G89.4 topiramate (TOPAMAX) 200 MG tablet   8. Fibromyalgia M79.7  "topiramate (TOPAMAX) 200 MG tablet       1-3) Screenings discussed    4-6) Routine labs today. Meds renewed, no changes. Follow up annually    7,8) Med renewed, no change.       COUNSELING:  Reviewed preventive health counseling, as reflected in patient instructions    Estimated body mass index is 28.55 kg/m  as calculated from the following:    Height as of 5/20/19: 1.778 m (5' 10\").    Weight as of this encounter: 90.3 kg (199 lb).     reports that she has never smoked. She has never used smokeless tobacco.    Counseling Resources:  ATP IV Guidelines  Pooled Cohorts Equation Calculator  Breast Cancer Risk Calculator  FRAX Risk Assessment  ICSI Preventive Guidelines  Dietary Guidelines for Americans, 2010  USDA's MyPlate  ASA Prophylaxis  Lung CA Screening    Kristin Miner PA-C  [facility]  "

## 2019-10-17 ASSESSMENT — ANXIETY QUESTIONNAIRES: GAD7 TOTAL SCORE: 2

## 2019-10-18 ENCOUNTER — ANCILLARY PROCEDURE (OUTPATIENT)
Dept: MAMMOGRAPHY | Facility: CLINIC | Age: 54
End: 2019-10-18
Attending: PHYSICIAN ASSISTANT
Payer: MEDICARE

## 2019-10-18 DIAGNOSIS — Z12.31 ENCOUNTER FOR SCREENING MAMMOGRAM FOR BREAST CANCER: ICD-10-CM

## 2019-10-18 PROCEDURE — 77067 SCR MAMMO BI INCL CAD: CPT | Mod: TC

## 2019-10-18 PROCEDURE — 77063 BREAST TOMOSYNTHESIS BI: CPT | Mod: TC

## 2019-10-21 ENCOUNTER — OFFICE VISIT (OUTPATIENT)
Dept: PALLIATIVE MEDICINE | Facility: CLINIC | Age: 54
End: 2019-10-21
Payer: MEDICARE

## 2019-10-21 VITALS
BODY MASS INDEX: 28.55 KG/M2 | SYSTOLIC BLOOD PRESSURE: 116 MMHG | DIASTOLIC BLOOD PRESSURE: 76 MMHG | HEART RATE: 78 BPM | WEIGHT: 199 LBS

## 2019-10-21 DIAGNOSIS — M25.522 LEFT ELBOW PAIN: ICD-10-CM

## 2019-10-21 DIAGNOSIS — G89.29 CHRONIC PAIN OF LEFT KNEE: ICD-10-CM

## 2019-10-21 DIAGNOSIS — G62.9 NEUROPATHY: ICD-10-CM

## 2019-10-21 DIAGNOSIS — M25.562 CHRONIC PAIN OF LEFT KNEE: ICD-10-CM

## 2019-10-21 DIAGNOSIS — M75.42 IMPINGEMENT SYNDROME, SHOULDER, LEFT: Primary | ICD-10-CM

## 2019-10-21 PROCEDURE — 99215 OFFICE O/P EST HI 40 MIN: CPT | Performed by: PSYCHIATRY & NEUROLOGY

## 2019-10-21 ASSESSMENT — PAIN SCALES - GENERAL: PAINLEVEL: MODERATE PAIN (5)

## 2019-10-21 NOTE — PATIENT INSTRUCTIONS
1. Schedule ortho appt   [phone] or .    2. [facility] - please call to schedule [place] [phone]    3. Try the voltaren gel- this is the antiinflamatory gel.  You can use on 2-3 joint. Call if you want more.    4. Follow up in 2 months.  5. We will talk about your low back, PT and medical marijuana next time.  ----------------------------------------------------------------  Clinic Number:  [phone]     Call with any questions about your care and for scheduling assistance.     Calls are returned Monday through Friday between 8 AM and 4:30 PM. We usually get back to you within 2 business days depending on the issue/request.    If we are prescribing your medications:    For opioid medication refills, call the clinic or send a [facility] ([facility] message 7 days in advance.  Please include:    Name of requested medication    Name of the pharmacy.    For non-opioid medications, call your pharmacy directly to request a refill. Please allow 3-4 days to be processed.     Per [place] State Law:    All controlled substance prescriptions must be filled within 30 days of being written.      For those controlled substances allowing refills, pickup must occur within 30 days of last fill.      We believe regular attendance is key to your success in our program!      Any time you are unable to keep your appointment we ask that you call us at least 24 hours in advance to cancel.This will allow us to offer the appointment time to another patient.     Multiple missed appointments may lead to dismissal from the clinic.

## 2019-10-21 NOTE — PROGRESS NOTES
[facility] Transfer of Care    Date of visit: 10/21/2019    Reason for consultation:    Kylie Austin is a 54 year old female who is seen as transfer of care from prior clinic provider, Dr. Rosie Rayo.    Primary Care Provider is Kristin Miner.  Pain medications are being prescribed by PCP and Dr. Rosie Rayo.    Please see the [facility] health questionnaire which the patient completed and reviewed with me in detail.    Chief Complaint:    Chief Complaint   Patient presents with     Pain       Pain history:  Kylie Austin is a 54 year old female who first started having problems with pain several decades ago that is attributed to activity, work (lifting heavy objects) and repetitive stress.    She has left shoulder pain, which started after she hada an allergic reaction to amiodarone.  She feels that a lot of her joint pain began after that.  She has burrning and popping in the shoulder, and then numbness, sometimes certain fingers. She has seen a sports provider and had steroid and viscosupplement.  She has not talked with an orthopedic surgeon about that.      She has a left broken elbows, and that continues to cause some pain as well. She feels that is separate from the elbow.  Pain often starts in the shoulder, moving down the arm and affecting the elbow and into the hand.  She uses medical cannibus and injections in the arm.  The last shot was helpful.    She has ongoing neck pain.  In the past she had a procedure with burning of a nerve going into the arm.  That has worn off.  She feels that she has ongoing neck pain that is separate.  It is a dull, achy pain that is constant, more to the left.  With turning of the head it can get worse, and at times it can lock.    She also has low back pain, with burning pain.  The pain is where the pelvis hits the spine.  Burning and aching.  When she sleeps, it can wake  "her up with trying to role over.  She feels this is likely from too much heavy lifting.  This has been there for years, no treatments. She also feels that her hips are painful and a \"shifting\" feeling.  She feels it in the outer hip, to the knee.  Worse on the left compared to the right.  Pain is sharp.    She has left knee pain, more medial.  Sharp, worsened with walking.  She will wake up with pain.  injection was helpful 8/5 but is wearing off.  She has tried topical creams over the counter that were not helpful.  Medical marijuana helpful.    She was last seen for an office visit with Dr. Rosie Rayo 7/22/19    Plan at that visit included:  1. Physical Therapy:  Continue home exercise program  2. Clinical Health Psychologist to address issues of relaxation, behavioral change, coping style, and other factors important to improvement.  Continue behavioral health treatments  3. Diagnostic Studies:  Reviewed imaging - consider hip and knee x-rays  4. Medication Management:    1. Continue Medical cannabis  2. Continue Topamax 200 mg BID  3. Continue Trazodone 100 mg QHS  4. Continue Abilify 5 mg daily  5. Continue Effexor as prescribed  5. Further procedures recommended:   1. Left shoulder joint and left knee joint injections  2. Consider left hip joint injection  3. Consider lumbar epidural steroid injection left L4-5  6. Recommendations to PCP: continue current treatment  7. Follow up: for injections - 3 months - will need to establish with new pain provider due to my leaving [place]    Pain rating: intensity Averages 5/10 on a 0-10 scale.  Aggravating factors include: movement, standing for long periods, walking  Relieving factors include: lying down, medication (cannabis)  Any bowel or bladder incontinence: none    Current pain medications include:  Medical cannabis - very helpful with pain and mood  Topamax 200 mg BID  Effexor- mg two tabs daily     Past pain treatments:  Tramadol  Vicodin   Advil- states " he's been told by cardiology to not take any more NSAIDs   Prednisone   Sumatriptan   Cymbalta, Effexor       Other treatments have included:  PT: Pursued in past  Injections:   - 8/5/19 - Left shoulder and left knee joint injection - helpful  - 12/7/18 left shoulder glenohumeral joint injection  - 5/21/18 left shoulder subacromial bursa injection - helpful  - 1/17/18 left shoulder subacromial bursa injection - helpful  - multiple injections including cervical RFA - short term relief with RFA    Past Medical History:  Past Medical History:   Diagnosis Date     Anxiety 2005    Brought on by Amioderone     Arthritis 8/2005     Atrial fib/flutter, transient     S/p cardioversion 2004     Bipolar 2 disorder (H)      Bleeding disorder (H) 1987    nose bleeds     Chronic pain     LUE pain     Congestive heart failure (H) 11/18/2004    cured 2005     Depressive disorder     on and off most of my life!     Fibromyalgia      Gender identity disorder Started Rx 2012     H/O hypogonadism Gonadectomy 2013     Hyperlipidemia      Hypertension      Other mental problems     bipolar     Psoriasis      Uncomplicated asthma 1964    cured in 4/2001     Vision disorder 6/2005     Patient Active Problem List    Diagnosis Date Noted     S/P laparoscopic sleeve gastrectomy 04/10/2018     Priority: Medium     Chronic fatigue 11/13/2017     Priority: Medium     Cervical spondylosis without myelopathy 11/03/2016     Priority: Medium     Anxiety 08/11/2016     Priority: Medium     Primary osteoarthritis of left shoulder 04/26/2016     Priority: Medium     Vitamin D deficiency 10/27/2014     Priority: Medium     Problem list name updated by automated process. Provider to review       Glenohumeral arthritis 07/24/2014     Priority: Medium     Fibromyalgia 07/17/2014     Priority: Medium     H/O hypogonadism      Priority: Medium     Morbid obesity (H) 09/10/2013     Priority: Medium     Pt reports she has been following an aggressive diet with  "low carbs, high fruits and vegetables.  She is now s/p ochiectomy and feels that set him back some.  Also having issues with left knee pain as well as an anal fissure.  Her max weight was 280, and currently at 266.  Feels that she is on the right tract.  She was interested in discussing the role for gastric bypass or similar surgery.  We discussed that she is not so obese that she might qualify for this type of procedure, but she is also doing well with her current weight loss plan.      She sees the connection between her mood, and pain as triggers for over eating and making poor food choices.    7/17/2014  Pt reports overall weight loss, but having trouble with further weight loss- wonders if it is from her medications.          Insomnia 07/18/2013     Priority: Medium     Pt has trouble with sleeping, this has been long-standing.  He endorses history of chronic fatigue and chronic pain.  He tried a Lunesta that his partner (maria t) takes, and this helped and then he was given a short prescription for Ambien and this also helped him sleep.  He has never tried Nortriptyline, amitriptyline, trazodone.  Has tried Ativan in the past.  He has never had a sleep study.  He reports restless leg syndrome- \"I'm running in my sleep\" and he was tried on Neurontin for this- he cannot recall if it helped.         Bipolar 2 disorder (H) 07/18/2013     Priority: Medium     The patient reports that he has a hard time finding his car when he yu in large parking lots.  He apparently had previously received a handicap parking tag to help him with this problem, so he could more easily find his car.  He states he often takes photos of his car to help him find it when he gets back out to it.  When I suggested that he take a different approach, by parking as far away as possible from the door, where he can find an easy to remember parking space, he then states he also has trouble with prolonged walking, and becoming exhausted easily- " (due to chronic fatigue) and this contributes to the confusion and ultimately anxiety and stress of trying to find his car.    He states he's been evaluated for his physical limitations (as he is also requesting disability application today) by Sister Earle with [employer]- these records are being requested.  We discussed that I would not complete any such handicaped parking without explicit instructions from psychiatry that it was legitimately necessary and no disability paperwork without documentation of his functional capacity.      10/10/2014  Pt reports he is receiving ongoing psychiatric care: Dr. Merle Godwin at [facility] (prescribing Ambien, Risperdone, Trazodone, Effexor) on [facility] in [place].  Psychology with Tammy Lencho at [facility]- states she's been diagnosed with Bipolar 2- with passive aggressive tendencies.        Chronic pain disorder 06/20/2013     Priority: Medium     Pt reports that ever since treatment for Afib, he's had an adverse reaction to amiodarone, with some LUE pain, tremor, tingling.  He also endorses chronic fatigue, which flairs at times.  He avoids narcotics and is on a routine exercise program.      7/18/2013  He has been on vicodin now for his ochiectomy surgery- and this med is helping his shoulder pains- and other chronic pains.  He would like to discuss non-narcotic pain medications.  He has been on Neurontin for RLS, but not for pain, unsure if it helped his pain complaints.  He has never been on any other AED, no TCAs, no SNRIs.  He has had PT for his various problems in the past, and presently his shoulder is bothering him some- would be willing to do more PT in the future.      9/10/2013  Pt reports that he is now following a Dr. Lizeth Funes in [facility] in [facility] ([place] who started him on Tramadol on 8/13/13- with 50mg q 12 hours- he feels this is helping his Chronic pain, and his fatigue.  He plans to continue to see this  provider.  He was under the impression that this medication was non-addictive.  We discussed that this is a narcotic and can lead to dependence/addiction. Also we discussed that there could be an interaction with the Nortriptyline if he were to increase the dose of either.      7/17/2014  Now off Nortriptyline, had not helped much.  Continues on Tramadol, still taking 50mg twice a day.  Is presently on Effexor 75mg BID (prescribed by Psychiatry Polo Turner- until recent difficulty/disagreement centered around unwillingness to sign off on continued disability forms).  He was able to get the disability paperwork completed by Dr. Funes.  The pt states that she has been put on Savella (in addition to Effexor), for a new diagnosis of Fibromyalgia which she's been getting samples for, but knows that the med will be too expensive if/when she has to start getting it from pharmacy.  The process of his disability has been hold for the past 6 months.  He does have plans to establish with another psychiatrist (Dr. Merle Spivey ([place] in [place]).      He continues to use Hydroxyzine (generally taking 4-6 tabs/day).  Has been on Ambien for several years, and recognizes that if she does not take an Ambien she cannot sleep very well.  She states she knows that it is addictive.      She takes Trazodone 100mg, per her for shaking and restless legs.  She has never taken higher doses of Trazodone.      Pt reports that his chronic shoulder pains which started with the use of Amiodarone; as it was just on the left shoulder.  He has stopped the Amiodarone and he also reports an orthopedic w/u an [facility] doctor, and he underwent synvisc injection on the left- with good effect, got one about every year.  He now reports that his right shoulder has been acting up as well.  There is pain in the left hand and extending up into the posterior scalp.      Pt reports a C3 fracture with autofusion and a separate thoracic vertebral fracture  that also contribute to pain.         Abnormal results of liver function studies 04/24/2013     Priority: Medium     Hyperlipidemia LDL goal <130 04/16/2013     Priority: Medium     Gender identity disorder 01/17/2012     Priority: Medium     6/20/2013  Self identifies as female.  Goes by Kylie.  Today presents for pre-operative eval.  Pt states he has attempted self-castration, and now he is going for orchiectomy.      7/18/2013   Now s/p bilat orchiectomy.  Still has normal male anatomy otherwise.  Is not sure if she would like to proceed with this type of surgery.           Past Surgical History:  Past Surgical History:   Procedure Laterality Date     BIOPSY  2005, 8/8/13    Stomach, colon     COLONOSCOPY  8/8/2013    Procedure: COLONOSCOPY;  COLONSCOPY SCREEN/ SE;  Surgeon: Santino Sun MD;  Location: [facility] OR     COLONOSCOPY WITH CO2 INSUFFLATION N/A 11/1/2018    Procedure: COLONOSCOPY WITH CO2 INSUFFLATION;  Surgeon: Santino Sun MD;  Location: [facility] OR     Gender Reassignment  05/2016     HEAD & NECK SURGERY  2015    ablation of nerve from neck to left arm     LAPAROSCOPIC GASTRIC SLEEVE N/A 4/10/2018    Procedure: LAPAROSCOPIC GASTRIC SLEEVE;  Laparoscopic Sleeve Gastrectomy;  Surgeon: Jani Shah MD;  Location: [facility] OR     MANDIBLE SURGERY  1986     ORCHIECTOMY INGUINAL BILATERAL  7/16/2013    Procedure: ORCHIECTOMY INGUINAL BILATERAL;  Bilateral Simple Inguinal Orchiectomy ;  Surgeon: Jan Garrett MD;  Location: [facility] OR     TONSILLECTOMY       Medications:  Current Outpatient Medications   Medication Sig Dispense Refill     ARIPiprazole (ABILIFY) 20 MG tablet Take 1 tablet (20 mg) by mouth every morning       diclofenac (VOLTAREN) 1 % topical gel Place 2-4 g onto the skin 4 times daily Use 2g on elbow and 4g on knee 100 g 3     estradiol (ESTRACE) 2 MG tablet 1 tablet daily 90 tablet 3     furosemide (LASIX) 20 MG tablet Take 1 tablet (20 mg) by mouth every morning 90  tablet 3     medical cannabis (Patient's own supply.  Not a prescription) 2 capsules 2 times daily (This is NOT a prescription, and does not certify that the patient has a qualifying medical condition for medical cannabis.  The purpose of this order is  to document that the patient reports taking medical cannabis.)    Morning and afternoon        medical cannabis inhalation (Patient's own supply.  Not a prescription) Inhale into the lungs 2 times daily as needed (This is NOT a prescription, and does not certify that the patient has a qualifying medical condition for medical cannabis.  The purpose of this order is  to document that the patient reports taking medical cannabis.)    MORNING AND AT NIGHT        simvastatin (ZOCOR) 40 MG tablet Take 1 tablet (40 mg) by mouth At Bedtime 90 tablet 3     topiramate (TOPAMAX) 200 MG tablet Take 1 tablet (200 mg) by mouth 2 times daily 180 tablet 3     traZODone (DESYREL) 100 MG tablet Take 1 tablet (100 mg) by mouth nightly as needed for sleep (Patient taking differently: Take 100 mg by mouth At Bedtime ) 30 tablet 1     venlafaxine (EFFEXOR-XR) 75 MG 24 hr capsule Take 3 capsules (225 mg) by mouth daily       Allergies:     Allergies   Allergen Reactions     Amiodarone      Caused pain fatigue/amplified anxiety and depression     Fluoxetine Other (See Comments)     Night terrors     Tetracycline      Teeth rattle/saliva acidic     Social History:  Home situation: , lives with wife  Occupation/Schooling: Disabled  Tobacco use: Denies  Alcohol use: rare glass  Drug use: none  History of chemical dependency treatment: none    Family history:  Family History   Problem Relation Age of Onset     Cancer Father         skin     Diabetes Father         Was on the patch when they were new     Heart Disease Father 55        scd     Coronary Artery Disease Father      Hypertension Father      Hyperlipidemia Father      Heart Disease Other      Alzheimer Disease Paternal  Grandmother      Diabetes Paternal Grandmother      Mental Illness Paternal Grandmother         alshimers     Osteoporosis Paternal Grandmother      Diabetes Sister      Diabetes Sister      Coronary Artery Disease Paternal Grandfather      Hypertension Paternal Grandfather      Hyperlipidemia Paternal Grandfather      Prostate Cancer Paternal Grandfather      Other Cancer Paternal Grandfather      Breast Cancer Mother      Depression Daughter      Unknown/Adopted Daughter      Depression Daughter      Mental Illness Daughter         bipolar     Depression Son      Anxiety Disorder Daughter      Anxiety Disorder Daughter      Osteoporosis Maternal Grandmother      Thyroid Disease Sister      Review of Systems:    POSTIVE IN BOLD  GENERAL: fever/chills, fatigue, general unwell feeling, weight gain/loss.  HEAD/EYES:  headache, dizziness, or vision changes.    EARS/NOSE/THROAT:  Nosebleeds, hearing loss, sinus infection, earache, tinnitus.  IMMUNE:  Allergies, cancer, immune deficiency, or infections.  SKIN:  Urticaria, rash, hives  HEME/Lymphatic:   anemia, easy bruising, easy bleeding.  RESPIRATORY:  cough, wheezing, or shortness of breath  CARDIOVASCULAR/Circulation:  Extremity edema, syncope, hypertension, tachycardia, or angina.  GASTROINTESTINAL:  abdominal pain, nausea/emesis, diarrhea, constipation,  hematochezia, or melena.  ENDOCRINE:  Diabetes, steroid use,  thyroid disease or osteoporosis.  MUSCULOSKELETAL: neck pain, back pain, arthralgia, arthritis, or gout.  GENITOURINARY:  frequency, urgency, dysuria, difficulty voiding, hematuria or incontinence.  NEUROLOGIC:  weakness, numbness, paresthesias, seizure, tremor, stroke or memory loss.  PSYCHIATRIC:  depression, anxiety, stress, suicidal thoughts or mood swings.     Physical Exam:  Vitals:    10/21/19 0758   BP: 116/76   Pulse: 78   Weight: 90.3 kg (199 lb)     Exam:  Constitutional: healthy, alert and no distress  Head: normocephalic. Atraumatic.   Eyes:  no redness or jaundice noted   ENT: oropharnx normal.  MMM.  Neck supple.  : deferred  Skin: no suspicious lesions or rashes  Psychiatric: mentation appears normal and affect normal/bright    Musculoskeletal exam:  Gait/Station/Posture: Slow gait, slight lean to right  Cervical spine:    Flex:  Mildly limited , minimal pain   Ext: mildly limited, moderate pain   Rotation to right: mildly limited, mild pain   Rotation to left: moderately limited, moderate pain    Thoracic spine:  Normal     Lumbar spine:    Flex:  No limitation, no pain   Ext: mild limitation, mild pain   Rotation/ext to right: pain free   Rotation/ext to left: minimal pain behaviors    Myofascial tenderness:  Mild tenderness along cervical midline and paraspinals around C4-6. Mild tenderness along lumbar midline with minimal paraspinal tenderness. Left shoulder tenderness in anterior joint, medial elbow tender with altered ulnar groove compared to right. Left medial knee joint tender.    Neurologic exam:  CN:  Cranial nerves 2-12 are normal  Motor:  5/5 UE and LE strength  Reflexes:     Biceps:     R:  0/4 L: 0/4   Brachioradialis   R:  0/4 L: 0/4   Triceps:  R:  0/4 L: 0/4   Patella:  R:  0/4 L: 0/4   Achilles:  R:  0/4 L: 0/4   Sensory:  (upper and lower extremities):   Light touch: normal    Allodynia: absent    Dysethesia: absent    Hyperalgesia: absent     Diagnostic tests:  MRI cervical spine 11/29/18  IMPRESSION:    1. Multilevel degenerative disc disease and degenerative facet  arthropathy as described above.  2. Mild spinal canal and foraminal stenoses as described above.    3. C7-T1 grade 1 spondylolisthesis.  4. No change.    Personally reviewed imaging     MRI lumbar spine 11/13/17  1. Early degenerative disc disease and moderate right posterior facet  degenerative changes at L3-L4 with mild bilateral foraminal stenosis,  right greater than left.  2. Early degenerative disc disease and moderate posterior facet  degenerative changes  L4-L5 with mild bilateral foraminal stenosis.  3. Early degenerative disc disease with no direct impingement on  neural structures at L5-S1.    MRI left shoulder 11/13/17  1. Supraspinatus and subscapularis degenerative tendinopathy without  significant tear.  2. Moderately advanced glenohumeral osteoarthritis.  3. Several loose bodies in the subcoracoid recess region, largest  measuring up to 2.6 cm.    Other testing (labs, diagnostics) reviewed:  Labs (10/16/19) - Na (141), K (4.2), GFR (>90), LFT's WNL. Glucose 87  EKG (4/10/18) - Sinus rhythm, left axis QTc 498ms    [place] Prescription Monitoring Program reviewed    Screening tools:  Not on opioids      Assessment:  1. Chronic pain syndrome  2. Chronic neck and back pain  3. Left shoulder pain/impingement  4. Chronic left knee pain  5. Osteoarthritis in multiple joints  6. Cervical spondylosis and degenerative disc disease  7. Myofascial pain  8. Sacroiliac joint pain  9. Lumbar facet joint pain  10.  Medical cannabis use   11. Transgender to female      Kylie Austin is a 54 year old female who presents with the complaints of neck, left shoulder, left elbow, low back, left hip and left knee pain. She has been an established patient of Dr. Rosie Rayo that is transferring care. She has no prior surgical history for pain symptoms and has been managed with pain interventions including left shoulder, knee and neck. Currently she is managed well with medical cannabis, not on opioids. Exam is consistent with cervical spondylosis and chronic left shoulder, elbow and knee pain that should be evaluated further with orthopedic surgery. Further she has reported left median nerve symptoms with pain at left medial elbow, and evaluation with an EMG would likely clarify her pain distribution and potential underlying neuropathy.    Will discuss low back and PT more at next appt    Plan:  Diagnosis reviewed, treatment option addressed, and risk/benefits discussed.   Self-care instructions given.  I am recommending a multidisciplinary treatment plan to help this patient better manage her pain.      1. Physical Therapy: None at this time, to evaluate on next visit  2. Pain Psychologist to address issues of relaxation, behavioral change, coping style, and other factors important to improvement: None at this time, continue with current behavioral health provider  3. Diagnostic Studies: Order EMG of left upper extremity to evaluate reported median nerve neuropathy in setting of medial elbow pain  4. Urine toxicology screen: None at this time   5. Medication Management:   1. Continue Medical cannabis, will renew certification on next office visit  2. Continue Topamax 200mg BID, Trazodone 100mg at bedtime, Abilify 5mg daily, Continue Effexor  3. Start Voltaren cream to areas of painful joints  6. Further procedures recommended: None at this time  7. Referral: To Orthopedics for evaluation of left shoulder, elbow and knee (this may need to be conducted under two evaluations)  8. Recommendations/follow-up for PCP:  Continue current treatment  9. Release of information: None  10. Follow up: 2 months    Sven Katz MD  10/21/2019 10:36 AM  Pain Medicine Fellow    Total time spent was 50 minutes, and more than 50% of face to face time was spent in counseling and/or coordination of care regarding medications, diagnostic studies..   Lanie Fowler MD  [facility]

## 2019-10-22 ENCOUNTER — TELEPHONE (OUTPATIENT)
Dept: NEUROLOGY | Facility: CLINIC | Age: 54
End: 2019-10-22

## 2019-10-22 DIAGNOSIS — G62.9 NEUROPATHY: Primary | ICD-10-CM

## 2019-10-22 NOTE — TELEPHONE ENCOUNTER
EMG order needs to be ordered as  PROCEDURE order, not a NEUROLOGY ADULT REFERRAL. Please order NEU5 for the EMG Procedure. Thanks!

## 2019-10-22 NOTE — TELEPHONE ENCOUNTER
Pt was seen by ANGEL Fowler yesterday and was told to Start Voltaren cream to areas of painful joints. Pt went to [facility] in [place] and rx was not there. Pt is waiting.      Luz PORTER    [facility] [place]

## 2019-10-22 NOTE — TELEPHONE ENCOUNTER
Writer called patient and informed her that the EMG will require a procedure order that she has schedule on December 23rd. Patient acknowledged and said she would call referring provider to get the order in.

## 2019-10-22 NOTE — TELEPHONE ENCOUNTER
voltaren script was sent in.    Also, ortho and neurology referrals sent in.    Lanie Fowler MD  [facility]

## 2019-10-22 NOTE — PROGRESS NOTES
"HISTORY OF PRESENT ILLNESS:    Kylie Austin is a 54 year old transgender patient who identifies as female, with history of chronic pain syndrome, who was referred by Dr. Fowler for evaluation of left shoulder and left knee pain.    She has left shoulder pain, which started after she had an allergic reaction to amiodarone.  She feels that a lot of her joint pain began after that.  She has burrning and popping in the shoulder, and then numbness, sometimes certain fingers. She has seen a \"sports provider\" and had steroid and viscosupplement injection. Gel injections helped 10-12 months, but couldn't continue due to cost.     She has left knee pain, more medial.  Sharp, worsened with walking.  She will wake up with pain.  Injection was helpful 8/5 but is wearing off.  She has tried topical creams over the counter that were not helpful.  Medical marijuana helpful.    History of c-spine issues, and complains of left elbow pain (previous fracture in the past of the elbow) and multiple other joint pain issues.  Present symptoms:   Left Shoulder: pain with any movements, pain at rest.  Weakness.  + popping/snapping. Night pain. Burning pain.  Some pain in shoulder moving head/neck.  +numbness/tingling down entire lateral arm, radial 3 digits.  left knee: pain to walk, stairs. Squatting.  + feeling of giving-way. Medial knee pain.  Treatments tried to this point: Knee: Corticosteroid injection-- last one was in July.  No viscosupplementation injection.   Orthopedic PMH: history of elbow fracture, no surgery. (childhood)     Other PMH:   Past Medical History:   Diagnosis Date     Anxiety 2005    Brought on by Amioderone     Arthritis 8/2005     Atrial fib/flutter, transient     S/p cardioversion 2004     Bipolar 2 disorder (H)      Bleeding disorder (H) 1987    nose bleeds     Chronic pain     LUE pain     Congestive heart failure (H) 11/18/2004    cured 2005     Depressive disorder     on and off most of my " life!     Fibromyalgia      Gender identity disorder Started Rx 2012     H/O hypogonadism Gonadectomy 2013     Hyperlipidemia      Hypertension      Other mental problems     bipolar     Psoriasis      Uncomplicated asthma 1964    cured in 4/2001     Vision disorder 6/2005     Surgical Hx:   Past Surgical History:   Procedure Laterality Date     BIOPSY  2005, 8/8/13    Stomach, colon     COLONOSCOPY  8/8/2013    Procedure: COLONOSCOPY;  COLONSCOPY SCREEN/ SE;  Surgeon: Santino Sun MD;  Location: [facility]     COLONOSCOPY WITH CO2 INSUFFLATION N/A 11/1/2018    Procedure: COLONOSCOPY WITH CO2 INSUFFLATION;  Surgeon: Santino Sun MD;  Location: [facility]     Gender Reassignment  05/2016     HEAD & NECK SURGERY  2015    ablation of nerve from neck to left arm     LAPAROSCOPIC GASTRIC SLEEVE N/A 4/10/2018    Procedure: LAPAROSCOPIC GASTRIC SLEEVE;  Laparoscopic Sleeve Gastrectomy;  Surgeon: Jani Shah MD;  Location: [facility]     MANDIBLE SURGERY  1986     ORCHIECTOMY INGUINAL BILATERAL  7/16/2013    Procedure: ORCHIECTOMY INGUINAL BILATERAL;  Bilateral Simple Inguinal Orchiectomy ;  Surgeon: Jan Garrett MD;  Location: [facility]     TONSILLECTOMY        Family Hx: See EMR  Social Hx: See EMR    REVIEW OF SYSTEMS:   CONSTITUTIONAL:  NEGATIVE for fever, chills, change in weight  INTEGUMENTARY/SKIN:  NEGATIVE for worrisome rashes, moles or lesions  EYES:  NEGATIVE for vision changes or irritation  ENT/MOUTH:  NEGATIVE for ear, mouth and throat problems  RESP:  NEGATIVE for significant cough or SOB  BREAST:  NEGATIVE for masses, tenderness or discharge  CV:  NEGATIVE for chest pain, palpitations or peripheral edema  GI:  NEGATIVE for nausea, abdominal pain, heartburn, or change in bowel habits  :  Negative   MUSCULOSKELETAL:  See HPI above.  polyarthralgias  NEURO:  NEGATIVE for weakness, dizziness or paresthesias  ENDOCRINE:  NEGATIVE for temperature intolerance, skin/hair  "changes  HEME/ALLERGY/IMMUNE:  NEGATIVE for bleeding problems  PSYCHIATRIC:  NEGATIVE for changes in mood or affect      PHYSICAL EXAM:  /76 (BP Location: Right arm, Patient Position: Sitting, Cuff Size: Adult Regular)   Pulse 69   Ht 1.778 m (5' 10\")   Wt 90.3 kg (199 lb)   SpO2 98%   BMI 28.55 kg/m    Body mass index is 28.55 kg/m .   GENERAL APPEARANCE: healthy, alert and no distress   SKIN: no suspicious lesions or rashes  NEURO: Normal strength and tone, mentation intact and speech normal  VASCULAR:  good pulses, and capillary refill   LYMPH: no lymphadenopathy   PSYCH:  mentation appears normal and affect normal/bright    MSK:  Neck Exam:  Cervical range of motion: full and causes neck and mild shoulder pain.  Sensory deficits: slightly radial forearm left   Motor deficits: none  DTR's: normal    Left Shoulder Exam:  Shoulder Inspection: no swelling, bruising, discoloration, or obvious deformity or asymmetry  no atrophy  Tender: diffuse  Range of Motion:   Active: forward flexion: 120, internal rotation: T12   Passive: forward flexion: 135*, external rotation: 60  Strength: forward flexion: 5/5, painful, external rotation: 5/5, Liftoff: Able  Impingement: all grade 2 positive  Special tests: Speed: Positive  Anterior Drawer: 0  Posterior Drawer: 0  Spurling's: Negative    Left Knee Exam:  Gait: walks with normal gait  Alignment: normal   Squat: 90 % painful.    Patellofemoral joint: moderate crepitations in the patellofemoral joint.  Effusion: none  ROM: 0-130+  Tender: medial joint line  Masses: none  Ligaments:   Lachman's: stable   Anterior/Posterior drawer: stable,   Varus/Valgus stress: stable to varus and valgus stress  McMurrays: pain but no catching with hyperflexion    Imaging:   Left shoulder 6/9/2016: Degenerative osteoarthrosis in the left shoulder  glenohumeral joint with no evidence of fracture or dislocation    C-spine 4/13/2015: Straightening of the cervical spine may be positional " or  related to muscle spasm. There are degenerative changes in the  cervical spine, greatest at C6-C7 where there is complete loss of disc  height as well as anterior/posterior osteophytic spurring. Moderate  loss of disc height is also noted at C5-C6    Cervical spine MRI 11/29/2018 :   C2-C3:  Left posterolateral uncinate spurs. Some moderate degeneration  left facet joint and moderate left foraminal stenosis. Otherwise  normal.     C3-C4:  Severe degeneration left facet joint. Otherwise normal.     C4-C5:  Loss of disc height, mild circumferential disc bulge and  vertebral endplate osteophytes causing slight impression on the thecal  sac. Mild degeneration left facet joint, mild bilateral foraminal  stenosis.     C5-C6:  Moderate degenerative disc disease with loss of disc height,  circumferential disc bulge and vertebral endplate osteophytes.  Moderate degeneration right facet joint and bilateral mild foraminal  stenosis. Mild spinal canal stenosis.     C6-C7:  Severe degenerative disc disease with marked loss of disc  height, circumferential disc bulge and osteophytes from the vertebral  endplates. Mild spinal canal stenosis and bilateral moderate foraminal  stenosis.     C7-T1: Grade 1 spondylolisthesis from bilateral severe degenerative  facet arthropathy. Mild spinal canal stenosis. Mild circumferential  disc bulge. Normal neural foramina.     T1-T2:  Small central posterior disc protrusion. Otherwise normal.      1. Multilevel degenerative disc disease and degenerative facet  arthropathy as described above.  2. Mild spinal canal and foraminal stenoses as described above.    3. C7-T1 grade 1 spondylolisthesis.    MRI left shoulder 11/13/2017:  1. Supraspinatus and subscapularis degenerative tendinopathy without  significant tear.  2. Moderately advanced glenohumeral osteoarthritis.  3. Several loose bodies in the subcoracoid recess region, largest  measuring up to 2.6 cm    MRI left knee 1/15/2015:   1.  Medial compartment degenerative arthrosis with near full-thickness  loss of articular cartilage.  2. Medial meniscus appears diminished in size compatible with  degeneration and mechanical wearing. Degenerative fraying is noted at  the tip of the body of the medial meniscus. No displaced meniscal  flap.  3. 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  4. No cruciate or collateral ligament tear.  5. Small ganglion cyst adjacent to the femoral attachment of the  lateral head of the gastrocnemius.    Left Shoulder xrays today: Large calcifications, likely articular bodies within the  subscapularis recess of the glenohumeral joint adjacent to the  coracoid process. Humeral joint degenerative arthrosis with  inferomedial humeral head osteophytic spurring. The appearance is  similar if not unchanged compared to 6/9/2016    Left Knee xrays today: Medial compartment moderate to severe joint space  narrowing. Lateral and patellofemoral compartment joint space width  appears within normal limits.     Impression:   Encounter Diagnoses   Name Primary?     Osteoarthritis of glenohumeral joint, left Yes     Loose body in left shoulder      Osteoarthritis of left knee, unspecified osteoarthritis type       Plan: For the left shoulder, I think that salvage options, such as loose body removal would likely not be particularly successful.  Other nonsurgical treatments appear to be exhausted at this point.  I think total shoulder arthroplasty is the next logical step for her.  I explained to her briefly the nature of the procedure, the recovery time and the nature of the recovery as well as some of the risks.    For the left knee I think it is worthwhile to see if Visco supplementation injections could be done.  They worked so well for her shoulder that they may work in the knee.  I also ordered an  brace for the knee as well.  Arthroplasty options could include unicompartmental arthroplasty based on the wear pattern seen.  Arthroplasty  could be done if none operative treatments fail.    Return to clinic as needed   She may return for a viscosupplementation injection. May return for other joint issues    PREETI Vo MD  Dept. Orthopedic Surgery  [facility]

## 2019-10-23 ENCOUNTER — ANCILLARY PROCEDURE (OUTPATIENT)
Dept: GENERAL RADIOLOGY | Facility: CLINIC | Age: 54
End: 2019-10-23
Attending: ORTHOPAEDIC SURGERY
Payer: MEDICARE

## 2019-10-23 ENCOUNTER — OFFICE VISIT (OUTPATIENT)
Dept: ORTHOPEDICS | Facility: CLINIC | Age: 54
End: 2019-10-23
Payer: MEDICARE

## 2019-10-23 VITALS
DIASTOLIC BLOOD PRESSURE: 76 MMHG | WEIGHT: 199 LBS | OXYGEN SATURATION: 98 % | HEART RATE: 69 BPM | HEIGHT: 70 IN | BODY MASS INDEX: 28.49 KG/M2 | SYSTOLIC BLOOD PRESSURE: 121 MMHG

## 2019-10-23 DIAGNOSIS — M19.012 OSTEOARTHRITIS OF GLENOHUMERAL JOINT, LEFT: Primary | ICD-10-CM

## 2019-10-23 DIAGNOSIS — M17.12 OSTEOARTHRITIS OF LEFT KNEE, UNSPECIFIED OSTEOARTHRITIS TYPE: ICD-10-CM

## 2019-10-23 DIAGNOSIS — M24.012 LOOSE BODY IN LEFT SHOULDER: ICD-10-CM

## 2019-10-23 DIAGNOSIS — M19.012 OSTEOARTHRITIS OF GLENOHUMERAL JOINT, LEFT: ICD-10-CM

## 2019-10-23 PROCEDURE — 73030 X-RAY EXAM OF SHOULDER: CPT | Mod: LT

## 2019-10-23 PROCEDURE — 99203 OFFICE O/P NEW LOW 30 MIN: CPT | Performed by: ORTHOPAEDIC SURGERY

## 2019-10-23 PROCEDURE — 73562 X-RAY EXAM OF KNEE 3: CPT | Mod: LT

## 2019-10-23 ASSESSMENT — MIFFLIN-ST. JEOR: SCORE: 1572.91

## 2019-10-23 NOTE — LETTER
"    10/23/2019         RE: Kylie Austin  [id number] [facility] [id number]        Dear Colleague,    Thank you for referring your patient, Kylie Austin, to the [facility]. Please see a copy of my visit note below.            HISTORY OF PRESENT ILLNESS:    Kylie Austin is a 54 year old transgender patient who identifies as female, with history of chronic pain syndrome, who was referred by Dr. Fowler for evaluation of left shoulder and left knee pain.    She has left shoulder pain, which started after she had an allergic reaction to amiodarone.  She feels that a lot of her joint pain began after that.  She has burrning and popping in the shoulder, and then numbness, sometimes certain fingers. She has seen a \"sports provider\" and had steroid and viscosupplement injection. Gel injections helped 10-12 months, but couldn't continue due to cost.     She has left knee pain, more medial.  Sharp, worsened with walking.  She will wake up with pain.   Injection was helpful 8/5 but is wearing off.  She has tried topical creams over the counter that were not helpful.  Medical marijuana helpful.    History of c-spine issues, and complains of left elbow pain (previous fracture in the past of the elbow) and multiple other joint pain issues.  Present symptoms:   Left Shoulder: pain with any movements, pain at rest.  Weakness.  + popping/snapping. Night pain. Burning pain.  Some pain in shoulder moving head/neck.  +numbness/tingling down entire lateral arm, radial 3 digits.  left knee: pain to walk, stairs. Squatting.  + feeling of giving-way. Medial knee pain.  Treatments tried to this point: Knee: Corticosteroid injection-- last one was in July.  No viscosupplementation injection.   Orthopedic PMH: history of elbow fracture, no surgery. (childhood)     Other PMH:   Past Medical History:   Diagnosis Date     Anxiety 2005    Brought on by Amiliame     Arthritis " 8/2005     Atrial fib/flutter, transient     S/p cardioversion 2004     Bipolar 2 disorder (H)      Bleeding disorder (H) 1987    nose bleeds     Chronic pain     LUE pain     Congestive heart failure (H) 11/18/2004    cured 2005     Depressive disorder     on and off most of my life!     Fibromyalgia      Gender identity disorder Started Rx 2012     H/O hypogonadism Gonadectomy 2013     Hyperlipidemia      Hypertension      Other mental problems     bipolar     Psoriasis      Uncomplicated asthma 1964    cured in 4/2001     Vision disorder 6/2005     Surgical Hx:   Past Surgical History:   Procedure Laterality Date     BIOPSY  2005, 8/8/13    Stomach, colon     COLONOSCOPY  8/8/2013    Procedure: COLONOSCOPY;  COLONSCOPY SCREEN/ SE;  Surgeon: Santino Sun MD;  Location: [facility]     COLONOSCOPY WITH CO2 INSUFFLATION N/A 11/1/2018    Procedure: COLONOSCOPY WITH CO2 INSUFFLATION;  Surgeon: Santino Sun MD;  Location: [facility]     Gender Reassignment  05/2016     HEAD & NECK SURGERY  2015    ablation of nerve from neck to left arm     LAPAROSCOPIC GASTRIC SLEEVE N/A 4/10/2018    Procedure: LAPAROSCOPIC GASTRIC SLEEVE;  Laparoscopic Sleeve Gastrectomy;  Surgeon: Jani Shah MD;  Location: [facility]     MANDIBLE SURGERY  1986     ORCHIECTOMY INGUINAL BILATERAL  7/16/2013    Procedure: ORCHIECTOMY INGUINAL BILATERAL;  Bilateral Simple Inguinal Orchiectomy ;  Surgeon: Jan Garrett MD;  Location: [facility]     TONSILLECTOMY        Family Hx: See EMR  Social Hx: See EMR    REVIEW OF SYSTEMS:   CONSTITUTIONAL:  NEGATIVE for fever, chills, change in weight  INTEGUMENTARY/SKIN:  NEGATIVE for worrisome rashes, moles or lesions  EYES:  NEGATIVE for vision changes or irritation  ENT/MOUTH:  NEGATIVE for ear, mouth and throat problems  RESP:  NEGATIVE for significant cough or SOB  BREAST:  NEGATIVE for masses, tenderness or discharge  CV:  NEGATIVE for chest pain, palpitations or peripheral edema  GI:  " NEGATIVE for nausea, abdominal pain, heartburn, or change in bowel habits  :  Negative   MUSCULOSKELETAL:  See HPI above.  polyarthralgias  NEURO:  NEGATIVE for weakness, dizziness or paresthesias  ENDOCRINE:  NEGATIVE for temperature intolerance, skin/hair changes  HEME/ALLERGY/IMMUNE:  NEGATIVE for bleeding problems  PSYCHIATRIC:  NEGATIVE for changes in mood or affect      PHYSICAL EXAM:  /76 (BP Location: Right arm, Patient Position: Sitting, Cuff Size: Adult Regular)   Pulse 69   Ht 1.778 m (5' 10\")   Wt 90.3 kg (199 lb)   SpO2 98%   BMI 28.55 kg/m     Body mass index is 28.55 kg/m .   GENERAL APPEARANCE: healthy, alert and no distress   SKIN: no suspicious lesions or rashes  NEURO: Normal strength and tone, mentation intact and speech normal  VASCULAR:  good pulses, and capillary refill   LYMPH: no lymphadenopathy   PSYCH:  mentation appears normal and affect normal/bright    MSK:  Neck Exam:  Cervical range of motion: full and causes neck and mild shoulder pain.  Sensory deficits: slightly radial forearm left   Motor deficits: none  DTR's: normal    Left Shoulder Exam:  Shoulder Inspection: no swelling, bruising, discoloration, or obvious deformity or asymmetry  no atrophy  Tender: diffuse  Range of Motion:   Active: forward flexion: 120, internal rotation: T12   Passive: forward flexion: 135*, external rotation: 60  Strength: forward flexion: 5/5, painful, external rotation: 5/5, Liftoff: Able  Impingement: all grade 2 positive  Special tests: Speed: Positive  Anterior Drawer: 0  Posterior Drawer: 0  Spurling's: Negative    Left Knee Exam:  Gait: walks with normal gait  Alignment: normal   Squat: 90 % painful.    Patellofemoral joint: moderate crepitations in the patellofemoral joint.  Effusion: none  ROM: 0-130+  Tender: medial joint line  Masses: none  Ligaments:   Lachman's: stable   Anterior/Posterior drawer: stable,   Varus/Valgus stress: stable to varus and valgus stress  McMurrays: pain " but no catching with hyperflexion    Imaging:   Left shoulder 6/9/2016: Degenerative osteoarthrosis in the left shoulder  glenohumeral joint with no evidence of fracture or dislocation    C-spine 4/13/2015: Straightening of the cervical spine may be positional or  related to muscle spasm. There are degenerative changes in the  cervical spine, greatest at C6-C7 where there is complete loss of disc  height as well as anterior/posterior osteophytic spurring. Moderate  loss of disc height is also noted at C5-C6    Cervical spine MRI 11/29/2018 :   C2-C3:  Left posterolateral uncinate spurs. Some moderate degeneration  left facet joint and moderate left foraminal stenosis. Otherwise  normal.     C3-C4:  Severe degeneration left facet joint. Otherwise normal.     C4-C5:  Loss of disc height, mild circumferential disc bulge and  vertebral endplate osteophytes causing slight impression on the thecal  sac. Mild degeneration left facet joint, mild bilateral foraminal  stenosis.     C5-C6:  Moderate degenerative disc disease with loss of disc height,  circumferential disc bulge and vertebral endplate osteophytes.  Moderate degeneration right facet joint and bilateral mild foraminal  stenosis. Mild spinal canal stenosis.     C6-C7:  Severe degenerative disc disease with marked loss of disc  height, circumferential disc bulge and osteophytes from the vertebral  endplates. Mild spinal canal stenosis and bilateral moderate foraminal  stenosis.     C7-T1: Grade 1 spondylolisthesis from bilateral severe degenerative  facet arthropathy. Mild spinal canal stenosis. Mild circumferential  disc bulge. Normal neural foramina.     T1-T2:  Small central posterior disc protrusion. Otherwise normal.      1. Multilevel degenerative disc disease and degenerative facet  arthropathy as described above.  2. Mild spinal canal and foraminal stenoses as described above.    3. C7-T1 grade 1 spondylolisthesis.    MRI left shoulder 11/13/2017:  1.  Supraspinatus and subscapularis degenerative tendinopathy without  significant tear.  2. Moderately advanced glenohumeral osteoarthritis.  3. Several loose bodies in the subcoracoid recess region, largest  measuring up to 2.6 cm    MRI left knee 1/15/2015:   1. Medial compartment degenerative arthrosis with near full-thickness  loss of articular cartilage.  2. Medial meniscus appears diminished in size compatible with  degeneration and mechanical wearing. Degenerative fraying is noted at  the tip of the body of the medial meniscus. No displaced meniscal  flap.  3. 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  4. No cruciate or collateral ligament tear.  5. Small ganglion cyst adjacent to the femoral attachment of the  lateral head of the gastrocnemius.    Left Shoulder xrays today: Large calcifications, likely articular bodies within the  subscapularis recess of the glenohumeral joint adjacent to the  coracoid process. Humeral joint degenerative arthrosis with  inferomedial humeral head osteophytic spurring. The appearance is  similar if not unchanged compared to 6/9/2016    Left Knee xrays today: Medial compartment moderate to severe joint space  narrowing. Lateral and patellofemoral compartment joint space width  appears within normal limits.     Impression:   Encounter Diagnoses   Name Primary?     Osteoarthritis of glenohumeral joint, left Yes     Loose body in left shoulder      Osteoarthritis of left knee, unspecified osteoarthritis type       Plan: For the left shoulder, I think that salvage options, such as loose body removal would likely not be particularly successful.  Other nonsurgical treatments appear to be exhausted at this point.  I think total shoulder arthroplasty is the next logical step for her.  I explained to her briefly the nature of the procedure, the recovery time and the nature of the recovery as well as some of the risks.    For the left knee I think it is worthwhile to see if Visco supplementation  injections could be done.  They worked so well for her shoulder that they may work in the knee.  I also ordered an  brace for the knee as well.  Arthroplasty options could include unicompartmental arthroplasty based on the wear pattern seen.  Arthroplasty could be done if none operative treatments fail.    Return to clinic as needed   She may return for a viscosupplementation injection. May return for other joint issues    PREETI Vo MD  Dept. Orthopedic Surgery  [facility]       Again, thank you for allowing me to participate in the care of your patient.        Sincerely,        Dario Vo MD

## 2019-11-04 NOTE — PROGRESS NOTES
"HISTORY OF PRESENT ILLNESS:  Kylie Austin is a 54 year old transgender patient who identifies as female, who is seen for follow up for her knee osteoarthritis. She is back for a viscosupplementation injection in the knee. She was also seen about her left shoulder osteoarthritis and loose bodies last time.   Present symptoms: unchanged    Review of Systems:  Constitutional/General: Negative for fever, chills, change in weight  Integumentary/Skin: Negative for worrisome rashes, moles, or lesions  Neuro: Negative for weakness, dizziness, or paresthesias   Psychiatric: negative for changes in mood or affect   This document serves as a record of the services and decisions personally performed and made by PREETI Vo MD. It was created on his behalf by Jessica Melgar, a trained medical scribe. The creation of this document is based the provider's statements to the medical scribe.    Scribe Jessica Melgar 9:48 AM 11/5/2019       PHYSICAL EXAM:  /73 (BP Location: Left arm, Patient Position: Sitting, Cuff Size: Adult Regular)   Pulse 81   Ht 1.778 m (5' 10\")   Wt 90.3 kg (199 lb)   SpO2 100%   BMI 28.55 kg/m    Body mass index is 28.55 kg/m .   GENERAL APPEARANCE: healthy, alert and no distress   SKIN: no suspicious lesions or rashes  NEURO: Normal strength and tone, mentation intact and speech normal  VASCULAR: good pulses, and cappillary refill   LYMPH: no lymphadenopathy   PSYCH:  mentation appears normal and affect normal/bright    Impression:   Osteoarthritis of the left knee    Plan: She will proceed with the viscosupplementation injection.    Procedure Note:   Informed consent obtained. Risks, benefits and complications of the injection were discussed with the patient and the patient elected to proceed. Left knee injected intra-articularly with Gel One and 4cc of local anesthetic after sterile prep. This was tolerated well by the patient.     Return to clinic AS NEEDED    The " information in this document, created by a scribe for me, accurately reflects the services I personally performed and the decisions made by me. I have reviewed and approved this document for accuracy.     PREETI Vo MD  Dept. Orthopedic Surgery  [facility]

## 2019-11-05 ENCOUNTER — OFFICE VISIT (OUTPATIENT)
Dept: ORTHOPEDICS | Facility: CLINIC | Age: 54
End: 2019-11-05
Payer: MEDICARE

## 2019-11-05 VITALS
WEIGHT: 199 LBS | DIASTOLIC BLOOD PRESSURE: 73 MMHG | OXYGEN SATURATION: 100 % | SYSTOLIC BLOOD PRESSURE: 113 MMHG | BODY MASS INDEX: 28.49 KG/M2 | HEART RATE: 81 BPM | HEIGHT: 70 IN

## 2019-11-05 DIAGNOSIS — M17.12 OSTEOARTHRITIS OF LEFT KNEE, UNSPECIFIED OSTEOARTHRITIS TYPE: Primary | ICD-10-CM

## 2019-11-05 PROCEDURE — 20610 DRAIN/INJ JOINT/BURSA W/O US: CPT | Mod: LT | Performed by: ORTHOPAEDIC SURGERY

## 2019-11-05 RX ORDER — LIDOCAINE HYDROCHLORIDE 10 MG/ML
6 INJECTION, SOLUTION EPIDURAL; INFILTRATION; INTRACAUDAL; PERINEURAL
Status: DISCONTINUED | OUTPATIENT
Start: 2019-11-05 | End: 2022-04-29

## 2019-11-05 RX ADMIN — LIDOCAINE HYDROCHLORIDE 6 ML: 10 INJECTION, SOLUTION EPIDURAL; INFILTRATION; INTRACAUDAL; PERINEURAL at 09:44

## 2019-11-05 ASSESSMENT — MIFFLIN-ST. JEOR: SCORE: 1572.91

## 2019-11-05 NOTE — LETTER
"    11/5/2019         RE: Kylie Austin  [address] [facility] [id number]        Dear Colleague,    Thank you for referring your patient, Kylie Austin, to the [facility]. Please see a copy of my visit note below.    HISTORY OF PRESENT ILLNESS:  Kylie Austin is a 54 year old transgender patient who identifies as female,  who is seen for follow up for her knee osteoarthritis. She is back for a viscosupplementation injection in the knee. She was also seen about her left shoulder osteoarthritis and loose bodies last time.   Present symptoms: unchanged    Review of Systems:  Constitutional/General: Negative for fever, chills, change in weight  Integumentary/Skin: Negative for worrisome rashes, moles, or lesions  Neuro: Negative for weakness, dizziness, or paresthesias   Psychiatric: negative for changes in mood or affect   This document serves as a record of the services and decisions personally performed and made by PREETI Vo MD. It was created on his behalf by Jessica Melgar, a trained medical scribe. The creation of this document is based the provider's statements to the medical scribe.    Scribe Jessica Melgar 9:48 AM 11/5/2019       PHYSICAL EXAM:  /73 (BP Location: Left arm, Patient Position: Sitting, Cuff Size: Adult Regular)   Pulse 81   Ht 1.778 m (5' 10\")   Wt 90.3 kg (199 lb)   SpO2 100%   BMI 28.55 kg/m     Body mass index is 28.55 kg/m .   GENERAL APPEARANCE: healthy, alert and no distress   SKIN: no suspicious lesions or rashes  NEURO: Normal strength and tone, mentation intact and speech normal  VASCULAR: good pulses, and cappillary refill   LYMPH: no lymphadenopathy   PSYCH:  mentation appears normal and affect normal/bright    Impression:   Osteoarthritis of the left knee    Plan: She will proceed with the viscosupplementation injection.    Procedure Note:   Informed consent obtained. Risks, benefits and complications " of the injection were discussed with the patient and the patient elected to proceed. Left knee injected intra-articularly with Gel One and 4cc of local anesthetic after sterile prep. This was tolerated well by the patient.     Return to clinic AS NEEDED    The information in this document, created by a scribe for me, accurately reflects the services I personally performed and the decisions made by me. I have reviewed and approved this document for accuracy.     PREETI Vo MD  Dept. Orthopedic Surgery  [facility]         Large Joint Injection/Arthocentesis: L knee joint  Date/Time: 11/5/2019 9:44 AM  Performed by: Dario Vo MD  Authorized by: Dario Vo MD     Indications:  Pain and osteoarthritis  Needle Size:  22 G  Guidance: landmark guided    Approach:  Anterolateral  Location:  Knee      Medications:  30 mg cross-Linked Hyaluronate 30 MG/3ML; 6 mL lidocaine (PF) 1 %  Outcome:  Tolerated well, no immediate complications  Procedure discussed: discussed risks, benefits, and alternatives    Consent Given by:  Patient  Timeout: timeout called immediately prior to procedure    Prep: patient was prepped and draped in usual sterile fashion            Again, thank you for allowing me to participate in the care of your patient.        Sincerely,        Dario Vo MD

## 2019-11-05 NOTE — PATIENT INSTRUCTIONS
Gel-One is a sterile, transparent and viscoelastic gel composed of 30 mg of cross-linked hyaluronate hydrogel, a derivative of sodium hyaluronate. It is indicated for the treatment of pain in osteoarthritis of the knee in patients who have failed to respond adequately to non-pharmacologic therapy, non-steroidal anti-inflammatory drugs (NSAIDs) or analgesics. In knees with osteoarthritis, the joint fluid can break down and not provide the cushioning required by the knee joint. Hydrogel supplements the knee joint with viscoelastic fluid to relieve pain and improve the knee joint's natural shock-absorbing abilities. Hydrogel is injected directly into the cavity of the knee joint. It is delivered via a single-use, pre-filled disposable syringe containing three mL (30mg) of Hydrogel.

## 2019-11-05 NOTE — PROGRESS NOTES
Large Joint Injection/Arthocentesis: L knee joint  Date/Time: 11/5/2019 9:44 AM  Performed by: Dario Vo MD  Authorized by: Dario Vo MD     Indications:  Pain and osteoarthritis  Needle Size:  22 G  Guidance: landmark guided    Approach:  Anterolateral  Location:  Knee      Medications:  30 mg cross-Linked Hyaluronate 30 MG/3ML; 6 mL lidocaine (PF) 1 %  Outcome:  Tolerated well, no immediate complications  Procedure discussed: discussed risks, benefits, and alternatives    Consent Given by:  Patient  Timeout: timeout called immediately prior to procedure    Prep: patient was prepped and draped in usual sterile fashion

## 2019-11-22 DIAGNOSIS — M25.512 LEFT SHOULDER PAIN, UNSPECIFIED CHRONICITY: Primary | ICD-10-CM

## 2019-11-24 ASSESSMENT — ENCOUNTER SYMPTOMS
SPEECH CHANGE: 0
MUSCLE CRAMPS: 0
PARALYSIS: 0
SEIZURES: 0
NECK PAIN: 1
HEADACHES: 0
ARTHRALGIAS: 1
DIZZINESS: 0
WEAKNESS: 0
DEPRESSION: 0
TREMORS: 1
TINGLING: 1
MYALGIAS: 1
DISTURBANCES IN COORDINATION: 0
INSOMNIA: 1
JOINT SWELLING: 1
PANIC: 0
MEMORY LOSS: 0
NERVOUS/ANXIOUS: 0
DECREASED CONCENTRATION: 0
STIFFNESS: 1
MUSCLE WEAKNESS: 0
NUMBNESS: 1
LOSS OF CONSCIOUSNESS: 0
BACK PAIN: 1

## 2019-11-25 ENCOUNTER — PREP FOR PROCEDURE (OUTPATIENT)
Dept: ORTHOPEDICS | Facility: CLINIC | Age: 54
End: 2019-11-25

## 2019-11-25 ENCOUNTER — ANCILLARY PROCEDURE (OUTPATIENT)
Dept: GENERAL RADIOLOGY | Facility: CLINIC | Age: 54
End: 2019-11-25
Attending: ORTHOPAEDIC SURGERY
Payer: COMMERCIAL

## 2019-11-25 ENCOUNTER — OFFICE VISIT (OUTPATIENT)
Dept: ORTHOPEDICS | Facility: CLINIC | Age: 54
End: 2019-11-25
Payer: COMMERCIAL

## 2019-11-25 VITALS — BODY MASS INDEX: 28.49 KG/M2 | WEIGHT: 199 LBS | HEIGHT: 70 IN

## 2019-11-25 DIAGNOSIS — M19.012 GLENOHUMERAL ARTHRITIS, LEFT: Primary | ICD-10-CM

## 2019-11-25 DIAGNOSIS — M25.512 LEFT SHOULDER PAIN, UNSPECIFIED CHRONICITY: ICD-10-CM

## 2019-11-25 DIAGNOSIS — M19.012 OSTEOARTHRITIS OF GLENOHUMERAL JOINT, LEFT: Primary | ICD-10-CM

## 2019-11-25 ASSESSMENT — MIFFLIN-ST. JEOR: SCORE: 1572.91

## 2019-11-25 NOTE — NURSING NOTE
"Reason For Visit:   Chief Complaint   Patient presents with     Consult     loose body in left shoulder.        PCP: Kristin Miner      ?  No  Occupation Not working.    Date of injury: Over the years  Type of injury: Many injuries to left shoulder. .  Date of surgery: No  Smoker: No    Ambedexterous hand dominant     SANE score  Affected shoulder: Left   Right shoulder SANE: 85  Left shoulder SANE: 55    Dynamometer  Forward Elevation:  R = 22 pounds,  L = 7 pounds  External Rotation:   R = 17 pounds,  L = 10 pounds    Ht 1.778 m (5' 10\")   Wt 90.3 kg (199 lb)   BMI 28.55 kg/m        Pain Assessment  Patient Currently in Pain: Yes  0-10 Pain Scale: 4  Primary Pain Location: Shoulder  Pain Descriptors: Discomfort, Aching, Dull      Vanita Beebe LPN        "

## 2019-11-25 NOTE — NURSING NOTE
Teaching Flowsheet   Relevant Diagnosis: Glenohumeral arthritis  Teaching Topic: Pre-op for left total shoulder arthroplasty, distal clavicle excision - surgery coordinator will call to schedule    Dr Tobin will review left shoulder MRI before surgery     Person(s) involved in teaching:   Patient  Partner     Motivation Level:  Asks Questions: Yes  Eager to Learn: Yes  Cooperative: Yes  Receptive (willing/able to accept information): Yes  Any cultural factors/Nondenominational beliefs that may influence understanding or compliance? No     Patient demonstrates understanding of the following:  Reason for the appointment, diagnosis and treatment plan: Yes  Knowledge of proper use of medications and conditions for which they are ordered (with special attention to potential side effects or drug interactions): Yes  Which situations necessitate calling provider and whom to contact: Yes     Teaching Concerns Addressed:   Pre-op physical with PMD at [employer]  Partner will provide transportation and assistance with cares after surgery  Aware of post-op limitations     Proper use and care of sling x 6 weeks (medical equip, care aids, etc.): Yes  Nutritional needs and diet plan: Yes  Pain management techniques: Yes  Wound Care: Yes  How and/when to access community resources: Yes     Instructional Materials Used/Given: Pre-op packet, surgical soap, written guidelines for care after shoulder replacement

## 2019-11-25 NOTE — PROGRESS NOTES
I saw the patient with the resident.  I agree with the resident note and plan of care.      Omari Tobin MD    CHIEF CONCERN: left shoulder arthritis    HISTORY:   Anni is a ambidextrous 54-year-old female with medical history significant for bipolar disorder, depression, male to female gender reassignment surgery who presents for evaluation of left shoulder pain.  Her pain has been ongoing for about 14 years and has gotten progressively worse over time.  The pain varies from a sensation of burning to stabbing to dull, most recently it is primarily been a dull ache.  She has nighttime pain that wakes her from sleeping.  She feels her shoulder sublux when she is sleeping.  She has been followed by pain specialist since 2014 for multiple joint pains.  She reports that she did well with halogen injections into the shoulder until she was no longer able to access them, and has since been getting steroid injections.  Her last injection was September 2019 and she still feels that it is benefiting her.  Typically she gets 3 to 4 months of partial relief.  That being said, her pain is bothersome enough that she is interested in discussing surgical options.    PAST MEDICAL HISTORY: (Reviewed with the patient and in the EPIC medical record)  Reviewed with patient and in the medical record.  Past Medical History:   Diagnosis Date     Anxiety 2005    Brought on by Amioderone     Arthritis 8/2005     Atrial fib/flutter, transient     S/p cardioversion 2004     Bipolar 2 disorder (H)      Bleeding disorder (H) 1987    nose bleeds     Chronic pain     LUE pain     Congestive heart failure (H) 11/18/2004    cured 2005     Depressive disorder     on and off most of my life!     Fibromyalgia      Gender identity disorder Started Rx 2012     H/O hypogonadism Gonadectomy 2013     Hyperlipidemia      Hypertension      Other mental problems     bipolar     Psoriasis      Uncomplicated asthma 1964    cured in 4/2001      Vision disorder 6/2005         PAST SURGICAL HISTORY: (Reviewed with the patient and in the EPIC medical record)  No history of adverse reactions to anesthesia.  No history of DVT/PE  Past Surgical History:   Procedure Laterality Date     BIOPSY  2005, 8/8/13    Stomach, colon     COLONOSCOPY  8/8/2013    Procedure: COLONOSCOPY;  COLONSCOPY SCREEN/ SE;  Surgeon: Santino Sun MD;  Location: [facility] OR     COLONOSCOPY WITH CO2 INSUFFLATION N/A 11/1/2018    Procedure: COLONOSCOPY WITH CO2 INSUFFLATION;  Surgeon: Santino Sun MD;  Location: [facility] OR     Gender Reassignment  05/2016     HEAD & NECK SURGERY  2015    ablation of nerve from neck to left arm     LAPAROSCOPIC GASTRIC SLEEVE N/A 4/10/2018    Procedure: LAPAROSCOPIC GASTRIC SLEEVE;  Laparoscopic Sleeve Gastrectomy;  Surgeon: Jani Shah MD;  Location: [facility] OR     MANDIBLE SURGERY  1986     ORCHIECTOMY INGUINAL BILATERAL  7/16/2013    Procedure: ORCHIECTOMY INGUINAL BILATERAL;  Bilateral Simple Inguinal Orchiectomy ;  Surgeon: Jan Garrett MD;  Location: [facility] OR     TONSILLECTOMY         MEDICATIONS: (Reviewed with the patient and in the EPIC medical record)  Current Outpatient Medications   Medication     ARIPiprazole (ABILIFY) 20 MG tablet     diclofenac (VOLTAREN) 1 % topical gel     estradiol (ESTRACE) 2 MG tablet     furosemide (LASIX) 20 MG tablet     medical cannabis (Patient's own supply.  Not a prescription)     medical cannabis inhalation (Patient's own supply.  Not a prescription)     simvastatin (ZOCOR) 40 MG tablet     topiramate (TOPAMAX) 200 MG tablet     traZODone (DESYREL) 100 MG tablet     venlafaxine (EFFEXOR-XR) 75 MG 24 hr capsule     Current Facility-Administered Medications   Medication     cross-Linked Hyaluronate (GEL-ONE) injection PRSY 30 mg     lidocaine (PF) (XYLOCAINE) 1 % injection 6 mL     ALLERGIES: (Reviewed with the patient and in the EPIC medical record)     Allergies   Allergen Reactions      Amiodarone      Caused pain fatigue/amplified anxiety and depression     Fluoxetine Other (See Comments)     Night terrors     Tetracycline      Teeth rattle/saliva acidic     SOCIAL HISTORY: (Reviewed with the patient and in the medical record)  Patient is retired.  She enjoys playing video games.  She does the housework and also enjoys doing mechanical work on her cars when able.  She does not use tobacco.  She rarely drinks alcohol.  She does use medical cannabis for pain control.  This is managed to the pain clinic.    FAMILY HISTORY: (Reviewed with the patient and in the medical record)  -- No family history of bleeding, clotting, or difficulty with anesthesia  Family History   Problem Relation Age of Onset     Cancer Father         skin     Diabetes Father         Was on the patch when they were new     Heart Disease Father 55        scd     Coronary Artery Disease Father      Hypertension Father      Hyperlipidemia Father      Heart Disease Other      Alzheimer Disease Paternal Grandmother      Diabetes Paternal Grandmother      Mental Illness Paternal Grandmother         alshimers     Osteoporosis Paternal Grandmother      Diabetes Sister      Diabetes Sister      Coronary Artery Disease Paternal Grandfather      Hypertension Paternal Grandfather      Hyperlipidemia Paternal Grandfather      Prostate Cancer Paternal Grandfather      Other Cancer Paternal Grandfather      Breast Cancer Mother      Depression Daughter      Unknown/Adopted Daughter      Depression Daughter      Mental Illness Daughter         bipolar     Depression Son      Anxiety Disorder Daughter      Anxiety Disorder Daughter      Osteoporosis Maternal Grandmother      Thyroid Disease Sister        REVIEW OF SYSTEMS: (Reviewed with the patient and on the health intake form)  -- A comprehensive 10 point review of systems was conducted and is negative except as noted in the HPI and on intake form below.    EXAM:     General: Awake, Alert and  Oriented, No acute Distress. Articulate and Interactive.    Body mass index is 28.55 kg/m .      Skin is Warm and Well perfused, no suggestion of infection, no rashes no lesion.  Normal hair pattern.    2+ radial pulse    Sensation intact light touch in the median, radial, ulnar, axillary, musculocutaneous nerve    5 out of 5 EPL, FPL, intrinsics.    Pain with palpation of AC joint and biceps tendon on the left.  No tenderness to the spots on the right.    Left shoulder range of motion 170 degrees active forward elevation, 100 degrees active abduction but 140 degrees passively.  With 45 degrees external rotation with no additional motion passively.  Internal rotation to L4.    Right shoulder range of motion 180 degrees active forward elevation, 180 degrees active abduction, external rotation 80 degrees, internal rotation to T10.    4/5 strength with empty can test, 4-5 resisted external rotation.  5/5 bearhug.  Normal liftoff test.  On the right patient has 5 out of 5 strength empty can test, resisted external rotation, bearhug.    Abnormal speeds and scott's test    IMAGING:   Plain Radiographs: X-ray of the left shoulder obtained today, 11/25/2019, as well as 10/23/2019 shows loss of glenohumeral joint space with end-stage osteoarthritis.  Circumferential osteophytes of the humeral head.  Multiple loose bodies medial to the coracoid.    ASSESSMENT:  1. End stage osteoarthritis left shoulder    PLAN:  Discussed definitive operative management including anatomic shoulder replacement and reverse shoulder arthroplasty.  We discussed that based on imaging we have a high suspicion that she is a good candidate for anatomic total shoulder arthroplasty, but we would want to confirm status of her rotator cuff with MRI prior to undergoing surgery.  We discussed risks and benefits of surgery including infection, postoperative stiffness, no significant change in pain as well as anticipated rehab and postoperative lifting  restrictions.  The patient would like to proceed with surgical planning.  We will set a surgical date and schedule her MRI as well.  Dr. Sidhu will review the MRI the patient will be contacted to discuss the results.  In addition to the shoulder arthroplasty she will also benefit from a distal clavicle excision to alleviate her AC joint pain.    This patient was seen and discussed with Dr. Tobin who is in agreement with this plan    Nichole Cagle, PGY5    Answers for HPI/ROS submitted by the patient on 11/24/2019   General Symptoms: No  Skin Symptoms: No  HENT Symptoms: No  EYE SYMPTOMS: No  HEART SYMPTOMS: No  LUNG SYMPTOMS: No  INTESTINAL SYMPTOMS: No  URINARY SYMPTOMS: No  GYNECOLOGIC SYMPTOMS: No  BREAST SYMPTOMS: No  SKELETAL SYMPTOMS: Yes  BLOOD SYMPTOMS: No  NERVOUS SYSTEM SYMPTOMS: Yes  MENTAL HEALTH SYMPTOMS: Yes  Back pain: Yes  Muscle aches: Yes  Neck pain: Yes  Swollen joints: Yes  Joint pain: Yes  Bone pain: No  Muscle cramps: No  Muscle weakness: No  Joint stiffness: Yes  Bone fracture: No  Trouble with coordination: No  Dizziness or trouble with balance: No  Fainting or black-out spells: No  Memory loss: No  Headache: No  Seizures: No  Speech problems: No  Tingling: Yes  Tremor: Yes  Weakness: No  Difficulty walking: No  Paralysis: No  Numbness: Yes  Nervous or Anxious: No  Depression: No  Trouble sleeping: Yes  Trouble thinking or concentrating: No  Mood changes: No  Panic attacks: No

## 2019-11-25 NOTE — LETTER
11/25/2019       RE: Kylie Austin  [address] [facility] [address]     Dear Colleague,    Thank you for referring your patient, Kylie Austin, to the [facility] at [facility]. Please see a copy of my visit note below.    I saw the patient with the resident.  I agree with the resident note and plan of care.      Omari Tobin MD    CHIEF CONCERN: left shoulder arthritis    HISTORY:   Anni is a ambidextrous 54-year-old female with medical history significant for bipolar disorder, depression, male to female gender reassignment surgery who presents for evaluation of left shoulder pain.  Her pain has been ongoing for about 14 years and has gotten progressively worse over time.  The pain varies from a sensation of burning to stabbing to dull, most recently it is primarily been a dull ache.  She has nighttime pain that wakes her from sleeping.  She feels her shoulder sublux when she is sleeping.  She has been followed by pain specialist since 2014 for multiple joint pains.  She reports that she did well with halogen injections into the shoulder until she was no longer able to access them, and has since been getting steroid injections.  Her last injection was September 2019 and she still feels that it is benefiting her.  Typically she gets 3 to 4 months of partial relief.  That being said, her pain is bothersome enough that she is interested in discussing surgical options.    PAST MEDICAL HISTORY: (Reviewed with the patient and in the [facility] medical record)  Reviewed with patient and in the medical record.  Past Medical History:   Diagnosis Date     Anxiety 2005    Brought on by Amioderone     Arthritis 8/2005     Atrial fib/flutter, transient     S/p cardioversion 2004     Bipolar 2 disorder (H)      Bleeding disorder (H) 1987    nose bleeds     Chronic pain     LUE pain     Congestive heart failure (H) 11/18/2004     cured 2005     Depressive disorder     on and off most of my life!     Fibromyalgia      Gender identity disorder Started Rx 2012     H/O hypogonadism Gonadectomy 2013     Hyperlipidemia      Hypertension      Other mental problems     bipolar     Psoriasis      Uncomplicated asthma 1964    cured in 4/2001     Vision disorder 6/2005         PAST SURGICAL HISTORY: (Reviewed with the patient and in the [facility] medical record)  No history of adverse reactions to anesthesia.  No history of DVT/PE  Past Surgical History:   Procedure Laterality Date     BIOPSY  2005, 8/8/13    Stomach, colon     COLONOSCOPY  8/8/2013    Procedure: COLONOSCOPY;  COLONSCOPY SCREEN/ SE;  Surgeon: Santino Sun MD;  Location: [facility] OR     COLONOSCOPY WITH CO2 INSUFFLATION N/A 11/1/2018    Procedure: COLONOSCOPY WITH CO2 INSUFFLATION;  Surgeon: Santino Sun MD;  Location: [facility] OR     Gender Reassignment  05/2016     HEAD & NECK SURGERY  2015    ablation of nerve from neck to left arm     LAPAROSCOPIC GASTRIC SLEEVE N/A 4/10/2018    Procedure: LAPAROSCOPIC GASTRIC SLEEVE;  Laparoscopic Sleeve Gastrectomy;  Surgeon: Jani Shah MD;  Location: [facility] OR     MANDIBLE SURGERY  1986     ORCHIECTOMY INGUINAL BILATERAL  7/16/2013    Procedure: ORCHIECTOMY INGUINAL BILATERAL;  Bilateral Simple Inguinal Orchiectomy ;  Surgeon: Jan Garrett MD;  Location: [facility] OR     TONSILLECTOMY         MEDICATIONS: (Reviewed with the patient and in the [facility] medical record)  Current Outpatient Medications   Medication     ARIPiprazole (ABILIFY) 20 MG tablet     diclofenac (VOLTAREN) 1 % topical gel     estradiol (ESTRACE) 2 MG tablet     furosemide (LASIX) 20 MG tablet     medical cannabis (Patient's own supply.  Not a prescription)     medical cannabis inhalation (Patient's own supply.  Not a prescription)     simvastatin (ZOCOR) 40 MG tablet     topiramate (TOPAMAX) 200 MG tablet     traZODone (DESYREL) 100 MG tablet     venlafaxine  (EFFEXOR-XR) 75 MG 24 hr capsule     Current Facility-Administered Medications   Medication     cross-Linked Hyaluronate (GEL-ONE) injection PRSY 30 mg     lidocaine (PF) (XYLOCAINE) 1 % injection 6 mL     ALLERGIES: (Reviewed with the patient and in the [facility] medical record)     Allergies   Allergen Reactions     Amiodarone      Caused pain fatigue/amplified anxiety and depression     Fluoxetine Other (See Comments)     Night terrors     Tetracycline      Teeth rattle/saliva acidic     SOCIAL HISTORY: (Reviewed with the patient and in the medical record)  Patient is retired.  She enjoys playing video games.  She does the housework and also enjoys doing mechanical work on her cars when able.  She does not use tobacco.  She rarely drinks alcohol.  She does use medical cannabis for pain control.  This is managed to the pain clinic.    FAMILY HISTORY: (Reviewed with the patient and in the medical record)  -- No family history of bleeding, clotting, or difficulty with anesthesia  Family History   Problem Relation Age of Onset     Cancer Father         skin     Diabetes Father         Was on the patch when they were new     Heart Disease Father 55        scd     Coronary Artery Disease Father      Hypertension Father      Hyperlipidemia Father      Heart Disease Other      Alzheimer Disease Paternal Grandmother      Diabetes Paternal Grandmother      Mental Illness Paternal Grandmother         alshimers     Osteoporosis Paternal Grandmother      Diabetes Sister      Diabetes Sister      Coronary Artery Disease Paternal Grandfather      Hypertension Paternal Grandfather      Hyperlipidemia Paternal Grandfather      Prostate Cancer Paternal Grandfather      Other Cancer Paternal Grandfather      Breast Cancer Mother      Depression Daughter      Unknown/Adopted Daughter      Depression Daughter      Mental Illness Daughter         bipolar     Depression Son      Anxiety Disorder Daughter      Anxiety Disorder Daughter       Osteoporosis Maternal Grandmother      Thyroid Disease Sister        REVIEW OF SYSTEMS: (Reviewed with the patient and on the health intake form)  -- A comprehensive 10 point review of systems was conducted and is negative except as noted in the HPI and on intake form below.    EXAM:     General: Awake, Alert and Oriented, No acute Distress. Articulate and Interactive.    Body mass index is 28.55 kg/m .      Skin is Warm and Well perfused, no suggestion of infection, no rashes no lesion.  Normal hair pattern.    2+ radial pulse    Sensation intact light touch in the median, radial, ulnar, axillary, musculocutaneous nerve    5 out of 5 EPL, FPL, intrinsics.    Pain with palpation of AC joint and biceps tendon on the left.  No tenderness to the spots on the right.    Left shoulder range of motion 170 degrees active forward elevation, 100 degrees active abduction but 140 degrees passively.  With 45 degrees external rotation with no additional motion passively.  Internal rotation to L4.    Right shoulder range of motion 180 degrees active forward elevation, 180 degrees active abduction, external rotation 80 degrees, internal rotation to T10.    4/5 strength with empty can test, 4-5 resisted external rotation.  5/5 bearhug.  Normal liftoff test.  On the right patient has 5 out of 5 strength empty can test, resisted external rotation, bearhug.    Abnormal speeds and scott's test    IMAGING:   Plain Radiographs: X-ray of the left shoulder obtained today, 11/25/2019, as well as 10/23/2019 shows loss of glenohumeral joint space with end-stage osteoarthritis.  Circumferential osteophytes of the humeral head.  Multiple loose bodies medial to the coracoid.    ASSESSMENT:  1. End stage osteoarthritis left shoulder    PLAN:  Discussed definitive operative management including anatomic shoulder replacement and reverse shoulder arthroplasty.  We discussed that based on imaging we have a high suspicion that she is a good candidate  for anatomic total shoulder arthroplasty, but we would want to confirm status of her rotator cuff with MRI prior to undergoing surgery.  We discussed risks and benefits of surgery including infection, postoperative stiffness, no significant change in pain as well as anticipated rehab and postoperative lifting restrictions.  The patient would like to proceed with surgical planning.  We will set a surgical date and schedule her MRI as well.  Dr. Sidhu will review the MRI the patient will be contacted to discuss the results.  In addition to the shoulder arthroplasty she will also benefit from a distal clavicle excision to alleviate her AC joint pain.    This patient was seen and discussed with Dr. Tobin who is in agreement with this plan    Nicohle Cagle, PGY5    Again, thank you for allowing me to participate in the care of your patient.      Sincerely,    Omari Tobin MD

## 2019-11-26 ENCOUNTER — TELEPHONE (OUTPATIENT)
Dept: PALLIATIVE MEDICINE | Facility: CLINIC | Age: 54
End: 2019-11-26

## 2019-11-26 ENCOUNTER — TELEPHONE (OUTPATIENT)
Dept: ORTHOPEDICS | Facility: CLINIC | Age: 54
End: 2019-11-26

## 2019-11-26 NOTE — TELEPHONE ENCOUNTER
Patient is being seen by me in clinic.    Lanie Fowler MD  [facility]

## 2019-11-26 NOTE — TELEPHONE ENCOUNTER
Attempted to reach out to patient to discuss scheduling surgery with Dr. Tobin. Left detailed message that Dr. Tobin dose surgery on Wednesdays with the first available being 1/22/20. Left best call back number of [phone]

## 2019-11-27 ENCOUNTER — ANCILLARY PROCEDURE (OUTPATIENT)
Dept: MRI IMAGING | Facility: CLINIC | Age: 54
End: 2019-11-27
Attending: ORTHOPAEDIC SURGERY
Payer: MEDICARE

## 2019-11-27 DIAGNOSIS — M19.012 OSTEOARTHRITIS OF GLENOHUMERAL JOINT, LEFT: ICD-10-CM

## 2019-11-27 PROCEDURE — 73221 MRI JOINT UPR EXTREM W/O DYE: CPT | Mod: TC

## 2019-11-27 NOTE — TELEPHONE ENCOUNTER
Patient is scheduled for surgery with Dr. Tobin      Spoke or left message with: Spoke with Kylie    Date of Surgery: 1/22/20    Location: [facility]    Informed patient they will need an adult  Yes    H&P: Patient to schedule with PCP    Additional imaging/appointments: N/A    Surgery packet: Given in clinic    Additional comments: Patient will await pre op phone call 1-2 days prior to surgery for arrival time

## 2019-12-02 ENCOUNTER — OFFICE VISIT (OUTPATIENT)
Dept: ENDOCRINOLOGY | Facility: CLINIC | Age: 54
End: 2019-12-02
Payer: COMMERCIAL

## 2019-12-02 VITALS
OXYGEN SATURATION: 97 % | TEMPERATURE: 97.4 F | WEIGHT: 198.2 LBS | SYSTOLIC BLOOD PRESSURE: 106 MMHG | BODY MASS INDEX: 28.37 KG/M2 | HEART RATE: 75 BPM | HEIGHT: 70 IN | DIASTOLIC BLOOD PRESSURE: 66 MMHG

## 2019-12-02 DIAGNOSIS — M79.7 FIBROMYALGIA: ICD-10-CM

## 2019-12-02 DIAGNOSIS — G89.4 CHRONIC PAIN DISORDER: ICD-10-CM

## 2019-12-02 RX ORDER — TOPIRAMATE 100 MG/1
100 TABLET, FILM COATED ORAL 2 TIMES DAILY
Qty: 60 TABLET | Refills: 5 | Status: SHIPPED | OUTPATIENT
Start: 2019-12-02 | End: 2020-04-06

## 2019-12-02 ASSESSMENT — MIFFLIN-ST. JEOR: SCORE: 1569.28

## 2019-12-02 ASSESSMENT — PAIN SCALES - GENERAL: PAINLEVEL: NO PAIN (0)

## 2019-12-02 NOTE — NURSING NOTE
"(   Chief Complaint   Patient presents with     RECHECK     3-4 month weight management follow up.    )    ( Weight: 89.9 kg (198 lb 3.2 oz) )  ( Height: 177.8 cm (5' 10\") )  ( BMI (Calculated): 28.44 )  ( Initial Weight: 121.6 kg (268 lb) )  ( Cumulative weight loss (lbs): 69.8 )  ( Last Visits Weight: 86.2 kg (190 lb) )  ( Wt change since last visit (lbs): 8.2 )  (   )  (   )    ( BP: 106/66 )  (   )  ( Temp: 97.4  F (36.3  C) )  ( Temp src: Oral )  ( Pulse: 75 )  (   )  ( SpO2: 97 % )    (   Patient Active Problem List   Diagnosis     Gender identity disorder     Hyperlipidemia LDL goal <130     Abnormal results of liver function studies     Chronic pain disorder     Insomnia     Bipolar 2 disorder (H)     Morbid obesity (H)     H/O hypogonadism     Fibromyalgia     Glenohumeral arthritis     Vitamin D deficiency     Primary osteoarthritis of left shoulder     Anxiety     Cervical spondylosis without myelopathy     Chronic fatigue     S/P laparoscopic sleeve gastrectomy     Glenohumeral arthritis, left    )  (   Current Outpatient Medications   Medication Sig Dispense Refill     ARIPiprazole (ABILIFY) 20 MG tablet Take 1 tablet (20 mg) by mouth every morning       diclofenac (VOLTAREN) 1 % topical gel Place 2-4 g onto the skin 4 times daily Use 2g on elbow and 4g on knee 100 g 3     estradiol (ESTRACE) 2 MG tablet 1 tablet daily 90 tablet 3     furosemide (LASIX) 20 MG tablet Take 1 tablet (20 mg) by mouth every morning 90 tablet 3     medical cannabis (Patient's own supply.  Not a prescription) 2 capsules 2 times daily (This is NOT a prescription, and does not certify that the patient has a qualifying medical condition for medical cannabis.  The purpose of this order is  to document that the patient reports taking medical cannabis.)    Morning and afternoon        medical cannabis inhalation (Patient's own supply.  Not a prescription) Inhale into the lungs 2 times daily as needed (This is NOT a prescription, and " does not certify that the patient has a qualifying medical condition for medical cannabis.  The purpose of this order is  to document that the patient reports taking medical cannabis.)    MORNING AND AT NIGHT        simvastatin (ZOCOR) 40 MG tablet Take 1 tablet (40 mg) by mouth At Bedtime 90 tablet 3     topiramate (TOPAMAX) 200 MG tablet Take 1 tablet (200 mg) by mouth 2 times daily 180 tablet 3     traZODone (DESYREL) 100 MG tablet Take 1 tablet (100 mg) by mouth nightly as needed for sleep (Patient taking differently: Take 100 mg by mouth At Bedtime ) 30 tablet 1     venlafaxine (EFFEXOR-XR) 75 MG 24 hr capsule Take 3 capsules (225 mg) by mouth daily      )  ( Diabetes Eval:    )    ( Pain Eval:  No Pain (0) )    ( Wound Eval:       )    (   History   Smoking Status     Never Smoker   Smokeless Tobacco     Never Used     Comment: NEVER SMOKED! Grew up with heavy smokers which did not help my Asthma!    )    ( Signed By:  Lashay Silver CMA; December 2, 2019; 10:28 AM )

## 2019-12-02 NOTE — PROGRESS NOTES
"    Return Medical Weight Management Note     Kylie Austin  MRN:  [id number]  :  1965  BLANCA:  2019    Dear Kristin Miner,    I had the pleasure of seeing your patient Kylie Austin.  She is a 54 year old female who I am continuing to see for treatment of obesity related to:       2018   I have the following co-morbidities associated with obesity: High Cholesterol, Fatty Liver, Weight Bearing Joint Pain       INTERVAL HISTORY:  Had bariatric surgery (gastric sleeve) 2018. Was on naltrexone, but after bariatric surgery, she no longer felt like she needed the support from naltrexone.     Started Keto Diet 2-3 weeks ago. Tolerating it well. No longer constipated or having significant flatulence. No hypoglycemic symptoms.     She is currently taking Topiramate 200mg only in AM. Thinks it is helping. Occasionally at night, has some cravings, beef jerky or sardines. Finds herself waking up at midnight most days of the week. Is on lasix for fluid retention. Takes a nap from 1-3pm. Sleep hygiene: plays on phone, watch TV and plays video games at night before bed.     Exercise: not exercising a lot. Having shoulder surgery in 2020 (bad osteoarthritis). Shovels and snow-blowing.     CURRENT WEIGHT:   198 lbs 3.2 oz    Wt Readings from Last 4 Encounters:   19 89.9 kg (198 lb 3.2 oz)   19 90.3 kg (199 lb)   19 90.3 kg (199 lb)   10/23/19 90.3 kg (199 lb)       Height:  5' 10\"  Body Mass Index:  Body mass index is 28.44 kg/m .  Vitals:  B/P: 106/66, P: 75    Initial consult weight was 265 lbs on 2018.  Weight change since last seen on visit date not found is down 1 pound.   Total loss is 67 pounds.    Diet and Activity Changes Since Last Visit Reviewed With Patient 2019   I have made the following changes to my diet since my last visit: diet   With regards to my diet, I am still struggling with: weight gain   I have made the following changes to " my activity/exercise since my last visit: still can not exercise much   With regards to my activity/exercise, I am still struggling with: arthritis limits my exercise       MEDICATIONS:   Current Outpatient Medications   Medication     ARIPiprazole (ABILIFY) 20 MG tablet     diclofenac (VOLTAREN) 1 % topical gel     estradiol (ESTRACE) 2 MG tablet     furosemide (LASIX) 20 MG tablet     medical cannabis (Patient's own supply.  Not a prescription)     medical cannabis inhalation (Patient's own supply.  Not a prescription)     simvastatin (ZOCOR) 40 MG tablet     topiramate (TOPAMAX) 200 MG tablet     traZODone (DESYREL) 100 MG tablet     venlafaxine (EFFEXOR-XR) 75 MG 24 hr capsule     Current Facility-Administered Medications   Medication     cross-Linked Hyaluronate (GEL-ONE) injection PRSY 30 mg     lidocaine (PF) (XYLOCAINE) 1 % injection 6 mL       Weight Loss Medication History Reviewed With Patient 11/30/2019   Which weight loss medications are you currently taking on a regular basis?  None   If you are not taking a weight loss medication that was prescribed to you, please indicate why: Other   Are you having any side effects from the weight loss medication that we have prescribed you? No       ASSESSMENT:   Overall doing well. Main concern is the cravings when she wakes up frequently at midnight. Discussed importance of sleep hygiene and allowing at least 2 hours between blue light exposure (games, tv, phones) prior to bedtime, as well as limiting naps during the day. Patient amenable to seeing a Sleep Specialist for additional resources. She is snacking on beef jerky and sardines, which is ok if she can manage her fluid retention well. Would recommend splitting the Topiramate from 200mg in AM to 100mg BID with the evening dose to help with cravings at midnight. Keto Diet seems to be working for her- she can continue this if she is tolerating it well.    PLAN:   1. Practice appropriate sleep hygiene  2.  Referral to Sleep Specialist   3. Continue Keto Diet, watch out for hypoglycemic symptoms   4. Continue portion control   5. Change Topiramate from 200mg AM to 100mg BID for midnight cravings   6. Return to Clinic in 4 months     FOLLOW-UP:    4 months.    Time: 15 mins spent on evaluation, management, counseling, education, & motivational interviewing with greater than 50 % of the total time was spent on counseling and coordinating care.    Patient was seen and discussed with attending, Dr. Mancilla.     Leonila Crespo MD   Med Peds PGY-1  [phone]    Attending physician addendum.   Pt was seen and evaluated with the medical resident. Clinical data was reviewed and discussed with the patient and with the resident. The note above reflects our history and findings, and the evaluation and plan reflects my clinical opinion.   10 of 15 minutes were spent in counseling, education, and discussion related to the above issues.

## 2019-12-02 NOTE — LETTER
"2019       RE: Kylie Austin  [address] [facility] [address]     Dear Colleague,    Thank you for referring your patient, Kylie Austin, to the [facility] MEDICAL WEIGHT MANAGEMENT at [facility]. Please see a copy of my visit note below.        Return Medical Weight Management Note     Kylie Austin  MRN:  [id number]  :  1965  BLANCA:  2019    Dear Kristin Miner,    I had the pleasure of seeing your patient Kylie Austin.  She is a 54 year old female who I am continuing to see for treatment of obesity related to:       2018   I have the following co-morbidities associated with obesity: High Cholesterol, Fatty Liver, Weight Bearing Joint Pain       INTERVAL HISTORY:  Had bariatric surgery (gastric sleeve) 2018. Was on naltrexone, but after bariatric surgery, she no longer felt like she needed the support from naltrexone.     Started Keto Diet 2-3 weeks ago. Tolerating it well. No longer constipated or having significant flatulence. No hypoglycemic symptoms.     She is currently taking Topiramate 200mg only in AM. Thinks it is helping. Occasionally at night, has some cravings, beef jerky or sardines. Finds herself waking up at midnight most days of the week. Is on lasix for fluid retention. Takes a nap from 1-3pm. Sleep hygiene: plays on phone, watch TV and plays video games at night before bed.     Exercise: not exercising a lot. Having shoulder surgery in 2020 (bad osteoarthritis). Shovels and snow-blowing.     CURRENT WEIGHT:   198 lbs 3.2 oz    Wt Readings from Last 4 Encounters:   19 89.9 kg (198 lb 3.2 oz)   19 90.3 kg (199 lb)   19 90.3 kg (199 lb)   10/23/19 90.3 kg (199 lb)       Height:  5' 10\"  Body Mass Index:  Body mass index is 28.44 kg/m .  Vitals:  B/P: 106/66, P: 75    Initial consult weight was 265 lbs on 2018.  Weight change since last seen on " visit date not found is down 1 pound.   Total loss is 67 pounds.    Diet and Activity Changes Since Last Visit Reviewed With Patient 11/30/2019   I have made the following changes to my diet since my last visit: diet   With regards to my diet, I am still struggling with: weight gain   I have made the following changes to my activity/exercise since my last visit: still can not exercise much   With regards to my activity/exercise, I am still struggling with: arthritis limits my exercise       MEDICATIONS:   Current Outpatient Medications   Medication     ARIPiprazole (ABILIFY) 20 MG tablet     diclofenac (VOLTAREN) 1 % topical gel     estradiol (ESTRACE) 2 MG tablet     furosemide (LASIX) 20 MG tablet     medical cannabis (Patient's own supply.  Not a prescription)     medical cannabis inhalation (Patient's own supply.  Not a prescription)     simvastatin (ZOCOR) 40 MG tablet     topiramate (TOPAMAX) 200 MG tablet     traZODone (DESYREL) 100 MG tablet     venlafaxine (EFFEXOR-XR) 75 MG 24 hr capsule     Current Facility-Administered Medications   Medication     cross-Linked Hyaluronate (GEL-ONE) injection PRSY 30 mg     lidocaine (PF) (XYLOCAINE) 1 % injection 6 mL       Weight Loss Medication History Reviewed With Patient 11/30/2019   Which weight loss medications are you currently taking on a regular basis?  None   If you are not taking a weight loss medication that was prescribed to you, please indicate why: Other   Are you having any side effects from the weight loss medication that we have prescribed you? No       ASSESSMENT:   Overall doing well. Main concern is the cravings when she wakes up frequently at midnight. Discussed importance of sleep hygiene and allowing at least 2 hours between blue light exposure (games, tv, phones) prior to bedtime, as well as limiting naps during the day. Patient amenable to seeing a Sleep Specialist for additional resources. She is snacking on beef jerky and sardines, which is  ok if she can manage her fluid retention well. Would recommend splitting the Topiramate from 200mg in AM to 100mg BID with the evening dose to help with cravings at midnight. Keto Diet seems to be working for her- she can continue this if she is tolerating it well.    PLAN:   1. Practice appropriate sleep hygiene  2. Referral to Sleep Specialist   3. Continue Keto Diet, watch out for hypoglycemic symptoms   4. Continue portion control   5. Change Topiramate from 200mg AM to 100mg BID for midnight cravings   6. Return to Clinic in 4 months     FOLLOW-UP:    4 months.    Time: 15 mins spent on evaluation, management, counseling, education, & motivational interviewing with greater than 50 % of the total time was spent on counseling and coordinating care.    Patient was seen and discussed with attending, Dr. Mancilla.     Leonila Crespo MD   Med Peds PGY-1  [phone]    Attending physician addendum.   Pt was seen and evaluated with the medical resident. Clinical data was reviewed and discussed with the patient and with the resident. The note above reflects our history and findings, and the evaluation and plan reflects my clinical opinion.   10 of 15 minutes were spent in counseling, education, and discussion related to the above issues.      Again, thank you for allowing me to participate in the care of your patient.      Sincerely,    Minh Mancilla MD

## 2019-12-16 ENCOUNTER — OFFICE VISIT (OUTPATIENT)
Dept: PALLIATIVE MEDICINE | Facility: CLINIC | Age: 54
End: 2019-12-16
Payer: MEDICARE

## 2019-12-16 VITALS
SYSTOLIC BLOOD PRESSURE: 105 MMHG | HEART RATE: 67 BPM | BODY MASS INDEX: 28.84 KG/M2 | DIASTOLIC BLOOD PRESSURE: 69 MMHG | WEIGHT: 201 LBS

## 2019-12-16 DIAGNOSIS — M25.512 CHRONIC LEFT SHOULDER PAIN: ICD-10-CM

## 2019-12-16 DIAGNOSIS — G89.29 CHRONIC PAIN OF LEFT KNEE: ICD-10-CM

## 2019-12-16 DIAGNOSIS — M75.42 IMPINGEMENT SYNDROME, SHOULDER, LEFT: Primary | ICD-10-CM

## 2019-12-16 DIAGNOSIS — M54.2 CERVICALGIA: ICD-10-CM

## 2019-12-16 DIAGNOSIS — M25.562 CHRONIC PAIN OF LEFT KNEE: ICD-10-CM

## 2019-12-16 DIAGNOSIS — G89.29 CHRONIC LEFT SHOULDER PAIN: ICD-10-CM

## 2019-12-16 DIAGNOSIS — M47.819 FACET ARTHROPATHY: ICD-10-CM

## 2019-12-16 PROCEDURE — 99214 OFFICE O/P EST MOD 30 MIN: CPT | Performed by: PSYCHIATRY & NEUROLOGY

## 2019-12-16 RX ORDER — GABAPENTIN 100 MG/1
300 CAPSULE ORAL 3 TIMES DAILY
COMMUNITY
End: 2020-06-01

## 2019-12-16 ASSESSMENT — PAIN SCALES - GENERAL: PAINLEVEL: MODERATE PAIN (4)

## 2019-12-16 NOTE — PATIENT INSTRUCTIONS
1. If you haven't seen a renewal email from the [place] [employer] in the next week, let me know.  2. Your surgeon will manage opioid medications after surgery.  I will make sure they know you are seeing me.  3. Follow up about 1 month after surgery.  4. We talked about how your medications can affect mood, pain and weight.  Watch for the effects on these 3 things.  You can let your psychiatrist know that I am willing to make changes as well but would do that all in partnership with the psychiatrist.    ----------------------------------------------------------------  Clinic Number:  [phone]     Call with any questions about your care and for scheduling assistance.     Calls are returned Monday through Friday between 8 AM and 4:30 PM. We usually get back to you within 2 business days depending on the issue/request.    If we are prescribing your medications:    For opioid medication refills, call the clinic or send a [employer] message 7 days in advance.  Please include:    Name of requested medication    Name of the pharmacy.    For non-opioid medications, call your pharmacy directly to request a refill. Please allow 3-4 days to be processed.     Per [facility] Law:    All controlled substance prescriptions must be filled within 30 days of being written.      For those controlled substances allowing refills, pickup must occur within 30 days of last fill.      We believe regular attendance is key to your success in our program!      Any time you are unable to keep your appointment we ask that you call us at least 24 hours in advance to cancel.This will allow us to offer the appointment time to another patient.     Multiple missed appointments may lead to dismissal from the clinic.

## 2019-12-16 NOTE — PROGRESS NOTES
[facility]    Date of visit: 12/16/2019    Chief complaint:   Chief Complaint   Patient presents with     Pain       Interval history:  Kylie Austin was last seen by me on 10/21/19.      Recommendations/plan at the last visit included:  1. Physical Therapy: None at this time, to evaluate on next visit  2. Pain Psychologist to address issues of relaxation, behavioral change, coping style, and other factors important to improvement: None at this time, continue with current behavioral health provider  3. Diagnostic Studies: Order EMG of left upper extremity to evaluate reported median nerve neuropathy in setting of medial elbow pain  4. Urine toxicology screen: None at this time   5. Medication Management:   1. Continue Medical cannabis, will renew certification on next office visit  2. Continue Topamax 200mg BID, Trazodone 100mg at bedtime, Abilify 5mg daily, Continue Effexor  3. Start Voltaren cream to areas of painful joints  6. Further procedures recommended: None at this time  7. Referral: To Orthopedics for evaluation of left shoulder, elbow and knee (this may need to be conducted under two evaluations)  8. Recommendations/follow-up for PCP:  Continue current treatment  9. Release of information: None  10. Follow up: 3 months      Since her last visit, Kylie Austin reports:  She is scheduled for left shoulder arthroplasty in January.  The Synvisc in the left knee has been helping with knee pain.  She had a left shoulder steroid injection which helps temporarily with the left shoulder.  She is using medical cannabis twice a day (pill) and the vape prior to taking a nap.  She is interested in getting renewal for this today.    She was recently started on gabapentin 100mg three times a day for pain and mood. No effect noted with that so far.  Diclofenac gel helps with sore joints and muscles.  She remains on topamax 100 daily,  venlafaxine 225 daily.   She is scheduled for an EMG next week at [place] to evaluate for CTS.     Pain scores:  Pain intensity on average is 4 on a scale of 0-10.     Current pain treatments:   Medical cannabis - very helpful with pain and mood  Topamax 100mg daily  Effexor-XR 225mg daily  Gabapentin 100 TID  voltaren     Past pain treatments:  Tramadol  Vicodin   Advil- states he's been told by cardiology to not take any more NSAIDs   Prednisone   Sumatriptan   Cymbalta, Effexor       Other treatments have included:  PT: Pursued in past  Injections:   - 8/5/19 - Left shoulder and left knee joint injection - helpful  - 12/7/18 left shoulder glenohumeral joint injection  - 5/21/18 left shoulder subacromial bursa injection - helpful  - 1/17/18 left shoulder subacromial bursa injection - helpful  - multiple injections including cervical RFA - short term relief with RFA  Side Effects: no side effect    Medications:  Current Outpatient Medications   Medication Sig Dispense Refill     ARIPiprazole (ABILIFY) 20 MG tablet Take 1 tablet (20 mg) by mouth every morning       diclofenac (VOLTAREN) 1 % topical gel Place 2-4 g onto the skin 4 times daily Use 2g on elbow and 4g on knee 100 g 3     estradiol (ESTRACE) 2 MG tablet 1 tablet daily 90 tablet 3     furosemide (LASIX) 20 MG tablet Take 1 tablet (20 mg) by mouth every morning 90 tablet 3     medical cannabis (Patient's own supply.  Not a prescription) 2 capsules 2 times daily (This is NOT a prescription, and does not certify that the patient has a qualifying medical condition for medical cannabis.  The purpose of this order is  to document that the patient reports taking medical cannabis.)    Morning and afternoon        medical cannabis inhalation (Patient's own supply.  Not a prescription) Inhale into the lungs 2 times daily as needed (This is NOT a prescription, and does not certify that the patient has a qualifying medical condition for medical cannabis.  The  purpose of this order is  to document that the patient reports taking medical cannabis.)    MORNING AND AT NIGHT        simvastatin (ZOCOR) 40 MG tablet Take 1 tablet (40 mg) by mouth At Bedtime 90 tablet 3     topiramate (TOPAMAX) 100 MG tablet Take 1 tablet (100 mg) by mouth 2 times daily 60 tablet 5     traZODone (DESYREL) 100 MG tablet Take 1 tablet (100 mg) by mouth nightly as needed for sleep (Patient taking differently: Take 100 mg by mouth At Bedtime ) 30 tablet 1     venlafaxine (EFFEXOR-XR) 75 MG 24 hr capsule Take 3 capsules (225 mg) by mouth daily         Medical History: any changes in medical history since they were last seen? No    Review of Systems:  The 14 system ROS was reviewed from the intake questionnaire, and is positive for: fatigue, stiffness, neck and back pain  Any bowel or bladder problems: No  Mood: depression, anxiety    Physical Exam:  Blood pressure 105/69, pulse 67, weight 91.2 kg (201 lb), not currently breastfeeding.  General: Resting in chair comfortably  Gait: Slow gait  MSK exam: tenderness along cervical spine midline and paraspinal primarily along lower cervical region    Assessment:   1. Chronic pain syndrome  2. Chronic neck and back pain  3. Left shoulder pain/impingement  4. Chronic left knee pain  5. Osteoarthritis in multiple joints  6. Cervical spondylosis and degenerative disc disease  7. Myofascial pain  8. Sacroiliac joint pain  9. Lumbar facet joint pain  10.  Medical cannabis use   11. Transgender to female    Kylie Austin is a 54 year old female who is seen at the pain clinic for multiple areas of pain, including neck, left shoulder, left elbow, left hand, low back and left knee pain.  History and examination are consistent with cervical spondylosis as the primary cause of neck pain.  She has significant shoulder arthritis that she is planning on having arthroplasty in January for.  Left upper extremity pain may be due to carpal tunnel and she is  scheduled for an upcoming EMG.     Plan:  1. Physical Therapy:  Not at this time  2. Clinical Health Psychologist to address issues of relaxation, behavioral change, coping style, and other factors important to improvement.  Not at this time  3. Diagnostic Studies:  Patient is scheduled for EMG left upper extremity next week to evaluate for median nerve neuropathy  4. Medication Management:    1. Continue with regimen as prescribed per psychiatry including gabapentin 100 TID, topamax 100 daily, effextor 225 daily.  Discussed how these medications can affect mood, pain and weight.   2. Voltaren to painful joints  3. Medical cannabis renewal done today  4. Surgeon to manage opioids around surgery  5. Further procedures recommended: None at this time  6. Recommendations to PCP: Continue current treatment  7. Follow up: 1 month after surgery    Total time spent was 30 minutes, and more than 50% of face to face time was spent in counseling and/or coordination of care regarding.      Lanie Fowler MD

## 2019-12-23 ENCOUNTER — OFFICE VISIT (OUTPATIENT)
Dept: NEUROLOGY | Facility: CLINIC | Age: 54
End: 2019-12-23
Payer: MEDICARE

## 2019-12-23 DIAGNOSIS — G62.9 NEUROPATHY: Primary | ICD-10-CM

## 2019-12-23 PROCEDURE — 95910 NRV CNDJ TEST 7-8 STUDIES: CPT | Performed by: PSYCHIATRY & NEUROLOGY

## 2019-12-23 PROCEDURE — 95885 MUSC TST DONE W/NERV TST LIM: CPT | Performed by: PSYCHIATRY & NEUROLOGY

## 2019-12-23 NOTE — LETTER
2019         RE: Kylie Austin  [address] [facility] [address]        Dear Colleague,    Thank you for referring your patient, Kylie Austin, to the [facility]. Please see a copy of my visit note below.    [facility] EMG Laboratory      Nerve Conduction & EMG Report          Patient:       Kylie Kirkland  Patient ID:    [id number]  Gender:        Female  YOB: 1965  Age:           54 Years 5 Months      History and Examination:  yKlie Kirkland is a 54 year old woman with intermittent pain in the left elbow and intermittent paresthesias in the lateral aspect of the left hand. Examination demonstrates preserved strength and bulk. She is referred principally for consideration of left median or ulnar entrapment.    Techniques:  Motor conduction studies were done with surface recording electrodes. Sensory conduction studies were performed with surface electrodes, unless indicated otherwise by (n), designating the use of subdermal recording electrodes. Temperature was monitored and recorded throughout the study. Upper extremities were maintained at a temperature of 32 degrees Centigrade or higher.  EMG was done with a concentric needle electrode.     Results:  Left median and radial sensory conduction studies were normal. The left ulnar sensory nerve action potential was moderately attenuated. Left median motor conduction studies demonstrated minimal relative prolongation of the latency to the second lumbrical muscle and were otherwise normal. Left ulnar motor conduction studies demonstrated moderately severe conduction slowing across the elbow. Electromyography of muscles innervated by C and C6 roots was normal.     Interpretation:  This is an abnormal study, demonstrating electrophysiologic evidence of the followin. Mild to moderate left ulnar neuropathy at the elbow.  2. Possible mild left  median neuropathy at the wrist. Minimal findings are seen in one parameter only.  3. No evidence of axonal injury to cervical nerve roots at the C7 or C6 level.       Conner Kaminski MD        Sensory NCS      Nerve / Sites Rec. Site Onset Peak NP Amp Ref. PP Amp Dist Angel Luis Ref. Temp     ms ms  V  V  V cm m/s m/s  C   L MEDIAN - Dig II      Dig II Wrist 2.66 3.28 10.8 10.0 15.6 14 52.7 48.0 32.4      Palm Wrist 1.67 2.24 31.8  42.0 8 48.0 48.0 32.4   L ULNAR - Dig V      Dig V Wrist 2.34 2.97 5.3 8.0 6.8 12.5 53.3 48.0 32.1      Palm Wrist 1.35 1.98 9.1  23.7 8 59.1  32.4   L RADIAL - Snuff      Forearm Snuff 1.98 2.55 45.8 15.0 51.6 12.5 63.2 48.0 32.6       Motor NCS      Nerve / Sites Rec. Site Lat Ref. Amp Ref. Rel Amp Dist Angel Luis Ref. Dur. Area Temp.     ms ms mV mV % cm m/s m/s ms %  C   L MEDIAN - APB      Wrist APB 3.75 4.40 7.8 5.0 100 8   5.68 100 32.7      Elbow APB 7.81  6.9  88.5 24 59.1 48.0 6.25 93 32.7   L ULNAR - ADM      Wrist ADM 2.60 3.50 12.9 5.0 100 8   6.67 100 32.6      B.Elbow ADM 6.20  12.7  98.6 20 55.7 48.0 7.24 104 32.6      A.Elbow ADM 8.33  11.8  92 8 37.5 48.0 7.34 102 32.6   L MEDIAN - II Lumb      Median II Lumb 3.49  2.2  100 10   6.93 100 32.2      Ulnar Palm Int 2.92  5.9  274 10   5.68 194 32.4   L ULNAR - FDI      Wrist FDI 3.54  14.7  100    7.14 100 32.2      B.Elbow FDI 6.98  14.4  98.2 20 58.2  7.19 97.6 32.4      A.Elbow FDI 8.91  14.4  98.1 8 41.5  7.19 96.6 32.4       EMG Summary Table     Spontaneous MUAP Recruitment    IA Fib PSW Fasc H.F. Amp Dur. PPP Pattern   L. PRON TERES N None None None None N N N N   L. EXT DIG COMM N None None None None N N N N   L. BICEPS N None None None None N N N N   L. TRICEPS N None None None None N N N N   L. C7 PSP N None None None None N N N N                          Again, thank you for allowing me to participate in the care of your patient.        Sincerely,        Conner Kaminski MD

## 2019-12-24 NOTE — PROGRESS NOTES
[facility] EMG Laboratory      Nerve Conduction & EMG Report          Patient:       Kylie Kirkland  Patient ID:    [id number]  Gender:        Female  YOB: 1965  Age:           54 Years 5 Months      History and Examination:  Kylie Kirkland is a 54 year old woman with intermittent pain in the left elbow and intermittent paresthesias in the lateral aspect of the left hand. Examination demonstrates preserved strength and bulk. She is referred principally for consideration of left median or ulnar entrapment.    Techniques:  Motor conduction studies were done with surface recording electrodes. Sensory conduction studies were performed with surface electrodes, unless indicated otherwise by (n), designating the use of subdermal recording electrodes. Temperature was monitored and recorded throughout the study. Upper extremities were maintained at a temperature of 32 degrees Centigrade or higher.  EMG was done with a concentric needle electrode.     Results:  Left median and radial sensory conduction studies were normal. The left ulnar sensory nerve action potential was moderately attenuated. Left median motor conduction studies demonstrated minimal relative prolongation of the latency to the second lumbrical muscle and were otherwise normal. Left ulnar motor conduction studies demonstrated moderately severe conduction slowing across the elbow. Electromyography of muscles innervated by C and C6 roots was normal.     Interpretation:  This is an abnormal study, demonstrating electrophysiologic evidence of the followin. Mild to moderate left ulnar neuropathy at the elbow.  2. Possible mild left median neuropathy at the wrist. Minimal findings are seen in one parameter only.  3. No evidence of axonal injury to cervical nerve roots at the C7 or C6 level.       Conner Kaminski MD        Sensory NCS      Nerve / Sites Rec. Site Onset Peak NP Amp Ref. PP Amp Dist Angel Luis Ref.  Temp     ms ms  V  V  V cm m/s m/s  C   L MEDIAN - Dig II      Dig II Wrist 2.66 3.28 10.8 10.0 15.6 14 52.7 48.0 32.4      Palm Wrist 1.67 2.24 31.8  42.0 8 48.0 48.0 32.4   L ULNAR - Dig V      Dig V Wrist 2.34 2.97 5.3 8.0 6.8 12.5 53.3 48.0 32.1      Palm Wrist 1.35 1.98 9.1  23.7 8 59.1  32.4   L RADIAL - Snuff      Forearm Snuff 1.98 2.55 45.8 15.0 51.6 12.5 63.2 48.0 32.6       Motor NCS      Nerve / Sites Rec. Site Lat Ref. Amp Ref. Rel Amp Dist Angel Luis Ref. Dur. Area Temp.     ms ms mV mV % cm m/s m/s ms %  C   L MEDIAN - APB      Wrist APB 3.75 4.40 7.8 5.0 100 8   5.68 100 32.7      Elbow APB 7.81  6.9  88.5 24 59.1 48.0 6.25 93 32.7   L ULNAR - ADM      Wrist ADM 2.60 3.50 12.9 5.0 100 8   6.67 100 32.6      B.Elbow ADM 6.20  12.7  98.6 20 55.7 48.0 7.24 104 32.6      A.Elbow ADM 8.33  11.8  92 8 37.5 48.0 7.34 102 32.6   L MEDIAN - II Lumb      Median II Lumb 3.49  2.2  100 10   6.93 100 32.2      Ulnar Palm Int 2.92  5.9  274 10   5.68 194 32.4   L ULNAR - FDI      Wrist FDI 3.54  14.7  100    7.14 100 32.2      B.Elbow FDI 6.98  14.4  98.2 20 58.2  7.19 97.6 32.4      A.Elbow FDI 8.91  14.4  98.1 8 41.5  7.19 96.6 32.4       EMG Summary Table     Spontaneous MUAP Recruitment    IA Fib PSW Fasc H.F. Amp Dur. PPP Pattern   L. PRON TERES N None None None None N N N N   L. EXT DIG COMM N None None None None N N N N   L. BICEPS N None None None None N N N N   L. TRICEPS N None None None None N N N N   L. C7 PSP N None None None None N N N N

## 2019-12-26 DIAGNOSIS — G56.22 NEUROPATHY, ULNAR AT ELBOW, LEFT: Primary | ICD-10-CM

## 2019-12-26 NOTE — PROGRESS NOTES
Please send script to [facility]'s home.    Lanie Fowler MD  [facility]

## 2020-01-07 ENCOUNTER — OFFICE VISIT (OUTPATIENT)
Dept: FAMILY MEDICINE | Facility: CLINIC | Age: 55
End: 2020-01-07
Payer: MEDICARE

## 2020-01-07 VITALS
TEMPERATURE: 97.2 F | BODY MASS INDEX: 30.4 KG/M2 | HEIGHT: 68 IN | WEIGHT: 200.6 LBS | SYSTOLIC BLOOD PRESSURE: 101 MMHG | DIASTOLIC BLOOD PRESSURE: 70 MMHG | OXYGEN SATURATION: 97 % | HEART RATE: 76 BPM

## 2020-01-07 DIAGNOSIS — M19.012 PRIMARY OSTEOARTHRITIS OF LEFT SHOULDER: ICD-10-CM

## 2020-01-07 DIAGNOSIS — R60.9 DEPENDENT EDEMA: ICD-10-CM

## 2020-01-07 DIAGNOSIS — Z01.818 PREOP GENERAL PHYSICAL EXAM: Primary | ICD-10-CM

## 2020-01-07 DIAGNOSIS — I44.0 AV BLOCK, 1ST DEGREE: ICD-10-CM

## 2020-01-07 LAB
ANION GAP SERPL CALCULATED.3IONS-SCNC: 4 MMOL/L (ref 3–14)
BUN SERPL-MCNC: 19 MG/DL (ref 7–30)
CALCIUM SERPL-MCNC: 9.4 MG/DL (ref 8.5–10.1)
CHLORIDE SERPL-SCNC: 106 MMOL/L (ref 94–109)
CO2 SERPL-SCNC: 30 MMOL/L (ref 20–32)
CREAT SERPL-MCNC: 0.88 MG/DL (ref 0.52–1.04)
GFR SERPL CREATININE-BSD FRML MDRD: 73 ML/MIN/{1.73_M2}
GLUCOSE SERPL-MCNC: 98 MG/DL (ref 70–99)
HGB BLD-MCNC: 15 G/DL (ref 11.7–15.7)
POTASSIUM SERPL-SCNC: 3.7 MMOL/L (ref 3.4–5.3)
SODIUM SERPL-SCNC: 140 MMOL/L (ref 133–144)

## 2020-01-07 PROCEDURE — 85018 HEMOGLOBIN: CPT | Performed by: PHYSICIAN ASSISTANT

## 2020-01-07 PROCEDURE — 80048 BASIC METABOLIC PNL TOTAL CA: CPT | Performed by: PHYSICIAN ASSISTANT

## 2020-01-07 PROCEDURE — 99214 OFFICE O/P EST MOD 30 MIN: CPT | Performed by: PHYSICIAN ASSISTANT

## 2020-01-07 PROCEDURE — 93000 ELECTROCARDIOGRAM COMPLETE: CPT | Performed by: PHYSICIAN ASSISTANT

## 2020-01-07 PROCEDURE — 36415 COLL VENOUS BLD VENIPUNCTURE: CPT | Performed by: PHYSICIAN ASSISTANT

## 2020-01-07 ASSESSMENT — MIFFLIN-ST. JEOR: SCORE: 1548.42

## 2020-01-07 NOTE — PROGRESS NOTES
[facility]  [address] [facility]  [place] [address]  [phone]  Dept: [phone]    PRE-OP EVALUATION:  Today's date: 2020    Kylie Austin (: 1965) presents for pre-operative evaluation assessment as requested by Omari Hutson MD.  She requires evaluation and anesthesia risk assessment prior to undergoing surgery/procedure for treatment of OA, left shoulder    Fax number for surgical facility: [facility]  Primary Physician: Kristin Miner  Type of Anesthesia Anticipated: General    Patient has a Health Care Directive or Living Will:  NO    Preop Questions 2020   Who is doing your surgery? Omari Steele M.D.   What are you having done? left shoulder replacement   Date of Surgery/Procedure: 2020   Facility or Hospital where procedure/surgery will be performed: [facility]   1.  Do you have a history of Heart attack, stroke, stent, coronary bypass surgery, or other heart surgery? No   2.  Do you ever have any pain or discomfort in your chest? No   3.  Do you have a history of  Heart Failure? YES - 2004, acute episode   4.   Are you troubled by shortness of breath when:  walking on a level surface, or up a slight hill, or at night? No   5.  Do you currently have a cold, bronchitis or other respiratory infection? No   6.  Do you have a cough, shortness of breath, or wheezing? No   7.  Do you sometimes get pains in the calves of your legs when you walk? No   8. Do you or anyone in your family have previous history of blood clots? No   9.  Do you or does anyone in your family have a serious bleeding problem such as prolonged bleeding following surgeries or cuts? No   10. Have you ever had problems with anemia or been told to take iron pills? YES - Hx of anemia   11. Have you had any abnormal blood loss such as black, tarry or bloody stools, or abnormal vaginal bleeding? No   12. Have you ever had a blood transfusion? No    13. Have you or any of your relatives ever had problems with anesthesia? No   14. Do you have sleep apnea, excessive snoring or daytime drowsiness? No   15. Do you have any prosthetic heart valves? No   16. Do you have prosthetic joints? No   17. Is there any chance that you may be pregnant? No         HPI:     HPI related to upcoming procedure: OA, left shoulder      See problem list for active medical problems.  Problems all longstanding and stable, except as noted/documented.  See ROS for pertinent symptoms related to these conditions.      MEDICAL HISTORY:     Patient Active Problem List    Diagnosis Date Noted     AV block, 1st degree 01/08/2020     Priority: Medium     Dependent edema 01/08/2020     Priority: Medium     Glenohumeral arthritis, left 11/27/2019     Priority: Medium     Added automatically from request for surgery [id number]       S/P laparoscopic sleeve gastrectomy 04/10/2018     Priority: Medium     Chronic fatigue 11/13/2017     Priority: Medium     Cervical spondylosis without myelopathy 11/03/2016     Priority: Medium     Anxiety 08/11/2016     Priority: Medium     Primary osteoarthritis of left shoulder 04/26/2016     Priority: Medium     Vitamin D deficiency 10/27/2014     Priority: Medium     Problem list name updated by automated process. Provider to review       Glenohumeral arthritis 07/24/2014     Priority: Medium     Fibromyalgia 07/17/2014     Priority: Medium     H/O hypogonadism      Priority: Medium     Morbid obesity (H) 09/10/2013     Priority: Medium     Pt reports she has been following an aggressive diet with low carbs, high fruits and vegetables.  She is now s/p ochiectomy and feels that set him back some.  Also having issues with left knee pain as well as an anal fissure.  Her max weight was 280, and currently at 266.  Feels that she is on the right tract.  She was interested in discussing the role for gastric bypass or similar surgery.  We discussed that she is not so  "obese that she might qualify for this type of procedure, but she is also doing well with her current weight loss plan.      She sees the connection between her mood, and pain as triggers for over eating and making poor food choices.    7/17/2014  Pt reports overall weight loss, but having trouble with further weight loss- wonders if it is from her medications.          Insomnia 07/18/2013     Priority: Medium     Pt has trouble with sleeping, this has been long-standing.  He endorses history of chronic fatigue and chronic pain.  He tried a Lunesta that his partner (maria t) takes, and this helped and then he was given a short prescription for Ambien and this also helped him sleep.  He has never tried Nortriptyline, amitriptyline, trazodone.  Has tried Ativan in the past.  He has never had a sleep study.  He reports restless leg syndrome- \"I'm running in my sleep\" and he was tried on Neurontin for this- he cannot recall if it helped.         Bipolar 2 disorder (H) 07/18/2013     Priority: Medium     The patient reports that he has a hard time finding his car when he yu in large parking lots.  He apparently had previously received a handicap parking tag to help him with this problem, so he could more easily find his car.  He states he often takes photos of his car to help him find it when he gets back out to it.  When I suggested that he take a different approach, by parking as far away as possible from the door, where he can find an easy to remember parking space, he then states he also has trouble with prolonged walking, and becoming exhausted easily- (due to chronic fatigue) and this contributes to the confusion and ultimately anxiety and stress of trying to find his car.    He states he's been evaluated for his physical limitations (as he is also requesting disability application today) by Sister Earle with [employer]- these records are being requested.  We discussed that I would not complete any such handicaped " parking without explicit instructions from psychiatry that it was legitimately necessary and no disability paperwork without documentation of his functional capacity.      10/10/2014  Pt reports he is receiving ongoing psychiatric care: Dr. Merle Godwin at [facility] (prescribing Ambien, Risperdone, Trazodone, Effexor) on [facility] in [place].  Psychology with Tammy Musa at [facility]- states she's been diagnosed with Bipolar 2- with passive aggressive tendencies.        Chronic pain disorder 06/20/2013     Priority: Medium     Pt reports that ever since treatment for Afib, he's had an adverse reaction to amiodarone, with some LUE pain, tremor, tingling.  He also endorses chronic fatigue, which flairs at times.  He avoids narcotics and is on a routine exercise program.      7/18/2013  He has been on vicodin now for his ochiectomy surgery- and this med is helping his shoulder pains- and other chronic pains.  He would like to discuss non-narcotic pain medications.  He has been on Neurontin for RLS, but not for pain, unsure if it helped his pain complaints.  He has never been on any other AED, no TCAs, no SNRIs.  He has had PT for his various problems in the past, and presently his shoulder is bothering him some- would be willing to do more PT in the future.      9/10/2013  Pt reports that he is now following a Dr. Lizeth Funes in [facility] in [facility] [place] who started him on Tramadol on 8/13/13- with 50mg q 12 hours- he feels this is helping his Chronic pain, and his fatigue.  He plans to continue to see this provider.  He was under the impression that this medication was non-addictive.  We discussed that this is a narcotic and can lead to dependence/addiction. Also we discussed that there could be an interaction with the Nortriptyline if he were to increase the dose of either.      7/17/2014  Now off Nortriptyline, had not helped much.  Continues on Tramadol, still taking  50mg twice a day.  Is presently on Effexor 75mg BID (prescribed by Psychiatry Polo Turner- until recent difficulty/disagreement centered around unwillingness to sign off on continued disability forms).  He was able to get the disability paperwork completed by Dr. Funes.  The pt states that she has been put on Savella (in addition to Effexor), for a new diagnosis of Fibromyalgia which she's been getting samples for, but knows that the med will be too expensive if/when she has to start getting it from pharmacy.  The process of his disability has been hold for the past 6 months.  He does have plans to establish with another psychiatrist (Dr. Merle Spivey ([place] in [place]).      He continues to use Hydroxyzine (generally taking 4-6 tabs/day).  Has been on Ambien for several years, and recognizes that if she does not take an Ambien she cannot sleep very well.  She states she knows that it is addictive.      She takes Trazodone 100mg, per her for shaking and restless legs.  She has never taken higher doses of Trazodone.      Pt reports that his chronic shoulder pains which started with the use of Amiodarone; as it was just on the left shoulder.  He has stopped the Amiodarone and he also reports an orthopedic w/u an [facility] doctor, and he underwent synvisc injection on the left- with good effect, got one about every year.  He now reports that his right shoulder has been acting up as well.  There is pain in the left hand and extending up into the posterior scalp.      Pt reports a C3 fracture with autofusion and a separate thoracic vertebral fracture that also contribute to pain.         Abnormal results of liver function studies 04/24/2013     Priority: Medium     Hyperlipidemia LDL goal <130 04/16/2013     Priority: Medium     Gender identity disorder 01/17/2012     Priority: Medium     6/20/2013  Self identifies as female.  Goes by Kylie.  Today presents for pre-operative eval.  Pt states he has attempted  self-castration, and now he is going for orchiectomy.      7/18/2013   Now s/p bilat orchiectomy.  Still has normal male anatomy otherwise.  Is not sure if she would like to proceed with this type of surgery.          Past Medical History:   Diagnosis Date     Anxiety 2005    Brought on by Amioderone     Arthritis 8/2005     Atrial fib/flutter, transient     S/p cardioversion 2004     Bipolar 2 disorder (H)      Bleeding disorder (H) 1987    nose bleeds     Chronic pain     LUE pain     Congestive heart failure (H) 11/18/2004    cured 2005     Depressive disorder     on and off most of my life!     Fibromyalgia      Gender identity disorder Started Rx 2012     H/O hypogonadism Gonadectomy 2013     Hyperlipidemia      Hypertension      Other mental problems     bipolar     Psoriasis      Uncomplicated asthma 1964    cured in 4/2001     Vision disorder 6/2005     Past Surgical History:   Procedure Laterality Date     BIOPSY  2005, 8/8/13    Stomach, colon     COLONOSCOPY  8/8/2013    Procedure: COLONOSCOPY;  COLONSCOPY SCREEN/ SE;  Surgeon: Santino Sun MD;  Location: [facility] OR     COLONOSCOPY WITH CO2 INSUFFLATION N/A 11/1/2018    Procedure: COLONOSCOPY WITH CO2 INSUFFLATION;  Surgeon: Santino Sun MD;  Location: [facility] OR     Gender Reassignment  05/2016     HEAD & NECK SURGERY  2015    ablation of nerve from neck to left arm     LAPAROSCOPIC GASTRIC SLEEVE N/A 4/10/2018    Procedure: LAPAROSCOPIC GASTRIC SLEEVE;  Laparoscopic Sleeve Gastrectomy;  Surgeon: Jani Shah MD;  Location: [facility] OR     MANDIBLE SURGERY  1986     ORCHIECTOMY INGUINAL BILATERAL  7/16/2013    Procedure: ORCHIECTOMY INGUINAL BILATERAL;  Bilateral Simple Inguinal Orchiectomy ;  Surgeon: Jan Garrett MD;  Location: [facility] OR     TONSILLECTOMY       Current Outpatient Medications   Medication Sig Dispense Refill     ARIPiprazole (ABILIFY) 20 MG tablet Take 1 tablet (20 mg) by mouth every morning       diclofenac  (VOLTAREN) 1 % topical gel Place 2-4 g onto the skin 4 times daily Use 2g on elbow and 4g on knee 100 g 3     estradiol (ESTRACE) 2 MG tablet 1 tablet daily 90 tablet 3     furosemide (LASIX) 20 MG tablet Take 1 tablet (20 mg) by mouth every morning 90 tablet 3     gabapentin (NEURONTIN) 100 MG capsule Take 300 mg by mouth 3 times daily        medical cannabis (Patient's own supply.  Not a prescription) 2 capsules 2 times daily (This is NOT a prescription, and does not certify that the patient has a qualifying medical condition for medical cannabis.  The purpose of this order is  to document that the patient reports taking medical cannabis.)    Morning and afternoon        medical cannabis inhalation (Patient's own supply.  Not a prescription) Inhale into the lungs 2 times daily as needed (This is NOT a prescription, and does not certify that the patient has a qualifying medical condition for medical cannabis.  The purpose of this order is  to document that the patient reports taking medical cannabis.)    MORNING AND AT NIGHT        simvastatin (ZOCOR) 40 MG tablet Take 1 tablet (40 mg) by mouth At Bedtime 90 tablet 3     topiramate (TOPAMAX) 100 MG tablet Take 1 tablet (100 mg) by mouth 2 times daily (Patient taking differently: Take 50 mg by mouth 2 times daily ) 60 tablet 5     traZODone (DESYREL) 100 MG tablet Take 1 tablet (100 mg) by mouth nightly as needed for sleep (Patient taking differently: Take 100 mg by mouth At Bedtime ) 30 tablet 1     venlafaxine (EFFEXOR-XR) 75 MG 24 hr capsule Take 3 capsules (225 mg) by mouth daily       order for DME Equipment being ordered: elbow guard, left - for ulnar neuropathy (Patient not taking: Reported on 1/7/2020) 1 Units 3     OTC products: None, except as noted above    Allergies   Allergen Reactions     Amiodarone      Caused pain fatigue/amplified anxiety and depression     Fluoxetine Other (See Comments)     Night terrors     Tetracycline      Teeth rattle/saliva  "acidic      Latex Allergy: NO    Social History     Tobacco Use     Smoking status: Never Smoker     Smokeless tobacco: Never Used     Tobacco comment: NEVER SMOKED! Grew up with heavy smokers which did not help my Asthma!   Substance Use Topics     Alcohol use: Yes     Comment: occasional//2 per month     History   Drug Use     Types: Marijuana     Comment: medical marijuana, daily       REVIEW OF SYSTEMS:   Constitutional, neuro, ENT, endocrine, pulmonary, cardiac, gastrointestinal, genitourinary, musculoskeletal, integument and psychiatric systems are negative, except as otherwise noted.    EXAM:   /70 (BP Location: Left arm, Cuff Size: Adult Large)   Pulse 76   Temp 97.2  F (36.2  C) (Tympanic)   Ht 1.727 m (5' 8\")   Wt 91 kg (200 lb 9.6 oz)   SpO2 97%   BMI 30.50 kg/m      GENERAL APPEARANCE: healthy, alert and no distress     EYES: EOMI, PERRL     HENT: ear canals and TM's normal and nose and mouth without ulcers or lesions     NECK: no adenopathy, no asymmetry, masses, or scars and thyroid normal to palpation     RESP: lungs clear to auscultation - no rales, rhonchi or wheezes     CV: regular rates and rhythm, normal S1 S2, no S3 or S4 and no murmur, click or rub     ABDOMEN:  soft, nontender, no HSM or masses and bowel sounds normal     MS: extremities normal- no gross deformities noted, no evidence of inflammation in joints, FROM in all extremities.     SKIN: no suspicious lesions or rashes     NEURO: Normal strength and tone, sensory exam grossly normal, mentation intact and speech normal     PSYCH: mentation appears normal. and affect normal/bright     LYMPHATICS: No cervical adenopathy    DIAGNOSTICS:     EKG: sinus bradycardia, first degree AV block, normal axis, normal intervals, no acute ST/T changes c/w ischemia, no LVH by voltage criteria, unchanged from previous tracings    Labs Resulted Today:   Results for orders placed or performed in visit on 01/07/20   HEMOGLOBIN     Status: None "   Result Value Ref Range    Hemoglobin 15.0 11.7 - 15.7 g/dL   Basic metabolic panel     Status: None   Result Value Ref Range    Sodium 140 133 - 144 mmol/L    Potassium 3.7 3.4 - 5.3 mmol/L    Chloride 106 94 - 109 mmol/L    Carbon Dioxide 30 20 - 32 mmol/L    Anion Gap 4 3 - 14 mmol/L    Glucose 98 70 - 99 mg/dL    Urea Nitrogen 19 7 - 30 mg/dL    Creatinine 0.88 0.52 - 1.04 mg/dL    GFR Estimate 73 >60 mL/min/[1.73_m2]    GFR Estimate If Black 85 >60 mL/min/[1.73_m2]    Calcium 9.4 8.5 - 10.1 mg/dL       Recent Labs   Lab Test 10/16/19  1015 10/16/18  0820 09/14/18  0917 06/07/18  1037  09/29/17  0755   HGB  --   --  14.5 15.0   < > 12.6   PLT  --   --  337 355   < > 400    141 140 142   < > 141   POTASSIUM 4.2 3.4 3.8 4.0   < > 4.0   CR 0.71 0.80 0.92 0.84   < > 0.84   A1C  --   --  5.2  --   --  5.4    < > = values in this interval not displayed.      IMPRESSION:   Reason for surgery/procedure: OA, left shoulder   Diagnosis/reason for consult: Pre op consult    The proposed surgical procedure is considered INTERMEDIATE risk.    REVISED CARDIAC RISK INDEX  The patient has the following serious cardiovascular risks for perioperative complications such as (MI, PE, VFib and 3  AV Block):  No serious cardiac risks  INTERPRETATION: 0 risks: Class I (very low risk - 0.4% complication rate)    The patient has the following additional risks for perioperative complications:  No identified additional risks  The 10-year ASCVD risk score (Jessicaestefani HANLEY Jr., et al., 2013) is: 1.4%    Values used to calculate the score:      Age: 54 years      Sex: Female      Is Non- : No      Diabetic: No      Tobacco smoker: No      Systolic Blood Pressure: 101 mmHg      Is BP treated: No      HDL Cholesterol: 48 mg/dL      Total Cholesterol: 181 mg/dL      ICD-10-CM    1. Preop general physical exam Z01.818 HEMOGLOBIN     EKG 12-LEAD RADIOLOGY   2. Primary osteoarthritis of left shoulder M19.012    3. Dependent  edema R60.9 Basic metabolic panel   4. AV block, 1st degree I44.0        RECOMMENDATIONS:     --Patient is to take all scheduled medications on the day of surgery     APPROVAL GIVEN to proceed with proposed procedure, without further diagnostic evaluation       Signed Electronically by: Kristin Miner PA-C    Copy of this evaluation report is provided to requesting physician.    [facility] Preop Guidelines    Revised Cardiac Risk Index

## 2020-01-07 NOTE — NURSING NOTE
"Initial /70 (BP Location: Left arm, Cuff Size: Adult Large)   Pulse 76   Temp 97.2  F (36.2  C) (Tympanic)   Ht 1.727 m (5' 8\")   Wt 91 kg (200 lb 9.6 oz)   SpO2 97%   BMI 30.50 kg/m   Estimated body mass index is 30.5 kg/m  as calculated from the following:    Height as of this encounter: 1.727 m (5' 8\").    Weight as of this encounter: 91 kg (200 lb 9.6 oz). .    "

## 2020-01-08 PROBLEM — R60.9 DEPENDENT EDEMA: Status: ACTIVE | Noted: 2020-01-08

## 2020-01-08 PROBLEM — I44.0 AV BLOCK, 1ST DEGREE: Status: ACTIVE | Noted: 2020-01-08

## 2020-01-13 ENCOUNTER — TELEPHONE (OUTPATIENT)
Dept: ORTHOPEDICS | Facility: CLINIC | Age: 55
End: 2020-01-13

## 2020-01-13 NOTE — TELEPHONE ENCOUNTER
Left shoulder MRI was reviewed by Dr Tobin.   Patient is scheduled for a left total shoulder arthroplasty.  There is no change in surgery plan.  Message was left on patient's voicemail with instructions to call with any questions.

## 2020-01-21 ENCOUNTER — ANESTHESIA EVENT (OUTPATIENT)
Dept: SURGERY | Facility: CLINIC | Age: 55
DRG: 483 | End: 2020-01-21
Payer: MEDICARE

## 2020-01-21 ASSESSMENT — ENCOUNTER SYMPTOMS: DYSRHYTHMIAS: 1

## 2020-01-22 ENCOUNTER — HOSPITAL ENCOUNTER (INPATIENT)
Facility: CLINIC | Age: 55
LOS: 1 days | Discharge: HOME | DRG: 483 | End: 2020-01-23
Attending: ORTHOPAEDIC SURGERY | Admitting: ORTHOPAEDIC SURGERY
Payer: MEDICARE

## 2020-01-22 ENCOUNTER — APPOINTMENT (OUTPATIENT)
Dept: GENERAL RADIOLOGY | Facility: CLINIC | Age: 55
DRG: 483 | End: 2020-01-22
Attending: ORTHOPAEDIC SURGERY
Payer: MEDICARE

## 2020-01-22 ENCOUNTER — ANESTHESIA (OUTPATIENT)
Dept: SURGERY | Facility: CLINIC | Age: 55
DRG: 483 | End: 2020-01-22
Payer: MEDICARE

## 2020-01-22 DIAGNOSIS — M19.012 GLENOHUMERAL ARTHRITIS, LEFT: ICD-10-CM

## 2020-01-22 DIAGNOSIS — M17.12 OSTEOARTHRITIS OF LEFT KNEE, UNSPECIFIED OSTEOARTHRITIS TYPE: ICD-10-CM

## 2020-01-22 DIAGNOSIS — Z98.890 STATUS POST SHOULDER SURGERY: Primary | ICD-10-CM

## 2020-01-22 LAB
ABO + RH BLD: NORMAL
ABO + RH BLD: NORMAL
BLD GP AB SCN SERPL QL: NORMAL
BLOOD BANK CMNT PATIENT-IMP: NORMAL
CREAT SERPL-MCNC: 0.78 MG/DL (ref 0.52–1.04)
GFR SERPL CREATININE-BSD FRML MDRD: 84 ML/MIN/{1.73_M2}
GLUCOSE BLDC GLUCOMTR-MCNC: 85 MG/DL (ref 70–99)
GLUCOSE SERPL-MCNC: 89 MG/DL (ref 70–99)
POTASSIUM SERPL-SCNC: 4.2 MMOL/L (ref 3.4–5.3)
SPECIMEN EXP DATE BLD: NORMAL

## 2020-01-22 PROCEDURE — 25000125 ZZHC RX 250: Performed by: CLINICAL NURSE SPECIALIST

## 2020-01-22 PROCEDURE — 12000001 ZZH R&B MED SURG/OB UMMC

## 2020-01-22 PROCEDURE — 82565 ASSAY OF CREATININE: CPT | Performed by: STUDENT IN AN ORGANIZED HEALTH CARE EDUCATION/TRAINING PROGRAM

## 2020-01-22 PROCEDURE — 36000064 ZZH SURGERY LEVEL 4 EA 15 ADDTL MIN - UMMC: Performed by: ORTHOPAEDIC SURGERY

## 2020-01-22 PROCEDURE — 40000986 XR SHOULDER LT PORT G/E 2 VW: Mod: LT

## 2020-01-22 PROCEDURE — 40000170 ZZH STATISTIC PRE-PROCEDURE ASSESSMENT II: Performed by: ORTHOPAEDIC SURGERY

## 2020-01-22 PROCEDURE — 25000132 ZZH RX MED GY IP 250 OP 250 PS 637: Mod: GY | Performed by: ANESTHESIOLOGY

## 2020-01-22 PROCEDURE — 36415 COLL VENOUS BLD VENIPUNCTURE: CPT | Performed by: CLINICAL NURSE SPECIALIST

## 2020-01-22 PROCEDURE — 25000125 ZZHC RX 250: Performed by: ORTHOPAEDIC SURGERY

## 2020-01-22 PROCEDURE — 86850 RBC ANTIBODY SCREEN: CPT | Performed by: CLINICAL NURSE SPECIALIST

## 2020-01-22 PROCEDURE — 25000125 ZZHC RX 250: Performed by: NURSE ANESTHETIST, CERTIFIED REGISTERED

## 2020-01-22 PROCEDURE — C9290 INJ, BUPIVACAINE LIPOSOME: HCPCS | Performed by: ANESTHESIOLOGY

## 2020-01-22 PROCEDURE — 82947 ASSAY GLUCOSE BLOOD QUANT: CPT | Performed by: ANESTHESIOLOGY

## 2020-01-22 PROCEDURE — 36000062 ZZH SURGERY LEVEL 4 1ST 30 MIN - UMMC: Performed by: ORTHOPAEDIC SURGERY

## 2020-01-22 PROCEDURE — 00000146 ZZHCL STATISTIC GLUCOSE BY METER IP

## 2020-01-22 PROCEDURE — 27810169 ZZH OR IMPLANT GENERAL: Performed by: ORTHOPAEDIC SURGERY

## 2020-01-22 PROCEDURE — 25000132 ZZH RX MED GY IP 250 OP 250 PS 637: Mod: GY | Performed by: STUDENT IN AN ORGANIZED HEALTH CARE EDUCATION/TRAINING PROGRAM

## 2020-01-22 PROCEDURE — 27210794 ZZH OR GENERAL SUPPLY STERILE: Performed by: ORTHOPAEDIC SURGERY

## 2020-01-22 PROCEDURE — 25000128 H RX IP 250 OP 636: Performed by: ORTHOPAEDIC SURGERY

## 2020-01-22 PROCEDURE — 25800025 ZZH RX 258: Performed by: ORTHOPAEDIC SURGERY

## 2020-01-22 PROCEDURE — 25000128 H RX IP 250 OP 636: Performed by: ANESTHESIOLOGY

## 2020-01-22 PROCEDURE — 0PBB0ZZ EXCISION OF LEFT CLAVICLE, OPEN APPROACH: ICD-10-PCS | Performed by: ORTHOPAEDIC SURGERY

## 2020-01-22 PROCEDURE — 25800030 ZZH RX IP 258 OP 636: Performed by: ANESTHESIOLOGY

## 2020-01-22 PROCEDURE — 27211024 ZZHC OR SUPPLY OTHER OPNP: Performed by: ORTHOPAEDIC SURGERY

## 2020-01-22 PROCEDURE — 99207 ZZC APP CREDIT; MD BILLING SHARED VISIT: CPT | Performed by: PHYSICIAN ASSISTANT

## 2020-01-22 PROCEDURE — 84132 ASSAY OF SERUM POTASSIUM: CPT | Performed by: STUDENT IN AN ORGANIZED HEALTH CARE EDUCATION/TRAINING PROGRAM

## 2020-01-22 PROCEDURE — 25800030 ZZH RX IP 258 OP 636: Performed by: ORTHOPAEDIC SURGERY

## 2020-01-22 PROCEDURE — 86901 BLOOD TYPING SEROLOGIC RH(D): CPT | Performed by: CLINICAL NURSE SPECIALIST

## 2020-01-22 PROCEDURE — 25000128 H RX IP 250 OP 636: Performed by: CLINICAL NURSE SPECIALIST

## 2020-01-22 PROCEDURE — 0RRK0JZ REPLACEMENT OF LEFT SHOULDER JOINT WITH SYNTHETIC SUBSTITUTE, OPEN APPROACH: ICD-10-PCS | Performed by: ORTHOPAEDIC SURGERY

## 2020-01-22 PROCEDURE — C1713 ANCHOR/SCREW BN/BN,TIS/BN: HCPCS | Performed by: ORTHOPAEDIC SURGERY

## 2020-01-22 PROCEDURE — 27110028 ZZH OR GENERAL SUPPLY NON-STERILE: Performed by: ORTHOPAEDIC SURGERY

## 2020-01-22 PROCEDURE — 25800030 ZZH RX IP 258 OP 636: Performed by: NURSE ANESTHETIST, CERTIFIED REGISTERED

## 2020-01-22 PROCEDURE — 37000008 ZZH ANESTHESIA TECHNICAL FEE, 1ST 30 MIN: Performed by: ORTHOPAEDIC SURGERY

## 2020-01-22 PROCEDURE — 25000566 ZZH SEVOFLURANE, EA 15 MIN: Performed by: ORTHOPAEDIC SURGERY

## 2020-01-22 PROCEDURE — 82947 ASSAY GLUCOSE BLOOD QUANT: CPT | Performed by: STUDENT IN AN ORGANIZED HEALTH CARE EDUCATION/TRAINING PROGRAM

## 2020-01-22 PROCEDURE — 86900 BLOOD TYPING SEROLOGIC ABO: CPT | Performed by: CLINICAL NURSE SPECIALIST

## 2020-01-22 PROCEDURE — 25800030 ZZH RX IP 258 OP 636: Performed by: CLINICAL NURSE SPECIALIST

## 2020-01-22 PROCEDURE — 71000014 ZZH RECOVERY PHASE 1 LEVEL 2 FIRST HR: Performed by: ORTHOPAEDIC SURGERY

## 2020-01-22 PROCEDURE — 25000132 ZZH RX MED GY IP 250 OP 250 PS 637: Mod: GY | Performed by: ORTHOPAEDIC SURGERY

## 2020-01-22 PROCEDURE — 36415 COLL VENOUS BLD VENIPUNCTURE: CPT | Performed by: STUDENT IN AN ORGANIZED HEALTH CARE EDUCATION/TRAINING PROGRAM

## 2020-01-22 PROCEDURE — 99207 ZZC APP CREDIT; MD BILLING SHARED VISIT: CPT | Performed by: INTERNAL MEDICINE

## 2020-01-22 PROCEDURE — 37000009 ZZH ANESTHESIA TECHNICAL FEE, EACH ADDTL 15 MIN: Performed by: ORTHOPAEDIC SURGERY

## 2020-01-22 PROCEDURE — 25000125 ZZHC RX 250: Performed by: ANESTHESIOLOGY

## 2020-01-22 PROCEDURE — 25000128 H RX IP 250 OP 636: Performed by: NURSE ANESTHETIST, CERTIFIED REGISTERED

## 2020-01-22 DEVICE — BONE CEMENT SIMPLEX W/TOBRAMYCIN 6197-9-001: Type: IMPLANTABLE DEVICE | Site: SHOULDER | Status: FUNCTIONAL

## 2020-01-22 RX ORDER — PHENYLEPHRINE HCL IN 0.9% NACL 1 MG/10 ML
SYRINGE (ML) INTRAVENOUS PRN
Status: DISCONTINUED | OUTPATIENT
Start: 2020-01-22 | End: 2020-01-22

## 2020-01-22 RX ORDER — CELECOXIB 200 MG/1
400 CAPSULE ORAL ONCE
Status: COMPLETED | OUTPATIENT
Start: 2020-01-22 | End: 2020-01-22

## 2020-01-22 RX ORDER — PROPOFOL 10 MG/ML
INJECTION, EMULSION INTRAVENOUS CONTINUOUS PRN
Status: DISCONTINUED | OUTPATIENT
Start: 2020-01-22 | End: 2020-01-22

## 2020-01-22 RX ORDER — ARIPIPRAZOLE 20 MG/1
20 TABLET ORAL EVERY MORNING
Status: DISCONTINUED | OUTPATIENT
Start: 2020-01-23 | End: 2020-01-23 | Stop reason: HOSPADM

## 2020-01-22 RX ORDER — ACETAMINOPHEN 325 MG/1
975 TABLET ORAL ONCE
Status: COMPLETED | OUTPATIENT
Start: 2020-01-22 | End: 2020-01-22

## 2020-01-22 RX ORDER — LIDOCAINE 40 MG/G
CREAM TOPICAL
Status: DISCONTINUED | OUTPATIENT
Start: 2020-01-22 | End: 2020-01-23 | Stop reason: HOSPADM

## 2020-01-22 RX ORDER — ONDANSETRON 2 MG/ML
4 INJECTION INTRAMUSCULAR; INTRAVENOUS EVERY 30 MIN PRN
Status: DISCONTINUED | OUTPATIENT
Start: 2020-01-22 | End: 2020-01-22 | Stop reason: HOSPADM

## 2020-01-22 RX ORDER — NALOXONE HYDROCHLORIDE 0.4 MG/ML
.1-.4 INJECTION, SOLUTION INTRAMUSCULAR; INTRAVENOUS; SUBCUTANEOUS
Status: DISCONTINUED | OUTPATIENT
Start: 2020-01-22 | End: 2020-01-23 | Stop reason: HOSPADM

## 2020-01-22 RX ORDER — OXYCODONE HYDROCHLORIDE 5 MG/1
5-10 TABLET ORAL EVERY 4 HOURS PRN
Status: DISCONTINUED | OUTPATIENT
Start: 2020-01-22 | End: 2020-01-23 | Stop reason: HOSPADM

## 2020-01-22 RX ORDER — CEFAZOLIN SODIUM 2 G/100ML
2 INJECTION, SOLUTION INTRAVENOUS EVERY 8 HOURS
Status: COMPLETED | OUTPATIENT
Start: 2020-01-22 | End: 2020-01-23

## 2020-01-22 RX ORDER — CEFAZOLIN SODIUM 1 G/3ML
1 INJECTION, POWDER, FOR SOLUTION INTRAMUSCULAR; INTRAVENOUS SEE ADMIN INSTRUCTIONS
Status: DISCONTINUED | OUTPATIENT
Start: 2020-01-22 | End: 2020-01-22 | Stop reason: HOSPADM

## 2020-01-22 RX ORDER — DEXAMETHASONE SODIUM PHOSPHATE 4 MG/ML
INJECTION, SOLUTION INTRA-ARTICULAR; INTRALESIONAL; INTRAMUSCULAR; INTRAVENOUS; SOFT TISSUE PRN
Status: DISCONTINUED | OUTPATIENT
Start: 2020-01-22 | End: 2020-01-22

## 2020-01-22 RX ORDER — PROPOFOL 10 MG/ML
INJECTION, EMULSION INTRAVENOUS PRN
Status: DISCONTINUED | OUTPATIENT
Start: 2020-01-22 | End: 2020-01-22

## 2020-01-22 RX ORDER — ONDANSETRON 2 MG/ML
INJECTION INTRAMUSCULAR; INTRAVENOUS PRN
Status: DISCONTINUED | OUTPATIENT
Start: 2020-01-22 | End: 2020-01-22

## 2020-01-22 RX ORDER — ACETAMINOPHEN 160 MG
TABLET,DISINTEGRATING ORAL PRN
Status: DISCONTINUED | OUTPATIENT
Start: 2020-01-22 | End: 2020-01-22 | Stop reason: HOSPADM

## 2020-01-22 RX ORDER — ACETAMINOPHEN 325 MG/1
975 TABLET ORAL EVERY 6 HOURS
Status: DISCONTINUED | OUTPATIENT
Start: 2020-01-22 | End: 2020-01-23 | Stop reason: HOSPADM

## 2020-01-22 RX ORDER — SODIUM CHLORIDE 9 MG/ML
INJECTION, SOLUTION INTRAVENOUS
Status: DISPENSED
Start: 2020-01-22 | End: 2020-01-23

## 2020-01-22 RX ORDER — TRAZODONE HYDROCHLORIDE 100 MG/1
100 TABLET ORAL AT BEDTIME
Status: DISCONTINUED | OUTPATIENT
Start: 2020-01-22 | End: 2020-01-23 | Stop reason: HOSPADM

## 2020-01-22 RX ORDER — NALOXONE HYDROCHLORIDE 0.4 MG/ML
.1-.4 INJECTION, SOLUTION INTRAMUSCULAR; INTRAVENOUS; SUBCUTANEOUS
Status: DISCONTINUED | OUTPATIENT
Start: 2020-01-22 | End: 2020-01-22 | Stop reason: HOSPADM

## 2020-01-22 RX ORDER — FENTANYL CITRATE 50 UG/ML
25-50 INJECTION, SOLUTION INTRAMUSCULAR; INTRAVENOUS
Status: DISCONTINUED | OUTPATIENT
Start: 2020-01-22 | End: 2020-01-22 | Stop reason: HOSPADM

## 2020-01-22 RX ORDER — SIMVASTATIN 40 MG
40 TABLET ORAL AT BEDTIME
Status: DISCONTINUED | OUTPATIENT
Start: 2020-01-22 | End: 2020-01-23 | Stop reason: HOSPADM

## 2020-01-22 RX ORDER — HYDROMORPHONE HCL/0.9% NACL/PF 0.2MG/0.2
.2-.4 SYRINGE (ML) INTRAVENOUS
Status: DISCONTINUED | OUTPATIENT
Start: 2020-01-22 | End: 2020-01-23 | Stop reason: HOSPADM

## 2020-01-22 RX ORDER — SODIUM CHLORIDE, SODIUM LACTATE, POTASSIUM CHLORIDE, CALCIUM CHLORIDE 600; 310; 30; 20 MG/100ML; MG/100ML; MG/100ML; MG/100ML
INJECTION, SOLUTION INTRAVENOUS CONTINUOUS
Status: DISCONTINUED | OUTPATIENT
Start: 2020-01-22 | End: 2020-01-22 | Stop reason: HOSPADM

## 2020-01-22 RX ORDER — LIDOCAINE HYDROCHLORIDE 20 MG/ML
INJECTION, SOLUTION INFILTRATION; PERINEURAL PRN
Status: DISCONTINUED | OUTPATIENT
Start: 2020-01-22 | End: 2020-01-22

## 2020-01-22 RX ORDER — FLUMAZENIL 0.1 MG/ML
0.2 INJECTION, SOLUTION INTRAVENOUS
Status: DISCONTINUED | OUTPATIENT
Start: 2020-01-22 | End: 2020-01-22 | Stop reason: HOSPADM

## 2020-01-22 RX ORDER — FUROSEMIDE 20 MG
20 TABLET ORAL EVERY MORNING
Status: DISCONTINUED | OUTPATIENT
Start: 2020-01-23 | End: 2020-01-23 | Stop reason: HOSPADM

## 2020-01-22 RX ORDER — SODIUM CHLORIDE 9 MG/ML
INJECTION, SOLUTION INTRAVENOUS CONTINUOUS
Status: DISCONTINUED | OUTPATIENT
Start: 2020-01-22 | End: 2020-01-23 | Stop reason: HOSPADM

## 2020-01-22 RX ORDER — EPHEDRINE SULFATE 50 MG/ML
INJECTION, SOLUTION INTRAMUSCULAR; INTRAVENOUS; SUBCUTANEOUS PRN
Status: DISCONTINUED | OUTPATIENT
Start: 2020-01-22 | End: 2020-01-22

## 2020-01-22 RX ORDER — KETOROLAC TROMETHAMINE 15 MG/ML
15 INJECTION, SOLUTION INTRAMUSCULAR; INTRAVENOUS EVERY 6 HOURS PRN
Status: DISCONTINUED | OUTPATIENT
Start: 2020-01-22 | End: 2020-01-22 | Stop reason: CLARIF

## 2020-01-22 RX ORDER — IBUPROFEN 600 MG/1
600 TABLET, FILM COATED ORAL EVERY 6 HOURS
Status: DISCONTINUED | OUTPATIENT
Start: 2020-01-22 | End: 2020-01-23 | Stop reason: HOSPADM

## 2020-01-22 RX ORDER — BUPIVACAINE HYDROCHLORIDE 2.5 MG/ML
INJECTION, SOLUTION INFILTRATION; PERINEURAL PRN
Status: DISCONTINUED | OUTPATIENT
Start: 2020-01-22 | End: 2020-01-22 | Stop reason: HOSPADM

## 2020-01-22 RX ORDER — KETAMINE HYDROCHLORIDE 10 MG/ML
INJECTION, SOLUTION INTRAMUSCULAR; INTRAVENOUS PRN
Status: DISCONTINUED | OUTPATIENT
Start: 2020-01-22 | End: 2020-01-22

## 2020-01-22 RX ORDER — CEFAZOLIN SODIUM 2 G/100ML
2 INJECTION, SOLUTION INTRAVENOUS
Status: COMPLETED | OUTPATIENT
Start: 2020-01-22 | End: 2020-01-22

## 2020-01-22 RX ORDER — ONDANSETRON 4 MG/1
4 TABLET, ORALLY DISINTEGRATING ORAL EVERY 30 MIN PRN
Status: DISCONTINUED | OUTPATIENT
Start: 2020-01-22 | End: 2020-01-22 | Stop reason: HOSPADM

## 2020-01-22 RX ORDER — GABAPENTIN 100 MG/1
300 CAPSULE ORAL ONCE
Status: COMPLETED | OUTPATIENT
Start: 2020-01-22 | End: 2020-01-22

## 2020-01-22 RX ORDER — ALBUTEROL SULFATE 0.83 MG/ML
2.5 SOLUTION RESPIRATORY (INHALATION) EVERY 6 HOURS PRN
Status: DISCONTINUED | OUTPATIENT
Start: 2020-01-22 | End: 2020-01-23 | Stop reason: HOSPADM

## 2020-01-22 RX ORDER — VENLAFAXINE HYDROCHLORIDE 75 MG/1
225 TABLET, EXTENDED RELEASE ORAL DAILY
Status: DISCONTINUED | OUTPATIENT
Start: 2020-01-23 | End: 2020-01-23 | Stop reason: HOSPADM

## 2020-01-22 RX ORDER — GABAPENTIN 300 MG/1
300 CAPSULE ORAL 3 TIMES DAILY
Status: DISCONTINUED | OUTPATIENT
Start: 2020-01-22 | End: 2020-01-23 | Stop reason: HOSPADM

## 2020-01-22 RX ORDER — TOPIRAMATE 50 MG/1
50 TABLET, FILM COATED ORAL 2 TIMES DAILY
Status: DISCONTINUED | OUTPATIENT
Start: 2020-01-22 | End: 2020-01-23 | Stop reason: HOSPADM

## 2020-01-22 RX ORDER — ESTRADIOL 2 MG/1
2 TABLET ORAL DAILY
Status: DISCONTINUED | OUTPATIENT
Start: 2020-01-23 | End: 2020-01-23 | Stop reason: HOSPADM

## 2020-01-22 RX ADMIN — IBUPROFEN 600 MG: 600 TABLET ORAL at 20:41

## 2020-01-22 RX ADMIN — TRANEXAMIC ACID 1 G: 1 INJECTION, SOLUTION INTRAVENOUS at 10:33

## 2020-01-22 RX ADMIN — Medication 100 MCG: at 10:48

## 2020-01-22 RX ADMIN — PHENYLEPHRINE HYDROCHLORIDE 0.2 MCG/KG/MIN: 10 INJECTION INTRAVENOUS at 10:54

## 2020-01-22 RX ADMIN — GABAPENTIN 300 MG: 300 CAPSULE ORAL at 08:34

## 2020-01-22 RX ADMIN — ACETAMINOPHEN 975 MG: 325 TABLET, FILM COATED ORAL at 08:34

## 2020-01-22 RX ADMIN — Medication 25 MG: at 10:45

## 2020-01-22 RX ADMIN — TOPIRAMATE 50 MG: 50 TABLET ORAL at 20:41

## 2020-01-22 RX ADMIN — ACETAMINOPHEN 975 MG: 325 TABLET, FILM COATED ORAL at 18:48

## 2020-01-22 RX ADMIN — Medication 10 MG: at 10:59

## 2020-01-22 RX ADMIN — CELECOXIB 400 MG: 200 CAPSULE ORAL at 09:08

## 2020-01-22 RX ADMIN — PROPOFOL 160 MG: 10 INJECTION, EMULSION INTRAVENOUS at 10:30

## 2020-01-22 RX ADMIN — PROPOFOL 20 MG: 10 INJECTION, EMULSION INTRAVENOUS at 11:54

## 2020-01-22 RX ADMIN — TRAZODONE HYDROCHLORIDE 100 MG: 100 TABLET ORAL at 21:48

## 2020-01-22 RX ADMIN — PROPOFOL 20 MG: 10 INJECTION, EMULSION INTRAVENOUS at 12:09

## 2020-01-22 RX ADMIN — Medication 5 MG: at 11:20

## 2020-01-22 RX ADMIN — PROPOFOL 30 MG: 10 INJECTION, EMULSION INTRAVENOUS at 12:03

## 2020-01-22 RX ADMIN — CEFAZOLIN SODIUM 2 G: 2 INJECTION, SOLUTION INTRAVENOUS at 10:33

## 2020-01-22 RX ADMIN — LIDOCAINE HYDROCHLORIDE 80 MG: 20 INJECTION, SOLUTION INFILTRATION; PERINEURAL at 10:30

## 2020-01-22 RX ADMIN — SIMVASTATIN 40 MG: 40 TABLET, FILM COATED ORAL at 21:48

## 2020-01-22 RX ADMIN — CEFAZOLIN SODIUM 2 G: 2 INJECTION, SOLUTION INTRAVENOUS at 18:48

## 2020-01-22 RX ADMIN — BUPIVACAINE HYDROCHLORIDE AND EPINEPHRINE BITARTRATE 10 ML: 5; .005 INJECTION, SOLUTION EPIDURAL; INTRACAUDAL; PERINEURAL at 09:30

## 2020-01-22 RX ADMIN — DEXAMETHASONE SODIUM PHOSPHATE 10 MG: 4 INJECTION, SOLUTION INTRAMUSCULAR; INTRAVENOUS at 10:45

## 2020-01-22 RX ADMIN — ONDANSETRON 4 MG: 2 INJECTION INTRAMUSCULAR; INTRAVENOUS at 12:14

## 2020-01-22 RX ADMIN — Medication 100 MCG: at 11:03

## 2020-01-22 RX ADMIN — SODIUM CHLORIDE: 9 INJECTION, SOLUTION INTRAVENOUS at 17:56

## 2020-01-22 RX ADMIN — Medication 5 MG: at 11:03

## 2020-01-22 RX ADMIN — BUPIVACAINE 10 ML: 13.3 INJECTION, SUSPENSION, LIPOSOMAL INFILTRATION at 09:30

## 2020-01-22 RX ADMIN — SODIUM CHLORIDE, POTASSIUM CHLORIDE, SODIUM LACTATE AND CALCIUM CHLORIDE: 600; 310; 30; 20 INJECTION, SOLUTION INTRAVENOUS at 10:10

## 2020-01-22 RX ADMIN — PROPOFOL 250 MCG/KG/MIN: 10 INJECTION, EMULSION INTRAVENOUS at 10:34

## 2020-01-22 RX ADMIN — GABAPENTIN 300 MG: 300 CAPSULE ORAL at 20:41

## 2020-01-22 ASSESSMENT — ACTIVITIES OF DAILY LIVING (ADL)
AMBULATION: 0-->INDEPENDENT
DRESS: 0-->INDEPENDENT
FALL_HISTORY_WITHIN_LAST_SIX_MONTHS: NO
TRANSFERRING: 0-->INDEPENDENT
BATHING: 0-->INDEPENDENT
RETIRED_COMMUNICATION: 0-->UNDERSTANDS/COMMUNICATES WITHOUT DIFFICULTY
RETIRED_EATING: 0-->INDEPENDENT
COGNITION: 0 - NO COGNITION ISSUES REPORTED
SWALLOWING: 0-->SWALLOWS FOODS/LIQUIDS WITHOUT DIFFICULTY
TOILETING: 0-->INDEPENDENT

## 2020-01-22 ASSESSMENT — MIFFLIN-ST. JEOR: SCORE: 1558.5

## 2020-01-22 NOTE — ANESTHESIA PROCEDURE NOTES
Peripheral Nerve Block Procedure Note    Staff:     Anesthesiologist:  Homar Amato DO    Resident/CRNA:  Lorenzo Anand MD    Block performed by resident/CRNA in the presence of a teaching physician    Location: Pre-op  Procedure Start/Stop TImes:      1/22/2020 9:44 AM     1/22/2020 9:50 AM    patient identified, IV checked, site marked, risks and benefits discussed, informed consent, monitors and equipment checked, pre-op evaluation, at physician/surgeon's request and post-op pain management      Correct Patient: Yes      Correct Position: Yes      Correct Site: Yes      Correct Procedure: Yes      Correct Laterality:  Yes    Site Marked:  Yes  Procedure details:     Procedure:  Interscalene    ASA:  3    Diagnosis:  Post operative pain    Laterality:  Left    Position:  Supine    Sterile Prep: chloraprep, mask and sterile gloves      Needle:  Insulated    Needle length (inches):  4    Ultrasound: Yes      Ultrasound used to identify targeted nerve, plexus, or vascular structure and placed a needle adjacent to it      Permanent Image entered into patiient's record      Abnormal pain on injection: No      Blood Aspirated: No      Paresthesias:  No    Bleeding at site: No      Bolus via:  Needle    Infusion Method:  Single Shot    Complications:  None  Assessment/Narrative:     Injection made incrementally with aspirations every (mL):  5

## 2020-01-22 NOTE — OR NURSING
PACU to Inpatient Nursing Handoff    Patient Kylie Austin is a 54 year old female who speaks English.   Procedure Procedure(s):  Left Total shoulder arthroplasty, distal clavicle excision   Surgeon(s) Primary: Omari Tobin MD  Resident - Assisting: Christiano Abreu MD     Allergies   Allergen Reactions     Amiodarone      Caused pain fatigue/amplified anxiety and depression     Fluoxetine Other (See Comments)     Night terrors     Tetracycline      Teeth rattle/saliva acidic       Isolation  [unfilled]     Past Medical History   has a past medical history of Anxiety (2005), Arthritis (8/2005), Atrial fib/flutter, transient, Bipolar 2 disorder (H), Bleeding disorder (H) (1987), Chronic pain, Congestive heart failure (H) (11/18/2004), Depressive disorder, Fibromyalgia, Gender identity disorder (Started Rx 2012), H/O hypogonadism (Gonadectomy 2013), Hyperlipidemia, Hypertension, Other mental problems, Psoriasis, Uncomplicated asthma (1964), and Vision disorder (6/2005).    Anesthesia Combined General with Interscalene Block   Dermatome Level     Preop Meds acetaminophen (Tylenol) - time given: 834  celecoxib (Celebrex) - time given: 908  gabapentin (Neurontin) - time given: 834   Nerve block Interscalene.  Location:left. Med:blind study. Time given: 1059   Intraop Meds ketamine (Ketalar): 25 mg given  ondansetron (Zofran): last given at 1214   Local Meds Yes   Antibiotics cefazolin (Ancef) - last given at 1033     Pain Patient Currently in Pain: denies  Comfort: comfortably manageable  Pain Control: partially effective   PACU meds  Not applicable   PCA / epidural No   Capnography     Telemetry ECG Rhythm: Normal sinus rhythm   Inpatient Telemetry Monitor Ordered? No          Labs Glucose Lab Results   Component Value Date    GLC 89 01/22/2020       Hgb Lab Results   Component Value Date    HGB 15.0 01/07/2020       INR No results found for: INR   PACU Imaging Completed      Wound/Incision Incision/Surgical Site 07/16/13 Bilateral  (Active)   Number of days: 2381       Incision/Surgical Site 04/10/18 Left Abdomen (Active)   Number of days: 652       Incision/Surgical Site 04/10/18 Left Abdomen (Active)   Number of days: 652       Incision/Surgical Site 04/10/18 Right Abdomen (Active)   Number of days: 652       Incision/Surgical Site 04/10/18 Right Abdomen (Active)   Number of days: 652       Incision/Surgical Site 04/10/18 Right Abdomen (Active)   Number of days: 652       Incision/Surgical Site 01/22/20 Left Shoulder (Active)   Incision Assessment UTV 1/22/2020  1:45 PM   Closure Adhesive strip(s);Approximated;Sutures 1/22/2020 12:27 PM   Incision Drainage Amount None 1/22/2020  1:45 PM   Dressing Intervention Clean, dry, intact 1/22/2020  1:45 PM   Number of days: 0      CMS        Equipment ice pack   Other LDA       IV Access Peripheral IV 01/22/20 Hand (Active)   Site Assessment WDL 1/22/2020  1:45 PM   Line Status Infusing 1/22/2020  1:45 PM   Phlebitis Scale 0-->no symptoms 1/22/2020  1:45 PM   Infiltration Scale 0 1/22/2020  1:45 PM   Number of days: 0      Blood Products Not applicable  mL   Intake/Output Date 01/22/20 0700 - 01/23/20 0659   Shift 9235-0621 6994-3898 5677-5534 24 Hour Total   INTAKE   P.O. 150   150   I.V. 800   800   Shift Total(mL/kg) 950(10.33)   950(10.33)   OUTPUT   Shift Total(mL/kg)       Weight (kg) 92 92 92 92      Drains / Lazar     Time of void PreOp Void Prior to Procedure: 0830 (01/22/20 0830)    PostOp      Diapered? No     Bladder Scan      mL(water, crackers) (01/22/20 1355)  crackers and water     Vitals    B/P: 94/51  T: 97.8  F (36.6  C)    Temp src: Oral  P:  Pulse: 68 (01/22/20 1345)    Heart Rate: 78 (01/22/20 1345)     R: 13  O2:  SpO2: 94 %    O2 Device: None (Room air) (01/22/20 1330)    Oxygen Delivery: 4 LPM (01/22/20 1315)         Family/support present significant other   Patient belongings     Patient transported  on cart   DC meds/scripts (obs/outpt) Not applicable   Inpatient Pain Meds Released? Yes       Special needs/considerations None   Tasks needing completion None       Sravani Grande RN  [facility]COM [id number]

## 2020-01-22 NOTE — CONSULTS
[facility], [place]  Consult Note - Hospitalist Service     Date of Admission:  1/22/2020  Consult Requested by: orthopedics   Reason for Consult: medical co-management    Assessment & Plan   Kylie Austin is a 54 year old female with PMH transient Afib s/p cardiovert, arthiritis, anxiety, Bipolar 2 disorder (H), Bleeding disorder (H) (1987), Chronic pain, Congestive heart failure (11/18/2004), Depressive disorder, Fibromyalgia, Gender identity disorder (Started Rx 2012), H/O hypogonadism (Gonadectomy 2013), Hyperlipidemia, Hypertension, Other mental problems, Psoriasis, Uncomplicated asthma (1964), and Vision disorder admitted on 1/22/2020. She is POD# 0 s/p left total shoulder    POD# 0 s/p left total shoulder  Arthritis  Chronic pain  Pain is currently well controlled s/p GA and peripheral nerve block. DVT ptx with SCDs.  -further management per orthopedics team. PTA pain medications include medical cannabis, topamax, effexor, gabapentin, voltaren.   -continue outpatient f/u with pain clinic    Transient Afib s/p DCCV x1 (2004)  1st degree AVB  Chronic OLIVAREZ  Patient attributes afib occurrence to a pneumonia event in 2004.  She was on amiodarone x 8 months and needed to stop d/t side effects. + hx LE edema on PTA lasix. No cardiopulmonary complaints. Exam shows occasional PVCs on telemetry, otherwise sinus, RRR. Exercise stress echo 7/2014 LVEF 60-65%, no WMA. EKG 1/7/20 notes IN interval 220ms, in setting of HR 59. Remains AVSS post-op.   -soft telemetry  -not on PTA rate control medications    CHF  Boarderline LVH  Assessed by Dr. Arias Can 12/5/17, Echo as per above not c/w CHF. Prior echo 2011 with mild LVH and boarderline RV size and normal function. Exam benign as above  -telemetry post op  -hold lasix at this time and restart if clinical signs of fluid overload    Uncomplicated asthma  No cardiopulmonary complaints and lungs CTAB. No PFTs of record. Exam negative for  abnormal pulmonary sounds/wheezing  -prn albuterol nebulizer  -IS  -monitor    S/p gonadectomy (bilateral orchiectomy)  Gender identity concerns  PSA 10/16/19 <0.01 c/w castration levels and pt on estradiol, no recent testosterone screening, and was 259 in 2013.   -continue estradiol    Hx sleeve gastrectomy (2018)  Obesity  BMI with initiation bariatric surgery visit 38.9, currently 30.8. Tolerated po, regular diet in PACU.   -diet as tolerated and per orthopedics recommendations  -recommend bowel regiment    HTN  Currently normotensive with most-recent bp soft (98/59), asymptomatic of hypotension.   -hold furosemide at this time and restart if clinical fluid overload    HLD-continue simvastatin    Bipolar d/o-continue abilify    Psoriasis-no current concerns, if plaques noted treat with emollient such as petroleum jelly      The patient's care was discussed with the Attending Physician, Dr. Santos, Patient and Patient's Family.    Meseret Pierson PA-C  [facility], [place]    ______________________________________________________________________    Chief Complaint   POD # 0 s/p left total shoulder arthroplasty    History is obtained from the patient    History of Present Illness   Kylie Austin is a 54 year old female with PMH transient Afib s/p cardiovert, arthiritis, anxiety, Bipolar 2 disorder (H), Bleeding disorder (H) (1987), Chronic pain, Congestive heart failure (H) (11/18/2004), Depressive disorder, Fibromyalgia, Gender identity disorder (Started Rx 2012), H/O hypogonadism (Gonadectomy 2013), Hyperlipidemia, Hypertension, Other mental problems, Psoriasis, Uncomplicated asthma (1964), and Vision disorder admitted on 1/22/2020. She is POD# 0 s/p left total shoulder        Review of Systems   The 10 point Review of Systems is negative other than noted in the HPI or here.     Past Medical History    I have reviewed this patient's medical history and updated it with  pertinent information if needed.   Past Medical History:   Diagnosis Date     Anxiety 2005    Brought on by Amioderone     Arthritis 8/2005     Atrial fib/flutter, transient     S/p cardioversion 2004     Bipolar 2 disorder (H)      Bleeding disorder (H) 1987    nose bleeds     Chronic pain     LUE pain     Congestive heart failure (H) 11/18/2004    cured 2005     Depressive disorder     on and off most of my life!     Fibromyalgia      Gender identity disorder Started Rx 2012     H/O hypogonadism Gonadectomy 2013     Hyperlipidemia      Hypertension      Other mental problems     bipolar     Psoriasis      Uncomplicated asthma 1964    cured in 4/2001     Vision disorder 6/2005       Past Surgical History   I have reviewed this patient's surgical history and updated it with pertinent information if needed.  Past Surgical History:   Procedure Laterality Date     BIOPSY  2005, 8/8/13    Stomach, colon     COLONOSCOPY  8/8/2013    Procedure: COLONOSCOPY;  COLONSCOPY SCREEN/ SE;  Surgeon: Santino Sun MD;  Location: [facility] OR     COLONOSCOPY WITH CO2 INSUFFLATION N/A 11/1/2018    Procedure: COLONOSCOPY WITH CO2 INSUFFLATION;  Surgeon: Santino Sun MD;  Location: [facility] OR     Gender Reassignment  05/2016     HEAD & NECK SURGERY  2015    ablation of nerve from neck to left arm     LAPAROSCOPIC GASTRIC SLEEVE N/A 4/10/2018    Procedure: LAPAROSCOPIC GASTRIC SLEEVE;  Laparoscopic Sleeve Gastrectomy;  Surgeon: Jani Shah MD;  Location: [facility] OR     MANDIBLE SURGERY  1986     ORCHIECTOMY INGUINAL BILATERAL  7/16/2013    Procedure: ORCHIECTOMY INGUINAL BILATERAL;  Bilateral Simple Inguinal Orchiectomy ;  Surgeon: Jan Garrett MD;  Location: [facility] OR     TONSILLECTOMY         Social History   I have reviewed this patient's social history and updated it with pertinent information if needed.  Social History     Tobacco Use     Smoking status: Never Smoker     Smokeless tobacco: Never Used      Tobacco comment: NEVER SMOKED! Grew up with heavy smokers which did not help my Asthma!   Substance Use Topics     Alcohol use: Yes     Comment: occasional//2 per month     Drug use: Yes     Types: Marijuana     Comment: medical marijuana, daily       Family History   I have reviewed this patient's family history and updated it with pertinent information if needed.   Family History   Problem Relation Age of Onset     Cancer Father         skin     Diabetes Father         Was on the patch when they were new     Heart Disease Father 55        scd     Coronary Artery Disease Father      Hypertension Father      Hyperlipidemia Father      Heart Disease Other      Alzheimer Disease Paternal Grandmother      Diabetes Paternal Grandmother      Mental Illness Paternal Grandmother         alshimers     Osteoporosis Paternal Grandmother      Diabetes Sister      Diabetes Sister      Coronary Artery Disease Paternal Grandfather      Hypertension Paternal Grandfather      Hyperlipidemia Paternal Grandfather      Prostate Cancer Paternal Grandfather      Other Cancer Paternal Grandfather      Breast Cancer Mother      Depression Daughter      Unknown/Adopted Daughter      Depression Daughter      Mental Illness Daughter         bipolar     Depression Son      Anxiety Disorder Daughter      Anxiety Disorder Daughter      Osteoporosis Maternal Grandmother      Thyroid Disease Sister        Medications   Facility-Administered Medications Prior to Admission   Medication Dose Route Frequency Provider Last Rate Last Dose     cross-Linked Hyaluronate (GEL-ONE) injection PRSY 30 mg  30 mg   Dario Vo MD   30 mg at 11/05/19 0944     lidocaine (PF) (XYLOCAINE) 1 % injection 6 mL  6 mL   Dario Vo MD   6 mL at 11/05/19 0944     Medications Prior to Admission   Medication Sig Dispense Refill Last Dose     ARIPiprazole (ABILIFY) 20 MG tablet Take 1 tablet (20 mg) by mouth every morning   1/22/2020 at Unknown time      diclofenac (VOLTAREN) 1 % topical gel Place 2-4 g onto the skin 4 times daily Use 2g on elbow and 4g on knee 100 g 3 Past Week at Unknown time     estradiol (ESTRACE) 2 MG tablet 1 tablet daily 90 tablet 3 1/22/2020 at 0700     furosemide (LASIX) 20 MG tablet Take 1 tablet (20 mg) by mouth every morning 90 tablet 3 1/20/2020 at 0800     gabapentin (NEURONTIN) 100 MG capsule Take 300 mg by mouth 3 times daily    1/21/2020 at 2000     medical cannabis (Patient's own supply.  Not a prescription) 2 capsules 2 times daily (This is NOT a prescription, and does not certify that the patient has a qualifying medical condition for medical cannabis.  The purpose of this order is  to document that the patient reports taking medical cannabis.)    Morning and afternoon    Past Week at Unknown time     simvastatin (ZOCOR) 40 MG tablet Take 1 tablet (40 mg) by mouth At Bedtime 90 tablet 3 1/21/2020 at 2200     traZODone (DESYREL) 100 MG tablet Take 1 tablet (100 mg) by mouth nightly as needed for sleep (Patient taking differently: Take 100 mg by mouth At Bedtime ) 30 tablet 1 1/21/2020 at 2200     venlafaxine (EFFEXOR-XR) 75 MG 24 hr capsule Take 3 capsules (225 mg) by mouth daily   1/22/2020 at Unknown time     medical cannabis inhalation (Patient's own supply.  Not a prescription) Inhale into the lungs 2 times daily as needed (This is NOT a prescription, and does not certify that the patient has a qualifying medical condition for medical cannabis.  The purpose of this order is  to document that the patient reports taking medical cannabis.)    MORNING AND AT NIGHT    Taking     order for DME Equipment being ordered: elbow guard, left - for ulnar neuropathy (Patient not taking: Reported on 1/7/2020) 1 Units 3 Not Taking     topiramate (TOPAMAX) 100 MG tablet Take 1 tablet (100 mg) by mouth 2 times daily (Patient taking differently: Take 50 mg by mouth 2 times daily ) 60 tablet 5 Taking       Allergies   Allergies   Allergen  Reactions     Amiodarone      Caused pain fatigue/amplified anxiety and depression     Fluoxetine Other (See Comments)     Night terrors     Tetracycline      Teeth rattle/saliva acidic       Physical Exam   Vital Signs: Temp: 97.8  F (36.6  C) Temp src: Oral BP: 104/65 Pulse: 72 Heart Rate: 76 Resp: 18 SpO2: 93 % O2 Device: None (Room air) Oxygen Delivery: 4 LPM  Weight: 202 lbs 13.17 oz  GENERAL: Alert and oriented x 3. NAD.  Cooperative.   HEENT: Anicteric sclera. Mucous membranes moist. NC. AT. EOMI.   CV: RRR. S1, S2. No murmurs appreciated.   RESPIRATORY: Effort normal on 2L NC O2. Lungs CTAB with no wheezing, rales, rhonchi.   GI: Abdomen soft and non distended with normoactive bowel sounds present in all quadrants. No tenderness, rebound, guarding. No lesions.   NEUROLOGICAL: No focal deficits. Moves all extremities.  CN 2-12 grossly intact.  EXTREMITIES: No peripheral edema. Intact bilateral pedal pulses.   SKIN: No jaundice. No rashes.  BACK: no CVA tenderness, no spinous tenderness      Data    Lab Results   Component Value Date    WBC 7.9 09/14/2018     Lab Results   Component Value Date    RBC 4.87 09/14/2018     Lab Results   Component Value Date    HGB 15.0 01/07/2020     Lab Results   Component Value Date    HCT 42.9 09/14/2018     No components found for: MCT  Lab Results   Component Value Date    MCV 88 09/14/2018     Lab Results   Component Value Date    MCH 29.8 09/14/2018     Lab Results   Component Value Date    MCHC 33.8 09/14/2018     Lab Results   Component Value Date    RDW 13.2 09/14/2018     Lab Results   Component Value Date     09/14/2018       Last Comprehensive Metabolic Panel:  Sodium   Date Value Ref Range Status   01/07/2020 140 133 - 144 mmol/L Final     Potassium   Date Value Ref Range Status   01/22/2020 4.2 3.4 - 5.3 mmol/L Final     Chloride   Date Value Ref Range Status   01/07/2020 106 94 - 109 mmol/L Final     Carbon Dioxide   Date Value Ref Range Status   01/07/2020  30 20 - 32 mmol/L Final     Anion Gap   Date Value Ref Range Status   01/07/2020 4 3 - 14 mmol/L Final     Glucose   Date Value Ref Range Status   01/22/2020 89 70 - 99 mg/dL Final     Urea Nitrogen   Date Value Ref Range Status   01/07/2020 19 7 - 30 mg/dL Final     Creatinine   Date Value Ref Range Status   01/22/2020 0.78 0.52 - 1.04 mg/dL Final     GFR Estimate   Date Value Ref Range Status   01/22/2020 84 >60 mL/min/[1.73_m2] Final     Comment:     Non  GFR Calc  Starting 12/18/2018, serum creatinine based estimated GFR (eGFR) will be   calculated using the Chronic Kidney Disease Epidemiology Collaboration   (CKD-EPI) equation.       Calcium   Date Value Ref Range Status   01/07/2020 9.4 8.5 - 10.1 mg/dL Final     Results for orders placed or performed during the hospital encounter of 01/22/20 (from the past 24 hour(s))   Glucose by meter   Result Value Ref Range    Glucose 85 70 - 99 mg/dL   POC US Guidance Needle Placement    Impression    Left interscalene block   ABO/Rh type and screen   Result Value Ref Range    ABO A     RH(D) Pos     Antibody Screen Neg     Test Valid Only At          [facility]    Specimen Expires 01/25/2020    Potassium   Result Value Ref Range    Potassium 4.2 3.4 - 5.3 mmol/L   Creatinine   Result Value Ref Range    Creatinine 0.78 0.52 - 1.04 mg/dL    GFR Estimate 84 >60 mL/min/[1.73_m2]    GFR Estimate If Black >90 >60 mL/min/[1.73_m2]   Glucose   Result Value Ref Range    Glucose 89 70 - 99 mg/dL   XR Shoulder Left Port G/E 2 Views    Narrative    EXAMINATION: XR SHOULDER LT PORT G/E 2 VW  1/22/2020 1:36 PM     CLINICAL HISTORY:  s/p shoulder replacement     COMPARISON: None available    FINDINGS: 2 views of the left shoulder were obtained. There are  postsurgical changes of shoulder replacement on the left. The  components are well seated.    There is postsurgical air. The lung tissue within the field-of-view  is  normally aerated.      Impression    IMPRESSION: Postsurgical changes of left total shoulder arthroplasty.    JUTTA ELLERMANN, MD

## 2020-01-22 NOTE — ANESTHESIA CARE TRANSFER NOTE
Patient: Kylie Hernandezcock    Procedure(s):  Left Total shoulder arthroplasty, distal clavicle excision    Diagnosis: Glenohumeral arthritis, left [M19.012]  Diagnosis Additional Information: No value filed.    Anesthesia Type:   General, Peripheral Nerve Block, For Post-op pain in coordination with surgeon     Note:  Airway :Face Mask  Patient transferred to:PACU  Comments: Report given to PACU RN.Handoff Report: Identifed the Patient, Identified the Reponsible Provider, Reviewed the pertinent medical history, Discussed the surgical course, Reviewed Intra-OP anesthesia mangement and issues during anesthesia, Set expectations for post-procedure period and Allowed opportunity for questions and acknowledgement of understanding      Vitals: (Last set prior to Anesthesia Care Transfer)    CRNA VITALS  1/22/2020 1220 - 1/22/2020 1306      1/22/2020             NIBP:  114/68    Pulse:  81    Temp:  36.4  C (97.5  F)    SpO2:  100 %    Resp Rate (observed):  12                Electronically Signed By: TALIB Hernandez CRNA  January 22, 2020  1:06 PM

## 2020-01-22 NOTE — ANESTHESIA PREPROCEDURE EVALUATION
Anesthesia Pre-Procedure Evaluation    Patient: Kylie Austin   MRN:     [id number] Gender:   female   Age:    54 year old :      1965        Preoperative Diagnosis: Glenohumeral arthritis, left [M19.012]   Procedure(s):  Left Total shoulder arthroplasty, distal clavicle excision     Past Medical History:   Diagnosis Date     Anxiety     Brought on by Amioderone     Arthritis 2005     Atrial fib/flutter, transient     S/p cardioversion      Bipolar 2 disorder (H)      Bleeding disorder (H)     nose bleeds     Chronic pain     LUE pain     Congestive heart failure (H) 2004    cured      Depressive disorder     on and off most of my life!     Fibromyalgia      Gender identity disorder Started Rx      H/O hypogonadism Gonadectomy      Hyperlipidemia      Hypertension      Other mental problems     bipolar     Psoriasis      Uncomplicated asthma 1964    cured in 2001     Vision disorder 2005      Past Surgical History:   Procedure Laterality Date     BIOPSY  , 13    Stomach, colon     COLONOSCOPY  2013    Procedure: COLONOSCOPY;  COLONSCOPY SCREEN/ SE;  Surgeon: Santino Sun MD;  Location: [facility]     COLONOSCOPY WITH CO2 INSUFFLATION N/A 2018    Procedure: COLONOSCOPY WITH CO2 INSUFFLATION;  Surgeon: Santino Sun MD;  Location: [facility]     Gender Reassignment  2016     HEAD & NECK SURGERY      ablation of nerve from neck to left arm     LAPAROSCOPIC GASTRIC SLEEVE N/A 4/10/2018    Procedure: LAPAROSCOPIC GASTRIC SLEEVE;  Laparoscopic Sleeve Gastrectomy;  Surgeon: Jani Shah MD;  Location: [facility]     MANDIBLE SURGERY       ORCHIECTOMY INGUINAL BILATERAL  2013    Procedure: ORCHIECTOMY INGUINAL BILATERAL;  Bilateral Simple Inguinal Orchiectomy ;  Surgeon: Jan Garrett MD;  Location: [facility]     TONSILLECTOMY            Anesthesia Evaluation     . Pt has had prior anesthetic. Type: General    No  history of anesthetic complications          ROS/MED HX    ENT/Pulmonary:     (+)Intermittent asthma , . .    Neurologic:  - neg neurologic ROS     Cardiovascular: Comment: Pt with history of acute HF since resolved in 2005, treated with lasix    H/o afib/flutter s/p cardioversion in 2004    (+) hypertension----. : . CHF etiology: acute Last EF: 60-65% . . :. dysrhythmias a-fib, . Previous cardiac testing date:results:Stress Testdate:2014 results:Interpretation Summary  NL exercise echo without evidence of CAD or stress induced ischemia. LV   function is normal at rest (LVEF 60-65%) and with exercise (70-75%). There   were no exercise induced wall motion abnormalities. Exercise capacity and   blood pressure response to exericse were normal.  Normal RV size and function  No significant valvular abnormalitiesECG reviewed date:1/7/20 results:Sinus petty with 1st degree AV block date: results:          METS/Exercise Tolerance:  >4 METS   Hematologic:     (+) Anemia, -      Musculoskeletal: Comment: Fibromyalgia   (+) arthritis,  -       GI/Hepatic:     (+) Other GI/Hepatic s/p gastric sleeve      Renal/Genitourinary:  - ROS Renal section negative       Endo:     (+) Obesity, .      Psychiatric:     (+) psychiatric history bipolar, anxiety and depression      Infectious Disease:  - neg infectious disease ROS       Malignancy:      - no malignancy   Other:    (+) No chance of pregnancy C-spine cleared: N/A, H/O Chronic Pain,no other significant disability                        PHYSICAL EXAM:   Mental Status/Neuro: A/A/O   Airway: Facies: Feasible  Mallampati: I  Mouth/Opening: Full  TM distance: > 6 cm  Neck ROM: Full   Respiratory: Auscultation: CTAB     Resp. Rate: Normal     Resp. Effort: Normal      CV: Rhythm: Regular  Rate: Age appropriate  Heart: Normal Sounds  Edema: None   Comments:      Dental: Normal Dentition                LABS:  CBC:   Lab Results   Component Value Date    WBC 7.9 09/14/2018    WBC 10.0  "06/07/2018    HGB 15.0 01/07/2020    HGB 14.5 09/14/2018    HCT 42.9 09/14/2018    HCT 45.0 06/07/2018     09/14/2018     06/07/2018     BMP:   Lab Results   Component Value Date     01/07/2020     10/16/2019    POTASSIUM 3.7 01/07/2020    POTASSIUM 4.2 10/16/2019    CHLORIDE 106 01/07/2020    CHLORIDE 110 (H) 10/16/2019    CO2 30 01/07/2020    CO2 24 10/16/2019    BUN 19 01/07/2020    BUN 16 10/16/2019    CR 0.88 01/07/2020    CR 0.71 10/16/2019    GLC 98 01/07/2020    GLC 87 10/16/2019     COAGS: No results found for: PTT, INR, FIBR  POC:   Lab Results   Component Value Date    BGM 97 04/10/2018     OTHER:   Lab Results   Component Value Date    A1C 5.2 09/14/2018    MAGALI 9.4 01/07/2020    ALBUMIN 3.9 10/16/2019    PROTTOTAL 7.2 10/16/2019    ALT 24 10/16/2019    AST 13 10/16/2019    ALKPHOS 47 10/16/2019    BILITOTAL 0.4 10/16/2019    TSH 1.16 09/14/2018    T4 0.82 09/14/2018        Preop Vitals    BP Readings from Last 3 Encounters:   01/07/20 101/70   12/16/19 105/69   12/02/19 106/66    Pulse Readings from Last 3 Encounters:   01/07/20 76   12/16/19 67   12/02/19 75      Resp Readings from Last 3 Encounters:   10/16/19 18   08/05/19 16   05/20/19 18    SpO2 Readings from Last 3 Encounters:   01/07/20 97%   12/02/19 97%   11/05/19 100%      Temp Readings from Last 1 Encounters:   01/07/20 36.2  C (97.2  F) (Tympanic)    Ht Readings from Last 1 Encounters:   01/07/20 1.727 m (5' 8\")      Wt Readings from Last 1 Encounters:   01/07/20 91 kg (200 lb 9.6 oz)    Estimated body mass index is 30.5 kg/m  as calculated from the following:    Height as of 1/7/20: 1.727 m (5' 8\").    Weight as of 1/7/20: 91 kg (200 lb 9.6 oz).     LDA:        Assessment:   ASA SCORE: 3    H&P: History and physical reviewed and following examination; no interval change.   Smoking Status:  Non-Smoker/Unknown        Plan:   Anes. Type:  General; Peripheral Nerve Block; For Post-op pain in coordination with surgeon    "  Block Details: Single Shot; Exparel; Interscalene   Pre-Medication: Acetaminophen; Gabapentin; NSAID   Induction:  IV (Standard)   Airway: LMA   Access/Monitoring: PIV   Maintenance: TIVA (Ketamine 0.25mg.kg)     Postop Plan:   Postop Pain: Opioids; Regional  Postop Sedation/Airway: Not planned  Disposition: Inpatient/Admit     PONV Management:   Adult Risk Factors: Female, Non-Smoker, Postop Opioids   Prevention: Ondansetron, Dexamethasone, No Volatiles     CONSENT: Direct conversation   Plan and risks discussed with: Patient   Blood Products: Consent Deferred (Minimal Blood Loss)                   Raul Khan DO

## 2020-01-22 NOTE — PROGRESS NOTES
SPIRITUAL HEALTH SERVICES  [facility] ([facility]) Pre OP [facility] OR 3C   PRE-SURGERY VISIT    Had pre-surgery visit with pt/family.  Provided spiritual support, prayer.     Kiki Benito  Chaplain Resident  Pager [phone]

## 2020-01-22 NOTE — PROGRESS NOTES
"Orthopaedic Surgery Progress Note      Subjective: No acute events overnight. Pain controlled.    Denies fever or chills, CP, SOB, numbness or tingling, motor dysfunction or weakness.    Objective: /66 (BP Location: Right arm)   Pulse 71   Temp 95.4  F (35.2  C) (Oral)   Resp 17   Ht 1.727 m (5' 8\")   Wt 92 kg (202 lb 13.2 oz)   SpO2 94%   BMI 30.84 kg/m      Physical Exam:  General: healthy, alert, no distress.  Respiratory: Breathing not labored.  MSK:  Focused examination of:   LUE: Fingers WWP with BCR, + radial pulse. Fires EPL/FPL/IO. SILT M/R/U. Decreased axillary sensation. Dressing c/d/i.    Labs:  Recent Labs   Lab 01/23/20  0529   HGB 11.3*       I/O last 3 completed shifts:  In: 1000 [P.O.:200; I.V.:800]  Out: 400 [Urine:400]    All cultures:  No results for input(s): CULT in the last 168 hours.    Assessment:  Kylie Austin is a 54 year old female who is s/p:    Procedures:  1. 1/22: Left TSA    Orthopaedics Primary; Hospitalist co-management  Activity: She should have supine gravity-eliminated, forward elevation 0 to unlimited, and passive external rotation at the side 0-40 degrees.  Pulleys are okay.  Weight bearing status: NWB LUE  Antibiotics: 24 hours periop  Diet: regular  DVT prophylaxis: none  Imaging: done in PACU  Bracing/Splinting: sling  Dressings: Keep clean, dry and intact until clinic  Drains: none  Physical Therapy/Occupational Therapy: Eval and treat for mobilization and ambulation    Follow-up: Clinic with Dr. Tobin in 2 weeks.    Disposition: Pending progress with therapies, pain control on orals, and medical stability, anticipate discharge on POD 1-2    Christiano Abreu MD  Orthopaedic Resident  Pager: [phone]    "

## 2020-01-22 NOTE — OP NOTE
"Procedure Date: 01/22/2020      PREOPERATIVE DIAGNOSES:  Left shoulder end-stage glenohumeral arthritis, end-stage, acromioclavicular joint arthritis.      POSTOPERATIVE DIAGNOSES:  Left shoulder end-stage glenohumeral arthritis, end-stage acromioclavicular joint arthritis.      SURGICAL PROCEDURE:     1.  Left total shoulder arthroplasty.   2.  Left shoulder open distal clavicle excision.      SURGEON:  Omari Tobin MD      ESTIMATED BLOOD LOSS:  250 mL.      IMPLANTS:   1.  Biomet comprehensive size medium polyethylene glenoid component with Regenerex titanium post.   2.  Biomet comprehensive size 17 x 55 mm press-fit humeral stem.   3.  Biomet Comprehensive size 46 x 18 x 53 mm eccentric humeral head set to the E offset in the 10 o'clock anatomic location.   4.  Simplex cement preloaded with antibiotics (glenoid only).      INDICATIONS:  Ms. Des Austin is a very pleasant 54-year-old woman who has a history of pain and disability in her shoulder.  She had a trial of nonsurgical management including activity modification and analgesics.  After discussion of risks and benefits of surgical versus nonsurgical management, she elected to proceed with surgical remediation.      DESCRIPTION OF PROCEDURE:  The patient was positively identified in the preanesthesia care area, her surgical site was initialed by me and her consent was reviewed with her.  She was then taken to the operating theater, she was placed in a well-padded beachchair position, prepped and draped in the usual sterile fashion for left upper extremity surgery.      Prior to surgical initiation, a \"time out\" was held in accordance with hospital policy.  Verbal verification of delivery of prophylactic intravenous antibiotics was performed.      After establishment of a deltopectoral incision slightly more vertical than usual in order to facilitate acromioclavicular joint access, I was able to identify the cephalic vein and allowed it to " retract medially.  Tributaries to the deltoid were ligated and coagulated.  I mobilized in the subdeltoid space.  I identified the biceps tendon.  I opened out the bicipital groove and released the upper cm of the pectoralis major tendon and tenodesed it in situ.  I amputated everything proximal to this off the anterosuperior labrum after opening the rotator interval.  I identified the 3 sisters.  I ligated them twice medially and once laterally.  I placed a vertically oriented #5 FiberWire through the bone tendon interface of the subscapularis and did a lesser tuberosity osteotomy and did a circumferential release of the subscapularis affecting excellent subscapularis bounce.  I externally rotated and delivered the humerus into the wound.      I reamed until a size 17 mm reamer had acceptable cortical chatter, then osteotomized the head in 30 degrees of retroversion and broached.  I removed the circumferential osteophyte and turned my attention to the glenoid.      Glenoid exposure proceeded without difficulty.  It was an osteoarthritic.  I was able to remove the last remaining bit of cartilage off the posterior aspect of the glenoid.  It had an atypical almost reverse biconcavity appearance like a reverse Walch B2.  I was able to identify the center position, reamed, placed some peripheral holes followed by a center boss and center hole.  Then copiously irrigated with antibiotic-impregnated saline and prepared with thrombin-soaked followed by hydrogen peroxide soaked pledgets.  I pressurized the cement in standard fashion and then impacted the definitive implant.  I removed all extraneous cement.      Provisional reduction with the above-mentioned implant trial showed 50% posterior translation with automatic spring back, 60 degrees of external rotation with subscapularis reapproximated and 70 degrees of internal rotation and 90 degrees of abduction.  Consequently, I removed the humeral trials, placed three #2  FiberWires around the osteotomy site and a vertically oriented loop #2 FiberWire through the drill holes lateral to the bicipital groove.  I placed the definitive implant in position using the neck of the implant as a washer for the sutures and then placed the definitive implant finding similar motion profile.      I reapproximated the subscapularis with simple stitches and then placed a #5 FiberWire through the drill holes lateral to the bicipital groove by pulling with a looped FiberWire.  This created a tension band construct.  A figure-of-eight #2 Ethibond was used to detension the repaired subscapularis back to the intact supraspinatus and repair the transverse humeral ligament.      The axillary nerve was verified to be in continuity and attention turned to the acromioclavicular joint.      Dissecting the subcutaneous space, I was able to identify the superior aspect of the clavicle.  I longitudinally opened this and dissected anteriorly and posteriorly, subperiosteally in order to mobilize this.  I identified the end of the clavicle and used a saw to resect the lateral cm until a 1 cm gap had been created.  I placed bone wax on it and then reconstituted the superior ligamentous structures using Ethibond sutures.  An open nutcracker demonstrated no evidence of incarceration with cross-body adduction at shoulder height.      Ethibonds were used to curtis the interval proximally and distally in case of need for later revision followed by 0 Vicryl, 2-0 Vicryl and 3-0 running subcuticular Monocryl.  Steri-Strips and a sterile nonadherent dressing were applied.  A sling was placed.      POSTOPERATIVE PLAN:   1.  The patient will be admitted to the Orthopedic Service for intravenous followed by oral pain medications.  She should have supine gravity eliminated, forward elevation 0 to unlimited and passive external rotation at the side 0-40 degrees.  Pulleys are okay.   2.  In 2 weeks, we will continue with the same  outpatient motion profile.   3.  At 6 weeks, she will begin gentle progressive 4-quadrant stretching (no internal rotation up the back).   4.  Total sling time will be 6 weeks.   5.  At 3 months, she will have unrestricted 4-quadrant stretching, periscapular stabilization and strengthening.  Radiographs will also be obtained at that visit.         JANETH POSADA MD             D: 2020   T: 2020   MT: JOAN      Name:     SARANYA DAVILA   MRN:      [id number]        Account:        [id number]   :      1965           Procedure Date: 2020      Document: [id number]

## 2020-01-23 ENCOUNTER — TELEPHONE (OUTPATIENT)
Dept: MEDSURG UNIT | Facility: CLINIC | Age: 55
End: 2020-01-23

## 2020-01-23 ENCOUNTER — APPOINTMENT (OUTPATIENT)
Dept: OCCUPATIONAL THERAPY | Facility: CLINIC | Age: 55
DRG: 483 | End: 2020-01-23
Attending: ORTHOPAEDIC SURGERY
Payer: MEDICARE

## 2020-01-23 VITALS
OXYGEN SATURATION: 95 % | SYSTOLIC BLOOD PRESSURE: 114 MMHG | DIASTOLIC BLOOD PRESSURE: 56 MMHG | BODY MASS INDEX: 30.74 KG/M2 | RESPIRATION RATE: 16 BRPM | TEMPERATURE: 97 F | WEIGHT: 202.82 LBS | HEART RATE: 71 BPM | HEIGHT: 68 IN

## 2020-01-23 LAB — HGB BLD-MCNC: 11.3 G/DL (ref 11.7–15.7)

## 2020-01-23 PROCEDURE — 36415 COLL VENOUS BLD VENIPUNCTURE: CPT | Performed by: ORTHOPAEDIC SURGERY

## 2020-01-23 PROCEDURE — 97110 THERAPEUTIC EXERCISES: CPT | Mod: GO

## 2020-01-23 PROCEDURE — 97535 SELF CARE MNGMENT TRAINING: CPT | Mod: GO

## 2020-01-23 PROCEDURE — 97530 THERAPEUTIC ACTIVITIES: CPT | Mod: GO

## 2020-01-23 PROCEDURE — 25000132 ZZH RX MED GY IP 250 OP 250 PS 637: Mod: GY | Performed by: STUDENT IN AN ORGANIZED HEALTH CARE EDUCATION/TRAINING PROGRAM

## 2020-01-23 PROCEDURE — 99232 SBSQ HOSP IP/OBS MODERATE 35: CPT | Performed by: INTERNAL MEDICINE

## 2020-01-23 PROCEDURE — 97165 OT EVAL LOW COMPLEX 30 MIN: CPT | Mod: GO

## 2020-01-23 PROCEDURE — 85018 HEMOGLOBIN: CPT | Performed by: ORTHOPAEDIC SURGERY

## 2020-01-23 PROCEDURE — 25000132 ZZH RX MED GY IP 250 OP 250 PS 637: Mod: GY | Performed by: ORTHOPAEDIC SURGERY

## 2020-01-23 PROCEDURE — 25000132 ZZH RX MED GY IP 250 OP 250 PS 637: Mod: GY | Performed by: PHYSICIAN ASSISTANT

## 2020-01-23 PROCEDURE — 25000128 H RX IP 250 OP 636: Performed by: ORTHOPAEDIC SURGERY

## 2020-01-23 RX ORDER — BUPIVACAINE HYDROCHLORIDE AND EPINEPHRINE 5; 5 MG/ML; UG/ML
INJECTION, SOLUTION PERINEURAL PRN
Status: DISCONTINUED | OUTPATIENT
Start: 2020-01-22 | End: 2020-01-23

## 2020-01-23 RX ORDER — OXYCODONE HYDROCHLORIDE 5 MG/1
5-10 TABLET ORAL EVERY 4 HOURS PRN
Qty: 40 TABLET | Refills: 0 | Status: SHIPPED | OUTPATIENT
Start: 2020-01-23 | End: 2020-06-01

## 2020-01-23 RX ORDER — IBUPROFEN 600 MG/1
600 TABLET, FILM COATED ORAL EVERY 6 HOURS PRN
Qty: 56 TABLET | Refills: 0 | Status: SHIPPED | OUTPATIENT
Start: 2020-01-23 | End: 2020-02-06

## 2020-01-23 RX ADMIN — ACETAMINOPHEN 975 MG: 325 TABLET, FILM COATED ORAL at 12:07

## 2020-01-23 RX ADMIN — ACETAMINOPHEN 975 MG: 325 TABLET, FILM COATED ORAL at 06:11

## 2020-01-23 RX ADMIN — VENLAFAXINE HYDROCHLORIDE 225 MG: 75 TABLET, EXTENDED RELEASE ORAL at 08:35

## 2020-01-23 RX ADMIN — ARIPIPRAZOLE 20 MG: 20 TABLET ORAL at 08:35

## 2020-01-23 RX ADMIN — IBUPROFEN 600 MG: 600 TABLET ORAL at 03:06

## 2020-01-23 RX ADMIN — GABAPENTIN 300 MG: 300 CAPSULE ORAL at 08:35

## 2020-01-23 RX ADMIN — CEFAZOLIN SODIUM 2 G: 2 INJECTION, SOLUTION INTRAVENOUS at 03:05

## 2020-01-23 RX ADMIN — ACETAMINOPHEN 975 MG: 325 TABLET, FILM COATED ORAL at 00:08

## 2020-01-23 RX ADMIN — ESTRADIOL 2 MG: 2 TABLET ORAL at 08:36

## 2020-01-23 RX ADMIN — IBUPROFEN 600 MG: 600 TABLET ORAL at 08:35

## 2020-01-23 RX ADMIN — TOPIRAMATE 50 MG: 50 TABLET ORAL at 08:35

## 2020-01-23 NOTE — PLAN OF CARE
"VS: /56   Pulse 71   Temp 97  F (36.1  C)   Resp 16   Ht 1.727 m (5' 8\")   Wt 92 kg (202 lb 13.2 oz)   SpO2 95%   BMI 30.84 kg/m     O2: Sats > 90% on RA.   Denies SOB. IS encouarged.   Output: Voids spontaneously without difficulty.   Last BM: 1/21. Bowels active and passing gas   Activity: Up to BR independently. Steady feet   Skin: Dry and intact except incision   Pain: Manageable with tylenol and Ibuprofen.Decline ice pack   CMS: Numbness and tingling in L arm- improving.  Radial pulse-good,PCD and compression stockings on.   Dressing: Aquacel-CDI   Diet: Regular. Denies nausea and vomiting   LDA: Removed.   Equipment: PCD, IV pole, Capno and personal belongings at bedside   Plan: Pain study.   Additional Info: Tremors present in R hand.  Pt stated both hands has tremors as baseline   Pt. discharged at 1pm to home. Pt. was accompanied by her wife, and left with personal belongings. Prior to discharge, PIV was removed. Pt. received complete discharge paperwork and pain medications as filled by discharge pharmacy. Pt. was given times of last dose for all discharge medications in writing on discharge medication sheets.  Discharge teaching included, pain management, activity restrictions, dressing changes, and signs and symptoms of infection. Pt. to follow up with ortho team in follow up appointment. Pt. had no further questions at the time of discharge and no unmet needs were identified.  "

## 2020-01-23 NOTE — PLAN OF CARE
Discharge Planner PT   Patient plan for discharge: Unknown  Current status: PT order received and chart reviewed. Per OT patient mobilizing at baseline level of function. No acute inpatient PT needs identified.   Barriers to return to prior living situation: No PT barriers  Recommendations for discharge: Defer to OT  Rationale for recommendations: Defer to OT       Entered by: Lily Alcazar 01/23/2020 11:42 AM

## 2020-01-23 NOTE — PLAN OF CARE
Discharge Planner OT   Patient plan for discharge: Home with assist. Patient is independent in ADLs/IADLs at baseline.  Current status: Patient is alert and oriented. Patient demonstrates supine<>sit, sit<>stand, toilet transfers, ascending/descending stairs and ambulation independently. Patient requires min A to complete UE dressing and SBA for LE dressing. Patient educated on HEP and sling management with spouse present. Patient met ROM goals.  Barriers to return to prior living situation: None  Recommendations for discharge: Home with assist  Rationale for recommendations: Discharge from OT, OT goals met       Entered by: Leah Vu 01/23/2020 1:16 PM     Occupational Therapy Discharge Summary    Reason for therapy discharge:    All goals and outcomes met, no further needs identified.    Progress towards therapy goal(s). See goals on Care Plan in [facility] electronic health record for goal details.  Goals met    Therapy recommendation(s):    Continue home exercise program.

## 2020-01-23 NOTE — TELEPHONE ENCOUNTER
Prior Authorization **INITIATED**    Medication: Oxycodone 5mg tablet - Quantity Limits  Insurance Company: [employer] - Phone [phone] Fax [phone]  Pharmacy Filling the Rx: [place], [place] - [address]  Filling Pharmacy Phone: [phone]  Filling Pharmacy Fax: [phone]  Start Date: 1/23/2020  Reference #: CoverMyMeds Key: [id number] - [place] Case ID: [id number]  Comments:  Plan limits to 6 tablets daily. Discharge pharmacy dispensed supply to patient while prior authorization is pending and will retroactively bill once approved.      Rafia Curiel CPhT  [place] Discharge Pharmacy Liaison  Pronouns: She/Her/Hers    [facility]  [address]  [address] [address]   [email or web address]  [email or web address]   Phone: [phone]  Pager: [phone]  Fax: [phone]

## 2020-01-23 NOTE — PLAN OF CARE
VS: Vitals: stable. Capno: stable   O2: Sats > 90% on RA.   Denies SOB. IS encouarged.   Output: Voids spontaneously without difficulty.Last PVR: 0   Last BM: 1/21. Bowels active and passing gas   Activity: Up to BR with SBA. Steady feet   Skin: Dry and intact except incision   Pain: Manageable with tylenol and Ibuprofen.Decline ice pack   CMS: Numbness and tingling in L arm- improving.  Radial pulse-good,PCD and compression stockings on.   Dressing: Aquacel-CDI   Diet: Regular. Denies nausea and vomiting   LDA: PIV-patent and infusing   Equipment: PCD, IV pole, Capno and personal belongings at bedside   Plan: Pain study.   Additional Info: Tremors present in R hand.  L hand- not seen coz of sling.  Pt stated both hands has tremors as baseline.

## 2020-01-23 NOTE — TELEPHONE ENCOUNTER
Prior Authorization **APPROVED**    Authorization Effective Date: 10/25/2019  Authorization Expiration Date: 1/22/2021  Medication: Oxycodone 5mg tablet - Quantity Limits **APPROVED**  Approved Dose/Quantity: Up to 16 tablets daily  Reference #: CoverMyMeds Key: [id number] - [place] Case ID: [id number]   Insurance Company: Medicare [employer] - Phone [phone] Fax [phone]  Expected CoPay: $10.58     CoPay Card Available: No    Foundation Assistance Needed: n/a  Which Pharmacy is filling the prescription (Not needed for infusion/clinic administered): [facility] [place], [place] - [address] [place] [address]  Pharmacy Notified: Yes  Patient Notified: Yes  Comments:  Plan limits to 6 tablets daily. Discharge pharmacy dispensed supply to patient while prior authorization is pending and will retroactively bill once approved.        Rafia Curiel CPhT  [place] Discharge Pharmacy Liaison  Pronouns: She/Her/Hers    [facility] [place] [place] [facility]  [address]  [address] [place], [place] [address], [place] [address]   [email or web address]  [email or web address]   Phone: [phone]  Pager: [phone]  Fax: [phone]

## 2020-01-23 NOTE — ANESTHESIA POSTPROCEDURE EVALUATION
Anesthesia POST Procedure Evaluation    Patient: Kylie Austin   MRN:     [id number] Gender:   female   Age:    54 year old :      1965        Preoperative Diagnosis: Glenohumeral arthritis, left [M19.012]   Procedure(s):  Left Total shoulder arthroplasty, distal clavicle excision   Postop Comments: No value filed.       Anesthesia Type:  Not documented  General, Peripheral Nerve Block, For Post-op pain in coordination with surgeon    Reportable Event: NO     PAIN: Uncomplicated   Sign Out status: Comfortable, Well controlled pain     PONV: No PONV   Sign Out status:  No Nausea or Vomiting     Neuro/Psych: Uneventful perioperative course   Sign Out Status: Preoperative baseline; Age appropriate mentation     Airway/Resp.: Uneventful perioperative course   Sign Out Status: Non labored breathing, age appropriate RR; Resp. Status within EXPECTED Parameters     CV: Uneventful perioperative course   Sign Out status: Appropriate BP and perfusion indices; Appropriate HR/Rhythm     Disposition:   Sign Out in:  PACU  Disposition:  Phase II; Home  Recovery Course: Uneventful  Follow-Up: Not required           Last Anesthesia Record Vitals:  CRNA VITALS  2020 1220 - 2020 1320      2020             NIBP:  114/68    Pulse:  81    Temp:  36.4  C (97.5  F)    SpO2:  100 %    Resp Rate (observed):  12          Last PACU Vitals:  Vitals Value Taken Time   /79 2020 11:28 AM   Temp 36.1  C (97  F) 2020  7:40 AM   Pulse 88 2020 11:28 AM   Resp 16 2020  7:40 AM   SpO2 99 % 2020 11:26 AM   Temp src     NIBP 114/68 2020 12:55 PM   Pulse 81 2020 12:55 PM   SpO2 100 % 2020 12:55 PM   Resp     Temp 36.4  C (97.5  F) 2020 12:55 PM   Ht Rate     Temp 2     Vitals shown include unvalidated device data.      Electronically Signed By: Homar Amato DO, 2020, 11:54 AM

## 2020-01-23 NOTE — PLAN OF CARE
Patient came on floor with left TSA, dressing clean,dry and intact, sling on. Denied pain at this time- patient is a pain study patient. Received scheduled tylenol, capnography on, instructed on the use of incentive spirometer and done several times. numbness and tingling from block.able to wiggle fingers. Ambulating to bathroom with stand by assist. Iv fluid infusing at 100 ml/hr. Pneumatic boots on. Demonstrates the abilities to use call light appropriately. Will continue to monitor patient.

## 2020-01-23 NOTE — PROGRESS NOTES
01/23/20 1246   Quick Adds   Type of Visit Initial Occupational Therapy Evaluation   Living Environment   Lives With spouse;friend(s);other relative(s)  (niece & nephew)   Living Arrangements house   Home Accessibility stairs to enter home;stairs within home   Number of Stairs, Main Entrance 1   Stair Railings, Main Entrance railing on left side (ascending);railings safe and in good condition   Number of Stairs, Within Home, Primary 5  (split entry)   Stair Railings, Within Home, Primary railings on both sides of stairs;railings safe and in good condition   Transportation Anticipated car, drives self;family or friend will provide   Living Environment Comment Patient has a tub, one grab bar, raised toilet seat   Self-Care   Usual Activity Tolerance good   Current Activity Tolerance moderate   Equipment Currently Used at Home raised toilet   Activity/Exercise/Self-Care Comment Patient is independent with ADLs/IADLs at baseline   Functional Level   Ambulation 0-->independent   Transferring 0-->independent   Toileting 0-->independent   Bathing 0-->independent   Dressing 0-->independent   Eating 0-->independent   Communication 0-->understands/communicates without difficulty   Swallowing 0-->swallows foods/liquids without difficulty   Cognition 0 - no cognition issues reported   Fall history within last six months no   Which of the above functional risks had a recent onset or change? dressing;bathing   Prior Functional Level Comment Patient is independent with ADLs/IADLs at baseline   General Information   Onset of Illness/Injury or Date of Surgery - Date 01/22/20   Referring Physician Omari Tobin MD    Patient/Family Goals Statement Return home   Additional Occupational Profile Info/Pertinent History of Current Problem Kylie Austin is a 54 year old female who is s/p 1/22 Left TSA   Precautions/Limitations other (see comments)  (L TSA precautions)   Weight-Bearing Status - LUE nonweight-bearing    Weight-Bearing Status - RUE full weight-bearing   Weight-Bearing Status - LLE full weight-bearing   Weight-Bearing Status - RLE full weight-bearing   General Observations On RA, alert and oriented   Cognitive Status Examination   Orientation orientation to person, place and time   Visual Perception   Visual Perception Wears glasses   Sensory Examination   Sensory Quick Adds No deficits were identified   Pain Assessment   Patient Currently in Pain Yes, see Vital Sign flowsheet   Range of Motion (ROM)   ROM Quick Adds No deficits were identified   Strength   Manual Muscle Testing Quick Adds No deficits were identified   Strength Comments L UE not formally tested secondary to precautions   Coordination   Upper Extremity Coordination Left UE impaired   Functional Limitations Impaired ability to perform bilateral tasks   Mobility   Bed Mobility Bed mobility skill: Supine to sit   Bed Mobility Skill: Supine to Sit   Level of [place]: Supine/Sit independent   Transfer Skill: Bed to Chair/Chair to Bed   Level of [place]: Bed to Chair independent   Weight-Bearing Restrictions nonweight-bearing  (L UE)   Transfer Skill: Sit to Stand   Level of [place]: Sit/Stand independent   Transfer Skill: Sit to Stand nonweight-bearing  (L UE)   Transfer Skill: Toilet Transfer   Level of [place]: Toilet independent   Weight-Bearing Restrictions: Toilet nonweight-bearing  (L UE)   Tub/Shower Transfer   Tub/Shower Transfer Transfer Skill: Tub/Shower Transfers   Transfer Skill: Tub/Shower Transfer   Level of [place]: Tub/Shower independent   Weight-Bearing Restrictions: Tub/Shower nonweight-bearing  (L UE)   Upper Body Dressing   Level of [place]: Dress Upper Body minimum assist (75% patients effort)   Lower Body Dressing   Level of [place]: Dress Lower Body stand-by assist   Toileting   Level of [place]: Toilet independent   Grooming   Level of [place]: Grooming independent   Eating/Self Feeding  "  Level of [place]: Eating independent   Activities of Daily Living Analysis   Impairments Contributing to Impaired Activities of Daily Living pain;ROM decreased;post surgical precautions  (L TSA precautions)   General Therapy Interventions   Planned Therapy Interventions ADL retraining;home program guidelines   Clinical Impression   Criteria for Skilled Therapeutic Interventions Met yes, treatment indicated   OT Diagnosis OT: Patient is below baseline in ADLs/ADLs   Influenced by the following impairments pain, L TSA precautions   Assessment of Occupational Performance 1-3 Performance Deficits   Identified Performance Deficits bathing, dressing   Clinical Decision Making (Complexity) Low complexity   Therapy Frequency Daily   Predicted Duration of Therapy Intervention (days/wks) OT: eval and treat x1   Anticipated Equipment Needs at Discharge other (see comments)  (pulleys )   Anticipated Discharge Disposition Home with Assist   Risks and Benefits of Treatment have been explained. Yes   Patient, Family & other staff in agreement with plan of care Yes   [facility] TM \"6 Clicks\"   2016, Trustees of [facility], under license to [employer].  All rights reserved.   6 Clicks Short Forms Daily Activity Inpatient Short Form   [facility]  \"6 Clicks\" Daily Activity Inpatient Short Form   1. Putting on and taking off regular lower body clothing? 3 - A Little   2. Bathing (including washing, rinsing, drying)? 3 - A Little   3. Toileting, which includes using toilet, bedpan or urinal? 4 - None   4. Putting on and taking off regular upper body clothing? 3 - A Little   5. Taking care of personal grooming such as brushing teeth? 4 - None   6. Eating meals? 4 - None   Daily Activity Raw Score (Score out of 24.Lower scores equate to lower levels of function) 21   Total Evaluation Time   Total Evaluation Time (Minutes) 8     "

## 2020-01-24 NOTE — DISCHARGE SUMMARY
ORTHOPAEDIC DISCHARGE SUMMARY     Date of Admission: 1/22/2020  Date of Discharge: 1/23/2020  1:00 PM  Disposition: Home  Staff Physician: No att. providers found  Primary Care Provider: Kristin Miner    DISCHARGE DIAGNOSIS:  Glenohumeral arthritis, left [M19.012]    PROCEDURES: Procedure(s):  Left Total shoulder arthroplasty, distal clavicle excision on 1/22/2020    BRIEF HISTORY:  54-year-old female with longstanding osteoarthritis of her left shoulder.  Tried and failed nonoperative management.  Risks and benefits to a left total shoulder arthroplasty discussed with the patient.  The patient was consented for the above-mentioned procedure.    HOSPITAL COURSE:    Surgery was uncomplicated. Kylie Austin has done well post-operatively. Medicine was consulted post operatively to aid in management of medical comorbidities. See final recommendations below. The patient received routine nursing cares and is medically stable. Vital signs are stable. The patient is tolerating a regular diet without GI distress/nausea or vomiting. Voiding spontaneously. All PT/OT goals have been met for safe mobility. Pain is now controlled on oral medications which will be available on discharge. Stool softeners have been used while taking pain medications to help prevent constipation. Kylie Austin is deemed medically safe to discharge.     Antibiotics:  Ancef given periop and 24 hours postop.  DVT prophylaxis:  None  PT Progress: Has met PT/OT goals for safe mobility.    Pain Meds:  Weaned off all IV pain meds by discharge.  Inpatient Events: No significant events or complications.     Discharge orders and instructions as below.    FOLLOWUP:    Follow up with Dr. Tobin at 2 weeks postoperatively.  Future Appointments   Date Time Provider Department Center   2/3/2020  3:40 PM Omari Tobin MD [facility]   2/10/2020  9:30 AM Tresa Fowler MD [facility]   3/2/2020  2:10 PM  [facility]   4/6/2020 10:30 AM Minh Mancilla MD [facility]       Appointments on [facility]. Call [phone] if you haven't heard regarding these appointments within 7 days of discharge.    PLANNED DISCHARGE ORDERS:           Discharge Medication List as of 1/23/2020 12:42 PM      START taking these medications    Details   ibuprofen (ADVIL/MOTRIN) 600 MG tablet Take 1 tablet (600 mg) by mouth every 6 hours as needed for moderate pain, Disp-56 tablet, R-0, E-Prescribe      oxyCODONE (ROXICODONE) 5 MG tablet Take 1-2 tablets (5-10 mg) by mouth every 4 hours as needed for moderate to severe pain, Disp-40 tablet, R-0, E-Prescribe         CONTINUE these medications which have NOT CHANGED    Details   ARIPiprazole (ABILIFY) 20 MG tablet Take 1 tablet (20 mg) by mouth every morning, Historical      diclofenac (VOLTAREN) 1 % topical gel Place 2-4 g onto the skin 4 times daily Use 2g on elbow and 4g on knee, Disp-100 g, R-3, E-Prescribe      estradiol (ESTRACE) 2 MG tablet 1 tablet daily, Disp-90 tablet, R-3, E-Prescribe      furosemide (LASIX) 20 MG tablet Take 1 tablet (20 mg) by mouth every morning, Disp-90 tablet, R-3, E-Prescribe      gabapentin (NEURONTIN) 100 MG capsule Take 300 mg by mouth 3 times daily , Historical      medical cannabis (Patient's own supply.  Not a prescription) 2 capsules 2 times daily (This is NOT a prescription, and does not certify that the patient has a qualifying medical condition for medical cannabis.  The purpose of this order is  to document that the patient reports taking medical cannabis.)    Morning  and afternoon , Historical      medical cannabis inhalation (Patient's own supply.  Not a prescription) Inhale into the lungs 2 times daily as needed (This is NOT a prescription, and does not certify that the patient has a qualifying medical condition for medical cannabis.  The purpose of this order is  to  document that the patient reports taking medical c annabis.)    MORNING AND AT NIGHT , Historical      order for DME Equipment being ordered: elbow guard, left - for ulnar neuropathyDisp-1 Units, R-3, Local Print      simvastatin (ZOCOR) 40 MG tablet Take 1 tablet (40 mg) by mouth At Bedtime, Disp-90 tablet, R-3, E-Prescribe      topiramate (TOPAMAX) 100 MG tablet Take 1 tablet (100 mg) by mouth 2 times daily, Disp-60 tablet, R-5, E-Prescribe      traZODone (DESYREL) 100 MG tablet Take 1 tablet (100 mg) by mouth nightly as needed for sleep, Disp-30 tablet, R-1, No Print Out      venlafaxine (EFFEXOR-XR) 75 MG 24 hr capsule Take 3 capsules (225 mg) by mouth daily, Historical               Discharge Procedure Orders   Reason for your hospital stay   Order Comments: Shoulder repair     Follow Up and recommended labs and tests   Order Comments: Follow up with Dr Tobin in two weeks. If you see him [facility], call the office at [phone] to schedule an appointment if you have not already done so. If you see him at [facility] call [phone] to schedule that appoinment..     Activity   Order Comments: Your activity upon discharge: as tolerated, sling at all times. May remove for hygiene or dressing.No active or passive range of motion to the shoulder. Follow the activity instructions for movement that were prescribed in the hospital.     Order Specific Question Answer Comments   Is discharge order? Yes      When to contact your care team   Order Comments: Call your physician for fevers greater than 101.5, chills, increased pain, redness, swelling or discharge at the surgical site. During regular business hours call Dr Tobin's office and request to speak with his nurse or the triage nurses. If you see him at [facility] call [phone]. If you see him at [facility] call [phone]. After hours or on weekends call the hospital  at [phone] and ask to speak with the resident on call.     Wound  care and dressings   Order Comments: You have a brown dressing on which should remain in place 5 days. You may shower over this dressing. After 5 days you may remove it. Underneath there may be steri-strips. Leave these in place. You may shower with your wound un covered as long as it is clean and dry. Do not scrub your wound. You may leave your wound uncovered as long as it is clean and dry. Notify your physician if you notice any drainage.  .       Christiano Abreu MD   Orthopaedic Surgery Resident

## 2020-02-03 ENCOUNTER — OFFICE VISIT (OUTPATIENT)
Dept: ORTHOPEDICS | Facility: CLINIC | Age: 55
End: 2020-02-03
Payer: COMMERCIAL

## 2020-02-03 DIAGNOSIS — Z96.612 STATUS POST TOTAL SHOULDER ARTHROPLASTY, LEFT: Primary | ICD-10-CM

## 2020-02-03 NOTE — NURSING NOTE
Reason For Visit:   Chief Complaint   Patient presents with     Surgical Followup     DOS 1/22/20 S/P Left TSA and distal excision       PCP: Kristin Miner        ?  No  Occupation Not working.     Date of injury: Over the years  Type of injury: Many injuries to left shoulder. .  Date of surgery: No  Smoker: No     Ambedexterous hand dominant         SANE score  Affected shoulder: Left  Right shoulder SANE: 100  Left shoulder SANE: 0-5    There were no vitals taken for this visit.      Pain Assessment  Patient Currently in Pain: Yes  0-10 Pain Scale: 2  Primary Pain Location: Generalized  Pain Descriptors: Discomfort    Vanita Beebe LPN

## 2020-02-03 NOTE — PROGRESS NOTES
Subjective: Patient is approximately 12 days out from her left total shoulder arthroplasty and open distal clavicle excision done on 1/22/2020.  Overall, she is doing quite well.  Her pain is been well controlled.  She is still taking about 2 oxycodones a day as well as occasional ibuprofen.  She has been doing her home PT exercises including pulleys as instructed.  This is been going well.  She denies any numbness or tingling down the left upper extremity.  Denies any recent fever, chills, sweats, erythema or discharge around the incision.    Physical exam:  General: Alert and oriented x3, no acute distress     Left upper extremity:  Incision clean, dry, intact. No surrounding erythema or discharge  Hand warm and well perfused  SILT in axillary, radial, median, ulnar nerves  Motor function intact to AIN, PIN, ulnar nerves      Imaging: No new imaging obtained at today's visit    Assessment: 2 weeks status post left total shoulder arthroplasty and open distal clavicle excision, progressing as expected    Plan: Patient is doing well.  She will continue to wear her sling for a total of 6 weeks.  Continue with current restrictions of supine gravity eliminated and forward elevation 0 to unlimited and passive external rotation at the side limited to 40 degrees.  At 6 weeks she may come out of the sling and at that time will begin gentle progressive 4 quadrant stretching with no internal rotation up to the back. Followup in 4 weeks.    Santino Carrera MD  Sports Medicine Fellow

## 2020-02-03 NOTE — LETTER
2/3/2020       RE: Kylie Austin  [address] [facility] [address]     Dear Colleague,    Thank you for referring your patient, Kylie Austin, to the [facility] at [facility]. Please see a copy of my visit note below.      Subjective: Patient is approximately 12 days out from her left total shoulder arthroplasty and open distal clavicle excision done on 1/22/2020.  Overall, she is doing quite well.  Her pain is been well controlled.  She is still taking about 2 oxycodones a day as well as occasional ibuprofen.  She has been doing her home PT exercises including pulleys as instructed.  This is been going well.  She denies any numbness or tingling down the left upper extremity.  Denies any recent fever, chills, sweats, erythema or discharge around the incision.    Physical exam:  General: Alert and oriented x3, no acute distress     Left upper extremity:  Incision clean, dry, intact. No surrounding erythema or discharge  Hand warm and well perfused  SILT in axillary, radial, median, ulnar nerves  Motor function intact to AIN, PIN, ulnar nerves      Imaging: No new imaging obtained at today's visit    Assessment: 2 weeks status post left total shoulder arthroplasty and open distal clavicle excision, progressing as expected    Plan: Patient is doing well.  She will continue to wear her sling for a total of 6 weeks.  Continue with current restrictions of supine gravity eliminated and forward elevation 0 to unlimited and passive external rotation at the side limited to 40 degrees.  At 6 weeks she may come out of the sling and at that time will begin gentle progressive 4 quadrant stretching with no internal rotation up to the back. Followup in 4 weeks.    Santino Carrera MD  Sports Medicine Fellow      S:  Doing well.    O:  Incision clean, dry, and intact  Sets Deltoid  Wiggles fingers  Warm and well-perfused hand with palpable  pulse    A/P:  Doing well  Advance per op report      Again, thank you for allowing me to participate in the care of your patient.      Sincerely,    Omari Tobin MD

## 2020-02-10 ENCOUNTER — OFFICE VISIT (OUTPATIENT)
Dept: PALLIATIVE MEDICINE | Facility: CLINIC | Age: 55
End: 2020-02-10
Payer: MEDICARE

## 2020-02-10 VITALS
SYSTOLIC BLOOD PRESSURE: 100 MMHG | DIASTOLIC BLOOD PRESSURE: 69 MMHG | WEIGHT: 201 LBS | HEART RATE: 69 BPM | BODY MASS INDEX: 30.56 KG/M2

## 2020-02-10 DIAGNOSIS — M54.2 CERVICALGIA: ICD-10-CM

## 2020-02-10 DIAGNOSIS — M75.42 IMPINGEMENT SYNDROME, SHOULDER, LEFT: Primary | ICD-10-CM

## 2020-02-10 DIAGNOSIS — G56.22 NEUROPATHY, ULNAR AT ELBOW, LEFT: ICD-10-CM

## 2020-02-10 PROCEDURE — 99214 OFFICE O/P EST MOD 30 MIN: CPT | Performed by: PSYCHIATRY & NEUROLOGY

## 2020-02-10 ASSESSMENT — PAIN SCALES - GENERAL: PAINLEVEL: MILD PAIN (2)

## 2020-02-10 NOTE — PROGRESS NOTES
[facility]     Date of visit: 2/10/2020     Chief complaint:   Chief Complaint   Patient presents with     Pain         Interval history:  Kylie Austin was last seen by me on 12/16/19.       Recommendations/plan at the last visit included:  1. Physical Therapy:  Not at this time  2. Clinical Health Psychologist to address issues of relaxation, behavioral change, coping style, and other factors important to improvement.  Not at this time  3. Diagnostic Studies:  Patient is scheduled for EMG left upper extremity next week to evaluate for median nerve neuropathy  4. Medication Management:    1. Continue with regimen as prescribed per psychiatry including gabapentin 100 TID, topamax 100 daily, effextor 225 daily.  Discussed how these medications can affect mood, pain and weight.   2. Voltaren to painful joints  3. Medical cannabis renewal done today  4. Surgeon to manage opioids around surgery  5. Further procedures recommended: None at this time  6. Recommendations to PCP: Continue current treatment  7. Follow up: 1 month after surgery        Since her last visit, Kylie Austin reports:  - completed EMG which showed:   1. Mild to moderate left ulnar neuropathy at the elbow.  2. Possible mild left median neuropathy at the wrist. Minimal findings are seen in one parameter only.  3. No evidence of axonal injury to cervical nerve roots at the C7 or C6 level.   - has not tried splints yet.   - Still using voltaren gel for joint pain and states it is very helpful.  - Had left shoulder total replacement on Jan 22, 2020.  Is currently doing HEP that she learned in PT. States she does this twice daily. Caused her fibro to flare up in her left arm. Improved significantly with Voltaren gel and pain meds. Currently using ibuprofen and oxycodone, oxycodone she takes 1 pill at bedtime and feels like she may be able to come off it soon.   - Does not need  medication refills.   - States neck pain has significantly improved since shoulder surgery.   - went to [place] for a week, saw some shows, walked a lot     Pain scores:  Pain intensity on average is 5 on a scale of 0-10.      Current pain treatments:   Medical cannabis - very helpful with pain and mood  Effexor-XR 225mg daily  Gabapentin 100 TID  voltaren     Past pain treatments:  Tramadol  Vicodin   Advil- states he's been told by cardiology to not take any more NSAIDs   Prednisone   Sumatriptan   Cymbalta, Effexor   Topamax 100mg daily     Other treatments have included:  PT: Pursued in past  Injections:   - 8/5/19 - Left shoulder and left knee joint injection - helpful  - 12/7/18 left shoulder glenohumeral joint injection  - 5/21/18 left shoulder subacromial bursa injection - helpful  - 1/17/18 left shoulder subacromial bursa injection - helpful  - multiple injections including cervical RFA - short term relief with RFA    Side Effects:  no side effect     Medications:  Current Outpatient Medications   Medication     ARIPiprazole (ABILIFY) 20 MG tablet     diclofenac (VOLTAREN) 1 % topical gel     estradiol (ESTRACE) 2 MG tablet     furosemide (LASIX) 20 MG tablet     gabapentin (NEURONTIN) 100 MG capsule     medical cannabis (Patient's own supply.  Not a prescription)     medical cannabis inhalation (Patient's own supply.  Not a prescription)     order for DME     oxyCODONE (ROXICODONE) 5 MG tablet     simvastatin (ZOCOR) 40 MG tablet     traZODone (DESYREL) 100 MG tablet     venlafaxine (EFFEXOR-XR) 75 MG 24 hr capsule     topiramate (TOPAMAX) 100 MG tablet     Current Facility-Administered Medications   Medication     cross-Linked Hyaluronate (GEL-ONE) injection PRSY 30 mg     lidocaine (PF) (XYLOCAINE) 1 % injection 6 mL      Medical History: any changes in medical history since they were last seen? No     Review of Systems:  The 14 system ROS was reviewed from the intake questionnaire, and is positive for:  "fatigue, stiffness, neck and back pain  Any bowel or bladder problems: No  Mood: \"great\"     Physical Exam:  /69   Pulse 69   Wt 91.2 kg (201 lb)   BMI 30.56 kg/m     General: Resting in chair comfortably  Gait:     Slow gait  MSK exam: limited AROM of left shoulder secondary to recent surgery. Incision along anterior shoulder is clean dry and intact. No visible sutures. No erythema or drainage noted. Some ecchymosis noted along lateral left shoulder and upper arm.  Neg esteban's bilaterally  CN 2-12 grossly intact     Assessment:   1. Chronic pain syndrome  2. Chronic neck and back pain  3. Left shoulder pain/impingement  4. Chronic left knee pain  5. Osteoarthritis in multiple joints  6. Cervical spondylosis and degenerative disc disease  7. Myofascial pain  8. Sacroiliac joint pain  9. Lumbar facet joint pain  10.  Medical cannabis use   11. Transgender to female           Plan:  1. Physical Therapy:  continue home PT  2. Clinical Health Psychologist to address issues of relaxation, behavioral change, coping style, and other factors important to improvement.  Not at this time  3. Diagnostic Studies:  none  4. Medication Management:  no changes to medications at this time  5. Further procedures recommended:   6. Recommendations to PCP: none  7. Follow up:  3 months.     I saw and examined the patient with the Pain Fellow/Resident. I have reviewed and agree with the resident's note and plan of care and made changes and corrections directly to the body of the note.    TIME SPENT:  BY FELLOW/RESIDENT ALONE 15 MIN  BY MYSELF AND FELLOW/RESIDENT TOGETHER 10 MIN  BY MYSELF WITHOUT THE FELLOW/RESIDENT 15 MIN    These times included 15 minutes I spent counseling her about her diagnosis and treatment options and coordination of care with the primary team    Lanie Fowler MD  [facility]     "

## 2020-02-10 NOTE — PATIENT INSTRUCTIONS
----------------------------------------------------------------  [facility] Number:  [phone]     Call with any questions about your care and for scheduling assistance.     Calls are returned Monday through Friday between 8 AM and 4:30 PM. We usually get back to you within 2 business days depending on the issue/request.    If we are prescribing your medications:    For opioid medication refills, call the clinic or send a Flumes message 7 days in advance.  Please include:    Name of requested medication    Name of the pharmacy.    For non-opioid medications, call your pharmacy directly to request a refill. Please allow 3-4 days to be processed.     Per [place] State Law:    All controlled substance prescriptions must be filled within 30 days of being written.      For those controlled substances allowing refills, pickup must occur within 30 days of last fill.      We believe regular attendance is key to your success in our program!      Any time you are unable to keep your appointment we ask that you call us at least 24 hours in advance to cancel.This will allow us to offer the appointment time to another patient.     Multiple missed appointments may lead to dismissal from the clinic.

## 2020-02-21 DIAGNOSIS — Z96.612 STATUS POST TOTAL SHOULDER ARTHROPLASTY, LEFT: Primary | ICD-10-CM

## 2020-03-02 ENCOUNTER — OFFICE VISIT (OUTPATIENT)
Dept: ORTHOPEDICS | Facility: CLINIC | Age: 55
End: 2020-03-02
Payer: COMMERCIAL

## 2020-03-02 DIAGNOSIS — Z96.612 STATUS POST TOTAL SHOULDER ARTHROPLASTY, LEFT: Primary | ICD-10-CM

## 2020-03-02 NOTE — LETTER
3/2/2020       RE: Kylie Austin  [address] [facility] [address]     Dear Colleague,    Thank you for referring your patient, Kylie Austin, to the [facility] at [facility]. Please see a copy of my visit note below.    S:  Doing well.    O:  Incision clean, dry, and intact  Sets Deltoid  Wiggles fingers  Warm and well-perfused hand with palpable pulse    A/P:  Doing well  Advance per op report      Again, thank you for allowing me to participate in the care of your patient.      Sincerely,    Omari Tobin MD

## 2020-03-02 NOTE — NURSING NOTE
Reason For Visit:   Chief Complaint   Patient presents with     Surgical Followup     DOS 1/22/19 S/P Left shoulder TSA , Clavicle excision     PCP: Kristin Miner        ?  No  Occupation Not working.     Date of injury: Over the years  Type of injury: Many injuries to left shoulder. .  Date of surgery: No  Smoker: No     Ambedexterous hand dominant      SANE score  Affected shoulder: Left   Right shoulder SANE: 100  Left shoulder SANE: 5        Pain Assessment  Patient Currently in Pain: Joseph Beebe LPN

## 2020-03-04 ENCOUNTER — THERAPY VISIT (OUTPATIENT)
Dept: PHYSICAL THERAPY | Facility: CLINIC | Age: 55
End: 2020-03-04
Payer: MEDICARE

## 2020-03-04 DIAGNOSIS — M25.512 ACUTE PAIN OF LEFT SHOULDER: ICD-10-CM

## 2020-03-04 DIAGNOSIS — Z96.612 STATUS POST TOTAL SHOULDER ARTHROPLASTY, LEFT: ICD-10-CM

## 2020-03-04 PROCEDURE — 97161 PT EVAL LOW COMPLEX 20 MIN: CPT | Mod: GP | Performed by: PHYSICAL THERAPIST

## 2020-03-04 PROCEDURE — 97110 THERAPEUTIC EXERCISES: CPT | Mod: GP | Performed by: PHYSICAL THERAPIST

## 2020-03-04 NOTE — PROGRESS NOTES
[facility] Initial Evaluation  Subjective:  The history is provided by the patient. No  was used.   Patient Health History  Kylie Austin being seen for left shoulder pain.     Problem began: 1/22/2020.   Problem occurred: shoulder replacement surgery   Pain is reported as 2/10 on pain scale.  General health as reported by patient is excellent.  Pertinent medical history includes: depression, fibromyalgia, heart problems, mental illness, overweight, osteoarthritis and pain at night/rest.   Red flags:  None as reported by patient.  Medical allergies: other. Other medical allergies details: ammioterone, Prozac, tetracycline.   Surgeries include:  Orthopedic surgery and other. Other surgery history details: lower jaw surgery, Gender reassignment surgery.    Current medications:  Anti-depressants, hormone replacement therapy, pain medication and sleep medication.    Current occupation is retired.   Primary job tasks: home tasks.                  Therapist Generated HPI Evaluation  Problem details: Date of MD order for this episode was 2-21-20. Kylie is s/p L total shoulder replacement with distal clavicle excision on 1-22-20. She is 6 weeks post op. MD order is for gentle stretching and avoiding IR up back. Kylie has been out of her sling since Monday, states she is sore but doing ok. .         Type of problem:  Left shoulder.    This is a new condition.  Condition occurred with:  Repetition/overuse.  Where condition occurred: for unknown reasons.  Patient reports pain:  Anterior and lateral.  Pain is described as aching (dull) and is constant.  Pain is the same all the time.  Since onset symptoms are gradually improving.  Associated symptoms:  Loss of motion/stiffness and loss of strength. Exacerbated by: limited in tasks due to recent surgery, driving.  and relieved by ice and activity/movement.      Work activity restrictions: retired.  Barriers include:  None as  reported by patient.                        Objective:  Standing Alignment:                  General deviations alignment: significant FH posture, elevated traps.                           Shoulder Evaluation:  ROM:  AROM:    Flexion:  Left:  85        Abduction:  Left: 77       Internal Rotation:  Left:  To lateral hip      External Rotation:  Left:  Neutral                    PROM:    Flexion:  Left:  108          Abduction:  Left:  88        Internal Rotation:  Left:  To lateral hip      External Rotation:  Left:  30                        Strength:  : deferred due to recent surgery.                          Palpation:  Palpation assessed shoulder: incision dry and scar healing, will need to progress to gentle scar massage.                                         General     ROS    Assessment/Plan:    Patient is a 54 year old female with left side shoulder complaints.    Patient has the following significant findings with corresponding treatment plan.                Diagnosis 1:  L total shoulder arthroplasty with distal clavicle resection  Pain -  hot/cold therapy, self management, education and home program  Decreased ROM/flexibility - therapeutic exercise and home program  Decreased strength - therapeutic exercise, therapeutic activities and home program  Decreased proprioception - neuro re-education, therapeutic activities and home program  Impaired muscle performance - neuro re-education and home program  Decreased function - therapeutic activities and home program  Impaired posture - neuro re-education and home program    Therapy Evaluation Codes:   1) History comprised of:   Personal factors that impact the plan of care:      Past/current experiences.    Comorbidity factors that impact the plan of care are:      Depression, Fibromyalgia and Overweight.     Medications impacting care: Anti-depressant and Pain.  2) Examination of Body Systems comprised of:   Body structures and functions that impact the plan  of care:      Shoulder.   Activity limitations that impact the plan of care are:      Bathing, Cooking, Driving, Dressing, Lifting, Sleeping and Laying down.  3) Clinical presentation characteristics are:   Stable/Uncomplicated.  4) Decision-Making    Low complexity using standardized patient assessment instrument and/or measureable assessment of functional outcome.  Cumulative Therapy Evaluation is: Low complexity.    Previous and current functional limitations:  (See Goal Flow Sheet for this information)    Short term and Long term goals: (See Goal Flow Sheet for this information)     Communication ability:  Patient appears to be able to clearly communicate and understand verbal and written communication and follow directions correctly.  Treatment Explanation - The following has been discussed with the patient:   RX ordered/plan of care  Anticipated outcomes  Possible risks and side effects  This patient would benefit from PT intervention to resume normal activities.   Rehab potential is good.    Frequency:  2 X week, once daily  Duration:  for 6 weeks  Discharge Plan:  Achieve all LTG.  Independent in home treatment program.  Reach maximal therapeutic benefit.    Please refer to the daily flowsheet for treatment today, total treatment time and time spent performing 1:1 timed codes.

## 2020-03-04 NOTE — LETTER
DEPARTMENT OF HEALTH AND HUMAN SERVICES  [facility] FOR [facility] & MEDICAID SERVICES    PLAN/UPDATED PLAN OF PROGRESS FOR OUTPATIENT REHABILITATION    PATIENTS NAME:  Kylie Mccloud   : 1965  PROVIDER NUMBER:    [id number]  HICN: [id number]  PROVIDER NAME: LASHELL PARSONS  MEDICAL RECORD NUMBER: [id number]   START OF CARE DATE:  SOC Date: 20   TYPE:  PT  PRIMARY/TREATMENT DIAGNOSIS: (Pertinent Medical Diagnosis)  Status post total shoulder arthroplasty, left  Acute pain of left shoulder  VISITS FROM START OF CARE:  Rxs Used: 1     [facility] Initial Evaluation  Subjective:  The history is provided by the patient. No  was used.   Patient Health History  Kylie Austin being seen for left shoulder pain.   Problem began: 2020.   Problem occurred: shoulder replacement surgery   Pain is reported as 2/10 on pain scale.  General health as reported by patient is excellent.  Pertinent medical history includes: depression, fibromyalgia, heart problems, mental illness, overweight, osteoarthritis and pain at night/rest.   Red flags:  None as reported by patient.  Medical allergies: other. Other medical allergies details: ammioterone, Prozac, tetracycline.   Surgeries include:  Orthopedic surgery and other. Other surgery history details: lower jaw surgery, Gender reassignment surgery.    Current medications:  Anti-depressants, hormone replacement therapy, pain medication and sleep medication.    Current occupation is retired.   Primary job tasks: home tasks.             Therapist Generated HPI Evaluation  Problem details: Date of MD order for this episode was 20. Kylie is s/p L total shoulder replacement with distal clavicle excision on 20. She is 6 weeks post op. MD order is for gentle stretching and avoiding IR up back. Kylie has been out of her sling since Monday, states she is sore but doing ok. .         Type of problem:  Left  shoulder.  This is a new condition.  Condition occurred with:  Repetition/overuse.  Where condition occurred: for unknown reasons.  Patient reports pain:  Anterior and lateral.  Pain is described as aching (dull) and is constant.  Pain is the same all the time.  Since onset symptoms are gradually improving.  Associated symptoms:  Loss of motion/stiffness and loss of strength. Exacerbated by: limited in tasks due to recent surgery, driving.  and relieved by ice and activity/movement.  PATIENTS NAME:  Kylie Mccloud   : 1965    Work activity restrictions: retired.  Barriers include:  None as reported by patient.              Objective:  Standing Alignment:    General deviations alignment: significant FH posture, elevated traps.  Shoulder Evaluation:  ROM:  AROM:    Flexion:  Left:  85      Abduction:  Left: 77     Internal Rotation:  Left:  To lateral hip      External Rotation:  Left:  Neutral      PROM:    Flexion:  Left:  108        Abduction:  Left:  88      Internal Rotation:  Left:  To lateral hip      External Rotation:  Left:  30      Strength:  : deferred due to recent surgery.  Palpation:  Palpation assessed shoulder: incision dry and scar healing, will need to progress to gentle scar massage.    Assessment/Plan:    Patient is a 54 year old female with left side shoulder complaints.    Patient has the following significant findings with corresponding treatment plan.                Diagnosis 1:  L total shoulder arthroplasty with distal clavicle resection  Pain -  hot/cold therapy, self management, education and home program  Decreased ROM/flexibility - therapeutic exercise and home program  Decreased strength - therapeutic exercise, therapeutic activities and home program  Decreased proprioception - neuro re-education, therapeutic activities and home program  Impaired muscle performance - neuro re-education and home program  Decreased function - therapeutic activities and home  program  Impaired posture - neuro re-education and home program    Therapy Evaluation Codes:   1) History comprised of:   Personal factors that impact the plan of care:      Past/current experiences.    Comorbidity factors that impact the plan of care are:      Depression, Fibromyalgia and Overweight.     Medications impacting care: Anti-depressant and Pain.  2) Examination of Body Systems comprised of:   Body structures and functions that impact the plan of care:      Shoulder.   Activity limitations that impact the plan of care are:      Bathing, Cooking, Driving, Dressing, Lifting, Sleeping and Laying down.  3) Clinical presentation characteristics are:   Stable/Uncomplicated.  4) Decision-Making    Low complexity using standardized patient assessment instrument and/or measureable assessment of functional outcome.  PATIENTS NAME:  Kylie Mccloud   : 1965    Cumulative Therapy Evaluation is: Low complexity.  Previous and current functional limitations:  (See Goal Flow Sheet for this information)    Short term and Long term goals: (See Goal Flow Sheet for this information)   Communication ability:  Patient appears to be able to clearly communicate and understand verbal and written communication and follow directions correctly.  Treatment Explanation - The following has been discussed with the patient:   RX ordered/plan of care  Anticipated outcomes  Possible risks and side effects  This patient would benefit from PT intervention to resume normal activities.   Rehab potential is good.  Frequency:  2 X week, once daily  Duration:  for 6 weeks  Discharge Plan:  Achieve all LTG.  Independent in home treatment program.  Reach maximal therapeutic benefit.  Please refer to the daily flowsheet for treatment today, total treatment time and time spent performing 1:1 timed codes.         Caregiver Signature/Credentials _____________________________ Date ________      Treating Provider: Nimco Mercado PT   I  "have reviewed and certified the need for these services and plan of treatment while under my care.        PHYSICIAN'S SIGNATURE:   _________________________________________  Date___________   Omari Tobin M.D.    Certification period:  Beginning of Cert date period: 03/04/20 to  End of Cert period date: 06/01/20     Functional Level Progress Report: Please see attached \"Goal Flow sheet for Functional level.\"    ____X____ Continue Services or       ________ DC Services                Service dates: From  SOC Date: 03/04/20 date to present                         "

## 2020-03-09 ENCOUNTER — THERAPY VISIT (OUTPATIENT)
Dept: PHYSICAL THERAPY | Facility: CLINIC | Age: 55
End: 2020-03-09
Payer: MEDICARE

## 2020-03-09 DIAGNOSIS — M25.512 ACUTE PAIN OF LEFT SHOULDER: ICD-10-CM

## 2020-03-09 DIAGNOSIS — Z96.612 STATUS POST TOTAL SHOULDER ARTHROPLASTY, LEFT: ICD-10-CM

## 2020-03-09 PROCEDURE — 97110 THERAPEUTIC EXERCISES: CPT | Mod: GP | Performed by: PHYSICAL THERAPIST

## 2020-03-11 ENCOUNTER — THERAPY VISIT (OUTPATIENT)
Dept: PHYSICAL THERAPY | Facility: CLINIC | Age: 55
End: 2020-03-11
Payer: MEDICARE

## 2020-03-11 DIAGNOSIS — Z96.612 STATUS POST TOTAL SHOULDER ARTHROPLASTY, LEFT: ICD-10-CM

## 2020-03-11 DIAGNOSIS — M25.512 ACUTE PAIN OF LEFT SHOULDER: ICD-10-CM

## 2020-03-11 PROCEDURE — 97110 THERAPEUTIC EXERCISES: CPT | Mod: GP | Performed by: PHYSICAL THERAPIST

## 2020-03-16 ENCOUNTER — THERAPY VISIT (OUTPATIENT)
Dept: PHYSICAL THERAPY | Facility: CLINIC | Age: 55
End: 2020-03-16
Payer: MEDICARE

## 2020-03-16 DIAGNOSIS — M25.512 ACUTE PAIN OF LEFT SHOULDER: ICD-10-CM

## 2020-03-16 DIAGNOSIS — Z96.612 STATUS POST TOTAL SHOULDER ARTHROPLASTY, LEFT: ICD-10-CM

## 2020-03-16 PROCEDURE — 97110 THERAPEUTIC EXERCISES: CPT | Mod: GP | Performed by: PHYSICAL THERAPIST

## 2020-03-26 ENCOUNTER — TELEPHONE (OUTPATIENT)
Dept: PHYSICAL THERAPY | Facility: CLINIC | Age: 55
End: 2020-03-26

## 2020-03-26 NOTE — TELEPHONE ENCOUNTER
Called to check status due to cx appt. Kylie cx as her wife is ill with flu(not covid). Made Kylie aware she may be seeing other providers over the next several weeks as we work through coverage logistics. She was fine with that. Does have webcam for when ready to transition to virtual visits.

## 2020-03-30 ENCOUNTER — THERAPY VISIT (OUTPATIENT)
Dept: PHYSICAL THERAPY | Facility: CLINIC | Age: 55
End: 2020-03-30
Payer: MEDICARE

## 2020-03-30 DIAGNOSIS — M25.512 ACUTE PAIN OF LEFT SHOULDER: ICD-10-CM

## 2020-03-30 DIAGNOSIS — Z96.612 STATUS POST TOTAL SHOULDER ARTHROPLASTY, LEFT: ICD-10-CM

## 2020-03-30 PROCEDURE — 97110 THERAPEUTIC EXERCISES: CPT | Mod: GP | Performed by: PHYSICAL THERAPIST

## 2020-03-30 NOTE — PROGRESS NOTES
"Subjective:  HPI  Physical Exam                    Objective:  System    Physical Exam    General     ROS    Assessment/Plan:    PROGRESS  REPORT    Progress reporting period is from 3-4-20 to 3-30-20, 5 visits.       SUBJECTIVE  Subjective changes noted by patient:  .  Subjective: States she is having less pain, can reach top of head now so can brush hair. Strength is not quite there. Easier to reach behind back. States sleep is difficult, waking frequently due to other issues.  States the neck is feeling \"great\" no issues with it since the surgery.      Current Pain level: 1/10.      Initial Pain level: 2/10.   Changes in function:  Yes (See Goal flowsheet attached for changes in current functional level)  Adverse reaction to treatment or activity: None    OBJECTIVE  Changes noted in objective findings:  Yes,   Objective: 10 wks PO 4-1-20, still at limitations of gentle progressive 4 quadrant stretching(no IR up back)  Standing active L shoulder flx 110, abd 75, IR to sacrum, ER 35   Supine PROM flx 153, ER 40, abd 110    Did trial SL abd today, painfree. Standing shoulder flx to 90 working on scapular mechanics is also painfree    ASSESSMENT/PLAN  Updated problem list and treatment plan: Diagnosis 1:  S/p L TSA with distal clavicle excision  Pain -  manual therapy, self management, education and home program  Decreased ROM/flexibility - manual therapy, therapeutic exercise and home program  Decreased strength - therapeutic exercise, therapeutic activities and home program  Impaired muscle performance - neuro re-education and home program  Decreased function - therapeutic activities and home program  The above all pending what MD allows progression to after appt on 4-6-20 with him  STG/LTGs have been met or progress has been made towards goals:  Yes (See Goal flow sheet completed today.)  Assessment of Progress: The patient's condition is improving.  Self Management Plans:  Patient has been instructed in a home " treatment program.  Patient  has been instructed in self management of symptoms.  I have re-evaluated this patient and find that the nature, scope, duration and intensity of the therapy is appropriate for the medical condition of the patient.  Kylie continues to require the following intervention to meet STG and LTG's:  PT    Recommendations:  MD xavier/armando 4-6-20, please advise regarding restrictions. Plan to see in clinic 1x/wk until ready to progress to virtual visits.    Please refer to the daily flowsheet for treatment today, total treatment time and time spent performing 1:1 timed codes.

## 2020-04-06 ENCOUNTER — VIRTUAL VISIT (OUTPATIENT)
Dept: ENDOCRINOLOGY | Facility: CLINIC | Age: 55
End: 2020-04-06
Payer: COMMERCIAL

## 2020-04-06 ENCOUNTER — VIRTUAL VISIT (OUTPATIENT)
Dept: ORTHOPEDICS | Facility: CLINIC | Age: 55
End: 2020-04-06
Payer: COMMERCIAL

## 2020-04-06 DIAGNOSIS — M25.512 LEFT SHOULDER PAIN, UNSPECIFIED CHRONICITY: Primary | ICD-10-CM

## 2020-04-06 DIAGNOSIS — M79.7 FIBROMYALGIA: ICD-10-CM

## 2020-04-06 DIAGNOSIS — G89.4 CHRONIC PAIN DISORDER: ICD-10-CM

## 2020-04-06 RX ORDER — TOPIRAMATE 100 MG/1
TABLET, FILM COATED ORAL
Qty: 90 TABLET | Refills: 5 | Status: SHIPPED | OUTPATIENT
Start: 2020-04-06 | End: 2021-08-03

## 2020-04-06 NOTE — PROGRESS NOTES
Return Medical Weight Management Note     Kylie Austin  MRN:  [id number]  :  1965  BLANCA:  2019    Dear Kristofer, Kristin Tariq,    I had the pleasure of seeing your patient Kylie Austin.  She is a 54 year old female who I am continuing to see for treatment of obesity related to:     2018   I have the following health issues associated with obesity: High Cholesterol, Fatty Liver, Weight Bearing Joint Pain   I have the following symptoms associated with obesity: Knee Pain, Fatty Liver, Depression   Had bariatric surgery (gastric sleeve) 2018    CURRENT WEIGHT:   0 lbs 0 oz Self report 213 today    Wt Readings from Last 4 Encounters:   02/10/20 91.2 kg (201 lb)   20 92 kg (202 lb 13.2 oz)   20 91 kg (200 lb 9.6 oz)   19 91.2 kg (201 lb)     Height:  Data Unavailable  Body Mass Index:  There is no height or weight on file to calculate BMI.  Vitals:  B/P: 106/66, P: 75    Initial consult weight was 265 lbs on 2018.  Weight change since last seen on visit date not found is down 1 pound.   Total loss is 67 pounds.    INTERVAL HISTORY:  Stopped taking topiramate as psychiatrist thought was not working and now gaining weight.     Diet Recall Review with Patient 2018   Do you typically eat breakfast? Yes   If you do eat breakfast, what types of food do you eat? egg, dry toast, or protien shake   Do you typically eat lunch? Yes   If you do eat lunch, what types of food do you typically eat?  salad, soup, or 1\2 sandwhich   Do you typically eat supper? Yes   If you do eat supper, what types of food do you typically eat? turkey meat loaf, coliflower potatoes, grilled chicken, grilled ham, salad, vegies   Do you typically eat snacks? No   If you do snack, what types of food do you typically eat? veggies   How many glasses of juice do you drink in a typical day? 0   How many of glasses of milk do you drink in a typical day? 0   How many 8oz glasses of  "sugar containing drinks such as Ney-Aid/sweet tea do you drink in a day? 0   How many cans/bottles of sugar pop/soda/tea/sports drinks do you drink in a day? 0   How many cans/bottles of diet pop/soda/tea or sports drink do you drink in a day? 0   How often do you have a drink of alcohol? Monthly or Less   If you do drink, how many drinks might you have in a day? 1 or 2     MEDICATIONS:   Current Outpatient Medications   Medication     ARIPiprazole (ABILIFY) 20 MG tablet     diclofenac (VOLTAREN) 1 % topical gel     estradiol (ESTRACE) 2 MG tablet     furosemide (LASIX) 20 MG tablet     gabapentin (NEURONTIN) 100 MG capsule     medical cannabis (Patient's own supply.  Not a prescription)     medical cannabis inhalation (Patient's own supply.  Not a prescription)     order for DME     oxyCODONE (ROXICODONE) 5 MG tablet     simvastatin (ZOCOR) 40 MG tablet     topiramate (TOPAMAX) 100 MG tablet     traZODone (DESYREL) 100 MG tablet     venlafaxine (EFFEXOR-XR) 75 MG 24 hr capsule     Current Facility-Administered Medications   Medication     cross-Linked Hyaluronate (GEL-ONE) injection PRSY 30 mg     lidocaine (PF) (XYLOCAINE) 1 % injection 6 mL     Weight Loss Medication History Reviewed With Patient 4/5/2020   Which weight loss medications are you currently taking on a regular basis?  None   If you are not taking a weight loss medication that was prescribed to you, please indicate why: Other   Are you having any side effects from the weight loss medication that we have prescribed you? No     ASSESSMENT:   Will restart topiramate and work up to 100 and 200 at supper. Reinforce food plan - focus on no between meal snacking, aggressive lowering of starches and cheese.     FOLLOW-UP:    3 months.  The patient is being evaluated via a billable telephone visit and has been notified of following:     \"This telephone visit will be conducted via a call between you and your physician/provider. We have found that certain " "health care needs can be provided without the need for a physical exam.  This service lets us provide the care you need with a short phone conversation.  If a prescription is necessary we can send it directly to your pharmacy.  If lab work is needed we can place an order for that and you can then stop by our lab to have the test done at a later time.    Telephone visits are billed at different rates depending on your insurance coverage. During this emergency period, for some insurers they may be billed the same as an in-person visit.  Please reach out to your insurance provider with any questions.    If during the course of the call the physician/provider feels a telephone visit is not appropriate, you will not be charged for this service.\"    Patient has given verbal consent for Telephone visit?  Yes    How would you like to obtain your AVS? MyChart    Phone call duration: 15 minutes..    Minh Mancilla MD       "

## 2020-04-08 ENCOUNTER — TELEPHONE (OUTPATIENT)
Dept: ORTHOPEDICS | Facility: CLINIC | Age: 55
End: 2020-04-08

## 2020-04-08 NOTE — TELEPHONE ENCOUNTER
Patient was called and a message was left to call back to schedule a follow up visit with Dr. Tobin.\    She can be scheduled with Dr. Tobin 3 months from last visit on 4/6/20 for a pop op visit as an in clinic appointment.

## 2020-04-09 ENCOUNTER — THERAPY VISIT (OUTPATIENT)
Dept: PHYSICAL THERAPY | Facility: CLINIC | Age: 55
End: 2020-04-09
Payer: MEDICARE

## 2020-04-09 DIAGNOSIS — Z96.612 STATUS POST TOTAL SHOULDER ARTHROPLASTY, LEFT: ICD-10-CM

## 2020-04-09 DIAGNOSIS — M25.512 LEFT SHOULDER PAIN, UNSPECIFIED CHRONICITY: ICD-10-CM

## 2020-04-09 DIAGNOSIS — M25.512 ACUTE PAIN OF LEFT SHOULDER: ICD-10-CM

## 2020-04-09 PROCEDURE — 97110 THERAPEUTIC EXERCISES: CPT | Mod: GP | Performed by: PHYSICAL THERAPIST

## 2020-04-09 PROCEDURE — 97112 NEUROMUSCULAR REEDUCATION: CPT | Mod: GP | Performed by: PHYSICAL THERAPIST

## 2020-04-16 ENCOUNTER — THERAPY VISIT (OUTPATIENT)
Dept: PHYSICAL THERAPY | Facility: CLINIC | Age: 55
End: 2020-04-16
Payer: MEDICARE

## 2020-04-16 DIAGNOSIS — M25.512 ACUTE PAIN OF LEFT SHOULDER: ICD-10-CM

## 2020-04-16 DIAGNOSIS — Z96.612 STATUS POST TOTAL SHOULDER ARTHROPLASTY, LEFT: ICD-10-CM

## 2020-04-16 PROCEDURE — 97110 THERAPEUTIC EXERCISES: CPT | Mod: GP | Performed by: PHYSICAL THERAPIST

## 2020-04-27 NOTE — TELEPHONE ENCOUNTER
Delivery Date:  2020      HISTORY OF PRESENT ILLNESS:  Leandra is a 32-year-old,  2, para 1-0-0-1, whose pregnancy was followed by myself.  The patient is blood type O positive, she is rubella immune and she is group B strep negative.  The patient did do NIPT testing that showed no detectable aneuploidy and declined her AFP.  She had a normal anatomy scan other than some choroid plexus cysts noted at 20 weeks, but these resolved by 24 weeks.  We discussed any further testing or referral for a level II anatomy scan, but the patient declined this.  The patient received both flu shot and DTaP shots during her pregnancy.  She passed her 1-hour GCT at 97 and had no other complications.        The patient delivered her first at 40 weeks 3 days after spontaneous labor.  We discussed with her this pregnancy, elective induction at 39 weeks if her cervix was favorable; however, during the course of the pregnancy because of the pandemic, decision was made that induction of labor in a controlled fashion in the 39th week was still an advisable approach regardless of whether the cervix was favorable or not.  At her appointment prior to her induction, the cervix was closed, 50% effaced and -2 to -3 station, so decision was made to bring her in for cervical ripening.      DELIVERY COURSE:  The patient presented yesterday at which time she was 39 weeks 2 days for Cervidil cervical ripening.  Fetal heart tones were category I.  The patient had a Cervidil overnight and upon my evaluation of her at approximately 8:30 a.m., the patient was 2 cm dilated, 50% effaced and -2 station.  Artificial rupture of membranes was done at approximately 8:30 a.m.  There was a large copious amount of clear fluid.  She received an epidural fairly quickly thereafter and was started on Pitocin.  Her active labor onset was documented fairly quickly after her artificial rupture of membranes at 9:15 a.m.  She reached a maximum of 8 milliunits per  Forms placed at . Patient will  on 1/2/18   minute of Pitocin.  At approximately 7-8 cm of dilation, she was noted to have some slight early decelerations and then right after my cervix check, she began to have some deep variables fairly quickly.  Within a few minutes of her 7-8 cm check, the patient was found to be complete and +1 station.  This was at 13:18.        She began to push fairly quickly thereafter and with excellent maternal pushing efforts went on to deliver a viable male infant via the left occiput anterior position at 13:28.  He did have his left arm up next to his face and he also had the cord basically slung over his shoulders.  The cord was reduced after the baby was delivered up to the abdomen.  Other than this, his Apgars were 8 and 9 and his weight is still pending at the time of this dictation.  He was placed on the maternal chest and abdomen.  After just over a minute of delayed cord clamping, the cord was doubly clamped and cut.        The placenta then delivered intact with a normal 3-vessel cord at 13:32.  The quantitative blood loss was 100 mL.  The patient sustained a small 3-inch vaginal base laceration that ran just up to the perineum, but did not extend onto the perineum.  This was repaired with a running locked 3-0 Vicryl suture.      Both mother and infant were doing extremely well immediately after delivery and there were no complications.  Needle and lap counts were correct x2.      First stage of labor 4 hours and 3 minutes, second stage 10 minutes, third stage 4 minutes for a total of 4 hours and 17 minutes.      DIAGNOSES:   1.  Intrauterine pregnancy, brought in for Cervidil cervical ripening at 39+2, done electively.   2.  Pitocin induction of labor.   3.  Epidural for pain control.   4.  Normal spontaneous vaginal delivery of a viable male infant.   5.  Vaginal laceration repaired in the normal fashion.         MAI GOLDMAN MD             D: 04/27/2020   T: 04/27/2020   MT: DRISS      Name:     ROBBIE KYLE   MRN:       [id number]        Account:        [id number]   :      1987        Delivery Date:  2020               Document: [id number]       cc: [facility]

## 2020-04-30 ENCOUNTER — THERAPY VISIT (OUTPATIENT)
Dept: PHYSICAL THERAPY | Facility: CLINIC | Age: 55
End: 2020-04-30
Payer: MEDICARE

## 2020-04-30 DIAGNOSIS — M25.512 ACUTE PAIN OF LEFT SHOULDER: ICD-10-CM

## 2020-04-30 DIAGNOSIS — Z96.612 STATUS POST TOTAL SHOULDER ARTHROPLASTY, LEFT: ICD-10-CM

## 2020-04-30 PROCEDURE — 97110 THERAPEUTIC EXERCISES: CPT | Mod: GP | Performed by: PHYSICAL THERAPIST

## 2020-05-14 ENCOUNTER — THERAPY VISIT (OUTPATIENT)
Dept: PHYSICAL THERAPY | Facility: CLINIC | Age: 55
End: 2020-05-14
Payer: MEDICARE

## 2020-05-14 DIAGNOSIS — M25.512 ACUTE PAIN OF LEFT SHOULDER: ICD-10-CM

## 2020-05-14 DIAGNOSIS — Z96.612 STATUS POST TOTAL SHOULDER ARTHROPLASTY, LEFT: ICD-10-CM

## 2020-05-14 PROCEDURE — 97110 THERAPEUTIC EXERCISES: CPT | Mod: GP | Performed by: PHYSICAL THERAPIST

## 2020-05-27 ENCOUNTER — MYC MEDICAL ADVICE (OUTPATIENT)
Dept: PALLIATIVE MEDICINE | Facility: CLINIC | Age: 55
End: 2020-05-27

## 2020-05-27 NOTE — TELEPHONE ENCOUNTER
Left voice mail for patient regarding conversion to a video visit on 6/1 with Lanie Fowler MD since we are taking every precaution to prevent the spread of COVID-19. LM that I would send a [employer] message with details and that she may call the main clinic number for details.     [employer] message sent to pt:    Mona Espinal,     I left a voice mail letting you know that Lanie Fowler MD is still not seeing patients in clinic due to COVID so the appointment on 6/1 needed to be changed to a video visit. To receive an invitation for the visit and connect with your provider, the [employer] video visit technology must be accessible from your smartphone or personal device. Please check the link below for setup instructions: [email or web address].     A medical assistant will call you 2-30 minutes before the appointment to get you checked in and make sure you have what is needed for the visit. Then the provider will reach out to you at the scheduled time of the visit.     Please call the main clinic number at [phone] if you have further questions or need help with getting things prepared for the visit.     Thank you,     ULI Morrow, RN-BC  Patient Care Supervisor  [facility]  --------------  Shravan Espinal,      I made a typo in the information that I sent to you. A medical assistant will call you 20-30 minutes (not 2-30) before the appointment to get you checked in and make sure you have what is needed for the visit. Then the provider will reach out to you at the scheduled time of the visit.     Take care,      ULI Morrow, RN-BC  Patient Care Supervisor  [facility]

## 2020-05-28 ENCOUNTER — THERAPY VISIT (OUTPATIENT)
Dept: PHYSICAL THERAPY | Facility: CLINIC | Age: 55
End: 2020-05-28
Payer: MEDICARE

## 2020-05-28 DIAGNOSIS — Z96.612 STATUS POST TOTAL SHOULDER ARTHROPLASTY, LEFT: ICD-10-CM

## 2020-05-28 DIAGNOSIS — M25.512 ACUTE PAIN OF LEFT SHOULDER: ICD-10-CM

## 2020-05-28 PROCEDURE — 97112 NEUROMUSCULAR REEDUCATION: CPT | Mod: GP | Performed by: PHYSICAL THERAPIST

## 2020-05-28 PROCEDURE — 97110 THERAPEUTIC EXERCISES: CPT | Mod: GP | Performed by: PHYSICAL THERAPIST

## 2020-06-01 ENCOUNTER — VIRTUAL VISIT (OUTPATIENT)
Dept: PALLIATIVE MEDICINE | Facility: CLINIC | Age: 55
End: 2020-06-01
Payer: MEDICARE

## 2020-06-01 DIAGNOSIS — G89.29 CHRONIC PAIN OF LEFT KNEE: ICD-10-CM

## 2020-06-01 DIAGNOSIS — M75.42 IMPINGEMENT SYNDROME, SHOULDER, LEFT: ICD-10-CM

## 2020-06-01 DIAGNOSIS — M25.522 LEFT ELBOW PAIN: ICD-10-CM

## 2020-06-01 DIAGNOSIS — M25.562 CHRONIC PAIN OF LEFT KNEE: ICD-10-CM

## 2020-06-01 PROCEDURE — 99213 OFFICE O/P EST LOW 20 MIN: CPT | Mod: 95 | Performed by: PSYCHIATRY & NEUROLOGY

## 2020-06-01 RX ORDER — GABAPENTIN 300 MG/1
300 CAPSULE ORAL 3 TIMES DAILY
Qty: 90 CAPSULE | Refills: 0 | COMMUNITY
Start: 2020-06-01 | End: 2021-08-03

## 2020-06-01 ASSESSMENT — PAIN SCALES - GENERAL: PAINLEVEL: MILD PAIN (2)

## 2020-06-01 NOTE — PATIENT INSTRUCTIONS
I apologize, I now realize I called you 30 min early!  Sorry if that inconvenienced you.    1. Schedule number for  Dr. oV- [phone]  2. We will call to schedule you in 3 months.

## 2020-06-01 NOTE — PROGRESS NOTES
"Kylie Austin is a 54 year old female who is being evaluated via a billable video visit.      The patient has been notified of following:     \"This video visit will be conducted via a call between you and your physician/provider. We have found that certain health care needs can be provided without the need for an in-person physical exam.  This service lets us provide the care you need with a video conversation.  If a prescription is necessary we can send it directly to your pharmacy.  If lab work is needed we can place an order for that and you can then stop by our lab to have the test done at a later time.    Video visits are billed at different rates depending on your insurance coverage.  Please reach out to your insurance provider with any questions.    If during the course of the call the physician/provider feels a video visit is not appropriate, you will not be charged for this service.\"    Patient has given verbal consent for Video visit? Yes    How would you like to obtain your AVS? [facility]    Patient would like the video invitation sent by: Text to cell phone: [phone]    Will anyone else be joining your video visit? [facility]     Date of visit: 6/1/2020      Chief complaint:   Chief Complaint   Patient presents with     Pain     Vidio visit due to COVID-19          Interval history:  Kylie Austin was last seen by me on 2/10/20       Recommendations/plan at the last visit included:  1. Physical Therapy:  continue home PT  2. Clinical Health Psychologist to address issues of relaxation, behavioral change, coping style, and other factors important to improvement.  Not at this time  3. Diagnostic Studies:  none  4. Medication Management:  no changes to medications at this time  5. Further procedures recommended:   6. Recommendations to PCP: none  7. Follow up:  3 months.        Since her last visit, Kylie Nunes " Geovanna reports:  -doing pretty well.  -shoulder is healing well, and she is able to move it much more freely.  She is working with PT< and is up to 2 lbs weights.  -neck pain continues to be better after shoulder surgery.  -overall no current changes.  Knee pain and elbow pain continue to be bothersome.  -she had a viscous injection with Dr. Vo in November.  She may want to repeat that.    Pain scores:  Pain intensity on average is 2 on a scale of 0-10.      Current pain treatments:   Medical cannabis - very helpful with pain and mood  Effexor-XR 225mg daily  Gabapentin 300 TID  voltaren     Past pain treatments:  Tramadol  Vicodin   Advil- states he's been told by cardiology to not take any more NSAIDs   Prednisone   Sumatriptan   Cymbalta, Effexor   Topamax 100mg daily     Other treatments have included:  PT: Pursued in past  Injections:   - 8/5/19 - Left shoulder and left knee joint injection - helpful  - 12/7/18 left shoulder glenohumeral joint injection  - 5/21/18 left shoulder subacromial bursa injection - helpful  - 1/17/18 left shoulder subacromial bursa injection - helpful  - multiple injections including cervical RFA - short term relief with radiofrequency ablation  -knee injection 11/5/19    Side Effects:  no side effect     Medications:  Current Outpatient Medications   Medication     ARIPiprazole (ABILIFY) 20 MG tablet     diclofenac (VOLTAREN) 1 % topical gel     estradiol (ESTRACE) 2 MG tablet     furosemide (LASIX) 20 MG tablet     gabapentin (NEURONTIN) 100 MG capsule     medical cannabis (Patient's own supply.  Not a prescription)     medical cannabis inhalation (Patient's own supply.  Not a prescription)     order for DME     simvastatin (ZOCOR) 40 MG tablet     topiramate (TOPAMAX) 100 MG tablet     traZODone (DESYREL) 100 MG tablet     venlafaxine (EFFEXOR-XR) 75 MG 24 hr capsule     oxyCODONE (ROXICODONE) 5 MG tablet     Current Facility-Administered Medications   Medication      cross-Linked Hyaluronate (GEL-ONE) injection PRSY 30 mg     lidocaine (PF) (XYLOCAINE) 1 % injection 6 mL      Medical History: any changes in medical history since they were last seen? No     Review of Systems:  The 14 system ROS was reviewed from the intake questionnaire, and is positive for: elbow and knee pain  Any bowel or bladder problems: No  Mood: no concerns     Physical Exam:  General: awake, alert  NAD    Assessment:   1. Chronic pain syndrome  2. Chronic neck and back pain  3. Left shoulder pain/impingement  4. Chronic left knee pain  5. Osteoarthritis in multiple joints  6. Cervical spondylosis and degenerative disc disease  7. Myofascial pain  8. Sacroiliac joint pain  9. Lumbar facet joint pain  10.  Medical cannabis use   11. Transgender to female        Plan:  1. Physical Therapy:  continue home PT  2. Clinical Health Psychologist to address issues of relaxation, behavioral change, coping style, and other factors important to improvement.  Not at this time  3. Diagnostic Studies:  none  4. Medication Management:  voltaren gel  5. Further procedures recommended: none  6. Schedule with Dr. Vo  7. Recommendations to PCP: none  8. Follow up:  3 months.       Video-Visit Details    Type of service:  Video Visit    Video Start Time: 0932  Video End Time: 0940    Originating Location (pt. Location): Home    Distant Location (provider location):  [facility]     Platform used for Video Visit: Martha Fowler MD  [facility]

## 2020-06-11 ENCOUNTER — THERAPY VISIT (OUTPATIENT)
Dept: PHYSICAL THERAPY | Facility: CLINIC | Age: 55
End: 2020-06-11
Payer: MEDICARE

## 2020-06-11 DIAGNOSIS — M25.512 ACUTE PAIN OF LEFT SHOULDER: ICD-10-CM

## 2020-06-11 DIAGNOSIS — Z96.612 STATUS POST TOTAL SHOULDER ARTHROPLASTY, LEFT: ICD-10-CM

## 2020-06-11 PROCEDURE — 97112 NEUROMUSCULAR REEDUCATION: CPT | Mod: GP | Performed by: PHYSICAL THERAPIST

## 2020-06-11 PROCEDURE — 97110 THERAPEUTIC EXERCISES: CPT | Mod: GP | Performed by: PHYSICAL THERAPIST

## 2020-06-11 NOTE — PROGRESS NOTES
DISCHARGE REPORT    Progress reporting period is from 3/30/20 to 6/11/20.       SUBJECTIVE  Subjective changes noted by patient:  Patient reports her shoulder has been feeling pretty good the past 2 weeks and doesn't have any concerns with HEP. She has been able to progress to 3# with her strengthening exercises and feels comfortable continuing with HEP moving forward    Current Pain level: 0/10.     Initial Pain level: 2/10.   Changes in function:  Yes (See Goal flowsheet attached for changes in current functional level)  Adverse reaction to treatment or activity: None    OBJECTIVE  Changes noted in objective findings: AROM left shoulder flexion 125, scaption 125. Strength: flexion 5-/5, abduction 4/5, ER 5-/5, IR 4+/5     ASSESSMENT/PLAN  Updated problem list and treatment plan: Diagnosis 1:  S/p L TSA with distal clavicle excision   Decreased ROM/flexibility - home program  Decreased strength - home program  STG/LTGs have been met or progress has been made towards goals:  Yes (See Goal flow sheet completed today.)  Assessment of Progress: The patient has met all of their long term goals.  Self Management Plans:  Patient has been instructed in a home treatment program.  Patient is independent in a home treatment program.  Patient  has been instructed in self management of symptoms.  Patient is independent in self management of symptoms.  I have re-evaluated this patient and find that the nature, scope, duration and intensity of the therapy is appropriate for the medical condition of the patient.  Kylie continues to require the following intervention to meet STG and LTG's:  PT intervention is no longer required to meet STG/LTG.    Recommendations:  This patient is ready to be discharged from therapy and continue their home treatment program.    Please refer to the daily flowsheet for treatment today, total treatment time and time spent performing 1:1 timed codes.

## 2020-06-11 NOTE — LETTER
DEPARTMENT OF HEALTH AND HUMAN SERVICES  CENTERS FOR MEDICARE & MEDICAID SERVICES    PLAN/UPDATED PLAN OF PROGRESS FOR OUTPATIENT REHABILITATION    PATIENTS NAME:  Kylie Mccloud   : 1965  PROVIDER NUMBER:    [id number]  HICN:   [id number]  PROVIDER NAME: LASHELL PARSONS  MEDICAL RECORD NUMBER: [id number]   START OF CARE DATE:  SOC Date: 20   TYPE:  PT  PRIMARY/TREATMENT DIAGNOSIS: (Pertinent Medical Diagnosis)  Acute pain of left shoulder  Status post total shoulder arthroplasty, left  VISITS FROM START OF CARE:  Rxs Used: 11     DISCHARGE REPORT  Progress reporting period is from 3/30/20 to 20.       SUBJECTIVE  Subjective changes noted by patient:  Patient reports her shoulder has been feeling pretty good the past 2 weeks and doesn't have any concerns with HEP. She has been able to progress to 3# with her strengthening exercises and feels comfortable continuing with HEP moving forward    Current Pain level: 0/10.     Initial Pain level: 2/10.   Changes in function:  Yes (See Goal flowsheet attached for changes in current functional level)  Adverse reaction to treatment or activity: None    OBJECTIVE  Changes noted in objective findings: AROM left shoulder flexion 125, scaption 125. Strength: flexion 5-/5, abduction 4/5, ER 5-/5, IR 4+/5     ASSESSMENT/PLAN  Updated problem list and treatment plan: Diagnosis 1:  S/p L TSA with distal clavicle excision   Decreased ROM/flexibility - home program  Decreased strength - home program  STG/LTGs have been met or progress has been made towards goals:  Yes (See Goal flow sheet completed today.)  Assessment of Progress: The patient has met all of their long term goals.  Self Management Plans:  Patient has been instructed in a home treatment program.  Patient is independent in a home treatment program.  Patient  has been instructed in self management of symptoms.  Patient is independent in self management of symptoms.  I have re-evaluated this  "patient and find that the nature, scope, duration and intensity of the therapy is appropriate for the medical condition of the patient.  Kylie continues to require the following intervention to meet STG and LTG's:  PT intervention is no longer required to meet STG/LTG.    Recommendations:  This patient is ready to be discharged from therapy and continue their home treatment program.    Please refer to the daily flowsheet for treatment today, total treatment time and time spent performing 1:1 timed codes.          Caregiver Signature/Credentials _____________________________ Date ________       Treating Provider: Marcell Benedict DPT   I have reviewed and certified the need for these services and plan of treatment while under my care.        PHYSICIAN'S SIGNATURE:   _________________________________________  Date___________   Omari HERRERA    Certification period:  Beginning of Cert date period: 06/02/20 to  End of Cert period date: 06/11/20     Functional Level Progress Report: Please see attached \"Goal Flow sheet for Functional level.\"    ____X____ Continue Services or       ________ DC Services                Service dates: From  SOC Date: 03/04/20 date to present                         "

## 2020-06-12 ENCOUNTER — TELEPHONE (OUTPATIENT)
Dept: FAMILY MEDICINE | Facility: CLINIC | Age: 55
End: 2020-06-12

## 2020-07-14 ENCOUNTER — TRANSFERRED RECORDS (OUTPATIENT)
Dept: HEALTH INFORMATION MANAGEMENT | Facility: CLINIC | Age: 55
End: 2020-07-14

## 2020-07-14 LAB
CREAT SERPL-MCNC: 1.06 MG/DL (ref 0.57–1.1)
GFR SERPL CREATININE-BSD FRML MDRD: 53 ML/MIN/1.73M2
GLUCOSE SERPL-MCNC: 99 MG/DL (ref 65–100)
POTASSIUM SERPL-SCNC: 3.6 MMOL/L (ref 3.5–5)

## 2020-07-15 LAB
CHOLEST SERPL-MCNC: 193 MG/DL (ref 100–199)
CREAT SERPL-MCNC: 0.88 MG/DL (ref 0.57–1.11)
GFR SERPL CREATININE-BSD FRML MDRD: >60 ML/MIN/1.73M2
HDLC SERPL-MCNC: 44 MG/DL
LDLC SERPL CALC-MCNC: 121 MG/DL
NONHDLC SERPL-MCNC: 149 MG/DL
POTASSIUM SERPL-SCNC: 3.7 MMOL/L (ref 3.5–5)
TRIGL SERPL-MCNC: 139 MG/DL

## 2020-07-23 ENCOUNTER — TELEPHONE (OUTPATIENT)
Dept: FAMILY MEDICINE | Facility: CLINIC | Age: 55
End: 2020-07-23

## 2020-07-23 NOTE — TELEPHONE ENCOUNTER
This could be cardiac in nature... probably should be evaluated in the ER with an EKG and troponins. Definitely if the pain worsens. Chest pain that radiates into the neck/jaw/ear is worrisome for MI.  We can discuss Afib at her appt.

## 2020-07-23 NOTE — TELEPHONE ENCOUNTER
Kylie states she had another heart episode yesterday and would like to know if you will fit her in today.  She does have an appointment with you tomorrow.  Please call.    Thank you.

## 2020-07-23 NOTE — TELEPHONE ENCOUNTER
Patient notified and voiced understanding and agreement. Kylie states that she have her partner drive her to the ED now.      Carmen Wilson RN BSN

## 2020-07-24 ENCOUNTER — ANCILLARY PROCEDURE (OUTPATIENT)
Dept: CARDIOLOGY | Facility: CLINIC | Age: 55
End: 2020-07-24
Attending: PHYSICIAN ASSISTANT
Payer: MEDICARE

## 2020-07-24 ENCOUNTER — OFFICE VISIT (OUTPATIENT)
Dept: FAMILY MEDICINE | Facility: CLINIC | Age: 55
End: 2020-07-24
Payer: MEDICARE

## 2020-07-24 VITALS
BODY MASS INDEX: 32.89 KG/M2 | RESPIRATION RATE: 20 BRPM | SYSTOLIC BLOOD PRESSURE: 97 MMHG | DIASTOLIC BLOOD PRESSURE: 67 MMHG | WEIGHT: 217 LBS | HEIGHT: 68 IN | HEART RATE: 88 BPM

## 2020-07-24 DIAGNOSIS — R00.2 PALPITATIONS: ICD-10-CM

## 2020-07-24 DIAGNOSIS — R00.2 PALPITATIONS: Primary | ICD-10-CM

## 2020-07-24 LAB
BASOPHILS # BLD AUTO: 0.1 10E9/L (ref 0–0.2)
BASOPHILS NFR BLD AUTO: 0.4 %
DIFFERENTIAL METHOD BLD: ABNORMAL
EOSINOPHIL # BLD AUTO: 0.4 10E9/L (ref 0–0.7)
EOSINOPHIL NFR BLD AUTO: 3.2 %
ERYTHROCYTE [DISTWIDTH] IN BLOOD BY AUTOMATED COUNT: 14.7 % (ref 10–15)
HCT VFR BLD AUTO: 41.9 % (ref 35–47)
HGB BLD-MCNC: 13.7 G/DL (ref 11.7–15.7)
LYMPHOCYTES # BLD AUTO: 3.6 10E9/L (ref 0.8–5.3)
LYMPHOCYTES NFR BLD AUTO: 29.1 %
MCH RBC QN AUTO: 28.6 PG (ref 26.5–33)
MCHC RBC AUTO-ENTMCNC: 32.7 G/DL (ref 31.5–36.5)
MCV RBC AUTO: 88 FL (ref 78–100)
MONOCYTES # BLD AUTO: 1.3 10E9/L (ref 0–1.3)
MONOCYTES NFR BLD AUTO: 10.2 %
NEUTROPHILS # BLD AUTO: 7.1 10E9/L (ref 1.6–8.3)
NEUTROPHILS NFR BLD AUTO: 57.1 %
PLATELET # BLD AUTO: 423 10E9/L (ref 150–450)
RBC # BLD AUTO: 4.79 10E12/L (ref 3.8–5.2)
TSH SERPL DL<=0.005 MIU/L-ACNC: 2.11 MU/L (ref 0.4–4)
WBC # BLD AUTO: 12.4 10E9/L (ref 4–11)

## 2020-07-24 PROCEDURE — 36415 COLL VENOUS BLD VENIPUNCTURE: CPT | Performed by: PHYSICIAN ASSISTANT

## 2020-07-24 PROCEDURE — 99213 OFFICE O/P EST LOW 20 MIN: CPT | Performed by: PHYSICIAN ASSISTANT

## 2020-07-24 PROCEDURE — 85025 COMPLETE CBC W/AUTO DIFF WBC: CPT | Performed by: PHYSICIAN ASSISTANT

## 2020-07-24 PROCEDURE — 0298T ZZC EXT ECG > 48HR TO 21 DAY REVIEW AND INTERPRETATN: CPT | Performed by: PHYSICIAN ASSISTANT

## 2020-07-24 PROCEDURE — 0296T LEADLESS EKG MONITOR 3 TO 14 DAYS: CPT | Performed by: PHYSICIAN ASSISTANT

## 2020-07-24 PROCEDURE — 84443 ASSAY THYROID STIM HORMONE: CPT | Performed by: PHYSICIAN ASSISTANT

## 2020-07-24 ASSESSMENT — MIFFLIN-ST. JEOR: SCORE: 1617.81

## 2020-07-24 NOTE — NURSING NOTE
Zio-Patch was placed at today's visit. Area prepped and cleaned per instructions. Patient tolerated patch well. Instructions, brochure, return box, and log book were given to the patient. Patient instructed to call [employer] with questions or concern. Patient stated understanding to instructions. Jacquelin Yuan MA

## 2020-07-24 NOTE — PROGRESS NOTES
Zio-Patch was placed at today's visit. Area prepped and cleaned per instructions. Patient tolerated patch well. Instructions, brochure, return box, and log book were given to the patient. Patient instructed to call [employer] with questions or concern. Patient stated understanding to instructions. Patient instructed to wear for 14 days and return. Jacquelin Yuan MA

## 2020-07-24 NOTE — PROGRESS NOTES
Subjective     Kylie Austin is a 55 year old female who presents to clinic today for the following health issues:    [facility]     Hospital Follow-up Visit:    Hospital/Nursing Home/IP Rehab Facility: [facility]  Date of Admission: 07/14/20  Date of Discharge: 07/15/20  Reason(s) for Admission: Atypical Chest Pain    BRIEF HOSPITAL COURSE: Kylie Austin is a 55 y.o. transgender female with a past medical history of dyslipidemia, atrial fibrillation, depression and anxiety who presents the emergency department with chest pain. Patient states she started experiencing pain around 1130 this morning. The pain was sudden onset and is located mainly in the center of her chest and radiates up her neck. She states that she has some associated shortness of breath. Denies any dizziness or lightheadedness. She denies any nausea or vomiting. She denies any worsening lower extremity edema. She states this is similar to when she had atrial fibrillation in the past however she is not currently in atrial fibrillation. The pain does not seem to get worse with activity or rest. The pain does seem to improve mildly when leaning forward.  She underwent a nuclear medicine stress test that was negative for ischemia. Echocardiography and EKG did not reveal evidence for pericarditis. She will discharge to home on 7/15 to follow up with primary MD for further evaluation.         Was your hospitalization related to COVID-19? No   Problems taking medications regularly:  None  Medication changes since discharge: None  Problems adhering to non-medication therapy:  None    Summary of hospitalization:  CareEverywhere information obtained and reviewed  Diagnostic Tests/Treatments reviewed.  Follow up needed: none  Other Healthcare Providers Involved in Patient s Care:         None  Update since discharge: fluctuating course. Post Discharge Medication Reconciliation: discharge medications reconciled, continue medications  without change.  Plan of care communicated with patient                Patient Active Problem List   Diagnosis     Gender identity disorder     Hyperlipidemia LDL goal <130     Abnormal results of liver function studies     Chronic pain disorder     Insomnia     Bipolar 2 disorder (H)     Morbid obesity (H)     H/O hypogonadism     Fibromyalgia     Glenohumeral arthritis     Vitamin D deficiency     Primary osteoarthritis of left shoulder     Anxiety     Cervical spondylosis without myelopathy     Chronic fatigue     S/P laparoscopic sleeve gastrectomy     Glenohumeral arthritis, left     AV block, 1st degree     Dependent edema     Status post shoulder surgery     Past Surgical History:   Procedure Laterality Date     ARTHROPLASTY SHOULDER Left 1/22/2020    Procedure: Left Total shoulder arthroplasty, distal clavicle excision;  Surgeon: Omari Tobin MD;  Location: [facility] OR     BIOPSY  2005, 8/8/13    Stomach, colon     COLONOSCOPY  8/8/2013    Procedure: COLONOSCOPY;  COLONSCOPY SCREEN/ SE;  Surgeon: Santino Sun MD;  Location: [facility] OR     COLONOSCOPY WITH CO2 INSUFFLATION N/A 11/1/2018    Procedure: COLONOSCOPY WITH CO2 INSUFFLATION;  Surgeon: Santino Sun MD;  Location: [facility] OR     Gender Reassignment  05/2016     HEAD & NECK SURGERY  2015    ablation of nerve from neck to left arm     LAPAROSCOPIC GASTRIC SLEEVE N/A 4/10/2018    Procedure: LAPAROSCOPIC GASTRIC SLEEVE;  Laparoscopic Sleeve Gastrectomy;  Surgeon: Jani Shah MD;  Location: [facility] OR     MANDIBLE SURGERY  1986     ORCHIECTOMY INGUINAL BILATERAL  7/16/2013    Procedure: ORCHIECTOMY INGUINAL BILATERAL;  Bilateral Simple Inguinal Orchiectomy ;  Surgeon: Jan Garrett MD;  Location: [facility] OR     TONSILLECTOMY         Social History     Tobacco Use     Smoking status: Never Smoker     Smokeless tobacco: Never Used     Tobacco comment: NEVER SMOKED! Grew up with heavy smokers which did not help my Asthma!  "  Substance Use Topics     Alcohol use: Yes     Comment: occasional//2 per month     Family History   Problem Relation Age of Onset     Cancer Father         skin     Diabetes Father         Was on the patch when they were new     Heart Disease Father 55        scd     Coronary Artery Disease Father      Hypertension Father      Hyperlipidemia Father      Heart Disease Other      Alzheimer Disease Paternal Grandmother      Diabetes Paternal Grandmother      Mental Illness Paternal Grandmother         alshimers     Osteoporosis Paternal Grandmother      Diabetes Sister      Diabetes Sister      Coronary Artery Disease Paternal Grandfather      Hypertension Paternal Grandfather      Hyperlipidemia Paternal Grandfather      Prostate Cancer Paternal Grandfather      Other Cancer Paternal Grandfather      Breast Cancer Mother      Depression Daughter      Unknown/Adopted Daughter      Depression Daughter      Mental Illness Daughter         bipolar     Depression Son      Anxiety Disorder Daughter      Anxiety Disorder Daughter      Osteoporosis Maternal Grandmother      Thyroid Disease Sister            Reviewed and updated as needed this visit by Provider         Review of Systems   Constitutional, cardiovascular systems are negative, except as otherwise noted.      Objective    BP 97/67   Pulse 88   Resp 20   Ht 1.727 m (5' 8\")   Wt 98.4 kg (217 lb)   Breastfeeding No   BMI 32.99 kg/m    Body mass index is 32.99 kg/m .  Physical Exam   GENERAL: healthy, alert and no distress  RESP: lungs clear to auscultation - no rales, rhonchi or wheezes  CV: regular rates and rhythm, normal S1 S2, no S3 or S4 and no murmur, click or rub  MS: no gross musculoskeletal defects noted, no edema  NEURO: Normal strength and tone, mentation intact and speech normal  PSYCH: mentation appears normal, affect normal/bright        Assessment & Plan   Assessment  1. Palpitations         Plan  Care Everywhere reviewed. Labs today. Patient " "hooked up with a Holter/Zio monitor x2 weeks. Follow up pending results.       BMI:   Estimated body mass index is 32.99 kg/m  as calculated from the following:    Height as of this encounter: 1.727 m (5' 8\").    Weight as of this encounter: 98.4 kg (217 lb).       Return for follow up pending results.    Kristin Miner PA-C  [facility] [place]      "

## 2020-07-27 ENCOUNTER — VIRTUAL VISIT (OUTPATIENT)
Dept: ORTHOPEDICS | Facility: CLINIC | Age: 55
End: 2020-07-27
Payer: COMMERCIAL

## 2020-07-27 DIAGNOSIS — M17.12 OSTEOARTHRITIS OF LEFT KNEE, UNSPECIFIED OSTEOARTHRITIS TYPE: Primary | ICD-10-CM

## 2020-07-27 NOTE — PROGRESS NOTES
"Video Visit Technology for this patient: Devora Video Visit- Patient was left in waiting room    Kylie Austin is a 55 year old female who is being evaluated via a billable video visit.      The patient has been notified of following:     \"This video visit will be conducted via a call between you and your physician/provider. We have found that certain health care needs can be provided without the need for an in-person physical exam.  This service lets us provide the care you need with a video conversation.  If a prescription is necessary we can send it directly to your pharmacy.  If lab work is needed we can place an order for that and you can then stop by our lab to have the test done at a later time.    Video visits are billed at different rates depending on your insurance coverage.  Please reach out to your insurance provider with any questions.    If during the course of the call the physician/provider feels a video visit is not appropriate, you will not be charged for this service.\"    Patient has given verbal consent for Video visit? Yes  How would you like to obtain your AVS? MyChart  Will anyone else be joining your video visit? No        Video-Visit Details    Type of service:  Video Visit    Video Time: 8 minutes    Originating Location (pt. Location): Home    Distant Location (provider location):  [facility]     Video worked, but audio was one way. I proceeded by typing questions and responses.     Patient states she is doing well without problems. Also notes that she is having success with exercises.  She demonstrates 155 AFE/40 ERside    Assessment: Doing well after TSA/DCE  Plan: F/U at anniversary of surgery with XR in clinic. I reviewed goal of continuing with home program.  Platform used for Video Visit: Devora Tobin MD        "

## 2020-07-27 NOTE — NURSING NOTE
Reason For Visit:   Chief Complaint   Patient presents with     Surgical Followup     DOS 1/22/20 S/P Left TSA        PCP: Kristin Miner        ?  No  Occupation Not working.     Date of injury: Over the years  Type of injury: Many injuries to left shoulder. .  Date of surgery: No  Smoker: No     Ambedexterous hand dominant    SANE score  Affected shoulder: Left   Right shoulder SANE: 95  Left shoulder SANE: 85    There were no vitals taken for this visit.      Pain Assessment  Patient Currently in Pain: Yes  0-10 Pain Scale: 1  Primary Pain Location: Shoulder  Pain Descriptors: Discomfort    Vanita Beebe LPN

## 2020-07-27 NOTE — LETTER
"    7/27/2020         RE: Kylie Austin  [address] [place] [address]        Dear Colleague,    Thank you for referring your patient, Kylie Austin, to the [facility]. Please see a copy of my visit note below.    Video Visit Technology for this patient: [employer]Well Video Visit- Patient was left in waiting room    Kylie Austin is a 55 year old female who is being evaluated via a billable video visit.      The patient has been notified of following:     \"This video visit will be conducted via a call between you and your physician/provider. We have found that certain health care needs can be provided without the need for an in-person physical exam.  This service lets us provide the care you need with a video conversation.  If a prescription is necessary we can send it directly to your pharmacy.  If lab work is needed we can place an order for that and you can then stop by our lab to have the test done at a later time.    Video visits are billed at different rates depending on your insurance coverage.  Please reach out to your insurance provider with any questions.    If during the course of the call the physician/provider feels a video visit is not appropriate, you will not be charged for this service.\"    Patient has given verbal consent for Video visit? Yes  How would you like to obtain your AVS? MyChart  Will anyone else be joining your video visit? No        Video-Visit Details    Type of service:  Video Visit    Video Time: 8 minutes    Originating Location (pt. Location): Home    Distant Location (provider location):  [facility]     Video worked, but audio was one way. I proceeded by typing questions and responses.     Patient states she is doing well without problems. Also notes that she is having success with exercises.  She demonstrates 155 AFE/40 ERside    Assessment: Doing well after TSA/DCE  Plan: F/U at anniversary of " surgery with XR in clinic. I reviewed goal of continuing with home program.  Platform used for Video Visit: Devora Tobin MD

## 2020-07-28 DIAGNOSIS — D72.829 LEUKOCYTOSIS, UNSPECIFIED TYPE: Primary | ICD-10-CM

## 2020-11-11 ENCOUNTER — ANCILLARY PROCEDURE (OUTPATIENT)
Dept: MAMMOGRAPHY | Facility: CLINIC | Age: 55
End: 2020-11-11
Payer: MEDICARE

## 2020-11-11 DIAGNOSIS — Z12.31 VISIT FOR SCREENING MAMMOGRAM: ICD-10-CM

## 2020-11-11 PROCEDURE — 77067 SCR MAMMO BI INCL CAD: CPT | Mod: TC | Performed by: RADIOLOGY

## 2020-11-15 ASSESSMENT — ENCOUNTER SYMPTOMS
CONSTIPATION: 0
DIARRHEA: 0
HEMATOCHEZIA: 0
NERVOUS/ANXIOUS: 0
PARESTHESIAS: 0
EYE PAIN: 0
HEMATURIA: 0
DYSURIA: 0
FEVER: 0
SHORTNESS OF BREATH: 0
DIZZINESS: 0
JOINT SWELLING: 0
CHILLS: 0
PALPITATIONS: 0
SORE THROAT: 0
HEADACHES: 0
BREAST MASS: 0
COUGH: 0
ARTHRALGIAS: 1
MYALGIAS: 0
ABDOMINAL PAIN: 0
HEARTBURN: 0
FREQUENCY: 0
WEAKNESS: 0
NAUSEA: 0

## 2020-11-15 ASSESSMENT — ACTIVITIES OF DAILY LIVING (ADL): CURRENT_FUNCTION: NO ASSISTANCE NEEDED

## 2020-11-16 ENCOUNTER — OFFICE VISIT (OUTPATIENT)
Dept: FAMILY MEDICINE | Facility: CLINIC | Age: 55
End: 2020-11-16
Payer: MEDICARE

## 2020-11-16 VITALS
SYSTOLIC BLOOD PRESSURE: 117 MMHG | DIASTOLIC BLOOD PRESSURE: 75 MMHG | BODY MASS INDEX: 33.95 KG/M2 | HEART RATE: 83 BPM | WEIGHT: 224 LBS | OXYGEN SATURATION: 98 % | TEMPERATURE: 98 F | HEIGHT: 68 IN | RESPIRATION RATE: 20 BRPM

## 2020-11-16 DIAGNOSIS — M79.89 LEG SWELLING: ICD-10-CM

## 2020-11-16 DIAGNOSIS — Z79.899 ENCOUNTER FOR LONG-TERM (CURRENT) USE OF MEDICATIONS: ICD-10-CM

## 2020-11-16 DIAGNOSIS — Z12.5 SCREENING FOR PROSTATE CANCER: ICD-10-CM

## 2020-11-16 DIAGNOSIS — I44.1 AV BLOCK, 2ND DEGREE: ICD-10-CM

## 2020-11-16 DIAGNOSIS — Z13.1 DIABETES MELLITUS SCREENING: ICD-10-CM

## 2020-11-16 DIAGNOSIS — Z12.11 SCREEN FOR COLON CANCER: ICD-10-CM

## 2020-11-16 DIAGNOSIS — E78.5 HYPERLIPIDEMIA LDL GOAL <130: ICD-10-CM

## 2020-11-16 DIAGNOSIS — Z12.5 SCREENING PSA (PROSTATE SPECIFIC ANTIGEN): ICD-10-CM

## 2020-11-16 DIAGNOSIS — Z00.00 ROUTINE GENERAL MEDICAL EXAMINATION AT A HEALTH CARE FACILITY: Primary | ICD-10-CM

## 2020-11-16 PROBLEM — I44.0 AV BLOCK, 1ST DEGREE: Status: RESOLVED | Noted: 2020-01-08 | Resolved: 2020-11-16

## 2020-11-16 LAB
CREAT UR-MCNC: 160 MG/DL
GLUCOSE SERPL-MCNC: 93 MG/DL (ref 70–99)
MICROALBUMIN UR-MCNC: 7 MG/L
MICROALBUMIN/CREAT UR: 4.59 MG/G CR (ref 0–25)
PSA SERPL-ACNC: <0.01 UG/L

## 2020-11-16 PROCEDURE — 90682 RIV4 VACC RECOMBINANT DNA IM: CPT | Performed by: PHYSICIAN ASSISTANT

## 2020-11-16 PROCEDURE — 82043 UR ALBUMIN QUANTITATIVE: CPT | Performed by: PHYSICIAN ASSISTANT

## 2020-11-16 PROCEDURE — G0008 ADMIN INFLUENZA VIRUS VAC: HCPCS | Performed by: PHYSICIAN ASSISTANT

## 2020-11-16 PROCEDURE — G0103 PSA SCREENING: HCPCS | Mod: KX | Performed by: PHYSICIAN ASSISTANT

## 2020-11-16 PROCEDURE — 82947 ASSAY GLUCOSE BLOOD QUANT: CPT | Performed by: PHYSICIAN ASSISTANT

## 2020-11-16 PROCEDURE — 36415 COLL VENOUS BLD VENIPUNCTURE: CPT | Performed by: PHYSICIAN ASSISTANT

## 2020-11-16 PROCEDURE — 99396 PREV VISIT EST AGE 40-64: CPT | Mod: 25 | Performed by: PHYSICIAN ASSISTANT

## 2020-11-16 PROCEDURE — 99213 OFFICE O/P EST LOW 20 MIN: CPT | Mod: 25 | Performed by: PHYSICIAN ASSISTANT

## 2020-11-16 RX ORDER — SIMVASTATIN 40 MG
40 TABLET ORAL AT BEDTIME
Qty: 90 TABLET | Refills: 2 | Status: SHIPPED | OUTPATIENT
Start: 2020-11-16 | End: 2021-11-08

## 2020-11-16 RX ORDER — FUROSEMIDE 20 MG
20 TABLET ORAL EVERY MORNING
Qty: 90 TABLET | Refills: 3 | Status: SHIPPED | OUTPATIENT
Start: 2020-11-16 | End: 2021-11-08

## 2020-11-16 ASSESSMENT — ENCOUNTER SYMPTOMS
EYE PAIN: 0
ARTHRALGIAS: 1
FEVER: 0
PALPITATIONS: 0
WEAKNESS: 0
COUGH: 0
BREAST MASS: 0
NAUSEA: 0
DIARRHEA: 0
MYALGIAS: 0
PARESTHESIAS: 0
ABDOMINAL PAIN: 0
DIZZINESS: 0
SHORTNESS OF BREATH: 0
DYSURIA: 0
HEARTBURN: 0
NERVOUS/ANXIOUS: 0
HEMATURIA: 0
SORE THROAT: 0
HEADACHES: 0
HEMATOCHEZIA: 0
CONSTIPATION: 0
JOINT SWELLING: 0
CHILLS: 0
FREQUENCY: 0

## 2020-11-16 ASSESSMENT — ACTIVITIES OF DAILY LIVING (ADL): CURRENT_FUNCTION: NO ASSISTANCE NEEDED

## 2020-11-16 ASSESSMENT — MIFFLIN-ST. JEOR: SCORE: 1649.56

## 2020-11-16 NOTE — PATIENT INSTRUCTIONS
Black Cohosh - supplement used for hot flashes      Preventive Health Recommendations  Female Ages 50 - 64    Yearly exam: See your health care provider every year in order to  o Review health changes.   o Discuss preventive care.    o Review your medicines if your doctor has prescribed any.      Get a Pap test every three years (unless you have an abnormal result and your provider advises testing more often).    If you get Pap tests with HPV test, you only need to test every 5 years, unless you have an abnormal result.     You do not need a Pap test if your uterus was removed (hysterectomy) and you have not had cancer.    You should be tested each year for STDs (sexually transmitted diseases) if you're at risk.     Have a mammogram every 1 to 2 years.    Have a colonoscopy at age 50, or have a yearly FIT test (stool test). These exams screen for colon cancer.      Have a cholesterol test every 5 years, or more often if advised.    Have a diabetes test (fasting glucose) every three years. If you are at risk for diabetes, you should have this test more often.     If you are at risk for osteoporosis (brittle bone disease), think about having a bone density scan (DEXA).    Shots: Get a flu shot each year. Get a tetanus shot every 10 years.    Nutrition:     Eat at least 5 servings of fruits and vegetables each day.    Eat whole-grain bread, whole-wheat pasta and brown rice instead of white grains and rice.    Get adequate Calcium and Vitamin D.     Lifestyle    Exercise at least 150 minutes a week (30 minutes a day, 5 days a week). This will help you control your weight and prevent disease.    Limit alcohol to one drink per day.    No smoking.     Wear sunscreen to prevent skin cancer.     See your dentist every six months for an exam and cleaning.    See your eye doctor every 1 to 2 years.

## 2020-11-16 NOTE — PROGRESS NOTES
"   SUBJECTIVE:   CC: Kylie Austin is an 55 year old woman who presents for preventive health visit.       Patient has been advised of split billing requirements and indicates understanding: Yes  Healthy Habits:     In general, how would you rate your overall health?  Fair    Frequency of exercise:  None    Do you usually eat at least 4 servings of fruit and vegetables a day, include whole grains    & fiber and avoid regularly eating high fat or \"junk\" foods?  No    Taking medications regularly:  Yes    Medication side effects:  None    Ability to successfully perform activities of daily living:  No assistance needed    Home Safety:  No safety concerns identified    Hearing Impairment:  No hearing concerns    In the past 6 months, have you been bothered by leaking of urine?  No    In general, how would you rate your overall mental or emotional health?  Good      PHQ-2 Total Score: 0    Additional concerns today:  Yes      PROBLEMS TO ADD ON...  Racing heart - last seen in July for this concern   She is still experiencing racing heart every day, no meds   Doesn't notice SOB, no CP; does notice extra fatigue and needs to lay down    Today's PHQ-2 Score:   PHQ-2 ( 1999 [employer]) 11/15/2020   Q1: Little interest or pleasure in doing things 0   Q2: Feeling down, depressed or hopeless 0   PHQ-2 Score 0   Q1: Little interest or pleasure in doing things Not at all   Q2: Feeling down, depressed or hopeless Not at all   PHQ-2 Score 0       Abuse: Current or Past (Physical, Sexual or Emotional) - No  Do you feel safe in your environment? Yes    Have you ever done Advance Care Planning? (For example, a Health Directive, POLST, or a discussion with a medical provider or your loved ones about your wishes): No, advance care planning information given to patient to review.  Patient plans to discuss their wishes with loved ones or provider.      Social History     Tobacco Use     Smoking status: Never Smoker     Smokeless " tobacco: Never Used     Tobacco comment: NEVER SMOKED! Grew up with heavy smokers which did not help my Asthma!   Substance Use Topics     Alcohol use: Yes     Comment: occasional//2 per month         Alcohol Use 11/15/2020   Prescreen: >3 drinks/day or >7 drinks/week? Not Applicable       Reviewed orders with patient.  Reviewed health maintenance and updated orders accordingly - Yes  Lab work is in process    Mammogram Screening: Patient over age 50, mutual decision to screen reflected in health maintenance.    Pertinent mammograms are reviewed under the imaging tab.  History of abnormal Pap smear: not indicated, cervix absent     Reviewed and updated as needed this visit by clinical staff  Tobacco  Allergies  Meds              Reviewed and updated as needed this visit by Provider                Past Medical History:   Diagnosis Date     Anxiety 2005    Brought on by Amioderone     Arthritis 8/2005     Atrial fib/flutter, transient     S/p cardioversion 2004     Bipolar 2 disorder (H)      Bleeding disorder (H) 1987    nose bleeds     Chronic pain     LUE pain     Congestive heart failure (H) 11/18/2004    cured 2005     Depressive disorder     on and off most of my life!     Fibromyalgia      Gender identity disorder Started Rx 2012     H/O hypogonadism Gonadectomy 2013     Hyperlipidemia      Hypertension      Other mental problems     bipolar     Psoriasis      Uncomplicated asthma 1964    cured in 4/2001     Vision disorder 6/2005        Review of Systems   Constitutional: Negative for chills and fever.   HENT: Negative for congestion, ear pain, hearing loss and sore throat.    Eyes: Negative for pain and visual disturbance.   Respiratory: Negative for cough and shortness of breath.    Cardiovascular: Positive for chest pain. Negative for palpitations and peripheral edema.   Gastrointestinal: Negative for abdominal pain, constipation, diarrhea, heartburn, hematochezia and nausea.   Breasts:  Negative for  "tenderness, breast mass and discharge.   Genitourinary: Negative for dysuria, frequency, genital sores, hematuria, pelvic pain, urgency, vaginal bleeding and vaginal discharge.   Musculoskeletal: Positive for arthralgias. Negative for joint swelling and myalgias.   Skin: Negative for rash.   Neurological: Negative for dizziness, weakness, headaches and paresthesias.   Psychiatric/Behavioral: Negative for mood changes. The patient is not nervous/anxious.         OBJECTIVE:   /75   Pulse 83   Temp 98  F (36.7  C) (Tympanic)   Resp 20   Ht 1.727 m (5' 8\")   Wt 101.6 kg (224 lb)   LMP  (LMP Unknown)   SpO2 98%   Breastfeeding No   BMI 34.06 kg/m    Physical Exam  GENERAL: healthy, alert and no distress  EYES: Eyes grossly normal to inspection, PERRL and conjunctivae and sclerae normal  HENT: ear canals and TM's normal, nose and mouth without ulcers or lesions  NECK: no adenopathy, no asymmetry, masses, or scars and thyroid normal to palpation  RESP: lungs clear to auscultation - no rales, rhonchi or wheezes  CV: regular rate and rhythm, normal S1 S2, no S3 or S4, no murmur, click or rub, no peripheral edema and peripheral pulses strong  ABDOMEN: soft, nontender, no hepatosplenomegaly, no masses and bowel sounds normal  MS: no gross musculoskeletal defects noted, no edema  SKIN: no suspicious lesions or rashes  NEURO: Normal strength and tone, mentation intact and speech normal  PSYCH: mentation appears normal, affect normal/bright    ASSESSMENT/PLAN:       ICD-10-CM    1. Routine general medical examination at a health care facility  Z00.00 ADMIN 1st VACCINE   2. Screening for prostate cancer  Z12.5 Prostate spec antigen screen   3. Screen for colon cancer  Z12.11 GASTROENTEROLOGY ADULT REF PROCEDURE ONLY   4. Screening PSA (prostate specific antigen)  Z12.5    5. Diabetes mellitus screening  Z13.1 Glucose   6. AV block, 2nd degree  I44.1 CARDIOLOGY EVAL ADULT REFERRAL   7. Leg swelling  M79.89 furosemide " "(LASIX) 20 MG tablet   8. Encounter for long-term (current) use of medications  Z79.899 Albumin Random Urine Quantitative with Creat Ratio   9. Hyperlipidemia LDL goal <130  E78.5 simvastatin (ZOCOR) 40 MG tablet       1-5) Screenings discussed    6) Referral to cardiology to discuss further. She will also ask about her ongoing palpitations - beta blockers not indicated with heart block.    7-9) BMP and lipids up to date. Microalbumin today due to diuretic use.       Patient has been advised of split billing requirements and indicates understanding: Yes  COUNSELING:  Reviewed preventive health counseling, as reflected in patient instructions    Estimated body mass index is 34.06 kg/m  as calculated from the following:    Height as of this encounter: 1.727 m (5' 8\").    Weight as of this encounter: 101.6 kg (224 lb).    She reports that she has never smoked. She has never used smokeless tobacco.      Counseling Resources:  ATP IV Guidelines  Pooled Cohorts Equation Calculator  Breast Cancer Risk Calculator  BRCA-Related Cancer Risk Assessment: FHS-7 Tool  FRAX Risk Assessment  ICSI Preventive Guidelines  Dietary Guidelines for Americans, 2010  USDA's MyPlate  ASA Prophylaxis  Lung CA Screening    JES Lopez [facility] [place]  "

## 2020-11-19 DIAGNOSIS — Z11.59 ENCOUNTER FOR SCREENING FOR OTHER VIRAL DISEASES: Primary | ICD-10-CM

## 2020-11-19 ASSESSMENT — ENCOUNTER SYMPTOMS
INSOMNIA: 1
LIGHT-HEADEDNESS: 1
ORTHOPNEA: 0
DEPRESSION: 1
SLEEP DISTURBANCES DUE TO BREATHING: 0
LEG PAIN: 0
NERVOUS/ANXIOUS: 1
PANIC: 0
PALPITATIONS: 1
EXERCISE INTOLERANCE: 1
DECREASED CONCENTRATION: 1
HYPOTENSION: 1
HYPERTENSION: 0
SYNCOPE: 0

## 2020-11-19 NOTE — TELEPHONE ENCOUNTER
Action    Action Taken 11-19: requested from [facility]:    Echo    NM Myocardial perfusion    CT Chest PE    7-15-20    7-15-20    7-14-20   11-19: requested EKG 7-16-20 from Sandra  11-20: resolved images and sent EKG to scanning/Aleksandra's email

## 2020-11-20 ENCOUNTER — PRE VISIT (OUTPATIENT)
Dept: CARDIOLOGY | Facility: CLINIC | Age: 55
End: 2020-11-20

## 2020-11-20 ENCOUNTER — OFFICE VISIT (OUTPATIENT)
Dept: CARDIOLOGY | Facility: CLINIC | Age: 55
End: 2020-11-20
Attending: PHYSICIAN ASSISTANT
Payer: MEDICARE

## 2020-11-20 VITALS
WEIGHT: 225 LBS | HEIGHT: 69 IN | BODY MASS INDEX: 33.33 KG/M2 | DIASTOLIC BLOOD PRESSURE: 77 MMHG | HEART RATE: 84 BPM | OXYGEN SATURATION: 97 % | SYSTOLIC BLOOD PRESSURE: 114 MMHG

## 2020-11-20 DIAGNOSIS — I48.0 PAROXYSMAL ATRIAL FIBRILLATION (H): ICD-10-CM

## 2020-11-20 DIAGNOSIS — I49.3 PVC'S (PREMATURE VENTRICULAR CONTRACTIONS): Primary | ICD-10-CM

## 2020-11-20 DIAGNOSIS — I44.1 AV BLOCK, 2ND DEGREE: ICD-10-CM

## 2020-11-20 LAB — INTERPRETATION ECG - MUSE: NORMAL

## 2020-11-20 PROCEDURE — G0463 HOSPITAL OUTPT CLINIC VISIT: HCPCS | Mod: 25

## 2020-11-20 PROCEDURE — 99204 OFFICE O/P NEW MOD 45 MIN: CPT | Mod: 25 | Performed by: INTERNAL MEDICINE

## 2020-11-20 PROCEDURE — 93010 ELECTROCARDIOGRAM REPORT: CPT | Performed by: INTERNAL MEDICINE

## 2020-11-20 PROCEDURE — 93005 ELECTROCARDIOGRAM TRACING: CPT

## 2020-11-20 RX ORDER — QUETIAPINE FUMARATE 25 MG/1
25 TABLET, FILM COATED ORAL DAILY
COMMUNITY
Start: 2020-10-29 | End: 2021-08-03

## 2020-11-20 ASSESSMENT — MIFFLIN-ST. JEOR: SCORE: 1662.03

## 2020-11-20 ASSESSMENT — PAIN SCALES - GENERAL: PAINLEVEL: NO PAIN (0)

## 2020-11-20 NOTE — NURSING NOTE
Chief Complaint   Patient presents with     New Patient     Consult for Ruth matthews. Referral PCP. Echo done 7/2020- on care everywhere.        Vitals were taken and medications were reconciled. EKG was performed.    Sheila Giron  10:57 AM

## 2020-11-20 NOTE — LETTER
11/20/2020      RE: Kylie Austin  [address] [facility] [id number]       Dear Colleague,    Thank you for the opportunity to participate in the care of your patient, Kylie Austin, at the [facility] [place] at [facility]. Please see a copy of my visit note below.    HPI:  Des Austin is a 54 yo transgender female with a PMH of dyslipidemia, PAF, depression, anxiety, and morbid obesity.     She is planned for a bariatric surgery, and was referred for a cardiology evaluation.  She had a PF episode in 2004 and 2005 which required DCCV. She denied any specific triggers.  She attempted amiodarone, which she was allergic to. Since then she has been doing well off AADs for the past 15 years.  She recently started having chest discomfort every day, but denied any symptoms like skipped beats or palpitation.  She wore a Zio patch for 2 weeks in 7-8/2020 and did have a symptom while she was wearing it.    PAST MEDICAL HISTORY:  Past Medical History:   Diagnosis Date     Anxiety 2005    Brought on by Amioderone     Arthritis 8/2005     Atrial fib/flutter, transient     S/p cardioversion 2004     Bipolar 2 disorder (H)      Bleeding disorder (H) 1987    nose bleeds     Chronic pain     LUE pain     Congestive heart failure (H) 11/18/2004    cured 2005     Depressive disorder     on and off most of my life!     Fibromyalgia      Gender identity disorder Started Rx 2012     H/O hypogonadism Gonadectomy 2013     Hyperlipidemia      Hypertension      Other mental problems     bipolar     Psoriasis      Uncomplicated asthma 1964    cured in 4/2001     Vision disorder 6/2005       CURRENT MEDICATIONS:  Current Outpatient Medications   Medication Sig Dispense Refill     ARIPiprazole (ABILIFY) 20 MG tablet Take 1 tablet (20 mg) by mouth every morning       diclofenac (VOLTAREN) 1 % topical gel Place 2-4 g onto the skin 4 times daily Use  2g on elbow and 4g on knee 200 g 3     furosemide (LASIX) 20 MG tablet Take 1 tablet (20 mg) by mouth every morning 90 tablet 3     gabapentin (NEURONTIN) 300 MG capsule Take 1 capsule (300 mg) by mouth 3 times daily 90 capsule 0     medical cannabis (Patient's own supply.  Not a prescription) 2 capsules 2 times daily (This is NOT a prescription, and does not certify that the patient has a qualifying medical condition for medical cannabis.  The purpose of this order is  to document that the patient reports taking medical cannabis.)    Morning and afternoon        medical cannabis inhalation (Patient's own supply.  Not a prescription) Inhale into the lungs 2 times daily as needed (This is NOT a prescription, and does not certify that the patient has a qualifying medical condition for medical cannabis.  The purpose of this order is  to document that the patient reports taking medical cannabis.)    MORNING AND AT NIGHT        order for DME Equipment being ordered: elbow guard, left - for ulnar neuropathy 1 Units 3     QUEtiapine (SEROQUEL) 25 MG tablet Take 25 mg by mouth daily       simvastatin (ZOCOR) 40 MG tablet Take 1 tablet (40 mg) by mouth At Bedtime 90 tablet 2     topiramate (TOPAMAX) 100 MG tablet 100 mg in morning and 200 mg at suppertime. 90 tablet 5     venlafaxine (EFFEXOR-XR) 75 MG 24 hr capsule Take 3 capsules (225 mg) by mouth daily       estradiol (ESTRACE) 2 MG tablet 1 tablet daily (Patient not taking: Reported on 11/16/2020) 90 tablet 3     traZODone (DESYREL) 100 MG tablet Take 1 tablet (100 mg) by mouth nightly as needed for sleep (Patient not taking: Reported on 11/20/2020) 30 tablet 1       PAST SURGICAL HISTORY:  Past Surgical History:   Procedure Laterality Date     ARTHROPLASTY SHOULDER Left 1/22/2020    Procedure: Left Total shoulder arthroplasty, distal clavicle excision;  Surgeon: Omari Tobin MD;  [facility]     BIOPSY  2005, 8/8/13    Stomach, colon     COLONOSCOPY   8/8/2013    Procedure: COLONOSCOPY;  COLONSCOPY SCREEN/ SE;  Surgeon: Santino Sun MD;  [facility]     COLONOSCOPY WITH CO2 INSUFFLATION N/A 11/1/2018    Procedure: COLONOSCOPY WITH CO2 INSUFFLATION;  Surgeon: Santino Sun MD;  [facility] OR     Gender Reassignment  05/2016     HEAD & NECK SURGERY  2015    ablation of nerve from neck to left arm     LAPAROSCOPIC GASTRIC SLEEVE N/A 4/10/2018    Procedure: LAPAROSCOPIC GASTRIC SLEEVE;  Laparoscopic Sleeve Gastrectomy;  Surgeon: Jani Shah MD;  [facility]     MANDIBLE SURGERY  1986     ORCHIECTOMY INGUINAL BILATERAL  7/16/2013    Procedure: ORCHIECTOMY INGUINAL BILATERAL;  Bilateral Simple Inguinal Orchiectomy ;  Surgeon: Jan Garrett MD;  [facility]     TONSILLECTOMY         ALLERGIES:     Allergies   Allergen Reactions     Amiodarone      Caused pain fatigue/amplified anxiety and depression     Fluoxetine Other (See Comments)     Night terrors     Tetracycline      Teeth rattle/saliva acidic       FAMILY HISTORY:  + Premature coronary artery disease  - Atrial fibrillation  + Sudden cardiac death     SOCIAL HISTORY:  Social History     Tobacco Use     Smoking status: Never Smoker     Smokeless tobacco: Never Used     Tobacco comment: NEVER SMOKED! Grew up with heavy smokers which did not help my Asthma!   Substance Use Topics     Alcohol use: Yes     Comment: occasional//2 per month     Drug use: Yes     Types: Marijuana     Comment: medical marijuana, daily       ROS:   Answers for HPI/ROS submitted by the patient on 11/19/2020 were reviewed, and there were no significant changes since then.  Constitutional: No fever, chills, or sweats. Weight stable.   ENT: No visual disturbance, ear ache, epistaxis, sore throat.   Cardiovascular: As per HPI.   Respiratory: No cough, hemoptysis.    GI: No nausea, vomiting, hematemesis, melena, or hematochezia.   : No hematuria.   Integument: Negative.   Psychiatric:  "Negative.   Hematologic:  Easy bruising, no easy bleeding.  Neuro: Negative.   Endocrinology: No significant heat or cold intolerance   Musculoskeletal: No myalgia.    Exam:  /77 (BP Location: Right arm, Patient Position: Sitting, Cuff Size: Adult Regular)   Pulse 84   Ht 1.74 m (5' 8.5\")   Wt 102.1 kg (225 lb)   LMP  (LMP Unknown)   SpO2 97%   BMI 33.71 kg/m    GENERAL APPEARANCE: healthy, alert and no distress  HEENT: no icterus, no xanthelasmas, normal pupil size and reaction, normal palate, mucosa moist, no central cyanosis  NECK: no adenopathy, no asymmetry, masses, or scars, thyroid normal to palpation and no bruits, JVP not elevated  RESPIRATORY: lungs clear to auscultation - no rales, rhonchi or wheezes, no use of accessory muscles, no retractions, respirations are unlabored, normal respiratory rate  CARDIOVASCULAR: regular rhythm, normal S1 with physiologic split S2, no S3 or S4 and no murmur, click or rub, precordium quiet with normal PMI.  ABDOMEN: soft, non tender, without hepatosplenomegaly, no masses palpable, bowel sounds normal, aorta not enlarged by palpation, no abdominal bruits  EXTREMITIES: peripheral pulses normal, no edema, no bruits  NEURO: alert and oriented to person/place/time, normal speech, gait and affect  VASC: Radial, femoral, dorsalis pedis and posterior tibialis pulses are normal in volumes and symmetric bilaterally. No bruits are heard.  SKIN: no ecchymoses, no rashes    Labs:  CBC RESULTS:   Lab Results   Component Value Date    WBC 12.4 (H) 07/24/2020    RBC 4.79 07/24/2020    HGB 13.7 07/24/2020    HCT 41.9 07/24/2020    MCV 88 07/24/2020    MCH 28.6 07/24/2020    MCHC 32.7 07/24/2020    RDW 14.7 07/24/2020     07/24/2020       BMP RESULTS:  Lab Results   Component Value Date     01/07/2020    POTASSIUM 3.7 07/15/2020    CHLORIDE 106 01/07/2020    CO2 30 01/07/2020    ANIONGAP 4 01/07/2020    GLC 93 11/16/2020    BUN 19 01/07/2020    CR 0.88 07/15/2020    " GFRESTIMATED >60 07/15/2020    GFRESTBLACK >60 07/15/2020    MAGALI 9.4 01/07/2020        INR RESULTS:  No results found for: INR    Procedures:  Zio patch in 7-8/2020 (14 days):  Rare PACs with a run and rare PVCs. The patient was symptomatic with PVCs.      ECG on 11/20/2020:  Sinus rhythm with LAD.    Assessment and Plan:  1. Dyslipidemia  2. Depression, anxiety  3. Morbid obesity.   She is planned for a bariatric surgery.  4. Second degree Mobitz type I AV block per Zio patch in 7-8/2020. Asymptomatic.  -> Observation.  5. PAF in 2004 and 2005. Required DCCVs. No specific triggers.  She attempted amiodarone, which she was allergic to. Since then she has been doing well off AADs for the past 15 years.  I advised her to observe without AADs or anticoagulants at this point and come to the ED if AF recurs.  6. PVCs Infrequent (<1.0%) per Zio patch in 7-8/2020.  Her chest symptoms were related to infrequent PVCs.  Because of a low PVC burden, I advised her to observe at this point and call us if she starts having more PVCs in the future.  No regular follow up will be required.    I spent more than 30 min to explain the physiology and treatment plans of AF and PVCs.    CC  Patient Care Team:  Kristin Muller PA-C as PCP - General (Physician Assistant)  Dallas Lo MD as Referring Physician (Internal Medicine)  Kristin Muller PA-C as Assigned PCP  Conner Kaminski MD as Assigned Neuroscience Provider  Minh Mancilla MD as Assigned Pediatric Specialist Provider  Omari Tobin MD as Assigned Musculoskeletal Provider  Aleksandra Sellers, RN as Specialty Care Coordinator (Cardiology)  Windy Owens MD (Cardiovascular Disease)  KRISTIN MULLER    Answers for HPI/ROS submitted by the patient on 11/19/2020   General Symptoms: No  Skin Symptoms: No  HENT Symptoms: No  EYE SYMPTOMS: No  HEART SYMPTOMS: Yes  LUNG SYMPTOMS: No  INTESTINAL SYMPTOMS: No  URINARY SYMPTOMS: No  GYNECOLOGIC  SYMPTOMS: No  BREAST SYMPTOMS: No  SKELETAL SYMPTOMS: No  BLOOD SYMPTOMS: No  NERVOUS SYSTEM SYMPTOMS: No  MENTAL HEALTH SYMPTOMS: Yes  Chest pain or pressure: No  Fast or irregular heartbeat: Yes  Pain in legs with walking: No  Trouble breathing while lying down: No  Fingers or toes appear blue: No  High blood pressure: No  Low blood pressure: Yes  Fainting: No  Murmurs: No  Pacemaker: No  Varicose veins: Yes  Edema or swelling: Yes  Wake up at night with shortness of breath: No  Light-headedness: Yes  Exercise intolerance: Yes  Nervous or Anxious: Yes  Depression: Yes  Trouble sleeping: Yes  Trouble thinking or concentrating: Yes  Mood changes: No  Panic attacks: No        Please do not hesitate to contact me if you have any questions/concerns.     Sincerely,     Windy Owens MD

## 2020-11-20 NOTE — PROGRESS NOTES
HPI:  Des Austin is a 56 yo transgender female with a PMH of dyslipidemia, PAF, depression, anxiety, and morbid obesity.     She is planned for a bariatric surgery, and was referred for a cardiology evaluation.  She had a PF episode in 2004 and 2005 which required DCCV. She denied any specific triggers.  She attempted amiodarone, which she was allergic to. Since then she has been doing well off AADs for the past 15 years.  She recently started having chest discomfort every day, but denied any symptoms like skipped beats or palpitation.  She wore a Zio patch for 2 weeks in 7-8/2020 and did have a symptom while she was wearing it.    PAST MEDICAL HISTORY:  Past Medical History:   Diagnosis Date     Anxiety 2005    Brought on by Amioderone     Arthritis 8/2005     Atrial fib/flutter, transient     S/p cardioversion 2004     Bipolar 2 disorder (H)      Bleeding disorder (H) 1987    nose bleeds     Chronic pain     LUE pain     Congestive heart failure (H) 11/18/2004    cured 2005     Depressive disorder     on and off most of my life!     Fibromyalgia      Gender identity disorder Started Rx 2012     H/O hypogonadism Gonadectomy 2013     Hyperlipidemia      Hypertension      Other mental problems     bipolar     Psoriasis      Uncomplicated asthma 1964    cured in 4/2001     Vision disorder 6/2005       CURRENT MEDICATIONS:  Current Outpatient Medications   Medication Sig Dispense Refill     ARIPiprazole (ABILIFY) 20 MG tablet Take 1 tablet (20 mg) by mouth every morning       diclofenac (VOLTAREN) 1 % topical gel Place 2-4 g onto the skin 4 times daily Use 2g on elbow and 4g on knee 200 g 3     furosemide (LASIX) 20 MG tablet Take 1 tablet (20 mg) by mouth every morning 90 tablet 3     gabapentin (NEURONTIN) 300 MG capsule Take 1 capsule (300 mg) by mouth 3 times daily 90 capsule 0     medical cannabis (Patient's own supply.  Not a prescription) 2 capsules 2 times daily (This is NOT a prescription, and does not  certify that the patient has a qualifying medical condition for medical cannabis.  The purpose of this order is  to document that the patient reports taking medical cannabis.)    Morning and afternoon        medical cannabis inhalation (Patient's own supply.  Not a prescription) Inhale into the lungs 2 times daily as needed (This is NOT a prescription, and does not certify that the patient has a qualifying medical condition for medical cannabis.  The purpose of this order is  to document that the patient reports taking medical cannabis.)    MORNING AND AT NIGHT        order for DME Equipment being ordered: elbow guard, left - for ulnar neuropathy 1 Units 3     QUEtiapine (SEROQUEL) 25 MG tablet Take 25 mg by mouth daily       simvastatin (ZOCOR) 40 MG tablet Take 1 tablet (40 mg) by mouth At Bedtime 90 tablet 2     topiramate (TOPAMAX) 100 MG tablet 100 mg in morning and 200 mg at suppertime. 90 tablet 5     venlafaxine (EFFEXOR-XR) 75 MG 24 hr capsule Take 3 capsules (225 mg) by mouth daily       estradiol (ESTRACE) 2 MG tablet 1 tablet daily (Patient not taking: Reported on 11/16/2020) 90 tablet 3     traZODone (DESYREL) 100 MG tablet Take 1 tablet (100 mg) by mouth nightly as needed for sleep (Patient not taking: Reported on 11/20/2020) 30 tablet 1       PAST SURGICAL HISTORY:  Past Surgical History:   Procedure Laterality Date     ARTHROPLASTY SHOULDER Left 1/22/2020    Procedure: Left Total shoulder arthroplasty, distal clavicle excision;  Surgeon: Omari Tobin MD;  [facility]     BIOPSY  2005, 8/8/13    Stomach, colon     COLONOSCOPY  8/8/2013    Procedure: COLONOSCOPY;  COLONSCOPY SCREEN/ SE;  Surgeon: Santino Sun MD;  [facility]     COLONOSCOPY WITH CO2 INSUFFLATION N/A 11/1/2018    Procedure: COLONOSCOPY WITH CO2 INSUFFLATION;  Surgeon: Santino Sun MD;  [facility]     Gender Reassignment  05/2016     HEAD & NECK SURGERY  2015    ablation of nerve from  "neck to left arm     LAPAROSCOPIC GASTRIC SLEEVE N/A 4/10/2018    Procedure: LAPAROSCOPIC GASTRIC SLEEVE;  Laparoscopic Sleeve Gastrectomy;  Surgeon: Jani Shah MD;  [facility]     MANDIBLE SURGERY  1986     ORCHIECTOMY INGUINAL BILATERAL  7/16/2013    Procedure: ORCHIECTOMY INGUINAL BILATERAL;  Bilateral Simple Inguinal Orchiectomy ;  Surgeon: Jan Garrett MD;  [facility]     TONSILLECTOMY         ALLERGIES:     Allergies   Allergen Reactions     Amiodarone      Caused pain fatigue/amplified anxiety and depression     Fluoxetine Other (See Comments)     Night terrors     Tetracycline      Teeth rattle/saliva acidic       FAMILY HISTORY:  + Premature coronary artery disease  - Atrial fibrillation  + Sudden cardiac death     SOCIAL HISTORY:  Social History     Tobacco Use     Smoking status: Never Smoker     Smokeless tobacco: Never Used     Tobacco comment: NEVER SMOKED! Grew up with heavy smokers which did not help my Asthma!   Substance Use Topics     Alcohol use: Yes     Comment: occasional//2 per month     Drug use: Yes     Types: Marijuana     Comment: medical marijuana, daily       ROS:   Answers for HPI/ROS submitted by the patient on 11/19/2020 were reviewed, and there were no significant changes since then.  Constitutional: No fever, chills, or sweats. Weight stable.   ENT: No visual disturbance, ear ache, epistaxis, sore throat.   Cardiovascular: As per HPI.   Respiratory: No cough, hemoptysis.    GI: No nausea, vomiting, hematemesis, melena, or hematochezia.   : No hematuria.   Integument: Negative.   Psychiatric: Negative.   Hematologic:  Easy bruising, no easy bleeding.  Neuro: Negative.   Endocrinology: No significant heat or cold intolerance   Musculoskeletal: No myalgia.    Exam:  /77 (BP Location: Right arm, Patient Position: Sitting, Cuff Size: Adult Regular)   Pulse 84   Ht 1.74 m (5' 8.5\")   Wt 102.1 kg (225 lb)   LMP  (LMP Unknown)   SpO2 97%   BMI 33.71 " kg/m    GENERAL APPEARANCE: healthy, alert and no distress  HEENT: no icterus, no xanthelasmas, normal pupil size and reaction, normal palate, mucosa moist, no central cyanosis  NECK: no adenopathy, no asymmetry, masses, or scars, thyroid normal to palpation and no bruits, JVP not elevated  RESPIRATORY: lungs clear to auscultation - no rales, rhonchi or wheezes, no use of accessory muscles, no retractions, respirations are unlabored, normal respiratory rate  CARDIOVASCULAR: regular rhythm, normal S1 with physiologic split S2, no S3 or S4 and no murmur, click or rub, precordium quiet with normal PMI.  ABDOMEN: soft, non tender, without hepatosplenomegaly, no masses palpable, bowel sounds normal, aorta not enlarged by palpation, no abdominal bruits  EXTREMITIES: peripheral pulses normal, no edema, no bruits  NEURO: alert and oriented to person/place/time, normal speech, gait and affect  VASC: Radial, femoral, dorsalis pedis and posterior tibialis pulses are normal in volumes and symmetric bilaterally. No bruits are heard.  SKIN: no ecchymoses, no rashes    Labs:  CBC RESULTS:   Lab Results   Component Value Date    WBC 12.4 (H) 07/24/2020    RBC 4.79 07/24/2020    HGB 13.7 07/24/2020    HCT 41.9 07/24/2020    MCV 88 07/24/2020    MCH 28.6 07/24/2020    MCHC 32.7 07/24/2020    RDW 14.7 07/24/2020     07/24/2020       BMP RESULTS:  Lab Results   Component Value Date     01/07/2020    POTASSIUM 3.7 07/15/2020    CHLORIDE 106 01/07/2020    CO2 30 01/07/2020    ANIONGAP 4 01/07/2020    GLC 93 11/16/2020    BUN 19 01/07/2020    CR 0.88 07/15/2020    GFRESTIMATED >60 07/15/2020    GFRESTBLACK >60 07/15/2020    MAGALI 9.4 01/07/2020        INR RESULTS:  No results found for: INR    Procedures:  Zio patch in 7-8/2020 (14 days):  Rare PACs with a run and rare PVCs. The patient was symptomatic with PVCs.      ECG on 11/20/2020:  Sinus rhythm with LAD.    Assessment and Plan:  1. Dyslipidemia  2. Depression, anxiety  3.  Morbid obesity.   She is planned for a bariatric surgery.  4. Second degree Mobitz type I AV block per Zio patch in 7-8/2020. Asymptomatic.  -> Observation.  5. PAF in 2004 and 2005. Required DCCVs. No specific triggers.  She attempted amiodarone, which she was allergic to. Since then she has been doing well off AADs for the past 15 years.  I advised her to observe without AADs or anticoagulants at this point and come to the ED if AF recurs.  6. PVCs Infrequent (<1.0%) per Zio patch in 7-8/2020.  Her chest symptoms were related to infrequent PVCs.  Because of a low PVC burden, I advised her to observe at this point and call us if she starts having more PVCs in the future.  No regular follow up will be required.    I spent more than 30 min to explain the physiology and treatment plans of AF and PVCs.    CC  Patient Care Team:  Kristin Muller PA-C as PCP - General (Physician Assistant)  Dallas Lo MD as Referring Physician (Internal Medicine)  Kristin Muller PA-C as Assigned PCP  Conner Kaminski MD as Assigned Neuroscience Provider  Minh Mancilla MD as Assigned Pediatric Specialist Provider  Omari Tobin MD as Assigned Musculoskeletal Provider  Aleksandra Sellers RN as Specialty Care Coordinator (Cardiology)  Windy Owens MD (Cardiovascular Disease)  KRISTIN MULLER    Answers for HPI/ROS submitted by the patient on 11/19/2020   General Symptoms: No  Skin Symptoms: No  HENT Symptoms: No  EYE SYMPTOMS: No  HEART SYMPTOMS: Yes  LUNG SYMPTOMS: No  INTESTINAL SYMPTOMS: No  URINARY SYMPTOMS: No  GYNECOLOGIC SYMPTOMS: No  BREAST SYMPTOMS: No  SKELETAL SYMPTOMS: No  BLOOD SYMPTOMS: No  NERVOUS SYSTEM SYMPTOMS: No  MENTAL HEALTH SYMPTOMS: Yes  Chest pain or pressure: No  Fast or irregular heartbeat: Yes  Pain in legs with walking: No  Trouble breathing while lying down: No  Fingers or toes appear blue: No  High blood pressure: No  Low blood pressure: Yes  Fainting:  No  Murmurs: No  Pacemaker: No  Varicose veins: Yes  Edema or swelling: Yes  Wake up at night with shortness of breath: No  Light-headedness: Yes  Exercise intolerance: Yes  Nervous or Anxious: Yes  Depression: Yes  Trouble sleeping: Yes  Trouble thinking or concentrating: Yes  Mood changes: No  Panic attacks: No

## 2020-11-20 NOTE — PATIENT INSTRUCTIONS
You were seen in Electrophysiology today by: Dr Owens    Plan:     Follow up visit: as needed      Your Care Team:  [facility] Cardiology   Telephone Number     Aleksandra Sellers RN [phone]     For scheduling appts or procedures:    Oanh Guadarrama   [phone]   For the Device Clinic (Pacemakers, ICDs, Loop Recorders)    During business hours: [phone]  After business hours:   [phone]- select option 4 and ask for job code [phone].       Cardiovascular Clinic:   [address] [place] [address]. [place] [address]      As always, Thank you for trusting us with your health care needs!

## 2020-12-06 ENCOUNTER — MYC MEDICAL ADVICE (OUTPATIENT)
Dept: PALLIATIVE MEDICINE | Facility: CLINIC | Age: 55
End: 2020-12-06

## 2020-12-07 NOTE — TELEPHONE ENCOUNTER
Scheduled for 12/10/20.    DUSTY McduffieN, RN  Care Coordinator  [facility]

## 2020-12-08 RX ORDER — BISACODYL 5 MG/1
5 TABLET, DELAYED RELEASE ORAL SEE ADMIN INSTRUCTIONS
Qty: 2 TABLET | Refills: 0 | COMMUNITY
Start: 2020-12-08 | End: 2021-03-02

## 2020-12-08 RX ORDER — POLYETHYLENE GLYCOL 3350 17 G/17G
238 POWDER, FOR SOLUTION ORAL DAILY
Qty: 238 G | Refills: 0 | Status: SHIPPED | OUTPATIENT
Start: 2020-12-08 | End: 2021-03-02

## 2020-12-08 ASSESSMENT — MIFFLIN-ST. JEOR: SCORE: 1648.43

## 2020-12-09 ENCOUNTER — TRANSFERRED RECORDS (OUTPATIENT)
Dept: HEALTH INFORMATION MANAGEMENT | Facility: CLINIC | Age: 55
End: 2020-12-09

## 2020-12-09 NOTE — PROGRESS NOTES
"Kylie Austin is a 55 year old female who is being evaluated via a billable video visit.      The patient has been notified of following:     \"This video visit will be conducted via a call between you and your physician/provider. We have found that certain health care needs can be provided without the need for an in-person physical exam.  This service lets us provide the care you need with a video conversation.  If a prescription is necessary we can send it directly to your pharmacy.  If lab work is needed we can place an order for that and you can then stop by our lab to have the test done at a later time.    Video visits are billed at different rates depending on your insurance coverage.  Please reach out to your insurance provider with any questions.    If during the course of the call the physician/provider feels a video visit is not appropriate, you will not be charged for this service.\"    Patient has given verbal consent for Video visit? Yes  How would you like to obtain your AVS? MyChart  If you are dropped from the video visit, the video invite should be resent to: Text to cell phone: [phone]  Will anyone else be joining your video visit? No    Pamela Torres CMA ([facility])      Video-Visit Details  Patient unable to hear me on video x 2 programs, therefore changed to phone call.    Kylie Austin is a 55 year old female who is being evaluated via a billable telephone visit.          [facility]     Date of visit: 12/10/20     Chief complaint:   Chief Complaint   Patient presents with     Pain     Video visit due to COVID-19         Interval history:  Kylie Austin was last seen by me on 6/1/20       Recommendations/plan at the last visit included:  1. Physical Therapy:  continue home PT  2. Clinical Health Psychologist to address issues of relaxation, behavioral change, coping style, and other factors important to improvement.  Not at this " time  3. Diagnostic Studies:  none  4. Medication Management:  voltaren gel  5. Further procedures recommended: none  6. Schedule with Dr. Vo  7. Recommendations to PCP: none  8. Follow up:  3 months.       Since her last visit, Kylie JACOBSON Des Austin reports:  -she has been doing well.  -pain level has been constant but lower.  -her neck continues to feel better after shoulder surgery.  Seems muscle pain is better, although it does seize up at times.  Uses the voltaren gel, massage, heat.  -remaining isolated with COVID  -knee sometimes acts up with coldness.    Pain scores:  Pain intensity on average is 4 on a scale of 0-10.      Current pain treatments:   Medical cannabis - very helpful with pain and mood  Effexor-XR 225mg daily  Gabapentin 300 TID  voltaren  topamax 100mg in the am, 200mg in the pm.     Past pain treatments:  Tramadol  Vicodin   Advil- states he's been told by cardiology to not take any more NSAIDs   Prednisone   Sumatriptan   Cymbalta     Other treatments have included:  PT: Pursued in past  Injections:   - 8/5/19 - Left shoulder and left knee joint injection - helpful  - 12/7/18 left shoulder glenohumeral joint injection  - 5/21/18 left shoulder subacromial bursa injection - helpful  - 1/17/18 left shoulder subacromial bursa injection - helpful  - multiple injections including cervical RFA - short term relief with radiofrequency ablation  -knee injection 11/5/19    Side Effects:  no side effect     Medications:  Current Outpatient Medications   Medication     ARIPiprazole (ABILIFY) 20 MG tablet     bisacodyl (DULCOLAX) 5 MG EC tablet     diclofenac (VOLTAREN) 1 % topical gel     furosemide (LASIX) 20 MG tablet     gabapentin (NEURONTIN) 300 MG capsule     medical cannabis (Patient's own supply.  Not a prescription)     medical cannabis inhalation (Patient's own supply.  Not a prescription)     QUEtiapine (SEROQUEL) 25 MG tablet     simvastatin (ZOCOR) 40 MG tablet     topiramate  (TOPAMAX) 100 MG tablet     venlafaxine (EFFEXOR-XR) 75 MG 24 hr capsule     estradiol (ESTRACE) 2 MG tablet     magnesium citrate solution     order for DME     polyethylene glycol (MIRALAX) 17 GM/Dose powder     Simethicone 125 MG TABS     traZODone (DESYREL) 100 MG tablet     Current Facility-Administered Medications   Medication     cross-Linked Hyaluronate (GEL-ONE) injection PRSY 30 mg     lidocaine (PF) (XYLOCAINE) 1 % injection 6 mL      Medical History: any changes in medical history since they were last seen? No     Review of Systems:  The 14 system ROS was reviewed from the intake questionnaire, and is positive for: knee pain  Any bowel or bladder problems: No  Mood: no concerns       Assessment:   1. Chronic pain syndrome  2. Chronic neck and back pain  3. Left shoulder pain/impingement  4. Chronic left knee pain  5. Osteoarthritis in multiple joints  6. Cervical spondylosis and degenerative disc disease  7. Myofascial pain  8. Sacroiliac joint pain  9. Lumbar facet joint pain  10.  Medical cannabis use   11. Transgender to female        Plan:  1. Physical Therapy:  continue HEP  2. Clinical Health Psychologist to address issues of relaxation, behavioral change, coping style, and other factors important to improvement.  Not at this time  3. Diagnostic Studies:  none  4. Medication Management:  no changes. Medical marijuana recertification done  5. Further procedures recommended: none  6. Recommendations to PCP: none  7. Follow up:  6 months.    Phone call duration: 10 minutes      Lanie Fowler MD  [facility]

## 2020-12-10 ENCOUNTER — VIRTUAL VISIT (OUTPATIENT)
Dept: PALLIATIVE MEDICINE | Facility: CLINIC | Age: 55
End: 2020-12-10
Payer: MEDICARE

## 2020-12-10 DIAGNOSIS — M54.2 CERVICALGIA: Primary | ICD-10-CM

## 2020-12-10 DIAGNOSIS — M17.12 PRIMARY OSTEOARTHRITIS OF LEFT KNEE: ICD-10-CM

## 2020-12-10 DIAGNOSIS — M75.42 IMPINGEMENT SYNDROME, SHOULDER, LEFT: ICD-10-CM

## 2020-12-10 PROCEDURE — 99441 PR PHYSICIAN TELEPHONE EVALUATION 5-10 MIN: CPT | Mod: 95 | Performed by: PSYCHIATRY & NEUROLOGY

## 2020-12-10 ASSESSMENT — PAIN SCALES - GENERAL: PAINLEVEL: MODERATE PAIN (4)

## 2020-12-10 NOTE — PATIENT INSTRUCTIONS
1. Follow up in 6 months.  2. Medical marijuana certification done    ----------------------------------------------------------------  Clinic Number:  [phone]     Call with any questions about your care and for scheduling assistance.     Calls are returned Monday through Friday between 8 AM and 4:30 PM. We usually get back to you within 2 business days depending on the issue/request.    If we are prescribing your medications:    For opioid medication refills, call the clinic or send a [employer] message 7 days in advance.  Please include:    Name of requested medication    Name of the pharmacy.    For non-opioid medications, call your pharmacy directly to request a refill. Please allow 3-4 days to be processed.     Per [place] State Law:    All controlled substance prescriptions must be filled within 30 days of being written.      For those controlled substances allowing refills, pickup must occur within 30 days of last fill.      We believe regular attendance is key to your success in our program!      Any time you are unable to keep your appointment we ask that you call us at least 24 hours in advance to cancel.This will allow us to offer the appointment time to another patient.     Multiple missed appointments may lead to dismissal from the clinic.

## 2020-12-11 DIAGNOSIS — Z11.59 ENCOUNTER FOR SCREENING FOR OTHER VIRAL DISEASES: ICD-10-CM

## 2020-12-11 PROCEDURE — U0003 INFECTIOUS AGENT DETECTION BY NUCLEIC ACID (DNA OR RNA); SEVERE ACUTE RESPIRATORY SYNDROME CORONAVIRUS 2 (SARS-COV-2) (CORONAVIRUS DISEASE [COVID-19]), AMPLIFIED PROBE TECHNIQUE, MAKING USE OF HIGH THROUGHPUT TECHNOLOGIES AS DESCRIBED BY CMS-2020-01-R: HCPCS | Performed by: SURGERY

## 2020-12-12 LAB
LABORATORY COMMENT REPORT: NORMAL
SARS-COV-2 RNA SPEC QL NAA+PROBE: NEGATIVE
SARS-COV-2 RNA SPEC QL NAA+PROBE: NORMAL
SPECIMEN SOURCE: NORMAL
SPECIMEN SOURCE: NORMAL

## 2020-12-15 ENCOUNTER — HOSPITAL ENCOUNTER (OUTPATIENT)
Facility: AMBULATORY SURGERY CENTER | Age: 55
Discharge: HOME | End: 2020-12-15
Attending: SURGERY | Admitting: SURGERY
Payer: MEDICARE

## 2020-12-15 VITALS
SYSTOLIC BLOOD PRESSURE: 110 MMHG | HEART RATE: 73 BPM | OXYGEN SATURATION: 100 % | TEMPERATURE: 96.5 F | BODY MASS INDEX: 32.88 KG/M2 | HEIGHT: 69 IN | WEIGHT: 222 LBS | DIASTOLIC BLOOD PRESSURE: 46 MMHG | RESPIRATION RATE: 16 BRPM

## 2020-12-15 DIAGNOSIS — Z12.11 SCREEN FOR COLON CANCER: Primary | ICD-10-CM

## 2020-12-15 LAB — COLONOSCOPY: NORMAL

## 2020-12-15 PROCEDURE — 88305 TISSUE EXAM BY PATHOLOGIST: CPT | Performed by: PATHOLOGY

## 2020-12-15 PROCEDURE — 45385 COLONOSCOPY W/LESION REMOVAL: CPT | Mod: PT | Performed by: SURGERY

## 2020-12-15 PROCEDURE — 45385 COLONOSCOPY W/LESION REMOVAL: CPT | Mod: PT

## 2020-12-15 PROCEDURE — G8918 PT W/O PREOP ORDER IV AB PRO: HCPCS

## 2020-12-15 PROCEDURE — G8907 PT DOC NO EVENTS ON DISCHARG: HCPCS

## 2020-12-15 PROCEDURE — 45381 COLONOSCOPY SUBMUCOUS NJX: CPT

## 2020-12-15 PROCEDURE — 45381 COLONOSCOPY SUBMUCOUS NJX: CPT | Mod: PT | Performed by: SURGERY

## 2020-12-15 PROCEDURE — 99153 MOD SED SAME PHYS/QHP EA: CPT | Mod: 59 | Performed by: SURGERY

## 2020-12-15 PROCEDURE — 99152 MOD SED SAME PHYS/QHP 5/>YRS: CPT | Mod: 59 | Performed by: SURGERY

## 2020-12-15 RX ORDER — NALOXONE HYDROCHLORIDE 0.4 MG/ML
0.2 INJECTION, SOLUTION INTRAMUSCULAR; INTRAVENOUS; SUBCUTANEOUS
Status: DISCONTINUED | OUTPATIENT
Start: 2020-12-15 | End: 2020-12-16 | Stop reason: HOSPADM

## 2020-12-15 RX ORDER — NALOXONE HYDROCHLORIDE 0.4 MG/ML
0.4 INJECTION, SOLUTION INTRAMUSCULAR; INTRAVENOUS; SUBCUTANEOUS
Status: DISCONTINUED | OUTPATIENT
Start: 2020-12-15 | End: 2020-12-16 | Stop reason: HOSPADM

## 2020-12-15 RX ORDER — NALOXONE HYDROCHLORIDE 0.4 MG/ML
0.4 INJECTION, SOLUTION INTRAMUSCULAR; INTRAVENOUS; SUBCUTANEOUS
Status: CANCELLED | OUTPATIENT
Start: 2020-12-15 | End: 2020-12-16

## 2020-12-15 RX ORDER — ONDANSETRON 4 MG/1
4 TABLET, ORALLY DISINTEGRATING ORAL EVERY 6 HOURS PRN
Status: DISCONTINUED | OUTPATIENT
Start: 2020-12-15 | End: 2020-12-16 | Stop reason: HOSPADM

## 2020-12-15 RX ORDER — LIDOCAINE 40 MG/G
CREAM TOPICAL
Status: DISCONTINUED | OUTPATIENT
Start: 2020-12-15 | End: 2020-12-16 | Stop reason: HOSPADM

## 2020-12-15 RX ORDER — NALOXONE HYDROCHLORIDE 0.4 MG/ML
0.2 INJECTION, SOLUTION INTRAMUSCULAR; INTRAVENOUS; SUBCUTANEOUS
Status: CANCELLED | OUTPATIENT
Start: 2020-12-15 | End: 2020-12-16

## 2020-12-15 RX ORDER — PROCHLORPERAZINE MALEATE 10 MG
10 TABLET ORAL EVERY 6 HOURS PRN
Status: CANCELLED | OUTPATIENT
Start: 2020-12-15

## 2020-12-15 RX ORDER — ONDANSETRON 4 MG/1
4 TABLET, ORALLY DISINTEGRATING ORAL EVERY 6 HOURS PRN
Status: CANCELLED | OUTPATIENT
Start: 2020-12-15

## 2020-12-15 RX ORDER — FENTANYL CITRATE 50 UG/ML
INJECTION, SOLUTION INTRAMUSCULAR; INTRAVENOUS PRN
Status: DISCONTINUED | OUTPATIENT
Start: 2020-12-15 | End: 2020-12-15 | Stop reason: HOSPADM

## 2020-12-15 RX ORDER — FLUMAZENIL 0.1 MG/ML
0.2 INJECTION, SOLUTION INTRAVENOUS
Status: CANCELLED | OUTPATIENT
Start: 2020-12-15 | End: 2020-12-15

## 2020-12-15 RX ORDER — ONDANSETRON 2 MG/ML
4 INJECTION INTRAMUSCULAR; INTRAVENOUS
Status: DISCONTINUED | OUTPATIENT
Start: 2020-12-15 | End: 2020-12-16 | Stop reason: HOSPADM

## 2020-12-15 RX ORDER — FLUMAZENIL 0.1 MG/ML
0.2 INJECTION, SOLUTION INTRAVENOUS
Status: SHIPPED | OUTPATIENT
Start: 2020-12-15 | End: 2020-12-15

## 2020-12-15 RX ORDER — ONDANSETRON 2 MG/ML
4 INJECTION INTRAMUSCULAR; INTRAVENOUS EVERY 6 HOURS PRN
Status: DISCONTINUED | OUTPATIENT
Start: 2020-12-15 | End: 2020-12-16 | Stop reason: HOSPADM

## 2020-12-15 RX ORDER — PROCHLORPERAZINE MALEATE 10 MG
10 TABLET ORAL EVERY 6 HOURS PRN
Status: DISCONTINUED | OUTPATIENT
Start: 2020-12-15 | End: 2020-12-16 | Stop reason: HOSPADM

## 2020-12-15 RX ORDER — ONDANSETRON 2 MG/ML
4 INJECTION INTRAMUSCULAR; INTRAVENOUS EVERY 6 HOURS PRN
Status: CANCELLED | OUTPATIENT
Start: 2020-12-15

## 2020-12-18 DIAGNOSIS — M79.7 FIBROMYALGIA: ICD-10-CM

## 2020-12-18 DIAGNOSIS — G89.4 CHRONIC PAIN DISORDER: ICD-10-CM

## 2020-12-18 RX ORDER — TOPIRAMATE 100 MG/1
TABLET, FILM COATED ORAL
Qty: 90 TABLET | Refills: 0 | OUTPATIENT
Start: 2020-12-18

## 2020-12-21 LAB — COPATH REPORT: NORMAL

## 2021-02-25 DIAGNOSIS — Z79.899 ENCOUNTER FOR LONG-TERM (CURRENT) USE OF HIGH-RISK MEDICATION: Primary | ICD-10-CM

## 2021-02-25 LAB
CHOLEST SERPL-MCNC: 246 MG/DL
GLUCOSE SERPL-MCNC: 133 MG/DL (ref 70–99)
HBA1C MFR BLD: 5.4 % (ref 0–5.6)
HDLC SERPL-MCNC: 42 MG/DL
LDLC SERPL CALC-MCNC: 146 MG/DL
NONHDLC SERPL-MCNC: 204 MG/DL
TRIGL SERPL-MCNC: 291 MG/DL

## 2021-02-25 PROCEDURE — 83036 HEMOGLOBIN GLYCOSYLATED A1C: CPT | Performed by: NURSE PRACTITIONER

## 2021-02-25 PROCEDURE — 80061 LIPID PANEL: CPT | Performed by: NURSE PRACTITIONER

## 2021-02-25 PROCEDURE — 82947 ASSAY GLUCOSE BLOOD QUANT: CPT | Performed by: NURSE PRACTITIONER

## 2021-02-25 PROCEDURE — 36415 COLL VENOUS BLD VENIPUNCTURE: CPT | Performed by: NURSE PRACTITIONER

## 2021-03-02 ENCOUNTER — OFFICE VISIT (OUTPATIENT)
Dept: ORTHOPEDICS | Facility: CLINIC | Age: 56
End: 2021-03-02
Payer: MEDICARE

## 2021-03-02 VITALS
DIASTOLIC BLOOD PRESSURE: 76 MMHG | OXYGEN SATURATION: 100 % | WEIGHT: 222 LBS | BODY MASS INDEX: 32.88 KG/M2 | SYSTOLIC BLOOD PRESSURE: 117 MMHG | HEART RATE: 81 BPM | HEIGHT: 69 IN

## 2021-03-02 DIAGNOSIS — M25.559 HIP PAIN: ICD-10-CM

## 2021-03-02 DIAGNOSIS — M17.12 OSTEOARTHRITIS OF LEFT KNEE, UNSPECIFIED OSTEOARTHRITIS TYPE: Primary | ICD-10-CM

## 2021-03-02 PROCEDURE — 20610 DRAIN/INJ JOINT/BURSA W/O US: CPT | Mod: LT | Performed by: ORTHOPAEDIC SURGERY

## 2021-03-02 RX ORDER — LIDOCAINE HYDROCHLORIDE 10 MG/ML
5 INJECTION, SOLUTION EPIDURAL; INFILTRATION; INTRACAUDAL; PERINEURAL
Status: DISCONTINUED | OUTPATIENT
Start: 2021-03-02 | End: 2022-04-29

## 2021-03-02 RX ADMIN — LIDOCAINE HYDROCHLORIDE 5 ML: 10 INJECTION, SOLUTION EPIDURAL; INFILTRATION; INTRACAUDAL; PERINEURAL at 09:03

## 2021-03-02 ASSESSMENT — PAIN SCALES - GENERAL: PAINLEVEL: EXTREME PAIN (8)

## 2021-03-02 ASSESSMENT — MIFFLIN-ST. JEOR: SCORE: 1648.43

## 2021-03-02 NOTE — PROGRESS NOTES
Large Joint Injection/Arthocentesis: L knee joint    Date/Time: 3/2/2021 9:03 AM  Performed by: Israel Wade  Authorized by: Dario Vo MD     Indications:  Osteoarthritis  Needle Size:  22 G  Guidance: landmark guided    Approach:  Anterolateral  Location:  Knee      Medications:  48 mg hylan 48 MG/6ML; 5 mL lidocaine (PF) 1 %  Outcome:  Tolerated well, no immediate complications  Procedure discussed: discussed risks, benefits, and alternatives    Consent Given by:  Patient  Timeout: timeout called immediately prior to procedure    Prep: patient was prepped and draped in usual sterile fashion      Patient defers prior auth. ABN signed at time of clinic visit. Discussed with Dr Vo.   Israel Wade OPA

## 2021-03-02 NOTE — LETTER
"    3/2/2021         RE: Kylie Austin  [id number] [address] [id number]        Dear Colleague,    Thank you for referring your patient, Kylie Austin, to the [facility]. Please see a copy of my visit note below.    HISTORY OF PRESENT ILLNESS:  Kylie Austin is a 54 year old transgender patient who identifies as female, who is seen for follow up for her left knee osteoarthritis. She is back for another viscosupplementation injection in the knee.     Previous viscosupplementation injection in 2019, left knee.  Length of effectiveness:  1 year or so.  Present symptoms: pain to walk, night pain. Occasional swelling.  Pain to flex knee.   Driving  Getting out of chairs     Also having problems with low back and buttock pain, since the injection of the knee wore off, and started limping.    Other: she had total shoulder arthroplasty by Dr. Tobin 1 year ago working very well.     Review of Systems:  Constitutional/General: Negative for fever, chills, change in weight  Integumentary/Skin: Negative for worrisome rashes, moles, or lesions  Neuro: Negative for weakness, dizziness, or paresthesias   Psychiatric: negative for changes in mood or affect       PHYSICAL EXAM:  /76 (BP Location: Left arm, Patient Position: Sitting, Cuff Size: Adult Regular)   Pulse 81   Ht 1.74 m (5' 8.5\")   Wt 100.7 kg (222 lb)   SpO2 100%   BMI 33.26 kg/m    Body mass index is 33.26 kg/m .   GENERAL APPEARANCE: healthy, alert and no distress   SKIN: no suspicious lesions or rashes  NEURO: Normal strength and tone, mentation intact and speech normal  VASCULAR: good pulses, and cappillary refill   LYMPH: no lymphadenopathy   PSYCH:  mentation appears normal and affect normal/bright    Left Knee Exam:  Effusion: minimal  ROM: 0-125  Tender: medial joint line and lateral joint line  Ligaments:   Lachman's: stable   Anterior/Posterior drawer: stable,   Varus/Valgus " stress: stable to varus and valgus stress     Hip Exam:  Seated SLR: negative  Hip range of motion: no pain in left hip with rotation.  Some pain in the knee  Strength:  full strength    Left knee xray 2019:  Medial compartment moderate to severe joint space  narrowing. Lateral and patellofemoral compartment joint space width  appears within normal limits. No radiographic evidence of joint  effusion.    Impression:   Osteoarthritis of the left knee      Plan: She will proceed with repeat viscosupplementation injection.  Low back/hip most likely due to altered gait.  If still hip pains despite pain relief from the injection or if injection doesn't work, she should follow up for new knee xrays and possibly an AP pelvis xray      Procedure Note:   Informed consent obtained. Risks, benefits and complications of the injection were discussed with the patient and the patient elected to proceed. Left knee injected intra-articularly with Gel One and 4cc of local anesthetic after sterile prep. This was tolerated well by the patient.     Return to clinic AS NEEDED      PREETI Vo MD  Dept. Orthopedic Surgery  [facility]         Large Joint Injection/Arthocentesis: L knee joint    Date/Time: 3/2/2021 9:03 AM  Performed by: Israel Wade  Authorized by: Dario Vo MD     Indications:  Osteoarthritis  Needle Size:  22 G  Guidance: landmark guided    Approach:  Anterolateral  Location:  Knee      Medications:  48 mg hylan 48 MG/6ML; 5 mL lidocaine (PF) 1 %  Outcome:  Tolerated well, no immediate complications  Procedure discussed: discussed risks, benefits, and alternatives    Consent Given by:  Patient  Timeout: timeout called immediately prior to procedure    Prep: patient was prepped and draped in usual sterile fashion      Patient defers prior auth. ABN signed at time of clinic visit. Discussed with Dr Vo.   Israel Wade OPA          Again, thank you for allowing me to participate in the  care of your patient.        Sincerely,        Dario Vo MD

## 2021-03-02 NOTE — PATIENT INSTRUCTIONS
Viscosupplementation can be helpful for people whose arthritis has not responded to basic treatments. It is most effective if the arthritis is in its early stages (mild to moderate). Some patients may feel pain at the injection site, and occasionally the injections result in increased swelling. It may take several weeks to notice an improvement after viscosupplementation. Not all patients will have relief of pain. If the injections are effective they may be repeated after a period of time, usually 6 months. The long-term effectiveness of viscosupplementation is not yet known and research continues in this area.If your arthritis is not responding well or if you are trying to delay surgery, you may wish to discuss this option with your orthopaedic surgeon.

## 2021-03-02 NOTE — PROGRESS NOTES
"HISTORY OF PRESENT ILLNESS:  Kylie Austin is a 54 year old transgender patient who identifies as female, who is seen for follow up for her left knee osteoarthritis. She is back for another viscosupplementation injection in the knee.     Previous viscosupplementation injection in 2019, left knee.  Length of effectiveness:  1 year or so.  Present symptoms: pain to walk, night pain. Occasional swelling.  Pain to flex knee.   Driving  Getting out of chairs     Also having problems with low back and buttock pain, since the injection of the knee wore off, and started limping.    Other: she had total shoulder arthroplasty by Dr. Tobin 1 year ago working very well.     Review of Systems:  Constitutional/General: Negative for fever, chills, change in weight  Integumentary/Skin: Negative for worrisome rashes, moles, or lesions  Neuro: Negative for weakness, dizziness, or paresthesias   Psychiatric: negative for changes in mood or affect       PHYSICAL EXAM:  /76 (BP Location: Left arm, Patient Position: Sitting, Cuff Size: Adult Regular)   Pulse 81   Ht 1.74 m (5' 8.5\")   Wt 100.7 kg (222 lb)   SpO2 100%   BMI 33.26 kg/m    Body mass index is 33.26 kg/m .   GENERAL APPEARANCE: healthy, alert and no distress   SKIN: no suspicious lesions or rashes  NEURO: Normal strength and tone, mentation intact and speech normal  VASCULAR: good pulses, and cappillary refill   LYMPH: no lymphadenopathy   PSYCH:  mentation appears normal and affect normal/bright    Left Knee Exam:  Effusion: minimal  ROM: 0-125  Tender: medial joint line and lateral joint line  Ligaments:   Lachman's: stable   Anterior/Posterior drawer: stable,   Varus/Valgus stress: stable to varus and valgus stress     Hip Exam:  Seated SLR: negative  Hip range of motion: no pain in left hip with rotation.  Some pain in the knee  Strength:  full strength    Left knee xray 2019:  Medial compartment moderate to severe joint space  narrowing. Lateral " and patellofemoral compartment joint space width  appears within normal limits. No radiographic evidence of joint  effusion.    Impression:   Osteoarthritis of the left knee      Plan: She will proceed with repeat viscosupplementation injection.  Low back/hip most likely due to altered gait.  If still hip pains despite pain relief from the injection or if injection doesn't work, she should follow up for new knee xrays and possibly an AP pelvis xray      Procedure Note:   Informed consent obtained. Risks, benefits and complications of the injection were discussed with the patient and the patient elected to proceed. Left knee injected intra-articularly with Gel One and 4cc of local anesthetic after sterile prep. This was tolerated well by the patient.     Return to clinic AS NEEDED      PERETI Vo MD  Dept. Orthopedic Surgery  [facility]

## 2021-03-17 ENCOUNTER — IMMUNIZATION (OUTPATIENT)
Dept: NURSING | Facility: CLINIC | Age: 56
End: 2021-03-17
Payer: MEDICARE

## 2021-03-17 PROCEDURE — 91300 PR COVID VAC PFIZER DIL RECON 30 MCG/0.3 ML IM: CPT

## 2021-03-17 PROCEDURE — 0001A PR COVID VAC PFIZER DIL RECON 30 MCG/0.3 ML IM: CPT

## 2021-04-07 ENCOUNTER — IMMUNIZATION (OUTPATIENT)
Dept: NURSING | Facility: CLINIC | Age: 56
End: 2021-04-07
Attending: FAMILY MEDICINE
Payer: MEDICARE

## 2021-04-07 PROCEDURE — 0002A PR COVID VAC PFIZER DIL RECON 30 MCG/0.3 ML IM: CPT

## 2021-04-07 PROCEDURE — 91300 PR COVID VAC PFIZER DIL RECON 30 MCG/0.3 ML IM: CPT

## 2021-06-15 ENCOUNTER — TELEPHONE (OUTPATIENT)
Dept: FAMILY MEDICINE | Facility: CLINIC | Age: 56
End: 2021-06-15

## 2021-06-15 ENCOUNTER — TRANSFERRED RECORDS (OUTPATIENT)
Dept: HEALTH INFORMATION MANAGEMENT | Facility: CLINIC | Age: 56
End: 2021-06-15

## 2021-08-03 ENCOUNTER — OFFICE VISIT (OUTPATIENT)
Dept: FAMILY MEDICINE | Facility: CLINIC | Age: 56
End: 2021-08-03
Payer: MEDICARE

## 2021-08-03 VITALS
WEIGHT: 240.8 LBS | DIASTOLIC BLOOD PRESSURE: 75 MMHG | OXYGEN SATURATION: 96 % | BODY MASS INDEX: 36.08 KG/M2 | TEMPERATURE: 96.8 F | SYSTOLIC BLOOD PRESSURE: 109 MMHG | HEART RATE: 72 BPM | RESPIRATION RATE: 20 BRPM

## 2021-08-03 DIAGNOSIS — M79.89 LEG SWELLING: ICD-10-CM

## 2021-08-03 DIAGNOSIS — R73.09 ELEVATED GLUCOSE: Primary | ICD-10-CM

## 2021-08-03 DIAGNOSIS — G47.00 INSOMNIA, UNSPECIFIED TYPE: ICD-10-CM

## 2021-08-03 DIAGNOSIS — R60.9 DEPENDENT EDEMA: ICD-10-CM

## 2021-08-03 LAB
ANION GAP SERPL CALCULATED.3IONS-SCNC: 1 MMOL/L (ref 3–14)
BUN SERPL-MCNC: 14 MG/DL (ref 7–30)
CALCIUM SERPL-MCNC: 9.2 MG/DL (ref 8.5–10.1)
CHLORIDE BLD-SCNC: 105 MMOL/L (ref 94–109)
CO2 SERPL-SCNC: 33 MMOL/L (ref 20–32)
CREAT SERPL-MCNC: 1 MG/DL (ref 0.52–1.04)
GFR SERPL CREATININE-BSD FRML MDRD: 62 ML/MIN/1.73M2
GLUCOSE BLD-MCNC: 102 MG/DL (ref 79–116)
GLUCOSE BLD-MCNC: 103 MG/DL (ref 70–99)
HBA1C MFR BLD: 5.5 % (ref 0–5.6)
HOLD SPECIMEN: NORMAL
POTASSIUM BLD-SCNC: 4.1 MMOL/L (ref 3.4–5.3)
SODIUM SERPL-SCNC: 139 MMOL/L (ref 133–144)

## 2021-08-03 PROCEDURE — 82947 ASSAY GLUCOSE BLOOD QUANT: CPT | Mod: 59 | Performed by: PHYSICIAN ASSISTANT

## 2021-08-03 PROCEDURE — 36415 COLL VENOUS BLD VENIPUNCTURE: CPT | Performed by: PHYSICIAN ASSISTANT

## 2021-08-03 PROCEDURE — 83036 HEMOGLOBIN GLYCOSYLATED A1C: CPT | Performed by: PHYSICIAN ASSISTANT

## 2021-08-03 PROCEDURE — 99214 OFFICE O/P EST MOD 30 MIN: CPT | Performed by: PHYSICIAN ASSISTANT

## 2021-08-03 PROCEDURE — 80048 BASIC METABOLIC PNL TOTAL CA: CPT | Performed by: PHYSICIAN ASSISTANT

## 2021-08-03 RX ORDER — ZOLPIDEM TARTRATE 5 MG/1
5 TABLET ORAL
Qty: 90 TABLET | Refills: 1 | Status: SHIPPED | OUTPATIENT
Start: 2021-08-03 | End: 2021-11-08

## 2021-08-03 RX ORDER — GABAPENTIN 300 MG/1
900 CAPSULE ORAL AT BEDTIME
Qty: 1 CAPSULE | Refills: 0 | COMMUNITY
Start: 2021-08-03 | End: 2022-11-28

## 2021-08-03 NOTE — PROGRESS NOTES
"    Assessment & Plan     1. Elevated glucose    2. Dependent edema    3. Leg swelling    4. Insomnia, unspecified type        1,2) Whole glucose 102 fasting and A1c normal. I'm not concerned about blood sugars. Patient reassured.     3) Due for routine BMP, obtained    4) Will restart Ambien 5mg daily. Follow up if symptoms fail to improve or worsen.      Review of the result(s) of each unique test - labs  Prescription drug management         BMI:   Estimated body mass index is 36.08 kg/m  as calculated from the following:    Height as of 3/2/21: 1.74 m (5' 8.5\").    Weight as of this encounter: 109.2 kg (240 lb 12.8 oz).       Return in about 3 months (around 11/3/2021) for your annual physical, a med check, repeat labs.    Kristin Miner PA-C  [facility] DARLENE Espinal is a 56 year old who presents for the following health issues     HPI     Insomnia  Onset/Duration: x2-3 yrs  Description:   Frequency of insomnia:  nightly  Time to fall asleep (sleep latency): 15 minutes  Middle of night awakening:  YES  Early morning awakening:  YES  Progression of Symptoms:  same  Accompanying Signs & Symptoms:  Daytime sleepiness/napping: YES  Excessive snoring/apnea: YES- has a c-pap  Restless legs: YES  Waking to urinate: no  Chronic pain:  YES  Depression symptoms (if yes, do PHQ9): YES  Anxiety symptoms (if yes, do MURPHY-7): no  History:  Prior Insomnia: YES  New stressful situation: no  Precipitating factors:   Caffeine intake: YES- 2 cups of coffee per day  OTC decongestants: no  Any new medications: no  Alleviating factors:  Self medicating (alcohol, etc.):  YES- medical cannabis   Stress-reduction (exercise, yoga, meditation etc): YES-walking the dog  Therapies tried and outcome: Ambien -  Effective    Wakes up in the middle of the night  Sleeping 2 hours every afternoon, tired by 8pm  Sits at edge of bed 15-20 mins  Sometimes pain is the issue - but on the right meds  Fibro can " flare  RLS  Sleep unchanged since stopping Seroquel  Trazodone - didn't help  Ambien - worked well, didn't have sleep walking      Concern - high blood sugar reading-just above normal, heart racing x1yr daily-headaches/dizziness occasional         Review of Systems   Constitutional, cardiovascular, musculoskeletal, neuro, skin, endocrine and psych systems are negative, except as otherwise noted.      Objective    There were no vitals taken for this visit.  There is no height or weight on file to calculate BMI.  Physical Exam   GENERAL: healthy, alert and no distress  MS: no gross musculoskeletal defects noted, no edema  SKIN: no suspicious lesions or rashes  NEURO: Normal strength and tone, mentation intact and speech normal  PSYCH: mentation appears normal, affect normal/bright    Results for orders placed or performed in visit on 08/03/21 (from the past 24 hour(s))   Extra Tube    Narrative    The following orders were created for panel order Extra Tube.  Procedure                               Abnormality         Status                     ---------                               -----------         ------                     Extra Red Top Tube[[id number]]                               In process                 Extra Green Top (Lithium...[[id number]]                                                 Extra Purple Top Tube[[id number]]                                                         Please view results for these tests on the individual orders.   Glucose, whole blood   Result Value Ref Range    Glucose Whole Blood 102 79 - 116 mg/dL   **A1C FUTURE 3mo   Result Value Ref Range    Hemoglobin A1C 5.5 0.0 - 5.6 %

## 2021-08-24 NOTE — TELEPHONE ENCOUNTER
Topiramate      Last Written Prescription Date: 12/20/16  Last Fill Quantity: 180, # refills: 3  Last Office Visit with [facility] or [facility] prescribing provider: 10/19/16       BP Readings from Last 3 Encounters:   03/15/17 137/86   11/03/16 98/64   10/19/16 116/77     Furosemide      Last Written Prescription Date: 11/30/16  Last Fill Quantity: 180, # refills: 3  Last Office Visit with [facility] [place] [facility] or [facility] prescribing provider: 10/19/16       Potassium   Date Value Ref Range Status   10/19/2016 3.6 3.4 - 5.3 mmol/L Final     Creatinine   Date Value Ref Range Status   10/19/2016 0.82 0.52 - 1.04 mg/dL Final     BP Readings from Last 3 Encounters:   03/15/17 137/86   11/03/16 98/64   10/19/16 116/77      Retention Suture Text: Retention sutures were placed to support the closure and prevent dehiscence.

## 2021-09-26 ENCOUNTER — HEALTH MAINTENANCE LETTER (OUTPATIENT)
Age: 56
End: 2021-09-26

## 2021-11-05 ENCOUNTER — IMMUNIZATION (OUTPATIENT)
Dept: NURSING | Facility: CLINIC | Age: 56
End: 2021-11-05
Payer: MEDICARE

## 2021-11-05 PROCEDURE — 0013A PR COVID VAC MODERNA 100 MCG/0.5 ML IM: CPT

## 2021-11-05 PROCEDURE — 91301 PR COVID VAC MODERNA 100 MCG/0.5 ML IM: CPT

## 2021-11-08 ENCOUNTER — OFFICE VISIT (OUTPATIENT)
Dept: FAMILY MEDICINE | Facility: CLINIC | Age: 56
End: 2021-11-08
Payer: MEDICARE

## 2021-11-08 VITALS
OXYGEN SATURATION: 98 % | BODY MASS INDEX: 35.1 KG/M2 | HEART RATE: 75 BPM | DIASTOLIC BLOOD PRESSURE: 81 MMHG | HEIGHT: 69 IN | TEMPERATURE: 97.9 F | WEIGHT: 237 LBS | SYSTOLIC BLOOD PRESSURE: 130 MMHG

## 2021-11-08 DIAGNOSIS — Z12.5 SCREENING FOR PROSTATE CANCER: ICD-10-CM

## 2021-11-08 DIAGNOSIS — Z12.11 COLON CANCER SCREENING: ICD-10-CM

## 2021-11-08 DIAGNOSIS — R23.2 HOT FLASHES: ICD-10-CM

## 2021-11-08 DIAGNOSIS — G47.00 INSOMNIA, UNSPECIFIED TYPE: ICD-10-CM

## 2021-11-08 DIAGNOSIS — Z83.49 FAMILY HISTORY OF THYROID DISEASE: ICD-10-CM

## 2021-11-08 DIAGNOSIS — M79.89 LEG SWELLING: ICD-10-CM

## 2021-11-08 DIAGNOSIS — Z79.899 ENCOUNTER FOR LONG-TERM (CURRENT) USE OF MEDICATIONS: ICD-10-CM

## 2021-11-08 DIAGNOSIS — Z12.31 ENCOUNTER FOR SCREENING MAMMOGRAM FOR BREAST CANCER: ICD-10-CM

## 2021-11-08 DIAGNOSIS — Z00.00 ROUTINE GENERAL MEDICAL EXAMINATION AT A HEALTH CARE FACILITY: Primary | ICD-10-CM

## 2021-11-08 DIAGNOSIS — E78.5 HYPERLIPIDEMIA LDL GOAL <130: ICD-10-CM

## 2021-11-08 DIAGNOSIS — R22.1 NECK FULLNESS: ICD-10-CM

## 2021-11-08 LAB
CHOLEST SERPL-MCNC: 192 MG/DL
CREAT UR-MCNC: 318 MG/DL
FASTING STATUS PATIENT QL REPORTED: YES
HDLC SERPL-MCNC: 41 MG/DL
LDLC SERPL CALC-MCNC: 115 MG/DL
MICROALBUMIN UR-MCNC: 17 MG/L
MICROALBUMIN/CREAT UR: 5.35 MG/G CR (ref 0–25)
NONHDLC SERPL-MCNC: 151 MG/DL
PSA SERPL-MCNC: <0.01 UG/L
TRIGL SERPL-MCNC: 180 MG/DL
TSH SERPL DL<=0.005 MIU/L-ACNC: 1.82 MU/L (ref 0.4–4)

## 2021-11-08 PROCEDURE — 36415 COLL VENOUS BLD VENIPUNCTURE: CPT | Performed by: PHYSICIAN ASSISTANT

## 2021-11-08 PROCEDURE — 99214 OFFICE O/P EST MOD 30 MIN: CPT | Mod: 25 | Performed by: PHYSICIAN ASSISTANT

## 2021-11-08 PROCEDURE — 90682 RIV4 VACC RECOMBINANT DNA IM: CPT | Performed by: PHYSICIAN ASSISTANT

## 2021-11-08 PROCEDURE — 99396 PREV VISIT EST AGE 40-64: CPT | Mod: 25 | Performed by: PHYSICIAN ASSISTANT

## 2021-11-08 PROCEDURE — G0103 PSA SCREENING: HCPCS | Mod: KX | Performed by: PHYSICIAN ASSISTANT

## 2021-11-08 PROCEDURE — G0008 ADMIN INFLUENZA VIRUS VAC: HCPCS | Performed by: PHYSICIAN ASSISTANT

## 2021-11-08 PROCEDURE — 80061 LIPID PANEL: CPT | Performed by: PHYSICIAN ASSISTANT

## 2021-11-08 PROCEDURE — 84443 ASSAY THYROID STIM HORMONE: CPT | Performed by: PHYSICIAN ASSISTANT

## 2021-11-08 PROCEDURE — 82043 UR ALBUMIN QUANTITATIVE: CPT | Performed by: PHYSICIAN ASSISTANT

## 2021-11-08 RX ORDER — ZOLPIDEM TARTRATE 10 MG/1
10 TABLET ORAL
Qty: 30 TABLET | Refills: 0 | Status: SHIPPED | OUTPATIENT
Start: 2021-11-08 | End: 2022-07-15

## 2021-11-08 RX ORDER — FUROSEMIDE 20 MG
20 TABLET ORAL EVERY MORNING
Qty: 90 TABLET | Refills: 3 | Status: SHIPPED | OUTPATIENT
Start: 2021-11-08 | End: 2022-10-24

## 2021-11-08 RX ORDER — SIMVASTATIN 40 MG
40 TABLET ORAL AT BEDTIME
Qty: 90 TABLET | Refills: 3 | Status: SHIPPED | OUTPATIENT
Start: 2021-11-08 | End: 2022-10-24

## 2021-11-08 ASSESSMENT — ACTIVITIES OF DAILY LIVING (ADL): CURRENT_FUNCTION: NO ASSISTANCE NEEDED

## 2021-11-08 ASSESSMENT — MIFFLIN-ST. JEOR: SCORE: 1711.46

## 2021-11-08 NOTE — PATIENT INSTRUCTIONS
Other medications we use for insomnia/sleep: Amitriptyline, doxepin, mirtazepine, Seroquel      Preventive Health Recommendations  Female Ages 50 - 64    Yearly exam: See your health care provider every year in order to  o Review health changes.   o Discuss preventive care.    o Review your medicines if your doctor has prescribed any.      Get a Pap test every three years (unless you have an abnormal result and your provider advises testing more often).    If you get Pap tests with HPV test, you only need to test every 5 years, unless you have an abnormal result.     You do not need a Pap test if your uterus was removed (hysterectomy) and you have not had cancer.    You should be tested each year for STDs (sexually transmitted diseases) if you're at risk.     Have a mammogram every 1 to 2 years.    Have a colonoscopy at age 50, or have a yearly FIT test (stool test). These exams screen for colon cancer.      Have a cholesterol test every 5 years, or more often if advised.    Have a diabetes test (fasting glucose) every three years. If you are at risk for diabetes, you should have this test more often.     If you are at risk for osteoporosis (brittle bone disease), think about having a bone density scan (DEXA).    Shots: Get a flu shot each year. Get a tetanus shot every 10 years.    Nutrition:     Eat at least 5 servings of fruits and vegetables each day.    Eat whole-grain bread, whole-wheat pasta and brown rice instead of white grains and rice.    Get adequate Calcium and Vitamin D.     Lifestyle    Exercise at least 150 minutes a week (30 minutes a day, 5 days a week). This will help you control your weight and prevent disease.    Limit alcohol to one drink per day.    No smoking.     Wear sunscreen to prevent skin cancer.     See your dentist every six months for an exam and cleaning.    See your eye doctor every 1 to 2 years.

## 2021-11-08 NOTE — PROGRESS NOTES
SUBJECTIVE:   CC: Kylie Austin is an 56 year old woman who presents for preventive health visit.       Patient has been advised of split billing requirements and indicates understanding: Yes  Healthy Habits:    In general, how would you rate your overall health?  Good    Frequency of exercise:  None    Duration of exercise:  Less than 15 minutes    Taking medications regularly:  Yes    Barriers to taking medications:  Not applicable    Medication side effects:  Not applicable    Ability to successfully perform activities of daily living:  No assistance needed    Home Safety:  No safety concerns identified    Hearing Impairment:  No hearing concerns    In the past 6 months, have you been bothered by leaking of urine?  No    In general, how would you rate your overall mental or emotional health?  Good      PHQ-2 Total Score:    Additional concerns today:  No      PROBLEMS TO ADD ON...  Neck fullness  Family hx of thyroid disease  Feels like something is pressing on her esophagus making it difficult to swallow    Needs meds refilled  Routine labs due        Today's PHQ-2 Score:   PHQ-2 ( 1999 Pfizer) 11/15/2020   Q1: Little interest or pleasure in doing things 0   Q2: Feeling down, depressed or hopeless 0   PHQ-2 Score 0   Q1: Little interest or pleasure in doing things Not at all   Q2: Feeling down, depressed or hopeless Not at all   PHQ-2 Score 0       Abuse: Current or Past (Physical, Sexual or Emotional) - No  Do you feel safe in your environment? Yes        Social History     Tobacco Use     Smoking status: Never Smoker     Smokeless tobacco: Never Used     Tobacco comment: NEVER SMOKED! Grew up with heavy smokers which did not help my Asthma!   Substance Use Topics     Alcohol use: Yes     Comment: occasional//2 per month       Alcohol Use 11/15/2020   Prescreen: >3 drinks/day or >7 drinks/week? Not Applicable     Reviewed orders with patient.  Reviewed health maintenance and updated orders  "accordingly - Yes  Lab work is in process    Breast Cancer Screening:  Any new diagnosis of family breast, ovarian, or bowel cancer? No    FHS-7: No flowsheet data found.    Mammogram Screening: Recommended mammography every 1-2 years with patient discussion and risk factor consideration  Pertinent mammograms are reviewed under the imaging tab.    History of abnormal Pap smear: Transgender female, cervix absent     Reviewed and updated as needed this visit by clinical staff  Tobacco  Allergies  Meds             Reviewed and updated as needed this visit by Provider               Past Medical History:   Diagnosis Date     Anxiety 2005    Brought on by Amioderone     Arthritis 8/2005     Atrial fib/flutter, transient     S/p cardioversion 2004     Bipolar 2 disorder (H)      Bleeding disorder (H) 1987    nose bleeds     Chronic pain     LUE pain     Congestive heart failure (H) 11/18/2004    cured 2005     Depressive disorder     on and off most of my life!     Fibromyalgia      Gender identity disorder Started Rx 2012     H/O hypogonadism Gonadectomy 2013     Hyperlipidemia      Hypertension      Other mental problems     bipolar     Psoriasis      Uncomplicated asthma 1964    cured in 4/2001     Vision disorder 6/2005        Review of Systems  Other than what is noted in the HPI and PMH a complete review of systems is otherwise negative including: Constitutional, HEENT, endocrine, cardiovascular, respiratory, GI/, musculoskeletal, neuro, and psychiatric.     OBJECTIVE:   /81 (BP Location: Right arm, Patient Position: Chair, Cuff Size: Adult Large)   Pulse 75   Temp 97.9  F (36.6  C) (Tympanic)   Ht 1.74 m (5' 8.5\")   Wt 107.5 kg (237 lb)   SpO2 98%   BMI 35.51 kg/m    Physical Exam  GENERAL: healthy, alert and no distress  EYES: Eyes grossly normal to inspection, PERRL and conjunctivae and sclerae normal  HENT: ear canals and TM's normal, nose and mouth without ulcers or lesions  NECK: no adenopathy, " no asymmetry, masses, or scars and thyroid normal to palpation  RESP: lungs clear to auscultation - no rales, rhonchi or wheezes  BREAST: normal without masses, tenderness or nipple discharge and no palpable axillary masses or adenopathy  CV: regular rates and rhythm, normal S1 S2, no S3 or S4 and no murmur, click or rub  ABDOMEN: soft, nontender, no hepatosplenomegaly, no masses and bowel sounds normal  MS: no gross musculoskeletal defects noted, no edema  SKIN: no suspicious lesions or rashes  NEURO: Normal strength and tone, mentation intact and speech normal  PSYCH: mentation appears normal, affect normal/bright    ASSESSMENT/PLAN:       ICD-10-CM    1. Routine general medical examination at a health care facility  Z00.00 TSH with free T4 reflex     TSH with free T4 reflex   2. Screening for prostate cancer  Z12.5 PROSTATE SPEC ANTIGEN SCREEN     PROSTATE SPEC ANTIGEN SCREEN   3. Colon cancer screening  Z12.11 Adult Gastro Ref - Procedure Only   4. Encounter for screening mammogram for breast cancer  Z12.31 MA SCREENING DIGITAL BILAT - Future  (s+30)   5. Insomnia, unspecified type  G47.00 zolpidem (AMBIEN) 10 MG tablet   6. Hyperlipidemia LDL goal <130  E78.5 Lipid panel reflex to direct LDL Fasting     simvastatin (ZOCOR) 40 MG tablet     Lipid panel reflex to direct LDL Fasting   7. Leg swelling  M79.89 furosemide (LASIX) 20 MG tablet   8. Encounter for long-term (current) use of medications  Z79.899 Albumin Random Urine Quantitative with Creat Ratio     Albumin Random Urine Quantitative with Creat Ratio   9. Neck fullness  R22.1 US Thyroid   10. Hot flashes  R23.2    11. Family history of thyroid disease  Z83.49        1-4) Screenings discussed    5) Increase Ambien to 10mg nightly to see if that works better. If not, we can discuss another sleep aid.    6-8) Due for routine labs. Meds renewed, no changes    9-11) Will check a TSH and thyroid US. If normal, will have her do an EGD.      Patient has been  "advised of split billing requirements and indicates understanding: Yes  COUNSELING:  Reviewed preventive health counseling, as reflected in patient instructions    Estimated body mass index is 35.51 kg/m  as calculated from the following:    Height as of this encounter: 1.74 m (5' 8.5\").    Weight as of this encounter: 107.5 kg (237 lb).    She reports that she has never smoked. She has never used smokeless tobacco.      Counseling Resources:  ATP IV Guidelines  Pooled Cohorts Equation Calculator  Breast Cancer Risk Calculator  BRCA-Related Cancer Risk Assessment: FHS-7 Tool  FRAX Risk Assessment  ICSI Preventive Guidelines  Dietary Guidelines for Americans, 2010  USDA's MyPlate  ASA Prophylaxis  Lung CA Screening    Kristin Miner PA-C  [facility]  "

## 2021-11-11 ENCOUNTER — ANCILLARY PROCEDURE (OUTPATIENT)
Dept: ULTRASOUND IMAGING | Facility: CLINIC | Age: 56
End: 2021-11-11
Attending: PHYSICIAN ASSISTANT
Payer: MEDICARE

## 2021-11-11 DIAGNOSIS — R22.1 NECK FULLNESS: ICD-10-CM

## 2021-11-11 PROCEDURE — 76536 US EXAM OF HEAD AND NECK: CPT | Performed by: RADIOLOGY

## 2021-11-15 ENCOUNTER — TELEPHONE (OUTPATIENT)
Dept: FAMILY MEDICINE | Facility: CLINIC | Age: 56
End: 2021-11-15
Payer: MEDICARE

## 2021-11-15 DIAGNOSIS — M25.569 CHRONIC KNEE PAIN, UNSPECIFIED LATERALITY: Primary | ICD-10-CM

## 2021-11-15 DIAGNOSIS — G89.29 CHRONIC KNEE PAIN, UNSPECIFIED LATERALITY: Primary | ICD-10-CM

## 2021-11-15 NOTE — TELEPHONE ENCOUNTER
Reason for call:  Order   Order or referral being requested: [facility] pain management clinic  Reason for request: Knee studdy  Date needed: as soon as possible  Has the patient been seen by the PCP for this problem? YES    Additional comments: Dr. Bloom at [facility] comprehensive pain management clinic [address] [place] [address]    The clinic asks that on the referral is says for the Clinic Staff with Dr. Bloom to contact the patient to get scheduled.    Patient asked for this to be marked urgent     Phone number to reach patient:  Cell number on file:    Telephone Information:   Mobile [phone]       Best Time:      Can we leave a detailed message on this number?  YES    Travel screening: Not Applicable

## 2021-12-02 ENCOUNTER — OFFICE VISIT (OUTPATIENT)
Dept: PALLIATIVE MEDICINE | Facility: CLINIC | Age: 56
End: 2021-12-02
Payer: MEDICARE

## 2021-12-02 VITALS — SYSTOLIC BLOOD PRESSURE: 131 MMHG | DIASTOLIC BLOOD PRESSURE: 83 MMHG | HEART RATE: 81 BPM

## 2021-12-02 DIAGNOSIS — M17.12 PRIMARY OSTEOARTHRITIS OF LEFT KNEE: ICD-10-CM

## 2021-12-02 DIAGNOSIS — G89.29 CHRONIC PAIN OF LEFT KNEE: Primary | ICD-10-CM

## 2021-12-02 DIAGNOSIS — M19.012 PRIMARY OSTEOARTHRITIS OF LEFT SHOULDER: ICD-10-CM

## 2021-12-02 DIAGNOSIS — M25.522 LEFT ELBOW PAIN: ICD-10-CM

## 2021-12-02 DIAGNOSIS — M25.562 CHRONIC PAIN OF LEFT KNEE: Primary | ICD-10-CM

## 2021-12-02 PROCEDURE — 99213 OFFICE O/P EST LOW 20 MIN: CPT | Performed by: PSYCHIATRY & NEUROLOGY

## 2021-12-02 ASSESSMENT — PAIN SCALES - GENERAL: PAINLEVEL: MODERATE PAIN (5)

## 2021-12-02 NOTE — PROGRESS NOTES
12/02/21 0829   PEG: A Thee-Item Scale Assessing Pain Intensity and Interference        0 = No pain / No interference    10 = Pain as bad as you can imagine / Completely interferes   What number best describes your pain on average in the past week? 5   What number best describes how, during the past week, pain has interfered with your enjoyment of life? 4   What number best describes how, during the past week, pain has interfered with your general activity? 4   PEG Total Score 4.33

## 2021-12-02 NOTE — PROGRESS NOTES
[facility]     Date of visit: 12/2/2021      Chief complaint:   Chief Complaint   Patient presents with     Pain         Interval history:  Kylie Austin was last seen by me on 12/10/20     Recommendations/plan at the last visit included:  1. Physical Therapy:  continue HEP  2. Clinical Health Psychologist to address issues of relaxation, behavioral change, coping style, and other factors important to improvement.  Not at this time  3. Diagnostic Studies:  none  4. Medication Management:  no changes. Medical marijuana recertification done  5. Further procedures recommended: none  6. Recommendations to PCP: none  7. Follow up:  6 months.     Since her last visit, Kylie Austin reports:  -continues to have some knee pain.  Getting enrolled in a knee study.  Uses voltaren  Here for MM recertification    Pain scores:  Moderate Pain (5)      Current pain treatments:   Medical cannabis - very helpful with pain and mood  Effexor-XR 225mg daily  [facility]       Past pain treatments:  Tramadol  Vicodin   Advil- states he's been told by cardiology to not take any more NSAIDs   Prednisone   Sumatriptan   Cymbalta  topamax 100mg in the am, 200mg in the pm.- wasn't sure it was helped.  Weaned off     Other treatments have included:  PT: Pursued in past  Injections:   - 8/5/19 - Left shoulder and left knee joint injection - helpful  - 12/7/18 left shoulder glenohumeral joint injection  - 5/21/18 left shoulder subacromial bursa injection - helpful  - 1/17/18 left shoulder subacromial bursa injection - helpful  - multiple injections including cervical RFA - short term relief with radiofrequency ablation  -knee injection 11/5/19    Side Effects:  dry mouth     Medications:  Current Outpatient Medications   Medication     ARIPiprazole (ABILIFY) 20 MG tablet     diclofenac (VOLTAREN) 1 % topical gel     furosemide (LASIX) 20 MG tablet     gabapentin  (NEURONTIN) 300 MG capsule     medical cannabis (Patient's own supply.  Not a prescription)     order for DME     simvastatin (ZOCOR) 40 MG tablet     venlafaxine (EFFEXOR-XR) 75 MG 24 hr capsule     zolpidem (AMBIEN) 10 MG tablet     diclofenac (VOLTAREN) 1 % topical gel     medical cannabis inhalation (Patient's own supply.  Not a prescription)     Current Facility-Administered Medications   Medication     cross-Linked Hyaluronate (GEL-ONE) injection PRSY 30 mg     hylan (SYNVISC ONE) injection 48 mg     lidocaine (PF) (XYLOCAINE) 1 % injection 5 mL     lidocaine (PF) (XYLOCAINE) 1 % injection 6 mL      Medical History: any changes in medical history since they were last seen? No     Physical exam:  /83   Pulse 81   Gen: awake, alert   Gait: normal  MSK exam: tenderness along knee bilaterally, worse medially on the left     Assessment:   1. Chronic pain syndrome  2. Chronic neck and back pain  3. Left shoulder pain/impingement  4. Chronic left knee pain  5. Osteoarthritis in multiple joints  6. Cervical spondylosis and degenerative disc disease  7. Myofascial pain  8. Sacroiliac joint pain  9. Lumbar facet joint pain  10.  Medical cannabis use   11. Transgender to female        Plan:   1. Physical Therapy:  continue HEP  2. Clinical Health Psychologist to address issues of relaxation, behavioral change, coping style, and other factors important to improvement.  Not at this time  3. Diagnostic Studies:  none  4. Medication Management: Medical marijuana recertification done  5. Further procedures recommended: none  6. Recommendations to PCP: none  7. Follow up:  6 months.    10 minutes spent on the date of encounter doing chart review, history, and exam documentation and further activities as noted above.     Lanie Fowler MD  [facility]

## 2021-12-02 NOTE — PATIENT INSTRUCTIONS
1. Follow up 6 months    ----------------------------------------------------------------  Clinic Number:  [phone]     Call with any questions about your care and for scheduling assistance.     Calls are returned Monday through Friday between 8 AM and 4:30 PM. We usually get back to you within 2 business days depending on the issue/request.    If we are prescribing your medications:    For opioid medication refills, call the clinic or send a [employer] message 7 days in advance.  Please include:    Name of requested medication    Name of the pharmacy.    For non-opioid medications, call your pharmacy directly to request a refill. Please allow 3-4 days to be processed.     Per [place] State Law:    All controlled substance prescriptions must be filled within 30 days of being written.      For those controlled substances allowing refills, pickup must occur within 30 days of last fill.      We believe regular attendance is key to your success in our program!      Any time you are unable to keep your appointment we ask that you call us at least 24 hours in advance to cancel.This will allow us to offer the appointment time to another patient.     Multiple missed appointments may lead to dismissal from the clinic.

## 2021-12-06 ENCOUNTER — TELEPHONE (OUTPATIENT)
Dept: PALLIATIVE MEDICINE | Facility: CLINIC | Age: 56
End: 2021-12-06
Payer: MEDICARE

## 2021-12-06 NOTE — TELEPHONE ENCOUNTER
Central Prior Authorization Team   Phone: [phone]    PA Initiation    Medication: diclofenac (VOLTAREN) 1 % topical gel  Insurance Company: [employer] - Phone [phone] Fax [phone]  Pharmacy Filling the Rx: [id number] - [place], [place] - [address] [address] [facility]  Filling Pharmacy Phone: [phone]  Filling Pharmacy Fax:    Start Date: 12/6/2021

## 2021-12-06 NOTE — TELEPHONE ENCOUNTER
Prior Authorization Approval    Authorization Effective Date: 9/7/2021  Authorization Expiration Date: 12/6/2022  Medication: diclofenac (VOLTAREN) 1 % topical gel  Approved Dose/Quantity:    Reference #:     Insurance Company: [employer] - Phone [phone] Fax [phone]  Expected CoPay:       CoPay Card Available:      Foundation Assistance Needed:    Which Pharmacy is filling the prescription (Not needed for infusion/clinic administered): [id number] - [address] [place] - [address] [facility]  Pharmacy Notified: Yes  Patient Notified: Yes  **Instructed pharmacy to notify patient when script is ready to /ship.**

## 2021-12-06 NOTE — TELEPHONE ENCOUNTER
Prior Authorization Retail Medication Request    Medication/Dose: diclofenac (VOLTAREN) 1 % topical gel  ICD code (if different than what is on RX):    Previously Tried and Failed:    Rationale:      Insurance Name:  Medicare  Insurance ID:  [id number]    Key: [id number]    Pharmacy Information:  [id number]   [id number] [facility] [address]  Phone: [phone] Fax: [phone]    Will route to CLARIBEL kamara.     Erika Mcclendon [facility]

## 2021-12-22 ENCOUNTER — TELEPHONE (OUTPATIENT)
Dept: GASTROENTEROLOGY | Facility: CLINIC | Age: 56
End: 2021-12-22
Payer: MEDICARE

## 2021-12-22 DIAGNOSIS — Z12.11 ENCOUNTER FOR SCREENING COLONOSCOPY: Primary | ICD-10-CM

## 2021-12-22 DIAGNOSIS — Z11.59 ENCOUNTER FOR SCREENING FOR OTHER VIRAL DISEASES: ICD-10-CM

## 2021-12-22 RX ORDER — BISACODYL 5 MG
TABLET, DELAYED RELEASE (ENTERIC COATED) ORAL
Qty: 4 TABLET | Refills: 0 | Status: SHIPPED | OUTPATIENT
Start: 2021-12-22 | End: 2022-04-13

## 2021-12-22 NOTE — TELEPHONE ENCOUNTER
FUTURE VISIT INFORMATION      SURGERY INFORMATION:    Date: 22    Location:  gi    Surgeon:   Dallas Velazco MD    Anesthesia Type:  MAC    Procedure: COLONOSCOPY    RECORDS REQUESTED FROM:        [facility] Care [facility]    Pertinent Medical History: AV Block    Most recent EKG+ Tracin20    Most recent ECHO: 7/15/20- [facility]    Most recent Cardiac Stress Test: 14

## 2021-12-22 NOTE — TELEPHONE ENCOUNTER
Screening Questions  1. Are you active on mychart? Y    2. What insurance is in the chart? Medicare    2.  Ordering/Referring Provider: Kristofer    3. BMI 35.1, If greater than 40 review exclusion criteria also will need EXTENDED PREP    4.  Respiratory Screening (If yes to any of the following Hospital setting only):     Do you use daily home oxygen? N  Do you have mod to severe Obstructive Sleep Apnea? Yes-moderate uses CPAP   Do you have Pulmonary Hypertension? N   Do you have UNCONTROLLED asthma? N    5. Have you had a heart or lung transplant (If yes, please review exclusion criteria) ? N    6. Are you currently on dialysis or have chronic kidney disease? N    7. Have you had a stroke or Transient ischemic attack (TIA) within 6 months? N    8. In the past 6 months, have you had any heart related issues including cardiomyopathy or heart attack? N                 If yes, did it require cardiac stenting or other implantable device?N      9. Do you have any implantable devices in your body (pacemaker, defib, LVAD)? N    10. Do you take nitroglycerin? If yes, how often? N    11. Are you currently taking any blood thinners?N    12. Are you a diabetic? N    13. (Females) Are you currently pregnant? NA  If yes, how many weeks?      15. Are you taking any prescription pain medications on a routine schedule? Gabapentin, every night If yes, MAC sedation and patient will need EXTENDED PREP.    16. Do you have any chemical dependencies such as alcohol, street drugs, or methadone? N If yes, MAC sedation.    17. Do you have any history of post-traumatic stress syndrome, severe anxiety or history of psychosis? Yes-anxiety    18. Do you transfer independently? Yes    19.  Do you have any issues with constipation? N   If yes, pt will need EXTENDED PREP     20. Preferred Pharmacy for Pre Prescription [id number] - [place], [place] - [address] [facility]    Scheduling Details    Which Colonoscopy Prep was Sent?:  Extended  Procedure Scheduled: Colon  Surgeon: Juan A  Date of Procedure: 1/4/22  Location: [facility]  Caller (Please ask for phone number if not scheduled by patient): [facility]      Sedation Type: MAC  Conscious Sedation- Needs  for 6 hours after the procedure  MAC/General-Needs  for 24 hours after procedure    Pre-op Required at [facility], [place], [place] and OR for MAC sedation: Yes scheduled with PAC 12/29/21  (if yes advise patient they will need a pre-op prior to procedure)      Is patient on blood thinners? -N (If yes- inform patient to follow up with PCP or provider for follow up instructions)     Informed patient they will need an adult  Yes  Cannot take any type of public or medical transportation alone    Pre-Procedure Covid test to be completed at [facility] or Externally: [place] 12/31/21    Confirmed Nurse will call to complete assessment Yes    Additional comments:

## 2021-12-22 NOTE — TELEPHONE ENCOUNTER
Pre assessment questions completed for upcoming colonoscopy procedure scheduled on 1.4.2022    COVID test scheduled 12.31.2021    Pre-op:  12.29.2021 PAC with Marilu Cummings PA-C    Reviewed procedural arrival time 1345 and facility location [facility].    Designated  policy reviewed. Instructed to have someone stay 24 hours post procedure.     Anticoagulation/blood thinners? no    Electronic implanted devices? No    Golytely prep as pt states she was unable to tolerate the Miralax/Magnesium citrate/Dulcolax in the past.  Pt has had Golytely before with good results.    Reviewed Golytely prep instructions with patient. No fiber/iron supplements or foods that contain nuts/seeds prior to procedure.     Patient verbalized understanding and had no questions or concerns at this time.    Janelle Zacarias RN

## 2021-12-29 ENCOUNTER — ANESTHESIA EVENT (OUTPATIENT)
Dept: GASTROENTEROLOGY | Facility: CLINIC | Age: 56
End: 2021-12-29
Payer: COMMERCIAL

## 2021-12-29 ENCOUNTER — PRE VISIT (OUTPATIENT)
Dept: SURGERY | Facility: CLINIC | Age: 56
End: 2021-12-29
Payer: MEDICARE

## 2021-12-29 ENCOUNTER — VIRTUAL VISIT (OUTPATIENT)
Dept: SURGERY | Facility: CLINIC | Age: 56
End: 2021-12-29
Payer: MEDICARE

## 2021-12-29 DIAGNOSIS — Z01.818 PRE-OP EVALUATION: Primary | ICD-10-CM

## 2021-12-29 PROCEDURE — 99202 OFFICE O/P NEW SF 15 MIN: CPT | Mod: 95 | Performed by: PHYSICIAN ASSISTANT

## 2021-12-29 RX ORDER — MIRTAZAPINE 45 MG/1
TABLET, FILM COATED ORAL EVERY EVENING
COMMUNITY
Start: 2021-11-03 | End: 2022-04-13

## 2021-12-29 ASSESSMENT — LIFESTYLE VARIABLES: TOBACCO_USE: 0

## 2021-12-29 ASSESSMENT — PAIN SCALES - GENERAL: PAINLEVEL: NO PAIN (0)

## 2021-12-29 NOTE — ANESTHESIA PREPROCEDURE EVALUATION
Anesthesia Pre-Procedure Evaluation    Patient: Kylie Austin   MRN: [id number] : 1965        Preoperative Diagnosis: Colon cancer screening [Z12.11]    Procedure : Procedure(s):  COLONOSCOPY  Video Visit       Past Medical History:   Diagnosis Date     Anxiety     Brought on by Amioderone     Arthritis 2005     Atrial fib/flutter, transient     S/p cardioversion      Bipolar 2 disorder (H)      Bleeding disorder (H)     nose bleeds     Chronic pain     LUE pain     Congestive heart failure (H) 2004    cured      Depressive disorder     on and off most of my life!     Fibromyalgia      Gender identity disorder Started Rx      H/O hypogonadism Gonadectomy      Hyperlipidemia      Hypertension      Other mental problems     bipolar     Psoriasis      Uncomplicated asthma 1964    cured in 2001     Vision disorder 2005      Past Surgical History:   Procedure Laterality Date     ARTHROPLASTY SHOULDER Left 2020    Procedure: Left Total shoulder arthroplasty, distal clavicle excision;  Surgeon: Omari Tobin MD;  [facility]     BIOPSY  2005, 13    Stomach, colon     COLONOSCOPY  2013    Procedure: COLONOSCOPY;  COLONSCOPY SCREEN/ SE;  Surgeon: Santino Sun MD;  [facility]     COLONOSCOPY WITH CO2 INSUFFLATION N/A 2018    Procedure: COLONOSCOPY WITH CO2 INSUFFLATION;  Surgeon: Santino Sun MD;  [facility]     COLONOSCOPY WITH CO2 INSUFFLATION N/A 12/15/2020    Procedure: COLONOSCOPY, WITH CO2 INSUFFLATION;  Surgeon: Nima Kamara MD;  [facility]     Gender Reassignment  2016     HEAD & NECK SURGERY      ablation of nerve from neck to left arm     LAPAROSCOPIC GASTRIC SLEEVE N/A 4/10/2018    Procedure: LAPAROSCOPIC GASTRIC SLEEVE;  Laparoscopic Sleeve Gastrectomy;  Surgeon: Jani Shah MD;  [facility]     MANDIBLE SURGERY       ORCHIECTOMY INGUINAL BILATERAL   7/16/2013    Procedure: ORCHIECTOMY INGUINAL BILATERAL;  Bilateral Simple Inguinal Orchiectomy ;  Surgeon: Jan Garrett MD;  [facility]     TONSILLECTOMY        Allergies   Allergen Reactions     Amiodarone      Caused pain fatigue/amplified anxiety and depression     Fluoxetine Other (See Comments)     Night terrors     Tetracycline      Teeth rattle/saliva acidic      Social History     Tobacco Use     Smoking status: Never Smoker     Smokeless tobacco: Never Used     Tobacco comment: NEVER SMOKED! Grew up with heavy smokers which did not help my Asthma!   Substance Use Topics     Alcohol use: Yes     Comment: occasional//2 per month      Wt Readings from Last 1 Encounters:   11/08/21 107.5 kg (237 lb)        Anesthesia Evaluation   Pt has had prior anesthetic.     No history of anesthetic complications       ROS/MED HX  ENT/Pulmonary:     (+) sleep apnea, uses CPAP, asthma (no recent symptoms )  (-) tobacco use   Neurologic:  - neg neurologic ROS     Cardiovascular: Comment: H/o atrial fibrillation s/p cardioversion    (+) Dyslipidemia -----Previous cardiac testing   Echo: Date: Results:    Stress Test: Date: Results:    ECG Reviewed: Date: 11/2020 Results:  SR, LAD  Cath: Date: Results:   (-) taking anticoagulants/antiplatelets   METS/Exercise Tolerance: >4 METS Comment: Shoveling snow, walking dog about 1 mile at a time   Hematologic:  - neg hematologic  ROS  (-) history of blood clots and history of blood transfusion   Musculoskeletal:  - neg musculoskeletal ROS     GI/Hepatic:  - neg GI/hepatic ROS  (-) GERD   Renal/Genitourinary:  - neg Renal ROS     Endo:     (+) Obesity (BMI 35),     Psychiatric/Substance Use:     (+) psychiatric history anxiety, bipolar and depression     Infectious Disease:       Malignancy:  - neg malignancy ROS     Other:            Physical Exam    Airway        Mallampati: II       Respiratory Devices and Support         Dental  no notable dental history          Cardiovascular   cardiovascular exam normal          Pulmonary   pulmonary exam normal                OUTSIDE LABS:  CBC:   Lab Results   Component Value Date    WBC 12.4 (H) 07/24/2020    WBC 7.9 09/14/2018    HGB 13.7 07/24/2020    HGB 11.3 (L) 01/23/2020    HCT 41.9 07/24/2020    HCT 42.9 09/14/2018     07/24/2020     09/14/2018     BMP:   Lab Results   Component Value Date     08/03/2021     01/07/2020    POTASSIUM 4.1 08/03/2021    POTASSIUM 3.7 07/15/2020    CHLORIDE 105 08/03/2021    CHLORIDE 106 01/07/2020    CO2 33 (H) 08/03/2021    CO2 30 01/07/2020    BUN 14 08/03/2021    BUN 19 01/07/2020    CR 1.00 08/03/2021    CR 0.88 07/15/2020     (H) 08/03/2021     (H) 02/25/2021     COAGS: No results found for: PTT, INR, FIBR  POC:   Lab Results   Component Value Date    BGM 85 01/22/2020     HEPATIC:   Lab Results   Component Value Date    ALBUMIN 3.9 10/16/2019    PROTTOTAL 7.2 10/16/2019    ALT 24 10/16/2019    AST 13 10/16/2019    ALKPHOS 47 10/16/2019    BILITOTAL 0.4 10/16/2019     OTHER:   Lab Results   Component Value Date    A1C 5.5 08/03/2021    MAGALI 9.2 08/03/2021    TSH 1.82 11/08/2021    T4 0.82 09/14/2018       Anesthesia Plan    ASA Status:  2   NPO Status:  NPO Appropriate    Anesthesia Type: MAC.     - Reason for MAC: straight local not clinically adequate, immobility needed   Induction: Intravenous.   Maintenance: TIVA.        Consents    Anesthesia Plan(s) and associated risks, benefits, and realistic alternatives discussed. Questions answered and patient/representative(s) expressed understanding.     - Discussed: Risks, Benefits and Alternatives for BOTH SEDATION and the PROCEDURE were discussed     - Discussed with:  Patient      - Extended Intubation/Ventilatory Support Discussed: No.      - Patient is DNR/DNI Status: No    Use of blood products discussed: No .     Postoperative Care       PONV prophylaxis: Background Propofol Infusion      Comments:              PAC Discussion and Assessment    ASA Classification: 2  ASC OK for Surgery: [facility]I.  Anesthetic techniques and relevant risks discussed: MAC with GA as backup                  PAC Resident/NP Anesthesia Assessment: Kylie Austin is a 56 year old female scheduled for colonoscopy on 1/4/22 by Dr. Velazco for screening. PAC referral for risk assessment and optimization for anesthesia with comorbid conditions of dyslipidemia, atrial fibrillation s/p cardioversion, h/o asthma, obesity, depression, anxiety:    Pre-operative considerations:      1.  Cardiac:  Functional status- METS >4. Denies cardiac symptoms.  low risk surgery with 0.4% (RCRI #) risk of major adverse cardiac event.      ~ h/o atrial fibrillation s/p cardioversion. EKG 11/2020 showed SR  ~leg swelling using Lasix  ~dyslipidemia using zocor  ~previous cardiac testing above        2.  Pulm:   DEVANTE risk: intermediate  ~non smoker     ~h/o asthma- no current medications or recent symptoms          3.  GI:  Risk of PONV score = 3.  If > 2, anti-emetic intervention recommended.            4.  endo: BMI 35    5. psych: depression and anxiety using abilify, effexor and remeron        VTE risk: 0.26%        Patient is optimized and is acceptable candidate for the proposed procedure.  No further diagnostic evaluation is needed.            **Physical exam and vital signs not completed today as this visit was scheduled as a virtual visit during Covid 19 pandemic. Physical exam should be completed the DOS in pre-op**        Final plan per anesthesiologist on day of surgery.            **For further details of assessment, testing, and physical exam please see H and P completed on same date.                  JES Man PA-C

## 2021-12-29 NOTE — H&P
Pre-Operative H & P     CC:  Preoperative exam to assess for increased cardiopulmonary risk while undergoing surgery and anesthesia.    Date of Encounter: 12/29/2021  Primary Care Physician:  Kristin Miner     Reason for visit:   Encounter Diagnosis   Name Primary?     Pre-op evaluation Yes         HPI  Kylie Austin is a 56 year old female who presents for pre-operative H & P in preparation for colonoscopy with Dr. Velazco on 1/4/22 at [facility]. Patient is being evaluated for comorbid conditions of hypertension, dyslipidemia, atrial fibrillation s/p cardioversion, h/o asthma, obesity, depression, anxiety    Kylie Austin was recently seen by her PCP for routine physical. She is due for colon cancer screening, so was referred to Dr. Velazco. Colonoscopy scheduled as above.       History is obtained from the patient and chart review      Hx of abnormal bleeding or anti-platelet use: denies    Menstrual history: No LMP recorded.    Prior to Admission Medications  Current Outpatient Medications   Medication Sig Dispense Refill     ARIPiprazole (ABILIFY) 20 MG tablet Take 1 tablet (20 mg) by mouth every morning       diclofenac (VOLTAREN) 1 % topical gel Apply 4 g topically 4 times daily as needed for moderate pain 200 g 11     furosemide (LASIX) 20 MG tablet Take 1 tablet (20 mg) by mouth every morning 90 tablet 3     gabapentin (NEURONTIN) 300 MG capsule Take 3 capsules (900 mg) by mouth At Bedtime 1 capsule 0     medical cannabis (Patient's own supply.  Not a prescription) 2 capsules 2 times daily (This is NOT a prescription, and does not certify that the patient has a qualifying medical condition for medical cannabis.  The purpose of this order is  to document that the patient reports taking medical cannabis.)    Morning and afternoon        simvastatin (ZOCOR) 40 MG tablet Take 1 tablet (40 mg) by mouth At Bedtime 90 tablet 3     venlafaxine (EFFEXOR-XR) 75 MG 24 hr capsule Take 225 mg by  mouth every morning        bisacodyl (DULCOLAX) 5 MG EC tablet Take 2 tabs by mouth at 3pm one day prior to procedure.  Take 2 tabs by mouth at 6am day of procedure. 4 tablet 0     diclofenac (VOLTAREN) 1 % topical gel Place 2-4 g onto the skin 4 times daily Use 2g on elbow and 4g on knee (Patient not taking: Reported on 12/2/2021) 200 g 3     medical cannabis inhalation (Patient's own supply.  Not a prescription) Inhale into the lungs 2 times daily as needed (This is NOT a prescription, and does not certify that the patient has a qualifying medical condition for medical cannabis.  The purpose of this order is  to document that the patient reports taking medical cannabis.)    MORNING AND AT NIGHT        mirtazapine (REMERON) 45 MG tablet every evening       order for DME Equipment being ordered: elbow guard, left - for ulnar neuropathy 1 Units 3     polyethylene glycol (GOLYTELY) 236 g suspension Take as directed in patient instructions.  At 6:00pm drink 8oz glass of mixture every 15 min until the jug is half empty.  Store the rest in the refrigerator.  Day of procedure at 6am drink second half of container.  Drink one 8oz glass every 15 minutes until jug is empty. 4000 mL 0     zolpidem (AMBIEN) 10 MG tablet Take 1 tablet (10 mg) by mouth nightly as needed for sleep (Patient not taking: Reported on 12/29/2021) 30 tablet 0       Family History  Family History   Problem Relation Age of Onset     Cancer Father         skin     Diabetes Father         Was on the patch when they were new     Heart Disease Father 55        scd     Coronary Artery Disease Father      Hypertension Father      Hyperlipidemia Father      Heart Disease Other      Alzheimer Disease Paternal Grandmother      Diabetes Paternal Grandmother      Mental Illness Paternal Grandmother         alshimers     Osteoporosis Paternal Grandmother      Diabetes Sister      Diabetes Sister      Coronary Artery Disease Paternal Grandfather      Hypertension  Paternal Grandfather      Hyperlipidemia Paternal Grandfather      Prostate Cancer Paternal Grandfather      Other Cancer Paternal Grandfather      Breast Cancer Mother      Depression Daughter      Unknown/Adopted Daughter      Depression Daughter      Mental Illness Daughter         bipolar     Depression Son      Anxiety Disorder Daughter      Anxiety Disorder Daughter      Osteoporosis Maternal Grandmother      Thyroid Disease Sister        The complete review of systems is negative other than noted in the HPI or here.        Anesthesia Pre-Procedure Evaluation    Patient: Kylie Austin   MRN: [id number] : 1965        Preoperative Diagnosis: Colon cancer screening [Z12.11]    Procedure : Procedure(s):  COLONOSCOPY  Video Visit       Past Medical History:   Diagnosis Date     Anxiety     Brought on by Amioderone     Arthritis 2005     Atrial fib/flutter, transient     S/p cardioversion      Bipolar 2 disorder (H)      Bleeding disorder (H)     nose bleeds     Chronic pain     LUE pain     Congestive heart failure (H) 2004    cured      Depressive disorder     on and off most of my life!     Fibromyalgia      Gender identity disorder Started Rx      H/O hypogonadism Gonadectomy      Hyperlipidemia      Hypertension      Other mental problems     bipolar     Psoriasis      Uncomplicated asthma 1964    cured in 2001     Vision disorder 2005      Past Surgical History:   Procedure Laterality Date     ARTHROPLASTY SHOULDER Left 2020    Procedure: Left Total shoulder arthroplasty, distal clavicle excision;  Surgeon: Omari Tobin MD;  [facility]     BIOPSY  2005, 13    Stomach, colon     COLONOSCOPY  2013    Procedure: COLONOSCOPY;  COLONSCOPY SCREEN/ SE;  Surgeon: Santino Sun MD;  [facility]     COLONOSCOPY WITH CO2 INSUFFLATION N/A 2018    Procedure: COLONOSCOPY WITH CO2 INSUFFLATION;  Surgeon: Corinne  Santino Haynes MD;  [facility]     COLONOSCOPY WITH CO2 INSUFFLATION N/A 12/15/2020    Procedure: COLONOSCOPY, WITH CO2 INSUFFLATION;  Surgeon: Nima Kamara MD;  [facility]: [facility]     Gender Reassignment  05/2016     HEAD & NECK SURGERY  2015    ablation of nerve from neck to left arm     LAPAROSCOPIC GASTRIC SLEEVE N/A 4/10/2018    Procedure: LAPAROSCOPIC GASTRIC SLEEVE;  Laparoscopic Sleeve Gastrectomy;  Surgeon: Jani Shah MD;  [facility]     MANDIBLE SURGERY  1986     ORCHIECTOMY INGUINAL BILATERAL  7/16/2013    Procedure: ORCHIECTOMY INGUINAL BILATERAL;  Bilateral Simple Inguinal Orchiectomy ;  Surgeon: Jan Garrett MD;  [facility]     TONSILLECTOMY        Allergies   Allergen Reactions     Amiodarone      Caused pain fatigue/amplified anxiety and depression     Fluoxetine Other (See Comments)     Night terrors     Tetracycline      Teeth rattle/saliva acidic      Social History     Tobacco Use     Smoking status: Never Smoker     Smokeless tobacco: Never Used     Tobacco comment: NEVER SMOKED! Grew up with heavy smokers which did not help my Asthma!   Substance Use Topics     Alcohol use: Yes     Comment: occasional//2 per month      Wt Readings from Last 1 Encounters:   11/08/21 107.5 kg (237 lb)        Anesthesia Evaluation            ROS/MED HX  ENT/Pulmonary:     (+) asthma (no recent symptoms )  (-) tobacco use   Neurologic:  - neg neurologic ROS     Cardiovascular: Comment: H/o atrial fibrillation s/p cardioversion    (+) Dyslipidemia -----Previous cardiac testing   Echo: Date: Results:    Stress Test: Date: Results:    ECG Reviewed: Date: 11/2020 Results:  SR, LAD  Cath: Date: Results:   (-) taking anticoagulants/antiplatelets   METS/Exercise Tolerance:     Hematologic:  - neg hematologic  ROS  (-) history of blood clots and history of blood transfusion   Musculoskeletal:  - neg musculoskeletal ROS     GI/Hepatic:  - neg GI/hepatic ROS  (-) GERD    Renal/Genitourinary:  - neg Renal ROS     Endo:     (+) Obesity (BMI 35),     Psychiatric/Substance Use:     (+) psychiatric history anxiety, bipolar and depression     Infectious Disease:       Malignancy:  - neg malignancy ROS     Other:               OUTSIDE LABS:  CBC:   Lab Results   Component Value Date    WBC 12.4 (H) 07/24/2020    WBC 7.9 09/14/2018    HGB 13.7 07/24/2020    HGB 11.3 (L) 01/23/2020    HCT 41.9 07/24/2020    HCT 42.9 09/14/2018     07/24/2020     09/14/2018     BMP:   Lab Results   Component Value Date     08/03/2021     01/07/2020    POTASSIUM 4.1 08/03/2021    POTASSIUM 3.7 07/15/2020    CHLORIDE 105 08/03/2021    CHLORIDE 106 01/07/2020    CO2 33 (H) 08/03/2021    CO2 30 01/07/2020    BUN 14 08/03/2021    BUN 19 01/07/2020    CR 1.00 08/03/2021    CR 0.88 07/15/2020     (H) 08/03/2021     (H) 02/25/2021     COAGS: No results found for: PTT, INR, FIBR  POC:   Lab Results   Component Value Date    BGM 85 01/22/2020     HEPATIC:   Lab Results   Component Value Date    ALBUMIN 3.9 10/16/2019    PROTTOTAL 7.2 10/16/2019    ALT 24 10/16/2019    AST 13 10/16/2019    ALKPHOS 47 10/16/2019    BILITOTAL 0.4 10/16/2019     OTHER:   Lab Results   Component Value Date    A1C 5.5 08/03/2021    MAGALI 9.2 08/03/2021    TSH 1.82 11/08/2021    T4 0.82 09/14/2018         Virtual visit -  No vitals were obtained       Physical Exam  Constitutional: Awake, alert, cooperative, no apparent distress, and appears stated age.  HENT: Normocephalic  Respiratory: non labored breathing   Neurologic: Awake, alert, oriented to name, place and time.   Neuropsychiatric: Calm, cooperative. Normal affect.       PRIOR LABS/DIAGNOSTIC STUDIES:   All labs and imaging personally reviewed       EKG/ stress test - if available please see in ROS above   No results found.  No flowsheet data found.      The patient's records and results personally reviewed by this provider.     Outside records  reviewed from: care everywhere      ASSESSMENT and PLAN    Kylie Austin is a 56 year old female scheduled for colonoscopy on 1/4/22 by Dr. Velazco for screening. PAC referral for risk assessment and optimization for anesthesia with comorbid conditions of dyslipidemia, atrial fibrillation s/p cardioversion, h/o asthma, obesity, depression, anxiety:    Pre-operative considerations:      1.  Cardiac:  Functional status- METS >4. Denies cardiac symptoms.  low risk surgery with 0.4% (RCRI #) risk of major adverse cardiac event.      ~ h/o atrial fibrillation s/p cardioversion. EKG 11/2020 showed SR  ~leg swelling using Lasix  ~dyslipidemia using zocor  ~previous cardiac testing above        2.  Pulm:   DEVANTE risk: intermediate  ~non smoker     ~h/o asthma- no current medications or recent symptoms          3.  GI:  Risk of PONV score = 3.  If > 2, anti-emetic intervention recommended.            4.  endo: BMI 35    5. psych: depression and anxiety using abilify, effexor and remeron        VTE risk: 0.26%        Patient is optimized and is acceptable candidate for the proposed procedure.  No further diagnostic evaluation is needed.            Final plan per anesthesiologist on day of surgery.        ** Patient's visit was done virtually today.  A full physical exam was not completed.  Please refer to the physical examination documented by the anesthesiologist in the anesthesia record on the day of surgery. **      Video-Visit Details    Type of service:  Video Visit    Patient verbally consented to video service today: YES      Video Start Time: 1239  Video End Time (time video stopped): 1248    Originating Location (pt. Location): Home    Distant Location (provider location):  home    Mode of Communication:  Video Conference via [employer]      On the day of service:     Prep time: 8 minutes  Visit time: 9 minutes  Documentation time: 4 minutes  ------------------------------------------  Total time: 21  minutes      Marilu Cummings PA-C  Preoperative Assessment Center  [facility]  Clinic and [facility] Center  Phone: [phone]  Fax: [phone]

## 2021-12-29 NOTE — PROGRESS NOTES
Kylie is a 56 year old who is being evaluated via a billable video visit.      How would you like to obtain your AVS? MyChart  If the video visit is dropped, the invitation should be resent by: Text to cell phone: [phone]      HPI       Review of Systems         Objective    Vitals - Patient Reported  Pain Score: No Pain (0)        Physical Exam

## 2021-12-31 ENCOUNTER — LAB (OUTPATIENT)
Dept: LAB | Facility: CLINIC | Age: 56
End: 2021-12-31
Payer: MEDICARE

## 2021-12-31 DIAGNOSIS — Z11.59 ENCOUNTER FOR SCREENING FOR OTHER VIRAL DISEASES: ICD-10-CM

## 2021-12-31 LAB — SARS-COV-2 RNA RESP QL NAA+PROBE: NEGATIVE

## 2021-12-31 PROCEDURE — U0003 INFECTIOUS AGENT DETECTION BY NUCLEIC ACID (DNA OR RNA); SEVERE ACUTE RESPIRATORY SYNDROME CORONAVIRUS 2 (SARS-COV-2) (CORONAVIRUS DISEASE [COVID-19]), AMPLIFIED PROBE TECHNIQUE, MAKING USE OF HIGH THROUGHPUT TECHNOLOGIES AS DESCRIBED BY CMS-2020-01-R: HCPCS

## 2021-12-31 PROCEDURE — U0005 INFEC AGEN DETEC AMPLI PROBE: HCPCS

## 2022-01-04 ENCOUNTER — ANESTHESIA (OUTPATIENT)
Dept: GASTROENTEROLOGY | Facility: CLINIC | Age: 57
End: 2022-01-04
Payer: COMMERCIAL

## 2022-01-04 ENCOUNTER — HOSPITAL ENCOUNTER (OUTPATIENT)
Facility: CLINIC | Age: 57
Discharge: HOME | End: 2022-01-04
Attending: INTERNAL MEDICINE | Admitting: INTERNAL MEDICINE
Payer: COMMERCIAL

## 2022-01-04 VITALS
SYSTOLIC BLOOD PRESSURE: 130 MMHG | OXYGEN SATURATION: 98 % | HEART RATE: 80 BPM | DIASTOLIC BLOOD PRESSURE: 75 MMHG | TEMPERATURE: 98.1 F | RESPIRATION RATE: 16 BRPM

## 2022-01-04 LAB — COLONOSCOPY: NORMAL

## 2022-01-04 PROCEDURE — 250N000009 HC RX 250: Performed by: ANESTHESIOLOGY

## 2022-01-04 PROCEDURE — 45380 COLONOSCOPY AND BIOPSY: CPT | Performed by: INTERNAL MEDICINE

## 2022-01-04 PROCEDURE — 250N000011 HC RX IP 250 OP 636: Performed by: REGISTERED NURSE

## 2022-01-04 PROCEDURE — 45385 COLONOSCOPY W/LESION REMOVAL: CPT | Mod: PT | Performed by: INTERNAL MEDICINE

## 2022-01-04 PROCEDURE — 250N000013 HC RX MED GY IP 250 OP 250 PS 637: Performed by: INTERNAL MEDICINE

## 2022-01-04 PROCEDURE — 258N000003 HC RX IP 258 OP 636: Performed by: REGISTERED NURSE

## 2022-01-04 PROCEDURE — 88305 TISSUE EXAM BY PATHOLOGIST: CPT | Mod: TC | Performed by: INTERNAL MEDICINE

## 2022-01-04 PROCEDURE — 370N000017 HC ANESTHESIA TECHNICAL FEE, PER MIN: Performed by: INTERNAL MEDICINE

## 2022-01-04 RX ORDER — ONDANSETRON 4 MG/1
4 TABLET, ORALLY DISINTEGRATING ORAL EVERY 6 HOURS PRN
Status: DISCONTINUED | OUTPATIENT
Start: 2022-01-04 | End: 2022-01-04 | Stop reason: HOSPADM

## 2022-01-04 RX ORDER — NALOXONE HYDROCHLORIDE 0.4 MG/ML
0.4 INJECTION, SOLUTION INTRAMUSCULAR; INTRAVENOUS; SUBCUTANEOUS
Status: DISCONTINUED | OUTPATIENT
Start: 2022-01-04 | End: 2022-01-04 | Stop reason: HOSPADM

## 2022-01-04 RX ORDER — ONDANSETRON 2 MG/ML
4 INJECTION INTRAMUSCULAR; INTRAVENOUS
Status: DISCONTINUED | OUTPATIENT
Start: 2022-01-04 | End: 2022-01-04 | Stop reason: HOSPADM

## 2022-01-04 RX ORDER — PROPOFOL 10 MG/ML
INJECTION, EMULSION INTRAVENOUS CONTINUOUS PRN
Status: DISCONTINUED | OUTPATIENT
Start: 2022-01-04 | End: 2022-01-04

## 2022-01-04 RX ORDER — NALOXONE HYDROCHLORIDE 0.4 MG/ML
0.2 INJECTION, SOLUTION INTRAMUSCULAR; INTRAVENOUS; SUBCUTANEOUS
Status: DISCONTINUED | OUTPATIENT
Start: 2022-01-04 | End: 2022-01-04 | Stop reason: HOSPADM

## 2022-01-04 RX ORDER — ONDANSETRON 2 MG/ML
4 INJECTION INTRAMUSCULAR; INTRAVENOUS EVERY 6 HOURS PRN
Status: DISCONTINUED | OUTPATIENT
Start: 2022-01-04 | End: 2022-01-04 | Stop reason: HOSPADM

## 2022-01-04 RX ORDER — PROCHLORPERAZINE MALEATE 10 MG
10 TABLET ORAL EVERY 6 HOURS PRN
Status: DISCONTINUED | OUTPATIENT
Start: 2022-01-04 | End: 2022-01-04 | Stop reason: HOSPADM

## 2022-01-04 RX ORDER — PROPOFOL 10 MG/ML
INJECTION, EMULSION INTRAVENOUS PRN
Status: DISCONTINUED | OUTPATIENT
Start: 2022-01-04 | End: 2022-01-04

## 2022-01-04 RX ORDER — SIMETHICONE 40MG/0.6ML
SUSPENSION, DROPS(FINAL DOSAGE FORM)(ML) ORAL PRN
Status: COMPLETED | OUTPATIENT
Start: 2022-01-04 | End: 2022-01-04

## 2022-01-04 RX ORDER — FLUMAZENIL 0.1 MG/ML
0.2 INJECTION, SOLUTION INTRAVENOUS
Status: DISCONTINUED | OUTPATIENT
Start: 2022-01-04 | End: 2022-01-04 | Stop reason: HOSPADM

## 2022-01-04 RX ORDER — SODIUM CHLORIDE, SODIUM LACTATE, POTASSIUM CHLORIDE, CALCIUM CHLORIDE 600; 310; 30; 20 MG/100ML; MG/100ML; MG/100ML; MG/100ML
INJECTION, SOLUTION INTRAVENOUS CONTINUOUS PRN
Status: DISCONTINUED | OUTPATIENT
Start: 2022-01-04 | End: 2022-01-04

## 2022-01-04 RX ORDER — LIDOCAINE 40 MG/G
CREAM TOPICAL
Status: DISCONTINUED | OUTPATIENT
Start: 2022-01-04 | End: 2022-01-04 | Stop reason: HOSPADM

## 2022-01-04 RX ORDER — LIDOCAINE HYDROCHLORIDE 20 MG/ML
INJECTION, SOLUTION INFILTRATION; PERINEURAL PRN
Status: DISCONTINUED | OUTPATIENT
Start: 2022-01-04 | End: 2022-01-04

## 2022-01-04 RX ADMIN — LIDOCAINE HYDROCHLORIDE 100 MG: 20 INJECTION, SOLUTION INFILTRATION; PERINEURAL at 14:28

## 2022-01-04 RX ADMIN — PROPOFOL 20 MG: 10 INJECTION, EMULSION INTRAVENOUS at 14:37

## 2022-01-04 RX ADMIN — SIMETHICONE 133 MG: 63.3; 3.7 SOLUTION/ DROPS ORAL at 14:49

## 2022-01-04 RX ADMIN — PROPOFOL 130 MCG/KG/MIN: 10 INJECTION, EMULSION INTRAVENOUS at 14:31

## 2022-01-04 RX ADMIN — PROPOFOL 140 MCG/KG/MIN: 10 INJECTION, EMULSION INTRAVENOUS at 14:54

## 2022-01-04 RX ADMIN — SODIUM CHLORIDE, POTASSIUM CHLORIDE, SODIUM LACTATE AND CALCIUM CHLORIDE: 600; 310; 30; 20 INJECTION, SOLUTION INTRAVENOUS at 14:25

## 2022-01-04 NOTE — ANESTHESIA CARE TRANSFER NOTE
Patient: Kylie Austin    Procedure: Procedure(s):  COLONOSCOPY, WITH POLYPECTOMY AND BIOPSY       Diagnosis: Colon cancer screening [Z12.11]  Diagnosis Additional Information: No value filed.    Anesthesia Type:   MAC     Note:    Oropharynx: oropharynx clear of all foreign objects and spontaneously breathing  Level of Consciousness: awake  Oxygen Supplementation: room air    Independent Airway: airway patency satisfactory and stable  Dentition: dentition unchanged  Vital Signs Stable: post-procedure vital signs reviewed and stable  Report to RN Given: handoff report given  Patient transferred to: Phase II    Handoff Report: Identifed the Patient, Identified the Reponsible Provider, Reviewed the pertinent medical history, Discussed the surgical course, Reviewed Intra-OP anesthesia mangement and issues during anesthesia, Set expectations for post-procedure period and Allowed opportunity for questions and acknowledgement of understanding      Vitals:  Vitals Value Taken Time   /75 01/04/22 1523   Temp 36.1    Pulse 73 01/04/22 1523   Resp 14    SpO2 100 % 01/04/22 1525   Vitals shown include unvalidated device data.    Electronically Signed By: TALIB Doss CRNA  January 4, 2022  3:26 PM

## 2022-01-04 NOTE — OR NURSING
Procedure: Colonoscopy with polypectomy using cold snare  Sedation: Monitored Anesthesia Care  Tolerated: VS stable during and post procedure. Not c/o abdominal or chest pain.  Report: Given to Cara GARCIA RN #[id number]  Pt to recovery area in stable condition, accompanied by JENIFFER Morrison RN

## 2022-01-04 NOTE — ANESTHESIA POSTPROCEDURE EVALUATION
Patient: Kylie Austin    Procedure: Procedure(s):  COLONOSCOPY, WITH POLYPECTOMY AND BIOPSY       Diagnosis:Colon cancer screening [Z12.11]  Diagnosis Additional Information: No value filed.    Anesthesia Type:  MAC    Note:  Disposition: Outpatient   Postop Pain Control: Uneventful            Sign Out: Well controlled pain   PONV: No   Neuro/Psych: Uneventful            Sign Out: Acceptable/Baseline neuro status   Airway/Respiratory: Uneventful            Sign Out: Acceptable/Baseline resp. status   CV/Hemodynamics: Uneventful            Sign Out: Acceptable CV status; No obvious hypovolemia; No obvious fluid overload   Other NRE: NONE   DID A NON-ROUTINE EVENT OCCUR? No    Event details/Postop Comments:  Awake, comfortable, conversant; satisfactory recovery from MAC.  Lizeth Forrester MD  1/4/2022  4:08 PM           Last vitals:  Vitals Value Taken Time   /75 01/04/22 1553   Temp     Pulse 77 01/04/22 1553   Resp 16 01/04/22 1553   SpO2 99 % 01/04/22 1555   Vitals shown include unvalidated device data.    Electronically Signed By: Lizeth Forrester MD  January 4, 2022  4:08 PM

## 2022-01-05 LAB
PATH REPORT.COMMENTS IMP SPEC: NORMAL
PATH REPORT.COMMENTS IMP SPEC: NORMAL
PATH REPORT.FINAL DX SPEC: NORMAL
PATH REPORT.GROSS SPEC: NORMAL
PATH REPORT.MICROSCOPIC SPEC OTHER STN: NORMAL
PATH REPORT.RELEVANT HX SPEC: NORMAL
PHOTO IMAGE: NORMAL

## 2022-01-05 PROCEDURE — 88305 TISSUE EXAM BY PATHOLOGIST: CPT | Mod: 26 | Performed by: PATHOLOGY

## 2022-01-16 ENCOUNTER — HEALTH MAINTENANCE LETTER (OUTPATIENT)
Age: 57
End: 2022-01-16

## 2022-01-21 ENCOUNTER — ANCILLARY PROCEDURE (OUTPATIENT)
Dept: MAMMOGRAPHY | Facility: CLINIC | Age: 57
End: 2022-01-21
Attending: PHYSICIAN ASSISTANT
Payer: COMMERCIAL

## 2022-01-21 DIAGNOSIS — Z12.31 ENCOUNTER FOR SCREENING MAMMOGRAM FOR BREAST CANCER: ICD-10-CM

## 2022-01-21 PROCEDURE — 77067 SCR MAMMO BI INCL CAD: CPT | Mod: TC | Performed by: RADIOLOGY

## 2022-02-03 ASSESSMENT — ENCOUNTER SYMPTOMS
WEIGHT LOSS: 1
SPEECH CHANGE: 0
WEIGHT GAIN: 1
MYALGIAS: 1
INCREASED ENERGY: 0
DECREASED APPETITE: 0
MUSCLE CRAMPS: 0
DEPRESSION: 1
MUSCLE WEAKNESS: 0
DIZZINESS: 0
DECREASED CONCENTRATION: 1
HALLUCINATIONS: 0
SEIZURES: 0
CHILLS: 0
NECK PAIN: 1
LOSS OF CONSCIOUSNESS: 0
POLYPHAGIA: 0
WEAKNESS: 0
TINGLING: 0
HEADACHES: 0
FEVER: 0
FATIGUE: 1
NUMBNESS: 0
NERVOUS/ANXIOUS: 1
JOINT SWELLING: 0
PARALYSIS: 0
INSOMNIA: 1
MEMORY LOSS: 1
PANIC: 0
STIFFNESS: 1
TREMORS: 1
DISTURBANCES IN COORDINATION: 0
ARTHRALGIAS: 1
POLYDIPSIA: 0
NIGHT SWEATS: 1
ALTERED TEMPERATURE REGULATION: 1
BACK PAIN: 1

## 2022-02-04 ENCOUNTER — OFFICE VISIT (OUTPATIENT)
Dept: ANESTHESIOLOGY | Facility: CLINIC | Age: 57
End: 2022-02-04
Attending: PHYSICIAN ASSISTANT
Payer: COMMERCIAL

## 2022-02-04 VITALS — OXYGEN SATURATION: 95 % | DIASTOLIC BLOOD PRESSURE: 77 MMHG | SYSTOLIC BLOOD PRESSURE: 113 MMHG | HEART RATE: 76 BPM

## 2022-02-04 DIAGNOSIS — M25.569 CHRONIC KNEE PAIN, UNSPECIFIED LATERALITY: ICD-10-CM

## 2022-02-04 DIAGNOSIS — G89.29 CHRONIC KNEE PAIN, UNSPECIFIED LATERALITY: ICD-10-CM

## 2022-02-04 PROCEDURE — 99204 OFFICE O/P NEW MOD 45 MIN: CPT

## 2022-02-04 RX ORDER — MELOXICAM 7.5 MG/1
7.5 TABLET ORAL DAILY
Qty: 30 TABLET | Refills: 1 | Status: SHIPPED | OUTPATIENT
Start: 2022-02-04 | End: 2022-04-06

## 2022-02-04 ASSESSMENT — PAIN SCALES - GENERAL: PAINLEVEL: MILD PAIN (3)

## 2022-02-04 NOTE — NURSING NOTE
RN reviewed AVS with patient. Patient to contact clinic if any questions/concerns. Patient verbalized understanding.    Keena Johnson RN

## 2022-02-04 NOTE — PATIENT INSTRUCTIONS
Medications:    meloxicam (MOBIC) 7.5 MG tablet. Take 1 tablet (7.5 mg) by mouth daily.     *Please provide the clinic with a minium of 1 week notice, on all prescription refills.         Referrals:    Physical Therapy Referral placed-   If you have not heard from the scheduling office within 2 business days, please call [phone] for all locations          To speak with a nurse, schedule/reschedule/cancel a clinic appointment, or request a medication refill call: [phone], option #1.    You can also reach us by [employer]: [email or web address]

## 2022-02-04 NOTE — LETTER
2/4/2022       RE: Kylie Austin  [address] [facility] [id number]     Dear Colleague,    Thank you for referring your patient, Kylie Austin, to the [facility]HENSIVE [facility]IN MANAGEMENT [place] at [facility]. Please see a copy of my visit note below.    Kylie Austin is a 56 year old female with a past medical history significant for fibromyalgia, Atrial fibrillation, Bipolar disorder who presents with a chief complaint of left knee pain. She has intermittent swelling of the knee.  Her pain is 3-4/10 in severity.    The pain has been present for 10 years .    The pain is Mild Pain (3) in severity.    The pain is described as sharp and excruciating.   The pain is alleviated by rest, elevation and sitting in a soaking tub..    It is exacerbated by increased activity, walking, exercise, twisting.    Modalities that have been utilized in the past which were helpful include gabapentin,medical cannibas, diclofenac gel, hyaluronidase injections.    Things that were not helpful, but tried ,include corticosteroid injections.          Current Outpatient Medications   Medication     ARIPiprazole (ABILIFY) 20 MG tablet     diclofenac (VOLTAREN) 1 % topical gel     diclofenac (VOLTAREN) 1 % topical gel     furosemide (LASIX) 20 MG tablet     gabapentin (NEURONTIN) 300 MG capsule     medical cannabis inhalation (Patient's own supply.  Not a prescription)     simvastatin (ZOCOR) 40 MG tablet     venlafaxine (EFFEXOR-XR) 75 MG 24 hr capsule     bisacodyl (DULCOLAX) 5 MG EC tablet     medical cannabis (Patient's own supply.  Not a prescription)     mirtazapine (REMERON) 45 MG tablet     order for DME     polyethylene glycol (GOLYTELY) 236 g suspension     zolpidem (AMBIEN) 10 MG tablet     Current Facility-Administered Medications   Medication     cross-Linked Hyaluronate (GEL-ONE) injection PRSY 30 mg     hylan  (SYNVISC ONE) injection 48 mg     lidocaine (PF) (XYLOCAINE) 1 % injection 5 mL     lidocaine (PF) (XYLOCAINE) 1 % injection 6 mL     Allergies   Allergen Reactions     Amiodarone      Caused pain fatigue/amplified anxiety and depression     Fluoxetine Other (See Comments)     Night terrors     Tetracycline      Teeth rattle/saliva acidic      Past Medical History:   Diagnosis Date     Anxiety 2005    Brought on by Amioderone     Arthritis 8/2005     Atrial fib/flutter, transient     S/p cardioversion 2004     Bipolar 2 disorder (H)      Bleeding disorder (H) 1987    nose bleeds     Chronic pain     LUE pain     Congestive heart failure (H) 11/18/2004    cured 2005     Depressive disorder     on and off most of my life!     Fibromyalgia      Gender identity disorder Started Rx 2012     H/O hypogonadism Gonadectomy 2013     Hyperlipidemia      Hypertension      Other mental problems     bipolar     Psoriasis      Uncomplicated asthma 1964    cured in 4/2001     Vision disorder 6/2005     Past Surgical History:   Procedure Laterality Date     ARTHROPLASTY SHOULDER Left 1/22/2020    Procedure: Left Total shoulder arthroplasty, distal clavicle excision;  Surgeon: Omari Tobin MD;  [facility]     BIOPSY  2005, 8/8/13    Stomach, colon     COLONOSCOPY  8/8/2013    Procedure: COLONOSCOPY;  COLONSCOPY SCREEN/ SE;  Surgeon: Santino Sun MD;  [facility]     COLONOSCOPY N/A 1/4/2022    Procedure: COLONOSCOPY, WITH POLYPECTOMY AND BIOPSY;  Surgeon: Dallas Velazco MD;  [facility]     COLONOSCOPY WITH CO2 INSUFFLATION N/A 11/1/2018    Procedure: COLONOSCOPY WITH CO2 INSUFFLATION;  Surgeon: Santino Sun MD;  [facility]     COLONOSCOPY WITH CO2 INSUFFLATION N/A 12/15/2020    Procedure: COLONOSCOPY, WITH CO2 INSUFFLATION;  Surgeon: Nima Kamara MD;  [facility]     Gender Reassignment  05/2016     HEAD & NECK SURGERY  2015    ablation of nerve from neck  to left arm     LAPAROSCOPIC GASTRIC SLEEVE N/A 4/10/2018    Procedure: LAPAROSCOPIC GASTRIC SLEEVE;  Laparoscopic Sleeve Gastrectomy;  Surgeon: Jani Shah MD;  [facility]     MANDIBLE SURGERY  1986     ORCHIECTOMY INGUINAL BILATERAL  7/16/2013    Procedure: ORCHIECTOMY INGUINAL BILATERAL;  Bilateral Simple Inguinal Orchiectomy ;  Surgeon: Jan Garrett MD;  [facility]     TONSILLECTOMY       Family History   Problem Relation Age of Onset     Breast Cancer Mother      Cancer Father         skin     Diabetes Father         Was on the patch when they were new     Heart Disease Father 55        scd     Coronary Artery Disease Father      Hypertension Father      Hyperlipidemia Father      Diabetes Sister      Diabetes Sister      Thyroid Disease Sister      Osteoporosis Maternal Grandmother      Alzheimer Disease Paternal Grandmother      Diabetes Paternal Grandmother      Mental Illness Paternal Grandmother         alshimers     Osteoporosis Paternal Grandmother      Coronary Artery Disease Paternal Grandfather      Hypertension Paternal Grandfather      Hyperlipidemia Paternal Grandfather      Prostate Cancer Paternal Grandfather      Other Cancer Paternal Grandfather      Depression Daughter      Unknown/Adopted Daughter      Depression Daughter      Mental Illness Daughter         bipolar     Anxiety Disorder Daughter      Anxiety Disorder Daughter      Depression Son      Heart Disease Other      Anesthesia Reaction No family hx of      Deep Vein Thrombosis (DVT) No family hx of      Social History     Socioeconomic History     Marital status:      Spouse name: None     Number of children: None     Years of education: None     Highest education level: None   Occupational History     None   Tobacco Use     Smoking status: Never Smoker     Smokeless tobacco: Never Used     Tobacco comment: NEVER SMOKED! Grew up with heavy smokers which did not help my Asthma!   Substance and Sexual  Activity     Alcohol use: Yes     Comment: occasional//2 per month     Drug use: Yes     Types: Marijuana     Comment: medical marijuana, daily     Sexual activity: Yes     Partners: Female     Birth control/protection: Post-menopausal, None     Comment: Yes enjoying it GREATLY!   Other Topics Concern     Parent/sibling w/ CABG, MI or angioplasty before 65F 55M? Yes     Comment: father   Social History Narrative     None     Social Determinants of Health     Financial Resource Strain: Not on file   Food Insecurity: Not on file   Transportation Needs: Not on file   Physical Activity: Not on file   Stress: Not on file   Social Connections: Not on file   Intimate Partner Violence: Not on file   Housing Stability: Not on file      ROS: 10 point ROS neg other than the symptoms noted above in the HPI.  Physical Exam  Vitals and nursing note reviewed.   Musculoskeletal:         General: Swelling and tenderness present. No deformity or signs of injury.      Left knee: Swelling and bony tenderness present. No deformity, effusion, erythema, ecchymosis, lacerations or crepitus. Decreased range of motion. Tenderness present over the medial joint line. No lateral joint line, MCL, LCL, ACL, PCL or patellar tendon tenderness. No LCL laxity, MCL laxity, ACL laxity or PCL laxity.Normal alignment, normal meniscus and normal patellar mobility. Normal pulse.      Right lower leg: No edema.      Left lower leg: No edema.           KNEE LEFT THREE VIEWS October 23, 2019 10:41 AM      HISTORY: Osteoarthritis of glenohumeral joint, left. Osteoarthritis of  left knee, unspecified osteoarthritis type.                                                                      IMPRESSION: Medial compartment moderate to severe joint space  narrowing. Lateral and patellofemoral compartment joint space width  appears within normal limits. No radiographic evidence of joint  effusion.      LILA MCCALLUM MD  A/P:Patient is 57 y/o woman with left knee  osteoarthritis.  Patient would like to participate in the SKOAP study.  She has been randomized to best practices !A.  We will prescribe meloxicam 7.5 mg every day and physical therapy.  We will f/u with the patient in one month.  Answers for HPI/ROS submitted by the patient on 2/3/2022  General Symptoms: Yes  Skin Symptoms: No  HENT Symptoms: No  EYE SYMPTOMS: No  HEART SYMPTOMS: No  LUNG SYMPTOMS: No  INTESTINAL SYMPTOMS: No  URINARY SYMPTOMS: No  GYNECOLOGIC SYMPTOMS: No  BREAST SYMPTOMS: No  SKELETAL SYMPTOMS: Yes  BLOOD SYMPTOMS: No  NERVOUS SYSTEM SYMPTOMS: Yes  MENTAL HEALTH SYMPTOMS: Yes  Fever: No  Loss of appetite: No  Weight loss: Yes  Weight gain: Yes  Fatigue: Yes  Night sweats: Yes  Chills: No  Increased stress: Yes  Excessive hunger: No  Excessive thirst: No  Feeling hot or cold when others believe the temperature is normal: Yes  Loss of height: No  Post-operative complications: No  Surgical site pain: No  Hallucinations: No  Change in or Loss of Energy: No  Hyperactivity: No  Confusion: No  Back pain: Yes  Muscle aches: Yes  Neck pain: Yes  Swollen joints: No  Joint pain: Yes  Bone pain: No  Muscle cramps: No  Muscle weakness: No  Joint stiffness: Yes  Bone fracture: No  Trouble with coordination: No  Dizziness or trouble with balance: No  Fainting or black-out spells: No  Memory loss: Yes  Headache: No  Seizures: No  Speech problems: No  Tingling: No  Tremor: Yes  Weakness: No  Difficulty walking: Yes  Paralysis: No  Numbness: No  Nervous or Anxious: Yes  Depression: Yes  Trouble sleeping: Yes  Trouble thinking or concentrating: Yes  Mood changes: No  Panic attacks: No          Again, thank you for allowing me to participate in the care of your patient.      Sincerely,    Mathew Bloom MD

## 2022-02-04 NOTE — NURSING NOTE
Patient presents with:  Consult: [facility] NEW, RM 14, patient reports,      Mild Pain (3)         What medications are you using for pain?   Gabapentin, medicinal cannabis, voltaren    (New patients only) Have you been seen by another pain clinic/ provider? [facility] Dr. Yenny pond, [facility]

## 2022-02-04 NOTE — PROGRESS NOTES
Kylie Austin is a 56 year old female with a past medical history significant for fibromyalgia, Atrial fibrillation, Bipolar disorder who presents with a chief complaint of left knee pain. She has intermittent swelling of the knee.  Her pain is 3-4/10 in severity.    The pain has been present for 10 years .    The pain is Mild Pain (3) in severity.    The pain is described as sharp and excruciating.   The pain is alleviated by rest, elevation and sitting in a soaking tub..    It is exacerbated by increased activity, walking, exercise, twisting.    Modalities that have been utilized in the past which were helpful include gabapentin,medical cannibas, diclofenac gel, hyaluronidase injections.    Things that were not helpful, but tried ,include corticosteroid injections.          Current Outpatient Medications   Medication     ARIPiprazole (ABILIFY) 20 MG tablet     diclofenac (VOLTAREN) 1 % topical gel     diclofenac (VOLTAREN) 1 % topical gel     furosemide (LASIX) 20 MG tablet     gabapentin (NEURONTIN) 300 MG capsule     medical cannabis inhalation (Patient's own supply.  Not a prescription)     simvastatin (ZOCOR) 40 MG tablet     venlafaxine (EFFEXOR-XR) 75 MG 24 hr capsule     bisacodyl (DULCOLAX) 5 MG EC tablet     medical cannabis (Patient's own supply.  Not a prescription)     mirtazapine (REMERON) 45 MG tablet     order for DME     polyethylene glycol (GOLYTELY) 236 g suspension     zolpidem (AMBIEN) 10 MG tablet     Current Facility-Administered Medications   Medication     cross-Linked Hyaluronate (GEL-ONE) injection PRSY 30 mg     hylan (SYNVISC ONE) injection 48 mg     lidocaine (PF) (XYLOCAINE) 1 % injection 5 mL     lidocaine (PF) (XYLOCAINE) 1 % injection 6 mL     Allergies   Allergen Reactions     Amiodarone      Caused pain fatigue/amplified anxiety and depression     Fluoxetine Other (See Comments)     Night terrors     Tetracycline      Teeth rattle/saliva acidic      Past Medical  History:   Diagnosis Date     Anxiety 2005    Brought on by Amioderone     Arthritis 8/2005     Atrial fib/flutter, transient     S/p cardioversion 2004     Bipolar 2 disorder (H)      Bleeding disorder (H) 1987    nose bleeds     Chronic pain     LUE pain     Congestive heart failure (H) 11/18/2004    cured 2005     Depressive disorder     on and off most of my life!     Fibromyalgia      Gender identity disorder Started Rx 2012     H/O hypogonadism Gonadectomy 2013     Hyperlipidemia      Hypertension      Other mental problems     bipolar     Psoriasis      Uncomplicated asthma 1964    cured in 4/2001     Vision disorder 6/2005     Past Surgical History:   Procedure Laterality Date     ARTHROPLASTY SHOULDER Left 1/22/2020    Procedure: Left Total shoulder arthroplasty, distal clavicle excision;  Surgeon: Omari Tobin MD;  [facility]     BIOPSY  2005, 8/8/13    Stomach, colon     COLONOSCOPY  8/8/2013    Procedure: COLONOSCOPY;  COLONSCOPY SCREEN/ SE;  Surgeon: Santino Sun MD;  [facility]     COLONOSCOPY N/A 1/4/2022    Procedure: COLONOSCOPY, WITH POLYPECTOMY AND BIOPSY;  Surgeon: Dallas Velazco MD;  [facility]     COLONOSCOPY WITH CO2 INSUFFLATION N/A 11/1/2018    Procedure: COLONOSCOPY WITH CO2 INSUFFLATION;  Surgeon: Santino Sun MD;  [facility]     COLONOSCOPY WITH CO2 INSUFFLATION N/A 12/15/2020    Procedure: COLONOSCOPY, WITH CO2 INSUFFLATION;  Surgeon: Nima Kamara MD;  [facility]     Gender Reassignment  05/2016     HEAD & NECK SURGERY  2015    ablation of nerve from neck to left arm     LAPAROSCOPIC GASTRIC SLEEVE N/A 4/10/2018    Procedure: LAPAROSCOPIC GASTRIC SLEEVE;  Laparoscopic Sleeve Gastrectomy;  Surgeon: Jani Shah MD;  [facility]     MANDIBLE SURGERY  1986     ORCHIECTOMY INGUINAL BILATERAL  7/16/2013    Procedure: ORCHIECTOMY INGUINAL BILATERAL;  Bilateral Simple Inguinal Orchiectomy ;  Surgeon:  Jan Garrett MD;  [facility]     TONSILLECTOMY       Family History   Problem Relation Age of Onset     Breast Cancer Mother      Cancer Father         skin     Diabetes Father         Was on the patch when they were new     Heart Disease Father 55        scd     Coronary Artery Disease Father      Hypertension Father      Hyperlipidemia Father      Diabetes Sister      Diabetes Sister      Thyroid Disease Sister      Osteoporosis Maternal Grandmother      Alzheimer Disease Paternal Grandmother      Diabetes Paternal Grandmother      Mental Illness Paternal Grandmother         alshimers     Osteoporosis Paternal Grandmother      Coronary Artery Disease Paternal Grandfather      Hypertension Paternal Grandfather      Hyperlipidemia Paternal Grandfather      Prostate Cancer Paternal Grandfather      Other Cancer Paternal Grandfather      Depression Daughter      Unknown/Adopted Daughter      Depression Daughter      Mental Illness Daughter         bipolar     Anxiety Disorder Daughter      Anxiety Disorder Daughter      Depression Son      Heart Disease Other      Anesthesia Reaction No family hx of      Deep Vein Thrombosis (DVT) No family hx of      Social History     Socioeconomic History     Marital status:      Spouse name: None     Number of children: None     Years of education: None     Highest education level: None   Occupational History     None   Tobacco Use     Smoking status: Never Smoker     Smokeless tobacco: Never Used     Tobacco comment: NEVER SMOKED! Grew up with heavy smokers which did not help my Asthma!   Substance and Sexual Activity     Alcohol use: Yes     Comment: occasional//2 per month     Drug use: Yes     Types: Marijuana     Comment: medical marijuana, daily     Sexual activity: Yes     Partners: Female     Birth control/protection: Post-menopausal, None     Comment: Yes enjoying it GREATLY!   Other Topics Concern     Parent/sibling w/ CABG, MI or angioplasty before  65F 55M? Yes     Comment: father   Social History Narrative     None     Social Determinants of Health     Financial Resource Strain: Not on file   Food Insecurity: Not on file   Transportation Needs: Not on file   Physical Activity: Not on file   Stress: Not on file   Social Connections: Not on file   Intimate Partner Violence: Not on file   Housing Stability: Not on file      ROS: 10 point ROS neg other than the symptoms noted above in the HPI.  Physical Exam  Vitals and nursing note reviewed.   Musculoskeletal:         General: Swelling and tenderness present. No deformity or signs of injury.      Left knee: Swelling and bony tenderness present. No deformity, effusion, erythema, ecchymosis, lacerations or crepitus. Decreased range of motion. Tenderness present over the medial joint line. No lateral joint line, MCL, LCL, ACL, PCL or patellar tendon tenderness. No LCL laxity, MCL laxity, ACL laxity or PCL laxity.Normal alignment, normal meniscus and normal patellar mobility. Normal pulse.      Right lower leg: No edema.      Left lower leg: No edema.           KNEE LEFT THREE VIEWS October 23, 2019 10:41 AM      HISTORY: Osteoarthritis of glenohumeral joint, left. Osteoarthritis of  left knee, unspecified osteoarthritis type.                                                                      IMPRESSION: Medial compartment moderate to severe joint space  narrowing. Lateral and patellofemoral compartment joint space width  appears within normal limits. No radiographic evidence of joint  effusion.      LILA MCCALLUM MD  A/P:Patient is 55 y/o woman with left knee osteoarthritis.  Patient would like to participate in the SKOAP study.  She has been randomized to best practices !A.  We will prescribe meloxicam 7.5 mg every day and physical therapy.  We will f/u with the patient in one month.  Answers for HPI/ROS submitted by the patient on 2/3/2022  General Symptoms: Yes  Skin Symptoms: No  HENT Symptoms: No  EYE  SYMPTOMS: No  HEART SYMPTOMS: No  LUNG SYMPTOMS: No  INTESTINAL SYMPTOMS: No  URINARY SYMPTOMS: No  GYNECOLOGIC SYMPTOMS: No  BREAST SYMPTOMS: No  SKELETAL SYMPTOMS: Yes  BLOOD SYMPTOMS: No  NERVOUS SYSTEM SYMPTOMS: Yes  MENTAL HEALTH SYMPTOMS: Yes  Fever: No  Loss of appetite: No  Weight loss: Yes  Weight gain: Yes  Fatigue: Yes  Night sweats: Yes  Chills: No  Increased stress: Yes  Excessive hunger: No  Excessive thirst: No  Feeling hot or cold when others believe the temperature is normal: Yes  Loss of height: No  Post-operative complications: No  Surgical site pain: No  Hallucinations: No  Change in or Loss of Energy: No  Hyperactivity: No  Confusion: No  Back pain: Yes  Muscle aches: Yes  Neck pain: Yes  Swollen joints: No  Joint pain: Yes  Bone pain: No  Muscle cramps: No  Muscle weakness: No  Joint stiffness: Yes  Bone fracture: No  Trouble with coordination: No  Dizziness or trouble with balance: No  Fainting or black-out spells: No  Memory loss: Yes  Headache: No  Seizures: No  Speech problems: No  Tingling: No  Tremor: Yes  Weakness: No  Difficulty walking: Yes  Paralysis: No  Numbness: No  Nervous or Anxious: Yes  Depression: Yes  Trouble sleeping: Yes  Trouble thinking or concentrating: Yes  Mood changes: No  Panic attacks: No

## 2022-02-09 ENCOUNTER — THERAPY VISIT (OUTPATIENT)
Dept: PHYSICAL THERAPY | Facility: CLINIC | Age: 57
End: 2022-02-09
Payer: COMMERCIAL

## 2022-02-09 DIAGNOSIS — G89.29 CHRONIC KNEE PAIN, UNSPECIFIED LATERALITY: ICD-10-CM

## 2022-02-09 DIAGNOSIS — M25.569 CHRONIC KNEE PAIN, UNSPECIFIED LATERALITY: ICD-10-CM

## 2022-02-09 DIAGNOSIS — M25.562 CHRONIC PAIN OF LEFT KNEE: Primary | ICD-10-CM

## 2022-02-09 DIAGNOSIS — G89.29 CHRONIC PAIN OF LEFT KNEE: Primary | ICD-10-CM

## 2022-02-09 PROCEDURE — 97110 THERAPEUTIC EXERCISES: CPT | Mod: GP | Performed by: PHYSICAL THERAPIST

## 2022-02-09 PROCEDURE — 97161 PT EVAL LOW COMPLEX 20 MIN: CPT | Mod: GP | Performed by: PHYSICAL THERAPIST

## 2022-02-09 ASSESSMENT — ACTIVITIES OF DAILY LIVING (ADL)
AS_A_RESULT_OF_YOUR_KNEE_INJURY,_HOW_WOULD_YOU_RATE_YOUR_CURRENT_LEVEL_OF_DAILY_ACTIVITY?: ABNORMAL
HOW_WOULD_YOU_RATE_THE_OVERALL_FUNCTION_OF_YOUR_KNEE_DURING_YOUR_USUAL_DAILY_ACTIVITIES?: NEARLY NORMAL
KNEE_ACTIVITY_OF_DAILY_LIVING_SCORE: 70
PAIN: THE SYMPTOM AFFECTS MY ACTIVITY SEVERELY
STIFFNESS: THE SYMPTOM AFFECTS MY ACTIVITY MODERATELY
SIT WITH YOUR KNEE BENT: ACTIVITY IS NOT DIFFICULT
RAW_SCORE: 49
GO DOWN STAIRS: ACTIVITY IS SOMEWHAT DIFFICULT
RISE FROM A CHAIR: ACTIVITY IS NOT DIFFICULT
GIVING WAY, BUCKLING OR SHIFTING OF KNEE: I DO NOT HAVE THE SYMPTOM
SQUAT: ACTIVITY IS SOMEWHAT DIFFICULT
WEAKNESS: I HAVE THE SYMPTOM BUT IT DOES NOT AFFECT MY ACTIVITY
LIMPING: I HAVE THE SYMPTOM BUT IT DOES NOT AFFECT MY ACTIVITY
HOW_WOULD_YOU_RATE_THE_CURRENT_FUNCTION_OF_YOUR_KNEE_DURING_YOUR_USUAL_DAILY_ACTIVITIES_ON_A_SCALE_FROM_0_TO_100_WITH_100_BEING_YOUR_LEVEL_OF_KNEE_FUNCTION_PRIOR_TO_YOUR_INJURY_AND_0_BEING_THE_INABILITY_TO_PERFORM_ANY_OF_YOUR_USUAL_DAILY_ACTIVITIES?: 75
KNEEL ON THE FRONT OF YOUR KNEE: ACTIVITY IS SOMEWHAT DIFFICULT
KNEE_ACTIVITY_OF_DAILY_LIVING_SUM: 49
STAND: ACTIVITY IS MINIMALLY DIFFICULT
WALK: ACTIVITY IS SOMEWHAT DIFFICULT
SWELLING: I HAVE THE SYMPTOM BUT IT DOES NOT AFFECT MY ACTIVITY
GO UP STAIRS: ACTIVITY IS SOMEWHAT DIFFICULT

## 2022-02-09 NOTE — PROGRESS NOTES
[facility]    OUTPATIENT Physical Therapy ORTHOPEDIC EVALUATION  PLAN OF TREATMENT FOR OUTPATIENT REHABILITATION  (COMPLETE FOR INITIAL CLAIMS ONLY)  Patient's Last Name, First Name, M.I.  YOB: 1965  Kylie Mccloud    Provider s Name:  [facility]   Medical Record No.  [id number]   Start of Care Date:  02/09/22   Onset Date:   02/04/22 (Date of order)   Type:     _X__PT   ___OT Medical Diagnosis:    Encounter Diagnoses   Name Primary?     Chronic knee pain, unspecified laterality      Chronic pain of left knee         Treatment Diagnosis:  L knee pain        Goals:     02/09/22 0500   Body Part   Goals listed below are for L knee   Goal #1   Goal #1 ambulation   Previous Functional Level Minutes patient could walk   Performance Level 30 minutes prior to pain being noticeable   Current Functional Level Minutes patient can walk   Performance Level Pain level 7/10 with 10 minutes of walking   STG Target Performance Minutes patient will be able to walk   Performance Level Walk 15 minutes with <4/10 pain level   Rationale for safe household ambulation;for safe outdoor household ambulation;for safe community ambulation;to maintain proper body mechanics/posture while ambulating to avoid additional compensatory injury due to improper gait mechanics;to promote a healthy and active lifestyle   Due Date 03/02/22    LTG Target Performance Minutes patient will be able to  walk   Performance Level 30 minutes prior to pain beginning   Rationale for safe household ambulation;for safe outdoor household ambulation;for safe community ambulation;for safe work place ambulation;to maintain proper body mechanics/posture while ambulating to avoid additional compensatory injury due to improper gait mechanics;to promote a healthy and active lifestyle   Due Date 04/20/22        Therapy Frequency:  1x/week  Predicted Duration of Therapy Intervention:  10 weeks    Raul Robison, PT                 I CERTIFY THE NEED FOR THESE SERVICES FURNISHED UNDER        THIS PLAN OF TREATMENT AND WHILE UNDER MY CARE     (Physician co-signature of this document indicates review and certification of the therapy plan).                       Certification Date From:  02/09/22   Certification Date To:  04/20/22    Referring Provider:  Mathew Bloom    Initial Assessment        See Epic Evaluation SOC Date: 02/09/22

## 2022-02-09 NOTE — PROGRESS NOTES
"Physical Therapy Initial Evaluation  Subjective:  The history is provided by the patient.   Patient Health History  Kylie Austin being seen for L knee pain.     Problem began: 2/9/2019.   Problem occurred: Gradual   Pain is reported as 3/10 on pain scale.    Pertinent medical history includes: asthma, overweight, depression, fibromyalgia, mental illness, sleep disorder/apnea, osteoarthritis and heart problems.   Red flags:  None as reported by patient.     Surgeries include:  Orthopedic surgery. Other surgery history details: Left shoulder replacement 2019.        Current occupation is Retired.                     Therapist Generated HPI Evaluation         Type of problem:  Left knee.    This is a chronic condition.  Condition occurred with:  Degenerative joint disease.  Where injured: gradual onset from \"years ago\"  Patient reports pain:  Anterior, in the joint and medial.  Pain is described as sharp, aching and burning and is constant and intermittent.  Pain radiates to:  Lower leg. Pain timing: variable depending on activity level.  Since onset symptoms are gradually worsening.  Associated symptoms:  Buckling/giving out and edema. Symptoms are exacerbated by activity, ascending stairs, bending/squatting, kneeling, descending stairs, certain positions, standing, weight bearing and walking  and relieved by ice and heat.      Barriers include:  None as reported by patient.                        Objective:    Gait:  Antalgic, favors R leg, no assistive device needed  Gait Type:  Antalgic         Flexibility/Screens:       Lower Extremity:  Decreased left lower extremity flexibility:Hamstrings    Decreased right lower extremity flexibility:  Hamstrings                                                 Hip Evaluation    Hip Strength:    Flexion:   Left: 5/5   Pain:  Right: 5/5   Pain:                    Extension:  Left: 4/5  Pain:Right: 4/5    Pain:    Abduction:  Left: 3+/5     Pain:Right: 4/5    " Pain:    Internal Rotation:  Left: 5/5    Pain:Right: 5/5   Pain:  External Rotation:  Left: 3+/5   Pain:  Right: 4/5   Pain:            Hip Special Testing:   Not Assessed        Hip Palpation:  Not Assessed              Knee Evaluation:  ROM:    AROM      Extension:  Left: WNL    Right:  WNL  Flexion: Left: 95    Right: 135  PROM      Extension: Left: WNL    Right:  WNL  Flexion: Left: 105    Right:  140  Pain: Pain at end range knee flexion medially and behind patella    Strength:     Extension:  Left: 4/5   Pain:      Right: 5/5   Pain:  Flexion:  Left: 4/5   Pain:      Right: 4/5   Pain:    Quad Set Left:    Pain: +   Quad Set Right: WNL    Pain:  Ligament Testing:  Normal                Special Tests:   Left knee positive for the following special tests:  Patellar Compression    Right knee negative for the following special tests:  Patellar Compression  Palpation:    Left knee tenderness present at:  Medial Joint Line  Left knee tenderness not present at:  Lateral Joint Line; Patellar Tendon; IT Band; Patellar Medial; Patellar Lateral; Patellar Superior and Patellar Inferior    Right knee tenderness not present at:  Medial Joint Line; Lateral Joint Line; Patellar Tendon; IT Band; Patellar Medial; Patellar Lateral; Patellar Superior and Patellar Inferior  Edema:  Edema of the knee: Mild swelling to medial L knee.    Mobility Testing:      Patellofemoral Medial:  Left: hypomobile    Right: hypermobile  Patellofemoral Lateral:  Left: hypomobile    Right: hypermobile      Functional Testing:  : Single leg stance >10 seconds LLE, 6 seconds on RLE; squat >90 degrees knee flexion with 5/10 pain in anterior knee.                  General     ROS    Assessment/Plan:    Patient is a 56 year old female with left side knee complaints.    Patient has the following significant findings with corresponding treatment plan.                Diagnosis 1:  L knee pain  Pain -  hot/cold therapy, mechanical traction,  splint/taping/bracing/orthotics, self management, education and home program  Decreased ROM/flexibility - manual therapy, therapeutic exercise and home program  Decreased joint mobility - manual therapy, therapeutic exercise and home program  Decreased strength - therapeutic exercise, therapeutic activities and home program  Impaired balance - neuro re-education, therapeutic activities and home program  Edema - vasopneumatics and self management/home program  Impaired muscle performance - neuro re-education and home program    Therapy Evaluation Codes:   Cumulative Therapy Evaluation is: Low complexity.    Previous and current functional limitations:  (See Goal Flow Sheet for this information)    Short term and Long term goals: (See Goal Flow Sheet for this information)     Communication ability:  Patient appears to be able to clearly communicate and understand verbal and written communication and follow directions correctly.  Treatment Explanation - The following has been discussed with the patient:   RX ordered/plan of care  Anticipated outcomes  Possible risks and side effects  This patient would benefit from PT intervention to resume normal activities.   Rehab potential is good.    Frequency:  1 X week, once daily  Duration:  for 10 weeks  Discharge Plan:  Achieve all LTG.  Independent in home treatment program.  Reach maximal therapeutic benefit.    Please refer to the daily flowsheet for treatment today, total treatment time and time spent performing 1:1 timed codes.

## 2022-02-16 ENCOUNTER — THERAPY VISIT (OUTPATIENT)
Dept: PHYSICAL THERAPY | Facility: CLINIC | Age: 57
End: 2022-02-16
Payer: COMMERCIAL

## 2022-02-16 DIAGNOSIS — M25.562 CHRONIC PAIN OF LEFT KNEE: ICD-10-CM

## 2022-02-16 DIAGNOSIS — G89.29 CHRONIC PAIN OF LEFT KNEE: ICD-10-CM

## 2022-02-16 PROCEDURE — 97110 THERAPEUTIC EXERCISES: CPT | Mod: GP | Performed by: PHYSICAL THERAPIST

## 2022-03-03 ENCOUNTER — THERAPY VISIT (OUTPATIENT)
Dept: PHYSICAL THERAPY | Facility: CLINIC | Age: 57
End: 2022-03-03
Payer: COMMERCIAL

## 2022-03-03 DIAGNOSIS — G89.29 CHRONIC PAIN OF LEFT KNEE: ICD-10-CM

## 2022-03-03 DIAGNOSIS — M25.562 CHRONIC PAIN OF LEFT KNEE: ICD-10-CM

## 2022-03-03 PROCEDURE — 97110 THERAPEUTIC EXERCISES: CPT | Mod: GP | Performed by: PHYSICAL THERAPIST

## 2022-03-03 ASSESSMENT — ACTIVITIES OF DAILY LIVING (ADL)
KNEE_ACTIVITY_OF_DAILY_LIVING_SUM: 55
STAND: ACTIVITY IS MINIMALLY DIFFICULT
GO UP STAIRS: ACTIVITY IS MINIMALLY DIFFICULT
LIMPING: I DO NOT HAVE THE SYMPTOM
KNEE_ACTIVITY_OF_DAILY_LIVING_SCORE: 78.57
WALK: ACTIVITY IS MINIMALLY DIFFICULT
SWELLING: I HAVE THE SYMPTOM BUT IT DOES NOT AFFECT MY ACTIVITY
PAIN: I HAVE THE SYMPTOM BUT IT DOES NOT AFFECT MY ACTIVITY
HOW_WOULD_YOU_RATE_THE_CURRENT_FUNCTION_OF_YOUR_KNEE_DURING_YOUR_USUAL_DAILY_ACTIVITIES_ON_A_SCALE_FROM_0_TO_100_WITH_100_BEING_YOUR_LEVEL_OF_KNEE_FUNCTION_PRIOR_TO_YOUR_INJURY_AND_0_BEING_THE_INABILITY_TO_PERFORM_ANY_OF_YOUR_USUAL_DAILY_ACTIVITIES?: 80
RISE FROM A CHAIR: ACTIVITY IS NOT DIFFICULT
STIFFNESS: THE SYMPTOM AFFECTS MY ACTIVITY SLIGHTLY
SQUAT: ACTIVITY IS SOMEWHAT DIFFICULT
GIVING WAY, BUCKLING OR SHIFTING OF KNEE: I DO NOT HAVE THE SYMPTOM
AS_A_RESULT_OF_YOUR_KNEE_INJURY,_HOW_WOULD_YOU_RATE_YOUR_CURRENT_LEVEL_OF_DAILY_ACTIVITY?: NEARLY NORMAL
KNEEL ON THE FRONT OF YOUR KNEE: ACTIVITY IS SOMEWHAT DIFFICULT
GO DOWN STAIRS: ACTIVITY IS MINIMALLY DIFFICULT
HOW_WOULD_YOU_RATE_THE_OVERALL_FUNCTION_OF_YOUR_KNEE_DURING_YOUR_USUAL_DAILY_ACTIVITIES?: NEARLY NORMAL
RAW_SCORE: 55
SIT WITH YOUR KNEE BENT: ACTIVITY IS NOT DIFFICULT
WEAKNESS: THE SYMPTOM AFFECTS MY ACTIVITY MODERATELY

## 2022-03-03 NOTE — PROGRESS NOTES
Subjective:  HPI  Physical Exam       Knee Activity of Daily Living Score: 78.57            Objective:  System    Physical Exam    General     ROS    Assessment/Plan:    DISCHARGE REPORT    Progress reporting period is from 02/09/2022 to 03/03/2022.       SUBJECTIVE  Subjective: Pt indicates knee seems to crack more when doing the squat.  Overall doing well functionally otherwise.  Feels definite progress since starting PT.    Current Pain level: 1/10.     Initial Pain level: 4/10.   Changes in function:  Yes (See Goal flowsheet attached for changes in current functional level)  Adverse reaction to treatment or activity: None    OBJECTIVE  Objective: AROM 0-0-133 L knee.  No discomfort resisted knee flexion and extension.     ASSESSMENT/PLAN  Updated problem list and treatment plan: Diagnosis 1:  Knee pain -- home program  STG/LTGs have been met or progress has been made towards goals:  Yes (See Goal flow sheet completed today.)  Assessment of Progress: Patient is meeting short term goals and is progressing towards long term goals.  Self Management Plans:  Patient is independent in a home treatment program.  I have re-evaluated this patient and find that the nature, scope, duration and intensity of the therapy is appropriate for the medical condition of the patient.  Kylie continues to require the following intervention to meet STG and LTG's:  PT intervention is no longer required to meet STG/LTG.    Recommendations:  Given progress, pt agrees discharge to [facility] but will let me know if there are further issues.    Please refer to the daily flowsheet for treatment today, total treatment time and time spent performing 1:1 timed codes.

## 2022-04-04 ENCOUNTER — TELEPHONE (OUTPATIENT)
Dept: PALLIATIVE MEDICINE | Facility: CLINIC | Age: 57
End: 2022-04-04
Payer: COMMERCIAL

## 2022-04-04 NOTE — TELEPHONE ENCOUNTER
[place] patient, she is aware. Routing to review care plan.        Robyn Rapp    [facility]

## 2022-04-04 NOTE — TELEPHONE ENCOUNTER
Chart review.    Pt of Dr. Fowler's since 10/2019. Previously with Dr. Moscoso and Dr. Rosie Rayo.    Last appointment with Dr. Fowler: 12/2021. And then a year before.    At last visit not changes were made and pt was to f/u again in 6 months. Re-certified for medical cannabis.    Medical cannabis certified w/ Dr. Fowler     Multiple injections have been done    Plan:  Transfer back to primary care provider including certification for medical cannabis.      Called pt. And relayed the above.  It was mentioned that a transfer back to primary care provider is an option, injections can be ordered by primary care provider and primary care provider can find a provider to certify her for medical cannabis.       Randa RN-BSN  [facility]

## 2022-04-06 DIAGNOSIS — M25.569 CHRONIC KNEE PAIN, UNSPECIFIED LATERALITY: ICD-10-CM

## 2022-04-06 DIAGNOSIS — G89.29 CHRONIC KNEE PAIN, UNSPECIFIED LATERALITY: ICD-10-CM

## 2022-04-06 RX ORDER — MELOXICAM 7.5 MG/1
7.5 TABLET ORAL DAILY
Qty: 30 TABLET | Refills: 1 | Status: SHIPPED | OUTPATIENT
Start: 2022-04-06 | End: 2022-06-15

## 2022-04-13 ENCOUNTER — TELEPHONE (OUTPATIENT)
Dept: PALLIATIVE MEDICINE | Facility: CLINIC | Age: 57
End: 2022-04-13

## 2022-04-13 ENCOUNTER — OFFICE VISIT (OUTPATIENT)
Dept: FAMILY MEDICINE | Facility: CLINIC | Age: 57
End: 2022-04-13
Payer: COMMERCIAL

## 2022-04-13 VITALS
RESPIRATION RATE: 14 BRPM | HEART RATE: 71 BPM | DIASTOLIC BLOOD PRESSURE: 72 MMHG | TEMPERATURE: 97.2 F | OXYGEN SATURATION: 97 % | HEIGHT: 68 IN | BODY MASS INDEX: 35.46 KG/M2 | SYSTOLIC BLOOD PRESSURE: 109 MMHG | WEIGHT: 234 LBS

## 2022-04-13 DIAGNOSIS — G89.29 CHRONIC KNEE PAIN, UNSPECIFIED LATERALITY: ICD-10-CM

## 2022-04-13 DIAGNOSIS — R29.898 LEFT ARM WEAKNESS: Primary | ICD-10-CM

## 2022-04-13 DIAGNOSIS — M25.569 CHRONIC KNEE PAIN, UNSPECIFIED LATERALITY: ICD-10-CM

## 2022-04-13 PROCEDURE — 99214 OFFICE O/P EST MOD 30 MIN: CPT | Performed by: PHYSICIAN ASSISTANT

## 2022-04-13 ASSESSMENT — PAIN SCALES - GENERAL: PAINLEVEL: MILD PAIN (3)

## 2022-04-13 NOTE — PROGRESS NOTES
"  Assessment & Plan   Problem List Items Addressed This Visit    None     Visit Diagnoses     Left arm weakness    -  Primary    Relevant Orders    MR Cervical Spine w/o Contrast    Orthopedic  Referral    Chronic knee pain, unspecified laterality        Relevant Orders    Pain Management Referral         Unclear cause of LUE weakness. Low suspicion for CVA or other intracranial process. Will order cervical spine MRI to rule out spinal process, but could also be ulnar neuropathy as she has paresthesias in the fourth and fifth fingers.  Plan to refer to orthopedics for evaluation of the latter.  Also referred to pain as she needs a new pain provider.  She historically saw Dr. Fowler 1-2 times annually.  She currently has good pain control with gabapentin, marijuana and Effexor.    Complete history and physical exam as below. AF with normal VS.    DDx and Dx discussed with and explained to the pt to their satisfaction.  All questions were answered at this time. Pt expressed understanding of and agreement with this dx, tx, and plan. No further workup warranted and standard medication warnings given. I have given the patient a list of pertinent indications for re-evaluation. Will go to the Emergency Department if symptoms worsen or new concerning symptoms arise. Patient left in no apparent distress.     30 minutes spent on the date of the encounter doing chart review, history and exam, documentation and further activities per the note     BMI:   Estimated body mass index is 35.88 kg/m  as calculated from the following:    Height as of this encounter: 1.72 m (5' 7.72\").    Weight as of this encounter: 106.1 kg (234 lb).     See Patient Instructions    Return for a recheck as needed, or call 911/go to an ER anytime if worsening.    CLARIBEL Blas  [facility] DARLENE Espinal is a 56 year old who presents for the following health issues     History of Present Illness       Back " Pain:  She presents for follow up of back pain. Patient's back pain is a recurring problem.  Location of back pain:  Left middle of back  Description of back pain: dull ache  Back pain spreads: left shoulder    Since patient first noticed back pain, pain is: gradually improving  Does back pain interfere with her job:  No      Reason for visit:  Left hand and arm  Symptom onset:  1-2 weeks ago    She eats 2-3 servings of fruits and vegetables daily.She consumes 0 sweetened beverage(s) daily.She exercises with enough effort to increase her heart rate 30 to 60 minutes per day.  She exercises with enough effort to increase her heart rate 3 or less days per week.   She is taking medications regularly.       Sees pain managment, but needs a new provider. She lost strength in left hand and had brief pain in her left shoulder blade. Started 2 weeks ago    Pain History:  Woke up 2 weeks ago with weakness and numbness in her left hand and forearm. Feels that she is pulling against something. Symptoms have been constant and unchanging. Ambidextrous. Disabled and unemployed. History of left TSA in 2020.    When did you first notice your pain? - 1 to 6 weeks   Have you seen any provider previously for this issue? No  How has your pain affected your ability to work? On disability, retired  Where in your body do you have pain? Back Pain  Onset/Duration: 2 weeks  Description:   Location of pain: left side, mid back  Character of pain: dull ache  Pain radiation: down left arm  New numbness or weakness in legs, not attributed to pain: no   Intensity: Currently 3/10  Progression of Symptoms: improving and constant  History:   Specific cause: none  Pain interferes with job: no  History of back problems: Yes, neck and low back  Any previous MRI or X-rays: None  Sees a specialist for back pain: Yes. Did. But she past away  Alleviating factors:   Improved by: rest    Precipitating factors:  Worsened by: using the arm  Therapies tried and  "outcome: rest    Accompanying Signs & Symptoms:  Risk of Fracture: None  Risk of Cauda Equina: None  Risk of Infection: None  Risk of Cancer: None  Risk of Ankylosing Spondylitis: Onset at age <35, male, AND morning back stiffness no    Review of Systems   Constitutional, HEENT, cardiovascular, pulmonary, gi and gu systems are negative, except as otherwise noted.      Objective    /72   Pulse 71   Temp 97.2  F (36.2  C) (Tympanic)   Resp 14   Ht 1.72 m (5' 7.72\")   Wt 106.1 kg (234 lb)   LMP  (LMP Unknown)   SpO2 97%   BMI 35.88 kg/m    Body mass index is 35.88 kg/m .  Physical Exam  Vitals and nursing note reviewed.   Constitutional:       General: She is not in acute distress.     Appearance: She is not ill-appearing or diaphoretic.   HENT:      Head: Normocephalic and atraumatic.      Mouth/Throat:      Mouth: Mucous membranes are moist.   Eyes:      Conjunctiva/sclera: Conjunctivae normal.   Cardiovascular:      Rate and Rhythm: Normal rate and regular rhythm.      Heart sounds: Normal heart sounds. No murmur heard.    No friction rub. No gallop.      Comments: 2+ symmetric radial pulses.  Pulmonary:      Effort: Pulmonary effort is normal. No respiratory distress.      Breath sounds: Normal breath sounds. No stridor. No wheezing, rhonchi or rales.   Abdominal:      General: Bowel sounds are normal. There is no distension.      Palpations: Abdomen is soft. There is no mass.      Tenderness: There is no abdominal tenderness. There is no guarding or rebound.      Hernia: No hernia is present.   Skin:     General: Skin is warm and dry.   Neurological:      General: No focal deficit present.      Mental Status: She is alert. Mental status is at baseline.      Comments: Diminished strength in LUE with finger squeeze and against resistance. Distal CMS other intact. Limb non-tender and without edema.     Psychiatric:         Mood and Affect: Mood normal.         Behavior: Behavior normal.            MRI " of c spine wo contrast pending.

## 2022-04-13 NOTE — PATIENT INSTRUCTIONS
Madison Espinal,    Thank you for allowing [facility] to manage your care.    I am unsure of the cause of your symptoms, but we will see what our workup shows.     If you develop worsening/changing symptoms at any time, please call 911 or go to the emergency department for evaluation.    I ordered an MRI of your neck, please call diagnostic imaging [phone] to schedule your test.    I made referrals to orthopedics for your weakness and pain for ongoing pain treatment, they will be calling in approximately 1 week to set up your appointment.  If you do not hear from them, please call the specialty number on your after visit summary.     Please allow 1-2 business days for our office to contact you in regards to your laboratory/radiological studies.  If not done so, I encourage you to login into [employer] ([email or web address] to review your results as well.     If you have any questions or concerns, please feel free to call us at [phone]    Sincerely,    Ty Pan PA-C    Did you know?      You can schedule a video visit for follow-up appointments as well as future appointments for certain conditions.  Please see the below link.     [email or web address]    If you have not already done so,  I encourage you to sign up for [employer] ([email or web address]).  This will allow you to review your results, securely communicate with a provider, and schedule virtual visits as well.

## 2022-04-13 NOTE — TELEPHONE ENCOUNTER

## 2022-04-13 NOTE — LETTER
April 13, 2022    Kylie Austin  [address]  [place] [address]    Dear Kylie,            Welcome to the [facility].  We are located on the 2nd floor ([address]) of the [facility], located at [address] [place] [address] [place] [address].  Your appointment has been scheduled on 6/21/2022 at 8:00a with Deisy Lopez NP.    At your first visit, you will meet your team of caregivers who will help you to develop pain management strategies that will last a lifetime. You will meet with our support staff to review your insurance information, and collect your co-payment if required by your insurance company. You will also meet with a medical pain specialist and care coordinator who will assess your pain and develop a plan of care for your successful pain rehabilitation. You should expect to spend approximately 1 hour at your first visit with us. Usually, patients work with us for a period of 6-12 months, and eventually return to their primary doctor once their pain management has stabilized.      To help us make your visit go as smoothly as possible, please bring the following items with you on your visit:       Completed Pain Questionnaire enclosed in this packet.  If you do not bring the completed questionnaire, we may have to reschedule your appointment.    List of any medicines that you are currently taking or have been prescribed    Important NON-[place] medical information such as medical records or tests results (X-rays, or laboratory tests)    Your health insurance card    Financial resources to cover your co-payment or balance due at the time of service (cash, personal check, Visa, and MasterCard are acceptable methods of payment)     Due to the demand for new patient evaluations, you must notify the scheduling department 48 hours (2 days) in advance if you are not able to keep this appointment. Failure to do so could affect your ability to  reschedule with our clinic. Please be aware that we will not prescribe any medications at your first visit.     Please call [phone] with any questions regarding your appointment. We look forward to meeting you and working to address your health care needs.     Sincerely,      [facility]

## 2022-04-20 ENCOUNTER — ANCILLARY PROCEDURE (OUTPATIENT)
Dept: MRI IMAGING | Facility: CLINIC | Age: 57
End: 2022-04-20
Attending: PHYSICIAN ASSISTANT
Payer: COMMERCIAL

## 2022-04-20 DIAGNOSIS — R29.898 LEFT ARM WEAKNESS: ICD-10-CM

## 2022-04-20 PROCEDURE — 72141 MRI NECK SPINE W/O DYE: CPT | Mod: TC | Performed by: RADIOLOGY

## 2022-04-29 ENCOUNTER — OFFICE VISIT (OUTPATIENT)
Dept: ORTHOPEDICS | Facility: CLINIC | Age: 57
End: 2022-04-29
Attending: PHYSICIAN ASSISTANT
Payer: COMMERCIAL

## 2022-04-29 VITALS
DIASTOLIC BLOOD PRESSURE: 68 MMHG | WEIGHT: 234 LBS | SYSTOLIC BLOOD PRESSURE: 122 MMHG | HEIGHT: 68 IN | BODY MASS INDEX: 35.46 KG/M2

## 2022-04-29 DIAGNOSIS — R29.898 LEFT ARM WEAKNESS: ICD-10-CM

## 2022-04-29 PROCEDURE — 99204 OFFICE O/P NEW MOD 45 MIN: CPT | Performed by: PEDIATRICS

## 2022-04-29 NOTE — PROGRESS NOTES
ASSESSMENT & PLAN    Kylie was seen today for extremity weakness.    Diagnoses and all orders for this visit:    Left arm weakness  -     Orthopedic  Referral  -     EMG; Future      We discussed the following: symptom treatment, activity modification/rest, imaging, rehab, injection therapy, electrodiagnostic testing and referral to spine. Following discussion, plan:  Edx testing next given degree of weakness; certainly may be cervical source, but question peripheral as well.  Questions answered. Discussed signs and symptoms that may indicate more serious issues; the patient was instructed to seek appropriate care if noted. Kylie indicates understanding of these issues and agrees with the plan.        See Patient Instructions  Patient Instructions   Left upper extremity weakness may be related to cervical spine source, cervical radiculopathy.  However, ulnar neuropathy or possibly posterior interosseous nerve involvement is a consideration as well.  Plan to obtain electrodiagnostic testing next, referred for EMG/NCS.  We also discussed considerations with physical therapy, injections, other imaging, and potential referral to spine given the weakness (primarily if confirmed to be coming from C-spine source).  Plan to contact you with EMG results.    If you have any further questions for your physician or physician s care team you can call [phone] and use option 3 to leave a voice message. Calls received during business hours will be returned same day.        Darion Cole [facility]      CC: Elijah Pan      -----  Chief Complaint   Patient presents with     Left Arm - Extremity Weakness       SUBJECTIVE  Kylie Austin is a/an 56 year old female who is seen in consultation at the request of  Elijah Pan PA-C for evaluation of left arm weakness with numbness and tingling.  States it has been present for the past month.  No  "known injury to her arm.   Feels her muscles will just go weak.  Numbness and tingling in long, ring and small fingers.  States she has lost all dexterity of the small and ring fingers.  Recently underwent a cervical MRI.   Currently no neck pain, only stiffness. Chronic neck pain.      The patient is seen by themselves.  The patient is Ambidextrous handed    Onset: 1 month(s) ago. Reports insidious onset without acute precipitating event.  Location of Pain: left upper extremity and hand   Worsened by: use of arm   Better with: nothing   Treatments tried: no treatment tried to date  Associated symptoms: numbness, tingling and weakness of left upper extremity     Orthopedic/Surgical history: ongoing neck pain.    Social History/Occupation: disability    No family history pertinent to patient's problem today.     **  Initially noted awaking with pain periscapular, and then left hand limited dexterity. + N/T in left hand as well. Primarily ulnar hand, but with more hand use, then gets more hand symptoms.    History wrist dislocation in past (bilaterally), no fracture.      Hx essential tremor since child.      REVIEW OF SYSTEMS:  Review of Systems   Neurological: Positive for tremors, weakness, numbness and paresthesias.   All other systems reviewed and are negative.        OBJECTIVE:  /68   Ht 1.721 m (5' 7.75\")   Wt 106.1 kg (234 lb)   LMP  (LMP Unknown)   BMI 35.84 kg/m     General: alert and in no distress  HEENT: no scleral icterus or conjunctival erythema  Skin: no suspicious lesions or rash. No jaundice.  CV: distal perfusion intact   Resp: normal respiratory effort without conversational dyspnea   Psych: normal mood and affect  Gait: normal steady gait with appropriate coordination and balance   Neuro: Normal tone UE      Cervical Spine Exam    Range of Motion:       forward flexion no change in symptoms        extension some neck pain, no radiating symptoms        lateral flexion mild neck pain, no " radiating symptoms    Strength:     C4 (shoulder shrug)   Grossly intact       C5 (shoulder abduction) asymmetric diminished left       C6 (elbow flexion) asymmetric diminished left       C7 (elbow extension) asymmetric diminished left       C8 (finger abduction, thumb flexion) asymmetric diminished left    Reflexes:      C5 (biceps)         C6 (supinator)         C7 (triceps)    Grossly symmetric    Special Tests:     positive (+) Spurling to left    Skin:     well perfused       capillary refill brisk           Hand/wrist (left):    Inspection:  No deformity noted.  No swelling. No ecchymosis.    Strength:  Decreased , finger abduction, wrist extension    Radial pulses normal, +2/4, capillary refill brisk.    Tinel neg wrist. Phalen attempt increased symptoms.          RADIOLOGY:  I independently visualized and reviewed these images with the patient    Multilevel degenerative change.    Results for orders placed or performed in visit on 04/20/22   MR Cervical Spine w/o Contrast    Narrative    MRI CERVICAL SPINE WITHOUT CONTRAST 4/20/2022 7:42 AM     HISTORY: Left arm weakness.     TECHNIQUE: Multiplanar, multisequence MRI of the cervical spine  without contrast.     COMPARISON: Cervical spine MR 11/29/2018.     FINDINGS: Mild grade 1 anterolisthesis of C5 on C6 and C7 on T1 likely  due to degenerative facet arthropathy. Few small presumed Schmorl's  node deformities in the cervical spine. No significant loss of  vertebral body height. No focal destructive bony lesion appreciated.  Mild edema around the degenerated left C3-C4 facet joint. Fatty marrow  replacement around the degenerated left C7-T1 facet joint.    No abnormal signal appreciated within the visualized spinal cord.    Level by level as follows:     C2-C3: Mild loss of disc height. Posterior disc bulge more pronounced  towards left foraminal region with left uncinate spurring. Marked  asymmetric left-sided facet hypertrophy. No spinal canal  narrowing. No  significant right neural foraminal narrowing. Severe left neural  foraminal narrowing.     C3-C4: Moderate loss of disc height. Posterior disc bulge more  pronounced towards the left foraminal region. Marked left greater than  right facet hypertrophy with edema around the left facet joint. No  spinal canal narrowing. Moderate right neural foraminal narrowing.  Severe left neural foraminal narrowing.     C4-C5: Moderate loss of disc height. Circumferential disc bulge with  endplate osteophytic spurring. Mild bilateral facet hypertrophy. Mild  spinal canal narrowing. Severe bilateral neural foraminal narrowing.     C5-C6: Grade 1 anterolisthesis with uncovering of the disc. Moderate  loss of disc height. Circumferential disc bulge with endplate  osteophytic spurring. Bilateral facet hypertrophy. Moderate spinal  canal narrowing. Severe bilateral neural foraminal narrowing.     C6-C7: Marked loss of disc height with degenerative endplate changes.  Circumferential disc bulge with endplate osteophytic spurring. Normal  facets. Mild spinal canal narrowing. Severe bilateral neural foraminal  narrowing.     C7-T1: Grade 1 anterolisthesis with uncovering of the disc. Moderate  loss of disc height. Posterior disc bulge with endplate osteophytic  spurring more pronounced towards the left foraminal region. Marked  bilateral facet hypertrophy. Moderate spinal canal narrowing. Severe  bilateral neural foraminal narrowing.     Paraspinous soft tissues are unremarkable.       Impression    IMPRESSION:    1.  Marked multilevel degenerative changes throughout the cervical  spine as detailed above.  2.  Moderate spinal canal narrowing at C5-C6 and C7-T1. Mild spinal  canal narrowing at C3-C4, C4-C5, and C6-C7.  3.  Multiple levels of neural foraminal narrowing, most pronounced on  the left at C2-C3 and C3-C4 and bilaterally at C4-C5, C5-C6, C6-C7,  and C7-T1.  4.  Mild edema around the degenerated left C3-C4 facet  joint. This  could potentially be a source of pain.   5.  Overall, degenerative findings appear to be slightly progressed  since prior MR 11/29/2018.     AGATA LAMBERT MD         SYSTEM ID:  [id number]         Review of prior external note(s) from - previous eval  Review of the result(s) of each unique test - imaging  Independent interpretation of a test performed by another physician/other qualified health care professional (not separately reported) - imaging

## 2022-04-29 NOTE — LETTER
4/29/2022         RE: Kylie Austin  [id number] [facility] [address]        Dear Colleague,    Thank you for referring your patient, Kylie Austin, to the [facility]. Please see a copy of my visit note below.    ASSESSMENT & PLAN    Kylie was seen today for extremity weakness.    Diagnoses and all orders for this visit:    Left arm weakness  -     Orthopedic  Referral  -     EMG; Future      We discussed the following: symptom treatment, activity modification/rest, imaging, rehab, injection therapy, electrodiagnostic testing and referral to spine. Following discussion, plan:  Edx testing next given degree of weakness; certainly may be cervical source, but question peripheral as well.  Questions answered. Discussed signs and symptoms that may indicate more serious issues; the patient was instructed to seek appropriate care if noted. Kylie indicates understanding of these issues and agrees with the plan.        See Patient Instructions  Patient Instructions   Left upper extremity weakness may be related to cervical spine source, cervical radiculopathy.  However, ulnar neuropathy or possibly posterior interosseous nerve involvement is a consideration as well.  Plan to obtain electrodiagnostic testing next, referred for EMG/NCS.  We also discussed considerations with physical therapy, injections, other imaging, and potential referral to spine given the weakness (primarily if confirmed to be coming from C-spine source).  Plan to contact you with EMG results.    If you have any further questions for your physician or physician s care team you can call [phone] and use option 3 to leave a voice message. Calls received during business hours will be returned same day.        Darion Cole DO  [facility] [place]      CC: Elijah Pan      -----  Chief Complaint   Patient presents with      "Left Arm - Extremity Weakness       SUBJECTIVE  Kylie Austin is a/an 56 year old female who is seen in consultation at the request of  Elijah Pan PA-C for evaluation of left arm weakness with numbness and tingling.  States it has been present for the past month.  No known injury to her arm.   Feels her muscles will just go weak.  Numbness and tingling in long, ring and small fingers.  States she has lost all dexterity of the small and ring fingers.  Recently underwent a cervical MRI.   Currently no neck pain, only stiffness. Chronic neck pain.      The patient is seen by themselves.  The patient is Ambidextrous handed    Onset: 1 month(s) ago. Reports insidious onset without acute precipitating event.  Location of Pain: left upper extremity and hand   Worsened by: use of arm   Better with: nothing   Treatments tried: no treatment tried to date  Associated symptoms: numbness, tingling and weakness of left upper extremity     Orthopedic/Surgical history: ongoing neck pain.    Social History/Occupation: disability    No family history pertinent to patient's problem today.     **  Initially noted awaking with pain periscapular, and then left hand limited dexterity. + N/T in left hand as well. Primarily ulnar hand, but with more hand use, then gets more hand symptoms.    History wrist dislocation in past (bilaterally), no fracture.      Hx essential tremor since child.      REVIEW OF SYSTEMS:  Review of Systems   Neurological: Positive for tremors, weakness, numbness and paresthesias.   All other systems reviewed and are negative.        OBJECTIVE:  /68   Ht 1.721 m (5' 7.75\")   Wt 106.1 kg (234 lb)   LMP  (LMP Unknown)   BMI 35.84 kg/m     General: alert and in no distress  HEENT: no scleral icterus or conjunctival erythema  Skin: no suspicious lesions or rash. No jaundice.  CV: distal perfusion intact   Resp: normal respiratory effort without conversational dyspnea   Psych: normal mood " and affect  Gait: normal steady gait with appropriate coordination and balance   Neuro: Normal tone UE      Cervical Spine Exam    Range of Motion:       forward flexion no change in symptoms        extension some neck pain, no radiating symptoms        lateral flexion mild neck pain, no radiating symptoms    Strength:     C4 (shoulder shrug)   Grossly intact       C5 (shoulder abduction) asymmetric diminished left       C6 (elbow flexion) asymmetric diminished left       C7 (elbow extension) asymmetric diminished left       C8 (finger abduction, thumb flexion) asymmetric diminished left    Reflexes:      C5 (biceps)         C6 (supinator)         C7 (triceps)    Grossly symmetric    Special Tests:     positive (+) Spurling to left    Skin:     well perfused       capillary refill brisk           Hand/wrist (left):    Inspection:  No deformity noted.  No swelling. No ecchymosis.    Strength:  Decreased , finger abduction, wrist extension    Radial pulses normal, +2/4, capillary refill brisk.    Tinel neg wrist. Phalen attempt increased symptoms.          RADIOLOGY:  I independently visualized and reviewed these images with the patient    Multilevel degenerative change.    Results for orders placed or performed in visit on 04/20/22   MR Cervical Spine w/o Contrast    Narrative    MRI CERVICAL SPINE WITHOUT CONTRAST 4/20/2022 7:42 AM     HISTORY: Left arm weakness.     TECHNIQUE: Multiplanar, multisequence MRI of the cervical spine  without contrast.     COMPARISON: Cervical spine MR 11/29/2018.     FINDINGS: Mild grade 1 anterolisthesis of C5 on C6 and C7 on T1 likely  due to degenerative facet arthropathy. Few small presumed Schmorl's  node deformities in the cervical spine. No significant loss of  vertebral body height. No focal destructive bony lesion appreciated.  Mild edema around the degenerated left C3-C4 facet joint. Fatty marrow  replacement around the degenerated left C7-T1 facet joint.    No abnormal  signal appreciated within the visualized spinal cord.    Level by level as follows:     C2-C3: Mild loss of disc height. Posterior disc bulge more pronounced  towards left foraminal region with left uncinate spurring. Marked  asymmetric left-sided facet hypertrophy. No spinal canal narrowing. No  significant right neural foraminal narrowing. Severe left neural  foraminal narrowing.     C3-C4: Moderate loss of disc height. Posterior disc bulge more  pronounced towards the left foraminal region. Marked left greater than  right facet hypertrophy with edema around the left facet joint. No  spinal canal narrowing. Moderate right neural foraminal narrowing.  Severe left neural foraminal narrowing.     C4-C5: Moderate loss of disc height. Circumferential disc bulge with  endplate osteophytic spurring. Mild bilateral facet hypertrophy. Mild  spinal canal narrowing. Severe bilateral neural foraminal narrowing.     C5-C6: Grade 1 anterolisthesis with uncovering of the disc. Moderate  loss of disc height. Circumferential disc bulge with endplate  osteophytic spurring. Bilateral facet hypertrophy. Moderate spinal  canal narrowing. Severe bilateral neural foraminal narrowing.     C6-C7: Marked loss of disc height with degenerative endplate changes.  Circumferential disc bulge with endplate osteophytic spurring. Normal  facets. Mild spinal canal narrowing. Severe bilateral neural foraminal  narrowing.     C7-T1: Grade 1 anterolisthesis with uncovering of the disc. Moderate  loss of disc height. Posterior disc bulge with endplate osteophytic  spurring more pronounced towards the left foraminal region. Marked  bilateral facet hypertrophy. Moderate spinal canal narrowing. Severe  bilateral neural foraminal narrowing.     Paraspinous soft tissues are unremarkable.       Impression    IMPRESSION:    1.  Marked multilevel degenerative changes throughout the cervical  spine as detailed above.  2.  Moderate spinal canal narrowing at  C5-C6 and C7-T1. Mild spinal  canal narrowing at C3-C4, C4-C5, and C6-C7.  3.  Multiple levels of neural foraminal narrowing, most pronounced on  the left at C2-C3 and C3-C4 and bilaterally at C4-C5, C5-C6, C6-C7,  and C7-T1.  4.  Mild edema around the degenerated left C3-C4 facet joint. This  could potentially be a source of pain.   5.  Overall, degenerative findings appear to be slightly progressed  since prior MR 11/29/2018.     AGATA LAMBERT MD         SYSTEM ID:  [id number]         Review of prior external note(s) from - previous eval  Review of the result(s) of each unique test - imaging  Independent interpretation of a test performed by another physician/other qualified health care professional (not separately reported) - imaging             Again, thank you for allowing me to participate in the care of your patient.        Sincerely,        Darion Cole, DO

## 2022-04-29 NOTE — PATIENT INSTRUCTIONS
Left upper extremity weakness may be related to cervical spine source, cervical radiculopathy.  However, ulnar neuropathy or possibly posterior interosseous nerve involvement is a consideration as well.  Plan to obtain electrodiagnostic testing next, referred for EMG/NCS.  We also discussed considerations with physical therapy, injections, other imaging, and potential referral to spine given the weakness (primarily if confirmed to be coming from C-spine source).  Plan to contact you with EMG results.    If you have any further questions for your physician or physician s care team you can call [phone] and use option 3 to leave a voice message. Calls received during business hours will be returned same day.

## 2022-05-09 ASSESSMENT — ENCOUNTER SYMPTOMS
NUMBNESS: 1
WEAKNESS: 1
PARESTHESIAS: 1
TREMORS: 1

## 2022-05-19 ENCOUNTER — TELEPHONE (OUTPATIENT)
Dept: PALLIATIVE MEDICINE | Facility: CLINIC | Age: 57
End: 2022-05-19

## 2022-05-19 NOTE — TELEPHONE ENCOUNTER
Dear Kristin Miner    This patient will be returning to you for management of their chronic pain.  She is not actively involved in the  pain management clinic.  The patient's current pain plan includes only annual certification for medical cannabis. I did speak to the patient and she is not wanting any other involvement with the pain clinic at this time. Please reach out to us if you have questions.     Alivia Kamara, DUSTYN, RN  Care Coordinator  [facility] [place]

## 2022-06-15 DIAGNOSIS — M25.569 CHRONIC KNEE PAIN, UNSPECIFIED LATERALITY: ICD-10-CM

## 2022-06-15 DIAGNOSIS — G89.29 CHRONIC KNEE PAIN, UNSPECIFIED LATERALITY: ICD-10-CM

## 2022-06-15 RX ORDER — MELOXICAM 7.5 MG/1
7.5 TABLET ORAL DAILY
Qty: 30 TABLET | Refills: 1 | Status: SHIPPED | OUTPATIENT
Start: 2022-06-15 | End: 2022-08-19

## 2022-06-15 NOTE — CONFIDENTIAL NOTE
Medication refill sent to patient's requested pharmacy. No changes. Patient last seen 2/4/22.    Keena Johnson RN

## 2022-06-23 ENCOUNTER — OFFICE VISIT (OUTPATIENT)
Dept: NEUROLOGY | Facility: CLINIC | Age: 57
End: 2022-06-23
Attending: PEDIATRICS
Payer: COMMERCIAL

## 2022-06-23 DIAGNOSIS — R29.898 LEFT ARM WEAKNESS: ICD-10-CM

## 2022-06-23 DIAGNOSIS — G56.02 CARPAL TUNNEL SYNDROME OF LEFT WRIST: ICD-10-CM

## 2022-06-23 DIAGNOSIS — M54.12 LEFT CERVICAL RADICULOPATHY: Primary | ICD-10-CM

## 2022-06-23 PROCEDURE — 95910 NRV CNDJ TEST 7-8 STUDIES: CPT | Performed by: PSYCHIATRY & NEUROLOGY

## 2022-06-23 PROCEDURE — 95886 MUSC TEST DONE W/N TEST COMP: CPT | Performed by: PSYCHIATRY & NEUROLOGY

## 2022-06-23 NOTE — PROGRESS NOTES
[facility]  Electrodiagnostic Laboratory                 Department of Neurology                                                                                                         Test Date:  2022    Patient: Kylie Austin : 1965 Physician: Pierce Kirkpatrick MD   Sex: Female AGE: 56 year Ref Phys: Darion Cole DO   ID#: [id number]   Technician: FUNMILAYO Linton     Clinical Information:    56 year old woman with recent development of weakness of her left hand with paresthesias at digits 3-5. Symptoms have been present for at least 2 months. Examination shows weakness of left FDI (4/5), and finger extensors (4-/5). She also has a prominent essential tremor in the left hand. Query left ulnar neuropathy, posterior interosseous neuropathy, vs brachial plexopathy or cervical radiculopathy.     Techniques:    Motor and sensory conduction studies were done with surface recording electrodes. EMG was done with a concentric needle electrode.     Results:    Left median (APB) and ulnar (ADM) motor NCSs were normal. Left median, ulnar, bilateral radial, and bilateral medial antebrachial cutaneous antidromic sensory NCSs were normal. Studies done bilaterally did not show a significant side-to-side difference. The left median and ulnar orthodromic palmar mixed NCSs showed a prolonged peak latency difference (0.72 ms, normal is <0.4 ms).  Needle EMG of the left FDI, EIP, EDC, FPL and deltoid showed fibrillations/positive waves (deltoid: 1+, EDC and FPL: 2+, EIP and FDI: 3+). All other muscles tested showed no abnormal spontaneous activity. There were several polyphasic MUPs at the EIP, EDC, and FPL. The left EDC, triceps, and pronator teres also showed some large, long duration MUPs. All other muscles sampled showed normal MUP morphology. Recruitment was generally difficult to analyze due to constant tremor upon muscle activation. The left  brachioradialis, deltoid, and biceps showed proper, non-tremulous activation of MUPs, and recruitment was deemed normal in those muscles.     Interpretation:    Abnormal study. There is electrodiagnostic evidence of 1) Active, subacute left C8/T1 radiculopathy. There is also evidence of 2) chronic, inactive left C7 radiculopathy and 3) mild denervation changes at the left deltoid which may suggest coexistent C5-6 radiculopathy although other muscles innervated by the same roots were normal. 4) A very mild left median neuropathy at the wrist, as seen in carpal tunnel syndrome. Findings of the study are not consistent with isolated left ulnar or posterior interosseous neuropathies or brachial plexopathy. Clinical and imaging correlation is recommended.    ___________________________  Pierce Kirkpatrick MD        Nerve Conduction Studies  Motor Sites      Latency Amplitude Neg. Amp Diff Segment Distance Velocity Neg. Dur Neg Area Diff Temperature Comment   Site (ms) Norm (mV) Norm %  cm m/s Norm ms %  C    Left Median (APB) Motor   Wrist 4.0  < 4.4 6.8  > 5.0  Wrist-APB 8   5.2  32.5    Elbow 8.1 - 6.0 - -11.8 Elbow-Wrist 22 54  > 48 5.5 -5.2 32.5    Left Ulnar (ADM) Motor   Wrist 3.5  < 3.5 7.4  > 5.0  Wrist-ADM 8   5.1  32.5    Bel Elbow 7.3 - 6.9 - -6.8 Bel Elbow-Wrist 20 53  > 48 4.9 -7.6 32.4    Abv Elbow 9.0 - 6.6 - -4.3 Abv Elbow-Bel Elbow 10 59  > 48 5.4 0 32.4      Sensory Sites      Onset Lat Peak Lat Amp (O-P) Amp (P-P) Segment Distance Velocity Temperature Comment   Site ms ms  V Norm  V  cm m/s Norm  C    Left Medial Antebrachial Cutaneous Sensory   Elbow-Med Forearm 1.95 2.6 8 - 7 Elbow-Med Forearm 10 51 - 32.6    Right Medial Antebrachial Cutaneous Sensory   Elbow-Med Forearm 2.4 3.0 4 - 7 Elbow-Med Forearm 12 50 - 32.5    Left Median Sensory   Wrist-Dig II 2.8 3.6 28  > 10 37 Wrist-Dig II 14 50  > 48 32.5    Left Median (Ortho) Sensory   Palm-Wrist 1.88 2.6 7 - 14 Palm-Wrist 8 43 - 32.5    Left  Median-Ulnar Palmar Sensory        Median   Palm-Wrist 1.88 2.6 7 - 14 Palm-Wrist 8 43 - 32.5         Ulnar   Palm-Wrist 1.28 1.88 7 - 8 Palm-Wrist 8 63 - 32.5    Left Radial Sensory   Forearm-Wrist 1.68 2.3 40  > 15 53 Forearm-Wrist 10 60 - 32.5    Right Radial Sensory   Forearm-Wrist 1.63 2.2 41  > 15 48 Forearm-Wrist 10 61 - 23.6    Left Ulnar Sensory   Wrist-Dig V 2.4 3.4 32  > 8 43 Wrist-Dig V 12.5 52  > 48 32.5    Left Ulnar (Ortho) Sensory   Palm-Wrist 1.28 1.88 7 - 8 Palm-Wrist 8 63 - 32.5      Inter-Nerve Comparisons     Nerve 1 Value 1 Nerve 2 Value 2 Parameter Result Normal   Sensory Sites   L Median Palm-Wrist 2.6 ms L Ulnar Palm-Wrist 1.9 ms Peak Lat Diff 0.72 ms <0.40       Electromyography     Side Muscle Ins Act Fibs/PSW Fasc HF Amp Dur Poly Recrt Int Pat   Left EIP Nml 3+ Nml 0 Nml Nml 2+ Tremor Nml   Left EDC Nml 2+ Nml 0 1+ 1+ 1+ Tremor Nml   Left Brachiorad Nml None Nml 0 Nml Nml 0 Nml Nml   Left Triceps Nml None Nml 0 1+ 1+ 0 Tremor Nml   Left Deltoid Nml 1+ Nml 0 Nml Nml 0 Nml Nml   Left Biceps Nml None Nml 0 Nml Nml 0 Nml Nml   Left Pronator Teres Nml None Nml 0 1+ 1+ 0 Tremor Nml   Left FDI Nml 3+ Nml 0 Nml Nml 0 Tremor Nml   Left FPL Nml 2+ Nml 0 Nml Nml 2+ Tremor Nml         NCS Waveforms:    Motor         Sensory                               Ultrasound Images:

## 2022-06-23 NOTE — LETTER
2022       RE: Kylie Austin  [address]  [facility] [address]     Dear Colleague,    Thank you for referring your patient, Kylie Austin, to the [facility] EMG CLINIC [place] at [facility]. Please see a copy of my visit note below.                            [facility]  Electrodiagnostic Laboratory                 Department of Neurology                                                                                                         Test Date:  2022    Patient: Kylie Austin : 1965 Physician: Pierce Kirkpatrick MD   Sex: Female AGE: 56 year Ref Phys: Darion Cole DO   ID#: [id number]   Technician: FUNMILAYO Linton     Clinical Information:    56 year old woman with recent development of weakness of her left hand with paresthesias at digits 3-5. Symptoms have been present for at least 2 months. Examination shows weakness of left FDI (4/5), and finger extensors (4-/5). She also has a prominent essential tremor in the left hand. Query left ulnar neuropathy, posterior interosseous neuropathy, vs brachial plexopathy or cervical radiculopathy.     Techniques:    Motor and sensory conduction studies were done with surface recording electrodes. EMG was done with a concentric needle electrode.     Results:    Left median (APB) and ulnar (ADM) motor NCSs were normal. Left median, ulnar, bilateral radial, and bilateral medial antebrachial cutaneous antidromic sensory NCSs were normal. Studies done bilaterally did not show a significant side-to-side difference. The left median and ulnar orthodromic palmar mixed NCSs showed a prolonged peak latency difference (0.72 ms, normal is <0.4 ms).  Needle EMG of the left FDI, EIP, EDC, FPL and deltoid showed fibrillations/positive waves (deltoid: 1+, EDC and FPL: 2+, EIP and FDI: 3+). All other muscles tested showed no abnormal  spontaneous activity. There were several polyphasic MUPs at the EIP, EDC, and FPL. The left EDC, triceps, and pronator teres also showed some large, long duration MUPs. All other muscles sampled showed normal MUP morphology. Recruitment was generally difficult to analyze due to constant tremor upon muscle activation. The left brachioradialis, deltoid, and biceps showed proper, non-tremulous activation of MUPs, and recruitment was deemed normal in those muscles.     Interpretation:    Abnormal study. There is electrodiagnostic evidence of 1) Active, subacute left C8/T1 radiculopathy. There is also evidence of 2) chronic, inactive left C7 radiculopathy and 3) mild denervation changes at the left deltoid which may suggest coexistent C5-6 radiculopathy although other muscles innervated by the same roots were normal. 4) A very mild left median neuropathy at the wrist, as seen in carpal tunnel syndrome. Findings of the study are not consistent with isolated left ulnar or posterior interosseous neuropathies or brachial plexopathy. Clinical and imaging correlation is recommended.    ___________________________  Pierce Kirkpatrick MD        Nerve Conduction Studies  Motor Sites      Latency Amplitude Neg. Amp Diff Segment Distance Velocity Neg. Dur Neg Area Diff Temperature Comment   Site (ms) Norm (mV) Norm %  cm m/s Norm ms %  C    Left Median (APB) Motor   Wrist 4.0  < 4.4 6.8  > 5.0  Wrist-APB 8   5.2  32.5    Elbow 8.1 - 6.0 - -11.8 Elbow-Wrist 22 54  > 48 5.5 -5.2 32.5    Left Ulnar (ADM) Motor   Wrist 3.5  < 3.5 7.4  > 5.0  Wrist-ADM 8   5.1  32.5    Bel Elbow 7.3 - 6.9 - -6.8 Bel Elbow-Wrist 20 53  > 48 4.9 -7.6 32.4    Abv Elbow 9.0 - 6.6 - -4.3 Abv Elbow-Bel Elbow 10 59  > 48 5.4 0 32.4      Sensory Sites      Onset Lat Peak Lat Amp (O-P) Amp (P-P) Segment Distance Velocity Temperature Comment   Site ms ms  V Norm  V  cm m/s Norm  C    Left Medial Antebrachial Cutaneous Sensory   Elbow-Med Forearm 1.95 2.6 8 -  7 Elbow-Med Forearm 10 51 - 32.6    Right Medial Antebrachial Cutaneous Sensory   Elbow-Med Forearm 2.4 3.0 4 - 7 Elbow-Med Forearm 12 50 - 32.5    Left Median Sensory   Wrist-Dig II 2.8 3.6 28  > 10 37 Wrist-Dig II 14 50  > 48 32.5    Left Median (Ortho) Sensory   Palm-Wrist 1.88 2.6 7 - 14 Palm-Wrist 8 43 - 32.5    Left Median-Ulnar Palmar Sensory        Median   Palm-Wrist 1.88 2.6 7 - 14 Palm-Wrist 8 43 - 32.5         Ulnar   Palm-Wrist 1.28 1.88 7 - 8 Palm-Wrist 8 63 - 32.5    Left Radial Sensory   Forearm-Wrist 1.68 2.3 40  > 15 53 Forearm-Wrist 10 60 - 32.5    Right Radial Sensory   Forearm-Wrist 1.63 2.2 41  > 15 48 Forearm-Wrist 10 61 - 23.6    Left Ulnar Sensory   Wrist-Dig V 2.4 3.4 32  > 8 43 Wrist-Dig V 12.5 52  > 48 32.5    Left Ulnar (Ortho) Sensory   Palm-Wrist 1.28 1.88 7 - 8 Palm-Wrist 8 63 - 32.5      Inter-Nerve Comparisons     Nerve 1 Value 1 Nerve 2 Value 2 Parameter Result Normal   Sensory Sites   L Median Palm-Wrist 2.6 ms L Ulnar Palm-Wrist 1.9 ms Peak Lat Diff 0.72 ms <0.40       Electromyography     Side Muscle Ins Act Fibs/PSW Fasc HF Amp Dur Poly Recrt Int Pat   Left EIP Nml 3+ Nml 0 Nml Nml 2+ Tremor Nml   Left EDC Nml 2+ Nml 0 1+ 1+ 1+ Tremor Nml   Left Brachiorad Nml None Nml 0 Nml Nml 0 Nml Nml   Left Triceps Nml None Nml 0 1+ 1+ 0 Tremor Nml   Left Deltoid Nml 1+ Nml 0 Nml Nml 0 Nml Nml   Left Biceps Nml None Nml 0 Nml Nml 0 Nml Nml   Left Pronator Teres Nml None Nml 0 1+ 1+ 0 Tremor Nml   Left FDI Nml 3+ Nml 0 Nml Nml 0 Tremor Nml   Left FPL Nml 2+ Nml 0 Nml Nml 2+ Tremor Nml         NCS Waveforms:    Motor         Sensory                               Ultrasound Images:              Sincerely,    Pierce Kirkpatrick MD

## 2022-06-25 ENCOUNTER — TELEPHONE (OUTPATIENT)
Dept: ORTHOPEDICS | Facility: CLINIC | Age: 57
End: 2022-06-25

## 2022-06-25 DIAGNOSIS — M54.12 CERVICAL RADICULOPATHY: ICD-10-CM

## 2022-06-25 DIAGNOSIS — R29.898 LEFT ARM WEAKNESS: Primary | ICD-10-CM

## 2022-06-25 NOTE — TELEPHONE ENCOUNTER
[facility]   Electrodiagnostic Laboratory   Department of Neurology     Test Date:  2022   Patient: Kylie Austin   : 1965   Sex: Female   AGE: 56 year   ID#: [id number]   Ref Phys: Darion Cole DO   Physician: Pierce Kirkpatrick MD   Technician: FUNMILAYO Linton     Clinical Information:     56 year old woman with recent development of weakness of her left hand with paresthesias at digits 3-5. Symptoms have been present for at least 2 months. Examination shows weakness of left FDI (4/5), and finger extensors (4-/5). She also has a prominent essential tremor in the left hand. Query left ulnar neuropathy, posterior interosseous neuropathy, vs brachial plexopathy or cervical radiculopathy.     Techniques:     Motor and sensory conduction studies were done with surface recording electrodes. EMG was done with a concentric needle electrode.     Results:     Left median (APB) and ulnar (ADM) motor NCSs were normal. Left median, ulnar, bilateral radial, and bilateral medial antebrachial cutaneous antidromic sensory NCSs were normal. Studies done bilaterally did not show a significant side-to-side difference. The left median and ulnar orthodromic palmar mixed NCSs showed a prolonged peak latency difference (0.72 ms, normal is <0.4 ms).   Needle EMG of the left FDI, EIP, EDC, FPL and deltoid showed fibrillations/positive waves (deltoid: 1+, EDC and FPL: 2+, EIP and FDI: 3+). All other muscles tested showed no abnormal spontaneous activity. There were several polyphasic MUPs at the EIP, EDC, and FPL. The left EDC, triceps, and pronator teres also showed some large, long duration MUPs. All other muscles sampled showed normal MUP morphology. Recruitment was generally difficult to analyze due to constant tremor upon muscle activation. The left brachioradialis, deltoid, and biceps showed proper, non-tremulous activation of MUPs, and recruitment was deemed normal in those muscles.      Interpretation:     Abnormal study. There is electrodiagnostic evidence of 1) Active, subacute left C8/T1 radiculopathy. There is also evidence of 2) chronic, inactive left C7 radiculopathy and 3) mild denervation changes at the left deltoid which may suggest coexistent C5-6 radiculopathy although other muscles innervated by the same roots were normal. 4) A very mild left median neuropathy at the wrist, as seen in carpal tunnel syndrome. Findings of the study are not consistent with isolated left ulnar or posterior interosseous neuropathies or brachial plexopathy. Clinical and imaging correlation is recommended.     ___________________________   Pierce Kirkpatrick MD

## 2022-06-27 NOTE — TELEPHONE ENCOUNTER
Findings of test most consistent with cervical radiculopathy, or pinched nerves in cervical spine. Also mild median neuropathy (concurrent carpal tunnel syndrome).  Current status?  Depending on current symptoms/status, considerations include physical therapy, referral for cervical KAYLYN, and referral to spine surgeon.  If still having UE weakness, would suggest referral to spine surgeon next.  I would be happy to have a visit with the patient (in person, by video, or by phone) to discuss further if that would be helpful.  Thanks.  Darion Cole DO, CAQ

## 2022-06-27 NOTE — TELEPHONE ENCOUNTER
Spoke with patient. She is understanding of the EMG results and wishes to consult with spine surgeon next due to ongoing left arm weakness. Order for spine surgery consultation placed. No further questions. Vanita Orellana, ATC

## 2022-07-08 DIAGNOSIS — M54.2 CERVICAL PAIN: Primary | ICD-10-CM

## 2022-07-11 NOTE — TELEPHONE ENCOUNTER
Action July 11, 2022 10:20 AM MT   Action Taken [facility] sent a req for imgs from [facility] [phone].      DIAGNOSIS: L arm weakness/Cervical radiculopathy/Dr. Florence/EMG/MRI/ortho con   APPOINTMENT DATE: 07/12/2022   NOTES STATUS DETAILS   OFFICE NOTE from referring provider Internal 04/29/2022 - Darion Barraza DO - [facility]     OFFICE NOTE from other specialist Internal 06/23/2022 - Pierce Kirkpatrick MD - [facility] Neuro    04/13/2022 - Elijah Pan - [facility]     Physical Therapy:  06/11/2020 to 03/11/2020 04/06/2020 - Omari Tobin MD - [facility] Ortho    12/23/2019 - Conner Kaminski MD - [facility] Neuro    More in Epic..     OPERATIVE REPORT Internal 01/22/2020 - Left total shoulder arthroplasty / Left shoulder open distal clavicle excision.    MEDICATION LIST Care Everywhere/Internal    EMG (for Spine) Internal Most Recent: 06/23/2022   IMPLANT RECORD/STICKER Internal    LABS     CBC/DIFF Internal 07/24/2020   INJECTIONS DONE IN RADIOLOGY Internal 01/22/2020, 08/05/2019, 12/07/2018   MRI Internal   04/20/2022, 11/29/2018, 04/16/2015 - C Spine  11/27/2019, 11/13/2017 - LT Shoulder   XRAYS (IMAGES & REPORTS) Internal 01/22/2020 to 06/09/2016- LT Shoulder   09/28/2015 - Cervical Radiology Ablation  06/29/2015, 04/16/2015 - Medial Branch Block C Spine  04/13/2015 - C Spine  07/19/2014 - Bilateral Shoulder

## 2022-07-12 ENCOUNTER — ANCILLARY PROCEDURE (OUTPATIENT)
Dept: GENERAL RADIOLOGY | Facility: CLINIC | Age: 57
End: 2022-07-12
Attending: ORTHOPAEDIC SURGERY
Payer: COMMERCIAL

## 2022-07-12 ENCOUNTER — OFFICE VISIT (OUTPATIENT)
Dept: ORTHOPEDICS | Facility: CLINIC | Age: 57
End: 2022-07-12
Payer: COMMERCIAL

## 2022-07-12 ENCOUNTER — PRE VISIT (OUTPATIENT)
Dept: ORTHOPEDICS | Facility: CLINIC | Age: 57
End: 2022-07-12

## 2022-07-12 VITALS — BODY MASS INDEX: 35.46 KG/M2 | HEIGHT: 68 IN | WEIGHT: 234 LBS

## 2022-07-12 DIAGNOSIS — R29.898 LEFT ARM WEAKNESS: Primary | ICD-10-CM

## 2022-07-12 DIAGNOSIS — M54.12 CERVICAL RADICULOPATHY: ICD-10-CM

## 2022-07-12 DIAGNOSIS — M48.02 CERVICAL STENOSIS OF SPINE: ICD-10-CM

## 2022-07-12 PROCEDURE — 72050 X-RAY EXAM NECK SPINE 4/5VWS: CPT | Mod: GC | Performed by: RADIOLOGY

## 2022-07-12 PROCEDURE — 99214 OFFICE O/P EST MOD 30 MIN: CPT | Performed by: PHYSICIAN ASSISTANT

## 2022-07-12 NOTE — LETTER
7/12/2022         RE: Kylie Austin  [address] [facility] [address]        Dear Colleague,    Thank you for referring your patient, Kylie Austin, to the [facility] [place]. Please see a copy of my visit note below.    Kylie Austin complains of    Chief Complaint   Patient presents with     Consult     Left arm weakness, cervical radiculopathy. Has been having having weakness in her left arm and is constet has been going on for several month, has not tried an injection or PT, no previous srugeries on her spine.        I have reviewed and updated the patient's Past Medical History, Social History, Family History and Medication List.    ALLERGIES  Amiodarone, Fluoxetine, and Tetracycline    Spine Surgery Consultation    REFERRING PHYSICIAN: Darion Cole   PRIMARY CARE PHYSICIAN: Kristin Miner           Chief Complaint:   Consult (Left arm weakness, cervical radiculopathy. Has been having having weakness in her left arm and is constet has been going on for several month, has not tried an injection or PT, no previous srugeries on her spine. )      History of Present Illness:  Symptom Profile Including: location of symptoms, onset, severity, exacerbating/alleviating factors, previous treatments:        Kylie Austin is a 57 year old female (ambidextrous)  who presents today for evaluation of New onset left arm pain, weakness, decreased hand dexterity.  Patient reports that she has dealt with neck pain for multiple years, but this has been manageable.  However, about 2 to 2-1/2 months ago she started having worsening left-sided neck pain with radiation into the arm.  She did not have any injury or trauma prior to this.  She says that she just woke up one morning with it being bad.  Pain and weakness have slowly increased during that time.  Specifically, she notes decreased  strength bicep strength.  Denies  issues with worsening balance or instability.  Denies any issues with her right arm.  Pain is worse when she turns her head towards the left.  She also will feel an electric shocklike pain down her back with flexing and extending her neck.  She is concerned about the loss of dexterity and strength over the past 2 months.    Past treatments tried:  - Physical therapy: none  - Injections: none  - Medications: mobic - helps with neck pain only      Social:  Denies tobacco product use  Does not currently work  Does not use assistive devices to ambulate    MATHIEU Scores:  Oswestry (MATHIEU) Questionnaire    OSWESTRY DISABILITY INDEX 7/9/2022   Count 10   Sum 15   Oswestry Score (%) 30   Some recent data might be hidden            Neck Disability Index (NDI) Questionnaire    Neck Disability Index (NDI) 7/9/2022   Neck Disability Index: Count 9   NDI: Total Score = SUM (points for all 10 findings) 15   Neck Disability in Percent = (Total Score) / 50 * 100 33.33 (%)   Some recent data might be hidden            PROMIS-10 Scores:  Global Mental Health Score: (P) 13  Global Physical Health Score: (P) 12  PROMIS TOTAL - SUBSCORES: (P) 25         Physical Exam:   PHYSICAL EXAM:   Constitutional - Patient is healthy, well-nourished and appears stated age   Respiratory - Patient is breathing normally and in no respiratory distress.   Skin - No suspicious rashes or lesions.   Psychiatric - Normal mood and affect.   Cardiovascular - Extremities warm and well perfused.   Eyes - Visual acuity is normal to the written word.   ENT - Hearing intact to the spoken word.   GI - No abdominal distention.   Musculoskeletal - Non-antalgic gait without use of assistive devices.        Cervical spine:    Appearance - Normal     Palpation - tender to palpation midline and paraspinal muscle    ROM -mildly limited, painful flexion, extension, lateral rotation    Motor -     UPPER EXTREMITY Left Right   Shoulder abduction (C5) 5/5 5/5   Elbow flexion (C6)  4/5 5/5   Wrist extension (C6)  4/5 5/5   Wrist flexion (C7) 4+/5 5/5   Elbow extension (C7) 4+/5 5/5    strength (C8) 3/5 5/5   Finger ab/adduction (T1) 5/5 5/5         Special Tests -      Lhermitte's Test - positive     Spurling's Test - positive, left     Graves's Test - negative     Rhomberg's Test: negative          Thoracic Spine:    Appearance - Normal     Palpation - Nontender to palpation    Strength/ROM - deferred            Neurologic - decreased sensation to light touch left UE.  Able to complete tandem gait without instability.  No instability with Romberg's test.      REFLEXES Left Right   Biceps 2+ 2+   Triceps 2+ 2+   Brachioradialis 2+ 2+   Patella 2+ 2+   Ankle jerk 1+ 1+   Babinski  Clonus  hoffmans downgoing  0 beats  negative downgoing  0 beats  negative              Imaging:     I independently reviewed & provided my own interpretation of new radiographs and/ or advanced imaging at this clinic visit. The results were discussed with the patient.  Findings include:    7/12/2022 XR cervical spine AP/lateral and flexion/extension views: Multilevel degenerative changes throughout cervical spine.  Mild C5-6 anterolisthesis with minimal motion between flexion/extension.  No evidence of fracture.    4/20/2022 MRI cervical spine without contrast: Multilevel degenerative changes throughout cervical spine.  No evidence of myelomalacia.  C5-6 moderate spinal canal and left foraminal stenosis.  C6-7 moderate spinal canal and left foraminal stenosis.  C7-T1 moderate-severe canal stenosis and left foraminal stenosis.    6/23/2022 EMG, Nuerologist's Interpretation:  Abnormal study. There is electrodiagnostic evidence of   1) Active, subacute left C8/T1 radiculopathy.  2) chronic, inactive left C7 radiculopathy   3) mild denervation changes at the left deltoid which may suggest coexistent C5-6 radiculopathy although other muscles innervated by the same roots were normal.   4) A very mild left median  neuropathy at the wrist, as seen in carpal tunnel syndrome.   Findings of the study are not consistent with isolated left ulnar or posterior interosseous neuropathies or brachial plexopathy. Clinical and imaging correlation is recommended.             Assessment and Plan:   Assessment:  57 year old female (ambidextrous, R=L handed) with multilevel cervical stenosis with left foraminal stenosis C5-T1 resulting in 2 months of worsening left arm weakness & loss of dexterity.     Plan:  Reviewed patient's XR and MR imaging studies with her to help explain what is going on.  She does have multilevel degenerative changes throughout cervical spine which appear to be causing compression of exiting C6, 7, 8 nerve roots .  It is concerning that she has had such rapid progression of left arm weakness which is notable on exam today.  In addition, she is losing dexterity in the left hand.  Fortunately, she does not have evidence of myelomalacia or other myelopathic symptoms including hyperreflexia or worsening balance.  However, with the marked weakness, I would recommend she see Dr. Ruiz to discuss what surgical intervention options are available.  Physical therapy, injections and other non-operative treatment options are contraindicated due to the new and worsening left upper extremity weakness she is experiencing.     - Ordered CT cervical w/o contrast for surgical planning .   - Follow up with Dr. Ruiz to discuss surgical options. Need PAC evaluation prior to surgery. .    Respectfully,  Nancy Marte (cherie Acosta), JES  Orthopaedic Spine Surgery  Dept Orthopaedic Surgery, [facility]  [facility] Phone: [phone]      50 minutes spent on the date of the encounter doing chart review, review of outside records, review of test results, my own interpretation of tests, documentation, patient history, physical exam & discussion of plan with patient and family.    Dictation Disclaimer: Some of this Note has been completed  with voice-recognition dictation software. Although errors are generally corrected real-time, there is the potential for a rare error to be present in the completed chart.

## 2022-07-12 NOTE — PROGRESS NOTES
Kylie Austin complains of    Chief Complaint   Patient presents with     Consult     Left arm weakness, cervical radiculopathy. Has been having having weakness in her left arm and is constet has been going on for several month, has not tried an injection or PT, no previous srugeries on her spine.        I have reviewed and updated the patient's Past Medical History, Social History, Family History and Medication List.    ALLERGIES  Amiodarone, Fluoxetine, and Tetracycline    Spine Surgery Consultation    REFERRING PHYSICIAN: Darion Cole   PRIMARY CARE PHYSICIAN: Kristin Miner           Chief Complaint:   Consult (Left arm weakness, cervical radiculopathy. Has been having having weakness in her left arm and is consteloy has been going on for several month, has not tried an injection or PT, no previous srugeries on her spine. )      History of Present Illness:  Symptom Profile Including: location of symptoms, onset, severity, exacerbating/alleviating factors, previous treatments:        Kylie Austin is a 57 year old female (ambidextrous)  who presents today for evaluation of New onset left arm pain, weakness, decreased hand dexterity.  Patient reports that she has dealt with neck pain for multiple years, but this has been manageable.  However, about 2 to 2-1/2 months ago she started having worsening left-sided neck pain with radiation into the arm.  She did not have any injury or trauma prior to this.  She says that she just woke up one morning with it being bad.  Pain and weakness have slowly increased during that time.  Specifically, she notes decreased  strength bicep strength.  Denies issues with worsening balance or instability.  Denies any issues with her right arm.  Pain is worse when she turns her head towards the left.  She also will feel an electric shocklike pain down her back with flexing and extending her neck.  She is concerned about the loss of dexterity and  strength over the past 2 months.    Past treatments tried:  - Physical therapy: none  - Injections: none  - Medications: mobic - helps with neck pain only      Social:  Denies tobacco product use  Does not currently work  Does not use assistive devices to ambulate    MATHIEU Scores:  Oswestry (MATHIEU) Questionnaire    OSWESTRY DISABILITY INDEX 7/9/2022   Count 10   Sum 15   Oswestry Score (%) 30   Some recent data might be hidden            Neck Disability Index (NDI) Questionnaire    Neck Disability Index (NDI) 7/9/2022   Neck Disability Index: Count 9   NDI: Total Score = SUM (points for all 10 findings) 15   Neck Disability in Percent = (Total Score) / 50 * 100 33.33 (%)   Some recent data might be hidden            PROMIS-10 Scores:  Global Mental Health Score: (P) 13  Global Physical Health Score: (P) 12  PROMIS TOTAL - SUBSCORES: (P) 25         Physical Exam:   PHYSICAL EXAM:   Constitutional - Patient is healthy, well-nourished and appears stated age   Respiratory - Patient is breathing normally and in no respiratory distress.   Skin - No suspicious rashes or lesions.   Psychiatric - Normal mood and affect.   Cardiovascular - Extremities warm and well perfused.   Eyes - Visual acuity is normal to the written word.   ENT - Hearing intact to the spoken word.   GI - No abdominal distention.   Musculoskeletal - Non-antalgic gait without use of assistive devices.        Cervical spine:    Appearance - Normal     Palpation - tender to palpation midline and paraspinal muscle    ROM -mildly limited, painful flexion, extension, lateral rotation    Motor -     UPPER EXTREMITY Left Right   Shoulder abduction (C5) 5/5 5/5   Elbow flexion (C6) 4/5 5/5   Wrist extension (C6)  4/5 5/5   Wrist flexion (C7) 4+/5 5/5   Elbow extension (C7) 4+/5 5/5    strength (C8) 3/5 5/5   Finger ab/adduction (T1) 5/5 5/5         Special Tests -      Lhermitte's Test - positive     Spurling's Test - positive, left     Graves's Test -  negative     Rhomberg's Test: negative          Thoracic Spine:    Appearance - Normal     Palpation - Nontender to palpation    Strength/ROM - deferred            Neurologic - decreased sensation to light touch left UE.  Able to complete tandem gait without instability.  No instability with Romberg's test.      REFLEXES Left Right   Biceps 2+ 2+   Triceps 2+ 2+   Brachioradialis 2+ 2+   Patella 2+ 2+   Ankle jerk 1+ 1+   Babinski  Clonus  hoffmans downgoing  0 beats  negative downgoing  0 beats  negative              Imaging:     I independently reviewed & provided my own interpretation of new radiographs and/ or advanced imaging at this clinic visit. The results were discussed with the patient.  Findings include:    7/12/2022 XR cervical spine AP/lateral and flexion/extension views: Multilevel degenerative changes throughout cervical spine.  Mild C5-6 anterolisthesis with minimal motion between flexion/extension.  No evidence of fracture.    4/20/2022 MRI cervical spine without contrast: Multilevel degenerative changes throughout cervical spine.  No evidence of myelomalacia.  C5-6 moderate spinal canal and left foraminal stenosis.  C6-7 moderate spinal canal and left foraminal stenosis.  C7-T1 moderate-severe canal stenosis and left foraminal stenosis.    6/23/2022 EMG, Nuerologist's Interpretation:  Abnormal study. There is electrodiagnostic evidence of   1) Active, subacute left C8/T1 radiculopathy.  2) chronic, inactive left C7 radiculopathy   3) mild denervation changes at the left deltoid which may suggest coexistent C5-6 radiculopathy although other muscles innervated by the same roots were normal.   4) A very mild left median neuropathy at the wrist, as seen in carpal tunnel syndrome.   Findings of the study are not consistent with isolated left ulnar or posterior interosseous neuropathies or brachial plexopathy. Clinical and imaging correlation is recommended.             Assessment and Plan:    Assessment:  57 year old female (ambidextrous, R=L handed) with multilevel cervical stenosis with left foraminal stenosis C5-T1 resulting in 2 months of worsening left arm weakness & loss of dexterity.     Plan:  Reviewed patient's XR and MR imaging studies with her to help explain what is going on.  She does have multilevel degenerative changes throughout cervical spine which appear to be causing compression of exiting C6, 7, 8 nerve roots .  It is concerning that she has had such rapid progression of left arm weakness which is notable on exam today.  In addition, she is losing dexterity in the left hand.  Fortunately, she does not have evidence of myelomalacia or other myelopathic symptoms including hyperreflexia or worsening balance.  However, with the marked weakness, I would recommend she see Dr. Ruiz to discuss what surgical intervention options are available.  Physical therapy, injections and other non-operative treatment options are contraindicated due to the new and worsening left upper extremity weakness she is experiencing.     - Ordered CT cervical w/o contrast for surgical planning .   - Follow up with Dr. Ruiz to discuss surgical options. Need PAC evaluation prior to surgery. .    Respectfully,  Nancy Marte (cherie Acosta), JES  Orthopaedic Spine Surgery  Dept Orthopaedic Surgery, [facility]  [facility] [place] Phone: [phone]      50 minutes spent on the date of the encounter doing chart review, review of outside records, review of test results, my own interpretation of tests, documentation, patient history, physical exam & discussion of plan with patient and family.    Dictation Disclaimer: Some of this Note has been completed with voice-recognition dictation software. Although errors are generally corrected real-time, there is the potential for a rare error to be present in the completed chart.

## 2022-07-12 NOTE — NURSING NOTE
"Reason For Visit:   Chief Complaint   Patient presents with     Consult     Left arm weakness, cervical radiculopathy. Has been having having weakness in her left arm and is constet has been going on for several month, has not tried an injection or PT, no previous srugeries on her spine.        Primary MD: Kristin Miner  Ref. MD: Douglas    ?  No  Occupation unemployed .  Currently working? No.  Work status?  On disability.  Date of injury: a few months ago   Type of injury: chronic .  Date of surgery: none   Type of surgery: none .  Smoker: No  Request smoking cessation information: No    Ht 1.721 m (5' 7.75\")   Wt 106.1 kg (234 lb)   LMP  (LMP Unknown)   BMI 35.84 kg/m           Oswestry (MATHIEU) Questionnaire    OSWESTRY DISABILITY INDEX 7/9/2022   Count 10   Sum 15   Oswestry Score (%) 30   Some recent data might be hidden            Neck Disability Index (NDI) Questionnaire    Neck Disability Index (NDI) 7/9/2022   Neck Disability Index: Count 9   NDI: Total Score = SUM (points for all 10 findings) 15   Neck Disability in Percent = (Total Score) / 50 * 100 33.33 (%)   Some recent data might be hidden                   Promis 10 Assessment    PROMIS 10 7/9/2022   In general, would you say your health is: Good   In general, would you say your quality of life is: Good   In general, how would you rate your physical health? Good   In general, how would you rate your mental health, including your mood and your ability to think? Good   In general, how would you rate your satisfaction with your social activities and relationships? Fair   In general, please rate how well you carry out your usual social activities and roles Fair   To what extent are you able to carry out your everyday physical activities such as walking, climbing stairs, carrying groceries, or moving a chair? Moderately   How often have you been bothered by emotional problems such as feeling anxious, depressed or irritable? Never "   How would you rate your fatigue on average? Moderate   How would you rate your pain on average?   0 = No Pain  to  10 = Worst Imaginable Pain 5   In general, would you say your health is: 3   In general, would you say your quality of life is: 3   In general, how would you rate your physical health? 3   In general, how would you rate your mental health, including your mood and your ability to think? 3   In general, how would you rate your satisfaction with your social activities and relationships? 2   In general, please rate how well you carry out your usual social activities and roles. (This includes activities at home, at work and in your community, and responsibilities as a parent, child, spouse, employee, friend, etc.) 2   To what extent are you able to carry out your everyday physical activities such as walking, climbing stairs, carrying groceries, or moving a chair? 3   In the past 7 days, how often have you been bothered by emotional problems such as feeling anxious, depressed, or irritable? 1   In the past 7 days, how would you rate your fatigue on average? 3   In the past 7 days, how would you rate your pain on average, where 0 means no pain, and 10 means worst imaginable pain? 5   Global Mental Health Score 13   Global Physical Health Score 12   PROMIS TOTAL - SUBSCORES 25   Some recent data might be hidden                Raul Huang ATC

## 2022-07-14 ENCOUNTER — TRANSFERRED RECORDS (OUTPATIENT)
Dept: FAMILY MEDICINE | Facility: CLINIC | Age: 57
End: 2022-07-14

## 2022-07-15 ENCOUNTER — OFFICE VISIT (OUTPATIENT)
Dept: NEUROSURGERY | Facility: CLINIC | Age: 57
End: 2022-07-15
Payer: COMMERCIAL

## 2022-07-15 VITALS
BODY MASS INDEX: 32.31 KG/M2 | WEIGHT: 213.2 LBS | HEIGHT: 68 IN | SYSTOLIC BLOOD PRESSURE: 103 MMHG | HEART RATE: 76 BPM | DIASTOLIC BLOOD PRESSURE: 72 MMHG

## 2022-07-15 DIAGNOSIS — M47.12 CERVICAL SPONDYLOSIS WITH MYELOPATHY: Primary | ICD-10-CM

## 2022-07-15 PROCEDURE — 99214 OFFICE O/P EST MOD 30 MIN: CPT | Performed by: ORTHOPAEDIC SURGERY

## 2022-07-15 ASSESSMENT — PAIN SCALES - GENERAL: PAINLEVEL: MILD PAIN (3)

## 2022-07-15 NOTE — PROGRESS NOTES
Spine Surgery Consultation    REFERRING PHYSICIAN: No ref. provider found   PRIMARY CARE PHYSICIAN: Kristin Miner           Chief Complaint:   Consult      History of Present Illness:  Symptom Profile Including: location of symptoms, onset, severity, exacerbating/alleviating factors, previous treatments:        Kylie Austin is a 57 year old female who is referred for evaluation.  Previously saw my physician assistant earlier this week.  She is been having 2 to 3 months of worsening left arm radicular pain numbness and some mild weakness.  It came on suddenly while she was drinking coffee.  No antecedent events.  Is worse with activity somewhat improved with rest.  It is a burning aching and tingling sensation mostly in the C7 and C8 distribution         Past Medical History:     Past Medical History:   Diagnosis Date     Anxiety 2005    Brought on by Amioderone     Arthritis 8/2005     Atrial fib/flutter, transient     S/p cardioversion 2004     Bipolar 2 disorder (H)      Bleeding disorder (H) 1987    nose bleeds     Chronic pain     LUE pain     Congestive heart failure (H) 11/18/2004    cured 2005     Depressive disorder     on and off most of my life!     Fibromyalgia      Gender identity disorder Started Rx 2012     H/O hypogonadism Gonadectomy 2013     Hyperlipidemia      Hypertension      Other mental problems     bipolar     Psoriasis      Uncomplicated asthma 1964    cured in 4/2001     Vision disorder 6/2005            Past Surgical History:     Past Surgical History:   Procedure Laterality Date     ARTHROPLASTY SHOULDER Left 1/22/2020    Procedure: Left Total shoulder arthroplasty, distal clavicle excision;  Surgeon: Omari Tobin MD;  Location: [facility]     BIOPSY  2005, 8/8/13    Stomach, colon     COLONOSCOPY  8/8/2013    Procedure: COLONOSCOPY;  COLONSCOPY SCREEN/ SE;  Surgeon: Santino Sun MD;  Location: [facility]     COLONOSCOPY N/A 1/4/2022    Procedure:  COLONOSCOPY, WITH POLYPECTOMY AND BIOPSY;  Surgeon: Dallas Velazco MD;  Location: [facility]     COLONOSCOPY WITH CO2 INSUFFLATION N/A 11/1/2018    Procedure: COLONOSCOPY WITH CO2 INSUFFLATION;  Surgeon: Santino Sun MD;  Location: [facility]     COLONOSCOPY WITH CO2 INSUFFLATION N/A 12/15/2020    Procedure: COLONOSCOPY, WITH CO2 INSUFFLATION;  Surgeon: Nima Kamara MD;  Location: [facility] OR     Gender Reassignment  05/2016     HEAD & NECK SURGERY  2015    ablation of nerve from neck to left arm     LAPAROSCOPIC GASTRIC SLEEVE N/A 4/10/2018    Procedure: LAPAROSCOPIC GASTRIC SLEEVE;  Laparoscopic Sleeve Gastrectomy;  Surgeon: Jani Shah MD;  Location: [facility] OR     MANDIBLE SURGERY  1986     ORCHIECTOMY INGUINAL BILATERAL  7/16/2013    Procedure: ORCHIECTOMY INGUINAL BILATERAL;  Bilateral Simple Inguinal Orchiectomy ;  Surgeon: Jan Garrett MD;  Location: [facility]     TONSILLECTOMY              Social History:     Social History     Tobacco Use     Smoking status: Never Smoker     Smokeless tobacco: Never Used     Tobacco comment: NEVER SMOKED! Grew up with heavy smokers which did not help my Asthma!   Substance Use Topics     Alcohol use: Not Currently     Comment: occasional//2 per month            Family History:     Family History   Problem Relation Age of Onset     Breast Cancer Mother      Cancer Father         skin     Diabetes Father         Was on the patch when they were new     Heart Disease Father 55        scd     Coronary Artery Disease Father      Hypertension Father      Hyperlipidemia Father      Diabetes Sister      Diabetes Sister      Thyroid Disease Sister      Osteoporosis Maternal Grandmother      Alzheimer Disease Paternal Grandmother      Diabetes Paternal Grandmother      Mental Illness Paternal Grandmother         alshimers     Osteoporosis Paternal Grandmother      Coronary Artery Disease Paternal Grandfather      Hypertension Paternal Grandfather       "Hyperlipidemia Paternal Grandfather      Prostate Cancer Paternal Grandfather      Other Cancer Paternal Grandfather      Depression Daughter      Unknown/Adopted Daughter      Depression Daughter      Mental Illness Daughter         bipolar     Anxiety Disorder Daughter      Anxiety Disorder Daughter      Depression Son      Heart Disease Other      Anesthesia Reaction No family hx of      Deep Vein Thrombosis (DVT) No family hx of             Allergies:     Allergies   Allergen Reactions     Amiodarone      Caused pain fatigue/amplified anxiety and depression     Fluoxetine Other (See Comments)     Night terrors     Tetracycline      Teeth rattle/saliva acidic            Medications:     Current Outpatient Medications   Medication     ARIPiprazole (ABILIFY) 20 MG tablet     diclofenac (VOLTAREN) 1 % topical gel     furosemide (LASIX) 20 MG tablet     gabapentin (NEURONTIN) 300 MG capsule     medical cannabis (Patient's own supply.  Not a prescription)     medical cannabis inhalation (Patient's own supply.  Not a prescription)     meloxicam (MOBIC) 7.5 MG tablet     order for DME     simvastatin (ZOCOR) 40 MG tablet     venlafaxine (EFFEXOR-XR) 75 MG 24 hr capsule     No current facility-administered medications for this visit.             Review of Systems:     A 10 point ROS was performed and reviewed. Specific responses to these questions are noted at the end of the document.         Physical Exam:   Vitals: /72   Pulse 76   Ht 1.727 m (5' 8\")   Wt 96.7 kg (213 lb 3.2 oz)   LMP  (LMP Unknown)   BMI 32.42 kg/m    Constitutional: awake, alert, cooperative, no apparent distress, appears stated age.    Eyes: The sclera are white.  Ears, Nose, Throat: The trachea is midline.  Psychiatric: The patient has a normal affect.  Respiratory: breathing non-labored  Cardiovascular: The extremities are warm and perfused.  Skin: no obvious rashes or lesions.  Musculoskeletal, Neurologic, and Spine:     Cervical " spine:    Appearance -no gross step-offs, kyphosis.    Motor -     C5: Deltoids R 5/5 and L 5/5 strength    C6: Biceps R 5/5 and L 5/5 strength     C7: Triceps R 5/5 and L 4/5 strength     C8:  R 5/5 and L 4/5 strength     T1: Dorsal interossei R 4/5 and L 4/5 strength        Sensation: intact to light touch in C5-T1, diminished left C7and C8      Special Tests -      Lhermitte's Test - Negative     Spurling's Test -  Positive     Graves's Test - Negative           Neurologic:      REFLEXES Left Right   Biceps 1+ 1+   Triceps 1+ 1+   Brachioradialis 1+ 1+   Patella 1+ 1+   Ankle jerk 1+ 1+   Babinski No upgoing great toe No upgoing great toe   Clonus 0 beats 0 beats     Hip Exam:  No pain with hip log roll and no tenderness over the greater trochanters.    Alignment:  Patient stands with a neutral standing sagittal balance.           Imaging:   We ordered and independently reviewed new radiographs at this clinic visit. The results were discussed with the patient.  Findings include:    Radiographs show mild diffuse spondylosis    Cervical MRI shows moderate diffuse spondylosis with foraminal stenosis moderate at C5-6, severe C6-7 and C7-T1             Assessment and Plan:   Assessment:  57 year old female with multilevel foraminal stenosis, mild left arm weakness, numbness and radicular complaints in the AAC C7 and C8 distribution     Plan:  1. Given to get started physical therapy and an injection.  She is already taking gabapentin.  If things do not improve I do think she would be a candidate for a C6-T1 and possible C5-T1 ACDF.  I would like to see how things go with the nonoperative treatments recommended and I did give her some information about ACDF today including discussing the risks and benefits noted below and I will plan to see her back in 4 to 6 weeks after attempted conservative care      Respectfully,  Fernando Ruiz MD  Spine Surgery  [facility]

## 2022-07-15 NOTE — LETTER
7/15/2022         RE: Kylie Austin  [address] [facility] [address]        Dear Colleague,    Thank you for referring your patient, Kylie Austin, to the [facility] [place]. Please see a copy of my visit note below.    Spine Surgery Consultation    REFERRING PHYSICIAN: No ref. provider found   PRIMARY CARE PHYSICIAN: Kristin Miner           Chief Complaint:   Consult      History of Present Illness:  Symptom Profile Including: location of symptoms, onset, severity, exacerbating/alleviating factors, previous treatments:        Kylie Austin is a 57 year old female who is referred for evaluation.  Previously saw my physician assistant earlier this week.  She is been having 2 to 3 months of worsening left arm radicular pain numbness and some mild weakness.  It came on suddenly while she was drinking coffee.  No antecedent events.  Is worse with activity somewhat improved with rest.  It is a burning aching and tingling sensation mostly in the C7 and C8 distribution         Past Medical History:     Past Medical History:   Diagnosis Date     Anxiety 2005    Brought on by Amioderone     Arthritis 8/2005     Atrial fib/flutter, transient     S/p cardioversion 2004     Bipolar 2 disorder (H)      Bleeding disorder (H) 1987    nose bleeds     Chronic pain     LUE pain     Congestive heart failure (H) 11/18/2004    cured 2005     Depressive disorder     on and off most of my life!     Fibromyalgia      Gender identity disorder Started Rx 2012     H/O hypogonadism Gonadectomy 2013     Hyperlipidemia      Hypertension      Other mental problems     bipolar     Psoriasis      Uncomplicated asthma 1964    cured in 4/2001     Vision disorder 6/2005            Past Surgical History:     Past Surgical History:   Procedure Laterality Date     ARTHROPLASTY SHOULDER Left 1/22/2020    Procedure: Left Total shoulder arthroplasty, distal clavicle  excision;  Surgeon: Omari Tobin MD;  Location: [facility] OR     BIOPSY  2005, 8/8/13    Stomach, colon     COLONOSCOPY  8/8/2013    Procedure: COLONOSCOPY;  COLONSCOPY SCREEN/ SE;  Surgeon: Santino Sun MD;  Location: [facility] OR     COLONOSCOPY N/A 1/4/2022    Procedure: COLONOSCOPY, WITH POLYPECTOMY AND BIOPSY;  Surgeon: Dallas Velazco MD;  Location: [facility]     COLONOSCOPY WITH CO2 INSUFFLATION N/A 11/1/2018    Procedure: COLONOSCOPY WITH CO2 INSUFFLATION;  Surgeon: Santino Sun MD;  Location: [facility]     COLONOSCOPY WITH CO2 INSUFFLATION N/A 12/15/2020    Procedure: COLONOSCOPY, WITH CO2 INSUFFLATION;  Surgeon: Nima Kamara MD;  Location: [facility] OR     Gender Reassignment  05/2016     HEAD & NECK SURGERY  2015    ablation of nerve from neck to left arm     LAPAROSCOPIC GASTRIC SLEEVE N/A 4/10/2018    Procedure: LAPAROSCOPIC GASTRIC SLEEVE;  Laparoscopic Sleeve Gastrectomy;  Surgeon: Jani Shah MD;  Location: [facility]     MANDIBLE SURGERY  1986     ORCHIECTOMY INGUINAL BILATERAL  7/16/2013    Procedure: ORCHIECTOMY INGUINAL BILATERAL;  Bilateral Simple Inguinal Orchiectomy ;  Surgeon: Jan Garrett MD;  Location: [facility] OR     TONSILLECTOMY              Social History:     Social History     Tobacco Use     Smoking status: Never Smoker     Smokeless tobacco: Never Used     Tobacco comment: NEVER SMOKED! Grew up with heavy smokers which did not help my Asthma!   Substance Use Topics     Alcohol use: Not Currently     Comment: occasional//2 per month            Family History:     Family History   Problem Relation Age of Onset     Breast Cancer Mother      Cancer Father         skin     Diabetes Father         Was on the patch when they were new     Heart Disease Father 55        scd     Coronary Artery Disease Father      Hypertension Father      Hyperlipidemia Father      Diabetes Sister      Diabetes Sister      Thyroid Disease Sister      Osteoporosis Maternal  "Grandmother      Alzheimer Disease Paternal Grandmother      Diabetes Paternal Grandmother      Mental Illness Paternal Grandmother         alshimers     Osteoporosis Paternal Grandmother      Coronary Artery Disease Paternal Grandfather      Hypertension Paternal Grandfather      Hyperlipidemia Paternal Grandfather      Prostate Cancer Paternal Grandfather      Other Cancer Paternal Grandfather      Depression Daughter      Unknown/Adopted Daughter      Depression Daughter      Mental Illness Daughter         bipolar     Anxiety Disorder Daughter      Anxiety Disorder Daughter      Depression Son      Heart Disease Other      Anesthesia Reaction No family hx of      Deep Vein Thrombosis (DVT) No family hx of             Allergies:     Allergies   Allergen Reactions     Amiodarone      Caused pain fatigue/amplified anxiety and depression     Fluoxetine Other (See Comments)     Night terrors     Tetracycline      Teeth rattle/saliva acidic            Medications:     Current Outpatient Medications   Medication     ARIPiprazole (ABILIFY) 20 MG tablet     diclofenac (VOLTAREN) 1 % topical gel     furosemide (LASIX) 20 MG tablet     gabapentin (NEURONTIN) 300 MG capsule     medical cannabis (Patient's own supply.  Not a prescription)     medical cannabis inhalation (Patient's own supply.  Not a prescription)     meloxicam (MOBIC) 7.5 MG tablet     order for DME     simvastatin (ZOCOR) 40 MG tablet     venlafaxine (EFFEXOR-XR) 75 MG 24 hr capsule     No current facility-administered medications for this visit.             Review of Systems:     A 10 point ROS was performed and reviewed. Specific responses to these questions are noted at the end of the document.         Physical Exam:   Vitals: /72   Pulse 76   Ht 1.727 m (5' 8\")   Wt 96.7 kg (213 lb 3.2 oz)   LMP  (LMP Unknown)   BMI 32.42 kg/m    Constitutional: awake, alert, cooperative, no apparent distress, appears stated age.    Eyes: The sclera are " white.  Ears, Nose, Throat: The trachea is midline.  Psychiatric: The patient has a normal affect.  Respiratory: breathing non-labored  Cardiovascular: The extremities are warm and perfused.  Skin: no obvious rashes or lesions.  Musculoskeletal, Neurologic, and Spine:     Cervical spine:    Appearance -no gross step-offs, kyphosis.    Motor -     C5: Deltoids R 5/5 and L 5/5 strength    C6: Biceps R 5/5 and L 5/5 strength     C7: Triceps R 5/5 and L 4/5 strength     C8:  R 5/5 and L 4/5 strength     T1: Dorsal interossei R 4/5 and L 4/5 strength        Sensation: intact to light touch in C5-T1, diminished left C7and C8      Special Tests -      Lhermitte's Test - Negative     Spurling's Test -  Positive     Graves's Test - Negative           Neurologic:      REFLEXES Left Right   Biceps 1+ 1+   Triceps 1+ 1+   Brachioradialis 1+ 1+   Patella 1+ 1+   Ankle jerk 1+ 1+   Babinski No upgoing great toe No upgoing great toe   Clonus 0 beats 0 beats     Hip Exam:  No pain with hip log roll and no tenderness over the greater trochanters.    Alignment:  Patient stands with a neutral standing sagittal balance.           Imaging:   We ordered and independently reviewed new radiographs at this clinic visit. The results were discussed with the patient.  Findings include:    Radiographs show mild diffuse spondylosis    Cervical MRI shows moderate diffuse spondylosis with foraminal stenosis moderate at C5-6, severe C6-7 and C7-T1             Assessment and Plan:   Assessment:  57 year old female with multilevel foraminal stenosis, mild left arm weakness, numbness and radicular complaints in the AAC C7 and C8 distribution     Plan:  1. Given to get started physical therapy and an injection.  She is already taking gabapentin.  If things do not improve I do think she would be a candidate for a C6-T1 and possible C5-T1 ACDF.  I would like to see how things go with the nonoperative treatments recommended and I did give her some  information about ACDF today including discussing the risks and benefits noted below and I will plan to see her back in 4 to 6 weeks after attempted conservative care      Respectfully,  Fernando Ruiz MD  Spine Surgery  [facility]          Again, thank you for allowing me to participate in the care of your patient.        Sincerely,        Fernando Ruiz MD

## 2022-07-19 ENCOUNTER — TELEPHONE (OUTPATIENT)
Dept: PALLIATIVE MEDICINE | Facility: CLINIC | Age: 57
End: 2022-07-19

## 2022-07-19 DIAGNOSIS — M54.12 CERVICAL RADICULITIS: Primary | ICD-10-CM

## 2022-07-19 RX ORDER — LIDOCAINE 40 MG/G
CREAM TOPICAL
Status: CANCELLED | OUTPATIENT
Start: 2022-07-19

## 2022-07-19 RX ORDER — SODIUM CHLORIDE 9 MG/ML
500 INJECTION, SOLUTION INTRAVENOUS CONTINUOUS
Status: CANCELLED | OUTPATIENT
Start: 2022-07-19 | End: 2022-07-19

## 2022-07-19 NOTE — TELEPHONE ENCOUNTER
Called patient and scheduled injection with Dr. Ferrell in  for 7/29.    She will take an at home COVID test 1-2 days before surgery and bring a picture of the results the morning of the injection.    No further questions/concerns.

## 2022-07-25 NOTE — TELEPHONE ENCOUNTER
Please NOTE: This patient's scheduled procedure for 7/29/2022 is still pending review with the patient's [employer] Medicare Insurance. If this is not an urgent/emergent case, we will need to possibly look at cancelling/rescheduling by 7/26/2022.    FC's will follow up tomorrow with a status.

## 2022-07-25 NOTE — TELEPHONE ENCOUNTER
I received the approval letter for the injection for this patient. We are good to go for the appt on 7/29.    Thank you.

## 2022-07-29 ENCOUNTER — HOSPITAL ENCOUNTER (OUTPATIENT)
Facility: AMBULATORY SURGERY CENTER | Age: 57
Discharge: HOME | End: 2022-07-29
Attending: PHYSICAL MEDICINE & REHABILITATION | Admitting: PHYSICAL MEDICINE & REHABILITATION
Payer: COMMERCIAL

## 2022-07-29 VITALS
HEART RATE: 60 BPM | SYSTOLIC BLOOD PRESSURE: 115 MMHG | OXYGEN SATURATION: 98 % | RESPIRATION RATE: 16 BRPM | TEMPERATURE: 97.4 F | DIASTOLIC BLOOD PRESSURE: 62 MMHG

## 2022-07-29 DIAGNOSIS — M54.12 CERVICAL RADICULITIS: ICD-10-CM

## 2022-07-29 PROCEDURE — G8907 PT DOC NO EVENTS ON DISCHARG: HCPCS

## 2022-07-29 PROCEDURE — G8918 PT W/O PREOP ORDER IV AB PRO: HCPCS

## 2022-07-29 PROCEDURE — 62321 NJX INTERLAMINAR CRV/THRC: CPT

## 2022-07-29 RX ORDER — IOPAMIDOL 408 MG/ML
INJECTION, SOLUTION INTRATHECAL PRN
Status: DISCONTINUED | OUTPATIENT
Start: 2022-07-29 | End: 2022-07-29 | Stop reason: HOSPADM

## 2022-07-29 RX ORDER — FENTANYL CITRATE 50 UG/ML
INJECTION, SOLUTION INTRAMUSCULAR; INTRAVENOUS PRN
Status: DISCONTINUED | OUTPATIENT
Start: 2022-07-29 | End: 2022-07-29 | Stop reason: HOSPADM

## 2022-07-29 RX ORDER — SODIUM CHLORIDE 9 MG/ML
500 INJECTION, SOLUTION INTRAVENOUS CONTINUOUS
Status: DISPENSED | OUTPATIENT
Start: 2022-07-29 | End: 2022-07-29

## 2022-07-29 RX ORDER — DEXAMETHASONE SODIUM PHOSPHATE 10 MG/ML
INJECTION INTRAMUSCULAR; INTRAVENOUS PRN
Status: DISCONTINUED | OUTPATIENT
Start: 2022-07-29 | End: 2022-07-29 | Stop reason: HOSPADM

## 2022-07-29 RX ORDER — LIDOCAINE 40 MG/G
CREAM TOPICAL
Status: DISCONTINUED | OUTPATIENT
Start: 2022-07-29 | End: 2022-07-30 | Stop reason: HOSPADM

## 2022-07-29 RX ORDER — LIDOCAINE HYDROCHLORIDE 10 MG/ML
INJECTION, SOLUTION EPIDURAL; INFILTRATION; INTRACAUDAL; PERINEURAL PRN
Status: DISCONTINUED | OUTPATIENT
Start: 2022-07-29 | End: 2022-07-29 | Stop reason: HOSPADM

## 2022-07-29 NOTE — PROCEDURES
PROCEDURE NOTE: Cervicothoracic Interlaminar Epidural Steroid Injection    PROCEDURE DATE: 7/29/2022    PATIENT NAME: Kylie Austin  YOB: 1965    ATTENDING PHYSICIAN: Kenya Ferrell MD    PREOPERATIVE DIAGNOSIS: Cervical radicular pain  POSTOPERATIVE DIAGNOSIS: Same    PROCEDURE PERFORMED: Interlaminar Epidural Steroid Injection at the T1-2 level, with a Left paramedian approach under fluoroscopic guidance    IV SEDATION #1: Midazolam: 2mg   IV SEDATION #2  Fentanyl: 25mcg  Sedation nurse was present for additional monitoring and administration     ESTIMATED BLOOD LOSS:  None    FLUOROSCOPY WAS USED.    TOTAL SEDATION TIME: 10 minutes.    INDICATIONS FOR PROCEDURE:   Kylie Austin is a 57 year old female with a clinical picture consistent with the above-mentioned diagnosis, resulting in radicular pain to the left upper extremity.    PROCEDURE AND FINDINGS:   Kylie Austin was greeted in the pre-procedure holding area. The risk, benefits and alternatives to the procedure were again reviewed with the patient and written informed consent was placed in the chart. An IV line was placed.  A 500 mL bag of NS was connected to the patient. She was taken to the procedure room and positioned prone on the fluoroscopy table.  Routine monitors were applied including blood pressure cuff, and pulse oximetry. Prior to the procedure a time out was completed, verifying correct patient, procedure, site, positioning, and implants and/or special equipment.     An AP fluoroscpic  film was taken to identify the T1 and T2 vertebral bodies, as well as the T1-T2 interspace. The skin was prepped with chlorhexidine and draped in the usual sterile fashion. The skin and subcutaneous tissue overlying the T1-T2 interspace was anesthetized using a 27-guage 1-1/4 inch needle with 1% preservative-free lidocaine for a total volume of 4mls. Then an 20G:3.5 inch Tuohy needle was advanced utilizing  AP, contralateral oblique (45*), and lateral fluoroscopic views into the T1-T2 interlaminar space. Once the needle tip was engaged in the ligamentum flavum, a loss of resistance syringe was attached and loss of resistance to saline.     After negative aspiration, 1mls of isovue 200-220mg/ml contrast was injected under AP view with live fluoroscopy and confirmed adequate spread along the epidural space. There was no evidence of intravascular uptake or intrathecal spread on imaging. Contralateral oblique/lateral view(s) were also taken confirming adequate epidural spread.     At this point, after negative aspiration, a total 4mL volume of treatment injectate, consisting of 1mL of 10mg/mL dexamethasone, 3mL of PFNS was injected easily.  The needle was then flushed with 0.3 ml of PFNS, restyletted  and removed. The needle insertion site was dressed appropriately.     Kylie was taken to the recovery room where she was monitored for a brief period of time. She tolerated the procedure well and was discharged home in stable condition with post procedural instructions.     Follow-up will be in clinic.    COMPLICATIONS: None    COMMENTS: None

## 2022-07-29 NOTE — DISCHARGE INSTRUCTIONS
PAIN INJECTION HOME CARE INSTRUCTIONS  Activity  -You may resume most normal activity levels with the exception of strenuous activity. It may help to move in ways that hurt before the injection, to see if the pain is still there, but do not overdo it.     -DO NOT remove bandaid for 24 hours  -DO NOT shower for 24 hours      Pain  -You may feel immediate pain relief and numbness for a period of time after the injection. This may indicate the medication has reached the right spot.  -Your pain may return after this short pain-free period, or may even be a little worse for a day or two. It may be caused by needle irritation or by the medication itself. The medications usually take two or three days to start working, but can take as long as a week.    -You may use an ice pack for 20 minutes every 2 hours for the first 24 hours  -You may use a heating pad after the first 24 hours  -You may use Tylenol (acetaminophen) every 4 hours or other pain medicines as directed by your physician      DID YOU RECEIVE SEDATION TODAY?  Yes    If you received sedation please follow these additional safety measures.    Sedation medicine, if given, may remain active for many hours. It is important for the next 24 hours that you do not:  -Drive a car  -Operate machines or power tools  -Consume alcohol, including beer  -Sign any important papers or legal documents    DID YOU RECEIVE STEROIDS TODAY?  Yes    Common side effects of steroids:  Not everyone will experience corticosteroid side effects. If side effects are experienced, they will gradually subside in the 7-10 day period following an injection. Most common side effects include:  -Flushed face and/or chest  -Feeling of warmth, particularly in the face but could be an overall feeling of warmth  -Increased blood sugar in diabetic patients  -Menstrual irregularities my occur. If taking hormone-based birth control an alternate method of birth control is recommended  -Sleep disturbances  and/or mood swings are possible  -Leg cramps    PLEASE KEEP TRACK OF YOUR SYMPTOMS AND NOTE ANY CHANGES FOR YOUR DOCTOR.       Please contact us if you have:  -Severe pain  -Fever more than 101.5 degrees Fahrenheit  -Signs of infection at the injection site (redness, swelling, or drainage)        FOR PM&R PATIENTS of Dr Ferrell:  For patients seen by the PM and R service, please call [phone].(Monday through Friday 8a-5p.  After business hours and weekends call [phone] and ask for the PM and R doctor on call). If you need to fax a pain diary to PM&R the fax number is [phone]. If you are unable to fax, uploading to [employer] is encouraged, then send to provider. If you have procedure scheduling questions please call [phone] Option #2

## 2022-08-01 ENCOUNTER — THERAPY VISIT (OUTPATIENT)
Dept: PHYSICAL THERAPY | Facility: CLINIC | Age: 57
End: 2022-08-01
Payer: COMMERCIAL

## 2022-08-01 DIAGNOSIS — G89.29 CHRONIC KNEE PAIN, UNSPECIFIED LATERALITY: Primary | ICD-10-CM

## 2022-08-01 DIAGNOSIS — M54.12 CERVICAL RADICULOPATHY: ICD-10-CM

## 2022-08-01 DIAGNOSIS — M25.569 CHRONIC KNEE PAIN, UNSPECIFIED LATERALITY: Primary | ICD-10-CM

## 2022-08-01 PROCEDURE — 97163 PT EVAL HIGH COMPLEX 45 MIN: CPT | Mod: GP | Performed by: PHYSICAL THERAPIST

## 2022-08-01 PROCEDURE — 97530 THERAPEUTIC ACTIVITIES: CPT | Mod: GP | Performed by: PHYSICAL THERAPIST
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
## 2022-08-01 NOTE — PROGRESS NOTES
[facility]    OUTPATIENT Physical Therapy ORTHOPEDIC EVALUATION  PLAN OF TREATMENT FOR OUTPATIENT REHABILITATION  (COMPLETE FOR INITIAL CLAIMS ONLY)  Patient's Last Name, First Name, M.I.  YOB: 1965  Kylie Mccloud  KAVON    Provider s Name:  [facility]   Medical Record No.  [id number]   Start of Care Date:  08/01/22   Onset Date:   03/29/22   Type:     _X__PT   ___OT Medical Diagnosis:  No diagnosis found.     Treatment Diagnosis:           Goals:     08/01/22 0500   Treating Provider   Treating Provider Alivia Chan DPT   Contact Information   Procedure Code: The timed procedures billed today were performed sequentially within this encounter. Therapeutic Exercise;Therapeutic Activities;Manual Therapy   Minutes: Therapeutic exercise 5   Minutes: Therapeutic activities 15   Rxs Authorized 8   Rxs Used 1   Insurance Medicare   Total Treatment Time (minutes) 40   Timed Code Treatment Minutes 20   SOC Date 08/01/22   Beginning of Cert date period 08/01/22   End of Cert period date 10/29/22   Therapy Frequency 2x/week for 1 month then 1x/week for 1 month   Predicted Duration of Therapy Intervention (days/wks) 2 months   DOI 03/29/22   Date PN/DC completed 08/01/22   Date/PN needed/next MD appt 08/19/22   Subjective See note   Objective Initiated basci cervical retraction, discussion on radiular sx.   Initial Pain level 2/10   Manual Therapy - Self mobs are part of the HEP unless other   Manual Traction  Reports no change wtih trial   PTRX Therapeutic Exercise   Therapeutic Exercise 1 Cervical Retraction With Patient Overpressure   Therapeutic Exercise Notes 1  HEP - Sets: 1 Reps: 10 reps  Sessions per day: every 2 hours  Notes: With hands around neck as a nod for additional support    Therapeutic Exercise 2 Scapular Retraction/Depression   Therapeutic Exercise  Notes 2  HEP - Sets: 5 sec  Reps: 5 Sessions per day: 2-3x/day  No Notes   Spine - Therapeutic Activities - All exercises are part of HEP unless otherwise noted   Exercise 1 Education on anatomy, pathology and clinical examination   PTRX Therapeutic Activities    Therapeutic Activity 1 Posture Correction with Lumbar Roll   Therapeutic Activity Notes 1  HEP - No Notes   Therapeutic Activity 2 Care for Recent Onset of Neck Pain   Therapeutic Activity Notes 2  HEP - No Notes   Therapeutic Activity 3 Postural Stresses on Neck   Therapeutic Activity Notes 3  HEP - No Notes   Therapeutic Activity 4 Sleeping Postures for the Neck   Therapeutic Activity Notes 4  HEP - No Notes       Therapy Frequency:  2x/week for 1 month then 1x/week for 1 month  Predicted Duration of Therapy Intervention:  2 months    Alivia Chan, PT                 I CERTIFY THE NEED FOR THESE SERVICES FURNISHED UNDER        THIS PLAN OF TREATMENT AND WHILE UNDER MY CARE     (Physician co-signature of this document indicates review and certification of the therapy plan).                     Certification Date From:  08/01/22   Certification Date To:  10/29/22    Referring Provider:  Fernando Dior*    Initial Assessment        See Epic Evaluation SOC Date: 08/01/22

## 2022-08-08 ENCOUNTER — THERAPY VISIT (OUTPATIENT)
Dept: PHYSICAL THERAPY | Facility: CLINIC | Age: 57
End: 2022-08-08
Payer: COMMERCIAL

## 2022-08-08 DIAGNOSIS — M54.12 CERVICAL RADICULITIS: Primary | ICD-10-CM

## 2022-08-08 PROCEDURE — 97035 APP MDLTY 1+ULTRASOUND EA 15: CPT | Mod: GP | Performed by: PHYSICAL THERAPIST

## 2022-08-08 PROCEDURE — 97140 MANUAL THERAPY 1/> REGIONS: CPT | Mod: GP | Performed by: PHYSICAL THERAPIST

## 2022-08-08 PROCEDURE — 97110 THERAPEUTIC EXERCISES: CPT | Mod: GP | Performed by: PHYSICAL THERAPIST

## 2022-08-15 ENCOUNTER — THERAPY VISIT (OUTPATIENT)
Dept: PHYSICAL THERAPY | Facility: CLINIC | Age: 57
End: 2022-08-15
Payer: COMMERCIAL

## 2022-08-15 DIAGNOSIS — M54.12 CERVICAL RADICULITIS: Primary | ICD-10-CM

## 2022-08-15 PROCEDURE — 97110 THERAPEUTIC EXERCISES: CPT | Mod: GP | Performed by: PHYSICAL THERAPIST

## 2022-08-15 PROCEDURE — 97012 MECHANICAL TRACTION THERAPY: CPT | Mod: GP | Performed by: PHYSICAL THERAPIST

## 2022-08-15 PROCEDURE — 97140 MANUAL THERAPY 1/> REGIONS: CPT | Mod: GP | Performed by: PHYSICAL THERAPIST

## 2022-08-19 ENCOUNTER — OFFICE VISIT (OUTPATIENT)
Dept: NEUROSURGERY | Facility: CLINIC | Age: 57
End: 2022-08-19
Payer: COMMERCIAL

## 2022-08-19 VITALS
SYSTOLIC BLOOD PRESSURE: 121 MMHG | OXYGEN SATURATION: 97 % | HEART RATE: 76 BPM | BODY MASS INDEX: 32.28 KG/M2 | HEIGHT: 68 IN | DIASTOLIC BLOOD PRESSURE: 70 MMHG | WEIGHT: 213 LBS

## 2022-08-19 DIAGNOSIS — M47.12 CERVICAL SPONDYLOSIS WITH MYELOPATHY: Primary | ICD-10-CM

## 2022-08-19 DIAGNOSIS — G89.29 CHRONIC KNEE PAIN, UNSPECIFIED LATERALITY: ICD-10-CM

## 2022-08-19 DIAGNOSIS — M25.569 CHRONIC KNEE PAIN, UNSPECIFIED LATERALITY: ICD-10-CM

## 2022-08-19 DIAGNOSIS — M54.12 CERVICAL RADICULOPATHY AT C8: ICD-10-CM

## 2022-08-19 DIAGNOSIS — Z01.818 PRE-OP TESTING: ICD-10-CM

## 2022-08-19 PROCEDURE — 99214 OFFICE O/P EST MOD 30 MIN: CPT | Performed by: ORTHOPAEDIC SURGERY

## 2022-08-19 RX ORDER — MELOXICAM 7.5 MG/1
7.5 TABLET ORAL DAILY
Qty: 30 TABLET | Refills: 1 | Status: ON HOLD | OUTPATIENT
Start: 2022-08-19 | End: 2022-10-28

## 2022-08-19 ASSESSMENT — PAIN SCALES - GENERAL: PAINLEVEL: MILD PAIN (2)

## 2022-08-19 NOTE — PATIENT INSTRUCTIONS
Patient Instructions    Surgery scheduled at [facility] ([facility]) for Anterior Cervical Decompression and Fusion with Dr. Ruiz    Pre-Operative  Surgical risks: blood clots in the leg or lung, problems urinating, nerve damage, drainage from the incision, infection,stiffness  Pre-operative physical in our Pre-operative Assessment Center (PAC) at our [facility] location. See handout in folder. To be completed within 30 days of surgical date. Surgery scheduler will assist in scheduling this appointment. **May need to be done with a local primary care provider for patients who live several hours away from [place].**  Possibly 2-4 night hospitalization stay (this will also depend on the progress of your recovery)   Shower procedure  Please shower with antimicrobial soap the night before surgery and morning of surgery. Refer to showering instruction handout in folder.  Eating/Drinking  Stop all solid foods 8 hours before surgery.  You may drink ONLY CLEAR LIQUIDS until 2 hours before surgery  Clear liquids include water, clear juice, black coffee with no milk or creamer, clear tea without milk, Gatorade, clear soda.   **REMINDER: DO NOT drink any clear liquids with pulp, milk, or creamer**   Your surgery may have to be rescheduled if these eating/drinking directions are not followed correctly   Medications  Generally hold Aspirin, NSAIDs (Advil/Ibuprofen, Indocin, Naproxen,Nuprin,Relafen/Nabumetone, Diclofenac,Meloxicam, Aleve, Celebrex) x 7 days prior to surgical date. Please see handout in folder for more details.   You can take Tylenol (Acetaminophen) for pain, 1000 mg  Do not exceed 3,000 mg per day   Any other medications prescribed, please discuss at your PAC appointment or with your prescribing provider at your pre-operative physical   As part of preparation for your upcoming procedure you are required to have a test for the novel Coronavirus,  COVID-19.  Patients planning on staying overnight in the hospital should get a PCR test from a lab four days before their procedure through [facility] or another lab.   Please schedule a PCR test with [facility] by calling [phone] or by visiting [email or web address].  You may NOT receive the COVID-19 vaccine 72 hours before or after surgery.    Pain Management  Dealing with pain  As your body heals, you might feel a stabbing, burning, or aching pain. You may also have some numbness.  Everyone feels pain differently, we may ask you to rate your pain using a pain scale. This will let us know how much pain you feel.   Keep in mind that medicine won't take away all of your pain. It helps to try other ways to relax and ease pain.   Things to help with pain  After surgery, we will give you medicine for your pain. These medications work well, but they can make you drowsy, itchy, or sick to your stomach. If we give you narcotics for pain, try to take the pills with food.   For mild to moderate pain, you can take medication such as Tylenol. These can be used with narcotics, but make sure that your narcotic does not contain Tylenol.   Do NOT drive while taking narcotic pain medication  Do NOT drink alcohol while using any pain medication  You can utilize ice as needed (no longer than 20 minutes at one time)  **Please let us know at least 3 days in advance before you run out of your pain medications so these can be refilled in a timely manner. You may send a [employer] message or call [phone] to leave a message with our care team.**    Incision Care  No submerging incision in water such as pools, hot tubs, baths for at least 8 weeks or until incision is healed  You may get your incision wet in the shower. Allow water to run over incision, and gently pat dry.   Remove dressing as instructed upon discharge  Watch for signs of infection  Redness, swelling, warmth, drainage, and fever of  101 degrees or higher  Notify clinic [phone]    Activity Restrictions  For the first 6-8 weeks, no lifting > 10 pounds, no bending, twisting, or overhead reaching.  Take stairs in moderation   Ok to walk as tolerated, take short frequent walks. You may gradually increase the distance as tolerated.   Avoid bed rest and prolonged sitting for longer than 30 minutes (change positions frequently while awake)  No contact sports until after follow up visit  No high impact activities such as; running/jogging, snowmobile or 4 almonte riding or any other recreational vehicles  Please call the clinic if you develop any of the following symptoms:  Swelling and/or warmth in one or both legs  Pain or tenderness in your leg, ankle, foot, or arm   Red or discolored skin     Post-Op Follow Up Appointments  6 week post op with xray prior with Dr. Ruiz  Remaining appointments will be determined by the provider   Please call to schedule follow up and xray appointments at [phone]        [facility] Orthopedic Department   Phone: [phone]

## 2022-08-19 NOTE — TELEPHONE ENCOUNTER
Medication refilled. Pt last seen in February. [employer] message sent to pt requesting they set up a follow up appointment or request future refills from PCP.     Alivia Tijerina LPN

## 2022-08-19 NOTE — PROGRESS NOTES
Spine Surgery Return Clinic Visit      Chief Complaint:   RECHECK (4-6 week follow up )      Interval HPI:  Symptom Profile Including: location of symptoms, onset, severity, exacerbating/alleviating factors, previous treatments:        Kylie Austin is a 57 year old female who returns in follow-up today.  She has been doing with severe left arm radicular complaints predominantly in a C8 but also somewhat overlapping C7 distribution with weakness in the left hand intrinsics and  and numbness over the lateral aspect of the hand.  It is been worsening in recent months.  After her last visit with me she did have an epidural injection at C7-T1 and she is done several visits with physical therapy.  This is in addition to her previous oral medication regimen which has been continued.  She thinks the injection provided some temporary relief but she still bothered by the tingling and weakness in the hand she feels like she cannot use the hand or function with it which is quite bothersome to her.            Past Medical History:     Past Medical History:   Diagnosis Date     Anxiety 2005    Brought on by Amioderone     Arthritis 08/2005     Atrial fib/flutter, transient     S/p cardioversion 2004     Bipolar 2 disorder (H)      Bleeding disorder (H) 1987    nose bleeds     Chronic pain     LUE pain     Congestive heart failure (H) 11/18/2004    cured 2005     Depressive disorder     on and off most of my life!     Fibromyalgia      Gender identity disorder Started Rx 2012     H/O hypogonadism Gonadectomy 2013     Hyperlipidemia      Hypertension      Other mental problems     bipolar     Psoriasis      Sleep apnea      Uncomplicated asthma 1964    cured in 4/2001     Vision disorder 06/2005            Past Surgical History:     Past Surgical History:   Procedure Laterality Date     ARTHROPLASTY SHOULDER Left 1/22/2020    Procedure: Left Total shoulder arthroplasty, distal clavicle excision;  Surgeon: Crista  Omari Rasheed MD;  Location: [facility] OR     BIOPSY  2005, 8/8/13    Stomach, colon     COLONOSCOPY  8/8/2013    Procedure: COLONOSCOPY;  COLONSCOPY SCREEN/ SE;  Surgeon: Santino Sun MD;  Location: [facility] OR     COLONOSCOPY N/A 1/4/2022    Procedure: COLONOSCOPY, WITH POLYPECTOMY AND BIOPSY;  Surgeon: Dallas Velazco MD;  Location: [facility]     COLONOSCOPY WITH CO2 INSUFFLATION N/A 11/1/2018    Procedure: COLONOSCOPY WITH CO2 INSUFFLATION;  Surgeon: Santino Sun MD;  Location: [facility] OR     COLONOSCOPY WITH CO2 INSUFFLATION N/A 12/15/2020    Procedure: COLONOSCOPY, WITH CO2 INSUFFLATION;  Surgeon: Nima Kamara MD;  Location: [facility] OR     Gender Reassignment  05/2016     HEAD & NECK SURGERY  2015    ablation of nerve from neck to left arm     INJECT EPIDURAL CERVICAL N/A 7/29/2022    Procedure: INJECTION, SPINE, CERVICAL, EPIDURAL T1-T2;  Surgeon: Kenya Ferrell MD;  Location: [facility] OR     LAPAROSCOPIC GASTRIC SLEEVE N/A 4/10/2018    Procedure: LAPAROSCOPIC GASTRIC SLEEVE;  Laparoscopic Sleeve Gastrectomy;  Surgeon: Jani Shah MD;  Location: [facility] OR     MANDIBLE SURGERY  1986     ORCHIECTOMY INGUINAL BILATERAL  7/16/2013    Procedure: ORCHIECTOMY INGUINAL BILATERAL;  Bilateral Simple Inguinal Orchiectomy ;  Surgeon: Jan Garrett MD;  Location: [facility] OR     TONSILLECTOMY              Social History:     Social History     Tobacco Use     Smoking status: Never Smoker     Smokeless tobacco: Never Used     Tobacco comment: NEVER SMOKED! Grew up with heavy smokers which did not help my Asthma!   Substance Use Topics     Alcohol use: Not Currently     Comment: occasional//2 per month            Family History:     Family History   Problem Relation Age of Onset     Breast Cancer Mother      Cancer Father         skin     Diabetes Father         Was on the patch when they were new     Heart Disease Father 55        scd     Coronary Artery Disease Father      Hypertension Father       Hyperlipidemia Father      Diabetes Sister      Diabetes Sister      Thyroid Disease Sister      Osteoporosis Maternal Grandmother      Alzheimer Disease Paternal Grandmother      Diabetes Paternal Grandmother      Mental Illness Paternal Grandmother         alshimers     Osteoporosis Paternal Grandmother      Coronary Artery Disease Paternal Grandfather      Hypertension Paternal Grandfather      Hyperlipidemia Paternal Grandfather      Prostate Cancer Paternal Grandfather      Other Cancer Paternal Grandfather      Depression Daughter      Unknown/Adopted Daughter      Depression Daughter      Mental Illness Daughter         bipolar     Anxiety Disorder Daughter      Anxiety Disorder Daughter      Depression Son      Heart Disease Other      Anesthesia Reaction No family hx of      Deep Vein Thrombosis (DVT) No family hx of             Allergies:     Allergies   Allergen Reactions     Amiodarone      Caused pain fatigue/amplified anxiety and depression     Fluoxetine Other (See Comments)     Night terrors     Tetracycline      Teeth rattle/saliva acidic            Medications:     Current Outpatient Medications   Medication     ARIPiprazole (ABILIFY) 20 MG tablet     diclofenac (VOLTAREN) 1 % topical gel     furosemide (LASIX) 20 MG tablet     gabapentin (NEURONTIN) 300 MG capsule     medical cannabis (Patient's own supply.  Not a prescription)     medical cannabis inhalation (Patient's own supply.  Not a prescription)     order for DME     simvastatin (ZOCOR) 40 MG tablet     venlafaxine (EFFEXOR-XR) 75 MG 24 hr capsule     meloxicam (MOBIC) 7.5 MG tablet     No current facility-administered medications for this visit.             Review of Systems:   A focused musculoskeletal and neurologic ROS was performed with pertinent positives and negatives noted in the HPI.  Additional systems were also reviewed and are documented at the bottom of the note.         Physical Exam:   Vitals: /70 (BP Location: Right  "arm, Patient Position: Sitting, Cuff Size: Adult Regular)   Pulse 76   Ht 1.727 m (5' 8\")   Wt 96.6 kg (213 lb)   LMP  (LMP Unknown)   SpO2 97%   BMI 32.39 kg/m    Musculoskeletal, Neurologic, and Spine:     Cervical spine:    Appearance -no gross step-offs, kyphosis.    Motor -     C5: Deltoids R 5/5 and L 5/5 strength    C6: Biceps R 5/5 and L 5/5 strength     C7: Triceps R 5/5 and L 4/5 strength     C8:  R 5/5 and L 3/5 strength     T1: Dorsal interossei R 4/5 and L 3/5 strength        Sensation: intact to light touch in C5-T1, DIMINISHED LEFT C8      Special Tests -      Lhermitte's Test - Negative     Spurling's Test - POSITIVE TO THE LEFT     Graves's Test - Negative       Lumbar Spine:    Appearance - No gross stepoffs or deformities    Motor -     L2-3: Hip flexion 5/5 R and 5/5 L strength          L3/4:  Knee extension R 5/5 and L 5/5 strength         L4/5:  Foot dorsiflexion R 5/5 L 5/5 and       EHL dorsiflexion R 4/5 L 4/5 strength         S1:  Plantarflexion/Peroneal Muscles  R 5/5 and L 5/5 strength    Sensation: intact to light touch L3-S1 distribution BLE      Neurologic:      REFLEXES Left Right   Biceps 1+ 1+   Triceps 1+ 1+   Brachioradialis 1+ 1+                         Hip Exam:  No pain with hip log roll and no tenderness over the greater trochanters.    Alignment:  Patient stands with a neutral standing sagittal balance.           Imaging:   We ordered and independently reviewed new radiographs at this clinic visit. The results were discussed with the patient. Findings include:     Radiographs show severe diffuse spondylosis    I again reviewed her cervical MRI in detail, which shows moderate diffuse spondylosis at multiple levels, left-sided foraminal stenosis is worst at C6-7 and C7-T1.  At C7-T1 there is moderate to severe central stenosis as well.       Assessment and Plan:     57 year old female with multilevel cervical spondylosis and foraminal stenosis worst C6-T1.    Given " that she has failed nonoperative treatments with injections oral medications and therapy and she has obvious weakness and deficits on exam, we discussed operative intervention in the form of a C6-T1 anterior cervical decompression and fusion with iliac crest autograft.    Risks of this surgery include risk of infection, risk of dural tear resulting in CSF leak which might result in headaches, or possible need for lumbar drain, or possible revision surgery in the setting of a persistent leak. Possible nerve root injury resulting in numbness weakness or paralysis into the arms or legs. Possible radiculitis which could result in similar symptoms or could result in significant neurogenic type pain. Risk of incomplete decompression which might require revision surgery in the future.  Risk of adjacent segment problems requiring surgery in the future. Risk of incomplete relief of symptoms possibly requiring revision surgery in the future. Furthermore, although rare, there are risks of major vessel injury such as to the vertebral artery or major organ injury such as to the esophagus or trachea from the surgery.  There are risks of dysphagia or dysphonia which can make it hard to swallow or talk. I explained that in fact most patients will have some period of swallowing difficulty following the surgery.  In some cases these things occur as a result of laryngeal nerve injury, and we discussed this possibility as well. There is a risk of hematoma formation requiring urgent return to the OR for airway compromise.  There is a risk of blood clots in the legs or the lungs.  Lastly, although rare, there are certainly risks of the anesthetic including stroke heart attack and death.    She would like to proceed.  We will move forward with getting things arranged and scheduled.       Respectfully,  Fernando Ruiz MD  Spine Surgery  [facility]

## 2022-08-19 NOTE — NURSING NOTE
"Kylie Austin's goals for this visit include: .  Chief Complaint   Patient presents with     RECHECK     4-6 week follow up        She requests these members of her care team be copied on today's visit information: yes    PCP: Kristin Miner    Referring Provider:  No referring provider defined for this encounter.    /70 (BP Location: Right arm, Patient Position: Sitting, Cuff Size: Adult Regular)   Pulse 76   Ht 1.727 m (5' 8\")   Wt 96.6 kg (213 lb)   LMP  (LMP Unknown)   SpO2 97%   BMI 32.39 kg/m      Do you need any medication refills at today's visit? No  PACO Ritter, TONIO ([facility])      "

## 2022-08-19 NOTE — NURSING NOTE
Reviewed pre- and post-operative instructions as outlined in the After Visit Summary/Patient Instructions with patient.   Surgery folder was given to patient    Patient Education Topic: Procedure with Dr. Ruiz     Learner(s): Patient    Knowledge Level: Advanced     Readiness to Learn: Ready    Method:  Verbal explanation and Written material     Outcome: Able to verbalize instructions    Barriers to Learning: No barrier    Covid Testing: patient educated they will need to have a test for the novel Coronavirus, COVID-19.The PCR test needs to be completed within 4 days (96) hours of surgery. . Order Placed.Yes    Scheduling Number: [phone]    NDI/MATHIEU: Confirmation of completion within last 6 months    Patient had the opportunity for questions to be answered. Advised Patient to call clinic with any questions/concerns. Gave patient antibacterial soap for pre-surgery skin preparation.     Cleo Amador RN Care Coordinator   Neurology/Neurosurgery/PM&R/ Pain Management

## 2022-08-19 NOTE — LETTER
8/19/2022         RE: Kylie Austin  [address] [facility] [id number]        Dear Colleague,    Thank you for referring your patient, Kylie Austin, to the [facility] [place]. Please see a copy of my visit note below.    Spine Surgery Return Clinic Visit      Chief Complaint:   RECHECK (4-6 week follow up )      Interval HPI:  Symptom Profile Including: location of symptoms, onset, severity, exacerbating/alleviating factors, previous treatments:        Kylie Austin is a 57 year old female who returns in follow-up today.  She has been doing with severe left arm radicular complaints predominantly in a C8 but also somewhat overlapping C7 distribution with weakness in the left hand intrinsics and  and numbness over the lateral aspect of the hand.  It is been worsening in recent months.  After her last visit with me she did have an epidural injection at C7-T1 and she is done several visits with physical therapy.  This is in addition to her previous oral medication regimen which has been continued.  She thinks the injection provided some temporary relief but she still bothered by the tingling and weakness in the hand she feels like she cannot use the hand or function with it which is quite bothersome to her.            Past Medical History:     Past Medical History:   Diagnosis Date     Anxiety 2005    Brought on by Amioderone     Arthritis 08/2005     Atrial fib/flutter, transient     S/p cardioversion 2004     Bipolar 2 disorder (H)      Bleeding disorder (H) 1987    nose bleeds     Chronic pain     LUE pain     Congestive heart failure (H) 11/18/2004    cured 2005     Depressive disorder     on and off most of my life!     Fibromyalgia      Gender identity disorder Started Rx 2012     H/O hypogonadism Gonadectomy 2013     Hyperlipidemia      Hypertension      Other mental problems     bipolar     Psoriasis      Sleep apnea       Uncomplicated asthma 1964    cured in 4/2001     Vision disorder 06/2005            Past Surgical History:     Past Surgical History:   Procedure Laterality Date     ARTHROPLASTY SHOULDER Left 1/22/2020    Procedure: Left Total shoulder arthroplasty, distal clavicle excision;  Surgeon: Omari Tobin MD;  Location: [facility]     BIOPSY  2005, 8/8/13    Stomach, colon     COLONOSCOPY  8/8/2013    Procedure: COLONOSCOPY;  COLONSCOPY SCREEN/ SE;  Surgeon: Santino Sun MD;  Location: [facility]     COLONOSCOPY N/A 1/4/2022    Procedure: COLONOSCOPY, WITH POLYPECTOMY AND BIOPSY;  Surgeon: Dallas Velazco MD;  [facility]     COLONOSCOPY WITH CO2 INSUFFLATION N/A 11/1/2018    Procedure: COLONOSCOPY WITH CO2 INSUFFLATION;  Surgeon: Santino Sun MD;  [facility]     COLONOSCOPY WITH CO2 INSUFFLATION N/A 12/15/2020    Procedure: COLONOSCOPY, WITH CO2 INSUFFLATION;  Surgeon: Nima Kamara MD;  Location: [facility]     Gender Reassignment  05/2016     HEAD & NECK SURGERY  2015    ablation of nerve from neck to left arm     INJECT EPIDURAL CERVICAL N/A 7/29/2022    Procedure: INJECTION, SPINE, CERVICAL, EPIDURAL T1-T2;  Surgeon: Kenya Ferrell MD;  Location: [facility]     LAPAROSCOPIC GASTRIC SLEEVE N/A 4/10/2018    Procedure: LAPAROSCOPIC GASTRIC SLEEVE;  Laparoscopic Sleeve Gastrectomy;  Surgeon: Jani Shah MD;  Location: [facility]     MANDIBLE SURGERY  1986     ORCHIECTOMY INGUINAL BILATERAL  7/16/2013    Procedure: ORCHIECTOMY INGUINAL BILATERAL;  Bilateral Simple Inguinal Orchiectomy ;  Surgeon: Jan Garrett MD;  Location: [facility]     TONSILLECTOMY              Social History:     Social History     Tobacco Use     Smoking status: Never Smoker     Smokeless tobacco: Never Used     Tobacco comment: NEVER SMOKED! Grew up with heavy smokers which did not help my Asthma!   Substance Use Topics     Alcohol use: Not Currently     Comment: occasional//2 per month             Family History:     Family History   Problem Relation Age of Onset     Breast Cancer Mother      Cancer Father         skin     Diabetes Father         Was on the patch when they were new     Heart Disease Father 55        scd     Coronary Artery Disease Father      Hypertension Father      Hyperlipidemia Father      Diabetes Sister      Diabetes Sister      Thyroid Disease Sister      Osteoporosis Maternal Grandmother      Alzheimer Disease Paternal Grandmother      Diabetes Paternal Grandmother      Mental Illness Paternal Grandmother         alshimers     Osteoporosis Paternal Grandmother      Coronary Artery Disease Paternal Grandfather      Hypertension Paternal Grandfather      Hyperlipidemia Paternal Grandfather      Prostate Cancer Paternal Grandfather      Other Cancer Paternal Grandfather      Depression Daughter      Unknown/Adopted Daughter      Depression Daughter      Mental Illness Daughter         bipolar     Anxiety Disorder Daughter      Anxiety Disorder Daughter      Depression Son      Heart Disease Other      Anesthesia Reaction No family hx of      Deep Vein Thrombosis (DVT) No family hx of             Allergies:     Allergies   Allergen Reactions     Amiodarone      Caused pain fatigue/amplified anxiety and depression     Fluoxetine Other (See Comments)     Night terrors     Tetracycline      Teeth rattle/saliva acidic            Medications:     Current Outpatient Medications   Medication     ARIPiprazole (ABILIFY) 20 MG tablet     diclofenac (VOLTAREN) 1 % topical gel     furosemide (LASIX) 20 MG tablet     gabapentin (NEURONTIN) 300 MG capsule     medical cannabis (Patient's own supply.  Not a prescription)     medical cannabis inhalation (Patient's own supply.  Not a prescription)     order for DME     simvastatin (ZOCOR) 40 MG tablet     venlafaxine (EFFEXOR-XR) 75 MG 24 hr capsule     meloxicam (MOBIC) 7.5 MG tablet     No current facility-administered medications for this visit.          "    Review of Systems:   A focused musculoskeletal and neurologic ROS was performed with pertinent positives and negatives noted in the HPI.  Additional systems were also reviewed and are documented at the bottom of the note.         Physical Exam:   Vitals: /70 (BP Location: Right arm, Patient Position: Sitting, Cuff Size: Adult Regular)   Pulse 76   Ht 1.727 m (5' 8\")   Wt 96.6 kg (213 lb)   LMP  (LMP Unknown)   SpO2 97%   BMI 32.39 kg/m    Musculoskeletal, Neurologic, and Spine:     Cervical spine:    Appearance -no gross step-offs, kyphosis.    Motor -     C5: Deltoids R 5/5 and L 5/5 strength    C6: Biceps R 5/5 and L 5/5 strength     C7: Triceps R 5/5 and L 4/5 strength     C8:  R 5/5 and L 3/5 strength     T1: Dorsal interossei R 4/5 and L 3/5 strength        Sensation: intact to light touch in C5-T1, DIMINISHED LEFT C8      Special Tests -      Lhermitte's Test - Negative     Spurling's Test - POSITIVE TO THE LEFT     Graves's Test - Negative       Lumbar Spine:    Appearance - No gross stepoffs or deformities    Motor -     L2-3: Hip flexion 5/5 R and 5/5 L strength          L3/4:  Knee extension R 5/5 and L 5/5 strength         L4/5:  Foot dorsiflexion R 5/5 L 5/5 and       EHL dorsiflexion R 4/5 L 4/5 strength         S1:  Plantarflexion/Peroneal Muscles  R 5/5 and L 5/5 strength    Sensation: intact to light touch L3-S1 distribution BLE      Neurologic:      REFLEXES Left Right   Biceps 1+ 1+   Triceps 1+ 1+   Brachioradialis 1+ 1+                         Hip Exam:  No pain with hip log roll and no tenderness over the greater trochanters.    Alignment:  Patient stands with a neutral standing sagittal balance.           Imaging:   We ordered and independently reviewed new radiographs at this clinic visit. The results were discussed with the patient. Findings include:     Radiographs show severe diffuse spondylosis    I again reviewed her cervical MRI in detail, which shows moderate " diffuse spondylosis at multiple levels, left-sided foraminal stenosis is worst at C6-7 and C7-T1.  At C7-T1 there is moderate to severe central stenosis as well.       Assessment and Plan:     57 year old female with multilevel cervical spondylosis and foraminal stenosis worst C6-T1.    Given that she has failed nonoperative treatments with injections oral medications and therapy and she has obvious weakness and deficits on exam, we discussed operative intervention in the form of a C6-T1 anterior cervical decompression and fusion with iliac crest autograft.    Risks of this surgery include risk of infection, risk of dural tear resulting in CSF leak which might result in headaches, or possible need for lumbar drain, or possible revision surgery in the setting of a persistent leak. Possible nerve root injury resulting in numbness weakness or paralysis into the arms or legs. Possible radiculitis which could result in similar symptoms or could result in significant neurogenic type pain. Risk of incomplete decompression which might require revision surgery in the future.  Risk of adjacent segment problems requiring surgery in the future. Risk of incomplete relief of symptoms possibly requiring revision surgery in the future. Furthermore, although rare, there are risks of major vessel injury such as to the vertebral artery or major organ injury such as to the esophagus or trachea from the surgery.  There are risks of dysphagia or dysphonia which can make it hard to swallow or talk. I explained that in fact most patients will have some period of swallowing difficulty following the surgery.  In some cases these things occur as a result of laryngeal nerve injury, and we discussed this possibility as well. There is a risk of hematoma formation requiring urgent return to the OR for airway compromise.  There is a risk of blood clots in the legs or the lungs.  Lastly, although rare, there are certainly risks of the anesthetic  including stroke heart attack and death.    She would like to proceed.  We will move forward with getting things arranged and scheduled.       Respectfully,  Fernando Ruiz MD  Spine Surgery  [facility]        Again, thank you for allowing me to participate in the care of your patient.        Sincerely,        Fernando Ruiz MD

## 2022-08-19 NOTE — LETTER
Opioid / Opioid Plus Controlled Substance Agreement    This is an agreement between you and your provider about the safe and appropriate use of controlled substance/opioids prescribed by your care team. Controlled substances are medicines that can cause physical and mental dependence (abuse).    There are strict laws about having and using these medicines. We here at [facility] are committing to working with you in your efforts to get better. To support you in this work, we ll help you schedule regular office appointments for medicine refills. If we must cancel or change your appointment for any reason, we ll make sure you have enough medicine to last until your next appointment.     As a Provider, I will:    Listen carefully to your concerns and treat you with respect.     Recommend a treatment plan that I believe is in your best interest. This plan may involve therapies other than opioid pain medication.     Talk with you often about the possible benefits, and the risk of harm of any medicine that we prescribe for you.     Provide a plan on how to taper (discontinue or go off) using this medicine if the decision is made to stop its use.    As a Patient, I understand that opioid(s):     Are a controlled substance prescribed by my care team to help me function or work and manage my condition(s).     Are strong medicines and can cause serious side effects such as:    Drowsiness, which can seriously affect my driving ability    A lower breathing rate, enough to cause death    Harm to my thinking ability     Depression     Abuse of and addiction to this medicine    Need to be taken exactly as prescribed. Combining opioids with certain medicines or chemicals (such as illegal drugs, sedatives, sleeping pills, and benzodiazepines) can be dangerous or even fatal. If I stop opioids suddenly, I may have severe withdrawal symptoms.    Do not work for all types of pain nor for all patients. If they re not helpful, I may  be asked to stop them.        The risks, benefits and side effects of these medicine(s) were explained to me. I agree that:  1. I will take part in other treatments as advised by my care team. This may be psychiatry or counseling, physical therapy, behavioral therapy, group treatment or a referral to a specialist.     2. I will keep all my appointments. I understand that this is part of the monitoring of opioids. My care team may require an office visit for EVERY opioid/controlled substance refill. If I miss appointments or don t follow instructions, my care team may stop my medicine.    3. I will take my medicines as prescribed. I will not change the dose or schedule unless my care team tells me to. There will be no refills if I run out early.     4. I may be asked to come to the clinic and complete a urine drug test or complete a pill count at any time. If I don t give a urine sample or participate in a pill count, the care team may stop my medicine.    5. I will only receive prescriptions from this clinic for chronic pain. If I am treated by another provider for acute pain issues, I will tell them that I am taking opioid pain medication for chronic pain and that I have a treatment agreement with this provider. I will inform my [facility] care team within one business day if I am given a prescription for any pain medication by another healthcare provider. My [facility] care team can contact other providers and pharmacists about my use of any medicines.    6. It is up to me to make sure that I don t run out of my medicines on weekends or holidays. If my care team is willing to refill my opioid prescription without a visit, I must request refills only during office hours. Refills may take up to 3 business days to process. I will use one pharmacy to fill all my opioid and other controlled substance prescriptions. I will notify the clinic about any changes to my insurance or medication  availability.    7. I am responsible for my prescriptions. If the medicine/prescription is lost, stolen or destroyed, it will not be replaced. I also agree not to share controlled substance medicines with anyone.    8. I am aware I should not use any illegal or recreational drugs. I agree not to drink alcohol unless my care team says I can.       9. If I enroll in the [place] [facility] Cannabis program, I will tell my care team prior to my next refill.     10. I will tell my care team right away if I become pregnant, have a new medical problem treated outside of my regular clinic, or have a change in my medications.    11. I understand that this medicine can affect my thinking, judgment and reaction time. Alcohol and drugs affect the brain and body, which can affect the safety of my driving. Being under the influence of alcohol or drugs can affect my decision-making, behaviors, personal safety, and the safety of others. Driving while impaired (DWI) can occur if a person is driving, operating, or in physical control of a car, motorcycle, boat, snowmobile, ATV, motorbike, off-road vehicle, or any other motor vehicle ([place] Statute 169A.20). I understand the risk if I choose to drive or operate any vehicle or machinery.    I understand that if I do not follow any of the conditions above, my prescriptions or treatment may be stopped or changed.          Opioids  What You Need to Know    What are opioids?   Opioids are pain medicines that must be prescribed by a doctor. They are also known as narcotics.     Examples are:   1. morphine (MS Contin, Latrice)  2. oxycodone (Oxycontin)  3. oxycodone and acetaminophen (Percocet)  4. hydrocodone and acetaminophen (Vicodin, Norco)   5. fentanyl patch (Duragesic)   6. hydromorphone (Dilaudid)   7. methadone  8. codeine (Tylenol #3)     What do opioids do well?   Opioids are best for severe short-term pain such as after a surgery or injury. They may work well for cancer pain. They may  help some people with long-lasting (chronic) pain.     What do opioids NOT do well?   Opioids never get rid of pain entirely, and they don t work well for most patients with chronic pain. Opioids don t reduce swelling, one of the causes of pain.                                    Other ways to manage chronic pain and improve function include:       Treat the health problem that may be causing pain    Anti-inflammation medicines, which reduce swelling and tenderness, such as ibuprofen (Advil, Motrin) or naproxen (Aleve)    Acetaminophen (Tylenol)    Antidepressants and anti-seizure medicines, especially for nerve pain    Topical treatments such as patches or creams    Injections or nerve blocks    Chiropractic or osteopathic treatment    Acupuncture, massage, deep breathing, meditation, visual imagery, aromatherapy    Use heat or ice at the pain site    Physical therapy     Exercise    Stop smoking    Take part in therapy       Risks and side effects     Talk to your doctor before you start or decide to keep taking opioids. Possible side effects include:      Lowering your breathing rate enough to cause death    Overdose, including death, especially if taking higher than prescribed doses    Worse depression symptoms; less pleasure in things you usually enjoy    Feeling tired or sluggish    Slower thoughts or cloudy thinking    Being more sensitive to pain over time; pain is harder to control    Trouble sleeping or restless sleep    Changes in hormone levels (for example, less testosterone)    Changes in sex drive or ability to have sex    Constipation    Unsafe driving    Itching and sweating    Dizziness    Nausea, throwing up and dry mouth    What else should I know about opioids?    Opioids may lead to dependence, tolerance, or addiction.      Dependence means that if you stop or reduce the medicine too quickly, you will have withdrawal symptoms. These include loose poop (diarrhea), jitters, flu-like symptoms,  nervousness and tremors. Dependence is not the same as addiction.                       Tolerance means needing higher doses over time to get the same effect. This may increase the chance of serious side effects.      Addiction is when people improperly use a substance that harms their body, their mind or their relations with others. Use of opiates can cause a relapse of addiction if you have a history of drug or alcohol abuse.      People who have used opioids for a long time may have a lower quality of life, worse depression, higher levels of pain and more visits to doctors.    You can overdose on opioids. Take these steps to lower your risk of overdose:    1. Recognize the signs:  Signs of overdose include decrease or loss of consciousness (blackout), slowed breathing, trouble waking up and blue lips. If someone is worried about overdose, they should call 911.    2. Talk to your doctor about Narcan (naloxone).   If you are at risk for overdose, you may be given a prescription for Narcan. This medicine very quickly reverses the effects of opioids.   If you overdose, a friend or family member can give you Narcan while waiting for the ambulance. They need to know the signs of overdose and how to give Narcan.     3. Don't use alcohol or street drugs.   Taking them with opioids can cause death.    4. Do not take any of these medicines unless your doctor says it s OK. Taking these with opioids can cause death:    Benzodiazepines, such as lorazepam (Ativan), alprazolam (Xanax) or diazepam (Valium)    Muscle relaxers, such as cyclobenzaprine (Flexeril)    Sleeping pills like zolpidem (Ambien)     Other opioids      How to keep you and other people safe while taking opioids:    1. Never share your opioids with others.  Opioid medicines are regulated by the Drug Enforcement Agency ([employer]ET[employer]). Selling or sharing medications is a criminal act.    2. Be sure to store opioids in a secure place, locked up if possible. Young children  can easily swallow them and overdose.    3. When you are traveling with your medicines, keep them in the original bottles. If you use a pill box, be sure you also carry a copy of your medicine list from your clinic or pharmacy.    4. Safe disposal of opioids    Most pharmacies have places to get rid of medicine, called disposal kiosks. Medicine disposal options are also available in every [facility]. Search your county and  medication disposal  to find more options. You can find more details at:  [email or web address]-[email or web address]-medications     I agree that my provider, clinic care team, and pharmacy may work with any city, state or federal law enforcement agency that investigates the misuse, sale, or other diversion of my controlled medicine. I will allow my provider to discuss my care with, or share a copy of, this agreement with any other treating provider, pharmacy or emergency room where I receive care.    I have read this agreement and have asked questions about anything I did not understand.    _______________________________________________________  Patient Signature - Kylie Austin _____________________                   Date     _______________________________________________________  Provider Signature - Fernando Ruiz MD   _____________________                   Date     _______________________________________________________  Witness Signature (required if provider not present while patient signing)   _____________________                   Date

## 2022-08-22 ENCOUNTER — TELEPHONE (OUTPATIENT)
Dept: ORTHOPEDICS | Facility: CLINIC | Age: 57
End: 2022-08-22

## 2022-08-22 ENCOUNTER — THERAPY VISIT (OUTPATIENT)
Dept: PHYSICAL THERAPY | Facility: CLINIC | Age: 57
End: 2022-08-22
Payer: COMMERCIAL

## 2022-08-22 DIAGNOSIS — M54.12 CERVICAL RADICULITIS: Primary | ICD-10-CM

## 2022-08-22 PROCEDURE — 97140 MANUAL THERAPY 1/> REGIONS: CPT | Mod: GP | Performed by: PHYSICAL THERAPIST

## 2022-08-22 PROCEDURE — 97012 MECHANICAL TRACTION THERAPY: CPT | Mod: GP | Performed by: PHYSICAL THERAPIST

## 2022-08-22 NOTE — PROGRESS NOTES
SUBJECTIVE  Subjective: Saw the neurosurgeon and they decided that because of the persistant distal weakness ACDF at C6-T1 will be performed.  Pt is scheduling for October.  For now needs to work on ROM in PT   Current Pain level: 2/10   Changes in function:  None    Adverse reaction to treatment or activity:  None    OBJECTIVE  Objective: Cervical AROM L rotation limited by pain and stiffness, other motions were functional     ASSESSMENT  Kylie continues to require intervention to meet STG and LTG's: PT  Patient is progressing as expected.  Response to therapy has shown an improvement in  Neck pain but not hand function  Progress made towards STG/LTG?  None    PLAN  Current treatment program is being advanced to more complex exercises.    PTA/ATC plan:  N/A    Please refer to the daily flowsheet for treatment today, total treatment time and time spent performing 1:1 timed codes.

## 2022-08-22 NOTE — TELEPHONE ENCOUNTER
FUTURE VISIT INFORMATION      SURGERY INFORMATION:    TBD in October with Dr. Ruiz    Consult: ov     RECORDS REQUESTED FROM:        Primary Care Provider: Kristin Miner PA-C- [facility]    Most recent EKG+ Tracin20    Most recent Cardiac Stress Test: 14

## 2022-08-22 NOTE — TELEPHONE ENCOUNTER
Patient is scheduled for surgery with Dr. Ruiz    Spoke with: Patient    Date of Surgery: 10/28/22    Location: [facility]    Post op: 6 weeks    Pre op with Provider: Complete    H&P: Scheduled with PAC 9/30/2    Pre-procedure COVID-19 Test: Kieran 10/26/22    Additional imaging/appointments: N/A    Surgery packet: Received in clinic     Additional comments: N/A

## 2022-08-24 ENCOUNTER — ANCILLARY PROCEDURE (OUTPATIENT)
Dept: CT IMAGING | Facility: CLINIC | Age: 57
End: 2022-08-24
Attending: PHYSICIAN ASSISTANT
Payer: COMMERCIAL

## 2022-08-24 DIAGNOSIS — R29.898 LEFT ARM WEAKNESS: ICD-10-CM

## 2022-08-24 DIAGNOSIS — M54.12 CERVICAL RADICULOPATHY: ICD-10-CM

## 2022-08-24 PROCEDURE — 72125 CT NECK SPINE W/O DYE: CPT | Mod: TC | Performed by: RADIOLOGY

## 2022-08-26 NOTE — PROGRESS NOTES
"  Assessment & Plan       ICD-10-CM    1. Chronic cervical pain  M54.2     G89.29    2. H/O gastric bypass  Z98.84 CBC with platelets and differential     Vitamin B12     Vitamin D Deficiency     Ferritin   3. Easy bruising  R23.8 INR   4. Spontaneous ecchymoses   R23.3 INR   5. Abnormal CBC  R79.89 CBC with platelets and differential     Ferritin   6. Vitamin D deficiency  E55.9 Vitamin D Deficiency   7. Encounter for long-term (current) use of medications  Z79.899 BASIC METABOLIC PANEL   8. Class 1 obesity with serious comorbidity and body mass index (BMI) of 31.0 to 31.9 in adult, unspecified obesity type  E66.9 metFORMIN (GLUCOPHAGE XR) 500 MG 24 hr tablet    Z68.31    9. High priority for 2019-nCoV vaccine  Z23        1) Medical cannabis certification renewed today. This has worked very well for her and she would like to continue use. Will be undergoing cervical fusion in October.    2-7) Routine labs in process. Will check a CBC and INR due to easy bruising.    8) Will trial Metformin XR 500mg daily. Follow up weight in 3 months.      Ordering of each unique test         BMI:   Estimated body mass index is 31.72 kg/m  as calculated from the following:    Height as of 8/19/22: 1.727 m (5' 8\").    Weight as of this encounter: 94.6 kg (208 lb 9.6 oz).   Return in about 3 months (around 11/29/2022) for your annual physical, a med check, Metformin f/u, fasting labs, with Kristin, in person.    Kristin Miner PA-C  [facility] DARLENE Espinal is a 57 year old, presenting for the following health issues:  Pain, Bleeding/Bruising, and Weight Problem      History of Present Illness       Reason for visit:  Pain specialist referral and weight management    She eats 4 or more servings of fruits and vegetables daily.She consumes 0 sweetened beverage(s) daily.She exercises with enough effort to increase her heart rate 30 to 60 minutes per day.  She exercises with enough effort to increase " her heart rate 3 or less days per week.   She is taking medications regularly.       Pain History:  When did you first notice your pain? - Chronic Pain   Have you seen this provider for your pain in the past? No   Where in your body do your have pain? All over  Are you seeing anyone else for your pain? Yes - Dr. Ferrell  What makes your pain better? Putting feet up, medical cannabis  What makes your pain worse? Everything  How has pain affected your ability to work? Not currently working - unrelated to pain  Who lives in your household? Wife             PHQ-9 SCORE 11/7/2017 10/16/2019 8/29/2022   PHQ-9 Total Score - - -   PHQ-9 Total Score 6 4 6     MURPHY-7 SCORE 6/2/2017 10/16/2019 8/29/2022   Total Score 5 2 5     PEG Score 12/2/2021 8/29/2022   PEG Total Score 4.33 5       Cervical fusion scheduled for 10/29/22  MM - good relief. Uses PRN, typically BID, sometimes during the middle of the night  Patient was seeing pain clinic on second floor, was told to see PCP for pain management or referral            Easy bruising x 1 year, not worsening, finds bruises on body    Weight management. Weight goes up and down   Would like to try Metformin for weight loss - her wife uses this for insulin resistance         Review of Systems   Constitutional, musculoskeletal, neuro, skin, endocrine and psych systems are negative, except as otherwise noted.      Objective    /82 (BP Location: Left arm, Patient Position: Sitting, Cuff Size: Adult Regular)   Pulse 77   Temp 98  F (36.7  C) (Tympanic)   Resp 18   Wt 94.6 kg (208 lb 9.6 oz)   LMP  (LMP Unknown)   SpO2 99%   Breastfeeding No   BMI 31.72 kg/m    Body mass index is 31.72 kg/m .  Physical Exam   GENERAL: healthy, alert and no distress  MS: no gross musculoskeletal defects noted, no edema  SKIN: no suspicious lesions or rashes  NEURO: Normal strength and tone, mentation intact and speech normal  PSYCH: mentation appears normal, affect normal/bright               .  ..

## 2022-08-29 ENCOUNTER — OFFICE VISIT (OUTPATIENT)
Dept: FAMILY MEDICINE | Facility: CLINIC | Age: 57
End: 2022-08-29
Payer: COMMERCIAL

## 2022-08-29 ENCOUNTER — THERAPY VISIT (OUTPATIENT)
Dept: PHYSICAL THERAPY | Facility: CLINIC | Age: 57
End: 2022-08-29
Payer: COMMERCIAL

## 2022-08-29 VITALS
SYSTOLIC BLOOD PRESSURE: 132 MMHG | DIASTOLIC BLOOD PRESSURE: 82 MMHG | WEIGHT: 208.6 LBS | HEART RATE: 77 BPM | OXYGEN SATURATION: 99 % | RESPIRATION RATE: 18 BRPM | TEMPERATURE: 98 F | BODY MASS INDEX: 31.72 KG/M2

## 2022-08-29 DIAGNOSIS — E55.9 VITAMIN D DEFICIENCY: ICD-10-CM

## 2022-08-29 DIAGNOSIS — R23.3 EASY BRUISING: ICD-10-CM

## 2022-08-29 DIAGNOSIS — Z23 HIGH PRIORITY FOR 2019-NCOV VACCINE: ICD-10-CM

## 2022-08-29 DIAGNOSIS — E66.811 CLASS 1 OBESITY WITH SERIOUS COMORBIDITY AND BODY MASS INDEX (BMI) OF 31.0 TO 31.9 IN ADULT, UNSPECIFIED OBESITY TYPE: ICD-10-CM

## 2022-08-29 DIAGNOSIS — M54.12 CERVICAL RADICULITIS: Primary | ICD-10-CM

## 2022-08-29 DIAGNOSIS — R23.3 SPONTANEOUS ECCHYMOSES: ICD-10-CM

## 2022-08-29 DIAGNOSIS — Z98.84 H/O GASTRIC BYPASS: ICD-10-CM

## 2022-08-29 DIAGNOSIS — G89.29 CHRONIC CERVICAL PAIN: Primary | ICD-10-CM

## 2022-08-29 DIAGNOSIS — R79.89 ABNORMAL CBC: ICD-10-CM

## 2022-08-29 DIAGNOSIS — M54.2 CHRONIC CERVICAL PAIN: Primary | ICD-10-CM

## 2022-08-29 DIAGNOSIS — Z79.899 ENCOUNTER FOR LONG-TERM (CURRENT) USE OF MEDICATIONS: ICD-10-CM

## 2022-08-29 LAB
ANION GAP SERPL CALCULATED.3IONS-SCNC: 7 MMOL/L (ref 3–14)
BASOPHILS # BLD AUTO: 0.1 10E3/UL (ref 0–0.2)
BASOPHILS NFR BLD AUTO: 1 %
BUN SERPL-MCNC: 21 MG/DL (ref 7–30)
CALCIUM SERPL-MCNC: 9.6 MG/DL (ref 8.5–10.1)
CHLORIDE BLD-SCNC: 100 MMOL/L (ref 94–109)
CO2 SERPL-SCNC: 30 MMOL/L (ref 20–32)
CREAT SERPL-MCNC: 0.77 MG/DL (ref 0.52–1.04)
DEPRECATED CALCIDIOL+CALCIFEROL SERPL-MC: 45 UG/L (ref 20–75)
EOSINOPHIL # BLD AUTO: 0.2 10E3/UL (ref 0–0.7)
EOSINOPHIL NFR BLD AUTO: 2 %
ERYTHROCYTE [DISTWIDTH] IN BLOOD BY AUTOMATED COUNT: 14.1 % (ref 10–15)
FERRITIN SERPL-MCNC: 15 NG/ML (ref 8–252)
GFR SERPL CREATININE-BSD FRML MDRD: 88 ML/MIN/1.73M2
GLUCOSE BLD-MCNC: 106 MG/DL (ref 70–99)
HCT VFR BLD AUTO: 43.9 % (ref 35–47)
HGB BLD-MCNC: 14.1 G/DL (ref 11.7–15.7)
INR PPP: 1.09 (ref 0.85–1.15)
LYMPHOCYTES # BLD AUTO: 3.4 10E3/UL (ref 0.8–5.3)
LYMPHOCYTES NFR BLD AUTO: 32 %
MCH RBC QN AUTO: 27.6 PG (ref 26.5–33)
MCHC RBC AUTO-ENTMCNC: 32.1 G/DL (ref 31.5–36.5)
MCV RBC AUTO: 86 FL (ref 78–100)
MONOCYTES # BLD AUTO: 0.8 10E3/UL (ref 0–1.3)
MONOCYTES NFR BLD AUTO: 8 %
NEUTROPHILS # BLD AUTO: 6 10E3/UL (ref 1.6–8.3)
NEUTROPHILS NFR BLD AUTO: 58 %
PLATELET # BLD AUTO: 438 10E3/UL (ref 150–450)
POTASSIUM BLD-SCNC: 3.9 MMOL/L (ref 3.4–5.3)
RBC # BLD AUTO: 5.1 10E6/UL (ref 3.8–5.2)
SODIUM SERPL-SCNC: 137 MMOL/L (ref 133–144)
VIT B12 SERPL-MCNC: 388 PG/ML (ref 232–1245)
WBC # BLD AUTO: 10.3 10E3/UL (ref 4–11)

## 2022-08-29 PROCEDURE — 91306 COVID-19,PF,MODERNA (18+ YRS BOOSTER .25ML): CPT | Performed by: PHYSICIAN ASSISTANT

## 2022-08-29 PROCEDURE — 36415 COLL VENOUS BLD VENIPUNCTURE: CPT | Performed by: PHYSICIAN ASSISTANT

## 2022-08-29 PROCEDURE — 82607 VITAMIN B-12: CPT | Performed by: PHYSICIAN ASSISTANT

## 2022-08-29 PROCEDURE — 85610 PROTHROMBIN TIME: CPT | Performed by: PHYSICIAN ASSISTANT

## 2022-08-29 PROCEDURE — 0064A COVID-19,PF,MODERNA (18+ YRS BOOSTER .25ML): CPT | Performed by: PHYSICIAN ASSISTANT

## 2022-08-29 PROCEDURE — 82728 ASSAY OF FERRITIN: CPT | Performed by: PHYSICIAN ASSISTANT

## 2022-08-29 PROCEDURE — 82306 VITAMIN D 25 HYDROXY: CPT | Performed by: PHYSICIAN ASSISTANT

## 2022-08-29 PROCEDURE — 96127 BRIEF EMOTIONAL/BEHAV ASSMT: CPT | Mod: 59 | Performed by: PHYSICIAN ASSISTANT

## 2022-08-29 PROCEDURE — 97140 MANUAL THERAPY 1/> REGIONS: CPT | Mod: GP | Performed by: PHYSICAL THERAPIST

## 2022-08-29 PROCEDURE — 97012 MECHANICAL TRACTION THERAPY: CPT | Mod: GP | Performed by: PHYSICAL THERAPIST

## 2022-08-29 PROCEDURE — 80048 BASIC METABOLIC PNL TOTAL CA: CPT | Performed by: PHYSICIAN ASSISTANT

## 2022-08-29 PROCEDURE — 99214 OFFICE O/P EST MOD 30 MIN: CPT | Mod: 25 | Performed by: PHYSICIAN ASSISTANT

## 2022-08-29 PROCEDURE — 85025 COMPLETE CBC W/AUTO DIFF WBC: CPT | Performed by: PHYSICIAN ASSISTANT

## 2022-08-29 RX ORDER — METFORMIN HCL 500 MG
500 TABLET, EXTENDED RELEASE 24 HR ORAL
Qty: 90 TABLET | Refills: 0 | Status: SHIPPED | OUTPATIENT
Start: 2022-08-29 | End: 2022-11-28

## 2022-08-29 ASSESSMENT — ANXIETY QUESTIONNAIRES
IF YOU CHECKED OFF ANY PROBLEMS ON THIS QUESTIONNAIRE, HOW DIFFICULT HAVE THESE PROBLEMS MADE IT FOR YOU TO DO YOUR WORK, TAKE CARE OF THINGS AT HOME, OR GET ALONG WITH OTHER PEOPLE: SOMEWHAT DIFFICULT
GAD7 TOTAL SCORE: 5
2. NOT BEING ABLE TO STOP OR CONTROL WORRYING: NOT AT ALL
GAD7 TOTAL SCORE: 5
3. WORRYING TOO MUCH ABOUT DIFFERENT THINGS: NOT AT ALL
7. FEELING AFRAID AS IF SOMETHING AWFUL MIGHT HAPPEN: NOT AT ALL
6. BECOMING EASILY ANNOYED OR IRRITABLE: SEVERAL DAYS
1. FEELING NERVOUS, ANXIOUS, OR ON EDGE: SEVERAL DAYS
5. BEING SO RESTLESS THAT IT IS HARD TO SIT STILL: SEVERAL DAYS
4. TROUBLE RELAXING: MORE THAN HALF THE DAYS

## 2022-08-29 ASSESSMENT — PATIENT HEALTH QUESTIONNAIRE - PHQ9: SUM OF ALL RESPONSES TO PHQ QUESTIONS 1-9: 6

## 2022-08-29 ASSESSMENT — PAIN SCALES - GENERAL: PAINLEVEL: MODERATE PAIN (4)

## 2022-08-30 NOTE — TELEPHONE ENCOUNTER
[facility] Referral      1. Confirmed address with patient? Yes  2. Confirmed phone number with patient? Yes  3. Confirmed referring provider? Yes  4. Is the PCP the same as the referring provider? Yes  5. Has the patient been to any previous pain clinics? No  (If yes, send [facility] with welcome letter)  6. Which insurance are we to bill for this appointment?  [employer]    7. Informed pt of cancellation (48 hour) policy? Yes    REGARDING OPIOID MEDICATIONS: We will always address appropriateness of opioid pain medications, but we generally will not automatically take on a prescribing role. When we do take on prescribing of opioids for chronic pain, it is in collaboration with the referring physician for an intermediate period of time (months), with an expectation that the primary physician or provider will assume the prescribing role if medications are effective at stable doses with demonstrated compliance. Therefore, please do not assume that your prescribing responsibilities end on the day of pain clinic consultation.  7. Informed pt of prescribing policy? Yes      8. Referring Provider: Kristin Miner     Patient is concerned with renewing Medical Marijuana.    Problem: Safety - Adult  Goal: Free from fall injury  Outcome: Progressing

## 2022-09-13 ENCOUNTER — THERAPY VISIT (OUTPATIENT)
Dept: PHYSICAL THERAPY | Facility: CLINIC | Age: 57
End: 2022-09-13
Payer: COMMERCIAL

## 2022-09-13 DIAGNOSIS — M54.12 CERVICAL RADICULITIS: Primary | ICD-10-CM

## 2022-09-13 PROCEDURE — 97140 MANUAL THERAPY 1/> REGIONS: CPT | Mod: GP | Performed by: PHYSICAL THERAPIST

## 2022-09-13 PROCEDURE — 97012 MECHANICAL TRACTION THERAPY: CPT | Mod: GP | Performed by: PHYSICAL THERAPIST

## 2022-09-13 NOTE — PROGRESS NOTES
SUBJECTIVE  Subjective: Plan remains the same- surgery scheduled for 10/28.  Gets some temporary relief from mobilizations and traction.  Today there is more aching in the L upper trap   Current Pain level: 2/10   Changes in function:  Yes (See Goal flowsheet attached for changes in current functional level)     Adverse reaction to treatment or activity:  None    OBJECTIVE  Objective: Mild loss of L rotation and R SB of c-spine.  Neither motion was reproductive of L arm pain.  Palpation of TrP in L UT reproduced today's complaint of aching     ASSESSMENT  Kylie continues to require intervention to meet STG and LTG's: PT  No change of symptoms has been noted since last visit.  Gets temporary relief from manual therapy and traction  Response to therapy has shown lack of progress in  pain level  Progress made towards STG/LTG?  Yes (See Goal flowsheet attached for changes in current functional level) for postural awareness goal    PLAN  Continue current treatment plan until patient demonstrates readiness to progress to higher level exercises.    PTA/ATC plan:  N/A    Please refer to the daily flowsheet for treatment today, total treatment time and time spent performing 1:1 timed codes.

## 2022-09-20 ENCOUNTER — THERAPY VISIT (OUTPATIENT)
Dept: PHYSICAL THERAPY | Facility: CLINIC | Age: 57
End: 2022-09-20
Payer: COMMERCIAL

## 2022-09-20 DIAGNOSIS — M54.12 CERVICAL RADICULITIS: Primary | ICD-10-CM

## 2022-09-20 PROCEDURE — 97012 MECHANICAL TRACTION THERAPY: CPT | Mod: GP | Performed by: PHYSICAL THERAPIST

## 2022-09-20 PROCEDURE — 97140 MANUAL THERAPY 1/> REGIONS: CPT | Mod: GP | Performed by: PHYSICAL THERAPIST

## 2022-09-20 PROCEDURE — 97110 THERAPEUTIC EXERCISES: CPT | Mod: GP | Performed by: PHYSICAL THERAPIST

## 2022-09-21 NOTE — PROGRESS NOTES
Subjective:  HPI  Physical Exam                    Objective:  System    Physical Exam    General     ROS    Assessment/Plan:    PROGRESS  REPORT    Progress reporting period is from 8/1/2022 to 9/20/2022.       SUBJECTIVE  Has been feeling good since the last visit. Did electrical work yesterday and that did not worsen symptoms. Tingling into R hand persists, but pain has been manageable since starting traction.  Pt feels comfortable with home program to continue to progress for surgical intervention.    Changes in function:  None  Adverse reaction to treatment or activity: activity - continued TTP L C7 locally at the joint    OBJECTIVE  Changes noted in objective findings:  The objective findings below are from DOS 9/22/2022.  Objective: Reproduction of pain symptoms during C7 PA. Soft tissue mobility around cervical spine was not as restricted. Good form with minimal cueing for both low row and external rotation exercises.  Pt HEP finalized.     ASSESSMENT/PLAN  Updated problem list and treatment plan: Diagnosis 1:  Cervical radiculopathy     Pain -  hot/cold therapy, US, manual therapy, self management, education and home program  Decreased ROM/flexibility - manual therapy, therapeutic exercise and home program  Decreased joint mobility - manual therapy, therapeutic exercise and home program  Decreased strength - therapeutic exercise, therapeutic activities and home program  Inflammation - cold therapy, US and self management/home program  Impaired muscle performance - neuro re-education  Decreased function - therapeutic activities  Impaired posture - neuro re-education  STG/LTGs have been met or progress has been made towards goals:  Yes (See Goal flow sheet completed today.)  Assessment of Progress: The patient's condition has potential to improve.  Self Management Plans:  Patient has been instructed in a home treatment program.  Patient  has been instructed in self management of symptoms.  I have re-evaluated  this patient and find that the nature, scope, duration and intensity of the therapy is appropriate for the medical condition of the patient.  Kylie continues to require the following intervention to meet STG and LTG's:  PT intervention is no longer required to meet STG/LTG.    Recommendations:  This patient is ready to be discharged from therapy and continue their home treatment program.    Please refer to the daily flowsheet for treatment today, total treatment time and time spent performing 1:1 timed codes.

## 2022-09-29 NOTE — PROGRESS NOTES
Preoperative Assessment Center Medication History Note    Medication history completed on September 29, 2022 by this writer. See Epic admission navigator for prior to admission medications. Operating room staff will still need to confirm medications and last dose information on day of surgery.     Medication history interview sources  Patient interview: Yes  Care Everywhere records: No  Surescripts pharmacy refill records: Yes  Other (if applicable):     Changes made to PTA medication list  Added: none  Deleted: duplicate medical cannibis,   Changed: none    Additional medication history information (including reliability of information, actions taken by pharmacist):    -- No recent (within 30 days) course of antibiotics  -- No recent (within 30 days) course of systemic steroids  -- Reports not being on blood thinning medications    -- Declines being on any other prescription or over-the-counter medications    Prior to Admission medications    Medication Sig Last Dose Taking? Auth Provider Long Term End Date   ARIPiprazole (ABILIFY) 20 MG tablet Take 1 tablet (20 mg) by mouth every morning Taking Yes Kristin Miner PA-C Yes    diclofenac (VOLTAREN) 1 % topical gel Apply 4 g topically 4 times daily as needed for moderate pain Taking Yes Tresa Fowler MD     furosemide (LASIX) 20 MG tablet Take 1 tablet (20 mg) by mouth every morning Taking Yes Kristin Miner PA-C Yes    gabapentin (NEURONTIN) 300 MG capsule Take 3 capsules (900 mg) by mouth At Bedtime Taking Yes Kristin Miner PA-C Yes    medical cannabis (Patient's own supply.  Not a prescription) 2 capsules 2 times daily (This is NOT a prescription, and does not certify that the patient has a qualifying medical condition for medical cannabis.  The purpose of this order is  to document that the patient reports taking medical cannabis.)    Oral tablet - takes 1 tablet three times daily   vape pen - as needed throughout the day Taking  Yes Reported, Patient     meloxicam (MOBIC) 7.5 MG tablet Take 1 tablet (7.5 mg) by mouth daily Taking Yes Mathew Bloom MD Yes    metFORMIN (GLUCOPHAGE XR) 500 MG 24 hr tablet Take 1 tablet (500 mg) by mouth daily (with dinner) Taking Yes Kristin Miner PA-C Yes    simvastatin (ZOCOR) 40 MG tablet Take 1 tablet (40 mg) by mouth At Bedtime Taking Yes Kristin Miner PA-C Yes    venlafaxine (EFFEXOR-XR) 75 MG 24 hr capsule Take 225 mg by mouth every morning  Taking Yes Kristin Miner PA-C Yes    order for DME Equipment being ordered: elbow guard, left - for ulnar neuropathy   Tresa Fowler MD            Medication history completed by: Jarek Loomis [facility]

## 2022-09-30 ENCOUNTER — PRE VISIT (OUTPATIENT)
Dept: SURGERY | Facility: CLINIC | Age: 57
End: 2022-09-30

## 2022-09-30 ENCOUNTER — ANESTHESIA EVENT (OUTPATIENT)
Dept: SURGERY | Facility: CLINIC | Age: 57
End: 2022-09-30

## 2022-09-30 ENCOUNTER — VIRTUAL VISIT (OUTPATIENT)
Dept: SURGERY | Facility: CLINIC | Age: 57
End: 2022-09-30
Payer: COMMERCIAL

## 2022-09-30 DIAGNOSIS — Z01.818 PREOP EXAMINATION: Primary | ICD-10-CM

## 2022-09-30 DIAGNOSIS — M54.12 CERVICAL RADICULOPATHY: ICD-10-CM

## 2022-09-30 DIAGNOSIS — M47.12 CERVICAL SPONDYLOSIS WITH MYELOPATHY: ICD-10-CM

## 2022-09-30 PROCEDURE — 99214 OFFICE O/P EST MOD 30 MIN: CPT | Mod: 95 | Performed by: NURSE PRACTITIONER

## 2022-09-30 ASSESSMENT — ENCOUNTER SYMPTOMS
ORTHOPNEA: 0
DYSRHYTHMIAS: 1

## 2022-09-30 ASSESSMENT — PAIN SCALES - GENERAL: PAINLEVEL: MODERATE PAIN (4)

## 2022-09-30 ASSESSMENT — LIFESTYLE VARIABLES: TOBACCO_USE: 0

## 2022-09-30 NOTE — PROGRESS NOTES
Kylie is a 57 year old who is being evaluated via a billable video visit.      How would you like to obtain your AVS? MyChart      HPI         Review of Systems         Objective    Vitals - Patient Reported  Pain Score: Moderate Pain (4)        Physical Exam     KARLA Long LPN

## 2022-09-30 NOTE — PATIENT INSTRUCTIONS
Preparing for Your Surgery      Name:  Kylie Austin   MRN:  [id number]   :  1965   Today's Date:  2022       Arriving for surgery:  Surgery date:  10/28/22  Arrival time:  05:30 AM     Surgeries and procedures: Adult patients can have 2 visitors all through the surgery process.     Visiting hours: 8 a.m. to 8:30 p.m.     Hospital: Adult patients and children under age 18 can have 4 visitor at a time     No visitors under the age of 5 are allowed for hospital patients.  Double occupancy rooms: Patients can have only two visitors at a time.     Patients with disabilities: Can have a support person with them (family member, service provider     Or someone well informed about their needs) plus the allowed number of visitors     Patients confirmed or suspected to have symptoms of COVID 19 or flu:     No visitors allowed for adult patients.   Children (under age 18) can have 1 named visitor.     People who are sick or showing symptoms of COVID 19 or flu:    Are not allowed to visit patients--we can only make exceptions in special situations.       Please follow these guidelines for your visit:   Arrive wearing a mask over your mouth and nose; we will give you a medical mask to wear    If you arrive wearing a cloth mask.   Keep it on during your entire visit, even when in patient's room.   If you don't wear a mask we'll ask you to leave.     Clean your hands with alcohol hand . Do this when you arrive at and leave the building and patient room,    And again after you touch your mask or anything in the room.     You can t visit if you have a fever, cough, shortness of breath, muscle aches, headaches, sore throat    Or diarrhea      Stay 6 feet away from others during your visit and between visits     Go directly to and from the room you are visiting.     Stay in the patient s room during your visit. Limit going to other places in the hospital as much as possible     Leave bags and jackets  at home or in the car.     For everyone s health, please don t come and go during your visit. That includes for smoking   during your visit.     Please come to:  [facility] [address]  [address] [place]  [address]  - parking is available in front of [facility] from 5:15AM to 8:00PM. If you prefer, park your car in the Green Lot.  -Proceed to the 3rd floor, check in at the Adult Surgery Waiting Lounge. [phone]    If an escort is needed stop at the Information Desk in the lobby. Inform the information person that you are here for surgery. An escort to the Adult Surgery Waiting Lounge will be provided.    What can I eat or drink?  -  You may eat and drink normally for up to 8 hours before your surgery.   -  You may have clear liquids until 2 hours before surgery.     Examples of clear liquids:  Water  Clear broth  Juices (apple, white grape, white cranberry  and cider) without pulp  Noncarbonated, powder based beverages  (lemonade and Ney-Aid)  Sodas (Sprite, 7-Up, ginger ale and seltzer)  Coffee or tea (without milk or cream)  Gatorade    -  No Alcohol for at least 24 hours before surgery.     Which medicines can I take?  Hold Aspirin for 7 days before surgery.   Hold Multivitamins for 7 days before surgery.  Hold Supplements for 7 days before surgery.  Hold Ibuprofen (Advil, Motrin) for 1 day before surgery--unless otherwise directed by surgeon.  Hold Naproxen (Aleve) for 4 days before surgery.  HOLD MOBIC (MELOXICAM) X 10 DAYS BEFORE SURGERY.  HOLD CANNABIS X 24 HOURS BEFORE SURGERY.  -  DO NOT take these medications the day of surgery:   lasix.    -  PLEASE TAKE these medications the day of surgery:  Tylenol if needed; take other morning medications.    How do I prepare myself?  - Please take 2 showers before surgery using Scrubcare or Hibiclens soap.    Use this soap only from the neck to your toes.     Leave the soap on  your skin for one minute--then rinse thoroughly.      You may use your own shampoo and conditioner. No other hair products.   - Please remove all jewelry and body piercings.  - No lotions, deodorants or fragrance.  - No makeup or fingernail polish.   - Bring your ID and insurance card.    -If you have a Deep Brain Stimulator, Spinal Cord Stimulator, or any Neuro Stimulator device---you must bring the remote control to the hospital.            Questions or Concerns:    - For any questions regarding the day of surgery or your hospital stay, please contact the Pre Admission Nursing Office at [phone].       - If you have health changes between today and your surgery, please call your surgeon.       - For questions after surgery, please call your surgeons office.

## 2022-09-30 NOTE — H&P
Pre-Operative H & P     CC:  Preoperative exam to assess for increased cardiopulmonary risk while undergoing surgery and anesthesia.    Date of Encounter: 9/30/2022  Primary Care Physician:  Kristin Miner     Reason for visit:   Encounter Diagnoses   Name Primary?     Preop examination Yes     Cervical spondylosis with myelopathy      Cervical radiculopathy        DAVID Austin is a 57 year old female who presents for pre-operative H & P in preparation for  Procedure Information     Date/Time: 10/28/22 @ 0730     Procedure:   Cervical 6 to Thoracic 1 anterior cervical decompression and fusion with medtronic plate and screws, interbody device, use of fluoroscopy, microscope N/A General   and left sided iliac crest autograft           Anesthesia type: general    Pre-op diagnosis: cervical spondylosis with myelopathy, cervical radiculopathy at C8    Location: [facility]    Providers: Dr. Ruiz          Kylie Austin is a 57 year old female with history of atrial fibrillation, hyperlipidemia, sleep apnea, essential tremors, fibromyalgia, obesity, gender dysphoria, marijuana use, anxiety, depression and bipolar disorder that has chronic neck pain, cervical spondylosis with myelopathy and cervical radiculopathy at C8.  She notes that she has had one injection and has done physical therapy, but continues to have neck pain and LUE radiculopathy symptoms.  She has consulted with Dr. Ruiz and the above listed procedure has been recommended for treatment.     History is obtained from the patient and chart review    Hx of abnormal bleeding or anti-platelet use: none    Menstrual history: No LMP recorded (lmp unknown). Patient is postmenopausal.:     Past Medical History  Past Medical History:   Diagnosis Date     Anxiety 2005    Brought on by Amioderone     Arthritis 08/2005     Atrial fib/flutter, transient     S/p cardioversion 2004      Bipolar 2 disorder (H)      Bleeding disorder (H) 1987    nose bleeds     Chronic pain     LUE pain     Congestive heart failure (H) 11/18/2004    cured 2005     Depressive disorder     on and off most of my life!     Fibromyalgia      Gender identity disorder Started Rx 2012     H/O hypogonadism Gonadectomy 2013     Hyperlipidemia      Hypertension      Other mental problems     bipolar     Psoriasis      Sleep apnea      Uncomplicated asthma 1964    cured in 4/2001     Vision disorder 06/2005       Past Surgical History  Past Surgical History:   Procedure Laterality Date     ARTHROPLASTY SHOULDER Left 1/22/2020    Procedure: Left Total shoulder arthroplasty, distal clavicle excision;  Surgeon: Omari Tobin MD;  Location: [facility]     BIOPSY  2005, 8/8/13    Stomach, colon     COLONOSCOPY  8/8/2013    Procedure: COLONOSCOPY;  COLONSCOPY SCREEN/ SE;  Surgeon: Santino Sun MD;  Location: [facility]     COLONOSCOPY N/A 1/4/2022    Procedure: COLONOSCOPY, WITH POLYPECTOMY AND BIOPSY;  Surgeon: Dallas Velazco MD;  Location: [facility]     COLONOSCOPY WITH CO2 INSUFFLATION N/A 11/1/2018    Procedure: COLONOSCOPY WITH CO2 INSUFFLATION;  Surgeon: Santino Sun MD;  [facility]: [facility]     COLONOSCOPY WITH CO2 INSUFFLATION N/A 12/15/2020    Procedure: COLONOSCOPY, WITH CO2 INSUFFLATION;  Surgeon: Nima Kamara MD;  Location: [facility]     Gender Reassignment  05/2016     HEAD & NECK SURGERY  2015    ablation of nerve from neck to left arm     INJECT EPIDURAL CERVICAL N/A 7/29/2022    Procedure: INJECTION, SPINE, CERVICAL, EPIDURAL T1-T2;  Surgeon: Kenya Ferrell MD;  Location: [facility]     LAPAROSCOPIC GASTRIC SLEEVE N/A 4/10/2018    Procedure: LAPAROSCOPIC GASTRIC SLEEVE;  Laparoscopic Sleeve Gastrectomy;  Surgeon: Jani Shah MD;  Location: [facility]     MANDIBLE SURGERY  1986     ORCHIECTOMY INGUINAL BILATERAL  7/16/2013    Procedure: ORCHIECTOMY INGUINAL BILATERAL;  Bilateral  Simple Inguinal Orchiectomy ;  Surgeon: Jan Garrett MD;  Location: [facility]     TONSILLECTOMY         Prior to Admission Medications  Current Outpatient Medications   Medication Sig Dispense Refill     ARIPiprazole (ABILIFY) 20 MG tablet Take 1 tablet (20 mg) by mouth every morning       diclofenac (VOLTAREN) 1 % topical gel Apply 4 g topically 4 times daily as needed for moderate pain 200 g 11     furosemide (LASIX) 20 MG tablet Take 1 tablet (20 mg) by mouth every morning 90 tablet 3     gabapentin (NEURONTIN) 300 MG capsule Take 3 capsules (900 mg) by mouth At Bedtime 1 capsule 0     medical cannabis (Patient's own supply.  Not a prescription) 2 capsules 2 times daily (This is NOT a prescription, and does not certify that the patient has a qualifying medical condition for medical cannabis.  The purpose of this order is  to document that the patient reports taking medical cannabis.)    Oral tablet - takes 1 tablet three times daily   vape pen - as needed throughout the day       meloxicam (MOBIC) 7.5 MG tablet Take 1 tablet (7.5 mg) by mouth daily 30 tablet 1     metFORMIN (GLUCOPHAGE XR) 500 MG 24 hr tablet Take 1 tablet (500 mg) by mouth daily (with dinner) 90 tablet 0     simvastatin (ZOCOR) 40 MG tablet Take 1 tablet (40 mg) by mouth At Bedtime 90 tablet 3     venlafaxine (EFFEXOR-XR) 75 MG 24 hr capsule Take 225 mg by mouth every morning        order for DME Equipment being ordered: elbow guard, left - for ulnar neuropathy 1 Units 3       Allergies  Allergies   Allergen Reactions     Amiodarone      Caused pain fatigue/amplified anxiety and depression     Fluoxetine Other (See Comments)     Night terrors     Tetracycline      Teeth rattle/saliva acidic       Social History  Social History     Socioeconomic History     Marital status:      Spouse name: Not on file     Number of children: 3     Years of education: Not on file     Highest education level: Not on file   Occupational History      Occupation: retired   Tobacco Use     Smoking status: Never     Smokeless tobacco: Never     Tobacco comments:     NEVER SMOKED! Grew up with heavy smokers which did not help my Asthma!   Vaping Use     Vaping Use: Every day     Substances: THC, CBD, medical cannabis     Devices: Disposable   Substance and Sexual Activity     Alcohol use: Not Currently     Comment: occasional//2 per month     Drug use: Yes     Types: Marijuana     Comment: medical marijuana, daily     Sexual activity: Not Currently     Partners: Female     Birth control/protection: Post-menopausal, None     Comment: Yes enjoying it GREATLY!   Other Topics Concern     Parent/sibling w/ CABG, MI or angioplasty before 65F 55M? Yes     Comment: father   Social History Narrative     Not on file     Social Determinants of Health     Financial Resource Strain: Not on file   Food Insecurity: Not on file   Transportation Needs: Not on file   Physical Activity: Not on file   Stress: Not on file   Social Connections: Not on file   Intimate Partner Violence: Not on file   Housing Stability: Not on file       Family History  Family History   Problem Relation Age of Onset     Breast Cancer Mother      Cancer Father         skin     Diabetes Father         Was on the patch when they were new     Heart Disease Father 55        scd     Coronary Artery Disease Father      Hypertension Father      Hyperlipidemia Father      Diabetes Sister      Diabetes Sister      Thyroid Disease Sister      Osteoporosis Maternal Grandmother      Alzheimer Disease Paternal Grandmother      Diabetes Paternal Grandmother      Mental Illness Paternal Grandmother         alshimers     Osteoporosis Paternal Grandmother      Coronary Artery Disease Paternal Grandfather      Hypertension Paternal Grandfather      Hyperlipidemia Paternal Grandfather      Prostate Cancer Paternal Grandfather      Other Cancer Paternal Grandfather      Depression Daughter      Unknown/Adopted Daughter       Depression Daughter      Mental Illness Daughter         bipolar     Anxiety Disorder Daughter      Anxiety Disorder Daughter      Depression Son      Heart Disease Other      Anesthesia Reaction No family hx of      Deep Vein Thrombosis (DVT) No family hx of        Review of Systems  The complete review of systems is negative other than noted in the HPI or here.   Anesthesia Evaluation   Pt has had prior anesthetic.     No history of anesthetic complications       ROS/MED HX  ENT/Pulmonary:     (+) sleep apnea, doesn't use CPAP,  (-) tobacco use, asthma and recent URI   Neurologic: Comment: Generalized essential tremors      Cardiovascular:     (+) Dyslipidemia -----dysrhythmias, a-fib, Previous cardiac testing   Echo: Date: 2020 Results:  See H&P  Stress Test: Date: 2020 Results:  See H&P  ECG Reviewed: Date: Results:    Cath: Date: Results:   (-) CHF, OLIVAREZ, orthopnea/PND and irregular heartbeat/palpitations   METS/Exercise Tolerance: >4 METS    Hematologic:  - neg hematologic  ROS  (-) history of blood clots and history of blood transfusion   Musculoskeletal: Comment: Generalized joint pain from OA    Neck pain, LUE radiculopathy  (+) arthritis,     GI/Hepatic:  - neg GI/hepatic ROS     Renal/Genitourinary:  - neg Renal ROS     Endo: Comment: Gender dysphoria s/p bottom surgery    (+) Obesity,     Psychiatric/Substance Use:     (+) psychiatric history anxiety, depression and bipolar     Infectious Disease:    (-) Recent Fever   Malignancy:  - neg malignancy ROS     Other:  - neg other ROS          Virtual visit -  No vitals were obtained    Physical Exam  Constitutional: Awake, alert, cooperative, no apparent distress, and appears stated age.  Eyes: Pupils equal  HENT: Normocephalic  Respiratory: non labored breathing   Neurologic: Awake, alert, oriented to name, place and time.   Neuropsychiatric: Calm, cooperative. Normal affect.       Labs/Diagnostic Studies   All labs and imaging personally reviewed     Stress  test  2020  FINAL CONCLUSIONS   1. Myocardial perfusion imaging is NORMAL.   2. The stress ECG was non-diagnostic due to nonspecific STT wave changes   at baseline.   3. Overall left ventricular systolic function was normal without wall   motion abnormalities.   4. The Left Ventricle Ejection Fraction was visually estimated to be 60%   (>55% is considered normal).   5. There are no prior studies available for comparison.     Echocardiogram  2020   Final Conclusion    Visually Estimated EF: 60-65%    Normal left ventricular size. Normal left ventricular systolic function. Normal left   ventricular wall thickness. Estimated EF 60-65%.    Mildly dilated RV with mildly reduced RV systolic function    No significant valvular heart disease noted    Normal inferior vena cava (IVC) size.     EKG  - updated tracing pending    ZIopatch  7/2020  No significant arrhythmias recorded.  Symptomatic episodes corresponded to sinus rhythm with PVCs.  Rare PVCs overall.  **also mention of second degree AV block-mobitz1    Component      Latest Ref Rng & Units 8/29/2022   WBC      4.0 - 11.0 10e3/uL 10.3   RBC Count      3.80 - 5.20 10e6/uL 5.10   Hemoglobin      11.7 - 15.7 g/dL 14.1   Hematocrit      35.0 - 47.0 % 43.9   MCV      78 - 100 fL 86   MCH      26.5 - 33.0 pg 27.6   MCHC      31.5 - 36.5 g/dL 32.1   RDW      10.0 - 15.0 % 14.1   Platelet Count      150 - 450 10e3/uL 438   % Neutrophils      % 58   % Lymphocytes      % 32   % Monocytes      % 8   % Eosinophils      % 2   % Basophils      % 1   Absolute Neutrophils      1.6 - 8.3 10e3/uL 6.0   Absolute Lymphocytes      0.8 - 5.3 10e3/uL 3.4   Absolute Monocytes      0.0 - 1.3 10e3/uL 0.8   Absolute Eosinophils      0.0 - 0.7 10e3/uL 0.2   Absolute Basophils      0.0 - 0.2 10e3/uL 0.1   Sodium      133 - 144 mmol/L 137   Potassium      3.4 - 5.3 mmol/L 3.9   Chloride      94 - 109 mmol/L 100   Carbon Dioxide      20 - 32 mmol/L 30   Anion Gap      3 - 14 mmol/L 7   Urea  Nitrogen      7 - 30 mg/dL 21   Creatinine      0.52 - 1.04 mg/dL 0.77   Calcium      8.5 - 10.1 mg/dL 9.6   Glucose      70 - 99 mg/dL 106 (H)   GFR Estimate      >60 mL/min/1.73m2 88   Ferritin      8 - 252 ng/mL 15     The patient's records and results personally reviewed by this provider.     Outside records reviewed from: Care Everywhere      Assessment      Kylie Austin is a 57 year old female seen as a PAC referral for risk assessment and optimization for anesthesia.    Plan/Recommendations  Pt will be optimized for the proposed procedure.  See below for details on the assessment, risk, and preoperative recommendations    NEUROLOGY  - No history of TIA, CVA or seizure   -noted essential tremors      -Post Op delirium risk factors:  No risk identified    ENT  - No current airway concerns.  Will need to be reassessed day of surgery.  Mallampati: Unable to assess  TM: Unable to assess    CARDIAC  - s/p cardioversion for A-fib in 2004/2005.  Reports no known reoccurance since.   - see previous cardiac testing above  - noted second degree AV block-Mobitz 1 on previous ziopatch, but EKG since then with no notation of that.  Will have patient get an updated EKG at her local [place] clinic for recheck prior to surgery.   - on lasix for lower extremity edema.  Hold DOS    - METS (Metabolic Equivalents) = >4  Patient performs 4 or more METS exercise without symptoms            Total Score: 0      RCRI-Very low risk: Class 1 0.4% complication rate            Total Score: 0        PULMONARY  - Obstructive Sleep Apnea  DEVANTE without home CPAP use.    - reports a very remote history of asthma, that previously resolved.  No recent asthma symptoms.  no asthma medications prescribed.   - Tobacco History      History   Smoking Status     Never   Smokeless Tobacco     Never     Comment: NEVER SMOKED! Grew up with heavy smokers which did not help my Asthma!       GI  - denies GERD  PONV High Risk  Total Score: 3       "     1 AN PONV: Pt is Female    1 AN PONV: Patient is not a current smoker    1 AN PONV: Intended Post Op Opioids        /RENAL  -Creatinine  WNL    ENDOCRINE    - BMI: Estimated body mass index is 31.72 kg/m  as calculated from the following:    Height as of 8/19/22: 1.727 m (5' 8\").    Weight as of 8/29/22: 94.6 kg (208 lb 9.6 oz).  Obesity (BMI >30)  - No history of Diabetes Mellitus     - on metformin for weight management; hold DOS    - s/p gender reassignment surgery for gender dysphoria    HEME  VTE Low Risk 0.5%            Total Score: 2    VTE: Male      - No history of abnormal bleeding or antiplatelet use.  -T&S ordered to be completed prior to surgery.    MSK  - chronic neck pain, multiple joint pain and fibromyalgia.  Consider cautious positioning.  - hold mobic 10 days prior to surgery    PSYCH  - hold marijuana 24-48 hours prior to surgery.      Patient was discussed with Dr. Mccord    The patient is optimized for their procedure. AVS with information on surgery time/arrival time, meds and NPO status given by nursing staff. No further diagnostic testing indicated.    Please refer to the physical examination documented by the anesthesiologist in the anesthesia record on the day of surgery.    Video-Visit Details    Type of service:  Video Visit    Patient verbally consented to video service today: YES    Video Start Time: 0754  Video End Time (time video stopped): 0808    Originating Location (pt. Location): Home    Distant Location (provider location):provider's home    Mode of Communication:  Video Conference via [employer]     On the day of service:     Prep time: 20 minutes  Visit time: 14 minutes  Documentation time: 5 minutes  ------------------------------------------  Total time: 39 minutes      TALIB Olea CNP  Preoperative Assessment Center  [facility]  Phone: [phone]  Fax: [phone]  "

## 2022-10-04 ENCOUNTER — MYC MEDICAL ADVICE (OUTPATIENT)
Dept: SURGERY | Facility: CLINIC | Age: 57
End: 2022-10-04

## 2022-10-04 NOTE — TELEPHONE ENCOUNTER
Updated Kylie of her upcoming EKG and lab appointments in [place], (see my chart message).  Kylie expressed understanding.  Alivia Louis RN

## 2022-10-23 DIAGNOSIS — M79.89 LEG SWELLING: ICD-10-CM

## 2022-10-23 DIAGNOSIS — E78.5 HYPERLIPIDEMIA LDL GOAL <130: ICD-10-CM

## 2022-10-24 LAB
ABO/RH(D): NORMAL
ANTIBODY SCREEN: NEGATIVE
SPECIMEN EXPIRATION DATE: NORMAL

## 2022-10-24 RX ORDER — SIMVASTATIN 40 MG
TABLET ORAL
Qty: 90 TABLET | Refills: 0 | Status: SHIPPED | OUTPATIENT
Start: 2022-10-24 | End: 2022-11-28

## 2022-10-24 RX ORDER — FUROSEMIDE 20 MG
TABLET ORAL
Qty: 90 TABLET | Refills: 0 | Status: SHIPPED | OUTPATIENT
Start: 2022-10-24 | End: 2023-04-18

## 2022-10-25 ENCOUNTER — OFFICE VISIT (OUTPATIENT)
Dept: FAMILY MEDICINE | Facility: CLINIC | Age: 57
End: 2022-10-25
Payer: COMMERCIAL

## 2022-10-25 ENCOUNTER — LAB (OUTPATIENT)
Dept: LAB | Facility: CLINIC | Age: 57
End: 2022-10-25
Payer: COMMERCIAL

## 2022-10-25 VITALS
HEART RATE: 73 BPM | WEIGHT: 210 LBS | TEMPERATURE: 97.5 F | BODY MASS INDEX: 31.83 KG/M2 | OXYGEN SATURATION: 98 % | SYSTOLIC BLOOD PRESSURE: 110 MMHG | DIASTOLIC BLOOD PRESSURE: 72 MMHG | HEIGHT: 68 IN

## 2022-10-25 DIAGNOSIS — Z01.818 PRE-OP EXAM: Primary | ICD-10-CM

## 2022-10-25 DIAGNOSIS — E78.5 HYPERLIPIDEMIA LDL GOAL <130: ICD-10-CM

## 2022-10-25 DIAGNOSIS — Z12.5 SCREENING FOR PROSTATE CANCER: ICD-10-CM

## 2022-10-25 DIAGNOSIS — Z86.79 PERSONAL HISTORY OF CARDIAC ARRHYTHMIA: ICD-10-CM

## 2022-10-25 DIAGNOSIS — Z13.220 SCREENING FOR HYPERLIPIDEMIA: ICD-10-CM

## 2022-10-25 DIAGNOSIS — Z01.818 PREOP EXAMINATION: ICD-10-CM

## 2022-10-25 LAB
CHOLEST SERPL-MCNC: 183 MG/DL
CREAT UR-MCNC: 73 MG/DL
FASTING STATUS PATIENT QL REPORTED: NO
HDLC SERPL-MCNC: 44 MG/DL
LDLC SERPL CALC-MCNC: 99 MG/DL
MICROALBUMIN UR-MCNC: <5 MG/L
MICROALBUMIN/CREAT UR: NORMAL MG/G{CREAT}
NONHDLC SERPL-MCNC: 139 MG/DL
PSA SERPL-MCNC: <0.01 UG/L
TRIGL SERPL-MCNC: 200 MG/DL

## 2022-10-25 PROCEDURE — 99213 OFFICE O/P EST LOW 20 MIN: CPT | Performed by: NURSE PRACTITIONER

## 2022-10-25 PROCEDURE — 82043 UR ALBUMIN QUANTITATIVE: CPT

## 2022-10-25 PROCEDURE — 86850 RBC ANTIBODY SCREEN: CPT

## 2022-10-25 PROCEDURE — 86900 BLOOD TYPING SEROLOGIC ABO: CPT

## 2022-10-25 PROCEDURE — 86901 BLOOD TYPING SEROLOGIC RH(D): CPT

## 2022-10-25 PROCEDURE — 36415 COLL VENOUS BLD VENIPUNCTURE: CPT

## 2022-10-25 PROCEDURE — G0103 PSA SCREENING: HCPCS | Mod: KX

## 2022-10-25 PROCEDURE — 93000 ELECTROCARDIOGRAM COMPLETE: CPT | Performed by: NURSE PRACTITIONER

## 2022-10-25 PROCEDURE — 80061 LIPID PANEL: CPT

## 2022-10-25 NOTE — PROGRESS NOTES
"  Assessment & Plan     Pre-op exam / Personal history of cardiac arrhythmia  EKG is stable and unchanged from previous  Has already been cleared for surgery, virtual visit 9/30/22  - EKG 12-lead complete w/read - Clinics       BMI:   Estimated body mass index is 31.93 kg/m  as calculated from the following:    Height as of this encounter: 1.727 m (5' 8\").    Weight as of this encounter: 95.3 kg (210 lb).   Weight management plan: not addressed today        Return in about 4 weeks (around 11/22/2022) for Medicare Wellness exam, or sooner if concerns.    Radha Hudson, [facility]    William Espinal is a 57 year old, presenting for the following health issues:  Pre-Op Exam (EKG)      History of Present Illness       Reason for visit:  Ekg    She eats 2-3 servings of fruits and vegetables daily.She consumes 0 sweetened beverage(s) daily.She exercises with enough effort to increase her heart rate 9 or less minutes per day.  She exercises with enough effort to increase her heart rate 3 or less days per week.   She is taking medications regularly.       Patient is here for EKG only.   Will be having surgery-   Cervical 6 to Thoracic 1 anterior cervical decompression and fusion with medtronic plate and screws, interbody device, use of fluoroscopy, microscope N/A General   and left sided iliac crest autograft       She already had a virtual preop, and was told to come in for an EKG.    History of s/p cardioversion for A-fib in 2004/2005.  Reports no known reoccurance since. Noted second degree AV block-Mobitz 1 on previous ziopatch, but EKG since then with no notation of that.   Patient denies any cardiopulmonary complaints.       Review of Systems   Constitutional, HEENT, cardiovascular, pulmonary, gi and gu systems are negative, except as otherwise noted.      Objective    /72   Pulse 73   Temp 97.5  F (36.4  C)   Ht 1.727 m (5' 8\")   Wt 95.3 kg (210 lb)   LMP  (LMP Unknown)  "  SpO2 98%   BMI 31.93 kg/m    Body mass index is 31.93 kg/m .  Physical Exam   GENERAL: healthy, alert and no distress  EYES: Eyes grossly normal to inspection, PERRL and conjunctivae and sclerae normal  HENT: normal cephalic/atraumatic, nose and mouth without ulcers or lesions, oropharynx clear and oral mucous membranes moist  NECK: no adenopathy, no asymmetry, masses, or scars and thyroid normal to palpation  RESP: lungs clear to auscultation - no rales, rhonchi or wheezes  CV: regular rate and rhythm, normal S1 S2, no S3 or S4, no murmur, click or rub, no peripheral edema and peripheral pulses strong  MS: no gross musculoskeletal defects noted, no edema  SKIN: no suspicious lesions or rashes  NEURO: Normal strength and tone, sensory exam grossly normal and mentation intact  PSYCH: mentation appears normal, affect normal/bright    Labs reviewed in Epic    EKG 10/25/22-  SR with HR 71, no changes c/w ischemia, unchanged from previous        Stress test  2020  FINAL CONCLUSIONS   1. Myocardial perfusion imaging is NORMAL.   2. The stress ECG was non-diagnostic due to nonspecific STT wave changes   at baseline.   3. Overall left ventricular systolic function was normal without wall   motion abnormalities.   4. The Left Ventricle Ejection Fraction was visually estimated to be 60%   (>55% is considered normal).   5. There are no prior studies available for comparison.      Echocardiogram  2020   Final Conclusion    Visually Estimated EF: 60-65%    Normal left ventricular size. Normal left ventricular systolic function. Normal left   ventricular wall thickness. Estimated EF 60-65%.    Mildly dilated RV with mildly reduced RV systolic function    No significant valvular heart disease noted    Normal inferior vena cava (IVC) size.

## 2022-10-26 ENCOUNTER — ANESTHESIA EVENT (OUTPATIENT)
Dept: SURGERY | Facility: CLINIC | Age: 57
End: 2022-10-26
Payer: COMMERCIAL

## 2022-10-26 ENCOUNTER — LAB (OUTPATIENT)
Dept: LAB | Facility: CLINIC | Age: 57
End: 2022-10-26
Payer: COMMERCIAL

## 2022-10-26 DIAGNOSIS — Z20.822 ENCOUNTER FOR LABORATORY TESTING FOR COVID-19 VIRUS: ICD-10-CM

## 2022-10-26 PROCEDURE — U0005 INFEC AGEN DETEC AMPLI PROBE: HCPCS

## 2022-10-26 PROCEDURE — U0003 INFECTIOUS AGENT DETECTION BY NUCLEIC ACID (DNA OR RNA); SEVERE ACUTE RESPIRATORY SYNDROME CORONAVIRUS 2 (SARS-COV-2) (CORONAVIRUS DISEASE [COVID-19]), AMPLIFIED PROBE TECHNIQUE, MAKING USE OF HIGH THROUGHPUT TECHNOLOGIES AS DESCRIBED BY CMS-2020-01-R: HCPCS

## 2022-10-26 ASSESSMENT — ENCOUNTER SYMPTOMS
ORTHOPNEA: 0
DYSRHYTHMIAS: 1

## 2022-10-26 ASSESSMENT — LIFESTYLE VARIABLES: TOBACCO_USE: 0

## 2022-10-26 NOTE — ANESTHESIA PREPROCEDURE EVALUATION
Pre-Operative H & P     CC:  Preoperative exam to assess for increased cardiopulmonary risk while undergoing surgery and anesthesia.    Date of Encounter: 9/30/2022  Primary Care Physician:  Kristin Miner     Reason for visit:   No diagnosis found.    DAVID Austin is a 57 year old female who presents for pre-operative H & P in preparation for  Procedure Information     Date/Time: 10/28/22 @ 0730     Procedure:   Cervical 6 to Thoracic 1 anterior cervical decompression and fusion with medtronic plate and screws, interbody device, use of fluoroscopy, microscope N/A General   and left sided iliac crest autograft           Anesthesia type: general    Pre-op diagnosis: cervical spondylosis with myelopathy, cervical radiculopathy at C8    Location: [facility]    Providers: Dr. Ruiz          Kylie Austin is a 57 year old female with history of atrial fibrillation, hyperlipidemia, sleep apnea, essential tremors, fibromyalgia, obesity, gender dysphoria, marijuana use, anxiety, depression and bipolar disorder that has chronic neck pain, cervical spondylosis with myelopathy and cervical radiculopathy at C8.  She notes that she has had one injection and has done physical therapy, but continues to have neck pain and LUE radiculopathy symptoms.  She has consulted with Dr. Ruiz and the above listed procedure has been recommended for treatment.     History is obtained from the patient and chart review    Hx of abnormal bleeding or anti-platelet use: none    Menstrual history: No LMP recorded (lmp unknown). Patient is postmenopausal.:     Past Medical History  Past Medical History:   Diagnosis Date     Anxiety 2005    Brought on by Amioderone     Arthritis 08/2005     Atrial fib/flutter, transient     S/p cardioversion 2004     Bipolar 2 disorder (H)      Bleeding disorder (H) 1987    nose bleeds     Chronic pain     LUE pain      Congestive heart failure (H) 11/18/2004    cured 2005     Depressive disorder     on and off most of my life!     Fibromyalgia      Gender identity disorder Started Rx 2012     H/O hypogonadism Gonadectomy 2013     Hyperlipidemia      Hypertension      Other mental problems     bipolar     Psoriasis      Sleep apnea      Uncomplicated asthma 1964    cured in 4/2001     Vision disorder 06/2005       Past Surgical History  Past Surgical History:   Procedure Laterality Date     ARTHROPLASTY SHOULDER Left 1/22/2020    Procedure: Left Total shoulder arthroplasty, distal clavicle excision;  Surgeon: Omari Tobin MD;  [facility]     BIOPSY  2005, 8/8/13    Stomach, colon     COLONOSCOPY  8/8/2013    Procedure: COLONOSCOPY;  COLONSCOPY SCREEN/ SE;  Surgeon: Santino Sun MD;  Location: [facility]     COLONOSCOPY N/A 1/4/2022    Procedure: COLONOSCOPY, WITH POLYPECTOMY AND BIOPSY;  Surgeon: Dallas Velazco MD;  Location: [facility]     COLONOSCOPY WITH CO2 INSUFFLATION N/A 11/1/2018    Procedure: COLONOSCOPY WITH CO2 INSUFFLATION;  Surgeon: Santino Sun MD;  [facility]     COLONOSCOPY WITH CO2 INSUFFLATION N/A 12/15/2020    Procedure: COLONOSCOPY, WITH CO2 INSUFFLATION;  Surgeon: Nima Kamara MD;  Location: [facility]     Gender Reassignment  05/2016     HEAD & NECK SURGERY  2015    ablation of nerve from neck to left arm     INJECT EPIDURAL CERVICAL N/A 7/29/2022    Procedure: INJECTION, SPINE, CERVICAL, EPIDURAL T1-T2;  Surgeon: Kenya Ferrell MD;  Location: [facility]     LAPAROSCOPIC GASTRIC SLEEVE N/A 4/10/2018    Procedure: LAPAROSCOPIC GASTRIC SLEEVE;  Laparoscopic Sleeve Gastrectomy;  Surgeon: Jani Shah MD;  Location: [facility]     MANDIBLE SURGERY  1986     ORCHIECTOMY INGUINAL BILATERAL  7/16/2013    Procedure: ORCHIECTOMY INGUINAL BILATERAL;  Bilateral Simple Inguinal Orchiectomy ;  Surgeon: Jan Garrett MD;  Location: [facility]     TONSILLECTOMY          Prior to Admission Medications  Current Outpatient Medications   Medication Sig Dispense Refill     ARIPiprazole (ABILIFY) 20 MG tablet Take 1 tablet (20 mg) by mouth every morning       diclofenac (VOLTAREN) 1 % topical gel Apply 4 g topically 4 times daily as needed for moderate pain 200 g 11     furosemide (LASIX) 20 MG tablet TAKE 1 TABLET BY MOUTH EVERY MORNING 90 tablet 0     gabapentin (NEURONTIN) 300 MG capsule Take 3 capsules (900 mg) by mouth At Bedtime 1 capsule 0     medical cannabis (Patient's own supply.  Not a prescription) 2 capsules 2 times daily (This is NOT a prescription, and does not certify that the patient has a qualifying medical condition for medical cannabis.  The purpose of this order is  to document that the patient reports taking medical cannabis.)    Oral tablet - takes 1 tablet three times daily   vape pen - as needed throughout the day       meloxicam (MOBIC) 7.5 MG tablet Take 1 tablet (7.5 mg) by mouth daily 30 tablet 1     metFORMIN (GLUCOPHAGE XR) 500 MG 24 hr tablet Take 1 tablet (500 mg) by mouth daily (with dinner) 90 tablet 0     order for DME Equipment being ordered: elbow guard, left - for ulnar neuropathy 1 Units 3     simvastatin (ZOCOR) 40 MG tablet TAKE 1 TABLET BY MOUTH AT BEDTIME 90 tablet 0     venlafaxine (EFFEXOR-XR) 75 MG 24 hr capsule Take 225 mg by mouth every morning          Allergies  Allergies   Allergen Reactions     Amiodarone      Caused pain fatigue/amplified anxiety and depression     Fluoxetine Other (See Comments)     Night terrors     Tetracycline      Teeth rattle/saliva acidic       Social History  Social History     Socioeconomic History     Marital status:      Spouse name: Not on file     Number of children: 3     Years of education: Not on file     Highest education level: Not on file   Occupational History     Occupation: retired   Tobacco Use     Smoking status: Never     Smokeless tobacco: Never     Tobacco comments:     NEVER  SMOKED! Grew up with heavy smokers which did not help my Asthma!   Vaping Use     Vaping Use: Every day     Substances: THC, CBD, medical cannabis     Devices: Disposable   Substance and Sexual Activity     Alcohol use: Not Currently     Comment: occasional//2 per month     Drug use: Yes     Types: Marijuana     Comment: medical marijuana, daily     Sexual activity: Not Currently     Partners: Female     Birth control/protection: Post-menopausal, None     Comment: Yes enjoying it GREATLY!   Other Topics Concern     Parent/sibling w/ CABG, MI or angioplasty before 65F 55M? Yes     Comment: father   Social History Narrative     Not on file     Social Determinants of Health     Financial Resource Strain: Not on file   Food Insecurity: Not on file   Transportation Needs: Not on file   Physical Activity: Not on file   Stress: Not on file   Social Connections: Not on file   Intimate Partner Violence: Not on file   Housing Stability: Not on file       Family History  Family History   Problem Relation Age of Onset     Breast Cancer Mother      Cancer Father         skin     Diabetes Father         Was on the patch when they were new     Heart Disease Father 55        scd     Coronary Artery Disease Father      Hypertension Father      Hyperlipidemia Father      Diabetes Sister      Diabetes Sister      Thyroid Disease Sister      Osteoporosis Maternal Grandmother      Alzheimer Disease Paternal Grandmother      Diabetes Paternal Grandmother      Mental Illness Paternal Grandmother         alshimers     Osteoporosis Paternal Grandmother      Coronary Artery Disease Paternal Grandfather      Hypertension Paternal Grandfather      Hyperlipidemia Paternal Grandfather      Prostate Cancer Paternal Grandfather      Other Cancer Paternal Grandfather      Depression Daughter      Unknown/Adopted Daughter      Depression Daughter      Mental Illness Daughter         bipolar     Anxiety Disorder Daughter      Anxiety Disorder  Daughter      Depression Son      Heart Disease Other      Anesthesia Reaction No family hx of      Deep Vein Thrombosis (DVT) No family hx of        Review of Systems  The complete review of systems is negative other than noted in the HPI or here.   Anesthesia Evaluation   Pt has had prior anesthetic.     No history of anesthetic complications       ROS/MED HX  ENT/Pulmonary:     (+) sleep apnea, doesn't use CPAP,  (-) tobacco use, asthma and recent URI   Neurologic: Comment: Generalized essential tremors      Cardiovascular:     (+) Dyslipidemia -----dysrhythmias, a-fib, Previous cardiac testing   Echo: Date: 2020 Results:  See H&P  Stress Test: Date: 2020 Results:  See H&P  ECG Reviewed: Date: Results:    Cath: Date: Results:   (-) CHF, OLIVAREZ, orthopnea/PND and irregular heartbeat/palpitations   METS/Exercise Tolerance: >4 METS    Hematologic:  - neg hematologic  ROS  (-) history of blood clots and history of blood transfusion   Musculoskeletal: Comment: Generalized joint pain from OA    Neck pain, LUE radiculopathy  (+) arthritis,     GI/Hepatic:  - neg GI/hepatic ROS     Renal/Genitourinary:  - neg Renal ROS     Endo: Comment: Gender dysphoria s/p bottom surgery    (+) Obesity,     Psychiatric/Substance Use:     (+) psychiatric history anxiety, depression and bipolar     Infectious Disease:    (-) Recent Fever   Malignancy:  - neg malignancy ROS     Other:  - neg other ROS          Virtual visit -  No vitals were obtained    Physical Exam  Constitutional: Awake, alert, cooperative, no apparent distress, and appears stated age.  Eyes: Pupils equal  HENT: Normocephalic  Respiratory: non labored breathing   Neurologic: Awake, alert, oriented to name, place and time.   Neuropsychiatric: Calm, cooperative. Normal affect.       Labs/Diagnostic Studies   All labs and imaging personally reviewed     Stress test  2020  FINAL CONCLUSIONS   1. Myocardial perfusion imaging is NORMAL.   2. The stress ECG was non-diagnostic  due to nonspecific STT wave changes   at baseline.   3. Overall left ventricular systolic function was normal without wall   motion abnormalities.   4. The Left Ventricle Ejection Fraction was visually estimated to be 60%   (>55% is considered normal).   5. There are no prior studies available for comparison.     Echocardiogram  2020   Final Conclusion    Visually Estimated EF: 60-65%    Normal left ventricular size. Normal left ventricular systolic function. Normal left   ventricular wall thickness. Estimated EF 60-65%.    Mildly dilated RV with mildly reduced RV systolic function    No significant valvular heart disease noted    Normal inferior vena cava (IVC) size.     EKG  - updated tracing pending    ZIopatch  7/2020  No significant arrhythmias recorded.  Symptomatic episodes corresponded to sinus rhythm with PVCs.  Rare PVCs overall.  **also mention of second degree AV block-mobitz1    Component      Latest Ref Rng & Units 8/29/2022   WBC      4.0 - 11.0 10e3/uL 10.3   RBC Count      3.80 - 5.20 10e6/uL 5.10   Hemoglobin      11.7 - 15.7 g/dL 14.1   Hematocrit      35.0 - 47.0 % 43.9   MCV      78 - 100 fL 86   MCH      26.5 - 33.0 pg 27.6   MCHC      31.5 - 36.5 g/dL 32.1   RDW      10.0 - 15.0 % 14.1   Platelet Count      150 - 450 10e3/uL 438   % Neutrophils      % 58   % Lymphocytes      % 32   % Monocytes      % 8   % Eosinophils      % 2   % Basophils      % 1   Absolute Neutrophils      1.6 - 8.3 10e3/uL 6.0   Absolute Lymphocytes      0.8 - 5.3 10e3/uL 3.4   Absolute Monocytes      0.0 - 1.3 10e3/uL 0.8   Absolute Eosinophils      0.0 - 0.7 10e3/uL 0.2   Absolute Basophils      0.0 - 0.2 10e3/uL 0.1   Sodium      133 - 144 mmol/L 137   Potassium      3.4 - 5.3 mmol/L 3.9   Chloride      94 - 109 mmol/L 100   Carbon Dioxide      20 - 32 mmol/L 30   Anion Gap      3 - 14 mmol/L 7   Urea Nitrogen      7 - 30 mg/dL 21   Creatinine      0.52 - 1.04 mg/dL 0.77   Calcium      8.5 - 10.1 mg/dL 9.6   Glucose       70 - 99 mg/dL 106 (H)   GFR Estimate      >60 mL/min/1.73m2 88   Ferritin      8 - 252 ng/mL 15     The patient's records and results personally reviewed by this provider.     Outside records reviewed from: Care Everywhere      Assessment      Kylie Austin is a 57 year old female seen as a PAC referral for risk assessment and optimization for anesthesia.    Plan/Recommendations  Pt will be optimized for the proposed procedure.  See below for details on the assessment, risk, and preoperative recommendations    NEUROLOGY  - No history of TIA, CVA or seizure   -noted essential tremors      -Post Op delirium risk factors:  No risk identified    ENT  - No current airway concerns.  Will need to be reassessed day of surgery.  Mallampati: Unable to assess  TM: Unable to assess    CARDIAC  - s/p cardioversion for A-fib in 2004/2005.  Reports no known reoccurance since.   - see previous cardiac testing above  - noted second degree AV block-Ruth 1 on previous ziopatch, but EKG since then with no notation of that.  Will have patient get an updated EKG at her local [facility] for recheck prior to surgery.   - on lasix for lower extremity edema.  Hold DOS    - METS (Metabolic Equivalents) = >4  Patient performs 4 or more METS exercise without symptoms            Total Score: 0      RCRI-Very low risk: Class 1 0.4% complication rate            Total Score: 0        PULMONARY  - Obstructive Sleep Apnea  DEVANTE without home CPAP use.    - reports a very remote history of asthma, that previously resolved.  No recent asthma symptoms.  no asthma medications prescribed.   - Tobacco History      History   Smoking Status     Never   Smokeless Tobacco     Never       GI  - denies GERD  PONV High Risk  Total Score: 3           1 AN PONV: Pt is Female    1 AN PONV: Patient is not a current smoker    1 AN PONV: Intended Post Op Opioids        /RENAL  -Creatinine  WNL    ENDOCRINE    - BMI: Estimated body mass index is  "31.93 kg/m  as calculated from the following:    Height as of 10/25/22: 1.727 m (5' 8\").    Weight as of 10/25/22: 95.3 kg (210 lb).  Obesity (BMI >30)  - No history of Diabetes Mellitus     - on metformin for weight management; hold DOS    - s/p gender reassignment surgery for gender dysphoria    HEME  VTE Low Risk 0.5%            Total Score: 2    VTE: Male      - No history of abnormal bleeding or antiplatelet use.  -T&S ordered to be completed prior to surgery.    MSK  - chronic neck pain, multiple joint pain and fibromyalgia.  Consider cautious positioning.  - hold mobic 10 days prior to surgery    PSYCH  - hold marijuana 24-48 hours prior to surgery.      Patient was discussed with Dr. Mccord    The patient is optimized for their procedure. AVS with information on surgery time/arrival time, meds and NPO status given by nursing staff. No further diagnostic testing indicated.    Please refer to the physical examination documented by the anesthesiologist in the anesthesia record on the day of surgery.    Video-Visit Details    Type of service:  Video Visit    Patient verbally consented to video service today: YES    Video Start Time: 0754  Video End Time (time video stopped): 0808    Originating Location (pt. Location): Home    Distant Location (provider location):provider's home    Mode of Communication:  Video Conference via [employer]     On the day of service:     Prep time: 20 minutes  Visit time: 14 minutes  Documentation time: 5 minutes  ------------------------------------------  Total time: 39 minutes      TALIB Olea CNP  Preoperative Assessment Center  [facility]  Phone: [phone]  Fax: [phone]    Physical Exam    Airway        Mallampati: II   TM distance: > 3 FB   Neck ROM: full   Mouth opening: > 3 cm    Respiratory Devices and Support         Dental  no notable dental history         Cardiovascular   cardiovascular exam normal     "   Rhythm and rate: regular and normal     Pulmonary   pulmonary exam normal        breath sounds clear to auscultation             Anesthesia Plan    ASA Status:  3   NPO Status:  NPO Appropriate    Anesthesia Type: General.     - Airway: ETT   Induction: Intravenous, Propofol.   Maintenance: Balanced.   Techniques and Equipment:     - Lines/Monitors: 2nd IV     Consents    Anesthesia Plan(s) and associated risks, benefits, and realistic alternatives discussed. Questions answered and patient/representative(s) expressed understanding.    - Discussed:     - Discussed with:  Patient      - Extended Intubation/Ventilatory Support Discussed: No.      - Patient is DNR/DNI Status: No    Use of blood products discussed: Yes.     - Discussed with: Patient.     - Consented: consented to blood products            Reason for refusal: other.     Postoperative Care    Pain management: IV analgesics, Oral pain medications, Multi-modal analgesia.   PONV prophylaxis: Ondansetron (or other 5HT-3)     Comments:

## 2022-10-27 LAB — SARS-COV-2 RNA RESP QL NAA+PROBE: NEGATIVE

## 2022-10-28 ENCOUNTER — ANESTHESIA (OUTPATIENT)
Dept: SURGERY | Facility: CLINIC | Age: 57
End: 2022-10-28
Payer: COMMERCIAL

## 2022-10-28 ENCOUNTER — HOSPITAL ENCOUNTER (OUTPATIENT)
Facility: CLINIC | Age: 57
Discharge: HOME | End: 2022-10-29
Attending: ORTHOPAEDIC SURGERY | Admitting: ORTHOPAEDIC SURGERY
Payer: COMMERCIAL

## 2022-10-28 ENCOUNTER — APPOINTMENT (OUTPATIENT)
Dept: GENERAL RADIOLOGY | Facility: CLINIC | Age: 57
End: 2022-10-28
Attending: ORTHOPAEDIC SURGERY
Payer: COMMERCIAL

## 2022-10-28 DIAGNOSIS — Z98.1 S/P CERVICAL SPINAL FUSION: Primary | ICD-10-CM

## 2022-10-28 LAB
CREAT SERPL-MCNC: 0.74 MG/DL (ref 0.52–1.04)
GFR SERPL CREATININE-BSD FRML MDRD: >90 ML/MIN/1.73M2
GLUCOSE BLDC GLUCOMTR-MCNC: 90 MG/DL (ref 70–99)
HGB BLD-MCNC: 12.8 G/DL (ref 11.7–15.7)
POTASSIUM BLD-SCNC: 3.7 MMOL/L (ref 3.4–5.3)

## 2022-10-28 PROCEDURE — L1499 SPINAL ORTHOSIS NOS: HCPCS

## 2022-10-28 PROCEDURE — 710N000010 HC RECOVERY PHASE 1, LEVEL 2, PER MIN: Performed by: ORTHOPAEDIC SURGERY

## 2022-10-28 PROCEDURE — 272N000001 HC OR GENERAL SUPPLY STERILE: Performed by: ORTHOPAEDIC SURGERY

## 2022-10-28 PROCEDURE — 36415 COLL VENOUS BLD VENIPUNCTURE: CPT | Performed by: ANESTHESIOLOGY

## 2022-10-28 PROCEDURE — 250N000011 HC RX IP 250 OP 636: Performed by: NURSE ANESTHETIST, CERTIFIED REGISTERED

## 2022-10-28 PROCEDURE — 250N000009 HC RX 250: Performed by: NURSE ANESTHETIST, CERTIFIED REGISTERED

## 2022-10-28 PROCEDURE — 999N000141 HC STATISTIC PRE-PROCEDURE NURSING ASSESSMENT: Performed by: ORTHOPAEDIC SURGERY

## 2022-10-28 PROCEDURE — 258N000003 HC RX IP 258 OP 636: Performed by: NURSE ANESTHETIST, CERTIFIED REGISTERED

## 2022-10-28 PROCEDURE — 22853 INSJ BIOMECHANICAL DEVICE: CPT | Performed by: ORTHOPAEDIC SURGERY

## 2022-10-28 PROCEDURE — 370N000017 HC ANESTHESIA TECHNICAL FEE, PER MIN: Performed by: ORTHOPAEDIC SURGERY

## 2022-10-28 PROCEDURE — 250N000025 HC SEVOFLURANE, PER MIN: Performed by: ORTHOPAEDIC SURGERY

## 2022-10-28 PROCEDURE — 22845 INSERT SPINE FIXATION DEVICE: CPT | Mod: 59 | Performed by: ORTHOPAEDIC SURGERY

## 2022-10-28 PROCEDURE — 85018 HEMOGLOBIN: CPT | Performed by: ANESTHESIOLOGY

## 2022-10-28 PROCEDURE — 250N000011 HC RX IP 250 OP 636: Performed by: PHYSICIAN ASSISTANT

## 2022-10-28 PROCEDURE — 250N000013 HC RX MED GY IP 250 OP 250 PS 637: Performed by: INTERNAL MEDICINE

## 2022-10-28 PROCEDURE — 272N000004 HC RX 272: Performed by: ORTHOPAEDIC SURGERY

## 2022-10-28 PROCEDURE — C1713 ANCHOR/SCREW BN/BN,TIS/BN: HCPCS | Performed by: ORTHOPAEDIC SURGERY

## 2022-10-28 PROCEDURE — 84132 ASSAY OF SERUM POTASSIUM: CPT | Performed by: ANESTHESIOLOGY

## 2022-10-28 PROCEDURE — 20937 SP BONE AGRFT MORSEL ADD-ON: CPT | Performed by: ORTHOPAEDIC SURGERY

## 2022-10-28 PROCEDURE — 999N000180 XR SURGERY CARM FLUORO LESS THAN 5 MIN: Mod: TC

## 2022-10-28 PROCEDURE — 22552 ARTHRD ANT NTRBD CERVICAL EA: CPT | Mod: 22 | Performed by: ORTHOPAEDIC SURGERY

## 2022-10-28 PROCEDURE — 120N000002 HC R&B MED SURG/OB UMMC

## 2022-10-28 PROCEDURE — 360N000085 HC SURGERY LEVEL 5 W/ FLUORO, PER MIN: Performed by: ORTHOPAEDIC SURGERY

## 2022-10-28 PROCEDURE — 22551 ARTHRD ANT NTRBDY CERVICAL: CPT | Mod: 22 | Performed by: ORTHOPAEDIC SURGERY

## 2022-10-28 PROCEDURE — 99232 SBSQ HOSP IP/OBS MODERATE 35: CPT | Performed by: INTERNAL MEDICINE

## 2022-10-28 PROCEDURE — L0174 CERV SR 2PC THOR EXT PRE OTS: HCPCS

## 2022-10-28 PROCEDURE — 82565 ASSAY OF CREATININE: CPT | Performed by: ANESTHESIOLOGY

## 2022-10-28 PROCEDURE — 258N000003 HC RX IP 258 OP 636: Performed by: PHYSICIAN ASSISTANT

## 2022-10-28 PROCEDURE — C1762 CONN TISS, HUMAN(INC FASCIA): HCPCS | Performed by: ORTHOPAEDIC SURGERY

## 2022-10-28 PROCEDURE — 250N000013 HC RX MED GY IP 250 OP 250 PS 637: Performed by: PHYSICIAN ASSISTANT

## 2022-10-28 DEVICE — GRAFT BN CANC 15CC CRSH 1-10MM 800103: Type: IMPLANTABLE DEVICE | Site: HIP | Status: FUNCTIONAL

## 2022-10-28 DEVICE — IMP PLATE CERV MEDT ZEVO 33MM 2 LVL 3002033: Type: IMPLANTABLE DEVICE | Site: SPINE CERVICAL | Status: FUNCTIONAL

## 2022-10-28 DEVICE — IMP SCR MEDT ZEVO 4.0X17MM ST VA 7724017: Type: IMPLANTABLE DEVICE | Site: SPINE CERVICAL | Status: FUNCTIONAL

## 2022-10-28 DEVICE — IMP SCR MEDT ZEVO 3.5X17MM ST VA 7723517: Type: IMPLANTABLE DEVICE | Site: SPINE CERVICAL | Status: FUNCTIONAL

## 2022-10-28 DEVICE — IMPLANT 6240686 ANATOMIC 18X16X6MM
Type: IMPLANTABLE DEVICE | Site: SPINE CERVICAL | Status: FUNCTIONAL
Brand: VERTE-STACK® SPINAL SYSTEM

## 2022-10-28 RX ORDER — ARIPIPRAZOLE 5 MG/1
20 TABLET ORAL EVERY MORNING
Status: DISCONTINUED | OUTPATIENT
Start: 2022-10-29 | End: 2022-10-29 | Stop reason: HOSPADM

## 2022-10-28 RX ORDER — LABETALOL HYDROCHLORIDE 5 MG/ML
10 INJECTION, SOLUTION INTRAVENOUS
Status: DISCONTINUED | OUTPATIENT
Start: 2022-10-28 | End: 2022-10-28 | Stop reason: HOSPADM

## 2022-10-28 RX ORDER — DIPHENHYDRAMINE HCL 25 MG
25 CAPSULE ORAL EVERY 6 HOURS PRN
Status: DISCONTINUED | OUTPATIENT
Start: 2022-10-28 | End: 2022-10-28 | Stop reason: HOSPADM

## 2022-10-28 RX ORDER — OXYCODONE HYDROCHLORIDE 5 MG/1
5 TABLET ORAL EVERY 4 HOURS PRN
Qty: 26 TABLET | Refills: 0 | Status: SHIPPED | OUTPATIENT
Start: 2022-10-28 | End: 2022-11-28

## 2022-10-28 RX ORDER — ONDANSETRON 4 MG/1
4 TABLET, ORALLY DISINTEGRATING ORAL EVERY 6 HOURS PRN
Status: DISCONTINUED | OUTPATIENT
Start: 2022-10-28 | End: 2022-10-29 | Stop reason: HOSPADM

## 2022-10-28 RX ORDER — BISACODYL 10 MG
10 SUPPOSITORY, RECTAL RECTAL DAILY PRN
Status: DISCONTINUED | OUTPATIENT
Start: 2022-10-28 | End: 2022-10-29 | Stop reason: HOSPADM

## 2022-10-28 RX ORDER — CEFAZOLIN SODIUM 2 G/100ML
2 INJECTION, SOLUTION INTRAVENOUS EVERY 8 HOURS
Status: COMPLETED | OUTPATIENT
Start: 2022-10-28 | End: 2022-10-28

## 2022-10-28 RX ORDER — SODIUM CHLORIDE, SODIUM LACTATE, POTASSIUM CHLORIDE, CALCIUM CHLORIDE 600; 310; 30; 20 MG/100ML; MG/100ML; MG/100ML; MG/100ML
INJECTION, SOLUTION INTRAVENOUS CONTINUOUS
Status: DISCONTINUED | OUTPATIENT
Start: 2022-10-28 | End: 2022-10-28 | Stop reason: HOSPADM

## 2022-10-28 RX ORDER — NALOXONE HYDROCHLORIDE 0.4 MG/ML
0.2 INJECTION, SOLUTION INTRAMUSCULAR; INTRAVENOUS; SUBCUTANEOUS
Status: DISCONTINUED | OUTPATIENT
Start: 2022-10-28 | End: 2022-10-29 | Stop reason: HOSPADM

## 2022-10-28 RX ORDER — DEXAMETHASONE SODIUM PHOSPHATE 4 MG/ML
4 INJECTION, SOLUTION INTRA-ARTICULAR; INTRALESIONAL; INTRAMUSCULAR; INTRAVENOUS; SOFT TISSUE
Status: DISCONTINUED | OUTPATIENT
Start: 2022-10-28 | End: 2022-10-28 | Stop reason: HOSPADM

## 2022-10-28 RX ORDER — HYDROXYZINE HYDROCHLORIDE 25 MG/1
25 TABLET, FILM COATED ORAL EVERY 6 HOURS PRN
Status: DISCONTINUED | OUTPATIENT
Start: 2022-10-28 | End: 2022-10-29 | Stop reason: HOSPADM

## 2022-10-28 RX ORDER — ONDANSETRON 2 MG/ML
4 INJECTION INTRAMUSCULAR; INTRAVENOUS EVERY 6 HOURS PRN
Status: DISCONTINUED | OUTPATIENT
Start: 2022-10-28 | End: 2022-10-29 | Stop reason: HOSPADM

## 2022-10-28 RX ORDER — LIDOCAINE HYDROCHLORIDE 20 MG/ML
INJECTION, SOLUTION INFILTRATION; PERINEURAL PRN
Status: DISCONTINUED | OUTPATIENT
Start: 2022-10-28 | End: 2022-10-28

## 2022-10-28 RX ORDER — OXYCODONE HYDROCHLORIDE 5 MG/1
5 TABLET ORAL EVERY 4 HOURS PRN
Status: DISCONTINUED | OUTPATIENT
Start: 2022-10-28 | End: 2022-10-29 | Stop reason: HOSPADM

## 2022-10-28 RX ORDER — LIDOCAINE 40 MG/G
CREAM TOPICAL
Status: DISCONTINUED | OUTPATIENT
Start: 2022-10-28 | End: 2022-10-28 | Stop reason: HOSPADM

## 2022-10-28 RX ORDER — DEXAMETHASONE SODIUM PHOSPHATE 4 MG/ML
INJECTION, SOLUTION INTRA-ARTICULAR; INTRALESIONAL; INTRAMUSCULAR; INTRAVENOUS; SOFT TISSUE PRN
Status: DISCONTINUED | OUTPATIENT
Start: 2022-10-28 | End: 2022-10-28

## 2022-10-28 RX ORDER — PROPOFOL 10 MG/ML
INJECTION, EMULSION INTRAVENOUS PRN
Status: DISCONTINUED | OUTPATIENT
Start: 2022-10-28 | End: 2022-10-28

## 2022-10-28 RX ORDER — GABAPENTIN 300 MG/1
900 CAPSULE ORAL AT BEDTIME
Status: DISCONTINUED | OUTPATIENT
Start: 2022-10-28 | End: 2022-10-29 | Stop reason: HOSPADM

## 2022-10-28 RX ORDER — ACETAMINOPHEN 325 MG/1
650 TABLET ORAL EVERY 4 HOURS PRN
Status: DISCONTINUED | OUTPATIENT
Start: 2022-10-31 | End: 2022-10-29 | Stop reason: HOSPADM

## 2022-10-28 RX ORDER — NALOXONE HYDROCHLORIDE 0.4 MG/ML
0.4 INJECTION, SOLUTION INTRAMUSCULAR; INTRAVENOUS; SUBCUTANEOUS
Status: DISCONTINUED | OUTPATIENT
Start: 2022-10-28 | End: 2022-10-29 | Stop reason: HOSPADM

## 2022-10-28 RX ORDER — SIMVASTATIN 40 MG
40 TABLET ORAL AT BEDTIME
Status: DISCONTINUED | OUTPATIENT
Start: 2022-10-28 | End: 2022-10-29 | Stop reason: HOSPADM

## 2022-10-28 RX ORDER — FAMOTIDINE 20 MG/1
20 TABLET, FILM COATED ORAL 2 TIMES DAILY
Status: DISCONTINUED | OUTPATIENT
Start: 2022-10-28 | End: 2022-10-28

## 2022-10-28 RX ORDER — PROPOFOL 10 MG/ML
INJECTION, EMULSION INTRAVENOUS CONTINUOUS PRN
Status: DISCONTINUED | OUTPATIENT
Start: 2022-10-28 | End: 2022-10-28

## 2022-10-28 RX ORDER — FENTANYL CITRATE 50 UG/ML
INJECTION, SOLUTION INTRAMUSCULAR; INTRAVENOUS PRN
Status: DISCONTINUED | OUTPATIENT
Start: 2022-10-28 | End: 2022-10-28

## 2022-10-28 RX ORDER — METFORMIN HCL 500 MG
500 TABLET, EXTENDED RELEASE 24 HR ORAL
Status: DISCONTINUED | OUTPATIENT
Start: 2022-10-28 | End: 2022-10-29 | Stop reason: HOSPADM

## 2022-10-28 RX ORDER — ONDANSETRON 4 MG/1
4 TABLET, ORALLY DISINTEGRATING ORAL EVERY 30 MIN PRN
Status: DISCONTINUED | OUTPATIENT
Start: 2022-10-28 | End: 2022-10-28 | Stop reason: HOSPADM

## 2022-10-28 RX ORDER — HYDROMORPHONE HYDROCHLORIDE 1 MG/ML
0.2 INJECTION, SOLUTION INTRAMUSCULAR; INTRAVENOUS; SUBCUTANEOUS EVERY 5 MIN PRN
Status: DISCONTINUED | OUTPATIENT
Start: 2022-10-28 | End: 2022-10-28 | Stop reason: HOSPADM

## 2022-10-28 RX ORDER — SODIUM CHLORIDE, SODIUM LACTATE, POTASSIUM CHLORIDE, CALCIUM CHLORIDE 600; 310; 30; 20 MG/100ML; MG/100ML; MG/100ML; MG/100ML
INJECTION, SOLUTION INTRAVENOUS CONTINUOUS PRN
Status: DISCONTINUED | OUTPATIENT
Start: 2022-10-28 | End: 2022-10-28

## 2022-10-28 RX ORDER — PROCHLORPERAZINE MALEATE 10 MG
10 TABLET ORAL EVERY 6 HOURS PRN
Status: DISCONTINUED | OUTPATIENT
Start: 2022-10-28 | End: 2022-10-29 | Stop reason: HOSPADM

## 2022-10-28 RX ORDER — HYDROMORPHONE HCL IN WATER/PF 6 MG/30 ML
0.2 PATIENT CONTROLLED ANALGESIA SYRINGE INTRAVENOUS
Status: DISCONTINUED | OUTPATIENT
Start: 2022-10-28 | End: 2022-10-29 | Stop reason: HOSPADM

## 2022-10-28 RX ORDER — ACETAMINOPHEN 325 MG/1
975 TABLET ORAL EVERY 8 HOURS
Status: DISCONTINUED | OUTPATIENT
Start: 2022-10-28 | End: 2022-10-29 | Stop reason: HOSPADM

## 2022-10-28 RX ORDER — VENLAFAXINE HYDROCHLORIDE 75 MG/1
225 CAPSULE, EXTENDED RELEASE ORAL EVERY MORNING
Status: DISCONTINUED | OUTPATIENT
Start: 2022-10-29 | End: 2022-10-29 | Stop reason: HOSPADM

## 2022-10-28 RX ORDER — CEFAZOLIN SODIUM/WATER 2 G/20 ML
2 SYRINGE (ML) INTRAVENOUS SEE ADMIN INSTRUCTIONS
Status: DISCONTINUED | OUTPATIENT
Start: 2022-10-28 | End: 2022-10-28 | Stop reason: HOSPADM

## 2022-10-28 RX ORDER — METHOCARBAMOL 750 MG/1
750 TABLET, FILM COATED ORAL EVERY 6 HOURS PRN
Status: DISCONTINUED | OUTPATIENT
Start: 2022-10-28 | End: 2022-10-29 | Stop reason: HOSPADM

## 2022-10-28 RX ORDER — FAMOTIDINE 20 MG/1
20 TABLET, FILM COATED ORAL 2 TIMES DAILY
Status: DISCONTINUED | OUTPATIENT
Start: 2022-10-28 | End: 2022-10-29 | Stop reason: HOSPADM

## 2022-10-28 RX ORDER — POLYETHYLENE GLYCOL 3350 17 G/17G
17 POWDER, FOR SOLUTION ORAL DAILY
Status: DISCONTINUED | OUTPATIENT
Start: 2022-10-29 | End: 2022-10-29 | Stop reason: HOSPADM

## 2022-10-28 RX ORDER — ONDANSETRON 2 MG/ML
INJECTION INTRAMUSCULAR; INTRAVENOUS PRN
Status: DISCONTINUED | OUTPATIENT
Start: 2022-10-28 | End: 2022-10-28

## 2022-10-28 RX ORDER — LIDOCAINE 40 MG/G
CREAM TOPICAL
Status: DISCONTINUED | OUTPATIENT
Start: 2022-10-28 | End: 2022-10-29 | Stop reason: HOSPADM

## 2022-10-28 RX ORDER — DIMENHYDRINATE 50 MG/ML
25 INJECTION, SOLUTION INTRAMUSCULAR; INTRAVENOUS
Status: DISCONTINUED | OUTPATIENT
Start: 2022-10-28 | End: 2022-10-28 | Stop reason: HOSPADM

## 2022-10-28 RX ORDER — HYDROMORPHONE HCL IN WATER/PF 6 MG/30 ML
0.4 PATIENT CONTROLLED ANALGESIA SYRINGE INTRAVENOUS
Status: DISCONTINUED | OUTPATIENT
Start: 2022-10-28 | End: 2022-10-29 | Stop reason: HOSPADM

## 2022-10-28 RX ORDER — SODIUM CHLORIDE 9 MG/ML
INJECTION, SOLUTION INTRAVENOUS CONTINUOUS
Status: DISCONTINUED | OUTPATIENT
Start: 2022-10-28 | End: 2022-10-29 | Stop reason: HOSPADM

## 2022-10-28 RX ORDER — FENTANYL CITRATE 50 UG/ML
25 INJECTION, SOLUTION INTRAMUSCULAR; INTRAVENOUS EVERY 5 MIN PRN
Status: DISCONTINUED | OUTPATIENT
Start: 2022-10-28 | End: 2022-10-28 | Stop reason: HOSPADM

## 2022-10-28 RX ORDER — CEFAZOLIN SODIUM/WATER 2 G/20 ML
2 SYRINGE (ML) INTRAVENOUS
Status: COMPLETED | OUTPATIENT
Start: 2022-10-28 | End: 2022-10-28

## 2022-10-28 RX ORDER — AMOXICILLIN 250 MG
1 CAPSULE ORAL 2 TIMES DAILY
Status: DISCONTINUED | OUTPATIENT
Start: 2022-10-28 | End: 2022-10-29 | Stop reason: HOSPADM

## 2022-10-28 RX ORDER — ONDANSETRON 2 MG/ML
4 INJECTION INTRAMUSCULAR; INTRAVENOUS EVERY 30 MIN PRN
Status: DISCONTINUED | OUTPATIENT
Start: 2022-10-28 | End: 2022-10-28 | Stop reason: HOSPADM

## 2022-10-28 RX ORDER — DIPHENHYDRAMINE HYDROCHLORIDE 50 MG/ML
25 INJECTION INTRAMUSCULAR; INTRAVENOUS EVERY 6 HOURS PRN
Status: DISCONTINUED | OUTPATIENT
Start: 2022-10-28 | End: 2022-10-28 | Stop reason: HOSPADM

## 2022-10-28 RX ORDER — OXYCODONE HYDROCHLORIDE 5 MG/1
10 TABLET ORAL EVERY 4 HOURS PRN
Status: DISCONTINUED | OUTPATIENT
Start: 2022-10-28 | End: 2022-10-29 | Stop reason: HOSPADM

## 2022-10-28 RX ORDER — DEXMEDETOMIDINE HYDROCHLORIDE 4 UG/ML
INJECTION, SOLUTION INTRAVENOUS PRN
Status: DISCONTINUED | OUTPATIENT
Start: 2022-10-28 | End: 2022-10-28

## 2022-10-28 RX ADMIN — PHENYLEPHRINE HYDROCHLORIDE 100 MCG: 10 INJECTION INTRAVENOUS at 08:01

## 2022-10-28 RX ADMIN — FENTANYL CITRATE 50 MCG: 50 INJECTION, SOLUTION INTRAMUSCULAR; INTRAVENOUS at 07:29

## 2022-10-28 RX ADMIN — SODIUM CHLORIDE, POTASSIUM CHLORIDE, SODIUM LACTATE AND CALCIUM CHLORIDE: 600; 310; 30; 20 INJECTION, SOLUTION INTRAVENOUS at 09:30

## 2022-10-28 RX ADMIN — FENTANYL CITRATE 50 MCG: 50 INJECTION, SOLUTION INTRAMUSCULAR; INTRAVENOUS at 10:22

## 2022-10-28 RX ADMIN — ACETAMINOPHEN 975 MG: 325 TABLET, FILM COATED ORAL at 18:31

## 2022-10-28 RX ADMIN — PHENYLEPHRINE HYDROCHLORIDE 0.2 MCG/KG/MIN: 10 INJECTION INTRAVENOUS at 08:01

## 2022-10-28 RX ADMIN — Medication 50 MG: at 07:32

## 2022-10-28 RX ADMIN — LIDOCAINE HYDROCHLORIDE 100 MG: 20 INJECTION, SOLUTION INFILTRATION; PERINEURAL at 07:31

## 2022-10-28 RX ADMIN — SODIUM CHLORIDE, POTASSIUM CHLORIDE, SODIUM LACTATE AND CALCIUM CHLORIDE: 600; 310; 30; 20 INJECTION, SOLUTION INTRAVENOUS at 07:31

## 2022-10-28 RX ADMIN — FENTANYL CITRATE 50 MCG: 50 INJECTION, SOLUTION INTRAMUSCULAR; INTRAVENOUS at 07:41

## 2022-10-28 RX ADMIN — Medication 2 G: at 16:41

## 2022-10-28 RX ADMIN — Medication 8 MCG: at 10:02

## 2022-10-28 RX ADMIN — FENTANYL CITRATE 50 MCG: 50 INJECTION, SOLUTION INTRAMUSCULAR; INTRAVENOUS at 08:01

## 2022-10-28 RX ADMIN — OXYCODONE HYDROCHLORIDE 5 MG: 5 TABLET ORAL at 16:40

## 2022-10-28 RX ADMIN — SIMVASTATIN 40 MG: 40 TABLET, FILM COATED ORAL at 22:26

## 2022-10-28 RX ADMIN — Medication 2 G: at 07:36

## 2022-10-28 RX ADMIN — ONDANSETRON 4 MG: 2 INJECTION INTRAMUSCULAR; INTRAVENOUS at 09:43

## 2022-10-28 RX ADMIN — OXYCODONE HYDROCHLORIDE 5 MG: 5 TABLET ORAL at 11:09

## 2022-10-28 RX ADMIN — METFORMIN HYDROCHLORIDE 500 MG: 500 TABLET, EXTENDED RELEASE ORAL at 18:31

## 2022-10-28 RX ADMIN — PROPOFOL 200 MG: 10 INJECTION, EMULSION INTRAVENOUS at 07:31

## 2022-10-28 RX ADMIN — HYDROMORPHONE HYDROCHLORIDE 0.3 MG: 1 INJECTION, SOLUTION INTRAMUSCULAR; INTRAVENOUS; SUBCUTANEOUS at 08:27

## 2022-10-28 RX ADMIN — GABAPENTIN 900 MG: 300 CAPSULE ORAL at 22:25

## 2022-10-28 RX ADMIN — Medication 20 MG: at 08:01

## 2022-10-28 RX ADMIN — PROPOFOL 75 MCG/KG/MIN: 10 INJECTION, EMULSION INTRAVENOUS at 07:38

## 2022-10-28 RX ADMIN — SENNOSIDES AND DOCUSATE SODIUM 1 TABLET: 50; 8.6 TABLET ORAL at 13:56

## 2022-10-28 RX ADMIN — OXYCODONE HYDROCHLORIDE 5 MG: 5 TABLET ORAL at 22:26

## 2022-10-28 RX ADMIN — HYDROMORPHONE HYDROCHLORIDE 0.2 MG: 1 INJECTION, SOLUTION INTRAMUSCULAR; INTRAVENOUS; SUBCUTANEOUS at 08:42

## 2022-10-28 RX ADMIN — MIDAZOLAM 2 MG: 1 INJECTION INTRAMUSCULAR; INTRAVENOUS at 07:25

## 2022-10-28 RX ADMIN — DEXAMETHASONE SODIUM PHOSPHATE 10 MG: 4 INJECTION, SOLUTION INTRA-ARTICULAR; INTRALESIONAL; INTRAMUSCULAR; INTRAVENOUS; SOFT TISSUE at 07:45

## 2022-10-28 RX ADMIN — Medication 12 MCG: at 09:43

## 2022-10-28 RX ADMIN — PHENYLEPHRINE HYDROCHLORIDE 100 MCG: 10 INJECTION INTRAVENOUS at 07:45

## 2022-10-28 RX ADMIN — FAMOTIDINE 20 MG: 20 TABLET ORAL at 13:56

## 2022-10-28 RX ADMIN — Medication 2 G: at 23:16

## 2022-10-28 RX ADMIN — SENNOSIDES AND DOCUSATE SODIUM 1 TABLET: 50; 8.6 TABLET ORAL at 20:07

## 2022-10-28 RX ADMIN — Medication 20 MG: at 09:02

## 2022-10-28 RX ADMIN — SODIUM CHLORIDE, PRESERVATIVE FREE: 5 INJECTION INTRAVENOUS at 13:57

## 2022-10-28 RX ADMIN — FAMOTIDINE 20 MG: 20 TABLET ORAL at 20:07

## 2022-10-28 RX ADMIN — ACETAMINOPHEN 975 MG: 325 TABLET, FILM COATED ORAL at 11:09

## 2022-10-28 RX ADMIN — SUGAMMADEX 200 MG: 100 INJECTION, SOLUTION INTRAVENOUS at 10:13

## 2022-10-28 ASSESSMENT — ACTIVITIES OF DAILY LIVING (ADL)
ADLS_ACUITY_SCORE: 20
ADLS_ACUITY_SCORE: 24
ADLS_ACUITY_SCORE: 20
ADLS_ACUITY_SCORE: 24
ADLS_ACUITY_SCORE: 24
ADLS_ACUITY_SCORE: 20
ADLS_ACUITY_SCORE: 22
ADLS_ACUITY_SCORE: 20
ADLS_ACUITY_SCORE: 24

## 2022-10-28 NOTE — OP NOTE
DATE OF SURGERY: 10/28/2022    PREOPERATIVE DIAGNOSIS: Cervical spondylosis with myelopathy   Cervical radiculopathy at C8           POSTOPERATIVE DIAGNOSIS: Same    PROCEDURES:  22-Modifier: This was a very deep wound with difficult access to the caudal levels, which did increase our total operative and technical difficulty and I have added a 22-modifier for this reason.  1. Anterior cervical discectomy with removal of disc material and osteophytes at C6-7 and C7-T1 for decompression of the spinal cord.  2. Bilateral Foraminotomies at C6-7 and C7-T1 for decompression of the nerve roots.  3. Placement of intervertebral prosthetic device at C6-7 and C7-T1 , Medtronic PEEK Cages.  3. Use of Iliac Crest Autograft for fusion, C6-7 and C7-T1, which was harvested through a separate skin and fascial incision.  4. Placement of anterior Medtronic plate and screw instrumentation, C6-7 and C7-T1, the plate was placed separately from the interbody and was placed to aid in fusion rate.    Primary Surgeon: Fernando Ruiz MD    FIRST ASSISTANT: CLARIBEL Kraus, there was no qualified resident available, the assistant was necessary for retraction, visualization, and wound closure.    ANESTHESIA: General Endotracheal    COMPLICATIONS:  None.    SPECIMENS: None.    ESTIMATED BLOOD LOSS: 50 mL    INDICATIONS:                          Kylie Austin is a 57 year old female with debilitating symptoms from Cervical spondylosis with myelopathy, Cervical radiculopathy at C8.  The patient elected surgical treatment, and understood the indications for this surgery, as well as its risks, benefits, and alternatives. We reviewed the risks and benefits of the surgery in detail. The risks include, but are not limited to, the general risks associated with anesthesia, including death, pulmonary embolism, DVT, stroke, myocardial infarction, pneumonia, and urinary tract infection. Additional risks specific to the surgery include  the risk of infection, failure to heal (non-union), dural tear with resultant CSF leak which might necessitate placement of a drain or revision surgery or could result in headaches, nerve injury resulting in weakness or paralysis, risk of adjacent segment disease, the risks of vascular injury resulting in severe or possibly uncontrollable bleeding, need for revision surgery in the future due to one of the above issues, or risk of incomplete symptom relief.  Kylie Austin understands the risks of the surgery and wishes to proceed.  No guarantees were given.    DESCRIPTION OF PROCEDURE:           Kylie Austin was taken to the operating  room, where the Anesthesiology Service induced satisfactory general anesthesia.  Ancef was given IV.  Venous thromboembolic prophylaxis  was performed with sequential devices placed on the calves bilaterally.  Lazar catheter was placed under standard sterile techniques. A bump was placed under the shoulders the arms were secured.  All pressure points were well padded.  The anterior neck was prepped and draped in its entirety in the usual sterile fashion. We then held a multidisciplinary time out in which we verified the patient, procedure, antibiotics, and operative plan.  All team members were in agreement.    I started by making a 3 cm longitudinal incision over the left iliac crest and dissected down to the fascia which was elevated medially and laterally.  We breached the dorsal cortex with an osteotome to create a bone window while carefully preserving the inner and outer tables.  I then harvested cancellous autograft in the appropriate amount for the levels needed with a curette.  The area was irrigated and then backfilled with allograft chips and the fascia was closed in layers.      I then performed a standard johnson-barrett approach to the anterior spine from the patient's Left side.  I used surface landmarks to identify the incision location.  The  skin was sharply incised and I then switched to electrocautery to go through the platysma.  Sub-platysmal dissection was bluntly performed 3cm above and below the incision to relax the soft tissues.   I then used a combination of scissor dissection and blunt dissection to carefully dissect the plane between the SCM and the soft tissues medially, down to the spine. Crossing vessels were tied off as we encountered them.  A spinal needle was placed into the most accessible vertebral body, and a fluoroscopic image was obtained to verify the level.  The surgical disc space was marked with a pen.      Of note, this was a very deep wound with difficult visualization particularly at the caudal segments and due to this increased technical difficulty I have added a 22-modifier for the entire procedure.    I then used electrocautery to elevate the longus coli over the levels of the planned fusion from C6-T1. We then turned our attention to the first level of decompression.      I used cautery to define the disc C7-T1 space.  A 15 blade was then used to incise the annulus caudal and cephalad. A micro-curette was then used to remove the bulk of the disc material.  I next placed caspar pins above and below and the distractor was used to open up the disc space while a carpio was used to gently provide distraction.  With this increased visualization I then removed the remainder of the posterior disc material down to the level of the PLL.  With the PLL still intact, I next used a bur to remove the posterior osteophytes above the level of the PLL and to thin the uncinates bilaterally.  The bur was then used to remove the anterior lip of the cephalad vertebral body and to produce a square shaped perpendicular surface for graft placement with a good bed for fusion.  I then sized the graft for a planned 7mm Medtronic PEEK cage.  While this was prepared on the back table, I then used a microcurette to split the PLL and create space between  the PLL and dura.  A 2mm kerrison was then used to resect the PLL out to the level of the uncinates and also to remove any remaining posterior osteophytes.  This dissection was carried out into the foramen until the uptake of the root was clearly visible and until a nerve hook could be freely passed out into the foramen bilaterally, thus completing the foraminotomies.  The cage was filled with iliac crest autograft and then impacted under fluoroscopic guidance.    I then moved up to C6-7.  The caspars and trimlines were moved up a level.  A 15 blade was then used to incise the annulus caudal and cephalad. A micro-curette was then used to remove the bulk of the disc material.  I next placed caspar pins above and below and the distractor was used to open up the disc space while a carpio was used to gently provide distraction.  With this increased visualization I then removed the remainder of the posterior disc material down to the level of the PLL.  With the PLL still intact, I next used a bur to remove the posterior osteophytes above the level of the PLL and to thin the uncinates bilaterally.  The bur was then used to remove the anterior lip of the cephalad vertebral body and to produce a square shaped perpendicular surface for graft placement with a good bed for fusion.  I then sized the graft for a planned 6mm Medtronic PEEK cage.  While this was prepared on the back table, I then used a microcurette to split the PLL and create space between the PLL and dura.  A 2mm kerrison was then used to resect the PLL out to the level of the uncinates and also to remove any remaining posterior osteophytes.  This dissection was carried out into the foramen until the uptake of the root was clearly visible and until a nerve hook could be freely passed out into the foramen bilaterally, thus completing the foraminotomies.  The cage was filled with iliac crest autograft and then impacted under fluoroscopic guidance.     I measured an  appropriate length plate and the plate was aligned and screw holes were drilled.  The screws were placed under manual power.  The placement of the anterior plate with screw fixation was not placed to anchor the interbody device but rather the anterior plate was placed to aid in a successful fusion.   We took our final fluoroscopic radiographs and I was satisfied with the positioning.      I then thoroughly irrigated.  A 1/8 inch hemovac drain was placed and the wound was closed in layers with 3-0 vicryl for the platysma, 3-0 vicryl for the dermis, and 4-0 monocryl and dermabond for the skin.  The wound was dressed with 4x4 and tegaderm.      Sensory monitoring was stable.     The patient was extubated and taken to the PACU in stable condition.  There were no immediately evident complications.    I, Fernando Ruiz MD, was personally present and scrubbed for the entire procedure.

## 2022-10-28 NOTE — CONSULTS
"[facility]  Consult Note - Hospitalist Service, GOLD TEAM 18  Date of Admission:  10/28/2022  Consult Requested by: Fernando Ruiz MD  Reason for Consult: Medical comanagement    Assessment & Plan   Kylie Austin is a 57 year old female admitted on 10/28/2022. She has history of obesity, mood disorder, cervical spondylosis with myelopathy now s/p C6-T1 anterior cervical decompression fusion with Dr. Ruiz on 10/20/2022    #History of cervical spondylosis with myelopathy  S/p the above procedure  - Care per primary team    Chronic issues  #Mood disorder  - Continue Abilify 20 mg every morning  - Venlafaxine to 25 mg in the morning  #Hyperlipidemia  - Continue simvastatin 40 mg nightly  #Obesity  - Continue metformin 500 mg daily         The patient's care was discussed with the Patient.    SHAY SARAVIA MD  [facility]  Securely message with the Vocera Web Console (learn more here)  Text page via [employer] Paging/Directory   Please see signed in provider for up to date coverage information    Hospitalist Service, GOLD TEAM 18    Clinically Significant Risk Factors Present on Admission                      # Obesity: Estimated body mass index is 31.45 kg/m  as calculated from the following:    Height as of this encounter: 1.727 m (5' 7.99\").    Weight as of this encounter: 93.8 kg (206 lb 12.7 oz).           ______________________________________________________________________    Chief Complaint   Neck pain, numbness and weakness in left upper arm    History is obtained from the patient    History of Present Illness   Kylie Austin is a 57 year old female who has a past medical history of A. fib, hyperlipidemia, essential tremor, anxiety, depression, bipolar presenting for spinal surgery given history of chronic neck pain with associated numbness and weakness in her left upper extremity mostly.  " Patient seen following postop procedure.  Patient denies any acute complaints at this time.  No fever, chills, shortness of breath, chest pain or abdominal pain.    Review of Systems   Complete ROS was obtained with the patient at bedside with pertinent positives and negatives as detailed in the HPI. All other systems reviewed and otherwise negative.       Past Medical History    I have reviewed this patient's medical history and updated it with pertinent information if needed.   Past Medical History:   Diagnosis Date     Anxiety 2005    Brought on by Amioderone     Arthritis 08/2005     Atrial fib/flutter, transient     S/p cardioversion 2004     Bipolar 2 disorder (H)      Bleeding disorder (H) 1987    nose bleeds     Chronic pain     LUE pain     Congestive heart failure (H) 11/18/2004    cured 2005     Depressive disorder     on and off most of my life!     Fibromyalgia      Gender identity disorder Started Rx 2012     H/O hypogonadism Gonadectomy 2013     Hyperlipidemia      Hypertension      Other mental problems     bipolar     Psoriasis      Sleep apnea      Uncomplicated asthma 1964    cured in 4/2001     Vision disorder 06/2005       Past Surgical History   I have reviewed this patient's surgical history and updated it with pertinent information if needed.  Past Surgical History:   Procedure Laterality Date     ARTHROPLASTY SHOULDER Left 1/22/2020    Procedure: Left Total shoulder arthroplasty, distal clavicle excision;  Surgeon: Omari Tobin MD;  Location: [facility]     BIOPSY  2005, 8/8/13    Stomach, colon     COLONOSCOPY  8/8/2013    Procedure: COLONOSCOPY;  COLONSCOPY SCREEN/ SE;  Surgeon: Santino Sun MD;  Location: [facility]     COLONOSCOPY N/A 1/4/2022    Procedure: COLONOSCOPY, WITH POLYPECTOMY AND BIOPSY;  Surgeon: Dallas Velazco MD;  Location: [facility]     COLONOSCOPY WITH CO2 INSUFFLATION N/A 11/1/2018    Procedure: COLONOSCOPY WITH CO2 INSUFFLATION;  Surgeon:  Santino Sun MD;  Location: [facility]     COLONOSCOPY WITH CO2 INSUFFLATION N/A 12/15/2020    Procedure: COLONOSCOPY, WITH CO2 INSUFFLATION;  Surgeon: Nima Kamara MD;  Location: [facility] OR     Gender Reassignment  05/2016     HEAD & NECK SURGERY  2015    ablation of nerve from neck to left arm     INJECT EPIDURAL CERVICAL N/A 7/29/2022    Procedure: INJECTION, SPINE, CERVICAL, EPIDURAL T1-T2;  Surgeon: Kenya Ferrell MD;  Location: [facility]     LAPAROSCOPIC GASTRIC SLEEVE N/A 4/10/2018    Procedure: LAPAROSCOPIC GASTRIC SLEEVE;  Laparoscopic Sleeve Gastrectomy;  Surgeon: Jani Shah MD;  Location: [facility]     MANDIBLE SURGERY  1986     ORCHIECTOMY INGUINAL BILATERAL  7/16/2013    Procedure: ORCHIECTOMY INGUINAL BILATERAL;  Bilateral Simple Inguinal Orchiectomy ;  Surgeon: Jan Garrett MD;  Location: [facility]     TONSILLECTOMY         Social History   I have reviewed this patient's social history and updated it with pertinent information if needed.  Social History     Tobacco Use     Smoking status: Never     Smokeless tobacco: Never     Tobacco comments:     NEVER SMOKED! Grew up with heavy smokers which did not help my Asthma!   Vaping Use     Vaping Use: Every day     Substances: THC, CBD, medical cannabis     Devices: Disposable   Substance Use Topics     Alcohol use: Not Currently     Comment: occasional//2 per month     Drug use: Yes     Types: Marijuana     Comment: medical marijuana, daily       Family History   I have reviewed this patient's family history and updated it with pertinent information if needed.  Family History   Problem Relation Age of Onset     Breast Cancer Mother      Cancer Father         skin     Diabetes Father         Was on the patch when they were new     Heart Disease Father 55        scd     Coronary Artery Disease Father      Hypertension Father      Hyperlipidemia Father      Diabetes Sister      Diabetes Sister      Thyroid Disease Sister      Osteoporosis  Maternal Grandmother      Alzheimer Disease Paternal Grandmother      Diabetes Paternal Grandmother      Mental Illness Paternal Grandmother         alshimers     Osteoporosis Paternal Grandmother      Coronary Artery Disease Paternal Grandfather      Hypertension Paternal Grandfather      Hyperlipidemia Paternal Grandfather      Prostate Cancer Paternal Grandfather      Other Cancer Paternal Grandfather      Depression Daughter      Unknown/Adopted Daughter      Depression Daughter      Mental Illness Daughter         bipolar     Anxiety Disorder Daughter      Anxiety Disorder Daughter      Depression Son      Heart Disease Other      Anesthesia Reaction No family hx of      Deep Vein Thrombosis (DVT) No family hx of        Medications   I have reviewed this patient's current medications  Current Facility-Administered Medications   Medication     [START ON 10/31/2022] acetaminophen (TYLENOL) tablet 650 mg     acetaminophen (TYLENOL) tablet 975 mg     [START ON 10/29/2022] ARIPiprazole (ABILIFY) tablet 20 mg     benzocaine-menthol (CHLORASEPTIC) 6-10 MG lozenge 1 lozenge     bisacodyl (DULCOLAX) suppository 10 mg     ceFAZolin (ANCEF) 2 g in 100 mL D5W intermittent infusion     famotidine (PEPCID) tablet 20 mg    Or     famotidine (PEPCID) injection 20 mg     gabapentin (NEURONTIN) capsule 900 mg     HYDROmorphone (DILAUDID) injection 0.2 mg    Or     HYDROmorphone (DILAUDID) injection 0.4 mg     hydrOXYzine (ATARAX) tablet 25 mg     lidocaine (LMX4) cream     lidocaine 1 % 0.1-1 mL     magnesium hydroxide (MILK OF MAGNESIA) suspension 30 mL     metFORMIN (GLUCOPHAGE XR) 24 hr tablet 500 mg     methocarbamol (ROBAXIN) tablet 750 mg     naloxone (NARCAN) injection 0.2 mg    Or     naloxone (NARCAN) injection 0.4 mg    Or     naloxone (NARCAN) injection 0.2 mg    Or     naloxone (NARCAN) injection 0.4 mg     ondansetron (ZOFRAN ODT) ODT tab 4 mg    Or     ondansetron (ZOFRAN) injection 4 mg     oxyCODONE (ROXICODONE)  tablet 5 mg    Or     oxyCODONE (ROXICODONE) tablet 10 mg     [START ON 10/29/2022] polyethylene glycol (MIRALAX) Packet 17 g     prochlorperazine (COMPAZINE) injection 10 mg    Or     prochlorperazine (COMPAZINE) tablet 10 mg     senna-docusate (SENOKOT-S/PERICOLACE) 8.6-50 MG per tablet 1 tablet     simvastatin (ZOCOR) tablet 40 mg     sodium chloride (PF) 0.9% PF flush 3 mL     sodium chloride (PF) 0.9% PF flush 3 mL     sodium chloride 0.9% infusion     [START ON 10/29/2022] venlafaxine (EFFEXOR XR) 24 hr capsule 225 mg       Allergies   Allergies   Allergen Reactions     Amiodarone      Caused pain fatigue/amplified anxiety and depression     Fluoxetine Other (See Comments)     Night terrors     Tetracycline      Teeth rattle/saliva acidic       Physical Exam   Vital Signs: Temp: 98.1  F (36.7  C) Temp src: Oral BP: 124/79 Pulse: 92   Resp: 14 SpO2: 96 % O2 Device: None (Room air) Oxygen Delivery: 5 LPM  Weight: 206 lbs 12.66 oz    General Appearance: Lying comfortably in bed, on room air, in no acute distress of discomfort  HEENT: PERRL: EOMI; moist mucous membrane w/o lesions  Neck: Collar in place, anterior neck dressing in place, with drain.  Pulmonary: Clear to auscultation bilaterally, no wheezes or crackles  CVS: Regular rhythm, no murmurs, rubs or gallops  GI: BS (+), soft nontender, no rebound or guarding   Extremities: No LE edema; pulses strong and equal throughout   Skin: No rashes or lesions  Neurologic: A&O x3        Data   Results for orders placed or performed during the hospital encounter of 10/28/22 (from the past 24 hour(s))   Glucose by meter   Result Value Ref Range    GLUCOSE BY METER POCT 90 70 - 99 mg/dL   Hemoglobin   Result Value Ref Range    Hemoglobin 12.8 11.7 - 15.7 g/dL   Creatinine   Result Value Ref Range    Creatinine 0.74 0.52 - 1.04 mg/dL    GFR Estimate >90 >60 mL/min/1.73m2   Potassium   Result Value Ref Range    Potassium 3.7 3.4 - 5.3 mmol/L   XR Surgery DMITRY L/T 5 Min  Fluoro    Narrative    This exam was marked as non-reportable because it will not be read by a   radiologist or a [place] non-radiologist provider.

## 2022-10-28 NOTE — BRIEF OP NOTE
Brief Operative Note    Preop Dx:   Cervical spondylosis with myelopathy [M47.12]  Cervical radiculopathy at C8 [M54.12]  Post op Dx:   Same  Procedure:    Procedure(s):  Cervical 6 to Thoracic 1 anterior cervical decompression and fusion with medtronic plate and screws, interbody device, use of fluoroscopy, microscope  and left sided iliac crest autograft  Surgeon:     Fernando Ruiz MD   Assistants:    Nancy Marte PA-C  Anesthesia:   General  EBL:    50mL  Total IV Fluids:  See Anesthesia Record  Specimens:   None  Findings:   See Operative Dictation  Complications:  None .    Assessment and Plan: Kylie Austin is a 57 year old female with PMH including atrial fibrillation, hyperlipidemia, sleep apnea, essential tremors, fibromyalgia, obesity, gender dysphoria, marijuana use, anxiety, depression and bipolar disorder now s/p above procedure on 10/28/22 with Dr. Ruiz.    Joseph (ortho) Primary  Activity: Up with assist until independent. No excessive bending or twisting. No lifting >10 lbs x 6 weeks. No lifting overhead  Weight bearing status: WBAT.  Pain management:  Transition from IV to PO narcotics as tolerated. No NSAIDs x 3 months.   Antibiotics: Ancef x 24 hours.  Diet: Begin with clear fluids and progress diet as tolerated.   DVT prophylaxis: SCDs only. No chemical DVT ppx needed.  Imaging: XR Upright cervical PTDC - ordered.  Labs: Hgb POD#1.  Bracing/Splinting: [place] J collar at all times. May mobilize with PT prior to brace arrival. May remove for skin checks and hygiene.  Dressings: Keep tegaderm c/d/i x 2 days.  Drains: HV. Document output per shift, will be discontinued at Orthopedic Surgery discretion.  Lazar catheter: not used  Physical Therapy/Occupational Therapy: Eval and treat.  Cultures: none.    Consults: Hospitalist.  Follow-up: Clinic with Dr. Ruiz in 6 weeks with repeat x-rays.   Disposition: Pending progress with therapies, pain control on orals, and medical  stability, anticipate discharge to home on POD #1-2.        The procedure was medically necessary for an assistant. My assistance was necessary for patient positioning, prepping and draping, soft tissue retraction, bone graft preparation and placement, and closure. The assistance that I provided reduced operative time which meant less general anesthetic for the patient.    Nancy Marte PA-C  Orthopedic Spine Surgery    Thank you for allowing me to participate in this patient's care. Please page me directly any questions/concerns.   Securely message with the Vocera Web Console (learn more here)  Text page via [employer] Paging/Directory    If there is no response, if it is a weekend, or if it is during evening hours, please page the orthopaedic surgery resident on call via [employer] Paging/Directory    Implants:   Implant Name Type Inv. Item Serial No.  Lot No. LRB No. Used Action   GRAFT [address] 1-10MM [id number] - [id number] Bone/Tissue/Biologic GRAFT [address] 1-10MM [id number] [employer]  N/A 1 Implanted   medtronic 7 mm  x 18 mm x 16 mm anatomic peek spacer    [employer] [id number] N/A 1 Implanted   IMP END CAP MEDT [id number] - [id number] Metallic Hardware/[place] IMP END CAP MEDT [id number]  [employer] [id number] N/A 1 Implanted   IMP PLATE CERV MEDT ZEVO 33MM 2 LVL [id number] - [id number] Metallic Hardware/[place] IMP PLATE CERV MEDT ZEVO 33MM 2 LVL [id number]  [employer]  N/A 1 Implanted   IMP SCR MEDT ZEVO 3.5X17MM ST [place] [id number] - [id number] Metallic Hardware/[place] IMP SCR MEDT ZEVO 3.5X17MM ST [place] [id number]  [employer]  N/A 2 Implanted   IMP SCR MEDT ZEVO 4.0X17MM ST [place] [id number] - [id number] Metallic Hardware/[place] IMP SCR MEDT ZEVO 4.0X17MM ST [place] [id number]  [employer]  N/A 3 Implanted   metronic 3.5 x 19 mm screw    [employer]IC  N/A 1 Implanted   medtronic 3.5 x 19 mm screw    [employer]   1 Wasted

## 2022-10-28 NOTE — ANESTHESIA CARE TRANSFER NOTE
Patient: Kylie Austin    Procedure: Procedure(s):  Cervical 6 to Thoracic 1 anterior cervical decompression and fusion with medtronic plate and screws, interbody device, use of fluoroscopy, microscope  and left sided iliac crest autograft       Diagnosis: Cervical spondylosis with myelopathy [M47.12]  Cervical radiculopathy at C8 [M54.12]  Diagnosis Additional Information: No value filed.    Anesthesia Type:   General     Note:    Oropharynx: oropharynx clear of all foreign objects  Level of Consciousness: awake  Oxygen Supplementation: face mask  Level of Supplemental Oxygen (L/min / FiO2): 8  Independent Airway: airway patency satisfactory and stable  Dentition: dentition unchanged  Vital Signs Stable: post-procedure vital signs reviewed and stable  Report to RN Given: handoff report given  Patient transferred to: PACU    Handoff Report: Identifed the Patient, Identified the Reponsible Provider, Reviewed the pertinent medical history, Discussed the surgical course, Reviewed Intra-OP anesthesia mangement and issues during anesthesia, Set expectations for post-procedure period and Allowed opportunity for questions and acknowledgement of understanding      Vitals:  Vitals Value Taken Time   /78 10/28/22 1022   Temp     Pulse 76 10/28/22 1028   Resp 14 10/28/22 1028   SpO2 99 % 10/28/22 1028   Vitals shown include unvalidated device data.    Electronically Signed By: TALIB Azevedo CRNA  October 28, 2022  10:29 AM

## 2022-10-28 NOTE — PROGRESS NOTES
S: Patient was seen today at   for an eval for a cervical collar as ordered by     O/G: The objective/goal of the collar is to help reduce motion/give cervical stability.    A: Pt was fit with a [place] J cervical collar, size 300.  Starting with the anterior section, the correct size and height was chosen that supported the patient in the desired position.  Attention was given to the chin support being sure that the correct height was selected to support the chin.   The posterior section was centered on the patients head .  With the side closure straps extending equally on both sides, the Velcro was secured.  An extra padding set was also provided.  Patient instructed on donning, doffing, use and care    P: Patient has been instructed to contact our facility with any questions and/or concerns.  TERRI Guzman.

## 2022-10-28 NOTE — ANESTHESIA POSTPROCEDURE EVALUATION
"Patient: Kylie Austin    Procedure: Procedure(s):  Cervical 6 to Thoracic 1 anterior cervical decompression and fusion with medtronic plate and screws, interbody device, use of fluoroscopy, microscope  and left sided iliac crest autograft       Anesthesia Type:  General    Note:  Disposition: Inpatient   Postop Pain Control: Uneventful            Sign Out: Well controlled pain   PONV: No   Neuro/Psych: Uneventful            Sign Out: Acceptable/Baseline neuro status   Airway/Respiratory: Uneventful            Sign Out: Acceptable/Baseline resp. status   CV/Hemodynamics: Uneventful            Sign Out: Acceptable CV status; No obvious hypovolemia; No obvious fluid overload   Other NRE: NONE   DID A NON-ROUTINE EVENT OCCUR? No           Last vitals:  Vitals Value Taken Time   /78 10/28/22 1115   Temp 36.6  C (97.9  F) 10/28/22 1045   Pulse 70 10/28/22 1127   Resp 24 10/28/22 1127   SpO2 98 % 10/28/22 1127   Vitals shown include unvalidated device data.    Patient Vitals for the past 24 hrs:   BP Temp Temp src Pulse Resp SpO2 Height Weight   10/28/22 1045 127/74 36.6  C (97.9  F) Oral 76 21 94 % -- --   10/28/22 1029 125/68 -- -- 72 10 99 % -- --   10/28/22 1022 128/78 36.8  C (98.2  F) Axillary 67 14 100 % -- --   10/28/22 0535 (!) 143/89 36.6  C (97.9  F) Oral 67 16 97 % 1.727 m (5' 7.99\") 93.8 kg (206 lb 12.7 oz)       Electronically Signed By: Arleen Carranza MD  October 28, 2022  11:28 AM  "

## 2022-10-28 NOTE — PLAN OF CARE
Pt arrived on the on the unit around 1200. Pt is oriented to the unit  Hemovac has been showing minimal drainage. Pt ambulated to bathroom with gait belt; showed steady gait. Pt was able to void with no issues. Pt ordered food and ate a little. Pt was here with spouse for a few hours. Pt rested after spouse left. Pt was informed that depending on therapy tomorrow she would likely discharge tomorrow or the next day. Continue POC.

## 2022-10-28 NOTE — PHARMACY-ADMISSION MEDICATION HISTORY
Medication history completed as part of pre-operative assessment. See note by Jarek Loomis PharmD on 9/30/22 for full details.     Israel Anaya, AngelD

## 2022-10-28 NOTE — OR NURSING
PACU to Inpatient Nursing Handoff    Patient Kylie Austin is a 57 year old female who speaks English.   Procedure Procedure(s):  Cervical 6 to Thoracic 1 anterior cervical decompression and fusion with medtronic plate and screws, interbody device, use of fluoroscopy, microscope  and left sided iliac crest autograft   Surgeon(s) Primary: Fernando Ruiz MD  Assisting: Nancy Marte PA-C     Allergies   Allergen Reactions     Amiodarone      Caused pain fatigue/amplified anxiety and depression     Fluoxetine Other (See Comments)     Night terrors     Tetracycline      Teeth rattle/saliva acidic       Isolation  No active isolations     Past Medical History   has a past medical history of Anxiety (2005), Arthritis (08/2005), Atrial fib/flutter, transient, Bipolar 2 disorder (H), Bleeding disorder (H) (1987), Chronic pain, Congestive heart failure (H) (11/18/2004), Depressive disorder, Fibromyalgia, Gender identity disorder (Started Rx 2012), H/O hypogonadism (Gonadectomy 2013), Hyperlipidemia, Hypertension, Other mental problems, Psoriasis, Sleep apnea, Uncomplicated asthma (1964), and Vision disorder (06/2005).    Anesthesia General   Dermatome Level     Preop Meds Not applicable   Nerve block Not applicable   Intraop Meds dexamethasone (Decadron)  dexmedetomidine (Precedex): 20 mcg total  fentanyl (Sublimaze): 200 mcg total  hydromorphone (Dilaudid): 0.5 mg total  ondansetron (Zofran): last given at 4   Local Meds No   Antibiotics cefazolin (Ancef) - last given at 0736     Pain Patient Currently in Pain: denies   PACU meds  Tylenol 975mg at 1112 and 5mg oxycodone at 1112   PCA / epidural No   Capnography     Telemetry ECG Rhythm: Normal sinus rhythm   Inpatient Telemetry Monitor Ordered? No        Labs Glucose Lab Results   Component Value Date    GLC 90 10/28/2022     08/29/2022     02/25/2021       Hgb Lab Results   Component Value Date    HGB 12.8 10/28/2022    HGB 13.7  07/24/2020       INR Lab Results   Component Value Date    INR 1.09 08/29/2022      PACU Imaging Not completed - due tomorrow     Wound/Incision Incision/Surgical Site 07/16/13 Bilateral  (Active)   Number of days: 3391       Incision/Surgical Site 04/10/18 Left Abdomen (Active)   Number of days: 1662       Incision/Surgical Site 04/10/18 Left Abdomen (Active)   Number of days: 1662       Incision/Surgical Site 04/10/18 Right Abdomen (Active)   Number of days: 1662       Incision/Surgical Site 04/10/18 Right Abdomen (Active)   Number of days: 1662       Incision/Surgical Site 04/10/18 Right Abdomen (Active)   Number of days: 1662       Incision/Surgical Site 01/22/20 Left Shoulder (Active)   Number of days: 1010       Incision/Surgical Site 10/28/22 Anterior;Left Hip (Active)   Incision Assessment UTV 10/28/22 1053   Closure YONAS 10/28/22 1053   Incision Drainage Amount None 10/28/22 1053   Dressing Intervention Clean, dry, intact 10/28/22 1053   Number of days: 0       Incision/Surgical Site 10/28/22 Anterior Neck (Active)   Incision Assessment UTV 10/28/22 1053   Closure YONAS 10/28/22 1053   Incision Drainage Amount None 10/28/22 1053   Dressing Intervention Clean, dry, intact 10/28/22 1053   Number of days: 0      CMS        Equipment ice pack and orthotics consult ordered for [place] J   Other LDA       IV Access Peripheral IV 10/28/22 Right Hand (Active)   Site Assessment WDL 10/28/22 1053   Line Status Infusing 10/28/22 1053   Dressing Intervention New dressing  10/28/22 0647   Phlebitis Scale 0-->no symptoms 10/28/22 1053   Infiltration Scale 0 10/28/22 1053   Infiltration Site Treatment Method  None 10/28/22 0647   Number of days: 0      Blood Products Not applicable EBL 50   mL   Intake/Output Date 10/28/22 0700 - 10/29/22 0659   Shift 9947-9609 8711-0220 3351-1996 24 Hour Total   INTAKE   I.V. 1200   1200   Shift Total(mL/kg) 1200(12.79)   1200(12.79)   OUTPUT   Blood 50   50   Shift Total(mL/kg) 50(0.53)    50(0.53)   Weight (kg) 93.8 93.8 93.8 93.8      Drains / Lazar Closed/Suction Drain 1 Anterior Neck Accordion 10 Jordanian (Active)   Site Description UTV 10/28/22 1053   Drainage Appearance Bloody/Bright Red;Normal 10/28/22 1053   Status To bulb suction 10/28/22 1053   Number of days: 0      Time of void PreOp Void Prior to Procedure: 0530 (10/28/22 0612)    PostOp      Diapered? No   Bladder Scan     PO    ice chips and apple juice     Vitals    B/P: 127/74  T: 97.9  F (36.6  C)    Temp src: Oral  P:  Pulse: 76 (10/28/22 1045)          R: 21  O2:  SpO2: 94 %    O2 Device: None (Room air) (10/28/22 0535)    Oxygen Delivery: 5 LPM (10/28/22 1022)         Family/support present significant other wife -Anuradha   Patient belongings     Patient transported on cart   DC meds/scripts (obs/outpt) Not applicable   Inpatient Pain Meds Released? Yes       Special needs/considerations None   Tasks needing completion None       Kathi Simons, RN  [facility] [id number]

## 2022-10-28 NOTE — ANESTHESIA PROCEDURE NOTES
Airway       Patient location during procedure: OR       Procedure Start/Stop Times: 10/28/2022 7:35 AM  Staff -        CRNA: Cara Prakash APRN CRNA       Performed By: CRNA  Consent for Airway        Urgency: elective  Indications and Patient Condition       Indications for airway management: dior-procedural       Induction type:intravenous       Mask difficulty assessment: 1 - vent by mask    Final Airway Details       Final airway type: endotracheal airway       Successful airway: ETT - single  Endotracheal Airway Details        ETT size (mm): 7.5       Cuffed: yes       Successful intubation technique: direct laryngoscopy       DL Blade Type: Hoffmann 2       Grade View of Cords: 1       Position: Right       Measured from: lips       Secured at (cm): 23       Bite block used: None    Post intubation assessment        Placement verified by: capnometry        Number of attempts at approach: 1       Secured with: silk tape       Ease of procedure: easy       Dentition: Intact       Dental guard used and removed.    Medication(s) Administered   Medication Administration Time: 10/28/2022 7:35 AM

## 2022-10-29 ENCOUNTER — APPOINTMENT (OUTPATIENT)
Dept: GENERAL RADIOLOGY | Facility: CLINIC | Age: 57
End: 2022-10-29
Attending: PHYSICIAN ASSISTANT
Payer: COMMERCIAL

## 2022-10-29 ENCOUNTER — APPOINTMENT (OUTPATIENT)
Dept: PHYSICAL THERAPY | Facility: CLINIC | Age: 57
End: 2022-10-29
Attending: PHYSICIAN ASSISTANT
Payer: COMMERCIAL

## 2022-10-29 VITALS
TEMPERATURE: 97.9 F | RESPIRATION RATE: 18 BRPM | HEIGHT: 68 IN | HEART RATE: 89 BPM | DIASTOLIC BLOOD PRESSURE: 70 MMHG | SYSTOLIC BLOOD PRESSURE: 131 MMHG | OXYGEN SATURATION: 95 % | BODY MASS INDEX: 31.34 KG/M2 | WEIGHT: 206.79 LBS

## 2022-10-29 LAB — HGB BLD-MCNC: 12.4 G/DL (ref 11.7–15.7)

## 2022-10-29 PROCEDURE — 72040 X-RAY EXAM NECK SPINE 2-3 VW: CPT

## 2022-10-29 PROCEDURE — 36415 COLL VENOUS BLD VENIPUNCTURE: CPT | Performed by: PHYSICIAN ASSISTANT

## 2022-10-29 PROCEDURE — 85018 HEMOGLOBIN: CPT | Performed by: PHYSICIAN ASSISTANT

## 2022-10-29 PROCEDURE — 72040 X-RAY EXAM NECK SPINE 2-3 VW: CPT | Mod: 26 | Performed by: RADIOLOGY

## 2022-10-29 PROCEDURE — 999N000111 HC STATISTIC OT IP EVAL DEFER

## 2022-10-29 PROCEDURE — 250N000013 HC RX MED GY IP 250 OP 250 PS 637: Performed by: PHYSICIAN ASSISTANT

## 2022-10-29 PROCEDURE — 97161 PT EVAL LOW COMPLEX 20 MIN: CPT | Mod: GP

## 2022-10-29 PROCEDURE — 99225 PR SUBSEQUENT OBSERVATION CARE,LEVEL II: CPT | Performed by: INTERNAL MEDICINE

## 2022-10-29 PROCEDURE — 250N000013 HC RX MED GY IP 250 OP 250 PS 637: Performed by: INTERNAL MEDICINE

## 2022-10-29 RX ORDER — HYDROXYZINE HYDROCHLORIDE 25 MG/1
25 TABLET, FILM COATED ORAL EVERY 6 HOURS PRN
Qty: 20 TABLET | Refills: 0 | Status: SHIPPED | OUTPATIENT
Start: 2022-10-29 | End: 2022-11-28

## 2022-10-29 RX ORDER — POLYETHYLENE GLYCOL 3350 17 G/17G
17 POWDER, FOR SOLUTION ORAL DAILY
Qty: 510 G | Refills: 0 | Status: SHIPPED | OUTPATIENT
Start: 2022-10-29 | End: 2022-11-28

## 2022-10-29 RX ORDER — METHOCARBAMOL 750 MG/1
750 TABLET, FILM COATED ORAL EVERY 6 HOURS PRN
Qty: 40 TABLET | Refills: 0 | Status: SHIPPED | OUTPATIENT
Start: 2022-10-29 | End: 2022-11-28

## 2022-10-29 RX ORDER — ACETAMINOPHEN 325 MG/1
975 TABLET ORAL EVERY 8 HOURS
Qty: 200 TABLET | Refills: 0 | Status: SHIPPED | OUTPATIENT
Start: 2022-10-29 | End: 2022-11-28

## 2022-10-29 RX ADMIN — POLYETHYLENE GLYCOL 3350 17 G: 17 POWDER, FOR SOLUTION ORAL at 07:55

## 2022-10-29 RX ADMIN — OXYCODONE HYDROCHLORIDE 5 MG: 5 TABLET ORAL at 02:40

## 2022-10-29 RX ADMIN — SENNOSIDES AND DOCUSATE SODIUM 1 TABLET: 50; 8.6 TABLET ORAL at 07:55

## 2022-10-29 RX ADMIN — ACETAMINOPHEN 975 MG: 325 TABLET, FILM COATED ORAL at 20:34

## 2022-10-29 RX ADMIN — ACETAMINOPHEN 975 MG: 325 TABLET, FILM COATED ORAL at 02:41

## 2022-10-29 RX ADMIN — SENNOSIDES AND DOCUSATE SODIUM 1 TABLET: 50; 8.6 TABLET ORAL at 20:34

## 2022-10-29 RX ADMIN — OXYCODONE HYDROCHLORIDE 5 MG: 5 TABLET ORAL at 06:38

## 2022-10-29 RX ADMIN — OXYCODONE HYDROCHLORIDE 5 MG: 5 TABLET ORAL at 16:46

## 2022-10-29 RX ADMIN — FAMOTIDINE 20 MG: 20 TABLET ORAL at 07:55

## 2022-10-29 RX ADMIN — ACETAMINOPHEN 975 MG: 325 TABLET, FILM COATED ORAL at 11:02

## 2022-10-29 RX ADMIN — OXYCODONE HYDROCHLORIDE 5 MG: 5 TABLET ORAL at 12:10

## 2022-10-29 RX ADMIN — ARIPIPRAZOLE 20 MG: 5 TABLET ORAL at 07:55

## 2022-10-29 RX ADMIN — METFORMIN HYDROCHLORIDE 500 MG: 500 TABLET, EXTENDED RELEASE ORAL at 16:46

## 2022-10-29 RX ADMIN — FAMOTIDINE 20 MG: 20 TABLET ORAL at 20:34

## 2022-10-29 RX ADMIN — VENLAFAXINE HYDROCHLORIDE 225 MG: 75 CAPSULE, EXTENDED RELEASE ORAL at 07:55

## 2022-10-29 ASSESSMENT — ACTIVITIES OF DAILY LIVING (ADL)
ADLS_ACUITY_SCORE: 24

## 2022-10-29 NOTE — PROGRESS NOTES
10/29/22 1100   Appointment Info   Signing Clinician's Name / Credentials (PT) Santiago Shelby, PT   Living Environment   People in Home spouse   Current Living Arrangements house   Home Accessibility stairs within home   Number of Stairs, Within Home, Primary five   Stair Railings, Within Home, Primary railings on both sides of stairs   Transportation Anticipated family or friend will provide   Living Environment Comments Pt lives with wife, who is able to assist. 5 steps inside the house to the bedroom.   Self-Care   Usual Activity Tolerance good   Current Activity Tolerance good   Equipment Currently Used at Home none   Fall history within last six months no   Activity/Exercise/Self-Care Comment Pt used to work as  and school bus supervisor.   General Information   Onset of Illness/Injury or Date of Surgery 10/28/22   Referring Physician Dr. Fernando Ruiz.   Patient/Family Therapy Goals Statement (PT) To go home.   Pertinent History of Current Problem (include personal factors and/or comorbidities that impact the POC) From chart: Kylie Austin is a 57 year old female with PMH including atrial fibrillation, hyperlipidemia, sleep apnea, essential tremors, fibromyalgia, obesity, gender dysphoria, marijuana use, anxiety, depression and bipolar disorder now s/p above procedure on 10/28/22 with Dr. Ruiz.   Existing Precautions/Restrictions brace worn when out of bed;spinal   Weight-Bearing Status - LUE partial weight-bearing (% in comments)  (5 lbs.)   Weight-Bearing Status - RUE partial weight-bearing (% in comments)  (5 lbs.)   Weight-Bearing Status - LLE weight-bearing as tolerated   Weight-Bearing Status - RLE weight-bearing as tolerated   General Observations Female supine in bed.   Cognition   Affect/Mental Status (Cognition) WNL   Orientation Status (Cognition) oriented x 4   Follows Commands (Cognition) WNL   Cognitive Status Comments Pleasant, cooperative.   Pain Assessment    Patient Currently in Pain Yes, see Vital Sign flowsheet  (Slight pain in hip and neck.)   Integumentary/Edema   Integumentary/Edema Comments Not formally assessed.   Posture    Posture Forward head position   Range of Motion (ROM)   ROM Comment Grossly WNL.   Strength (Manual Muscle Testing)   Strength Comments Grossly WNL.   Bed Mobility   Comment, (Bed Mobility) Supine-sit IND with log roll.   Transfers   Comment, (Transfers) Sit-stand IND.   Gait/Stairs (Locomotion)   Comment, (Gait/Stairs) Ambulated x 200 ft with no AD, SBA. Completed stairs safely.   Balance   Balance no deficits were identified   Sensory Examination   Sensory Perception patient reports no sensory changes   Coordination   Coordination no deficits were identified   Muscle Tone   Muscle Tone no deficits were identified   Clinical Impression   Criteria for Skilled Therapeutic Intervention Evaluation only   Clinical Presentation (PT Evaluation Complexity) Stable/Uncomplicated   Clinical Presentation Rationale Clinician judgment.   Clinical Decision Making (Complexity) low complexity   Risk & Benefits of therapy have been explained patient   PT Total Evaluation Time   PT Eval, Low Complexity Minutes (31817) 15   Plan of Care Review   Plan of Care Reviewed With patient   PT Discharge Planning   PT Plan PT: Discharge from therapy.   PT Discharge Recommendation (DC Rec) home with assist   PT Rationale for DC Rec Pt mobilizing very well, no acute IP PT needs. Safe to d/c home with assist of spouse as needed.   PT Brief overview of current status PT eval completed. Pt is IND with mobility, ambulated ~200 ft with no AD, SBA, completed stairs with SBA.   Total Session Time   Total Session Time (sum of timed and untimed services) 15

## 2022-10-29 NOTE — PLAN OF CARE
VSS. LS clear on RA. No SOB or chest pain reported.  Denies N/T. Reports generalized weakness, otherwise strengths intact. Pulses +2  Pt reports pain to her anterior neck, radiating to upper back and to her L groin due to graft site. Pain adequately managed with tylenol scheduled and oxy PRN. Pt calling appropriately.  X rays completed.

## 2022-10-29 NOTE — UTILIZATION REVIEW
Admission Status; Secondary Review Determination   10/28/2022  5:23 AM    Under the authority of the Utilization Management Committee, the utilization review process indicated a secondary review on the above patient. The review outcome is based on review of the medical records, discussions with staff, and applying clinical experience noted on the date of the review.     (x) Outpatient Status with extended recovery is appropriate - This patient does not meet hospital inpatient criteria. If this patient's primary payer is Medicare and was admitted as an inpatient, Condition Code 44 should be used and patient status changed to outpatient recovery.    RATIONALE FOR DETERMINATION   57-year-old female with history of obesity, mood disorder, cervical spondylosis with myelopathy, status post C6-T1 anterior cervical decompression fusion on 10/28.  Patient has no postop complications and advancing diet as tolerated with pain well controlled.  Removed Hemovac and covered with gauze and dressing, x-rays pending, pain well controlled with Tylenol and oxycodone.  Procedure is outpatient, patient was admitted as inpatient, does not meet criteria for inpatient stay and is improving, with plans to discharge soon, recommend change to outpatient status, communicated to Dr. Hernandez  Patient was admitted to the hospital after the procedure. Patient has Medicare and the procedure is not on the CMS inpatient list. No documented complications or unexpected recovery. Patient can be safely  monitored for complications and recover in outpatient/extended recovery setting.     The severity of illness, intensity of service provided, expected LOS and risk for adverse outcome doesn't meet inpatient hospital admission.       The information on this document is developed by the utilization review team in order for the business office to ensure compliance. This only denotes the appropriateness of proper admission status and does not reflect the  quality of care rendered.   The definitions of Inpatient Status and Observation Status used in making the determination above are those provided in the CMS Coverage Manual, Chapter 1 and Chapter 6, section 70.4.     Sincerely,     Snehal Loya MD   Physician Advisor   Utilization Management   [facility].

## 2022-10-29 NOTE — PLAN OF CARE
OT: Orders received and chart review completed. Writer discussed care with patient and physical therapist, no IP OT needs were identified. Patient did have some questions regarding bathing and writer educated patient on compensatory strategies. Will complete orders.

## 2022-10-29 NOTE — PROGRESS NOTES
"[facility]    Medicine Progress Note - Hospitalist Service, GOLD TEAM 18    Date of Admission:  10/28/2022    Assessment & Plan   Kylie Austin is a 57 year old female admitted on 10/28/2022. She has history of obesity, mood disorder, cervical spondylosis with myelopathy now s/p C6-T1 anterior cervical decompression fusion with Dr. Ruiz on 10/20/2022    #History of cervical spondylosis with myelopathy  S/p the above procedure  - Care per primary team    #Elevated BP    Continue to monitor    Prn hydralazine for SBP > 180      Chronic issues  #Mood disorder  - Continue Abilify 20 mg every morning  - Venlafaxine to 25 mg in the morning  #Hyperlipidemia  - Continue simvastatin 40 mg nightly  #Obesity  - Continue metformin 500 mg daily           Diet: Advance Diet as Tolerated: Regular Diet Adult    DVT Prophylaxis: Defer to primary service  Lazar Catheter: Not present  Central Lines: None  Cardiac Monitoring: None  Code Status: Full Code      Disposition Plan     Expected Discharge Date: 10/30/2022                The patient's care was discussed with the Patient and Care Team.    SHAY SARAVIA MD  Hospitalist Service, [facility] 18  [facility]  Securely message with the Vocera Web Console (learn more here)  Text page via [facility] Paging/Directory   Please see signed in provider for up to date coverage information      Clinically Significant Risk Factors                       # Obesity: Estimated body mass index is 31.45 kg/m  as calculated from the following:    Height as of this encounter: 1.727 m (5' 7.99\").    Weight as of this encounter: 93.8 kg (206 lb 12.7 oz)., PRESENT ON ADMISSION         ______________________________________________________________________    Interval History   AGUEDAEON  Reports pain is well controlled.     Data reviewed today: I reviewed all medications, new labs and imaging results over the " last 24 hours.      Physical Exam   Vital Signs: Temp: 98  F (36.7  C) Temp src: Oral BP: 115/69 Pulse: 80   Resp: 16 SpO2: 96 % O2 Device: Nasal cannula Oxygen Delivery: 1 LPM  Weight: 206 lbs 12.66 oz    General Appearance: Patient lying comfortably in bed, on room air, in no acute distress of discomfort  HEENT: PERRL: EOMI; moist mucous membrane w/o lesions  Neck: Collar in place, anterior neck dressing in place, draining in place, dressing c/d/i  Pulmonary: Clear to auscultation bilaterally, no wheezes or crackles  CVS: Regular rhythm, no murmurs, rubs or gallops  GI: BS (+), soft nontender, no rebound or guarding   Extremities: No LE edema; pulses strong and equal throughout   Skin: No rashes or lesions  Neurologic: A&O x3      Data   Recent Labs   Lab 10/28/22  0637 10/28/22  0543   HGB 12.8  --    POTASSIUM 3.7  --    CR 0.74  --    GLC  --  90     Recent Results (from the past 24 hour(s))   XR Surgery DMITRY L/T 5 Min Fluoro    Narrative    This exam was marked as non-reportable because it will not be read by a   radiologist or a [facility] non-radiologist provider.           Medications     sodium chloride 10 mL/hr at 10/28/22 1357       acetaminophen  975 mg Oral Q8H     ARIPiprazole  20 mg Oral QAM     famotidine  20 mg Oral BID    Or     famotidine  20 mg Intravenous BID     gabapentin  900 mg Oral At Bedtime     metFORMIN  500 mg Oral Daily with supper     polyethylene glycol  17 g Oral Daily     senna-docusate  1 tablet Oral BID     simvastatin  40 mg Oral At Bedtime     sodium chloride (PF)  3 mL Intracatheter Q8H     venlafaxine  225 mg Oral QAM

## 2022-10-29 NOTE — PLAN OF CARE
Problem: Spinal Surgery  Goal: Absence of Bleeding  Outcome: Progressing  Goal: Effective Bowel Elimination  Outcome: Progressing  Intervention: Enhance Bowel Motility and Elimination  Flowsheets (Taken 10/29/2022 1151)  Bowel Motility Enhancement:    ambulation promoted    fluid intake encouraged    oral intake encouraged  Bowel Elimination Management: toileting offered  Goal: Optimal Functional Ability  Outcome: Progressing  Intervention: Optimize Functional Status  Flowsheets  Taken 10/29/2022 1151  Activity Management:    activity adjusted per tolerance    activity encouraged    ambulated to bathroom  Taken 10/29/2022 0900  Activity Management:    activity adjusted per tolerance    ambulated to bathroom  Goal: Absence of Infection Signs and Symptoms  Outcome: Progressing  Intervention: Prevent or Manage Infection  Flowsheets (Taken 10/29/2022 1151)  Infection Prevention:    hand hygiene promoted    single patient room provided    rest/sleep promoted    personal protective equipment utilized  Infection Management: aseptic technique maintained  Goal: Optimal Neurologic Function  Intervention: Optimize Neurologic Function  Recent Flowsheet Documentation  Taken 10/29/2022 0900 by Whiteside, Rylee  Body Position: position changed independently  Goal: Anesthesia/Sedation Recovery  Intervention: Optimize Anesthesia Recovery  Recent Flowsheet Documentation  Taken 10/29/2022 0900 by Whiteside, Rylee  Safety Promotion/Fall Prevention:    activity supervised    assistive device/personal items within reach    nonskid shoes/slippers when out of bed  Goal: Optimal Pain Control and Function  Outcome: Progressing  Intervention: Prevent or Manage Pain  Flowsheets (Taken 10/29/2022 1151)  Pain Management Interventions:    medication (see MAR)    pain management plan reviewed with patient/caregiver   Goal Outcome Evaluation:         Patient A&Ox4. Pain has been between a 2-4 controlled with Tylenol and Oxycodone. Patient able to  ambulate with SBA only. [place] J collar to remain on at all times. Hemovac removed and covered with gauze and dressing. PT/OT meet with patient. Patient awaiting xray.          Rylee Whiteside (Student Nurse)

## 2022-10-29 NOTE — PROGRESS NOTES
"Orthopedic Surgery Progress Note Spine: 10/29/2022    Subjective:   No acute events overnight. Pain well-controlled. Tolerating a clear diet. Not yet moving bowels. No new concerns or complaints.    Objective:   /69 (BP Location: Left arm)   Pulse 80   Temp 98  F (36.7  C) (Oral)   Resp 16   Ht 1.727 m (5' 7.99\")   Wt 93.8 kg (206 lb 12.7 oz)   LMP  (LMP Unknown)   SpO2 96%   BMI 31.45 kg/m    I/O this shift:  In: -   Out: 315 [Urine:300; Drains:15]  General: NAD. Resting comfortably in bed.  Respiratory: Breathing comfortably on RA.    Drain Output:   15 ml Bloody Serosanguinous Last Shift, for total of 15 ml last 24hrs    Output by Drain (mL) 10/27/22 0700 - 10/27/22 1459 10/27/22 1500 - 10/27/22 2259 10/27/22 2300 - 10/28/22 0659 10/28/22 0700 - 10/28/22 1459 10/28/22 1500 - 10/28/22 2259 10/28/22 2300 - 10/29/22 0643   Closed/Suction Drain 1 Anterior Neck Accordion 10 Swedish     50 15         Musculoskeletal:     Dressings: C/D/I without Strike through, ICBG [place] Site C/D/I    Motor Strength Right Left   Deltoids: C5 5/5 5/5   Biceps: C5 5/5 5/5   Brachialis: C6 5/5 5/5   Wrist extension: C6 5/5 5/5   Triceps: C7  5/5 4+/5   Wrist flexion: C7 5/5 5/5    strength: C8 5/5 5/5   Hand intrinsics: T1 5/5 5/5     Sensation from C4-L1 is preserved.    Motor Strength Right Left   Hip flexion: L1, L2, L3 5/5 5/5   Hip adduction: L2, L3 5/5 5/5   Knee flexion: S1 5/5 5/5   Knee extension: L3, L4 5/5 5/5   Ankle dosiflexion: L4, L5 5/5 5/5   EHL: L5 5/5 5/5   Ankle plantarflexion: S1 5/5 5/5     Sensation from L1-S2 is preserved.    Laboratory Data:  Lab Results   Component Value Date    WBC 10.3 08/29/2022    HGB 12.8 10/28/2022     08/29/2022    INR 1.09 08/29/2022       Images:  No new images were obtained.    Assessment & Plan:   Kylie Austin is a 57 year old female with PMH including atrial fibrillation, hyperlipidemia, sleep apnea, essential tremors, fibromyalgia, obesity, " gender dysphoria, marijuana use, anxiety, depression and bipolar disorder now s/p above procedure on 10/28/22 with Dr. Ruiz.    Goals for 10/29/22: Patient is doing exceptionally well.  Remove drain this morning.  Possible discharge 10/29/2022 afternoon after physical therapy, imaging complete.  -Xrays  -AM labs  -Remove Hemovac  -Physical therapy, possible discharge to home this afternoon    Joseph (ortho) Primary  Activity: Up with assist until independent. No excessive bending or twisting. No lifting >10 lbs x 6 weeks. No lifting overhead  Weight bearing status: WBAT.  Pain management:  Transition from IV to PO narcotics as tolerated. No NSAIDs x 3 months.   Antibiotics: Ancef x 24 hours.  Diet: Begin with clear fluids and progress diet as tolerated.   DVT prophylaxis: SCDs only. No chemical DVT ppx needed.  Imaging: XR Upright cervical PTDC - ordered.  Labs: Hgb POD#1.  Bracing/Splinting: [place] J collar at all times. May mobilize with PT prior to brace arrival. May remove for skin checks and hygiene.  Dressings: Keep tegaderm c/d/i x 2 days.  Drains: HV. Document output per shift, will be discontinued at Orthopedic Surgery discretion.  Lazar catheter: not used  Physical Therapy/Occupational Therapy: Eval and treat.  Cultures: none.    Consults: Hospitalist.  Follow-up: Clinic with Dr. Ruiz in 6 weeks with repeat x-rays.   Disposition: Pending progress with therapies, pain control on orals, and medical stability, anticipate discharge to home on POD #1-2.  ------------------------------------------------------------------------------------------    [   ] PCA discontinued.  [   ] Drains discontinued.  [   ] Postoperative radiographs complete.  [   ] Discharge orders complete.  [   ] Discharge summary started.    Srikanth Hernandez MD  Orthopedic Surgery PGY4  [phone]    Please page me directly with any questions/concerns during regular weekday hours before 5 pm. If there is no response, if it is a weekend, or  if it is during evening hours then please page the orthopedic surgery resident on call.    FOLLOWUP:    Future Appointments   Date Time Provider Department Center   10/29/2022 10:45 AM Santiago Shelby PT [facility] [place]   10/29/2022  7:00 PM UR OT WAITLIST [facility] [place]   11/28/2022  7:00 AM [facility]   12/16/2022 10:45 AM Fernando Ruiz MD [facility] [place]

## 2022-10-29 NOTE — PLAN OF CARE
"0%Goal Outcome Evaluation:  VS: /69 (BP Location: Left arm)   Pulse 62   Temp 98.5  F (36.9  C) (Oral)   Resp 10   Ht 1.727 m (5' 7.99\")   Wt 93.8 kg (206 lb 12.7 oz)   LMP  (LMP Unknown)   SpO2 96%   BMI 31.45 kg/m       O2: SpO2 > 90% and stable on RA. LS clear and equal bilaterally. Denies chest pain and SOB.    Output: Voids spontaneously without difficulty to bathroom. X2 unmeasured output. Pt voided 450 ml and PVR 10.   Last BM: 10/27, denies abdominal discomfort. BS active / passing flatus.    Activity: Up with  SBA uses gait belt. Pt walked on the hallway.   Skin: WDL except, intact except left hip  and anterior neck incision.   Pain: Pain managed with prn 5 mg of oxycodone and scheduled tylenol.    CMS: Intact, AOx4. Denies numbness and tingling.   Dressing: Anterior and left hip dressing CDI. Pt has [place] J brace on at all times.   Diet: Regular diet. Denies nausea/vomiting.    LDA: PIV infusing 10 ml/hr into right forearm.   Equipment: IV pole, personal belongings,    Plan: Continue with plan of care. Call light within reach, pt able to make needs known.    Additional Info:                              "

## 2022-10-29 NOTE — PLAN OF CARE
Goal Outcome Evaluation:      Pt is alert and oriented. Pain well managed with Tylenol and Oxycodone. No signs of infection. Dressing to incision area is CDI. Hemo vac present. SBA to bathroom. Slight numbness to left arm not new but is getting better. Redd lopez stays on at all times. Able to make her needs known, will continue with plan of care.

## 2022-10-30 NOTE — PLAN OF CARE
Pt discharged to home with wife. Pt education on discharge instructions. Verbalized understanding.

## 2022-11-28 ENCOUNTER — OFFICE VISIT (OUTPATIENT)
Dept: FAMILY MEDICINE | Facility: CLINIC | Age: 57
End: 2022-11-28
Payer: COMMERCIAL

## 2022-11-28 VITALS
DIASTOLIC BLOOD PRESSURE: 70 MMHG | TEMPERATURE: 97.7 F | SYSTOLIC BLOOD PRESSURE: 106 MMHG | WEIGHT: 204 LBS | HEART RATE: 69 BPM | BODY MASS INDEX: 30.92 KG/M2 | OXYGEN SATURATION: 98 % | HEIGHT: 68 IN | RESPIRATION RATE: 20 BRPM

## 2022-11-28 DIAGNOSIS — M79.7 FIBROMYALGIA: ICD-10-CM

## 2022-11-28 DIAGNOSIS — R73.01 ELEVATED FASTING GLUCOSE: ICD-10-CM

## 2022-11-28 DIAGNOSIS — Z23 HIGH PRIORITY FOR 2019-NCOV VACCINE: ICD-10-CM

## 2022-11-28 DIAGNOSIS — E78.5 HYPERLIPIDEMIA LDL GOAL <130: ICD-10-CM

## 2022-11-28 DIAGNOSIS — F31.81 BIPOLAR 2 DISORDER (H): ICD-10-CM

## 2022-11-28 DIAGNOSIS — I48.0 PAROXYSMAL ATRIAL FIBRILLATION (H): ICD-10-CM

## 2022-11-28 DIAGNOSIS — I42.9 SECONDARY CARDIOMYOPATHY (H): ICD-10-CM

## 2022-11-28 DIAGNOSIS — Z00.00 ENCOUNTER FOR MEDICARE ANNUAL WELLNESS EXAM: Primary | ICD-10-CM

## 2022-11-28 LAB
AMPHETAMINES UR QL: NOT DETECTED
BARBITURATES UR QL SCN: NOT DETECTED
BENZODIAZ UR QL SCN: NOT DETECTED
BUPRENORPHINE UR QL: NOT DETECTED
CANNABINOIDS UR QL: DETECTED
COCAINE UR QL SCN: NOT DETECTED
D-METHAMPHET UR QL: NOT DETECTED
HBA1C MFR BLD: 5.4 % (ref 0–5.6)
METHADONE UR QL SCN: NOT DETECTED
OPIATES UR QL SCN: NOT DETECTED
OXYCODONE UR QL SCN: NOT DETECTED
PCP UR QL SCN: NOT DETECTED
PROPOXYPH UR QL: NOT DETECTED
TRICYCLICS UR QL SCN: NOT DETECTED

## 2022-11-28 PROCEDURE — 99214 OFFICE O/P EST MOD 30 MIN: CPT | Mod: 25 | Performed by: PHYSICIAN ASSISTANT

## 2022-11-28 PROCEDURE — G0008 ADMIN INFLUENZA VIRUS VAC: HCPCS | Performed by: PHYSICIAN ASSISTANT

## 2022-11-28 PROCEDURE — 83036 HEMOGLOBIN GLYCOSYLATED A1C: CPT | Performed by: PHYSICIAN ASSISTANT

## 2022-11-28 PROCEDURE — 91312 COVID-19 VACCINE BIVALENT BOOSTER 12+ (PFIZER): CPT | Performed by: PHYSICIAN ASSISTANT

## 2022-11-28 PROCEDURE — 0124A COVID-19 VACCINE BIVALENT BOOSTER 12+ (PFIZER): CPT | Performed by: PHYSICIAN ASSISTANT

## 2022-11-28 PROCEDURE — 80306 DRUG TEST PRSMV INSTRMNT: CPT | Performed by: PHYSICIAN ASSISTANT

## 2022-11-28 PROCEDURE — 90682 RIV4 VACC RECOMBINANT DNA IM: CPT | Performed by: PHYSICIAN ASSISTANT

## 2022-11-28 PROCEDURE — 36415 COLL VENOUS BLD VENIPUNCTURE: CPT | Performed by: PHYSICIAN ASSISTANT

## 2022-11-28 PROCEDURE — G0438 PPPS, INITIAL VISIT: HCPCS | Performed by: PHYSICIAN ASSISTANT

## 2022-11-28 RX ORDER — GABAPENTIN 300 MG/1
900 CAPSULE ORAL AT BEDTIME
Qty: 270 CAPSULE | Refills: 3 | Status: SHIPPED | OUTPATIENT
Start: 2022-11-28 | End: 2023-10-18

## 2022-11-28 RX ORDER — METFORMIN HCL 500 MG
500 TABLET, EXTENDED RELEASE 24 HR ORAL
Qty: 90 TABLET | Refills: 0 | Status: CANCELLED | OUTPATIENT
Start: 2022-11-28

## 2022-11-28 RX ORDER — SIMVASTATIN 40 MG
40 TABLET ORAL AT BEDTIME
Qty: 90 TABLET | Refills: 2 | Status: SHIPPED | OUTPATIENT
Start: 2022-11-28 | End: 2023-10-18

## 2022-11-28 ASSESSMENT — PAIN SCALES - GENERAL: PAINLEVEL: NO PAIN (0)

## 2022-11-28 ASSESSMENT — ACTIVITIES OF DAILY LIVING (ADL)
CURRENT_FUNCTION: TRANSPORTATION REQUIRES ASSISTANCE
CURRENT_FUNCTION: SHOPPING REQUIRES ASSISTANCE

## 2022-11-28 NOTE — PATIENT INSTRUCTIONS
Schedule an appointment with Cleo Awan NP, at the [facility]. She will check hormones and even prescribe hormones for transgender patients.     Ask your insurance about the Shingles vaccine:  1) Is it covered?  2) Where should I get it?

## 2022-11-28 NOTE — PROGRESS NOTES
"SUBJECTIVE:   Kylie is a 57 year old who presents for Preventive Visit.    Patient has been advised of split billing requirements and indicates understanding: Yes  Are you in the first 12 months of your Medicare coverage?  No    History of Present Illness       Reason for visit:  Yearly physical, meds    She eats 2-3 servings of fruits and vegetables daily.She consumes 0 sweetened beverage(s) daily.She exercises with enough effort to increase her heart rate 9 or less minutes per day.  She exercises with enough effort to increase her heart rate 3 or less days per week.   She is taking medications regularly.  She is not taking prescribed medications regularly due to None.  Healthy Habits:    In general, how would you rate your overall health?  Very good    Frequency of exercise:  None    Duration of exercise:  Less than 15 minutes    Do you usually eat at least 4 servings of fruit and vegetables a day, include whole grains    & fiber and avoid regularly eating high fat or \"junk\" foods?  Yes    Taking medications regularly:  0    Barriers to taking medications:  None    Medication side effects:  None    Ability to successfully perform activities of daily living:  Transportation requires assistance and shopping requires assistance    Hearing Impairment:  Difficulty following a conversation in a noisy restaurant or crowded room and difficulty understanding soft or whispered speech    In the past 6 months, have you been bothered by leaking of urine?  No    In general, how would you rate your overall mental or emotional health?  Good      PHQ-2 Total Score:    Additional concerns today:  Yes      Have you ever done Advance Care Planning? (For example, a Health Directive, POLST, or a discussion with a medical provider or your loved ones about your wishes): Yes, advance care planning is on file.       Fall risk  Fallen 2 or more times in the past year?: No  Any fall with injury in the past year?: Yes  Timed Up and Go Test " (>13.5 is fall risk; contact physician) : 10    Cognitive Screening   1) Repeat 3 items (Leader, Season, Table)    2) Clock draw: NORMAL  3) 3 item recall: Recalls 3 objects  Results: 3 items recalled: COGNITIVE IMPAIRMENT LESS LIKELY    Mini-CogTM Copyright S Toby. Licensed by the author for use in [facility]; reprinted with permission (dell@[facility]). All rights reserved.      Do you have sleep apnea, excessive snoring or daytime drowsiness?: yes    Reviewed and updated as needed this visit by clinical staff   Tobacco  Allergies  Meds              Reviewed and updated as needed this visit by Provider                 Social History     Tobacco Use     Smoking status: Never     Smokeless tobacco: Never     Tobacco comments:     NEVER SMOKED! Grew up with heavy smokers which did not help my Asthma!   Substance Use Topics     Alcohol use: Not Currently     Comment: occasional//2 per month         Alcohol Use 11/15/2020   Prescreen: >3 drinks/day or >7 drinks/week? Not Applicable         PROBLEMS TO ADD ON...  -------------------------------------  Needing refills and/or labs for:  Hyperlipidemia  Following a low fat diet? Yes  Medication side effects? No    Additional medications:  metFORMIN (GLUCOPHAGE XR) 500 MG 24 hr tablet  Started 3 months ago  208 at last visit, down 4 pounds   Would like to stop this    gabapentin (NEURONTIN) 300 MG capsule  meloxicam      PEG Score 12/2/2021 8/29/2022 11/28/2022   PEG Total Score 4.33 5 5           Current providers sharing in care for this patient include:   Patient Care Team:  Kristin Miner PA-C as PCP - General (Physician Assistant)  Dallas Lo MD as Referring Physician (Internal Medicine)  Kristin Miner PA-C as Assigned PCP  Aleksandra Sellers, RN as Specialty Care Coordinator (Cardiology)  Windy Owens MD (Cardiovascular Disease)  Pierce Kirkpatrick MD as Assigned Neuroscience Provider  Fernando Ruiz MD  as Assigned Musculoskeletal Provider    The following health maintenance items are reviewed in Epic and correct as of today:  Health Maintenance   Topic Date Due     NICOTINE/TOBACCO CESSATION COUNSELING Q 1 YR  Never done     URINE DRUG SCREEN  Never done     HEPATITIS B IMMUNIZATION (1 of 3 - 3-dose series) Never done     ZOSTER IMMUNIZATION (1 of 2) Never done     MAMMO SCREENING  01/21/2023     DTAP/TDAP/TD IMMUNIZATION (3 - Td or Tdap) 06/05/2023     BMP  08/29/2023     FERRITIN  08/29/2023     ANNUAL REVIEW OF HM ORDERS  08/29/2023     CBC  08/29/2023     VITAMIN B12  08/29/2023     VITAMIN D  08/29/2023     LIPID  10/25/2023     MICROALBUMIN  10/25/2023     PSA  10/25/2023     MEDICARE ANNUAL WELLNESS VISIT  11/28/2023     COLORECTAL CANCER SCREENING  01/04/2024     ADVANCE CARE PLANNING  11/28/2027     HEPATITIS C SCREENING  Completed     HIV SCREENING  Completed     PHQ-2 (once per calendar year)  Completed     INFLUENZA VACCINE  Completed     COVID-19 Vaccine  Completed     Pneumococcal Vaccine: Pediatrics (0 to 5 Years) and At-Risk Patients (6 to 64 Years)  Aged Out     IPV IMMUNIZATION  Aged Out     MENINGITIS IMMUNIZATION  Aged Out     PAP  Discontinued     Lab work is in process  Labs reviewed in EPIC    FHS-7:   Breast CA Risk Assessment (FHS-7) 1/21/2022   Did any of your first-degree relatives have breast or ovarian cancer? No   Did any of your relatives have bilateral breast cancer? No   Did any man in your family have breast cancer? No   Did any woman in your family have breast and ovarian cancer? No   Did any woman in your family have breast cancer before age 50 y? No   Do you have 2 or more relatives with breast and/or ovarian cancer? No   Do you have 2 or more relatives with breast and/or bowel cancer? No       Mammogram Screening: Recommended mammography every 1-2 years with patient discussion and risk factor consideration  Pertinent mammograms are reviewed under the imaging tab.    Review of  "Systems  Other than what is noted in the HPI and PMH a complete review of systems is otherwise negative including: Constitutional, HEENT, endocrine, cardiovascular, respiratory, GI/, musculoskeletal, neuro, and psychiatric.     OBJECTIVE:   /70 (BP Location: Right arm, Patient Position: Sitting, Cuff Size: Adult Regular)   Pulse 69   Temp 97.7  F (36.5  C) (Tympanic)   Resp 20   Ht 1.722 m (5' 7.8\")   Wt 92.5 kg (204 lb)   LMP  (LMP Unknown)   SpO2 98%   BMI 31.21 kg/m   Estimated body mass index is 31.21 kg/m  as calculated from the following:    Height as of this encounter: 1.722 m (5' 7.8\").    Weight as of this encounter: 92.5 kg (204 lb).  Physical Exam  GENERAL: healthy, alert and no distress  EYES: Eyes grossly normal to inspection, PERRL and conjunctivae and sclerae normal  HENT: ear canals and TM's normal, nose and mouth without ulcers or lesions  NECK: no adenopathy, no asymmetry, masses, or scars and thyroid normal to palpation  RESP: lungs clear to auscultation - no rales, rhonchi or wheezes  BREAST: normal without masses, tenderness or nipple discharge and no palpable axillary masses or adenopathy  CV: regular rates and rhythm, normal S1 S2, no S3 or S4 and no murmur, click or rub  ABDOMEN: soft, nontender, no hepatosplenomegaly, no masses and bowel sounds normal  MS: no gross musculoskeletal defects noted, no edema  SKIN: no suspicious lesions or rashes  NEURO: Normal strength and tone, mentation intact and speech normal  PSYCH: mentation appears normal, affect normal/bright      ASSESSMENT / PLAN:       ICD-10-CM    1. Encounter for Medicare annual wellness exam  Z00.00       2. Elevated fasting glucose  R73.01 Hemoglobin A1c     Hemoglobin A1c      3. Fibromyalgia  M79.7 Urine Drugs of Abuse Screen Panel 13     gabapentin (NEURONTIN) 300 MG capsule     Urine Drugs of Abuse Screen Panel 13      4. Hyperlipidemia LDL goal <130  E78.5 simvastatin (ZOCOR) 40 MG tablet      5. High " priority for 2019-nCoV vaccine  Z23       6. 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 Paroxysmal atrial fibrillation (H)  I48.0       8. Bipolar 2 disorder (H)  F31.81           1) Screenings discussed    2) Will check an A1c today    3) Medical cannabis re-certification completed today. Med renewed, no change    4) Recent lipid panel reviewed. Med renewed, no change    6,7) Has seen cards in the past, follow up PRN    8) Follows psych      COUNSELING:  Reviewed preventive health counseling, as reflected in patient instructions        She reports that she has never smoked. She has never used smokeless tobacco.      Appropriate preventive services were discussed with this patient, including applicable screening as appropriate for cardiovascular disease, diabetes, osteopenia/osteoporosis, and glaucoma.  As appropriate for age/gender, discussed screening for colorectal cancer, prostate cancer, breast cancer, and cervical cancer. Checklist reviewing preventive services available has been given to the patient.    Reviewed patients plan of care and provided an AVS. The Basic Care Plan (routine screening as documented in Health Maintenance) for Kylie meets the Care Plan requirement. This Care Plan has been established and reviewed with the Patient.          Kristin Miner PA-C  [facility]    Identified Health Risks:

## 2022-12-14 DIAGNOSIS — Z98.1 S/P CERVICAL SPINAL FUSION: Primary | ICD-10-CM

## 2022-12-16 ENCOUNTER — OFFICE VISIT (OUTPATIENT)
Dept: NEUROSURGERY | Facility: CLINIC | Age: 57
End: 2022-12-16
Payer: COMMERCIAL

## 2022-12-16 ENCOUNTER — ANCILLARY PROCEDURE (OUTPATIENT)
Dept: GENERAL RADIOLOGY | Facility: CLINIC | Age: 57
End: 2022-12-16
Attending: ORTHOPAEDIC SURGERY
Payer: COMMERCIAL

## 2022-12-16 VITALS
SYSTOLIC BLOOD PRESSURE: 105 MMHG | BODY MASS INDEX: 31.21 KG/M2 | HEART RATE: 69 BPM | DIASTOLIC BLOOD PRESSURE: 66 MMHG | WEIGHT: 204 LBS

## 2022-12-16 DIAGNOSIS — M47.12 CERVICAL SPONDYLOSIS WITH MYELOPATHY: Primary | ICD-10-CM

## 2022-12-16 DIAGNOSIS — Z98.1 S/P CERVICAL SPINAL FUSION: ICD-10-CM

## 2022-12-16 PROCEDURE — 72050 X-RAY EXAM NECK SPINE 4/5VWS: CPT | Mod: GC | Performed by: RADIOLOGY

## 2022-12-16 PROCEDURE — 99024 POSTOP FOLLOW-UP VISIT: CPT | Performed by: ORTHOPAEDIC SURGERY

## 2022-12-16 ASSESSMENT — PAIN SCALES - GENERAL: PAINLEVEL: MILD PAIN (2)

## 2022-12-16 NOTE — LETTER
12/16/2022         RE: Kylie Austin  [address] [place] [facility] [address]        Dear Colleague,    Thank you for referring your patient, Kylie Austin, to the [facility] [place]. Please see a copy of my visit note below.    Spine Surgery Return Clinic Visit      Chief Complaint:   RECHECK (6 week POP C6-T1 ACDF / DOS 10/28/22//xrays ordered md)      Interval HPI:  Symptom Profile Including: location of symptoms, onset, severity, exacerbating/alleviating factors, previous treatments:        Kylie Austin is a 57 year old female who returns today doing quite well after two-level ACDF.  Overall she feels like she is made significant improvements and she does not have the numbness and difficulty in the left hand.  Overall she is pleased with the surgery.            Past Medical History:     Past Medical History:   Diagnosis Date     Anxiety 2005    Brought on by Amioderone     Arthritis 08/2005     Atrial fib/flutter, transient     S/p cardioversion 2004     Bipolar 2 disorder (H)      Bleeding disorder (H) 1987    nose bleeds     Chronic pain     LUE pain     Congestive heart failure (H) 11/18/2004    cured 2005     Depressive disorder     on and off most of my life!     Fibromyalgia      Gender identity disorder Started Rx 2012     H/O hypogonadism Gonadectomy 2013     Hyperlipidemia      Hypertension      Other mental problems     bipolar     Psoriasis      Sleep apnea      Uncomplicated asthma 1964    cured in 4/2001     Vision disorder 06/2005            Past Surgical History:     Past Surgical History:   Procedure Laterality Date     ARTHROPLASTY SHOULDER Left 1/22/2020    Procedure: Left Total shoulder arthroplasty, distal clavicle excision;  Surgeon: Omari Tobin MD;  [facility]     BIOPSY  2005, 8/8/13    Stomach, colon     COLONOSCOPY  8/8/2013    Procedure: COLONOSCOPY;  COLONSCOPY SCREEN/ SE;  Surgeon:  Santino Sun MD;  Location: [facility] OR     COLONOSCOPY N/A 1/4/2022    Procedure: COLONOSCOPY, WITH POLYPECTOMY AND BIOPSY;  Surgeon: Dallas Velazco MD;  [facility]     COLONOSCOPY WITH CO2 INSUFFLATION N/A 11/1/2018    Procedure: COLONOSCOPY WITH CO2 INSUFFLATION;  Surgeon: Santino Sun MD;  Location: [facility]     COLONOSCOPY WITH CO2 INSUFFLATION N/A 12/15/2020    Procedure: COLONOSCOPY, WITH CO2 INSUFFLATION;  Surgeon: Nima Kamara MD;  Location: [facility]     DECOMPRESSION, FUSION CERVICAL ANTERIOR ONE LEVEL, COMBINED N/A 10/28/2022    Procedure: Cervical 6 to Thoracic 1 anterior cervical decompression and fusion with medtronic plate and screws, interbody device, use of fluoroscopy, microscope;  Surgeon: Fernando Ruiz MD;  Location: [facility] OR     Gender Reassignment  05/2016     GRAFT BONE FROM ILIAC CREST Left 10/28/2022    Procedure: and left sided iliac crest autograft;  Surgeon: Fernando Ruiz MD;  Location[facility] OR     HEAD & NECK SURGERY  2015    ablation of nerve from neck to left arm     INJECT EPIDURAL CERVICAL N/A 7/29/2022    Procedure: INJECTION, SPINE, CERVICAL, EPIDURAL T1-T2;  Surgeon: Kenya Ferrell MD;  Location: [facility] OR     LAPAROSCOPIC GASTRIC SLEEVE N/A 4/10/2018    Procedure: LAPAROSCOPIC GASTRIC SLEEVE;  Laparoscopic Sleeve Gastrectomy;  Surgeon: Jani Shah MD;  [facility]     MANDIBLE SURGERY  1986     ORCHIECTOMY INGUINAL BILATERAL  7/16/2013    Procedure: ORCHIECTOMY INGUINAL BILATERAL;  Bilateral Simple Inguinal Orchiectomy ;  Surgeon: Jan Garrett MD;  [facility] OR     TONSILLECTOMY              Social History:     Social History     Tobacco Use     Smoking status: Never     Smokeless tobacco: Never     Tobacco comments:     NEVER SMOKED! Grew up with heavy smokers which did not help my Asthma!   Substance Use Topics     Alcohol use: Not Currently     Comment: occasional//2 per month            Family  History:     Family History   Problem Relation Age of Onset     Breast Cancer Mother      Cancer Father         skin     Diabetes Father         Was on the patch when they were new     Heart Disease Father 55        scd     Coronary Artery Disease Father      Hypertension Father      Hyperlipidemia Father      Diabetes Sister      Diabetes Sister      Thyroid Disease Sister      Osteoporosis Maternal Grandmother      Alzheimer Disease Paternal Grandmother      Diabetes Paternal Grandmother      Mental Illness Paternal Grandmother         alshimers     Osteoporosis Paternal Grandmother      Coronary Artery Disease Paternal Grandfather      Hypertension Paternal Grandfather      Hyperlipidemia Paternal Grandfather      Prostate Cancer Paternal Grandfather      Other Cancer Paternal Grandfather      Depression Daughter      Unknown/Adopted Daughter      Depression Daughter      Mental Illness Daughter         bipolar     Anxiety Disorder Daughter      Anxiety Disorder Daughter      Depression Son      Heart Disease Other      Anesthesia Reaction No family hx of      Deep Vein Thrombosis (DVT) No family hx of             Allergies:     Allergies   Allergen Reactions     Amiodarone      Caused pain fatigue/amplified anxiety and depression     Fluoxetine Other (See Comments)     Night terrors     Tetracycline      Teeth rattle/saliva acidic            Medications:     Current Outpatient Medications   Medication     ARIPiprazole (ABILIFY) 20 MG tablet     diclofenac (VOLTAREN) 1 % topical gel     furosemide (LASIX) 20 MG tablet     gabapentin (NEURONTIN) 300 MG capsule     medical cannabis (Patient's own supply.  Not a prescription)     order for DME     simvastatin (ZOCOR) 40 MG tablet     venlafaxine (EFFEXOR-XR) 75 MG 24 hr capsule     No current facility-administered medications for this visit.             Review of Systems:   A focused musculoskeletal and neurologic ROS was performed with pertinent positives and  negatives noted in the HPI.  Additional systems were also reviewed and are documented at the bottom of the note.         Physical Exam:   Vitals: /66   Pulse 69   Wt 92.5 kg (204 lb)   LMP  (LMP Unknown)   BMI 31.21 kg/m    Musculoskeletal, Neurologic, and Spine:     Cervical spine:    Appearance -no gross step-offs, kyphosis.    Motor -     C5: Deltoids R 5/5 and L 5/5 strength    C6: Biceps R 5/5 and L 5/5 strength     C7: Triceps R 5/5 and L 5/5 strength     C8:  R 5/5 and L 5/5 strength     Sensation: intact to light touch in C5-T1      Special Tests -      Lhermitte's Test - Negative     Spurling's Test - Negative      Graves's Test - Negative       Neurologic:      REFLEXES Left Right   Biceps 1+ 1+   Triceps 1+ 1+   Brachioradialis 1+ 1+   Patella 1+ 1+   Ankle jerk 1+ 1+   Babinski No upgoing great toe No upgoing great toe   Clonus 0 beats 0 beats     Hip Exam:  No pain with hip log roll and no tenderness over the greater trochanters.    Alignment:  Patient stands with a neutral standing sagittal balance.           Imaging:   We ordered and independently reviewed new radiographs at this clinic visit. The results were discussed with the patient. Findings include:     Cervical radiographs show well-positioned implants no signs of complication       Assessment and Plan:     57 year old female with good outcome at this point status post ACDF.  She can do unlimited low impact cardio, lift up to 30 or 50 pounds at waist level, continue to avoid repetitive or bending neck twisting.  Follow-up in 3 months with physician assistant with repeat x-rays for monitoring and activity advancement.           Respectfully,  Fernando Ruiz MD  Spine Surgery  [facility]        Again, thank you for allowing me to participate in the care of your patient.        Sincerely,        Fernando Ruiz MD

## 2022-12-16 NOTE — PROGRESS NOTES
Spine Surgery Return Clinic Visit      Chief Complaint:   RECHECK (6 week POP C6-T1 ACDF / DOS 10/28/22//xrays ordered md)      Interval HPI:  Symptom Profile Including: location of symptoms, onset, severity, exacerbating/alleviating factors, previous treatments:        Kylie Austin is a 57 year old female who returns today doing quite well after two-level ACDF.  Overall she feels like she is made significant improvements and she does not have the numbness and difficulty in the left hand.  Overall she is pleased with the surgery.            Past Medical History:     Past Medical History:   Diagnosis Date     Anxiety 2005    Brought on by Amioderone     Arthritis 08/2005     Atrial fib/flutter, transient     S/p cardioversion 2004     Bipolar 2 disorder (H)      Bleeding disorder (H) 1987    nose bleeds     Chronic pain     LUE pain     Congestive heart failure (H) 11/18/2004    cured 2005     Depressive disorder     on and off most of my life!     Fibromyalgia      Gender identity disorder Started Rx 2012     H/O hypogonadism Gonadectomy 2013     Hyperlipidemia      Hypertension      Other mental problems     bipolar     Psoriasis      Sleep apnea      Uncomplicated asthma 1964    cured in 4/2001     Vision disorder 06/2005            Past Surgical History:     Past Surgical History:   Procedure Laterality Date     ARTHROPLASTY SHOULDER Left 1/22/2020    Procedure: Left Total shoulder arthroplasty, distal clavicle excision;  Surgeon: Omari Tobin MD;  [facility]     BIOPSY  2005, 8/8/13    Stomach, colon     COLONOSCOPY  8/8/2013    Procedure: COLONOSCOPY;  COLONSCOPY SCREEN/ SE;  Surgeon: Santino Sun MD;  [facility]     COLONOSCOPY N/A 1/4/2022    Procedure: COLONOSCOPY, WITH POLYPECTOMY AND BIOPSY;  Surgeon: Dallas Velazco MD;  [facility]     COLONOSCOPY WITH CO2 INSUFFLATION N/A 11/1/2018    Procedure: COLONOSCOPY WITH CO2 INSUFFLATION;  Surgeon:  Santino Sun MD;  [facility]     COLONOSCOPY WITH CO2 INSUFFLATION N/A 12/15/2020    Procedure: COLONOSCOPY, WITH CO2 INSUFFLATION;  Surgeon: Nima Kamara MD;  [facility]     DECOMPRESSION, FUSION CERVICAL ANTERIOR ONE LEVEL, COMBINED N/A 10/28/2022    Procedure: Cervical 6 to Thoracic 1 anterior cervical decompression and fusion with medtronic plate and screws, interbody device, use of fluoroscopy, microscope;  Surgeon: Fernando Ruiz MD;  [facility]     Gender Reassignment  05/2016     GRAFT BONE FROM ILIAC CREST Left 10/28/2022    Procedure: and left sided iliac crest autograft;  Surgeon: Fernando Ruiz MD;  [facility]     HEAD & NECK SURGERY  2015    ablation of nerve from neck to left arm     INJECT EPIDURAL CERVICAL N/A 7/29/2022    Procedure: INJECTION, SPINE, CERVICAL, EPIDURAL T1-T2;  Surgeon: Kenya Ferrell MD;  [facility]     LAPAROSCOPIC GASTRIC SLEEVE N/A 4/10/2018    Procedure: LAPAROSCOPIC GASTRIC SLEEVE;  Laparoscopic Sleeve Gastrectomy;  Surgeon: Jani Shah MD;  [facility]     MANDIBLE SURGERY  1986     ORCHIECTOMY INGUINAL BILATERAL  7/16/2013    Procedure: ORCHIECTOMY INGUINAL BILATERAL;  Bilateral Simple Inguinal Orchiectomy ;  Surgeon: Jan Garrett MD;  [facility]     TONSILLECTOMY              Social History:     Social History     Tobacco Use     Smoking status: Never     Smokeless tobacco: Never     Tobacco comments:     NEVER SMOKED! Grew up with heavy smokers which did not help my Asthma!   Substance Use Topics     Alcohol use: Not Currently     Comment: occasional//2 per month            Family History:     Family History   Problem Relation Age of Onset     Breast Cancer Mother      Cancer Father         skin     Diabetes Father         Was on the patch when they were new     Heart Disease Father 55        scd     Coronary Artery Disease Father      Hypertension Father      Hyperlipidemia  Father      Diabetes Sister      Diabetes Sister      Thyroid Disease Sister      Osteoporosis Maternal Grandmother      Alzheimer Disease Paternal Grandmother      Diabetes Paternal Grandmother      Mental Illness Paternal Grandmother         alshimers     Osteoporosis Paternal Grandmother      Coronary Artery Disease Paternal Grandfather      Hypertension Paternal Grandfather      Hyperlipidemia Paternal Grandfather      Prostate Cancer Paternal Grandfather      Other Cancer Paternal Grandfather      Depression Daughter      Unknown/Adopted Daughter      Depression Daughter      Mental Illness Daughter         bipolar     Anxiety Disorder Daughter      Anxiety Disorder Daughter      Depression Son      Heart Disease Other      Anesthesia Reaction No family hx of      Deep Vein Thrombosis (DVT) No family hx of             Allergies:     Allergies   Allergen Reactions     Amiodarone      Caused pain fatigue/amplified anxiety and depression     Fluoxetine Other (See Comments)     Night terrors     Tetracycline      Teeth rattle/saliva acidic            Medications:     Current Outpatient Medications   Medication     ARIPiprazole (ABILIFY) 20 MG tablet     diclofenac (VOLTAREN) 1 % topical gel     furosemide (LASIX) 20 MG tablet     gabapentin (NEURONTIN) 300 MG capsule     medical cannabis (Patient's own supply.  Not a prescription)     order for DME     simvastatin (ZOCOR) 40 MG tablet     venlafaxine (EFFEXOR-XR) 75 MG 24 hr capsule     No current facility-administered medications for this visit.             Review of Systems:   A focused musculoskeletal and neurologic ROS was performed with pertinent positives and negatives noted in the HPI.  Additional systems were also reviewed and are documented at the bottom of the note.         Physical Exam:   Vitals: /66   Pulse 69   Wt 92.5 kg (204 lb)   LMP  (LMP Unknown)   BMI 31.21 kg/m    Musculoskeletal, Neurologic, and Spine:     Cervical  spine:    Appearance -no gross step-offs, kyphosis.    Motor -     C5: Deltoids R 5/5 and L 5/5 strength    C6: Biceps R 5/5 and L 5/5 strength     C7: Triceps R 5/5 and L 5/5 strength     C8:  R 5/5 and L 5/5 strength     Sensation: intact to light touch in C5-T1      Special Tests -      Lhermitte's Test - Negative     Spurling's Test - Negative      Graves's Test - Negative       Neurologic:      REFLEXES Left Right   Biceps 1+ 1+   Triceps 1+ 1+   Brachioradialis 1+ 1+   Patella 1+ 1+   Ankle jerk 1+ 1+   Babinski No upgoing great toe No upgoing great toe   Clonus 0 beats 0 beats     Hip Exam:  No pain with hip log roll and no tenderness over the greater trochanters.    Alignment:  Patient stands with a neutral standing sagittal balance.           Imaging:   We ordered and independently reviewed new radiographs at this clinic visit. The results were discussed with the patient. Findings include:     Cervical radiographs show well-positioned implants no signs of complication       Assessment and Plan:     57 year old female with good outcome at this point status post ACDF.  She can do unlimited low impact cardio, lift up to 30 or 50 pounds at waist level, continue to avoid repetitive or bending neck twisting.  Follow-up in 3 months with physician assistant with repeat x-rays for monitoring and activity advancement.           Respectfully,  Fernando Ruiz MD  Spine Surgery  [facility]

## 2023-01-06 ENCOUNTER — E-VISIT (OUTPATIENT)
Dept: URGENT CARE | Facility: CLINIC | Age: 58
End: 2023-01-06
Payer: COMMERCIAL

## 2023-01-06 ENCOUNTER — ANCILLARY PROCEDURE (OUTPATIENT)
Dept: GENERAL RADIOLOGY | Facility: CLINIC | Age: 58
End: 2023-01-06
Attending: NURSE PRACTITIONER
Payer: COMMERCIAL

## 2023-01-06 ENCOUNTER — OFFICE VISIT (OUTPATIENT)
Dept: URGENT CARE | Facility: URGENT CARE | Age: 58
End: 2023-01-06
Payer: COMMERCIAL

## 2023-01-06 VITALS
SYSTOLIC BLOOD PRESSURE: 133 MMHG | OXYGEN SATURATION: 97 % | HEART RATE: 71 BPM | DIASTOLIC BLOOD PRESSURE: 74 MMHG | WEIGHT: 213 LBS | RESPIRATION RATE: 14 BRPM | TEMPERATURE: 97.9 F | BODY MASS INDEX: 32.58 KG/M2

## 2023-01-06 DIAGNOSIS — R05.1 ACUTE COUGH: ICD-10-CM

## 2023-01-06 DIAGNOSIS — I48.0 PAROXYSMAL ATRIAL FIBRILLATION (H): ICD-10-CM

## 2023-01-06 DIAGNOSIS — J01.90 ACUTE SINUSITIS WITH COEXISTING CONDITION REQUIRING PROPHYLACTIC TREATMENT: ICD-10-CM

## 2023-01-06 DIAGNOSIS — I42.9 SECONDARY CARDIOMYOPATHY (H): ICD-10-CM

## 2023-01-06 DIAGNOSIS — J22 LOWER RESPIRATORY TRACT INFECTION: Primary | ICD-10-CM

## 2023-01-06 DIAGNOSIS — R06.02 SOB (SHORTNESS OF BREATH): Primary | ICD-10-CM

## 2023-01-06 PROCEDURE — U0005 INFEC AGEN DETEC AMPLI PROBE: HCPCS | Performed by: NURSE PRACTITIONER

## 2023-01-06 PROCEDURE — 71046 X-RAY EXAM CHEST 2 VIEWS: CPT | Mod: TC | Performed by: RADIOLOGY

## 2023-01-06 PROCEDURE — 99214 OFFICE O/P EST MOD 30 MIN: CPT | Performed by: NURSE PRACTITIONER

## 2023-01-06 PROCEDURE — U0003 INFECTIOUS AGENT DETECTION BY NUCLEIC ACID (DNA OR RNA); SEVERE ACUTE RESPIRATORY SYNDROME CORONAVIRUS 2 (SARS-COV-2) (CORONAVIRUS DISEASE [COVID-19]), AMPLIFIED PROBE TECHNIQUE, MAKING USE OF HIGH THROUGHPUT TECHNOLOGIES AS DESCRIBED BY CMS-2020-01-R: HCPCS | Performed by: NURSE PRACTITIONER

## 2023-01-06 PROCEDURE — 99207 PR NON-BILLABLE SERV PER CHARTING: CPT | Performed by: PREVENTIVE MEDICINE

## 2023-01-06 RX ORDER — ZINC ACETATE ANHYDROUS AND ZINC GLUCONATE 2; 1 [HP_X]/1; [HP_X]/1
TABLET ORAL
COMMUNITY
End: 2023-01-24

## 2023-01-06 RX ORDER — PREDNISONE 20 MG/1
40 TABLET ORAL DAILY
Qty: 10 TABLET | Refills: 0 | Status: SHIPPED | OUTPATIENT
Start: 2023-01-06 | End: 2023-01-11

## 2023-01-06 RX ORDER — BENZONATATE 100 MG/1
100 CAPSULE ORAL EVERY 8 HOURS PRN
Qty: 30 CAPSULE | Refills: 0 | Status: SHIPPED | OUTPATIENT
Start: 2023-01-06 | End: 2023-01-24

## 2023-01-06 RX ORDER — ZINC GLUCONATE 50 MG
50 TABLET ORAL DAILY
COMMUNITY
End: 2023-01-24

## 2023-01-06 NOTE — PROGRESS NOTES
Assessment & Plan     Lower respiratory tract infection  - amoxicillin-clavulanate (AUGMENTIN) 875-125 MG tablet  Dispense: 14 tablet; Refill: 0  - predniSONE (DELTASONE) 20 MG tablet  Dispense: 10 tablet; Refill: 0    Acute sinusitis with coexisting condition requiring prophylactic treatment    Acute cough    - XR Chest 2 Views  - Symptomatic COVID-19 Virus (Coronavirus) by PCR Nose  - benzonatate (TESSALON) 100 MG capsule  Dispense: 30 capsule; Refill: 0    Paroxysmal atrial fibrillation (H)      Secondary cardiomyopathy (H)       Reviewed chest xray images and results during visit showing no obvious pneumonia, though increased consolidation bilateral lower lobe. With her history of heart disease, will treat with Augmentin for lower respiratory tract infection which will cover for sinus infection and pneumonia with her drug allergies. Prescription sent to pharmacy for tessalon as needed for cough and prednisone daily for 5 days, potential side effects discussed. COVID testing in process, will notify results when available. Recommended rest, fluids especially warm clear liquids such as tea with honey and lemon, decreasing dairy which can increase congestion, humidifier, steam, nasal irrigation with saline, flonase nasal spray.       Follow-up with PCP if symptoms persist for 7 days, and sooner if symptoms worsen or new symptoms develop.     Discussed red flag symptoms which warrant immediate visit in emergency room    All questions were answered and patient verbalized understanding. AVS reviewed with patient.     34 minutes spent during visit, chart review, and charting on day of encounter.      Jillian West, DNP, APRN, CNP 1/6/2023 1:37 PM  [facility] [place]          William Espinal is a 57 year old female who presents to clinic today for the following health issues:  Chief Complaint   Patient presents with     Cough     Sx week       Fever     Sx comes and goes.     chest congestion      Sx week     Sinus Problem     Sx- Sinus drip , back of throat - Pt denies any covid test. Per pt exposed to pneumonia - ava day     Patient presents for evaluation of cough. Associated symptoms: chest congestion, nasal congestion, sinus pain/pressure, intermittent fever, chills at night, shortness of breath, wheezing, nasal congestion, post-nasal drip, fatigue, intermittent headache (not currently). Symptoms have been present for over a week.Temperature not taken. Denies body aches, sore throat. Cough has been worsening and sinus pain has been improving. She has been taking tylenol which helps temporarily. Shortness of breath is mild. Cough is productive with clear/white mucus. Cough has been waking patient at night. Patient was exposed to pneumonia last week. Kylie has a history of bronchitis and pneumonia.     Patient has a history of HTN, asthma, CHF, atrial fibrillation, cardiomyopathy, bipolar disorder.     Problem list, Medication list, Allergies, and Medical history reviewed in EPIC.    ROS:  Review of systems negative except for noted above        Objective    /74   Pulse 71   Temp 97.9  F (36.6  C) (Tympanic)   Resp 14   Wt 96.6 kg (213 lb)   LMP  (LMP Unknown)   SpO2 97%   BMI 32.58 kg/m    Physical Exam  Constitutional:       General: She is not in acute distress.     Appearance: She is not toxic-appearing or diaphoretic.   HENT:      Head: Normocephalic and atraumatic.      Right Ear: Tympanic membrane, ear canal and external ear normal.      Left Ear: Tympanic membrane, ear canal and external ear normal.      Nose:      Right Sinus: No maxillary sinus tenderness or frontal sinus tenderness.      Left Sinus: No maxillary sinus tenderness or frontal sinus tenderness.      Comments: Mild nasal congestion     Mouth/Throat:      Mouth: Mucous membranes are moist.      Pharynx: Oropharynx is clear. No oropharyngeal exudate or posterior oropharyngeal erythema.      Tonsils: No tonsillar  abscesses. 0 on the right. 0 on the left.   Eyes:      Conjunctiva/sclera: Conjunctivae normal.   Cardiovascular:      Rate and Rhythm: Normal rate and regular rhythm.      Heart sounds: Normal heart sounds.   Pulmonary:      Effort: Pulmonary effort is normal. No respiratory distress.      Comments: Able to speak in complete sentences, coarse lung sounds bilateral bases, wheezing throughout lungs, frequent harsh cough  Lymphadenopathy:      Cervical: No cervical adenopathy.   Neurological:      Mental Status: She is alert.          X-ray chest was performed and reviewed independently by myself showing no obvious pneumonia, though increased consolidation bilateral lower lobes  Radiologist impression:   Results for orders placed or performed in visit on 01/06/23   XR Chest 2 Views     Status: None (Preliminary result)    Narrative    CHEST TWO VIEWS    1/6/2023 12:42 PM     HISTORY: Cough, shortness of breath. Acute cough.    COMPARISON: 6/2/2017.      Impression    IMPRESSION: No acute cardiopulmonary disease.

## 2023-01-06 NOTE — PATIENT INSTRUCTIONS
Rest, fluids especially warm clear liquids such as tea with honey and lemon, decreasing dairy which can increase congestion, humidifier, steam, nasal irrigation with saline, flonase nasal spray    Augmentin twice daily for 7 days   Prednisone daily for 5 days  Tessalon as needed for cough

## 2023-01-06 NOTE — PATIENT INSTRUCTIONS
Dear Kylie Austin,    We are sorry you are not feeling well. Based on the responses you provided, it is recommended that you be seen in-person in urgent care so we can better evaluate your symptoms. Please click here to find the nearest urgent care location to you.   You will not be charged for this Visit. Thank you for trusting us with your care.    Darryl Cazares MD, MD

## 2023-01-07 LAB — SARS-COV-2 RNA RESP QL NAA+PROBE: NEGATIVE

## 2023-01-19 NOTE — MR AVS SNAPSHOT
MRN:[id number]                      After Visit Summary   10/18/2017    Kylie Piper    MRN: [id number]           Visit Information        Provider Department      10/18/2017 8:00 AM Merle Zamudio RD [facility]        Your next 10 appointments already scheduled     Nov 07, 2017  2:00 PM CST   New Visit with Stanislav Rayo MD   [facility] ([facility])    [address] [place] [address] [phone]              Care Instructions    GOALS:  Relating To Eating:  Eat slowly (20-30 minutes per meal), chewing foods well (25 chews per bite/applesauce consistency)  Focus on lean protein and non-starchy vegetables/whole fruit at each meal with no more than 1 cup of carbs or 2 slices of bread  Record food intake prior to eating throughout the day.  May use [employer]  or FitBit.    Relating to beverages:  Work on  fluids from meals by 30 minutes.     Relating to activity:  Increase activity as able depending upon knee pain. Try upper body exercises    Relating to cravings:  Rid your environment of trigger foods.          [employer] Information     [facility] gives you secure access to your electronic health record. If you see a primary care provider, you can also send messages to your care team and make appointments. If you have questions, please call your primary care clinic.  If you do not have a primary care provider, please call [phone] and they will assist you.      [facility] is an electronic gateway that provides easy, online access to your medical records. With My [facility], you can request a clinic appointment, read your test results, renew a prescription or communicate with your care team.     To access your existing account, please contact your [facility] or call [phone] for assistance.        Care EveryWhere ID     This is your Care EveryWhere ID.  This could be used by other organizations to access your [place] medical records  [id number]        Equal Access to Services     SHARON WORTHINGTON : Dominik Gross, milton lester, tana brice, riley juarez. So [facility] [phone].    ATENCIÓN: Si habla español, tiene a wheeler disposición servicios gratuitos de asistencia lingüística. Llame al [phone].    We comply with applicable federal civil rights laws and [place] laws. We do not discriminate on the basis of race, color, national origin, age, disability, sex, sexual orientation, or gender identity.             36.9

## 2023-01-23 ASSESSMENT — ANXIETY QUESTIONNAIRES
6. BECOMING EASILY ANNOYED OR IRRITABLE: NOT AT ALL
4. TROUBLE RELAXING: SEVERAL DAYS
GAD7 TOTAL SCORE: 2
1. FEELING NERVOUS, ANXIOUS, OR ON EDGE: NOT AT ALL
8. IF YOU CHECKED OFF ANY PROBLEMS, HOW DIFFICULT HAVE THESE MADE IT FOR YOU TO DO YOUR WORK, TAKE CARE OF THINGS AT HOME, OR GET ALONG WITH OTHER PEOPLE?: NOT DIFFICULT AT ALL
GAD7 TOTAL SCORE: 2
3. WORRYING TOO MUCH ABOUT DIFFERENT THINGS: NOT AT ALL
IF YOU CHECKED OFF ANY PROBLEMS ON THIS QUESTIONNAIRE, HOW DIFFICULT HAVE THESE PROBLEMS MADE IT FOR YOU TO DO YOUR WORK, TAKE CARE OF THINGS AT HOME, OR GET ALONG WITH OTHER PEOPLE: NOT DIFFICULT AT ALL
5. BEING SO RESTLESS THAT IT IS HARD TO SIT STILL: SEVERAL DAYS
7. FEELING AFRAID AS IF SOMETHING AWFUL MIGHT HAPPEN: NOT AT ALL
7. FEELING AFRAID AS IF SOMETHING AWFUL MIGHT HAPPEN: NOT AT ALL
2. NOT BEING ABLE TO STOP OR CONTROL WORRYING: NOT AT ALL

## 2023-01-24 ENCOUNTER — OFFICE VISIT (OUTPATIENT)
Dept: FAMILY MEDICINE | Facility: CLINIC | Age: 58
End: 2023-01-24
Payer: COMMERCIAL

## 2023-01-24 VITALS
HEIGHT: 68 IN | SYSTOLIC BLOOD PRESSURE: 102 MMHG | TEMPERATURE: 97.6 F | RESPIRATION RATE: 14 BRPM | BODY MASS INDEX: 32.55 KG/M2 | DIASTOLIC BLOOD PRESSURE: 70 MMHG | HEART RATE: 79 BPM | WEIGHT: 214.8 LBS | OXYGEN SATURATION: 96 %

## 2023-01-24 DIAGNOSIS — Z78.0 POSTMENOPAUSAL STATUS: ICD-10-CM

## 2023-01-24 DIAGNOSIS — Z12.31 ENCOUNTER FOR SCREENING MAMMOGRAM FOR BREAST CANCER: ICD-10-CM

## 2023-01-24 DIAGNOSIS — Z13.820 SCREENING FOR OSTEOPOROSIS: ICD-10-CM

## 2023-01-24 DIAGNOSIS — Z12.31 VISIT FOR SCREENING MAMMOGRAM: ICD-10-CM

## 2023-01-24 DIAGNOSIS — F64.9 GENDER DYSPHORIA: Primary | ICD-10-CM

## 2023-01-24 LAB
ALBUMIN SERPL-MCNC: 3.8 G/DL (ref 3.4–5)
ANION GAP SERPL CALCULATED.3IONS-SCNC: 4 MMOL/L (ref 3–14)
BUN SERPL-MCNC: 19 MG/DL (ref 7–30)
CALCIUM SERPL-MCNC: 9.4 MG/DL (ref 8.5–10.1)
CHLORIDE BLD-SCNC: 104 MMOL/L (ref 94–109)
CO2 SERPL-SCNC: 31 MMOL/L (ref 20–32)
CREAT SERPL-MCNC: 0.82 MG/DL (ref 0.52–1.04)
ESTRADIOL SERPL-MCNC: <5 PG/ML
GFR SERPL CREATININE-BSD FRML MDRD: 82 ML/MIN/1.73M2
GLUCOSE BLD-MCNC: 95 MG/DL (ref 70–99)
PHOSPHATE SERPL-MCNC: 3.9 MG/DL (ref 2.5–4.5)
POTASSIUM BLD-SCNC: 4.4 MMOL/L (ref 3.4–5.3)
PROLACTIN SERPL 3RD IS-MCNC: 4 NG/ML (ref 5–23)
SODIUM SERPL-SCNC: 139 MMOL/L (ref 133–144)

## 2023-01-24 PROCEDURE — 36415 COLL VENOUS BLD VENIPUNCTURE: CPT | Performed by: NURSE PRACTITIONER

## 2023-01-24 PROCEDURE — 82670 ASSAY OF TOTAL ESTRADIOL: CPT | Performed by: NURSE PRACTITIONER

## 2023-01-24 PROCEDURE — 84146 ASSAY OF PROLACTIN: CPT | Performed by: NURSE PRACTITIONER

## 2023-01-24 PROCEDURE — 84403 ASSAY OF TOTAL TESTOSTERONE: CPT | Performed by: NURSE PRACTITIONER

## 2023-01-24 PROCEDURE — 84270 ASSAY OF SEX HORMONE GLOBUL: CPT | Performed by: NURSE PRACTITIONER

## 2023-01-24 PROCEDURE — 99214 OFFICE O/P EST MOD 30 MIN: CPT | Performed by: NURSE PRACTITIONER

## 2023-01-24 PROCEDURE — 80069 RENAL FUNCTION PANEL: CPT | Performed by: NURSE PRACTITIONER

## 2023-01-24 RX ORDER — ESTRADIOL 2 MG/1
2-4 TABLET ORAL DAILY
Qty: 180 TABLET | Refills: 1 | Status: SHIPPED | OUTPATIENT
Start: 2023-01-24 | End: 2023-10-09

## 2023-01-24 RX ORDER — ESTRADIOL 2 MG/1
2-4 TABLET ORAL DAILY
Qty: 90 TABLET | Refills: 1 | Status: SHIPPED | OUTPATIENT
Start: 2023-01-24 | End: 2023-01-24

## 2023-01-24 RX ORDER — SPIRONOLACTONE 50 MG/1
100 TABLET, FILM COATED ORAL DAILY
Qty: 180 TABLET | Refills: 1 | Status: SHIPPED | OUTPATIENT
Start: 2023-01-24 | End: 2023-07-13

## 2023-01-24 ASSESSMENT — PAIN SCALES - GENERAL: PAINLEVEL: MODERATE PAIN (4)

## 2023-01-24 ASSESSMENT — ANXIETY QUESTIONNAIRES: GAD7 TOTAL SCORE: 2

## 2023-01-24 NOTE — PROGRESS NOTES
Assessment & Plan     Visit for screening mammogram    - MA SCREENING DIGITAL BILAT - Future  (s+30); Future    Encounter for screening mammogram for breast cancer    - MA SCREENING DIGITAL BILAT - Future  (s+30); Future    Gender identity disorder    - MA SCREENING DIGITAL BILAT - Future  (s+30); Future  - DX Hip/Pelvis/Spine; Future  - Estradiol; Future  - Testosterone Free and Total; Future  - Prolactin; Future  - spironolactone (ALDACTONE) 50 MG tablet; Take 2 tablets (100 mg) by mouth daily  - Renal panel (Alb, BUN, Ca, Cl, CO2, Creat, Gluc, Phos, K, Na); Future  - estradiol (ESTRACE) 2 MG tablet; Take 1-2 tablets (2-4 mg) by mouth daily  - Renal panel (Alb, BUN, Ca, Cl, CO2, Creat, Gluc, Phos, K, Na)  - Prolactin  - Testosterone Free and Total  - Estradiol    Screening for osteoporosis    - DX Hip/Pelvis/Spine; Future    Postmenopausal status    - DX Hip/Pelvis/Spine; Future    30 minutes spent on the date of the encounter doing chart review, history and exam, documentation and further activities per the note     See Patient Instructions: Take medication as prescribed, complete labs every 3 to 4 months for the first year on estrogen.  Follow-up at anytime for healthcare questions or concerns.  Review after visit summary tips.  Patient in agreement.    Return in about 3 months (around 4/24/2023), or if symptoms worsen or fail to improve, for lab appointment. .    DIMITRI BENDER, ALE  [facility] DARLENE Espinal is a 57 year old, presenting for the following health issues:  Consult    Patient reports history of gender dysphoria, has taken estrogen pills and patches in the past.  Would like to restart pills as she does not believe patches will be covered by her insurance.  She reports her mental health is stable she has been a little depressed by something that happened the end of last year.  She talks with her therapist regularly and is scheduling with a new psychiatrist.   "Retired.  Enjoys playing video games walking dog.  Has support of wife and daughter.  Does not smoke cigarettes, does use medical cannabis he said.  Discussed why she stopped estrogen therapy in the past, she reports history of chest pain, and it was decided she should stop estrogen at that time; has now been told it is safe to restart estrogen.  Discussed risks of estrogen including increased risk for blood clot and breast cancer.  Discussed symptoms to watch for including DVT (S&S), PE, MI, stroke.  And untreated blood clot can result in death.  Patient aware of risks and in agreement to hormone therapy.  Advised need for labs today prior to starting medication and then every 3 to 4 months the first year.  Patient would also like to have DEXA scan to screen for osteoporosis, this was ordered advised her to verify coverage with insurance prior to scheduling.  Follow-up anytime for healthcare questions or concerns.  History of Present Illness       Reason for visit:  Hormones    She eats 2-3 servings of fruits and vegetables daily.She consumes 0 sweetened beverage(s) daily.She exercises with enough effort to increase her heart rate 9 or less minutes per day.  She exercises with enough effort to increase her heart rate 3 or less days per week.   She is taking medications regularly.  Today's MURPHY-7 Score: 2     Patient would like to discuss going back on hormones. Also has questions regarding bone density testing.     Review of Systems   Constitutional, HEENT, cardiovascular, pulmonary, GI, , musculoskeletal, neuro, skin, endocrine and psych systems are negative, except as otherwise noted.      Objective    /70   Pulse 79   Temp 97.6  F (36.4  C) (Tympanic)   Resp 14   Ht 1.716 m (5' 7.56\")   Wt 97.4 kg (214 lb 12.8 oz)   LMP  (LMP Unknown)   SpO2 96%   BMI 33.09 kg/m    Body mass index is 33.09 kg/m .  Physical Exam   GENERAL: Healthy, alert and no distress  EYES: Eyes grossly normal to inspection.  No " discharge or erythema, or obvious scleral/conjunctival abnormalities.  RESP: No audible wheeze, cough, or visible cyanosis.  No visible retractions or increased work of breathing.    SKIN: Visible skin clear. No significant rash, abnormal pigmentation or lesions.  NEURO: Cranial nerves grossly intact.  Mentation and speech appropriate for age.  PSYCH: Mentation appears normal, affect normal/bright, judgement and insight intact, normal speech and appearance well-groomed.       See orders, discussed labs to be done quarterly the first year while on hormone therapy.  Schedule follow-up lab appointment in 3-4 months

## 2023-01-25 LAB — SHBG SERPL-SCNC: 64 NMOL/L (ref 30–135)

## 2023-01-25 NOTE — RESULT ENCOUNTER NOTE
Shravan Espinal,    Thank you for your recent office visit.    Here are your recent results.  Blood labs look pretty normal.  Follow-up as needed/discussed.    Feel free to contact me via [employer] or call the clinic at [phone].    Sincerely,    TALIB Yanes, FNP-BC

## 2023-01-26 LAB
TESTOST FREE SERPL-MCNC: 0.02 NG/DL
TESTOST SERPL-MCNC: 2 NG/DL (ref 8–60)

## 2023-02-08 ENCOUNTER — OFFICE VISIT (OUTPATIENT)
Dept: ANESTHESIOLOGY | Facility: CLINIC | Age: 58
End: 2023-02-08
Payer: COMMERCIAL

## 2023-02-08 VITALS
DIASTOLIC BLOOD PRESSURE: 81 MMHG | SYSTOLIC BLOOD PRESSURE: 118 MMHG | HEIGHT: 68 IN | WEIGHT: 214 LBS | HEART RATE: 80 BPM | BODY MASS INDEX: 32.43 KG/M2 | OXYGEN SATURATION: 100 %

## 2023-02-08 DIAGNOSIS — M25.562 CHRONIC PAIN OF LEFT KNEE: Primary | ICD-10-CM

## 2023-02-08 DIAGNOSIS — G89.29 CHRONIC PAIN OF LEFT KNEE: Primary | ICD-10-CM

## 2023-02-08 PROCEDURE — 99214 OFFICE O/P EST MOD 30 MIN: CPT

## 2023-02-08 ASSESSMENT — PAIN SCALES - GENERAL: PAINLEVEL: MODERATE PAIN (4)

## 2023-02-08 NOTE — NURSING NOTE
Patient presents with:  Follow Up: Follow-up [facility]       Moderate Pain (4)         What medications are you using for pain? Gabapentin, Medical Cannabis    (New patients only) Have you been seen by another pain clinic/ provider? no    (Return Patients only) What refills are you needing today? no

## 2023-02-08 NOTE — LETTER
2/8/2023       RE: Kylie Austin  [address] [facility] [address]     Dear Colleague,    Thank you for referring your patient, Kylie Austin, to the [facility]IVE [facility]NAG[facility] [place] at [facility]. Please see a copy of my visit note below.    Patient is a 56 y/o female with a chief complaint of left knee pain.  She had been involved in the SKOAP study (best practices).  She was taking meloxicam 7.5 mg qd.  Pre-intervention pain was 8/10 and post-intervention pain was 2/10.  She reports pain and swelling of the right knee.  She would like to transition to Phase 2 (ie-interventional techniques).  Current Outpatient Medications   Medication     ARIPiprazole (ABILIFY) 20 MG tablet     diclofenac (VOLTAREN) 1 % topical gel     estradiol (ESTRACE) 2 MG tablet     furosemide (LASIX) 20 MG tablet     gabapentin (NEURONTIN) 300 MG capsule     medical cannabis (Patient's own supply.  Not a prescription)     order for DME     simvastatin (ZOCOR) 40 MG tablet     spironolactone (ALDACTONE) 50 MG tablet     venlafaxine (EFFEXOR-XR) 75 MG 24 hr capsule     No current facility-administered medications for this visit.     Allergies   Allergen Reactions     Amiodarone      Caused pain fatigue/amplified anxiety and depression     Fluoxetine Other (See Comments)     Night terrors     Tetracycline      Teeth rattle/saliva acidic     Past Medical History:   Diagnosis Date     Anxiety 2005    Brought on by Amioderone     Arthritis 08/2005     Atrial fib/flutter, transient     S/p cardioversion 2004     Bipolar 2 disorder (H)      Bleeding disorder (H) 1987    nose bleeds     Chronic pain     LUE pain     Congestive heart failure (H) 11/18/2004    cured 2005     Depressive disorder     on and off most of my life!     Fibromyalgia      Gender identity disorder Started Rx 2012     H/O hypogonadism Gonadectomy 2013      Hyperlipidemia      Hypertension      Other mental problems     bipolar     Psoriasis      Sleep apnea      Uncomplicated asthma 1964    cured in 4/2001     Vision disorder 06/2005     Past Surgical History:   Procedure Laterality Date     ARTHROPLASTY SHOULDER Left 1/22/2020    Procedure: Left Total shoulder arthroplasty, distal clavicle excision;  Surgeon: Omari Tobin MD;  [facility]     BIOPSY  2005, 8/8/13    Stomach, colon     COLONOSCOPY  8/8/2013    Procedure: COLONOSCOPY;  COLONSCOPY SCREEN/ SE;  Surgeon: Santino Sun MD;  [facility]     COLONOSCOPY N/A 1/4/2022    Procedure: COLONOSCOPY, WITH POLYPECTOMY AND BIOPSY;  Surgeon: Dallas Velazco MD;  [facility]     COLONOSCOPY WITH CO2 INSUFFLATION N/A 11/1/2018    Procedure: COLONOSCOPY WITH CO2 INSUFFLATION;  Surgeon: Sanitno Sun MD;  [facility]     COLONOSCOPY WITH CO2 INSUFFLATION N/A 12/15/2020    Procedure: COLONOSCOPY, WITH CO2 INSUFFLATION;  Surgeon: Nima Kamara MD;  [facility]     DECOMPRESSION, FUSION CERVICAL ANTERIOR ONE LEVEL, COMBINED N/A 10/28/2022    Procedure: Cervical 6 to Thoracic 1 anterior cervical decompression and fusion with medtronic plate and screws, interbody device, use of fluoroscopy, microscope;  Surgeon: Fernando Ruiz MD;  [facility]     Gender Reassignment  05/2016     GRAFT BONE FROM ILIAC CREST Left 10/28/2022    Procedure: and left sided iliac crest autograft;  Surgeon: Fernando Ruiz MD;  [facility]     HEAD & NECK SURGERY  2015    ablation of nerve from neck to left arm     INJECT EPIDURAL CERVICAL N/A 7/29/2022    Procedure: INJECTION, SPINE, CERVICAL, EPIDURAL T1-T2;  Surgeon: Kenya Ferrell MD;  [facility]     LAPAROSCOPIC GASTRIC SLEEVE N/A 4/10/2018    Procedure: LAPAROSCOPIC GASTRIC SLEEVE;  Laparoscopic Sleeve Gastrectomy;  Surgeon: Jani Shah MD;  [facility]     MANDIBLE  SURGERY  1986     ORCHIECTOMY INGUINAL BILATERAL  7/16/2013    Procedure: ORCHIECTOMY INGUINAL BILATERAL;  Bilateral Simple Inguinal Orchiectomy ;  Surgeon: Jan Garrett MD;  [facility]     TONSILLECTOMY       Family History   Problem Relation Age of Onset     Breast Cancer Mother      Cancer Father         skin     Diabetes Father         Was on the patch when they were new     Heart Disease Father 55        scd     Coronary Artery Disease Father      Hypertension Father      Hyperlipidemia Father      Diabetes Sister      Diabetes Sister      Thyroid Disease Sister      Osteoporosis Maternal Grandmother      Alzheimer Disease Paternal Grandmother      Diabetes Paternal Grandmother      Mental Illness Paternal Grandmother         alshimers     Osteoporosis Paternal Grandmother      Coronary Artery Disease Paternal Grandfather      Hypertension Paternal Grandfather      Hyperlipidemia Paternal Grandfather      Prostate Cancer Paternal Grandfather      Other Cancer Paternal Grandfather      Depression Daughter      Unknown/Adopted Daughter      Depression Daughter      Mental Illness Daughter         bipolar     Anxiety Disorder Daughter      Anxiety Disorder Daughter      Depression Son      Heart Disease Other      Anesthesia Reaction No family hx of      Deep Vein Thrombosis (DVT) No family hx of      Social History     Socioeconomic History     Marital status:      Spouse name: Not on file     Number of children: 3     Years of education: Not on file     Highest education level: Not on file   Occupational History     Occupation: retired   Tobacco Use     Smoking status: Never     Smokeless tobacco: Never     Tobacco comments:     NEVER SMOKED! Grew up with heavy smokers which did not help my Asthma!   Vaping Use     Vaping Use: Every day     Substances: THC, CBD, medical cannabis     Devices: Disposable   Substance and Sexual Activity     Alcohol use: Not Currently     Comment: occasional//2  per month     Drug use: Yes     Types: Marijuana     Comment: medical marijuana, daily     Sexual activity: Not Currently     Partners: Female     Birth control/protection: Post-menopausal, None     Comment: Yes enjoying it GREATLY!   Other Topics Concern     Parent/sibling w/ CABG, MI or angioplasty before 65F 55M? Yes     Comment: father   Social History Narrative     Not on file     Social Determinants of Health     Financial Resource Strain: Not on file   Food Insecurity: Not on file   Transportation Needs: Not on file   Physical Activity: Not on file   Stress: Not on file   Social Connections: Not on file   Intimate Partner Violence: Not on file   Housing Stability: Not on file      ROS: 10 point ROS neg other than the symptoms noted above in the HPI.  Physical Exam  Vitals and nursing note reviewed. Exam conducted with a chaperone present.   Musculoskeletal:      Right knee: Bony tenderness present. No swelling, deformity, effusion, erythema, ecchymosis, lacerations or crepitus. Normal range of motion. Tenderness present. No medial joint line, lateral joint line, MCL, LCL, ACL, PCL or patellar tendon tenderness. No LCL laxity, MCL laxity, ACL laxity or PCL laxity. Normal alignment, normal meniscus and normal patellar mobility. Normal pulse.      Left knee: Swelling and bony tenderness present. No deformity, effusion, erythema, ecchymosis, lacerations or crepitus. Decreased range of motion. Tenderness present. No PCL or patellar tendon tenderness. No LCL laxity, MCL laxity, ACL laxity or PCL laxity.Normal alignment, normal meniscus and normal patellar mobility. Normal pulse.     A/P:  The patient is a 58 y/o lady with left knee pain secondary to osteoarthritis that is persistent.  She was in phase 1 of the study and is transitioning to Phase 2 of the study.  She had received meloxicam and has been randomized to receive genicular nerve blocks.      Sincerely,    Mathew Bloom MD

## 2023-02-10 ENCOUNTER — TELEPHONE (OUTPATIENT)
Dept: ANESTHESIOLOGY | Facility: CLINIC | Age: 58
End: 2023-02-10
Payer: COMMERCIAL

## 2023-02-10 PROBLEM — G89.29 CHRONIC PAIN OF LEFT KNEE: Status: ACTIVE | Noted: 2023-02-10

## 2023-02-10 PROBLEM — M25.562 CHRONIC PAIN OF LEFT KNEE: Status: ACTIVE | Noted: 2023-02-10

## 2023-02-10 NOTE — TELEPHONE ENCOUNTER
Called patient to schedule surgery with    Date of Surgery: 02/28/2023  Approximate arrival time given:  Yes  Location of surgery: [facility]  Discussed COVID-19 Testing: Not Applicable     Patient aware that pre-op RN will call 2-3 days prior to surgery with arrival time and instructions Yes    Patient aware  is needed.     Mike Mcguire on 2/10/2023 at 8:54 AM

## 2023-02-20 NOTE — PROGRESS NOTES
Patient is a 56 y/o female with a chief complaint of left knee pain.  She had been involved in the SKOAP study (best practices).  She was taking meloxicam 7.5 mg qd.  Pre-intervention pain was 8/10 and post-intervention pain was 2/10.  She reports pain and swelling of the right knee.  She would like to transition to Phase 2 (ie-interventional techniques).  Current Outpatient Medications   Medication     ARIPiprazole (ABILIFY) 20 MG tablet     diclofenac (VOLTAREN) 1 % topical gel     estradiol (ESTRACE) 2 MG tablet     furosemide (LASIX) 20 MG tablet     gabapentin (NEURONTIN) 300 MG capsule     medical cannabis (Patient's own supply.  Not a prescription)     order for DME     simvastatin (ZOCOR) 40 MG tablet     spironolactone (ALDACTONE) 50 MG tablet     venlafaxine (EFFEXOR-XR) 75 MG 24 hr capsule     No current facility-administered medications for this visit.     Allergies   Allergen Reactions     Amiodarone      Caused pain fatigue/amplified anxiety and depression     Fluoxetine Other (See Comments)     Night terrors     Tetracycline      Teeth rattle/saliva acidic     Past Medical History:   Diagnosis Date     Anxiety 2005    Brought on by Amioderone     Arthritis 08/2005     Atrial fib/flutter, transient     S/p cardioversion 2004     Bipolar 2 disorder (H)      Bleeding disorder (H) 1987    nose bleeds     Chronic pain     LUE pain     Congestive heart failure (H) 11/18/2004    cured 2005     Depressive disorder     on and off most of my life!     Fibromyalgia      Gender identity disorder Started Rx 2012     H/O hypogonadism Gonadectomy 2013     Hyperlipidemia      Hypertension      Other mental problems     bipolar     Psoriasis      Sleep apnea      Uncomplicated asthma 1964    cured in 4/2001     Vision disorder 06/2005     Past Surgical History:   Procedure Laterality Date     ARTHROPLASTY SHOULDER Left 1/22/2020    Procedure: Left Total shoulder arthroplasty, distal clavicle excision;  Surgeon:  Omari Tobin MD;  [facility]     BIOPSY  2005, 8/8/13    Stomach, colon     COLONOSCOPY  8/8/2013    Procedure: COLONOSCOPY;  COLONSCOPY SCREEN/ SE;  Surgeon: Santino Sun MD;  [facility]     COLONOSCOPY N/A 1/4/2022    Procedure: COLONOSCOPY, WITH POLYPECTOMY AND BIOPSY;  Surgeon: Dallas Velazco MD;  [facility]     COLONOSCOPY WITH CO2 INSUFFLATION N/A 11/1/2018    Procedure: COLONOSCOPY WITH CO2 INSUFFLATION;  Surgeon: Santino Sun MD;  [facility]     COLONOSCOPY WITH CO2 INSUFFLATION N/A 12/15/2020    Procedure: COLONOSCOPY, WITH CO2 INSUFFLATION;  Surgeon: Nima Kamara MD;  [facility]     DECOMPRESSION, FUSION CERVICAL ANTERIOR ONE LEVEL, COMBINED N/A 10/28/2022    Procedure: Cervical 6 to Thoracic 1 anterior cervical decompression and fusion with medtronic plate and screws, interbody device, use of fluoroscopy, microscope;  Surgeon: Fernando Ruiz MD;  [facility]     Gender Reassignment  05/2016     GRAFT BONE FROM ILIAC CREST Left 10/28/2022    Procedure: and left sided iliac crest autograft;  Surgeon: Fernando Ruiz MD;  [facility]     HEAD & NECK SURGERY  2015    ablation of nerve from neck to left arm     INJECT EPIDURAL CERVICAL N/A 7/29/2022    Procedure: INJECTION, SPINE, CERVICAL, EPIDURAL T1-T2;  Surgeon: Kenya Ferrell MD;  [facility]     LAPAROSCOPIC GASTRIC SLEEVE N/A 4/10/2018    Procedure: LAPAROSCOPIC GASTRIC SLEEVE;  Laparoscopic Sleeve Gastrectomy;  Surgeon: Jani Shah MD;  [facility]     MANDIBLE SURGERY  1986     ORCHIECTOMY INGUINAL BILATERAL  7/16/2013    Procedure: ORCHIECTOMY INGUINAL BILATERAL;  Bilateral Simple Inguinal Orchiectomy ;  Surgeon: Jan Garrett MD;  [facility]     TONSILLECTOMY       Family History   Problem Relation Age of Onset     Breast Cancer Mother      Cancer Father         skin     Diabetes Father         Was on the  patch when they were new     Heart Disease Father 55        scd     Coronary Artery Disease Father      Hypertension Father      Hyperlipidemia Father      Diabetes Sister      Diabetes Sister      Thyroid Disease Sister      Osteoporosis Maternal Grandmother      Alzheimer Disease Paternal Grandmother      Diabetes Paternal Grandmother      Mental Illness Paternal Grandmother         alshimers     Osteoporosis Paternal Grandmother      Coronary Artery Disease Paternal Grandfather      Hypertension Paternal Grandfather      Hyperlipidemia Paternal Grandfather      Prostate Cancer Paternal Grandfather      Other Cancer Paternal Grandfather      Depression Daughter      Unknown/Adopted Daughter      Depression Daughter      Mental Illness Daughter         bipolar     Anxiety Disorder Daughter      Anxiety Disorder Daughter      Depression Son      Heart Disease Other      Anesthesia Reaction No family hx of      Deep Vein Thrombosis (DVT) No family hx of      Social History     Socioeconomic History     Marital status:      Spouse name: Not on file     Number of children: 3     Years of education: Not on file     Highest education level: Not on file   Occupational History     Occupation: retired   Tobacco Use     Smoking status: Never     Smokeless tobacco: Never     Tobacco comments:     NEVER SMOKED! Grew up with heavy smokers which did not help my Asthma!   Vaping Use     Vaping Use: Every day     Substances: THC, CBD, medical cannabis     Devices: Disposable   Substance and Sexual Activity     Alcohol use: Not Currently     Comment: occasional//2 per month     Drug use: Yes     Types: Marijuana     Comment: medical marijuana, daily     Sexual activity: Not Currently     Partners: Female     Birth control/protection: Post-menopausal, None     Comment: Yes enjoying it GREATLY!   Other Topics Concern     Parent/sibling w/ CABG, MI or angioplasty before 65F 55M? Yes     Comment: father   Social History  Narrative     Not on file     Social Determinants of Health     Financial Resource Strain: Not on file   Food Insecurity: Not on file   Transportation Needs: Not on file   Physical Activity: Not on file   Stress: Not on file   Social Connections: Not on file   Intimate Partner Violence: Not on file   Housing Stability: Not on file      ROS: 10 point ROS neg other than the symptoms noted above in the HPI.  Physical Exam  Vitals and nursing note reviewed. Exam conducted with a chaperone present.   Musculoskeletal:      Right knee: Bony tenderness present. No swelling, deformity, effusion, erythema, ecchymosis, lacerations or crepitus. Normal range of motion. Tenderness present. No medial joint line, lateral joint line, MCL, LCL, ACL, PCL or patellar tendon tenderness. No LCL laxity, MCL laxity, ACL laxity or PCL laxity. Normal alignment, normal meniscus and normal patellar mobility. Normal pulse.      Left knee: Swelling and bony tenderness present. No deformity, effusion, erythema, ecchymosis, lacerations or crepitus. Decreased range of motion. Tenderness present. No PCL or patellar tendon tenderness. No LCL laxity, MCL laxity, ACL laxity or PCL laxity.Normal alignment, normal meniscus and normal patellar mobility. Normal pulse.     A/P:  The patient is a 56 y/o lady with left knee pain secondary to osteoarthritis that is persistent.  She was in phase 1 of the study and is transitioning to Phase 2 of the study.  She had received meloxicam and has been randomized to receive genicular nerve blocks.

## 2023-02-21 DIAGNOSIS — R29.898 LEFT ARM WEAKNESS: ICD-10-CM

## 2023-02-21 DIAGNOSIS — M54.12 CERVICAL RADICULOPATHY: Primary | ICD-10-CM

## 2023-02-24 ENCOUNTER — ANCILLARY PROCEDURE (OUTPATIENT)
Dept: BONE DENSITY | Facility: CLINIC | Age: 58
End: 2023-02-24
Attending: NURSE PRACTITIONER
Payer: COMMERCIAL

## 2023-02-24 ENCOUNTER — ANCILLARY PROCEDURE (OUTPATIENT)
Dept: MAMMOGRAPHY | Facility: CLINIC | Age: 58
End: 2023-02-24
Attending: NURSE PRACTITIONER
Payer: COMMERCIAL

## 2023-02-24 ENCOUNTER — ANCILLARY ORDERS (OUTPATIENT)
Dept: FAMILY MEDICINE | Facility: CLINIC | Age: 58
End: 2023-02-24

## 2023-02-24 DIAGNOSIS — Z13.820 SCREENING FOR OSTEOPOROSIS: ICD-10-CM

## 2023-02-24 DIAGNOSIS — Z78.0 POSTMENOPAUSAL STATUS: ICD-10-CM

## 2023-02-24 DIAGNOSIS — F64.9 GENDER DYSPHORIA: ICD-10-CM

## 2023-02-24 DIAGNOSIS — Z12.31 VISIT FOR SCREENING MAMMOGRAM: ICD-10-CM

## 2023-02-24 DIAGNOSIS — Z12.31 ENCOUNTER FOR SCREENING MAMMOGRAM FOR BREAST CANCER: ICD-10-CM

## 2023-02-24 PROCEDURE — 77067 SCR MAMMO BI INCL CAD: CPT | Performed by: RADIOLOGY

## 2023-02-24 PROCEDURE — 77081 DXA BONE DENSITY APPENDICULR: CPT | Mod: XU | Performed by: RADIOLOGY

## 2023-02-24 PROCEDURE — 77080 DXA BONE DENSITY AXIAL: CPT | Performed by: RADIOLOGY

## 2023-02-24 NOTE — RESULT ENCOUNTER NOTE
Shravan Espinal,    Thank you for your recent office visit.  Per radiology your DEXA scan does show osteoporosis (see below results).  Are you taking a daily calcium supplement with vitamin D?  Are you interested on trying a medication such as Fosamax?  Please let me know.    Here are your recent results.  [age] years[age]  Height: 68.0 inches  Weight: 214.0 pounds  Sex: Female  Ethnicity: White     Image quality: Adequate     Lumbar spine T-score in region of L1 (L2-L4) = -3.5      HIPS:  Mean total hip T-score: -1.3     Left femoral neck T-score = -2.1  Right femoral neck T-score= -2.5      Radius 33% T-score = -2.2  Wrist DEXA performed because of discrepancy between spine and hips.     FRAX:  10 year probability of major osteoporotic fracture: 11.1%  10 year probability of hip fracture: 2.1%  The 10 year probability of fracture may be lower than reported if the  patient has received treatment. FRAX data should be disregarded in  patient's taking bisphosphonates.     World Health Organization definition of osteoporosis and osteopenia  for  women:   Normal: T-score at or above -1.0  Low Bone Mass (Osteopenia): T-score between -1.0 and -2.5.   Osteoporosis: T-score at or below -2.5   T-scores are reported for postmenopausal women and men over 50 years  of age.       IMPRESSION:    Osteoporosis.    Feel free to contact me via Dick or Bro or call the clinic at [phone].    Sincerely,    Cleo Awan, TALIB, FNP-BC

## 2023-02-28 ENCOUNTER — ANCILLARY ORDERS (OUTPATIENT)
Dept: ANESTHESIOLOGY | Facility: CLINIC | Age: 58
End: 2023-02-28

## 2023-02-28 ENCOUNTER — ANCILLARY PROCEDURE (OUTPATIENT)
Dept: RADIOLOGY | Facility: AMBULATORY SURGERY CENTER | Age: 58
End: 2023-02-28
Payer: COMMERCIAL

## 2023-02-28 ENCOUNTER — HOSPITAL ENCOUNTER (OUTPATIENT)
Facility: AMBULATORY SURGERY CENTER | Age: 58
Discharge: HOME | End: 2023-02-28
Payer: COMMERCIAL

## 2023-02-28 VITALS
RESPIRATION RATE: 14 BRPM | OXYGEN SATURATION: 98 % | TEMPERATURE: 96.8 F | SYSTOLIC BLOOD PRESSURE: 126 MMHG | HEART RATE: 66 BPM | DIASTOLIC BLOOD PRESSURE: 73 MMHG

## 2023-02-28 DIAGNOSIS — R52 PAIN: ICD-10-CM

## 2023-02-28 DIAGNOSIS — M25.561 CHRONIC PAIN OF BOTH KNEES: Primary | ICD-10-CM

## 2023-02-28 DIAGNOSIS — G89.29 CHRONIC PAIN OF LEFT KNEE: ICD-10-CM

## 2023-02-28 DIAGNOSIS — M25.562 CHRONIC PAIN OF LEFT KNEE: ICD-10-CM

## 2023-02-28 DIAGNOSIS — G89.29 CHRONIC PAIN OF BOTH KNEES: Primary | ICD-10-CM

## 2023-02-28 DIAGNOSIS — M25.562 CHRONIC PAIN OF BOTH KNEES: Primary | ICD-10-CM

## 2023-02-28 PROCEDURE — 64454 NJX AA&/STRD GNCLR NRV BRNCH: CPT | Mod: LT

## 2023-02-28 RX ORDER — BUPIVACAINE HYDROCHLORIDE 2.5 MG/ML
INJECTION, SOLUTION INFILTRATION; PERINEURAL PRN
Status: DISCONTINUED | OUTPATIENT
Start: 2023-02-28 | End: 2023-02-28 | Stop reason: HOSPADM

## 2023-02-28 NOTE — DISCHARGE INSTRUCTIONS
"PAIN INJECTION HOME CARE INSTRUCTIONS  Activity  -You may resume most normal activity levels with the exception of strenuous activity. It may help to move in ways that hurt before the injection, to see if the pain is still there, but do not overdo it.     -DO NOT remove bandaid for 24 hours  -DO NOT shower for 24 hours      Pain  -You may feel immediate pain relief and numbness for a period of time after the injection. This may indicate the medication has reached the right spot.  -Your pain may return after this short pain-free period, or may even be a little worse for a day or two. It may be caused by needle irritation or by the medication itself. The medications usually take two or three days to start working, but can take as long as a week.    -You may use an ice pack for 20 minutes every 2 hours for the first 24 hours  -You may use a heating pad after the first 24 hours  -You may use Tylenol (acetaminophen) every 4 hours or other pain medicines as directed by your physician    DID YOU RECEIVE STEROIDS TODAY?  {YES / NO:310364::\"Yes\"}    Common side effects of steroids:  Not everyone will experience corticosteroid side effects. If side effects are experienced, they will gradually subside in the 7-10 day period following an injection. Most common side effects include:  -Flushed face and/or chest  -Feeling of warmth, particularly in the face but could be an overall feeling of warmth  -Increased blood sugar in diabetic patients  -Menstrual irregularities my occur. If taking hormone-based birth control an alternate method of birth control is recommended  -Sleep disturbances and/or mood swings are possible  -Leg cramps    PLEASE KEEP TRACK OF YOUR SYMPTOMS AND NOTE ANY CHANGES FOR YOUR DOCTOR.       Please contact us if you have:  -Severe pain  -Fever more than 101.5 degrees Fahrenheit  -Signs of infection at the injection site (redness, swelling, or drainage)      FOR PAIN CENTER PATIENTS:  If you have questions, " please contact the Pain Clinic at [phone] Option 1 between the hours of 7:00 am and 3:00 pm Monday through Friday. After office hours you can contact the on call provider by dialing [phone]. If you need immediate attention, we recommend that you go to a hospital emergency room or dial [phone]. If you need to Fax information, the number is [phone].

## 2023-03-03 NOTE — PROGRESS NOTES
I have reviewed and updated the patient's Past Medical History, Social History, Family History and Medication List.    ALLERGIES  Amiodarone, Fluoxetine, and Tetracycline    Spine Surgery Follow Up    REFERRING PHYSICIAN: No ref. provider found   PRIMARY CARE PHYSICIAN: Kristin Miner           Chief Complaint:   RECHECK (Follow up per Dr Ruiz- neck pain arm weakness)      History of Present Illness:  Symptom Profile Including: location of symptoms, onset, severity, exacerbating/alleviating factors, previous treatments:        Kylie Austin is a 57 year old female who presents today for routine 4 month follow up s/p C6-T1 ACDF and L ICAG (DOS 10/28/22 with Dr. Ruiz). last visit 12/16/22: improvements from surgery, no more numbness/ difficulty with left hand. advised increase cardio activities, lifting limit 50lb at waist level.     Today, patient reports she is overall doing well.  She says her preoperative symptoms were significantly improved with surgery.  She has had some issues with her left anterior/lateral deltoid going numb if she uses her arm too much.  She says this is been present since surgery.  Denies weakness or pain.  This is not significantly bothersome to her at this point.  No other complaints or concerns today.      Neck Disability Index (NDI) Questionnaire    Neck Disability Index (NDI) 3/6/2023   Neck Disability Index: Count 8   NDI: Total Score = SUM (points for all 10 findings) 9   Neck Disability in Percent = (Total Score) / 50 * 100 22.5 (%)   Some recent data might be hidden            Physical Exam:   PHYSICAL EXAM:   Constitutional - Patient is healthy, well-nourished and appears stated age   Respiratory - Patient is breathing normally and in no respiratory distress.   Skin - No suspicious rashes or lesions.   Psychiatric - Normal mood and affect.   Cardiovascular - Extremities warm and well perfused.   Eyes - Visual acuity is normal to the written word.   ENT -  Hearing intact to the spoken word.   GI - No abdominal distention.   Musculoskeletal - Non-antalgic gait without use of assistive devices.        Cervical spine:    Appearance - Normal. Well healed surgical scar    ROM - Full painless ROM of neck and shoulders bilateral. Shoulder ROM does not produce left arm pain    Motor -     UPPER EXTREMITY Left Right    strength 5/5 5/5   Finger ab/adduction 5/5 5/5   Wrist flexion 5/5 5/5   Wrist extension 5/5 5/5   Elbow flexion 4+/5 5/5   Elbow extension 4/5 5/5   Shoulder abduction 5/5 5/5        Neurologic - Sensation intact to light touch bilaterally             Imaging:   I ordered and independently reviewed new radiographs at this clinic visit. The results were discussed with the patient.  Findings include:    3/7/23 xr cervical spine ap/lateral and flexion/extension views: multilevel cervical spondylosis, most significant C4-5. Stable C6-T1 instrumentation. >2mm interspinous motion at C6-7 between flexion and extension. No gross instability.             Assessment and Plan:   Assessment:  57 year old female 4 months s/p C6-T1 ACDF w/ L ICAG; progressing as expected with some left deltoid numbness (likely due to left C5 foraminal stenosis)     Plan:  Reviewed XR image results with patient today; hardware stable & looks like healing well; cervical spondylosis at other levels. Prior MRI with bilateral foraminal stenosis at C4-5 which could be contributing to her left deltoid symptoms; however she is not significantly bothered by this right now. Recommend continue to advance activities as tolerated. If L arm symptoms become more bothersome we could try non-op management including PT/ injections/ medications. She should return at 1 year postop with a CT to assess fusion.    - Follow up at 1 year post-op  with CT cervical spine w/o contrast to assess fusion. May follow up sooner if left deltoid symptoms worsen or other symptoms develop.    Respectfully,  Nancy Marte  (cherie Acosta)JES  Orthopaedic Spine Surgery  Dept Orthopaedic Surgery, [facility]  [facility] [place]: [phone]    25 minutes spent on the date of the encounter doing chart review, review of outside records, review of test results, my own interpretation of tests, documentation, patient history, physical exam & discussion of plan with patient and family.    Dictation Disclaimer: Some of this Note has been completed with voice-recognition dictation software. Although errors are generally corrected real-time, there is the potential for a rare error to be present in the completed chart.

## 2023-03-06 ASSESSMENT — ENCOUNTER SYMPTOMS
DEPRESSION: 1
BACK PAIN: 1
DECREASED CONCENTRATION: 0
INSOMNIA: 1
NERVOUS/ANXIOUS: 0
PANIC: 0
MYALGIAS: 1
ARTHRALGIAS: 1
MUSCLE CRAMPS: 1
JOINT SWELLING: 0
MUSCLE WEAKNESS: 1
STIFFNESS: 0
NECK PAIN: 0

## 2023-03-07 ENCOUNTER — OFFICE VISIT (OUTPATIENT)
Dept: ORTHOPEDICS | Facility: CLINIC | Age: 58
End: 2023-03-07
Payer: COMMERCIAL

## 2023-03-07 ENCOUNTER — ANCILLARY PROCEDURE (OUTPATIENT)
Dept: GENERAL RADIOLOGY | Facility: CLINIC | Age: 58
End: 2023-03-07
Attending: PHYSICIAN ASSISTANT
Payer: COMMERCIAL

## 2023-03-07 VITALS — HEIGHT: 68 IN | WEIGHT: 214 LBS | BODY MASS INDEX: 32.43 KG/M2

## 2023-03-07 DIAGNOSIS — M47.812 CERVICAL SPONDYLOSIS WITHOUT MYELOPATHY: ICD-10-CM

## 2023-03-07 DIAGNOSIS — M81.0 AGE-RELATED OSTEOPOROSIS WITHOUT CURRENT PATHOLOGICAL FRACTURE: Primary | ICD-10-CM

## 2023-03-07 DIAGNOSIS — Z98.1 S/P CERVICAL SPINAL FUSION: Primary | ICD-10-CM

## 2023-03-07 DIAGNOSIS — R29.898 LEFT ARM WEAKNESS: ICD-10-CM

## 2023-03-07 DIAGNOSIS — M54.12 CERVICAL RADICULOPATHY: ICD-10-CM

## 2023-03-07 PROCEDURE — 99213 OFFICE O/P EST LOW 20 MIN: CPT | Performed by: PHYSICIAN ASSISTANT

## 2023-03-07 PROCEDURE — 72050 X-RAY EXAM NECK SPINE 4/5VWS: CPT | Performed by: RADIOLOGY

## 2023-03-07 RX ORDER — ALENDRONATE SODIUM 70 MG/1
70 TABLET ORAL
Qty: 12 TABLET | Refills: 0 | Status: SHIPPED | OUTPATIENT
Start: 2023-03-07 | End: 2023-05-26

## 2023-03-07 NOTE — LETTER
3/7/2023         RE: Kylie Austin  [address] [facility] [address]        Dear Colleague,    Thank you for referring your patient, Kylie Austin, to the [facility] [place]. Please see a copy of my visit note below.    I have reviewed and updated the patient's Past Medical History, Social History, Family History and Medication List.    ALLERGIES  Amiodarone, Fluoxetine, and Tetracycline    Spine Surgery Follow Up    REFERRING PHYSICIAN: No ref. provider found   PRIMARY CARE PHYSICIAN: Kristin Miner           Chief Complaint:   RECHECK (Follow up per Dr Ruiz- neck pain arm weakness)      History of Present Illness:  Symptom Profile Including: location of symptoms, onset, severity, exacerbating/alleviating factors, previous treatments:        Kylie Austin is a 57 year old female who presents today for routine 4 month follow up s/p C6-T1 ACDF and L ICAG (DOS 10/28/22 with Dr. Ruiz). last visit 12/16/22: improvements from surgery, no more numbness/ difficulty with left hand. advised increase cardio activities, lifting limit 50lb at waist level.     Today, patient reports she is overall doing well.  She says her preoperative symptoms were significantly improved with surgery.  She has had some issues with her left anterior/lateral deltoid going numb if she uses her arm too much.  She says this is been present since surgery.  Denies weakness or pain.  This is not significantly bothersome to her at this point.  No other complaints or concerns today.      Neck Disability Index (NDI) Questionnaire    Neck Disability Index (NDI) 3/6/2023   Neck Disability Index: Count 8   NDI: Total Score = SUM (points for all 10 findings) 9   Neck Disability in Percent = (Total Score) / 50 * 100 22.5 (%)   Some recent data might be hidden            Physical Exam:   PHYSICAL EXAM:   Constitutional - Patient is healthy, well-nourished and appears  stated age   Respiratory - Patient is breathing normally and in no respiratory distress.   Skin - No suspicious rashes or lesions.   Psychiatric - Normal mood and affect.   Cardiovascular - Extremities warm and well perfused.   Eyes - Visual acuity is normal to the written word.   ENT - Hearing intact to the spoken word.   GI - No abdominal distention.   Musculoskeletal - Non-antalgic gait without use of assistive devices.        Cervical spine:    Appearance - Normal. Well healed surgical scar    ROM - Full painless ROM of neck and shoulders bilateral. Shoulder ROM does not produce left arm pain    Motor -     UPPER EXTREMITY Left Right    strength 5/5 5/5   Finger ab/adduction 5/5 5/5   Wrist flexion 5/5 5/5   Wrist extension 5/5 5/5   Elbow flexion 4+/5 5/5   Elbow extension 4/5 5/5   Shoulder abduction 5/5 5/5        Neurologic - Sensation intact to light touch bilaterally             Imaging:   I ordered and independently reviewed new radiographs at this clinic visit. The results were discussed with the patient.  Findings include:    3/7/23 xr cervical spine ap/lateral and flexion/extension views: multilevel cervical spondylosis, most significant C4-5. Stable C6-T1 instrumentation. >2mm interspinous motion at C6-7 between flexion and extension. No gross instability.             Assessment and Plan:   Assessment:  57 year old female 4 months s/p C6-T1 ACDF w/ L ICAG; progressing as expected with some left deltoid numbness (likely due to left C5 foraminal stenosis)     Plan:  Reviewed XR image results with patient today; hardware stable & looks like healing well; cervical spondylosis at other levels. Prior MRI with bilateral foraminal stenosis at C4-5 which could be contributing to her left deltoid symptoms; however she is not significantly bothered by this right now. Recommend continue to advance activities as tolerated. If L arm symptoms become more bothersome we could try non-op management including PT/  injections/ medications. She should return at 1 year postop with a CT to assess fusion.    - Follow up at 1 year post-op  with CT cervical spine w/o contrast to assess fusion. May follow up sooner if left deltoid symptoms worsen or other symptoms develop.    Respectfully,  Nancy Marte (cherie Acosta)JES  Orthopaedic Spine Surgery  Dept Orthopaedic Surgery, formerly [facility]  [facility] Phone: [phone]    25 minutes spent on the date of the encounter doing chart review, review of outside records, review of test results, my own interpretation of tests, documentation, patient history, physical exam & discussion of plan with patient and family.    Dictation Disclaimer: Some of this Note has been completed with voice-recognition dictation software. Although errors are generally corrected real-time, there is the potential for a rare error to be present in the completed chart.        Again, thank you for allowing me to participate in the care of your patient.        Sincerely,        Nancy Marte PA-C

## 2023-03-28 NOTE — OP NOTE
Genicular Nerve Blocks    The patient's identity, the procedure to be performed and the specific site of the procedure was verified in accordance with [facility] Protocol.   Diagnosis: Knee Pain  Pre-Procedure Pain Score: 5/10  Procedure Note: Informed consent was obtained.  The patient was positioned comfortably in the supine position. The site was then prepped and draped in a sterile fashion.  There was no evidence of infection at the site of needle insertion.  Fluoroscopy was used to identify the anatomy of the knee. 22 gauge spinal needles were advanced under fluoroscopic guidance to points just proximal to the distal femoral medial and lateral condyles, and just distal to the medial tibial condyle. In lateral fluoroscopic view the needles were placed at the midway point of the shafts of the femur and tibia. Medication was carefully injected after negative aspiration. The patient tolerated the procedure well and was discharged from the clinic 30 minutes later.    Side: Left   Nerves Blocked:superolateral, superomedial and inferomedial  Medication (per site):   1cc   0.25% Bupivacaine    Post Procedure Pain Score:0/10  Counseling: Greater than 50% of this patient visit was spent in counseling the patient regarding the treatment of their pain, coordinating their overall treatment plan and assessing their progress.

## 2023-04-17 DIAGNOSIS — M79.89 LEG SWELLING: ICD-10-CM

## 2023-04-18 RX ORDER — FUROSEMIDE 20 MG
TABLET ORAL
Qty: 90 TABLET | Refills: 0 | Status: SHIPPED | OUTPATIENT
Start: 2023-04-18 | End: 2023-10-09

## 2023-04-20 ENCOUNTER — DOCUMENTATION ONLY (OUTPATIENT)
Dept: LAB | Facility: CLINIC | Age: 58
End: 2023-04-20
Payer: COMMERCIAL

## 2023-04-20 NOTE — PROGRESS NOTES
Patient has an upcoming lab appointment on 04.24.2023 at 08:30 am. Please review and place future orders that may be needed. Otherwise please call patient to cancel lab appointment.    Thank you,  [facility] lab staff

## 2023-04-21 NOTE — PROGRESS NOTES
Spoke with pt who is unaware of why this was scheduled for her , she requested to cancel it .     Lab appt cancelled per her request.    Cristina Nuno RN  [facility]

## 2023-04-24 PROBLEM — M54.12 CERVICAL RADICULITIS: Status: RESOLVED | Noted: 2022-07-19 | Resolved: 2023-04-24

## 2023-05-25 DIAGNOSIS — M81.0 AGE-RELATED OSTEOPOROSIS WITHOUT CURRENT PATHOLOGICAL FRACTURE: ICD-10-CM

## 2023-05-26 RX ORDER — ALENDRONATE SODIUM 70 MG/1
TABLET ORAL
Qty: 12 TABLET | Refills: 0 | Status: SHIPPED | OUTPATIENT
Start: 2023-05-26 | End: 2023-08-16

## 2023-06-19 NOTE — PROGRESS NOTES
[facility]    Medicine Progress Note - Hospitalist Service       Date of Admission:  1/22/2020  Assessment & Plan     Kylie Asutin is a 54 year old female who is s/p:   Procedures:  1. 1/22: Left TSA    PMH transient Afib s/p cardiovert, arthiritis, anxiety, Bipolar 2 disorder (H), Bleeding disorder (H) (1987), Chronic pain, Congestive heart failure (11/18/2004), Depressive disorder, Fibromyalgia, Gender identity disorder (Started Rx 2012), H/O hypogonadism (Gonadectomy 2013), Hyperlipidemia, Hypertension, Other mental problems, Psoriasis, Uncomplicated asthma (1964), and Vision disorder admitted on 1/22/2020.    s/p left total shoulder arthroplasty  On Analgesics. Pain controlled currently.   - Wound cares, Dressings, Surgical pain management, DVT Prophylaxis  per primary team.   - Monitor anemia, hemodynamics, Input/Output, Capnography (for 24 hours)  - Encourage Incentive spirometry  - Laxatives for constipation prophylaxis     Hx of Transient Afib s/p DCCV X 1, 1st degree AVB, Chronic OLIVAREZ, HF, LVH: doing well.   Not on prior rate control medications  Holding Furosemide - resume at discharge.     Asthma: stable. Encourage IS. Monitor.     HTN: stable.   HL: statin.   Bipolar: stabel. Abilify.     The patient's care was discussed with the Care Coordinator/, Patient and RN.    Gilberto Acosta MD  Hospitalist Service  [facility]    ______________________________________________________________________    Interval History   Doing well  Pain controlled.   Denies fever or chills. No cough or cp or sob. No LH or dizziness. No NV.  No other new or acute medical concern      Data reviewed today: I reviewed all medications, new labs and imaging results over the last 24 hours. I personally reviewed no images or EKG's today.    Physical Exam   Vital Signs: Temp: 97  F (36.1  C) Temp src: Oral BP: 114/56 Pulse: 71 Heart Rate:  "97 Resp: 16 SpO2: 95 % O2 Device: None (Room air) Oxygen Delivery: 4 LPM  Weight: 202 lbs 13.17 oz      General: alert, interactive, NAD  HEENT: AT/NC,  Moist MM  Respi/Chest: Non labored  CVS/Heart: RRR  GI/Abd: non distended  MSK/Extremities: Distally warm, well perfused.     Neuro: AO x 4  Psychiatry: Stable mood.       Data   Recent Labs   Lab 01/23/20  0529 01/22/20  0821   HGB 11.3*  --    POTASSIUM  --  4.2   CR  --  0.78   GLC  --  89     Recent Results (from the past 24 hour(s))   XR Shoulder Left Port G/E 2 Views    Narrative    EXAMINATION: XR SHOULDER LT PORT G/E 2 VW  1/22/2020 1:36 PM     CLINICAL HISTORY:  s/p shoulder replacement     COMPARISON: None available    FINDINGS: 2 views of the left shoulder were obtained. There are  postsurgical changes of shoulder replacement on the left. The  components are well seated.    There is postsurgical air. The lung tissue within the field-of-view is  normally aerated.      Impression    IMPRESSION: Postsurgical changes of left total shoulder arthroplasty.    JUTTA ELLERMANN, MD     Medications     bupivacaine liposome (EXPAREL) LONG ACTING injection was administered into the infiltration site to produce postsurgical analgesia. Duration of action is up to 72 hours, and other \"unruly\" medications should not be given for 96 hours with the exception of the lidocaine 5% patch (LIDODERM) and the lidocaine 10mg in potassium infusions. This entry is for INFORMATION ONLY.       sodium chloride Stopped (01/23/20 0611)       acetaminophen  975 mg Oral Q6H     ARIPiprazole  20 mg Oral QAM     estradiol  2 mg Oral Daily     [Held by provider] furosemide  20 mg Oral QAM     gabapentin  300 mg Oral TID     ibuprofen  600 mg Oral Q6H     simvastatin  40 mg Oral At Bedtime     sodium chloride (PF)  3 mL Intracatheter Q8H     topiramate  50 mg Oral BID     traZODone  100 mg Oral At Bedtime     venlafaxine  225 mg Oral Daily     " Yes

## 2023-06-26 ENCOUNTER — TELEPHONE (OUTPATIENT)
Dept: FAMILY MEDICINE | Facility: CLINIC | Age: 58
End: 2023-06-26
Payer: COMMERCIAL

## 2023-06-26 NOTE — LETTER
June 26, 2023      Kylie JACOBSON [place]  [address] [facility] [address]      Your team at [facility] cares about your health. We have reviewed your chart and based on our findings; we are making the following recommendations to better manage your health.     You are in particular need of attention regarding the following:     Please schedule a Nurse Only Appointment with your primary care clinic to update your immunizations that are due.    Health Maintenance Due   Topic Date Due    HEPATITIS B IMMUNIZATION (1 of 3 - 3-dose series) Never done    ZOSTER IMMUNIZATION (1 of 2) Never done    Pneumococcal Vaccine: Pediatrics (0 to 5 Years) and At-Risk Patients (6 to 64 Years) (2 - PPSV23 if available, else PCV20) 12/11/2018    DTAP/TDAP/TD IMMUNIZATION (5 - Td or Tdap) 06/05/2023      If you have already completed these items, please contact the clinic via phone or   [employer] so your care team can review and update your records. Thank you for   choosing [facility] for your healthcare needs. For any questions,   concerns, or to schedule an appointment please contact our clinic.    Healthy Regards,      Your [facility] Care Team

## 2023-06-26 NOTE — TELEPHONE ENCOUNTER
Patient Quality Outreach    Patient is due for the following:       Topic Date Due     Hepatitis B Vaccine (1 of 3 - 3-dose series) Never done     Zoster (Shingles) Vaccine (1 of 2) Never done     Pneumococcal Vaccine (2 - PPSV23 if available, else PCV20) 12/11/2018     Diptheria Tetanus Pertussis (DTAP/TDAP/TD) Vaccine (5 - Td or Tdap) 06/05/2023     Next Steps:   Schedule a nurse only visit for Immunizations.    Type of outreach:    Sent letter.    Questions for provider review:    None           eJssica Kennedy, CMA

## 2023-08-16 ENCOUNTER — TRANSFERRED RECORDS (OUTPATIENT)
Dept: HEALTH INFORMATION MANAGEMENT | Facility: CLINIC | Age: 58
End: 2023-08-16
Payer: COMMERCIAL

## 2023-08-16 DIAGNOSIS — M81.0 AGE-RELATED OSTEOPOROSIS WITHOUT CURRENT PATHOLOGICAL FRACTURE: ICD-10-CM

## 2023-08-16 RX ORDER — ALENDRONATE SODIUM 70 MG/1
TABLET ORAL
Qty: 12 TABLET | Refills: 0 | Status: SHIPPED | OUTPATIENT
Start: 2023-08-16 | End: 2023-11-06

## 2023-10-05 ENCOUNTER — OFFICE VISIT (OUTPATIENT)
Dept: FAMILY MEDICINE | Facility: CLINIC | Age: 58
End: 2023-10-05
Payer: COMMERCIAL

## 2023-10-05 VITALS
OXYGEN SATURATION: 96 % | SYSTOLIC BLOOD PRESSURE: 118 MMHG | TEMPERATURE: 96.9 F | RESPIRATION RATE: 18 BRPM | WEIGHT: 232.2 LBS | HEIGHT: 68 IN | DIASTOLIC BLOOD PRESSURE: 82 MMHG | HEART RATE: 83 BPM | BODY MASS INDEX: 35.19 KG/M2

## 2023-10-05 DIAGNOSIS — M72.2 PLANTAR FASCIITIS: Primary | ICD-10-CM

## 2023-10-05 DIAGNOSIS — Z23 ENCOUNTER FOR VACCINATION: ICD-10-CM

## 2023-10-05 DIAGNOSIS — M21.42 PES PLANUS OF LEFT FOOT: ICD-10-CM

## 2023-10-05 PROCEDURE — 99213 OFFICE O/P EST LOW 20 MIN: CPT | Mod: 25 | Performed by: PHYSICIAN ASSISTANT

## 2023-10-05 PROCEDURE — G0009 ADMIN PNEUMOCOCCAL VACCINE: HCPCS | Mod: 59 | Performed by: PHYSICIAN ASSISTANT

## 2023-10-05 PROCEDURE — 90682 RIV4 VACC RECOMBINANT DNA IM: CPT | Performed by: PHYSICIAN ASSISTANT

## 2023-10-05 PROCEDURE — 91320 SARSCV2 VAC 30MCG TRS-SUC IM: CPT | Performed by: PHYSICIAN ASSISTANT

## 2023-10-05 PROCEDURE — 90480 ADMN SARSCOV2 VAC 1/ONLY CMP: CPT | Performed by: PHYSICIAN ASSISTANT

## 2023-10-05 PROCEDURE — G0008 ADMIN INFLUENZA VIRUS VAC: HCPCS | Mod: 59 | Performed by: PHYSICIAN ASSISTANT

## 2023-10-05 PROCEDURE — 90677 PCV20 VACCINE IM: CPT | Performed by: PHYSICIAN ASSISTANT

## 2023-10-05 ASSESSMENT — ENCOUNTER SYMPTOMS
NUMBNESS: 0
PARESTHESIAS: 0
ARTHRALGIAS: 1
FEVER: 0
WEAKNESS: 0

## 2023-10-05 ASSESSMENT — PAIN SCALES - GENERAL: PAINLEVEL: MILD PAIN (3)

## 2023-10-05 NOTE — PATIENT INSTRUCTIONS
The pain at your right heel is likely due to planter fasciitis.  Please see the attached handout for additional information and exercises.  For treatment you may use topical Voltaren, Tylenol, stretching, supportive footwear, and I would recommend using a frozen water bottle to roll underneath the foot a few times daily.  Please do the stretches with your leg straight and bent 2-3 times daily on both sides.     The pain of your left ankle is likely due to chronic ankle strain/sprain from having a fallen arch/flatfoot on that side.  For this you can use the topical Voltaren, ice, and Tylenol.  I would recommend purchasing shoes with supportive arches or adding arch supports into your shoes as this will help support your ankle.     If your symptoms do not improve over the next few weeks with our treatment plan, please reach out and I will place a referral to physical therapy.

## 2023-10-05 NOTE — PROGRESS NOTES
Assessment & Plan     Patient is a 58-year-old female who presents to clinic with foot/ankle concerns.  Vital signs normal.    Plantar fasciitis  Patient notes intermittent right heel pain ongoing for 4 months that is worse after exercising/walking and is worse with the first few steps.  Exam is consistent with planter fasciitis.  Discussed treatment including stretching, ice, topical diclofenac, Tylenol.  Recommended supportive footwear and arch support.  If symptoms do not improve with treatment plan, would refer to physical therapy.    Pes planus of left foot  Patient notes left ankle pain that worsens with exercise and improves with rest.  Symptoms ongoing for 1 year intermittently.  Exam consistent with pes planus which is likely causing chronic ankle sprain.  Discussed nature of injury and recommended arch support and supportive footwear.  Patient may also use Tylenol as well as topical diclofenac.    Encounter for vaccination  Patient is due for multiple vaccinations and is agreeable to receiving these today.  - Pneumococcal 20 Valent Conjugate (Prevnar 20)  - COVID-19 12+ (2023-24) (PFIZER)  - Tdap, tetanus-diptheria-acell pertussis, (BOOSTRIX) 5-2.5-18.5 LF-MCG/0.5 NAYANA injection; Inject 0.5 mLs into the muscle once for 1 dose  - INFLUENZA VACCINE 18-64Y (FLUBLOK)  Prior to immunization administration, verified patients identity using patient s name and date of birth. Please see Immunization Activity for additional information.     Screening Questionnaire for Adult Immunization    Are you sick today?   No   Do you have allergies to medications, food, a vaccine component or latex?   No   Have you ever had a serious reaction after receiving a vaccination?   No   Do you have a long-term health problem with heart, lung, kidney, or metabolic disease (e.g., diabetes), asthma, a blood disorder, no spleen, complement component deficiency, a cochlear implant, or a spinal fluid leak?  Are you on long-term aspirin  therapy?   No   Do you have cancer, leukemia, HIV/AIDS, or any other immune system problem?   No   Do you have a parent, brother, or sister with an immune system problem?   No   In the past 3 months, have you taken medications that affect  your immune system, such as prednisone, other steroids, or anticancer drugs; drugs for the treatment of rheumatoid arthritis, Crohn s disease, or psoriasis; or have you had radiation treatments?   No   Have you had a seizure, or a brain or other nervous system problem?   No   During the past year, have you received a transfusion of blood or blood    products, or been given immune (gamma) globulin or antiviral drug?   No   For women: Are you pregnant or is there a chance you could become       pregnant during the next month?   No   Have you received any vaccinations in the past 4 weeks?   No     Immunization questionnaire answers were all negative.      Patient instructed to remain in clinic for 15 minutes afterwards, and to report any adverse reactions.     Screening performed by Vanita Zee MA on 10/5/2023 at 9:58 AM.               See Patient Instructions    Kathi Owens PA-C  [facility] DARLENE Espinal is a 58 year old, presenting for the following health issues:  Musculoskeletal Problem          10/5/2023     9:15 AM   Additional Questions   Roomed by Vanita SCHWARTZ MA   Accompanied by No one         10/5/2023     9:15 AM   Patient Reported Additional Medications   Patient reports taking the following new medications None       History of Present Illness       Reason for visit:  Left ankle swelling and right foot hurts    She eats 2-3 servings of fruits and vegetables daily.She consumes 1 sweetened beverage(s) daily.She exercises with enough effort to increase her heart rate 30 to 60 minutes per day.  She exercises with enough effort to increase her heart rate 3 or less days per week.   She is taking medications regularly.       Patient stated she is  "having Rt Foot pain located to bottom of heel and left ankle pain-lateral aspect.  Pain is worse after exercising and worse with the first few steps.  She has had the Right Foot pain since July but the Left ankle started hurting last year with flare ups here and there but now seems like every time she goes for a walk it hurts. She has bought new sneakers but doesn't seem to help. Patient is walking on treadmill 3X per week for 30 min-1 hour. Patient has taken a 2 week break from exercise but still has pain. No prior injuries or surgeries. Patient notes multiple ankle sprains in the past.      Review of Systems   Constitutional:  Negative for fever.   Musculoskeletal:  Positive for arthralgias.   Neurological:  Negative for weakness, numbness and paresthesias.            Objective    /82   Pulse 83   Temp 96.9  F (36.1  C) (Temporal)   Resp 18   Ht 1.715 m (5' 7.5\")   Wt 105.3 kg (232 lb 3.2 oz)   LMP  (LMP Unknown)   SpO2 96%   Breastfeeding No   BMI 35.83 kg/m    Body mass index is 35.83 kg/m .  Physical Exam  Vitals and nursing note reviewed.   Constitutional:       General: She is not in acute distress.     Appearance: Normal appearance.   HENT:      Head: Normocephalic and atraumatic.   Eyes:      Extraocular Movements: Extraocular movements intact.      Pupils: Pupils are equal, round, and reactive to light.   Cardiovascular:      Rate and Rhythm: Normal rate and regular rhythm.      Heart sounds: Normal heart sounds.   Pulmonary:      Effort: Pulmonary effort is normal.      Breath sounds: Normal breath sounds.   Musculoskeletal:         General: Normal range of motion.      Cervical back: Normal range of motion.   Skin:     General: Skin is warm and dry.   Neurological:      General: No focal deficit present.      Mental Status: She is alert.   Psychiatric:         Mood and Affect: Mood normal.         Behavior: Behavior normal.                            "

## 2023-10-08 DIAGNOSIS — M79.89 LEG SWELLING: ICD-10-CM

## 2023-10-08 DIAGNOSIS — F64.9 GENDER DYSPHORIA: ICD-10-CM

## 2023-10-09 ENCOUNTER — ANCILLARY PROCEDURE (OUTPATIENT)
Dept: CT IMAGING | Facility: CLINIC | Age: 58
End: 2023-10-09
Attending: PHYSICIAN ASSISTANT
Payer: COMMERCIAL

## 2023-10-09 DIAGNOSIS — Z98.1 S/P CERVICAL SPINAL FUSION: ICD-10-CM

## 2023-10-09 PROCEDURE — 72125 CT NECK SPINE W/O DYE: CPT | Mod: TC | Performed by: RADIOLOGY

## 2023-10-09 RX ORDER — ESTRADIOL 2 MG/1
TABLET ORAL
Qty: 180 TABLET | Refills: 0 | Status: SHIPPED | OUTPATIENT
Start: 2023-10-09 | End: 2023-11-06

## 2023-10-09 RX ORDER — SPIRONOLACTONE 50 MG/1
TABLET, FILM COATED ORAL
Qty: 180 TABLET | Refills: 0 | Status: SHIPPED | OUTPATIENT
Start: 2023-10-09 | End: 2023-11-06

## 2023-10-09 RX ORDER — FUROSEMIDE 20 MG
TABLET ORAL
Qty: 90 TABLET | Refills: 0 | Status: SHIPPED | OUTPATIENT
Start: 2023-10-09 | End: 2024-01-24

## 2023-10-18 ENCOUNTER — OFFICE VISIT (OUTPATIENT)
Dept: FAMILY MEDICINE | Facility: CLINIC | Age: 58
End: 2023-10-18
Payer: COMMERCIAL

## 2023-10-18 VITALS
RESPIRATION RATE: 14 BRPM | DIASTOLIC BLOOD PRESSURE: 80 MMHG | HEART RATE: 81 BPM | TEMPERATURE: 97.7 F | SYSTOLIC BLOOD PRESSURE: 118 MMHG | HEIGHT: 68 IN | BODY MASS INDEX: 35.77 KG/M2 | WEIGHT: 236 LBS | OXYGEN SATURATION: 100 %

## 2023-10-18 DIAGNOSIS — E78.5 HYPERLIPIDEMIA LDL GOAL <130: ICD-10-CM

## 2023-10-18 DIAGNOSIS — R60.9 DEPENDENT EDEMA: ICD-10-CM

## 2023-10-18 DIAGNOSIS — G89.4 CHRONIC PAIN DISORDER: Primary | ICD-10-CM

## 2023-10-18 DIAGNOSIS — M79.7 FIBROMYALGIA: ICD-10-CM

## 2023-10-18 DIAGNOSIS — Z12.5 SCREENING FOR PROSTATE CANCER: ICD-10-CM

## 2023-10-18 DIAGNOSIS — Z98.84 S/P LAPAROSCOPIC SLEEVE GASTRECTOMY: ICD-10-CM

## 2023-10-18 DIAGNOSIS — Z23 NEED FOR TDAP VACCINATION: ICD-10-CM

## 2023-10-18 DIAGNOSIS — F31.81 BIPOLAR 2 DISORDER (H): ICD-10-CM

## 2023-10-18 DIAGNOSIS — Z13.1 DIABETES MELLITUS SCREENING: ICD-10-CM

## 2023-10-18 DIAGNOSIS — E55.9 VITAMIN D DEFICIENCY: ICD-10-CM

## 2023-10-18 LAB
ANION GAP SERPL CALCULATED.3IONS-SCNC: 10 MMOL/L (ref 7–15)
BUN SERPL-MCNC: 12.9 MG/DL (ref 8–23)
CALCIUM SERPL-MCNC: 9.7 MG/DL (ref 8.8–10.2)
CHLORIDE SERPL-SCNC: 99 MMOL/L (ref 98–107)
CHOLEST SERPL-MCNC: 187 MG/DL
CREAT SERPL-MCNC: 0.76 MG/DL (ref 0.51–0.95)
DEPRECATED HCO3 PLAS-SCNC: 28 MMOL/L (ref 22–29)
EGFRCR SERPLBLD CKD-EPI 2021: 89 ML/MIN/1.73M2
ERYTHROCYTE [DISTWIDTH] IN BLOOD BY AUTOMATED COUNT: 13.6 % (ref 10–15)
FERRITIN SERPL-MCNC: 28 NG/ML (ref 11–328)
GLUCOSE SERPL-MCNC: 99 MG/DL (ref 70–99)
HBA1C MFR BLD: 5.9 % (ref 0–5.6)
HCT VFR BLD AUTO: 41.8 % (ref 35–47)
HDLC SERPL-MCNC: 37 MG/DL
HGB BLD-MCNC: 13.4 G/DL (ref 11.7–15.7)
LDLC SERPL CALC-MCNC: 106 MG/DL
MCH RBC QN AUTO: 28.3 PG (ref 26.5–33)
MCHC RBC AUTO-ENTMCNC: 32.1 G/DL (ref 31.5–36.5)
MCV RBC AUTO: 88 FL (ref 78–100)
NONHDLC SERPL-MCNC: 150 MG/DL
PLATELET # BLD AUTO: 421 10E3/UL (ref 150–450)
POTASSIUM SERPL-SCNC: 5.3 MMOL/L (ref 3.4–5.3)
PSA SERPL DL<=0.01 NG/ML-MCNC: <0.01 NG/ML (ref 0–4.5)
RBC # BLD AUTO: 4.74 10E6/UL (ref 3.8–5.2)
SODIUM SERPL-SCNC: 137 MMOL/L (ref 135–145)
TRIGL SERPL-MCNC: 220 MG/DL
VIT B12 SERPL-MCNC: 399 PG/ML (ref 232–1245)
VIT D+METAB SERPL-MCNC: 32 NG/ML (ref 20–50)
WBC # BLD AUTO: 10 10E3/UL (ref 4–11)

## 2023-10-18 PROCEDURE — 85027 COMPLETE CBC AUTOMATED: CPT | Performed by: PHYSICIAN ASSISTANT

## 2023-10-18 PROCEDURE — G0103 PSA SCREENING: HCPCS | Performed by: PHYSICIAN ASSISTANT

## 2023-10-18 PROCEDURE — 80048 BASIC METABOLIC PNL TOTAL CA: CPT | Performed by: PHYSICIAN ASSISTANT

## 2023-10-18 PROCEDURE — 82306 VITAMIN D 25 HYDROXY: CPT | Performed by: PHYSICIAN ASSISTANT

## 2023-10-18 PROCEDURE — 82728 ASSAY OF FERRITIN: CPT | Performed by: PHYSICIAN ASSISTANT

## 2023-10-18 PROCEDURE — 36415 COLL VENOUS BLD VENIPUNCTURE: CPT | Performed by: PHYSICIAN ASSISTANT

## 2023-10-18 PROCEDURE — 99214 OFFICE O/P EST MOD 30 MIN: CPT | Performed by: PHYSICIAN ASSISTANT

## 2023-10-18 PROCEDURE — 82607 VITAMIN B-12: CPT | Performed by: PHYSICIAN ASSISTANT

## 2023-10-18 PROCEDURE — 83036 HEMOGLOBIN GLYCOSYLATED A1C: CPT | Performed by: PHYSICIAN ASSISTANT

## 2023-10-18 PROCEDURE — 80061 LIPID PANEL: CPT | Performed by: PHYSICIAN ASSISTANT

## 2023-10-18 RX ORDER — SIMVASTATIN 40 MG
40 TABLET ORAL AT BEDTIME
Qty: 90 TABLET | Refills: 3 | Status: SHIPPED | OUTPATIENT
Start: 2023-10-18

## 2023-10-18 ASSESSMENT — ANXIETY QUESTIONNAIRES
GAD7 TOTAL SCORE: 3
IF YOU CHECKED OFF ANY PROBLEMS ON THIS QUESTIONNAIRE, HOW DIFFICULT HAVE THESE PROBLEMS MADE IT FOR YOU TO DO YOUR WORK, TAKE CARE OF THINGS AT HOME, OR GET ALONG WITH OTHER PEOPLE: NOT DIFFICULT AT ALL
5. BEING SO RESTLESS THAT IT IS HARD TO SIT STILL: SEVERAL DAYS
3. WORRYING TOO MUCH ABOUT DIFFERENT THINGS: NOT AT ALL
2. NOT BEING ABLE TO STOP OR CONTROL WORRYING: SEVERAL DAYS
4. TROUBLE RELAXING: SEVERAL DAYS
7. FEELING AFRAID AS IF SOMETHING AWFUL MIGHT HAPPEN: NOT AT ALL
1. FEELING NERVOUS, ANXIOUS, OR ON EDGE: NOT AT ALL
6. BECOMING EASILY ANNOYED OR IRRITABLE: NOT AT ALL
GAD7 TOTAL SCORE: 3

## 2023-10-18 ASSESSMENT — PATIENT HEALTH QUESTIONNAIRE - PHQ9
SUM OF ALL RESPONSES TO PHQ QUESTIONS 1-9: 6
SUM OF ALL RESPONSES TO PHQ QUESTIONS 1-9: 6
10. IF YOU CHECKED OFF ANY PROBLEMS, HOW DIFFICULT HAVE THESE PROBLEMS MADE IT FOR YOU TO DO YOUR WORK, TAKE CARE OF THINGS AT HOME, OR GET ALONG WITH OTHER PEOPLE: SOMEWHAT DIFFICULT

## 2023-10-18 ASSESSMENT — PAIN SCALES - GENERAL: PAINLEVEL: MILD PAIN (2)

## 2023-10-18 NOTE — PROGRESS NOTES
"  Assessment & Plan       ICD-10-CM    1. Chronic pain disorder  G89.4       2. Fibromyalgia  M79.7       3. Vitamin D deficiency  E55.9 Vitamin D Deficiency     Vitamin D Deficiency      4. Hyperlipidemia LDL goal <130  E78.5 Lipid panel reflex to direct LDL Non-fasting     simvastatin (ZOCOR) 40 MG tablet     Lipid panel reflex to direct LDL Non-fasting      5. S/P laparoscopic sleeve gastrectomy  Z98.84 Ferritin     CBC with Platelets     Vitamin B12     Vitamin B12     CBC with Platelets     Ferritin      6. Dependent edema  R60.9 Basic metabolic panel  (Ca, Cl, CO2, Creat, Gluc, K, Na, BUN)     Basic metabolic panel  (Ca, Cl, CO2, Creat, Gluc, K, Na, BUN)      7. Bipolar 2 disorder (H)  F31.81       8. Diabetes mellitus screening  Z13.1 Hemoglobin A1c     Hemoglobin A1c      9. Screening for prostate cancer  Z12.5 PROSTATE SPEC ANTIGEN SCREEN     PROSTATE SPEC ANTIGEN SCREEN      10. Need for Tdap vaccination  Z23 Tdap, tetanus-diptheria-acell pertussis, (BOOSTRIX) 5-2.5-18.5 LF-MCG/0.5 NAYANA injection          1,2) Stable. Her medical cannabis re-cert was completed today.     3-6) Routine labs in process. Meds recently renewed, no changes today    7) Follows psych    Screenings discussed      Ordering of each unique test  Prescription drug management         BMI:   Estimated body mass index is 36.23 kg/m  as calculated from the following:    Height as of this encounter: 1.719 m (5' 7.68\").    Weight as of this encounter: 107 kg (236 lb).     Return in about 1 year (around 10/18/2024) for your annual physical, a med check, fasting labs, with Kristin, in person.     [facility] DARLENE Espinal is a 58 year old, presenting for the following health issues:  RECHECK (Pain, has had pain since last year (November or December)) and Health Maintenance (Patient is fasting)        10/18/2023     7:37 AM   Additional Questions   Roomed by Chary Chacon, Student MA and " Veronica   Accompanied by N/A         10/18/2023     7:37 AM   Patient Reported Additional Medications   Patient reports taking the following new medications No new medications       History of Present Illness       Reason for visit:  Left ankle swelling and right foot hurts    She eats 2-3 servings of fruits and vegetables daily.She consumes 1 sweetened beverage(s) daily.She exercises with enough effort to increase her heart rate 30 to 60 minutes per day.  She exercises with enough effort to increase her heart rate 3 or less days per week.   She is taking medications regularly.     Due for yearly labs, vit D recheck, cholesterol recheck  Sees psych for Bipolar    Pain History:  When did you first notice your pain? Last year, November and December   Have you seen this provider for your pain in the past? Yes   Where in your body do you have pain? Left arm, feet, and lower back  Are you seeing anyone else for your pain? Yes - Did see a pain specialist before    No specific injury  Working out on the treadmill  Got new shoes  Foot and ankle started hurting immediately   Saw Kathi - recommended she get arch supports and that has helped    Using medical cannabis once during the day when she naps and at bedtime  Working well, does cause some dry mouth    PEG: A Three-Item Scale Assessing Pain Intensity and Interference    What number best describes your PAIN ON AVERAGE in the past week?  2    What number best describes how, during the past week, pain has interfered with your ENJOYMENT OF LIFE?  2    What number best describes how, during the past week, pain has interfered with your GENERAL ACTIVITY?  3    PEG Total Score:  2    Erika MONTERO, [place] KA, Renan MJ, Helen TA, Marco J, Sara JM, Zuly SM, Jason K. Development and initial validation of the PEG, a 3-item scale assessing pain intensity and interference. Journal of General Internal Medicine. 2009 Jun;24:733-738.          10/16/2019     4:14 AM 8/29/2022     6:59 AM  "10/18/2023     7:25 AM   PHQ-9 SCORE   PHQ-9 Total Score MyChart   6 (Mild depression)   PHQ-9 Total Score 4 6 6           8/29/2022     6:59 AM 1/23/2023     3:07 PM 10/18/2023     7:26 AM   MURPHY-7 SCORE   Total Score  2 (minimal anxiety) 3 (minimal anxiety)   Total Score 5 2 3       PDMP Review         Value Time User    State PDMP site checked  Yes 1/24/2023  2:16 PM Cleo Ramon NP          Last CSA Agreement:   CSA -- Patient Level:    CSA: None found at the patient level.       Last UDS: 11/28/2022      Review of Systems   Constitutional, musculoskeletal, neuro, endocrine and psych systems are negative, except as otherwise noted.      Objective    /80   Pulse 81   Temp 97.7  F (36.5  C) (Temporal)   Resp 14   Ht 1.719 m (5' 7.68\")   Wt 107 kg (236 lb)   LMP  (LMP Unknown)   SpO2 100%   BMI 36.23 kg/m    Body mass index is 36.23 kg/m .  Physical Exam   GENERAL: healthy, alert and no distress  EYES: Eyes grossly normal to inspection  RESP: lungs clear to auscultation - no rales, rhonchi or wheezes  CV: regular rates and rhythm, normal S1 S2, no S3 or S4, and no murmur, click or rub  ABDOMEN: soft, nontender, no hepatosplenomegaly, no masses and bowel sounds normal  MS: no gross musculoskeletal defects noted, no edema  SKIN: no suspicious lesions or rashes  NEURO: Normal strength and tone, mentation intact and speech normal  PSYCH: mentation appears normal, affect normal/bright                "

## 2023-10-18 NOTE — PATIENT INSTRUCTIONS
Weight loss injections: Wegovy, Ozempic, Mounjaro, Saxenda    Call your insurance and about coverage. If they say they cover any of them ask what the criteria for coverage is.

## 2023-10-30 ENCOUNTER — PATIENT OUTREACH (OUTPATIENT)
Dept: CARE COORDINATION | Facility: CLINIC | Age: 58
End: 2023-10-30
Payer: COMMERCIAL

## 2023-11-03 ENCOUNTER — OFFICE VISIT (OUTPATIENT)
Dept: NEUROSURGERY | Facility: CLINIC | Age: 58
End: 2023-11-03
Payer: COMMERCIAL

## 2023-11-03 VITALS
HEART RATE: 71 BPM | WEIGHT: 236 LBS | DIASTOLIC BLOOD PRESSURE: 77 MMHG | BODY MASS INDEX: 35.77 KG/M2 | HEIGHT: 68 IN | SYSTOLIC BLOOD PRESSURE: 116 MMHG

## 2023-11-03 DIAGNOSIS — E66.812 CLASS 2 SEVERE OBESITY DUE TO EXCESS CALORIES WITH SERIOUS COMORBIDITY AND BODY MASS INDEX (BMI) OF 36.0 TO 36.9 IN ADULT (H): ICD-10-CM

## 2023-11-03 DIAGNOSIS — E66.01 CLASS 2 SEVERE OBESITY DUE TO EXCESS CALORIES WITH SERIOUS COMORBIDITY AND BODY MASS INDEX (BMI) OF 36.0 TO 36.9 IN ADULT (H): ICD-10-CM

## 2023-11-03 DIAGNOSIS — M47.12 CERVICAL SPONDYLOSIS WITH MYELOPATHY: Primary | ICD-10-CM

## 2023-11-03 PROCEDURE — 99214 OFFICE O/P EST MOD 30 MIN: CPT | Performed by: ORTHOPAEDIC SURGERY

## 2023-11-03 NOTE — PATIENT INSTRUCTIONS
1.) We have placed orders for a cervical MRI, EMG, and cervical epidural injection referral. The MRI and EMG can be scheduled today. You will receive a call to schedule the injection.   1.) Please call us back at [phone] to schedule a phone follow-up appt with Dr. Ruiz after these tests and procedures are completed. Thank you!

## 2023-11-03 NOTE — PROGRESS NOTES
Spine Surgery Return Clinic Visit      Chief Complaint:   New Patient (1 yr follow up DOS 10/28/22/CT completed 10/9/23 as ordered by spine PA//)      Interval HPI:  Symptom Profile Including: location of symptoms, onset, severity, exacerbating/alleviating factors, previous treatments:        Kylie Austin is a 58 year old female who returns roughly 1 year status post two-level anterior cervical fusion.  Overall she is doing quite well.  She feels like the neck is healed and her left arm symptoms are much better.  She says she is about 50% better than before surgery.  The main thing that bothers her she feels like there is a shooting numbness into the right arm.  It bothers her when she is driving.  It comes down somewhat in a C6 distribution down over the lateral shoulder and into the radial border of the hand.            Past Medical History:     Past Medical History:   Diagnosis Date    Anxiety 2005    Brought on by Amioderone    Arthritis 08/2005    Atrial fib/flutter, transient     S/p cardioversion 2004    Bipolar 2 disorder (H)     Bleeding disorder (H24) 1987    nose bleeds    Chronic pain     LUE pain    Congestive heart failure (H) 11/18/2004    cured 2005    Depressive disorder     on and off most of my life!    Fibromyalgia     Gender identity disorder Started Rx 2012    H/O hypogonadism Gonadectomy 2013    Hyperlipidemia     Hypertension     Other mental problems     bipolar    Psoriasis     Sleep apnea     Uncomplicated asthma 1964    cured in 4/2001    Vision disorder 06/2005            Past Surgical History:     Past Surgical History:   Procedure Laterality Date    ARTHROPLASTY SHOULDER Left 1/22/2020    Procedure: Left Total shoulder arthroplasty, distal clavicle excision;  Surgeon: Omari Tobin MD;  [facility]    BIOPSY  2005, 8/8/13    Stomach, colon    BLOCK, NERVE, GENICULAR Left 2/28/2023    Procedure: BLOCK, NERVE, GENICULAR;  Surgeon: Mathew Bloom MD;   Location: [facility] OR    COLONOSCOPY  8/8/2013    Procedure: COLONOSCOPY;  COLONSCOPY SCREEN/ SE;  Surgeon: Santino Sun MD;  [facility]    COLONOSCOPY N/A 1/4/2022    Procedure: COLONOSCOPY, WITH POLYPECTOMY AND BIOPSY;  Surgeon: Dallas Velazco MD;  [facility]    COLONOSCOPY WITH CO2 INSUFFLATION N/A 11/1/2018    Procedure: COLONOSCOPY WITH CO2 INSUFFLATION;  Surgeon: Sanitno Sun MD;  [facility]    COLONOSCOPY WITH CO2 INSUFFLATION N/A 12/15/2020    Procedure: COLONOSCOPY, WITH CO2 INSUFFLATION;  Surgeon: Nima Kamara MD;  [facility]    DECOMPRESSION, FUSION CERVICAL ANTERIOR ONE LEVEL, COMBINED N/A 10/28/2022    Procedure: Cervical 6 to Thoracic 1 anterior cervical decompression and fusion with medtronic plate and screws, interbody device, use of fluoroscopy, microscope;  Surgeon: Fernando Ruiz MD;  [facility]    Gender Reassignment  05/2016    GRAFT BONE FROM ILIAC CREST Left 10/28/2022    Procedure: and left sided iliac crest autograft;  Surgeon: Fernando Ruiz MD;  [facility]    HEAD & NECK SURGERY  2015    ablation of nerve from neck to left arm    INJECT EPIDURAL CERVICAL N/A 7/29/2022    Procedure: INJECTION, SPINE, CERVICAL, EPIDURAL T1-T2;  Surgeon: Kenya Ferrell MD;  [facility]    LAPAROSCOPIC GASTRIC SLEEVE N/A 4/10/2018    Procedure: LAPAROSCOPIC GASTRIC SLEEVE;  Laparoscopic Sleeve Gastrectomy;  Surgeon: Jani Shah MD;  [facility]    MANDIBLE SURGERY  1986    ORCHIECTOMY INGUINAL BILATERAL  7/16/2013    Procedure: ORCHIECTOMY INGUINAL BILATERAL;  Bilateral Simple Inguinal Orchiectomy ;  Surgeon: Jan Garrett MD;  [facility]    TONSILLECTOMY              Social History:     Social History     Tobacco Use    Smoking status: Never     Passive exposure: Never    Smokeless tobacco: Never    Tobacco comments:     NEVER SMOKED! Grew up with heavy smokers which did not help my  Asthma!   Substance Use Topics    Alcohol use: Not Currently     Comment: occasional//2 per month            Family History:     Family History   Problem Relation Age of Onset    Breast Cancer Mother     Cancer Father         skin    Diabetes Father         Was on the patch when they were new    Heart Disease Father 55        scd    Coronary Artery Disease Father     Hypertension Father     Hyperlipidemia Father     Diabetes Sister     Diabetes Sister     Thyroid Disease Sister     Osteoporosis Maternal Grandmother     Alzheimer Disease Paternal Grandmother     Diabetes Paternal Grandmother     Mental Illness Paternal Grandmother         alshimers    Osteoporosis Paternal Grandmother     Coronary Artery Disease Paternal Grandfather     Hypertension Paternal Grandfather     Hyperlipidemia Paternal Grandfather     Prostate Cancer Paternal Grandfather     Other Cancer Paternal Grandfather     Depression Daughter     Unknown/Adopted Daughter     Depression Daughter     Mental Illness Daughter         bipolar    Anxiety Disorder Daughter     Anxiety Disorder Daughter     Depression Son     Heart Disease Other     Anesthesia Reaction No family hx of     Deep Vein Thrombosis (DVT) No family hx of             Allergies:     Allergies   Allergen Reactions    Amiodarone      Caused pain fatigue/amplified anxiety and depression    Fluoxetine Other (See Comments)     Night terrors    Tetracycline      Teeth rattle/saliva acidic            Medications:     Current Outpatient Medications   Medication    alendronate (FOSAMAX) 70 MG tablet    ARIPiprazole (ABILIFY) 20 MG tablet    diclofenac (VOLTAREN) 1 % topical gel    estradiol (ESTRACE) 2 MG tablet    furosemide (LASIX) 20 MG tablet    medical cannabis (Patient's own supply.  Not a prescription)    order for DME    simvastatin (ZOCOR) 40 MG tablet    spironolactone (ALDACTONE) 50 MG tablet    venlafaxine (EFFEXOR-XR) 75 MG 24 hr capsule     No current facility-administered  "medications for this visit.             Review of Systems:   A focused musculoskeletal and neurologic ROS was performed with pertinent positives and negatives noted in the HPI.  Additional systems were also reviewed and are documented at the bottom of the note.         Physical Exam:   Vitals: /77   Pulse 71   Ht 1.727 m (5' 8\")   Wt 107 kg (236 lb)   LMP  (LMP Unknown)   BMI 35.88 kg/m    Musculoskeletal, Neurologic, and Spine:     Cervical spine:    Appearance -no gross step-offs, kyphosis.    Motor -     C5: Deltoids R 5/5 and L 5/5 strength    C6: Biceps R 5/5 and L 5/5 strength     C7: Triceps R 5/5 and L 5/5 strength     C8:  R 5/5 and L 5/5 strength     T1: Dorsal interossei R 4/5 and L 4/5 strength        Sensation: intact to light touch in C5-T1      Special Tests -      Lhermitte's Test - Negative     Spurling's Test - Negative      Graves's Test - Negative           Neurologic:      REFLEXES Left Right                  Patella 1+ 1+   Ankle jerk 1+ 1+   Babinski No upgoing great toe No upgoing great toe   Clonus 0 beats 0 beats     Hip Exam:  No pain with hip log roll and no tenderness over the greater trochanters.    Alignment:  Patient stands with a neutral standing sagittal balance.           Imaging:   We ordered and independently reviewed new radiographs at this clinic visit. The results were discussed with the patient. Findings include:     Cervical CT scan reviewed today shows solid arthrodesis C5-7 moderate degenerative changes C4-5 above       Assessment and Plan:     58 year old female with some continued right arm symptoms.  Overall about 50% better than before surgery.  Arthrodesis is likely solid.  Given her continued right arm complaints I like to get a right arm nerve conduction test to look for carpal tunnel or cubital tunnel and also an updated cervical MRI.  I am going to order a C7-T1 interlaminar injection and follow-up by phone after these tests are available.   "         Respectfully,  Fernando Ruiz MD  Spine Surgery  [facility]

## 2023-11-03 NOTE — LETTER
11/3/2023         RE: Kylie Austin  [address] [facility] [address]        Dear Colleague,    Thank you for referring your patient, Kylie Austin, to the [facility] [place]. Please see a copy of my visit note below.    Spine Surgery Return Clinic Visit      Chief Complaint:   New Patient (1 yr follow up DOS 10/28/22/CT completed 10/9/23 as ordered by spine PA//)      Interval HPI:  Symptom Profile Including: location of symptoms, onset, severity, exacerbating/alleviating factors, previous treatments:        Kylie Austin is a 58 year old female who returns roughly 1 year status post two-level anterior cervical fusion.  Overall she is doing quite well.  She feels like the neck is healed and her left arm symptoms are much better.  She says she is about 50% better than before surgery.  The main thing that bothers her she feels like there is a shooting numbness into the right arm.  It bothers her when she is driving.  It comes down somewhat in a C6 distribution down over the lateral shoulder and into the radial border of the hand.            Past Medical History:     Past Medical History:   Diagnosis Date     Anxiety 2005    Brought on by Amioderone     Arthritis 08/2005     Atrial fib/flutter, transient     S/p cardioversion 2004     Bipolar 2 disorder (H)      Bleeding disorder (H24) 1987    nose bleeds     Chronic pain     LUE pain     Congestive heart failure (H) 11/18/2004    cured 2005     Depressive disorder     on and off most of my life!     Fibromyalgia      Gender identity disorder Started Rx 2012     H/O hypogonadism Gonadectomy 2013     Hyperlipidemia      Hypertension      Other mental problems     bipolar     Psoriasis      Sleep apnea      Uncomplicated asthma 1964    cured in 4/2001     Vision disorder 06/2005            Past Surgical History:     Past Surgical History:   Procedure Laterality Date     ARTHROPLASTY  SHOULDER Left 1/22/2020    Procedure: Left Total shoulder arthroplasty, distal clavicle excision;  Surgeon: Omari Tobin MD;  [facility]     BIOPSY  2005, 8/8/13    Stomach, colon     BLOCK, NERVE, GENICULAR Left 2/28/2023    Procedure: BLOCK, NERVE, GENICULAR;  Surgeon: Mathew Bloom MD;  Location: [facility] OR     COLONOSCOPY  8/8/2013    Procedure: COLONOSCOPY;  COLONSCOPY SCREEN/ SE;  Surgeon: Santino Sun MD;  [facility]     COLONOSCOPY N/A 1/4/2022    Procedure: COLONOSCOPY, WITH POLYPECTOMY AND BIOPSY;  Surgeon: Dallas Velazco MD;  [facility]     COLONOSCOPY WITH CO2 INSUFFLATION N/A 11/1/2018    Procedure: COLONOSCOPY WITH CO2 INSUFFLATION;  Surgeon: Santino Sun MD;  [facility]     COLONOSCOPY WITH CO2 INSUFFLATION N/A 12/15/2020    Procedure: COLONOSCOPY, WITH CO2 INSUFFLATION;  Surgeon: Nima Kmaara MD;  [facility]     DECOMPRESSION, FUSION CERVICAL ANTERIOR ONE LEVEL, COMBINED N/A 10/28/2022    Procedure: Cervical 6 to Thoracic 1 anterior cervical decompression and fusion with medtronic plate and screws, interbody device, use of fluoroscopy, microscope;  Surgeon: Fernando Ruiz MD;  [facility]     Gender Reassignment  05/2016     GRAFT BONE FROM ILIAC CREST Left 10/28/2022    Procedure: and left sided iliac crest autograft;  Surgeon: Fernando Ruiz MD;  [facility]     HEAD & NECK SURGERY  2015    ablation of nerve from neck to left arm     INJECT EPIDURAL CERVICAL N/A 7/29/2022    Procedure: INJECTION, SPINE, CERVICAL, EPIDURAL T1-T2;  Surgeon: Kenya Ferrell MD;  [facility]     LAPAROSCOPIC GASTRIC SLEEVE N/A 4/10/2018    Procedure: LAPAROSCOPIC GASTRIC SLEEVE;  Laparoscopic Sleeve Gastrectomy;  Surgeon: Jani Shah MD;  [facility]     MANDIBLE SURGERY  1986     ORCHIECTOMY INGUINAL BILATERAL  7/16/2013    Procedure: ORCHIECTOMY INGUINAL BILATERAL;  Bilateral Simple  Inguinal Orchiectomy ;  Surgeon: Jan Garrett MD;  [facility]     TONSILLECTOMY              Social History:     Social History     Tobacco Use     Smoking status: Never     Passive exposure: Never     Smokeless tobacco: Never     Tobacco comments:     NEVER SMOKED! Grew up with heavy smokers which did not help my Asthma!   Substance Use Topics     Alcohol use: Not Currently     Comment: occasional//2 per month            Family History:     Family History   Problem Relation Age of Onset     Breast Cancer Mother      Cancer Father         skin     Diabetes Father         Was on the patch when they were new     Heart Disease Father 55        scd     Coronary Artery Disease Father      Hypertension Father      Hyperlipidemia Father      Diabetes Sister      Diabetes Sister      Thyroid Disease Sister      Osteoporosis Maternal Grandmother      Alzheimer Disease Paternal Grandmother      Diabetes Paternal Grandmother      Mental Illness Paternal Grandmother         alshimers     Osteoporosis Paternal Grandmother      Coronary Artery Disease Paternal Grandfather      Hypertension Paternal Grandfather      Hyperlipidemia Paternal Grandfather      Prostate Cancer Paternal Grandfather      Other Cancer Paternal Grandfather      Depression Daughter      Unknown/Adopted Daughter      Depression Daughter      Mental Illness Daughter         bipolar     Anxiety Disorder Daughter      Anxiety Disorder Daughter      Depression Son      Heart Disease Other      Anesthesia Reaction No family hx of      Deep Vein Thrombosis (DVT) No family hx of             Allergies:     Allergies   Allergen Reactions     Amiodarone      Caused pain fatigue/amplified anxiety and depression     Fluoxetine Other (See Comments)     Night terrors     Tetracycline      Teeth rattle/saliva acidic            Medications:     Current Outpatient Medications   Medication     alendronate (FOSAMAX) 70 MG tablet     ARIPiprazole (ABILIFY) 20 MG  "tablet     diclofenac (VOLTAREN) 1 % topical gel     estradiol (ESTRACE) 2 MG tablet     furosemide (LASIX) 20 MG tablet     medical cannabis (Patient's own supply.  Not a prescription)     order for DME     simvastatin (ZOCOR) 40 MG tablet     spironolactone (ALDACTONE) 50 MG tablet     venlafaxine (EFFEXOR-XR) 75 MG 24 hr capsule     No current facility-administered medications for this visit.             Review of Systems:   A focused musculoskeletal and neurologic ROS was performed with pertinent positives and negatives noted in the HPI.  Additional systems were also reviewed and are documented at the bottom of the note.         Physical Exam:   Vitals: /77   Pulse 71   Ht 1.727 m (5' 8\")   Wt 107 kg (236 lb)   LMP  (LMP Unknown)   BMI 35.88 kg/m    Musculoskeletal, Neurologic, and Spine:     Cervical spine:    Appearance -no gross step-offs, kyphosis.    Motor -     C5: Deltoids R 5/5 and L 5/5 strength    C6: Biceps R 5/5 and L 5/5 strength     C7: Triceps R 5/5 and L 5/5 strength     C8:  R 5/5 and L 5/5 strength     T1: Dorsal interossei R 4/5 and L 4/5 strength        Sensation: intact to light touch in C5-T1      Special Tests -      Lhermitte's Test - Negative     Spurling's Test - Negative      Graves's Test - Negative           Neurologic:      REFLEXES Left Right                  Patella 1+ 1+   Ankle jerk 1+ 1+   Babinski No upgoing great toe No upgoing great toe   Clonus 0 beats 0 beats     Hip Exam:  No pain with hip log roll and no tenderness over the greater trochanters.    Alignment:  Patient stands with a neutral standing sagittal balance.           Imaging:   We ordered and independently reviewed new radiographs at this clinic visit. The results were discussed with the patient. Findings include:     Cervical CT scan reviewed today shows solid arthrodesis C5-7 moderate degenerative changes C4-5 above       Assessment and Plan:     58 year old female with some continued right arm " symptoms.  Overall about 50% better than before surgery.  Arthrodesis is likely solid.  Given her continued right arm complaints I like to get a right arm nerve conduction test to look for carpal tunnel or cubital tunnel and also an updated cervical MRI.  I am going to order a C7-T1 interlaminar injection and follow-up by phone after these tests are available.           Respectfully,  Fernando Ruiz MD  Spine Surgery  [facility]      Again, thank you for allowing me to participate in the care of your patient.        Sincerely,        Fernando Ruiz MD

## 2023-11-03 NOTE — NURSING NOTE
"Kylie Austin's goals for this visit include:   Chief Complaint   Patient presents with    New Patient     1 yr follow up DOS 10/28/22  CT completed 10/9/23 as ordered by spine PA           She requests these members of her care team be copied on today's visit information: yes    PCP: Kristin Miner    Referring Provider:  No referring provider defined for this encounter.    /77   Pulse 71   Ht 1.727 m (5' 8\")   Wt 107 kg (236 lb)   LMP  (LMP Unknown)   BMI 35.88 kg/m      Do you need any medication refills at today's visit? No  PACO Ritter, TONIO ([facility])      "

## 2023-11-06 ENCOUNTER — ANCILLARY PROCEDURE (OUTPATIENT)
Dept: MRI IMAGING | Facility: CLINIC | Age: 58
End: 2023-11-06
Attending: ORTHOPAEDIC SURGERY
Payer: COMMERCIAL

## 2023-11-06 ENCOUNTER — VIRTUAL VISIT (OUTPATIENT)
Dept: FAMILY MEDICINE | Facility: CLINIC | Age: 58
End: 2023-11-06
Payer: COMMERCIAL

## 2023-11-06 DIAGNOSIS — F64.9 GENDER DYSPHORIA: ICD-10-CM

## 2023-11-06 DIAGNOSIS — M47.12 CERVICAL SPONDYLOSIS WITH MYELOPATHY: ICD-10-CM

## 2023-11-06 DIAGNOSIS — M81.0 AGE-RELATED OSTEOPOROSIS WITHOUT CURRENT PATHOLOGICAL FRACTURE: ICD-10-CM

## 2023-11-06 PROCEDURE — 72141 MRI NECK SPINE W/O DYE: CPT | Performed by: RADIOLOGY

## 2023-11-06 PROCEDURE — 99213 OFFICE O/P EST LOW 20 MIN: CPT | Mod: 95 | Performed by: NURSE PRACTITIONER

## 2023-11-06 RX ORDER — ALENDRONATE SODIUM 70 MG/1
TABLET ORAL
Qty: 12 TABLET | Refills: 0 | Status: SHIPPED | OUTPATIENT
Start: 2023-11-06 | End: 2024-02-20

## 2023-11-06 RX ORDER — ESTRADIOL 2 MG/1
TABLET ORAL
Qty: 180 TABLET | Refills: 1 | Status: SHIPPED | OUTPATIENT
Start: 2023-11-06 | End: 2024-04-30

## 2023-11-06 RX ORDER — SPIRONOLACTONE 50 MG/1
100 TABLET, FILM COATED ORAL DAILY
Qty: 180 TABLET | Refills: 1 | Status: SHIPPED | OUTPATIENT
Start: 2023-11-06 | End: 2024-07-09

## 2023-11-06 NOTE — PROGRESS NOTES
"Kylie is a 58 year old who is being evaluated via a billable telephone visit.      What phone number would you like to be contacted at? [phone]   How would you like to obtain your AVS? Janell    Distant Location (provider location):  Off-site    Assessment & Plan     Gender identity disorder    - estradiol (ESTRACE) 2 MG tablet; TAKE 1 TO 2 TABLETS BY MOUTH ONCE DAILY  - spironolactone (ALDACTONE) 50 MG tablet; Take 2 tablets (100 mg) by mouth daily  - Estradiol; Future  - Testosterone Free and Total; Future    Age-related osteoporosis without current pathological fracture    - alendronate (FOSAMAX) 70 MG tablet; TAKE 1 TABLET BY MOUTH ONCE WEEKLY. SIT UP FOR 2 HOURS AFTER TAKING.    Review of external notes as documented elsewhere in note  Ordering of each unique test  Prescription drug management  25 minutes spent by me on the date of the encounter doing chart review, history and exam, documentation and further activities per the note     BMI:   Estimated body mass index is 35.88 kg/m  as calculated from the following:    Height as of 11/3/23: 1.727 m (5' 8\").    Weight as of 11/3/23: 107 kg (236 lb).       See Patient Instructions    DIMITRI BENDER NP  [facility] DARLENE    William Espinal is a 58 year old, presenting for the following health issues:  Recheck Medication      11/6/2023     6:50 AM   Additional Questions   Roomed by Leann (questions completed via [employer])   Accompanied by n/a     She feels she is tolerating her hormone therapy and they are working for her as expected. Mental health doing well.  Reviewed risks of hormone therapy signs to watch out for and when to come in for evaluation. She does not smoke. Tolerating fosamax for bone density. No other concerns at this time. Advised follow up as needed. Consider lab only appt for hormone labs; other labs done recently.     History of Present Illness       Reason for visit:  Hormones    She eats 2-3 servings of " fruits and vegetables daily.She consumes 0 sweetened beverage(s) daily.She exercises with enough effort to increase her heart rate 30 to 60 minutes per day.  She exercises with enough effort to increase her heart rate 3 or less days per week.   She is taking medications regularly.       Review of Systems   Constitutional, HEENT, cardiovascular, pulmonary, GI, , musculoskeletal, neuro, skin, endocrine and psych systems are negative, except as otherwise noted.      Objective           Vitals:  No vitals were obtained today due to virtual visit.    Physical Exam   healthy, alert, and no distress  PSYCH: Alert and oriented times 3; coherent speech, normal   rate and volume, able to articulate logical thoughts, able   to abstract reason, no tangential thoughts, no hallucinations   or delusions  Her affect is normal  RESP: No cough, no audible wheezing, able to talk in full sentences  Remainder of exam unable to be completed due to telephone visits    See orders        Phone call duration: 7 minutes

## 2023-11-13 ENCOUNTER — PATIENT OUTREACH (OUTPATIENT)
Dept: CARE COORDINATION | Facility: CLINIC | Age: 58
End: 2023-11-13
Payer: COMMERCIAL

## 2023-11-15 ENCOUNTER — TELEPHONE (OUTPATIENT)
Dept: FAMILY MEDICINE | Facility: CLINIC | Age: 58
End: 2023-11-15
Payer: COMMERCIAL

## 2023-11-15 DIAGNOSIS — E66.812 CLASS 2 SEVERE OBESITY DUE TO EXCESS CALORIES WITH SERIOUS COMORBIDITY AND BODY MASS INDEX (BMI) OF 35.0 TO 35.9 IN ADULT (H): Primary | ICD-10-CM

## 2023-11-15 DIAGNOSIS — E66.01 CLASS 2 SEVERE OBESITY DUE TO EXCESS CALORIES WITH SERIOUS COMORBIDITY AND BODY MASS INDEX (BMI) OF 35.0 TO 35.9 IN ADULT (H): Primary | ICD-10-CM

## 2023-11-15 NOTE — TELEPHONE ENCOUNTER
Pt calling to state that she was able to reach her insurance and they had informed her at that Prior Auth will be needed for Ozempic and that Ozempic is covered if pt goes through the step therapy such as oral medications. Pt states that she was previously on Metformin (discontinued 11/28/23) and should be able to get Ozempic per insurance.     Pt requesting to start Ozempic see 10/18/23 Office visit note below from pcp:  Weight loss injections: Wegovy, Ozempic, Mounjaro, Saxenda  Call your insurance and about coverage. If they say they cover any of them ask what the criteria for coverage is.     Robyn Hill RN on 11/15/2023 at 12:03 PM

## 2023-11-16 NOTE — TELEPHONE ENCOUNTER
Pt notified of Provider's message as written. Assisted with scheduling 3 month follow up appt.   Pt has never done injections. Advised pt to keep medication in fridge until ready to use. Make sure medication is clear, not cloudy. Prime medication before use. Give in abdomen and rotate sites. Give 2 inches away from umbilicus. Clean skin with alcohol swab. Inject upright or at 90 degree angle and hold in place for atleast 10 seconds to make sure all medication is injected. Offered a nurse visit to review with pt, pt can get further directions from pharmacist, or can watch video on Ozempic website and pt states her Wife is a nurse and can assist with this.     Alivia Whitt RN  [facility]

## 2023-11-16 NOTE — TELEPHONE ENCOUNTER
Prescription sent. Her weight loss goal is 11 pounds over the next 3 months. Schedule a virtual visit tele/video in 3 months to reassess

## 2023-11-17 ENCOUNTER — LAB (OUTPATIENT)
Dept: LAB | Facility: CLINIC | Age: 58
End: 2023-11-17
Payer: COMMERCIAL

## 2023-11-17 DIAGNOSIS — F64.9 GENDER DYSPHORIA: ICD-10-CM

## 2023-11-17 LAB — ESTRADIOL SERPL-MCNC: 79 PG/ML

## 2023-11-17 PROCEDURE — 36415 COLL VENOUS BLD VENIPUNCTURE: CPT

## 2023-11-17 PROCEDURE — 84270 ASSAY OF SEX HORMONE GLOBUL: CPT

## 2023-11-17 PROCEDURE — 82670 ASSAY OF TOTAL ESTRADIOL: CPT

## 2023-11-17 PROCEDURE — 84403 ASSAY OF TOTAL TESTOSTERONE: CPT

## 2023-11-18 LAB — SHBG SERPL-SCNC: 70 NMOL/L (ref 30–135)

## 2023-11-21 LAB
TESTOST FREE SERPL-MCNC: 0.04 NG/DL
TESTOST SERPL-MCNC: 4 NG/DL (ref 8–60)

## 2023-11-21 NOTE — RESULT ENCOUNTER NOTE
Shravan Espinal,    Thank you for your recent office visit.    Here are your recent results.  Normal labs    Feel free to contact me via [employer] or call the clinic at [phone].    Sincerely,    TALIB Yanes, FNP-BC

## 2023-11-28 ENCOUNTER — TELEPHONE (OUTPATIENT)
Dept: FAMILY MEDICINE | Facility: CLINIC | Age: 58
End: 2023-11-28
Payer: COMMERCIAL

## 2023-11-28 DIAGNOSIS — E66.812 CLASS 2 SEVERE OBESITY WITH SERIOUS COMORBIDITY AND BODY MASS INDEX (BMI) OF 35.0 TO 35.9 IN ADULT, UNSPECIFIED OBESITY TYPE (H): Primary | ICD-10-CM

## 2023-11-28 DIAGNOSIS — E66.01 CLASS 2 SEVERE OBESITY WITH SERIOUS COMORBIDITY AND BODY MASS INDEX (BMI) OF 35.0 TO 35.9 IN ADULT, UNSPECIFIED OBESITY TYPE (H): Primary | ICD-10-CM

## 2023-11-28 NOTE — TELEPHONE ENCOUNTER
Prior Authorization Retail Medication Request    Medication/Dose: semaglutide (OZEMPIC) 2 MG/3ML pen  Diagnosis and ICD code (if different than what is on RX):  E66.01, Z68.35   New/renewal/insurance change PA/secondary ins. PA:  Previously Tried and Failed:  na  Rationale:  na    Insurance   Primary: [employer] MEDICARE ADVANTAGE   Insurance ID:  [id number]     Secondary (if applicable):sarah  Insurance ID:  sarah    Pharmacy Information (if different than what is on RX)  Name:  [facility]  Phone:  [phone]  Fax:na

## 2023-11-30 NOTE — TELEPHONE ENCOUNTER
Central Prior Authorization Team   Phone: [phone]    PA Initiation    Medication: OZEMPIC (0.25 OR 0.5 MG/DOSE) 2 MG/3ML SC SOPN  Insurance Company: [employer] - Phone [phone] Fax [phone]  Pharmacy Filling the Rx: [id number] - [address] [place] - [address] [facility]  Filling Pharmacy Phone: [phone]  Filling Pharmacy Fax:    Start Date: 11/30/2023

## 2023-12-07 NOTE — TELEPHONE ENCOUNTER
Prior Authorization Not Needed per Insurance-This is not available for review for this Dx. Patient would have to pay out of pocket for this medication.     Medication: OZEMPIC (0.25 OR 0.5 MG/DOSE) 2 MG/3ML SC SOPN  Insurance Company: [employer] Platinum Blue - Phone [phone] Fax [phone]  Expected CoPay: $    Pharmacy Filling the Rx: [id number] - [address] [place], [place] - [address] [facility]  Pharmacy Notified: No  Patient Notified: No

## 2023-12-08 RX ORDER — TIRZEPATIDE 2.5 MG/.5ML
2.5 INJECTION, SOLUTION SUBCUTANEOUS
Qty: 2 ML | Refills: 0 | Status: SHIPPED | OUTPATIENT
Start: 2023-12-08 | End: 2024-01-24

## 2023-12-08 NOTE — TELEPHONE ENCOUNTER
I reviewed the directives from Kristin Miner PA-C with patient and she verbalized a good understanding.     Kylie said she will call her insurance and pharmacy. She will call back once she has some answers.     Carmen Wilson RN BSN  [facility]

## 2023-12-08 NOTE — TELEPHONE ENCOUNTER
Med is not covered for weight loss. I suggest she call her insurance:    Weight loss injections: Nathan Zamudio Saxenda  Call your insurance and ask if any of the above injection(s) are covered. If they say yes, ask them what the criteria for coverage is.  Check with your pharmacy regarding which injection(s) they've been getting in or have the best luck getting. And do they have the starter doses?

## 2023-12-08 NOTE — TELEPHONE ENCOUNTER
Monjuaro is what the patient is wanting and there needs to be a prescription exemption sent to the insurance company.

## 2023-12-08 NOTE — TELEPHONE ENCOUNTER
Prescription for Mounjaro sent to pharmacy.   Follow up with Kristin 3 months after starting med for weight check. Weight loss goal of 12 pounds over that time.  Send me a message in 3-4 weeks if tolerating med ok

## 2023-12-26 ENCOUNTER — TELEPHONE (OUTPATIENT)
Dept: FAMILY MEDICINE | Facility: CLINIC | Age: 58
End: 2023-12-26
Payer: COMMERCIAL

## 2023-12-29 NOTE — TELEPHONE ENCOUNTER
RN called pt to relay pcp's message below. Pt verbalized understanding and RN sent [employer] message of alternative medications to pt as reference when calling out to insurance.     Robyn Hill RN on 12/29/2023 at 1:14 PM

## 2023-12-29 NOTE — TELEPHONE ENCOUNTER
Central Prior Authorization Team   Phone: [phone]    PA Initiation    Medication: MOUNJARO 2.5 MG/0.5ML SC SOPN  Insurance Company: Other (see comments)Comment:  Prime Medicare [phone]  Pharmacy Filling the Rx: [id number] - [address] [place], [place] - [address] [facility]  Filling Pharmacy Phone: [phone]  Filling Pharmacy Fax:    Start Date: 12/29/2023    Started PA on CMM and a response of Product not covered for this health plan/disease state/medication as submitted. Product not covered by this plan for this diagnosis. For a FDA label approved diagnosis, please resubmit with an ICD 10 code or submit via other methods.

## 2023-12-29 NOTE — TELEPHONE ENCOUNTER
Please notify patient of denial of coverage. She can call her insurance and ask if any of the injections are covered under weight loss:  Saxenda, Wegovy

## 2024-01-11 ENCOUNTER — OFFICE VISIT (OUTPATIENT)
Dept: NEUROLOGY | Facility: CLINIC | Age: 59
End: 2024-01-11
Attending: ORTHOPAEDIC SURGERY
Payer: COMMERCIAL

## 2024-01-11 DIAGNOSIS — M47.12 CERVICAL SPONDYLOSIS WITH MYELOPATHY: ICD-10-CM

## 2024-01-11 DIAGNOSIS — G56.01 CARPAL TUNNEL SYNDROME OF RIGHT WRIST: Primary | ICD-10-CM

## 2024-01-11 PROCEDURE — 95909 NRV CNDJ TST 5-6 STUDIES: CPT | Mod: GC | Performed by: PSYCHIATRY & NEUROLOGY

## 2024-01-11 PROCEDURE — 95886 MUSC TEST DONE W/N TEST COMP: CPT | Mod: GC | Performed by: PSYCHIATRY & NEUROLOGY

## 2024-01-11 NOTE — PROGRESS NOTES
[facility]  Electrodiagnostic Laboratory                 Department of Neurology                                                                                                         Test Date:  2024    Patient: Kylie Austin : 1965 Physician: Pierce Kirkpatrick MD   Sex: Male AGE: 58 year Ref Phys: Fernando Ruiz MD    ID#: [id number]   Technician: Kristy Behling     History and Examination:    Kylie Austin is a 58 year old woman with previous cervical radicular pain about 1 year ago s/p ACDF with fusion of C6-T1 with residual and episodic numbness of the right hand. Study is performed to evaluate for right cervical radiculopathy vs entrapment neuropathies of the right upper extremity like carpal or cubital tunnel syndrome.     Techniques:    Motor and sensory conduction studies were done with surface recording electrodes.  EMG was done with a concentric needle electrode.     Results:    Antidromic sensory nerve conduction studies of the right median, ulnar, and radial nerves were normal.  Right median-ulnar orthodromic palmar mixed comparison study showed prolonged peak latency difference.  Motor nerve conduction study of the right median (APB) nerve showed mildly prolonged distal latency and normal amplitude and conduction velocity at the forearm. Motor nerve conduction study of the right ulnar (ADM) nerve was normal.  Right median-ulnar lumbrical II-palmar interossei comparison study was normal.     F-wave latency of the right ulnar (ADM) nerve was normal.    Needle EMG of the right FDI, pronator teres, triceps, biceps brachii, and deltoid were normal.      Interpretation:    This is an abnormal study.  There is electrophysiologic evidence of a mild to moderate right median neuropathy at the wrist, as can be seen in carpal tunnel syndrome.  There is no evidence of right ulnar neuropathy or cervical radiculopathy on the basis of  the study.    Study was performed with Dr. Kaya Delacruz MD  Neuromuscular Medicine Fellow, PGY-5  [facility]    ATTENDING ADDENDUM: I was present during the entire study and directly supervised Fellow Dr Delacruz, who performed it. I agree with the analysis of results and interpretation as above, which I personally reviewed and edited. Pierce Kirkpatrick MD         Nerve Conduction Studies  Motor Sites      Latency Amplitude Neg. Amp Diff Segment Distance Velocity Neg. Dur Neg Area Diff Temperature Comment   Site (ms) Norm (mV) Norm (%)  cm m/s Norm (ms) (%) ( C)    Right Median (APB) Motor   Wrist 4.6  < 4.4 6.2  > 5.0  Wrist-APB 8   5.2  31.1    Elbow 8.8 - 6.3  > 5.0 2 Elbow-Wrist 23.5 56  > 48 5.0 -12 31.1    Right Median/Ulnar (Lumb-Dors Int II) Motor        Median (Lumb I)   Wrist 4.3 - 0.79 -  Wrist-Lumb I 10   9.3  30.8         Ulnar (Dorsal Int (manus))   Wrist 4.1 - 2.4 -  Wrist-Dorsal Int (manus) 10   5.4  30.9    Right Ulnar (ADM) Motor   Wrist 3.5  < 3.5 10.8  > 5.0  Wrist-ADM 8   5.4  31.1    Bel Elbow 7.5 - 8.8 - -19 Bel Elbow-Wrist 24.5 61  > 48 5.6 -17 31.1    Abv Elbow 9.2 - 8.8 - 0 Abv Elbow-Bel Elbow 10 59  > 48 6.0 1 31.1      Sensory Sites      Onset Lat Peak Lat Amp (O-P) Amp (P-P) Segment Distance Velocity Temperature Comment   Site ms (ms)  V Norm ( V)  cm m/s Norm ( C)    Right Median Sensory   Wrist-Dig II 2.8 4.1 19  > 10 11 Wrist-Dig II 15 54  > 48 29.7    Right Median-Ulnar Palmar Sensory        Median   Palm-Wrist 1.78 2.5 36 - 48 Palm-Wrist 8 45 - 31         Ulnar   Palm-Wrist 1.38 1.98 8 - 6 Palm-Wrist 8 58 - 31    Right Radial Sensory   Forearm-Wrist 2.0 2.7 28  > 15 39 Forearm-Wrist 10 50 - 30.7    Right Ulnar Sensory   Wrist-Dig V 2.4 3.4 11  > 8 31 Wrist-Dig V 12.5 52  > 48 30.4      Inter-Nerve Comparisons     Nerve 1 Value 1 Nerve 2 Value 2 Parameter Result Normal   Sensory Sites   R Median Palm-Wrist 2.5 ms R Ulnar Palm-Wrist 1.98 ms Peak  Lat Diff 0.52 ms <0.40     F Wave Studies     Min-F Max-F Dispersion Persistence Mean-F F-Norm L-R Mean-F L-R Mean-F Norm F/M Ratio F-M Lat (ms)   Right Ulnar (Abd Dig Min)  31.1  C   28.44 33.59 5.15 100.00 31.64 <36  <2.5 0.98 24.61       Electromyography     Side Muscle Ins Act Fibs/PSW Fasc HF Amp Dur Poly Recrt Int Pat   Right FDI Nml None Nml 0 Nml Nml 0 Nml Nml   Right Pronator Teres Nml None Nml 0 Nml Nml 0 Nml Nml   Right Triceps Nml None Nml 0 Nml Nml 0 Nml Nml   Right Biceps Nml None Nml 0 Nml Nml 0 Nml Nml   Right Deltoid Nml None Nml 0 Nml Nml 0 Nml Nml         NCS Waveforms:    Motor           Sensory

## 2024-01-11 NOTE — LETTER
2024       RE: Kylie Austin  [address] [facility] [address]       Dear Colleague,    Thank you for referring your patient, Kylie Austin, to the [facility] [place] [facility]. Please see a copy of my visit note below.                            [facility]  Electrodiagnostic Laboratory                 Department of Neurology                                                                                                         Test Date:  2024    Patient: Kylie Austin : 1965 Physician: Pierce Kirkpatrick MD   Sex: Male AGE: 58 year Ref Phys: Fernando Ruiz MD    ID#: [id number]   Technician: Kristy Behling     History and Examination:    Kylie Austin is a 58 year old woman with previous cervical radicular pain about 1 year ago s/p ACDF with fusion of C6-T1 with residual and episodic numbness of the right hand. Study is performed to evaluate for right cervical radiculopathy vs entrapment neuropathies of the right upper extremity like carpal or cubital tunnel syndrome.     Techniques:    Motor and sensory conduction studies were done with surface recording electrodes.  EMG was done with a concentric needle electrode.     Results:    Antidromic sensory nerve conduction studies of the right median, ulnar, and radial nerves were normal.  Right median-ulnar orthodromic palmar mixed comparison study showed prolonged peak latency difference.  Motor nerve conduction study of the right median (APB) nerve showed mildly prolonged distal latency and normal amplitude and conduction velocity at the forearm. Motor nerve conduction study of the right ulnar (ADM) nerve was normal.  Right median-ulnar lumbrical II-palmar interossei comparison study was normal.     F-wave latency of the right ulnar (ADM) nerve was normal.    Needle EMG of the right FDI, pronator  teres, triceps, biceps brachii, and deltoid were normal.      Interpretation:    This is an abnormal study.  There is electrophysiologic evidence of a mild to moderate right median neuropathy at the wrist, as can be seen in carpal tunnel syndrome.  There is no evidence of right ulnar neuropathy or cervical radiculopathy on the basis of the study.    Study was performed with Dr. Kaya Delacruz MD  Neuromuscular Medicine Fellow, PGY-5  [facility]    ATTENDING ADDENDUM: I was present during the entire study and directly supervised Fellow Dr Delacruz, who performed it. I agree with the analysis of results and interpretation as above, which I personally reviewed and edited. Pierce Kirkpatrick MD         Nerve Conduction Studies  Motor Sites      Latency Amplitude Neg. Amp Diff Segment Distance Velocity Neg. Dur Neg Area Diff Temperature Comment   Site (ms) Norm (mV) Norm (%)  cm m/s Norm (ms) (%) ( C)    Right Median (APB) Motor   Wrist 4.6  < 4.4 6.2  > 5.0  Wrist-APB 8   5.2  31.1    Elbow 8.8 - 6.3  > 5.0 2 Elbow-Wrist 23.5 56  > 48 5.0 -12 31.1    Right Median/Ulnar (Lumb-Dors Int II) Motor        Median (Lumb I)   Wrist 4.3 - 0.79 -  Wrist-Lumb I 10   9.3  30.8         Ulnar (Dorsal Int (manus))   Wrist 4.1 - 2.4 -  Wrist-Dorsal Int (manus) 10   5.4  30.9    Right Ulnar (ADM) Motor   Wrist 3.5  < 3.5 10.8  > 5.0  Wrist-ADM 8   5.4  31.1    Bel Elbow 7.5 - 8.8 - -19 Bel Elbow-Wrist 24.5 61  > 48 5.6 -17 31.1    Abv Elbow 9.2 - 8.8 - 0 Abv Elbow-Bel Elbow 10 59  > 48 6.0 1 31.1      Sensory Sites      Onset Lat Peak Lat Amp (O-P) Amp (P-P) Segment Distance Velocity Temperature Comment   Site ms (ms)  V Norm ( V)  cm m/s Norm ( C)    Right Median Sensory   Wrist-Dig II 2.8 4.1 19  > 10 11 Wrist-Dig II 15 54  > 48 29.7    Right Median-Ulnar Palmar Sensory        Median   Palm-Wrist 1.78 2.5 36 - 48 Palm-Wrist 8 45 - 31         Ulnar   Palm-Wrist 1.38 1.98 8 - 6 Palm-Wrist 8 58 - 31     Right Radial Sensory   Forearm-Wrist 2.0 2.7 28  > 15 39 Forearm-Wrist 10 50 - 30.7    Right Ulnar Sensory   Wrist-Dig V 2.4 3.4 11  > 8 31 Wrist-Dig V 12.5 52  > 48 30.4      Inter-Nerve Comparisons     Nerve 1 Value 1 Nerve 2 Value 2 Parameter Result Normal   Sensory Sites   R Median Palm-Wrist 2.5 ms R Ulnar Palm-Wrist 1.98 ms Peak Lat Diff 0.52 ms <0.40     F Wave Studies     Min-F Max-F Dispersion Persistence Mean-F F-Norm L-R Mean-F L-R Mean-F Norm F/M Ratio F-M Lat (ms)   Right Ulnar (Abd Dig Min)  31.1  C   28.44 33.59 5.15 100.00 31.64 <36  <2.5 0.98 24.61       Electromyography     Side Muscle Ins Act Fibs/PSW Fasc HF Amp Dur Poly Recrt Int Pat   Right FDI Nml None Nml 0 Nml Nml 0 Nml Nml   Right Pronator Teres Nml None Nml 0 Nml Nml 0 Nml Nml   Right Triceps Nml None Nml 0 Nml Nml 0 Nml Nml   Right Biceps Nml None Nml 0 Nml Nml 0 Nml Nml   Right Deltoid Nml None Nml 0 Nml Nml 0 Nml Nml         NCS Waveforms:    Motor           Sensory                   Again, thank you for allowing me to participate in the care of your patient.      Sincerely,    Pierce Kirkpatrick MD

## 2024-01-16 DIAGNOSIS — G56.01 CARPAL TUNNEL SYNDROME OF RIGHT WRIST: Primary | ICD-10-CM

## 2024-01-18 ENCOUNTER — OFFICE VISIT (OUTPATIENT)
Dept: ORTHOPEDICS | Facility: CLINIC | Age: 59
End: 2024-01-18
Payer: COMMERCIAL

## 2024-01-18 ENCOUNTER — PRE VISIT (OUTPATIENT)
Dept: ORTHOPEDICS | Facility: CLINIC | Age: 59
End: 2024-01-18

## 2024-01-18 DIAGNOSIS — G56.01 CARPAL TUNNEL SYNDROME OF RIGHT WRIST: ICD-10-CM

## 2024-01-18 PROCEDURE — 99214 OFFICE O/P EST MOD 30 MIN: CPT | Performed by: FAMILY MEDICINE

## 2024-01-18 NOTE — PATIENT INSTRUCTIONS
Use wrist brace at night and as needed during the day  Follow-up with hand therapy  Contact our clinic if you would like to try a steroid injection or if your symptoms are unimproved after 1 to 2 months    Carpal Tunnel Exercises    You may do all of these exercises right away.    Wrist range of motion    Flexion: Gently bend your wrist forward. Hold for 5 seconds. Do 2 sets of 15.    Extension: Gently bend your wrist backward. Hold this position 5 seconds. Do 2 sets of 15.    Side to side: Gently move your wrist from side to side (a handshake motion). Hold for 5 seconds in each direction. Do 2 sets of 15.    Wrist stretch: Press the back of the hand on your injured side with your other hand to help bend your wrist. Hold for 15 to 30 seconds. Next, stretch the hand back by pressing the fingers in a backward direction. Hold for 15 to 30 seconds. Keep the arm on your injured side straight during this exercise. Do 3 sets.    Mid-trap exercise: Lie on your stomach on a firm surface and place a folded pillow underneath your chest. Place your arms out straight to your sides with your elbows straight and thumbs toward the ceiling. Slowly raise your arms toward the ceiling as you squeeze your shoulder blades together. Lower slowly. Do 3 sets of 15. As the exercise gets easier to do, hold soup cans or small weights in your hands.    Pectoralis stretch:  an open doorway or corner with both hands slightly above your head on the door frame or wall. Slowly lean forward until you feel a stretch in the front of your shoulders. Hold 15 to 30 seconds. Repeat 3 times.    Scalene stretch: Sit or stand and clasp both hands behind your back. Lower your left shoulder and tilt your head toward the right until you feel a stretch. Hold this position for 15 to 30 seconds and then come back to the starting position. Then lower your right shoulder and tilt your head toward the left. Hold for 15 to 30 seconds. Repeat 3 times on each  side.    Thoracic extension: Sit in a chair and clasp both arms behind your head. Gently arch backward and look up toward the ceiling. Repeat 10 times. Do this several times each day.    Scapular squeeze: While sitting or standing with your arms by your sides, squeeze your shoulder blades together and hold for 5 seconds. Do 2 sets of 15.    Wrist extension: Hold a soup can or hammer handle in your hand with your palm facing down. Slowly bend your wrist up. Slowly lower the weight down into the starting position. Do 2 sets of 15. Gradually increase the weight of the object you are holding.     strengthening: Squeeze a soft rubber ball and hold the squeeze for 5 seconds. Do 2 sets of 15.      Developed by [employer].  Published by [employer].  Copyright  2014 [employer] and/or one of its subsidiaries. All rights reserved.

## 2024-01-18 NOTE — PROGRESS NOTES
[facility]  SPORTS & ORTHOPEDIC CLINIC VISIT     Jan 18, 2024        ASSESSMENT & PLAN    58-year-old with paresthesias and discomfort in the right hand that is consistent with carpal tunnel as seen on EMG.  She is also having some symptoms extending up her arm and in the ring and small fingers that is likely from alternate etiology.    Reviewed imaging and assessment with patient in detail  Recommended trial of bracing at night and as needed during the day  She will also like to follow-up with hand therapy and referral was given.  She was also given an HEP to begin on her own.  We discussed the indication for a steroid injection and she will follow-up if she would like to pursue this option.    Ricardo Lai MD  [facility] SPORTS MEDICINE [facility]    -----  Chief Complaint   Patient presents with    Right Wrist - Pain       SUBJECTIVE  Kylie Austin is a/an 58 year old female who is seen in consultation at the request of Dr.Chris Ruiz  for evaluation of  Right wrist pain .     The patient is seen with their wife.  The patient is Ambidextrous handed    Onset: 1 years(s) ago. Reports insidious onset without acute precipitating event.  Location of Pain: right wrist  Worsened by: Driving, lifting unscrewing a lid  Better with: NA  Treatments tried: Medical Cannabis   Associated symptoms: numbness and tingling    Orthopedic/Surgical history: NO  Social History/Occupation: NO      REVIEW OF SYSTEMS:  Do you have fever, chills, weight loss? No  Do you have any vision problems? No  Do you have any chest pain or edema? No  Do you have any shortness of breath or wheezing?  No  Do you have stomach problems? No  Do you have any numbness or focal weakness? No  Do you have diabetes? No  Do you have problems with bleeding or clotting? No  Do you have an rashes or other skin lesions? No    OBJECTIVE:  LMP  (LMP Unknown)      General  - alert, pleasant, no distress  CV  -  normal radial pulse, cap refill brisk  Musculoskeletal -right wrist  - inspection: normal joint alignment, no obvious deformity, no swelling  - palpation: no bony or soft tissue tenderness, no tenderness at the anatomical snuffbox  - ROM:  90 deg flexion   70 deg extension   25 deg abduction   65 deg adduction  - strength: 5/5  strength, 5/5 flexion, extension,   - special tests:  (-) Tinel's  (-) Finkelstein  (+) Phalen  (-) Le click test  (-) ulnar impaction  Neuro  -sensation decreased over volar aspect of index middle and thumb.  No motor deficit, grossly normal coordination, normal muscle tone  Skin  - no ecchymosis, erythema, warmth, or induration, no obvious rash        RADIOLOGY:    EMG of the right wrist dated 1/11/2024.  Per neurology report:  Interpretation:     This is an abnormal study.  There is electrophysiologic evidence of a mild to moderate right median neuropathy at the wrist, as can be seen in carpal tunnel syndrome.  There is no evidence of right ulnar neuropathy or cervical radiculopathy on the basis of the study.

## 2024-01-18 NOTE — TELEPHONE ENCOUNTER
DIAGNOSIS: Carpal tunnel syndrome of right wrist [G56.01] / Fernando Ruiz MD in  NEUROSURGERY     APPOINTMENT DATE: 1.18.24   NOTES STATUS DETAILS   OFFICE NOTE from referring provider Internal 1.16.24, 7.12.22  Joseph  Orth[facility]   OFFICE NOTE from other specialist Internal 4.29.22  Douglas  [facility]   EMG report Internal 1.11.24, 6.23.22, 12.23.19   MEDICATION LIST Internal    DEXA (osteoporosis/bone health) Internal 2.24.23

## 2024-01-18 NOTE — LETTER
1/18/2024      RE: Kylie Austin  [address] [facility] [address]     Dear Colleague,    Thank you for referring your patient, Kylie Austin, to the [facility] SPORTS MEDICINE [facility]. Please see a copy of my visit note below.      [facility]  SPORTS & ORTHOPEDIC CLINIC VISIT     Jan 18, 2024        ASSESSMENT & PLAN    58-year-old with paresthesias and discomfort in the right hand that is consistent with carpal tunnel as seen on EMG.  She is also having some symptoms extending up her arm and in the ring and small fingers that is likely from alternate etiology.    Reviewed imaging and assessment with patient in detail  Recommended trial of bracing at night and as needed during the day  She will also like to follow-up with hand therapy and referral was given.  She was also given an HEP to begin on her own.  We discussed the indication for a steroid injection and she will follow-up if she would like to pursue this option.    Ricardo Lai MD  [facility] SPORTS MEDICINE [facility]    -----  Chief Complaint   Patient presents with    Right Wrist - Pain       SUBJECTIVE  Kylie Austin is a/an 58 year old female who is seen in consultation at the request of Dr.Chris Ruiz  for evaluation of  Right wrist pain .     The patient is seen with their wife.  The patient is Ambidextrous handed    Onset: 1 years(s) ago. Reports insidious onset without acute precipitating event.  Location of Pain: right wrist  Worsened by: Driving, lifting unscrewing a lid  Better with: NA  Treatments tried: Medical Cannabis   Associated symptoms: numbness and tingling    Orthopedic/Surgical history: NO  Social History/Occupation: NO      REVIEW OF SYSTEMS:  Do you have fever, chills, weight loss? No  Do you have any vision problems? No  Do you have any chest pain or edema? No  Do you have any shortness of breath or wheezing?  No  Do you  have stomach problems? No  Do you have any numbness or focal weakness? No  Do you have diabetes? No  Do you have problems with bleeding or clotting? No  Do you have an rashes or other skin lesions? No    OBJECTIVE:  LMP  (LMP Unknown)      General  - alert, pleasant, no distress  CV  - normal radial pulse, cap refill brisk  Musculoskeletal -right wrist  - inspection: normal joint alignment, no obvious deformity, no swelling  - palpation: no bony or soft tissue tenderness, no tenderness at the anatomical snuffbox  - ROM:  90 deg flexion   70 deg extension   25 deg abduction   65 deg adduction  - strength: 5/5  strength, 5/5 flexion, extension,   - special tests:  (-) Tinel's  (-) Finkelstein  (+) Phalen  (-) Le click test  (-) ulnar impaction  Neuro  -sensation decreased over volar aspect of index middle and thumb.  No motor deficit, grossly normal coordination, normal muscle tone  Skin  - no ecchymosis, erythema, warmth, or induration, no obvious rash        RADIOLOGY:    EMG of the right wrist dated 1/11/2024.  Per neurology report:  Interpretation:     This is an abnormal study.  There is electrophysiologic evidence of a mild to moderate right median neuropathy at the wrist, as can be seen in carpal tunnel syndrome.  There is no evidence of right ulnar neuropathy or cervical radiculopathy on the basis of the study.           Again, thank you for allowing me to participate in the care of your patient.      Sincerely,    Ricardo Lai MD

## 2024-01-19 ENCOUNTER — TELEPHONE (OUTPATIENT)
Dept: FAMILY MEDICINE | Facility: CLINIC | Age: 59
End: 2024-01-19
Payer: COMMERCIAL

## 2024-01-19 NOTE — TELEPHONE ENCOUNTER
Patient Quality Outreach    Patient is due for the following:   Physical Annual Wellness Visit    Next Steps:   Patient was scheduled for for 1/24/24    Type of outreach:    Phone, spoke to patient/parent. Made appointment for 1/24/24      Questions for provider review:    None           Edie Hui, TONIO

## 2024-01-22 ASSESSMENT — ENCOUNTER SYMPTOMS
BREAST MASS: 0
JOINT SWELLING: 1
NERVOUS/ANXIOUS: 0
CHILLS: 0
NAUSEA: 0
ARTHRALGIAS: 1
DYSURIA: 0
HEADACHES: 0
SORE THROAT: 0
WEAKNESS: 1
MYALGIAS: 1
DIZZINESS: 0
FEVER: 0
HEMATURIA: 0
DIARRHEA: 0
HEMATOCHEZIA: 0
EYE PAIN: 0
ABDOMINAL PAIN: 0
HEARTBURN: 0
PALPITATIONS: 0
COUGH: 0
FREQUENCY: 0
PARESTHESIAS: 0
CONSTIPATION: 0
SHORTNESS OF BREATH: 0

## 2024-01-22 ASSESSMENT — ACTIVITIES OF DAILY LIVING (ADL): CURRENT_FUNCTION: NO ASSISTANCE NEEDED

## 2024-01-24 ENCOUNTER — OFFICE VISIT (OUTPATIENT)
Dept: FAMILY MEDICINE | Facility: CLINIC | Age: 59
End: 2024-01-24
Payer: COMMERCIAL

## 2024-01-24 VITALS
HEIGHT: 68 IN | RESPIRATION RATE: 20 BRPM | WEIGHT: 239.2 LBS | BODY MASS INDEX: 36.25 KG/M2 | TEMPERATURE: 98.1 F | SYSTOLIC BLOOD PRESSURE: 120 MMHG | DIASTOLIC BLOOD PRESSURE: 74 MMHG | HEART RATE: 91 BPM

## 2024-01-24 DIAGNOSIS — R73.03 PREDIABETES: ICD-10-CM

## 2024-01-24 DIAGNOSIS — E66.01 CLASS 2 SEVERE OBESITY DUE TO EXCESS CALORIES WITH SERIOUS COMORBIDITY AND BODY MASS INDEX (BMI) OF 36.0 TO 36.9 IN ADULT (H): ICD-10-CM

## 2024-01-24 DIAGNOSIS — I48.0 PAROXYSMAL ATRIAL FIBRILLATION (H): ICD-10-CM

## 2024-01-24 DIAGNOSIS — Z00.00 ENCOUNTER FOR MEDICARE ANNUAL WELLNESS EXAM: Primary | ICD-10-CM

## 2024-01-24 DIAGNOSIS — E66.812 CLASS 2 SEVERE OBESITY DUE TO EXCESS CALORIES WITH SERIOUS COMORBIDITY AND BODY MASS INDEX (BMI) OF 36.0 TO 36.9 IN ADULT (H): ICD-10-CM

## 2024-01-24 DIAGNOSIS — M79.89 LEG SWELLING: ICD-10-CM

## 2024-01-24 DIAGNOSIS — I42.9 SECONDARY CARDIOMYOPATHY (H): ICD-10-CM

## 2024-01-24 DIAGNOSIS — Z12.31 VISIT FOR SCREENING MAMMOGRAM: ICD-10-CM

## 2024-01-24 DIAGNOSIS — F31.81 BIPOLAR 2 DISORDER (H): ICD-10-CM

## 2024-01-24 LAB — HBA1C MFR BLD: 5.8 % (ref 0–5.6)

## 2024-01-24 PROCEDURE — 83036 HEMOGLOBIN GLYCOSYLATED A1C: CPT | Performed by: PHYSICIAN ASSISTANT

## 2024-01-24 PROCEDURE — 36415 COLL VENOUS BLD VENIPUNCTURE: CPT | Performed by: PHYSICIAN ASSISTANT

## 2024-01-24 PROCEDURE — 99396 PREV VISIT EST AGE 40-64: CPT | Performed by: PHYSICIAN ASSISTANT

## 2024-01-24 RX ORDER — FUROSEMIDE 20 MG
20 TABLET ORAL EVERY MORNING
Qty: 90 TABLET | Refills: 3 | Status: SHIPPED | OUTPATIENT
Start: 2024-01-24

## 2024-01-24 ASSESSMENT — ENCOUNTER SYMPTOMS
MYALGIAS: 1
CONSTIPATION: 0
ARTHRALGIAS: 1
NAUSEA: 0
DIZZINESS: 0
CHILLS: 0
SHORTNESS OF BREATH: 0
DYSURIA: 0
WEAKNESS: 1
PALPITATIONS: 0
HEADACHES: 0
COUGH: 0
FEVER: 0
SORE THROAT: 0
EYE PAIN: 0
JOINT SWELLING: 1
NERVOUS/ANXIOUS: 0
DIARRHEA: 0
FREQUENCY: 0
ABDOMINAL PAIN: 0
HEMATURIA: 0

## 2024-01-24 ASSESSMENT — ACTIVITIES OF DAILY LIVING (ADL): CURRENT_FUNCTION: NO ASSISTANCE NEEDED

## 2024-01-24 NOTE — PROGRESS NOTES
"Preventive Care Visit  [facility] DARLENE Miner PA-C, Family Medicine  Jan 24, 2024       SUBJECTIVE:   Kylie is a 58 year old, presenting for the following:  Physical (fasting)        1/24/2024    10:39 AM   Additional Questions   Roomed by Edie   Accompanied by sarah         1/24/2024    10:39 AM   Patient Reported Additional Medications   Patient reports taking the following new medications none     Healthy Habits:     In general, how would you rate your overall health?  Good    Frequency of exercise:  2-3 days/week    Duration of exercise:  15-30 minutes    Do you usually eat at least 4 servings of fruit and vegetables a day, include whole grains    & fiber and avoid regularly eating high fat or \"junk\" foods?  Yes    Taking medications regularly:  Yes    Medication side effects:  Not applicable    Ability to successfully perform activities of daily living:  No assistance needed    Home Safety:  No safety concerns identified    Hearing Impairment:  Difficulty following a conversation in a noisy restaurant or crowded room    In the past 6 months, have you been bothered by leaking of urine?  No    In general, how would you rate your overall mental or emotional health?  Good    Additional concerns today:  No      Today's PHQ-2 Score:       1/24/2024    10:33 AM   PHQ-2 ( 1999 [employer])   Q1: Little interest or pleasure in doing things 0   Q2: Feeling down, depressed or hopeless 0   PHQ-2 Score 0   Q1: Little interest or pleasure in doing things Not at all   Q2: Feeling down, depressed or hopeless Not at all   PHQ-2 Score 0           Annual Wellness Visit    Patient has been advised of split billing requirements and indicates understanding: Yes     Are you in the first 12 months of your Medicare Part B coverage?  No    Mental Health:  In general, how would you rate your overall mental or emotional health? good    Today's PHQ-2 Score:       1/24/2024    10:33 AM   PHQ-2 ( 1999 Pf[employer])   Q1: " Little interest or pleasure in doing things 0   Q2: Feeling down, depressed or hopeless 0   PHQ-2 Score 0   Q1: Little interest or pleasure in doing things Not at all   Q2: Feeling down, depressed or hopeless Not at all   PHQ-2 Score 0         Do you feel safe in your environment? Yes    Have you ever done Advance Care Planning? (For example, a Health Directive, POLST, or a discussion with a medical provider or your loved ones about your wishes)? No, advance care planning information given to patient to review.  Patient plans to discuss their wishes with loved ones or provider.      Fall risk:  Fallen 2 or more times in the past year?: No  Any fall with injury in the past year?: No    Cognitive Screenin) Repeat 3 items (Leader, Season, Table)    2) Clock draw: NORMAL  3) 3 item recall: Recalls 3 objects  Results: 3 items recalled: COGNITIVE IMPAIRMENT LESS LIKELY    Mini-CogTM Copyright GREGORY Luis. Licensed by the author for use in [facility]; reprinted with permission ([email or web address]). All rights reserved.      Do you have sleep apnea, excessive snoring or daytime drowsiness? : yes    Social History     Tobacco Use    Smoking status: Never     Passive exposure: Never    Smokeless tobacco: Never    Tobacco comments:     NEVER SMOKED! Grew up with heavy smokers which did not help my Asthma!   Substance Use Topics    Alcohol use: Not Currently     Comment: occasional//2 per month             2024    10:10 AM   Alcohol Use   Prescreen: >3 drinks/day or >7 drinks/week? No          No data to display              Do you have a current opioid prescription? No  Do you use any other controlled substances or medications that are not prescribed by a provider? None    Current providers sharing in care for this patient include:   Patient Care Team:  Kristin Miner PA-C as PCP - General (Physician Assistant)  Dallas Lo MD as Referring Physician (Internal Medicine)  Kristin Miner PA-C  as Assigned PCP  Aleksandra Sellers RN as Specialty Care Coordinator (Cardiology)  Windy Owens MD (Cardiovascular Disease)  Fernando Ruiz MD as Assigned Musculoskeletal Provider    The following health maintenance items are reviewed in Epic and correct as of today:  Health Maintenance   Topic Date Due    NICOTINE/TOBACCO CESSATION COUNSELING Q 1 YR  Never done    MEDICARE ANNUAL WELLNESS VISIT  11/28/2023    MAMMO SCREENING  02/24/2024    A1C  10/18/2024    BMP  10/18/2024    FERRITIN  10/18/2024    LIPID  10/18/2024    PSA  10/18/2024    ANNUAL REVIEW OF HM ORDERS  10/18/2024    CBC  10/18/2024    VITAMIN B12  10/18/2024    VITAMIN D  10/18/2024    COLORECTAL CANCER SCREENING  01/04/2025    ADVANCE CARE PLANNING  11/28/2027    DTAP/TDAP/TD IMMUNIZATION (6 - Td or Tdap) 10/23/2033    DEXA  02/24/2038    HEPATITIS C SCREENING  Completed    HIV SCREENING  Completed    PHQ-2 (once per calendar year)  Completed    INFLUENZA VACCINE  Completed    ZOSTER IMMUNIZATION  Completed    RSV VACCINE (Pregnancy & 60+)  Completed    COVID-19 Vaccine  Completed    Pneumococcal Vaccine: Pediatrics (0 to 5 Years) and At-Risk Patients (6 to 64 Years)  Aged Out    IPV IMMUNIZATION  Aged Out    HPV IMMUNIZATION  Aged Out    MENINGITIS IMMUNIZATION  Aged Out    RSV MONOCLONAL ANTIBODY  Aged Out    URINE DRUG SCREEN  Discontinued    PAP  Discontinued       Patient has been advised of split billing requirements and indicates understanding: Not reviewed by provider    Appropriate preventive services were discussed with this patient, including applicable screening as appropriate for fall prevention, nutrition, physical activity, Tobacco-use cessation, weight loss and cognition.  Checklist reviewing preventive services available has been given to the patient.      Social History     Tobacco Use    Smoking status: Never     Passive exposure: Never    Smokeless tobacco: Never    Tobacco comments:     NEVER SMOKED! Grew up with  heavy smokers which did not help my Asthma!   Substance Use Topics    Alcohol use: Not Currently     Comment: occasional//2 per month             1/22/2024    10:10 AM   Alcohol Use   Prescreen: >3 drinks/day or >7 drinks/week? No     Reviewed orders with patient.  Reviewed health maintenance and updated orders accordingly - Yes  Lab work is in process  Labs reviewed in EPIC    Breast Cancer Screening:    FHS-7:       1/21/2022     1:12 PM 2/24/2023     9:20 AM 1/22/2024    10:13 AM   Breast CA Risk Assessment (FHS-7)   Did any of your first-degree relatives have breast or ovarian cancer? No Yes Yes   Did any of your relatives have bilateral breast cancer? No No No   Did any man in your family have breast cancer? No No No   Did any woman in your family have breast and ovarian cancer? No No Yes   Did any woman in your family have breast cancer before age 50 y? No Yes No   Do you have 2 or more relatives with breast and/or ovarian cancer? No No No   Do you have 2 or more relatives with breast and/or bowel cancer? No No No     Mammogram Screening: Recommended mammography every 1-2 years with patient discussion and risk factor consideration  Pertinent mammograms are reviewed under the imaging tab.    History of abnormal Pap smear: Not indicated, cervix not present     Reviewed and updated as needed this visit by clinical staff   Tobacco  Allergies  Meds              Reviewed and updated as needed this visit by Provider                    Review of Systems   Constitutional:  Negative for chills and fever.   HENT:  Positive for hearing loss. Negative for congestion, ear pain and sore throat.    Eyes:  Positive for visual disturbance. Negative for pain.   Respiratory:  Negative for cough and shortness of breath.    Cardiovascular:  Negative for chest pain and palpitations.   Gastrointestinal:  Negative for abdominal pain, constipation, diarrhea and nausea.   Genitourinary:  Negative for dysuria, frequency, genital  "sores, hematuria, pelvic pain, urgency, vaginal bleeding and vaginal discharge.   Musculoskeletal:  Positive for arthralgias, joint swelling and myalgias.   Skin:  Negative for rash.   Neurological:  Positive for weakness. Negative for dizziness and headaches.   Psychiatric/Behavioral:  The patient is not nervous/anxious.        OBJECTIVE:   /74   Pulse 91   Temp 98.1  F (36.7  C) (Temporal)   Resp 20   Ht 1.727 m (5' 8\")   Wt 108.5 kg (239 lb 3.2 oz)   LMP  (LMP Unknown)   PF 99 L/min   BMI 36.37 kg/m     Estimated body mass index is 36.37 kg/m  as calculated from the following:    Height as of this encounter: 1.727 m (5' 8\").    Weight as of this encounter: 108.5 kg (239 lb 3.2 oz).  Physical Exam  GENERAL: alert and no distress  EYES: Eyes grossly normal to inspection  HENT: ear canals and TM's normal, nose and mouth without ulcers or lesions  NECK: no adenopathy, no asymmetry, masses, or scars  RESP: lungs clear to auscultation - no rales, rhonchi or wheezes  BREAST: normal without masses, tenderness or nipple discharge and no palpable axillary masses or adenopathy  CV: regular rates and rhythm, normal S1 S2, no S3 or S4, and no murmur, click or rub  ABDOMEN: soft, nontender, no hepatosplenomegaly, no masses and bowel sounds normal  MS: no gross musculoskeletal defects noted, no edema  SKIN: no suspicious lesions or rashes  NEURO: Normal strength and tone, mentation intact and speech normal  PSYCH: mentation appears normal, affect normal/bright  LYMPH: no cervical, supraclavicular, axillary, or inguinal adenopathy    ASSESSMENT/PLAN:       ICD-10-CM    1. Encounter for Medicare annual wellness exam  Z00.00       2. Visit for screening mammogram  Z12.31 MA SCREENING DIGITAL BILAT - Future  (s+30)      3. Prediabetes  R73.03 Hemoglobin A1c     Hemoglobin A1c      4. Bipolar 2 disorder (H)  F31.81       5. Paroxysmal atrial fibrillation (H)  I48.0       6. 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 " "Class 2 severe obesity due to excess calories with serious comorbidity and body mass index (BMI) of 36.0 to 36.9 in adult (H)  E66.01     Z68.36       8. Leg swelling  M79.89 furosemide (LASIX) 20 MG tablet          1,2) Screenings discussed    3) Repeat A1c in process. She has made some dietary changes    4) Follows psychiatry    5,6) Stable. Heart RRR today. Has seen cards in the past    7) She is trying to lose weight. Comorbid to prediabetes    8) BMP up to date. Med renewed, no changes        Counseling  Reviewed preventive health counseling, as reflected in patient instructions      BMI  Estimated body mass index is 36.37 kg/m  as calculated from the following:    Height as of this encounter: 1.727 m (5' 8\").    Weight as of this encounter: 108.5 kg (239 lb 3.2 oz).       She reports that she has never smoked. She has never been exposed to tobacco smoke. She has never used smokeless tobacco.            Signed Electronically by: Kristin Miner PA-C  "

## 2024-01-25 ENCOUNTER — PATIENT OUTREACH (OUTPATIENT)
Dept: CARE COORDINATION | Facility: CLINIC | Age: 59
End: 2024-01-25
Payer: COMMERCIAL

## 2024-01-29 ENCOUNTER — THERAPY VISIT (OUTPATIENT)
Dept: OCCUPATIONAL THERAPY | Facility: CLINIC | Age: 59
End: 2024-01-29
Payer: COMMERCIAL

## 2024-01-29 DIAGNOSIS — G56.01 CARPAL TUNNEL SYNDROME OF RIGHT WRIST: ICD-10-CM

## 2024-01-29 DIAGNOSIS — R20.2 RIGHT HAND PARESTHESIA: Primary | ICD-10-CM

## 2024-01-29 PROCEDURE — 97535 SELF CARE MNGMENT TRAINING: CPT | Mod: GO | Performed by: OCCUPATIONAL THERAPIST

## 2024-01-29 PROCEDURE — 97110 THERAPEUTIC EXERCISES: CPT | Mod: GO | Performed by: OCCUPATIONAL THERAPIST

## 2024-01-29 PROCEDURE — 97165 OT EVAL LOW COMPLEX 30 MIN: CPT | Mod: GO | Performed by: OCCUPATIONAL THERAPIST

## 2024-01-29 NOTE — PROGRESS NOTES
"OCCUPATIONAL THERAPY EVALUATION  Type of Visit: Evaluation    See electronic medical record for Abuse and Falls Screening details.    Subjective      Presenting condition or subjective complaint: Carple tunnel in right wrist  Date of onset: 01/18/24 (therapy referral)    Relevant medical history: Anemia; Arthritis; Asthma; Concussions; Depression; Fibromyalgia; Heart problems; Neck injury; Overweight; Pain at night or rest; Sleep disorder like apnea   Dates & types of surgery: 9/1986 low jaw, 5/2016 gender afferming surgery, 1/2020 shoulder surgery, 10/2022 neck surgery    Prior diagnostic imaging/testing results: EMG   mild to moderate CTS  Prior therapy history for the same diagnosis, illness or injury: No      Prior Level of Function  ADL: Independent    Living Environment  Social support: With family members   Type of home: House   Stairs to enter the home: No       Ramp: No   Stairs inside the home: Yes 14 Is there a railing: Yes   Help at home: None  Equipment owned: Straight Cane     Employment: No    Hobbies/Interests: Hand knitting, building wooden models, playing video games    Patient goals for therapy: Not having numbing feelings in finger     Objective   ADDITIONAL HISTORY:  Right hand dominant  Patient reports symptoms of pain, stiffness/loss of motion, numbness, and tingling for the past 6 months  Transportation: drives    Functional Outcome Measure:   Functional Outcome Measure:  Upper Extremity Functional Index  SCORE:   Column Totals: 59/80  (A lower score indicates greater disability.)    PAIN:  Pain Level at Rest: 0/10  Pain Level with Use: 3/10  Pain Location: hand  Pain Quality: Aching  Pain Frequency: intermittent  Pain is Worst: daytime  Pain is Exacerbated By: cold, damp weather  Pain is Relieved By: \"work though it\"  Pain Progression: Unchanged    EDEMA: None     SENSATION: Decreased Median Nerve distribution per pt report and Intermittently     ROM: WNL's hand and wrist, no thenar atrophy " noted on exam    SPECIAL TESTS:   CTS Special Tests  Pain Report Right   Median Nerve Compression at Pronator -   Carpal Compression Test-Durkan Test (30 sec) -   Harrison Test for Lumbrical Incursion (fist x30 sec) + slight   Tinel's at Carpal Tunnel -   Phalen's Sign + 30 secs     NEURAL TENSION TESTING: MNT: Median Neurodynamic Test (based on DS Eller's ULNT)   1/29/2024   0-5 Scale 4/5 S1  5-/5 S2   Position:   0/5: Arm across abdomen in coronal plane  1/5: Depress shoulder, ER to neutral ABD shoulder to 45 degrees  2/5: ER shoulder to end range, keep elbow at 90 degrees  3/5: Extend elbow to 0 degrees  4/5: Fully supinate forearm  5/5: Extend wrist, fingers and thumb  Notes:  (+) indicates beyond grade level but less than intermediate to next level  (-) indicates over intermediate to level  S1 onset/change of patient's symptoms  S2 definite stop point based on patient's discomfort level    STRENGTH:     Measured in pounds 1/29/2024 1/29/2024    Left Right   Trial 1 62 71+   Trial 2 58 74+   Trial 3 57 74+   Average 59 73     Lateral Pinch  Measured in pounds 1/29/2024 1/29/2024    Left Right   Trial 1 10 20   Trial 2 12 20   Trial 3 12 20   Average 11 20     3 Point Pinch  Measured in pounds 1/29/2024 1/29/2024    Left Right   Trial 1 13 19   Trial 2 12 17   Trial 3 12 20   Average 12 19       Assessment & Plan   CLINICAL IMPRESSIONS  Medical Diagnosis: R CTS    Treatment Diagnosis: R hand numbness/tingling    Impression/Assessment: Pt is a 58 year old female presenting to Occupational Therapy due to right hand median nerve paresthesias.  Patient's limitations or Problem List includes: Pain, Weakness, Sensory disturbance, and Tightness in musculature of the right hand which interferes with the patient's ability to perform Self Care Tasks (dressing), Sleep Patterns, Household Chores, and Driving  as compared to previous level of function.    Clinical Decision Making (Complexity):  Assessment of Occupational  Performance: 5 or more Performance Deficits  Occupational Performance Limitations: dressing, driving and community mobility, home establishment and management, and meal preparation and cleanup  Clinical Decision Making (Complexity): Low complexity    PLAN OF CARE  Treatment Interventions:  Modalities:  US  Therapeutic Exercise:  AROM, PROM, Tendon Gliding, and Isotonics  Neuromuscular re-education:  Nerve Gliding, Posture, and Kinesiotaping  Manual Techniques:  Myofascial release  Orthotic Fabrication:  Static Forearm based  Self Care:  Self Care Tasks, Ergonomic Considerations, and Diagnostic Education     Long Term Goals   OT Goal 1  Goal Identifier: sleeping  Goal Description: Pt will report decreased median n paraesthesias to be able to sleeping with mild to no difficulty, UEFI 3/4  Rationale: In order to maximize safety and independence with performance of self-care activities  Goal Progress: extreme difficulty, UEFI 0/4  Target Date: 03/28/24  OT Goal 2  Goal Identifier: driving  Goal Description: Pt will report decreased median n paraesthesias to be able to drive with mild to no difficulty, UEFI 3/4  Rationale: In order to maximize safety and independence with ADL/IADLs  Goal Progress: moderate difficulty, UEFI 2/4  Target Date: 03/28/24      Frequency of Treatment: 2 x per month  Duration of Treatment: 2 months     Recommended Referrals to Other Professionals:  NA  Education Assessment: Learner/Method: Patient;Demonstration;Pictures/Video  Education Comments: PTRx on phone and printed handouts     Risks and benefits of evaluation/treatment have been explained.   Patient/Family/caregiver agrees with Plan of Care.     Evaluation Time:    OT Eval, Low Complexity Minutes (86903): 15   Present: Not applicable     Signing Clinician: Mariam Parker OT      [facility]                                                                                   OUTPATIENT OCCUPATIONAL  THERAPY      PLAN OF TREATMENT FOR OUTPATIENT REHABILITATION   Patient's Last Name, First Name, CHARLOTTEDavidANADavid  Kylie Mccloud YOB: 1965   Provider's Name   [facility]   Medical Record No.  [id number]     Onset Date: 01/18/24 (therapy referral) Start of Care Date: 01/29/24     Medical Diagnosis:  R CTS      OT Treatment Diagnosis:  R hand numbness/tingling Plan of Treatment  Frequency/Duration:2 x per month/2 months    Certification date from 01/29/24   To 03/28/24        See note for plan of treatment details and functional goals     Mariam Parker OT                         I CERTIFY THE NEED FOR THESE SERVICES FURNISHED UNDER        THIS PLAN OF TREATMENT AND WHILE UNDER MY CARE     (Physician attestation of this document indicates review and certification of the therapy plan).              Referring Provider:  Ricardo Lai    Initial Assessment  See Epic Evaluation- 01/29/24

## 2024-02-05 ENCOUNTER — THERAPY VISIT (OUTPATIENT)
Dept: OCCUPATIONAL THERAPY | Facility: CLINIC | Age: 59
End: 2024-02-05
Payer: COMMERCIAL

## 2024-02-05 DIAGNOSIS — R20.2 RIGHT HAND PARESTHESIA: Primary | ICD-10-CM

## 2024-02-05 PROCEDURE — 97112 NEUROMUSCULAR REEDUCATION: CPT | Mod: GO | Performed by: OCCUPATIONAL THERAPIST

## 2024-02-05 PROCEDURE — 97110 THERAPEUTIC EXERCISES: CPT | Mod: GO | Performed by: OCCUPATIONAL THERAPIST

## 2024-02-12 ENCOUNTER — THERAPY VISIT (OUTPATIENT)
Dept: OCCUPATIONAL THERAPY | Facility: CLINIC | Age: 59
End: 2024-02-12
Payer: COMMERCIAL

## 2024-02-12 DIAGNOSIS — R20.2 RIGHT HAND PARESTHESIA: Primary | ICD-10-CM

## 2024-02-12 PROCEDURE — 97112 NEUROMUSCULAR REEDUCATION: CPT | Mod: GO | Performed by: OCCUPATIONAL THERAPIST

## 2024-02-12 PROCEDURE — 97110 THERAPEUTIC EXERCISES: CPT | Mod: GO | Performed by: OCCUPATIONAL THERAPIST

## 2024-02-12 NOTE — PROGRESS NOTES
02/12/24 0500   Appointment Info   Treating Provider Mariam Parker, OTR/L, CHT   Total/Authorized Visits 4   Visits Used 3   Medical Diagnosis R CTS   OT Tx Diagnosis R hand numbness/tingling   Quick Add  Certification   Progress Note/Certification   Start Of Care Date 01/29/24   Onset of Illness/Injury or Date of Surgery 01/18/24  (therapy referral)   Therapy Frequency 2 x per month   Predicted Duration 2 months   Certification date from 01/29/24   Certification date to 03/28/24   Progress Note Completed Date 02/12/24   Goals   OT Goals 2   OT Goal 1   Goal Identifier sleeping   Goal Description Pt will report decreased median n paraesthesias to be able to sleeping with mild to no difficulty, UEFI 3/4   Rationale In order to maximize safety and independence with performance of self-care activities   Goal Progress less Sx sleeping, moderate difficulty UEFI 2/4   Target Date 03/28/24   Date Met   (improvement, expect to be met with continuation of HEP)   OT Goal 2   Goal Identifier driving   Goal Description Pt will report decreased median n paraesthesias to be able to drive with mild to no difficulty, UEFI 3/4   Rationale In order to maximize safety and independence with ADL/IADLs   Goal Progress mild difficulty, UEFI 3/4   Target Date 03/28/24   Date Met 02/12/24   Subjective Report   Subjective Report The tingling has been better.   Objective Measures   Objective Measures Objective Measure 1;Hand Obj Measures   Hand Objective Measures Neural Tension Testing;Special Tests;Strength   Neural Tension Testing MNT   Special Tests CTS Special Tests   Strength ;Lateral Pinch;3 Point Pinch   Objective Measure 1   Objective Measure pain 0-10   Details 0/10   CTS Special Tests   Median Nerve Compression at Pronator -   Carpal Compression Test-Durkan Test (30 sec) + slight   Harrison Test for Lumbrical Incursion (fist x30 sec) -   Tinel's at Carpal Tunnel +   Phalen's Sign +   MNT: Median Neurodynamic Test (based on DS  "Eller's ULNT)   0-5 Scale 5-/5   Treatment Interventions (OT)   Interventions Therapeutic Procedure/Exercise;Neuromuscular Re-education   Self Care/Home Management   Self Care 1 HEP   Self Care 1 - Details avoid prolonged wrist flexion   PTRx Self Care 1 Carpal Tunnel Syndrome Prevention   PTRx Self Care 1 - Details wear your wrist splint sleeping   Skilled Intervention to decrease med n Sx   Patient Response/Progress verbalized understanding   Neuromuscular Re-education   Neuromuscular Re-ed Minutes (66944) 10   PTRx Neuro Re-ed 1 Median Nerve Mobility   PTRx Neuro Re-ed 1 - Details 10 reps gentle nerve \"flossing\"   PTRx Neuro Re-ed 2 Nerve Gliding Distal Median   PTRx Neuro Re-ed 2 - Details 10 reps   Skilled Intervention to increase mobility   Patient Response/Progress verbal cuing to avoid nerve Sx with proximal nerve flossing, demonstrated understanding   Therapeutic Procedure/Exercise   Therapeutic Procedure: strength, endurance, ROM, flexibillity minutes (90697) 20   PTRx Ther Proc 1 Finger Active Range of Motion Tendon Glides Fist Series   PTRx Ther Proc 1 - Details 10 reps   PTRx Ther Proc 2 Finger Active Range of Motion FDS Flexor Tendon Gliding   PTRx Ther Proc 2 - Details 10 reps   PTRx Ther Proc 3 Intrinsics Passive Range of Motion Lumbrical Stretch 2   PTRx Ther Proc 3 - Details 5 reps hold 20 secs   PTRx Ther Proc 4 Forearm Passive Range of Motion Flexor Stretch   PTRx Ther Proc 4 - Details 5 reps hold 20 secs   Skilled Intervention to increase mobility and tendon gliding and improve posture   Patient Response/Progress demonstrated understanding   PTRx Ther Proc 5 Cervical Retraction   PTRx Ther Proc 5 - Details 10 reps   PTRx Ther Proc 6 Scapular Retraction/Depression   PTRx Ther Proc 6 - Details 10 reps   PTRx Ther Proc 7 Pec Stretch Doorway   PTRx Ther Proc 7 - Details 5 reps hold 20 secs   Education   Learner/Method Patient;Demonstration;Pictures/Video   Education Comments PTRx phone only per pt "   Plan   Home program add pec stretches and postural exercises   Updates to plan of care '   Total Session Time   Timed Code Treatment Minutes 30   Total Treatment Time (sum of timed and untimed services) 30       DISCHARGE  Reason for Discharge: Patient has met all goals.    Equipment Issued: NA    Discharge Plan: Patient to continue home program.    Referring Provider:  Ricardo Lai

## 2024-02-20 DIAGNOSIS — M81.0 AGE-RELATED OSTEOPOROSIS WITHOUT CURRENT PATHOLOGICAL FRACTURE: ICD-10-CM

## 2024-02-20 RX ORDER — ALENDRONATE SODIUM 70 MG/1
TABLET ORAL
Qty: 12 TABLET | Refills: 0 | Status: SHIPPED | OUTPATIENT
Start: 2024-02-20 | End: 2024-05-09

## 2024-02-22 ENCOUNTER — PATIENT OUTREACH (OUTPATIENT)
Dept: CARE COORDINATION | Facility: CLINIC | Age: 59
End: 2024-02-22
Payer: COMMERCIAL

## 2024-02-22 NOTE — PATIENT INSTRUCTIONS
GOALS:  Relating To Eating:  Follow the Modified Liquid Diet for weight loss:  Breakfast: Protein Shake/Bar  Lunch: Protein Shake/Bar  Supper: 3-4 oz lean protein + non-starchy vegetables  Snack: non-starchy vegetables (no calorie-containing dips/condiments)  Beverages: at least 48-64 oz water between meals daily    *Protein Shake Criteria: ~200 Calories, at least 20 grams of protein, and less than 10 grams of sugar     Eat slowly (20-30 minutes per meal), chewing foods well (25 chews per bite/applesauce consistency)    Relating to beverages:  Work on  fluids from meals by 30 minutes.     Relating to activity:  Increase activity as able depending upon knee pain/energy level. Try upper body exercises or yoga.     Relating to cravings:  Rid your environment of trigger foods.       Bariatric Task List  Status:  Is patient a candidate for bariatric surgery?:    -     Cleared to schedule surgeon consult?:    -     Status:  surgery evaluation in process -     Surgeon: Dr Shah -     Tentative surgery month/year: April 2018 -        Insurance: Insurance:  [employer]     [facility]: PCP Recommendation and Medical Clearance:    -      Referral:    -     Other:    -        Patient Info: Initial Weight:  271 lb 4.8 oz -     Date of Initial Weight/Height:  10/18/2017 -     Goal Weight (lbs):  261 -     Required Weight Loss:  10 -     Surgery Type:  sleeve gastrectomy -     Multidisciplinary Meeting:  Needed -        Dietician Visits: Structured weight loss required by insurance?:  Yes -     Dietician Visit 1:  Completed -     Dietician Visit 2:  Completed -     Dietician Visit 3:  Completed - 12/6/17 [facility]   Dietician Visit 4:  Needed -     Dietician Visit 5:  Needed -     Dietician Visit 6:  Needed -     Dietician Visit additional:    -     Clearance from dietician to see surgeon?:    -     Dietician Notes:    -        Psychological Evaluation: Psych eval:  Needed - 11/27/17 done with letter of support from [facility]  Reason for visit: annual follow up    Relevant information: was seen on 11/29/23 for uterine prolapse. Vaginal atrophy, Cystocele, Rectocele, Hx of recurrent UTI. Some hx of microscopic hematuria.     Records/imaging/labs/orders: all records available    Pt called: no need for a call    At Rooming: have pt empty bladder/pvr, Document PVR      Mk Bradford  2/22/2024  1:14 PM   "([facility])   Therapist letter of support:    -     Psychiatrist letter of support:  Needed - [employer] [place], [place] - clearance faxed on 11/27/17 ([facility])   Establish care with therapist:    -     Complete eating disorder evaluation:    -     Letter of clearance from therapist/eating disorder program:    -     Other:    -        Lab Work: completed Sept and Oct ([facility]) Complete Blood Count:  Needed -     Comprehensive Metabolic Panel:  Needed -     Vitamin D:  Needed -     Hgb A1c:  Needed -     PTH:    -     H. pylori:    -     TSH:    -     Nicotine Testing:    -     Other:    -        Consults/ Clearance: Sleep Medicine:    -     Cardiac:  Needed - hx of afib; clearance from Dr. Hugo Chaidez in [place], [place] on 12/5/17 ([facility])   Pain: Needed - already sent from [facility] (Dr. Stanislav Rayo)   Dental:    -     Endocrine:    -     Gastroenterology:    -     Vascular Medicine:    -     Hematology:    -     Medical Weight Management: Needed -     Physical Therapy/Exercise:    -     Nephrology:    -     Neurology:    -     Pulmonology:    -     Rheumatology:    -     Other    -     Other    -     Other    -           Testing: UGI:    -     EGD:    -     Other:    -        PCP: Establish care with PCP:  Completed -     Follow up with PCP:    -     PCP letter of support:  Needed - [facility] - faxed already ([facility])      Smoking: Quit tobacco use (3 months smoke free)?:    -     Quit date:    -        Patient Education:  Information Session:  Completed -     Given \"Making your decision\" handout?:  Yes -     Given support group information?:  Yes -     Attended support group?:  Completed -     Support plan in place?:  Needed -     Research consents signed?:  Yes -        Additional Surgery Requirements: Review Coag plan:    -     HgA1c <8:    -     Inpatient pain consult:    -     Final nicotine screen:    -     Dental work complete:    -     Birth control plan:    -     Other Requirements:    -     Other " Requirements:    -        Final Tasks:  Before surgery online class:  Needed -     Before surgery online class website link:  [email or web address]   After surgery online class:  Needed -     After surgery online class website link:  [email or web address]   Nurse visit for weigh-in and information:  Needed -     Pre-assessment clinic visit with anesthesia team for H&P:  Needed -     Final labs (Hgb, plt, T&S, UA):  Needed -        Notes:   -

## 2024-03-21 ENCOUNTER — TELEPHONE (OUTPATIENT)
Dept: FAMILY MEDICINE | Facility: CLINIC | Age: 59
End: 2024-03-21

## 2024-03-21 NOTE — TELEPHONE ENCOUNTER
Prior Authorization Retail Medication Request    Medication/Dose: tirzepatide (MOUNJARO) 2.5 MG/0.5ML pen  Diagnosis and ICD code (if different than what is on RX):  E66.01, Z68.35   New/renewal/insurance change PA/secondary ins. PA:  Previously Tried and Failed:  na  Rationale:  na    Insurance   Primary:  [employer]  Insurance ID:  [id number]     Secondary (if applicable):na  Insurance ID:  sarah    Pharmacy Information (if different than what is on RX)  Name:   [facility]  Phone:  [phone]  Fax:   na

## 2024-04-02 NOTE — TELEPHONE ENCOUNTER
Spoke with patient, relayed providers message below and patient verbalized understanding. Pt stated no further questions and will not proceed with this care plan as insurance is not covering.     Robyn Hill, RN on 4/2/2024 at 1:04 PM

## 2024-04-02 NOTE — TELEPHONE ENCOUNTER
Retail Pharmacy Prior Authorization Team   Phone: [phone]    PRIOR AUTHORIZATION DENIED    Medication: MOUNJARO 2.5 MG/0.5ML SC SOPN  Insurance Company: [employer] - Phone [phone] Fax [phone]  Denial Date: 4/2/2024  Denial Reason(s): NOT AN FDA APPROVED DX.     Appeal Information: NOT ELIGIBLE

## 2024-04-05 ENCOUNTER — OFFICE VISIT (OUTPATIENT)
Dept: FAMILY MEDICINE | Facility: CLINIC | Age: 59
End: 2024-04-05
Payer: COMMERCIAL

## 2024-04-05 VITALS
BODY MASS INDEX: 33.16 KG/M2 | HEIGHT: 68 IN | HEART RATE: 76 BPM | DIASTOLIC BLOOD PRESSURE: 76 MMHG | SYSTOLIC BLOOD PRESSURE: 116 MMHG | TEMPERATURE: 97.3 F | OXYGEN SATURATION: 98 % | RESPIRATION RATE: 18 BRPM | WEIGHT: 218.8 LBS

## 2024-04-05 DIAGNOSIS — Z20.6 EXPOSURE TO HUMAN IMMUNODEFICIENCY VIRUS: ICD-10-CM

## 2024-04-05 DIAGNOSIS — Z72.51 HIGH RISK SEXUAL BEHAVIOR, UNSPECIFIED TYPE: Primary | ICD-10-CM

## 2024-04-05 DIAGNOSIS — F64.0 TRANSGENDER PERSON ON HORMONE THERAPY: ICD-10-CM

## 2024-04-05 DIAGNOSIS — Z20.2 HPV EXPOSURE: ICD-10-CM

## 2024-04-05 DIAGNOSIS — Z79.899 TRANSGENDER PERSON ON HORMONE THERAPY: ICD-10-CM

## 2024-04-05 DIAGNOSIS — F64.9 GENDER DYSPHORIA: ICD-10-CM

## 2024-04-05 PROCEDURE — 99213 OFFICE O/P EST LOW 20 MIN: CPT | Mod: 25 | Performed by: NURSE PRACTITIONER

## 2024-04-05 PROCEDURE — 36415 COLL VENOUS BLD VENIPUNCTURE: CPT | Performed by: NURSE PRACTITIONER

## 2024-04-05 PROCEDURE — 90651 9VHPV VACCINE 2/3 DOSE IM: CPT | Mod: GZ | Performed by: NURSE PRACTITIONER

## 2024-04-05 PROCEDURE — 87389 HIV-1 AG W/HIV-1&-2 AB AG IA: CPT | Performed by: NURSE PRACTITIONER

## 2024-04-05 PROCEDURE — 90471 IMMUNIZATION ADMIN: CPT | Mod: GZ | Performed by: NURSE PRACTITIONER

## 2024-04-05 ASSESSMENT — PAIN SCALES - GENERAL: PAINLEVEL: NO PAIN (0)

## 2024-04-05 NOTE — PROGRESS NOTES
"  Assessment & Plan     High risk sexual behavior, unspecified type    - HIV Antigen Antibody Combo; Future  - HPV 9Y+ (Gardasil 9)  - HIV Antigen Antibody Combo    Exposure to human immunodeficiency virus    - HIV Antigen Antibody Combo; Future  - HPV 9Y+ (Gardasil 9)  - HIV Antigen Antibody Combo    HPV exposure    - HPV 9Y+ (Gardasil 9)    Gender identity disorder      Transgender person on hormone therapy      Review of external notes as documented elsewhere in note  Ordering of each unique test  Prescription drug management  25 minutes spent by me on the date of the encounter doing chart review, history and exam, documentation and further activities per the note      BMI  Estimated body mass index is 33.27 kg/m  as calculated from the following:    Height as of this encounter: 1.727 m (5' 8\").    Weight as of this encounter: 99.2 kg (218 lb 12.8 oz).         See Patient Instructions    William Espinal is a 58 year old, presenting for the following health issues:  Recheck Medication    Mental health doing well.  Has newer partner that they have known for a while, also male to female transgender who is HIV positive, low viral load on antiviral treatment for over 20 years and also who has HPV.  She would like HIV screening today.  Declining PrEP treatment due to potential side effects.  Tips/info given on after visit summary.  Additionally partner has HPV, patient would like HPV vaccine.  Discussed whether they want to verify coverage with insurance prior to receiving vaccine and she declined.  No other questions or concerns at this time.        4/5/2024    11:09 AM   Additional Questions   Roomed by Chary Chacon CMA   Accompanied by Girlfriend - Brenda         4/5/2024    11:09 AM   Patient Reported Additional Medications   Patient reports taking the following new medications No new medications     History of Present Illness       Reason for visit:  Hpv shot & talk about getting tested for hiv    She eats " "0-1 servings of fruits and vegetables daily.She consumes 0 sweetened beverage(s) daily.She exercises with enough effort to increase her heart rate 20 to 29 minutes per day.  She exercises with enough effort to increase her heart rate 3 or less days per week.   She is taking medications regularly.           Review of Systems  Constitutional, HEENT, cardiovascular, pulmonary, GI, , musculoskeletal, neuro, skin, endocrine and psych systems are negative, except as otherwise noted.      Objective    /76   Pulse 76   Temp 97.3  F (36.3  C) (Temporal)   Resp 18   Ht 1.727 m (5' 8\")   Wt 99.2 kg (218 lb 12.8 oz)   LMP  (LMP Unknown)   SpO2 98%   BMI 33.27 kg/m    Body mass index is 33.27 kg/m .  Physical Exam   GENERAL: alert and no distress  EYES: Eyes grossly normal to inspection.  No discharge or erythema, or obvious scleral/conjunctival abnormalities.  RESP: No audible wheeze, cough, or visible cyanosis.    SKIN: Visible skin clear. No significant rash, abnormal pigmentation or lesions.  NEURO: Cranial nerves grossly intact.  Mentation and speech appropriate for age.  PSYCH: Appropriate affect, tone, and pace of words    See orders        Signed Electronically by: ALE DURAND    "

## 2024-04-06 LAB — HIV 1+2 AB+HIV1 P24 AG SERPL QL IA: NONREACTIVE

## 2024-04-09 NOTE — RESULT ENCOUNTER NOTE
Shravan Espinal,    Thank you for your recent office visit.    Here are your recent results.  Negative HIV screening.  I know you are concerned about side effects from prep, I do have many patients who are on it who tolerated it well.  If you did contract HIV those meds also have side effects that you probably would want.  Just something to consider.    Feel free to contact me via [facility] or call the clinic at [phone].    Sincerely,    TALIB Yanes, FNP-BC

## 2024-04-26 DIAGNOSIS — F64.9 GENDER DYSPHORIA: ICD-10-CM

## 2024-04-30 RX ORDER — ESTRADIOL 2 MG/1
TABLET ORAL
Qty: 180 TABLET | Refills: 0 | Status: SHIPPED | OUTPATIENT
Start: 2024-04-30

## 2024-05-09 DIAGNOSIS — M81.0 AGE-RELATED OSTEOPOROSIS WITHOUT CURRENT PATHOLOGICAL FRACTURE: ICD-10-CM

## 2024-05-09 RX ORDER — ALENDRONATE SODIUM 70 MG/1
TABLET ORAL
Qty: 12 TABLET | Refills: 0 | Status: SHIPPED | OUTPATIENT
Start: 2024-05-09 | End: 2024-07-29

## 2024-06-18 ENCOUNTER — OFFICE VISIT (OUTPATIENT)
Dept: FAMILY MEDICINE | Facility: CLINIC | Age: 59
End: 2024-06-18
Payer: COMMERCIAL

## 2024-06-18 ENCOUNTER — ANCILLARY PROCEDURE (OUTPATIENT)
Dept: GENERAL RADIOLOGY | Facility: CLINIC | Age: 59
End: 2024-06-18
Attending: PHYSICIAN ASSISTANT
Payer: COMMERCIAL

## 2024-06-18 VITALS
OXYGEN SATURATION: 99 % | RESPIRATION RATE: 12 BRPM | SYSTOLIC BLOOD PRESSURE: 131 MMHG | TEMPERATURE: 98.5 F | WEIGHT: 206 LBS | DIASTOLIC BLOOD PRESSURE: 87 MMHG | BODY MASS INDEX: 31.22 KG/M2 | HEART RATE: 80 BPM | HEIGHT: 68 IN
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
## 2024-06-18 PROCEDURE — 99213 OFFICE O/P EST LOW 20 MIN: CPT | Performed by: PHYSICIAN ASSISTANT

## 2024-06-18 PROCEDURE — 71101 X-RAY EXAM UNILAT RIBS/CHEST: CPT | Mod: TC | Performed by: RADIOLOGY

## 2024-06-18 RX ORDER — BUSPIRONE HYDROCHLORIDE 30 MG/1
30 TABLET ORAL 3 TIMES DAILY
COMMUNITY
Start: 2024-03-28

## 2024-06-18 NOTE — PROGRESS NOTES
"  Assessment & Plan     Fall from roller skates, initial encounter  R/O fracture. Discussed that if there is a fracture but it is not dislocated, there is no other medical treatment we recommend other than Tylenol scheduled. Sleep sitting up. TENS unit is very helpful. Discussed that narcotics are not generally given due to risk for respiratory depression (which is already compromised due to pain). If there is a dislocation, usually, we monitor in the hospital, but it has already been 4 days. We will see what we find on the x-rays and notify pt of results via [employer]. She is able to hold a full converastion today and has no distress. She breathes cautiously.   - XR Ribs & Chest Right G/E 3 Views; Future    Patient was notified no rib fracture and encouraged to try things discussed above. Follow up if symptoms worsen or new symptoms ensue.     BMI  Estimated body mass index is 31.32 kg/m  as calculated from the following:    Height as of this encounter: 1.727 m (5' 8\").    Weight as of this encounter: 93.4 kg (206 lb).       Subjective   Kylie is a 58 year old, presenting for the following health issues:  Shoulder Injury (Per pt roller skating on Friday night and fell. Began hurting Saturday. ) and Chest Injury        6/18/2024     1:45 PM   Additional Questions   Roomed by Sheila   Accompanied by self         6/18/2024     1:45 PM   Patient Reported Additional Medications   Patient reports taking the following new medications n/a     Kylie is a very pleasant person who presents to clinic for evaluation of right chest pain that began after falling on an outstretched hand while roller skating on Friday. She was attending a [employer] event when this occurred. She says that her glasses broke (she has a bruise under her right eyebrow) and when she fell, her elbow bent and her right arm was thrust into the chest. She heard a pop and wondered if she had broken anything. She felt a little sore the next day. Each day has been " "more sore. She has taken Tylenol for pain (cannot take NSAIDs due to hx gastric bypass). Her shoulder, elbow and wrist are \"all fine\" per her report. She is struggling to get a deep breath.     History of Present Illness       Reason for visit:  Fell and heard a pop in my chest area and my right chest hurts severely.  Symptom onset:  1-3 days ago  Symptoms include:  Chest pain on right side of chest  Symptom intensity:  Moderate  Symptom progression:  Worsening  Had these symptoms before:  No  What makes it worse:  Breathing hard moving my right arm  What makes it better:  Resting on my back    She eats 2-3 servings of fruits and vegetables daily.She consumes 0 sweetened beverage(s) daily.She exercises with enough effort to increase her heart rate 9 or less minutes per day.  She exercises with enough effort to increase her heart rate 3 or less days per week.   She is taking medications regularly.     ROS: as per HPI        Objective    /87   Pulse 80   Temp 98.5  F (36.9  C) (Tympanic)   Resp 12   Ht 1.727 m (5' 8\")   Wt 93.4 kg (206 lb)   LMP  (LMP Unknown)   SpO2 99%   BMI 31.32 kg/m    Body mass index is 31.32 kg/m .  Physical Exam  Vitals reviewed.   Constitutional:       Appearance: Normal appearance. She is not ill-appearing.   Cardiovascular:      Rate and Rhythm: Normal rate and regular rhythm.   Pulmonary:      Breath sounds: Normal breath sounds.      Comments: Cautious inhalation due to pain.   Chest:          Comments: TTP at this area. No obvious crepitus or bony step offs noted. No visually noted deformities noted.   Skin:            Comments: Denotes bruising   Neurological:      Mental Status: She is alert.   Psychiatric:         Mood and Affect: Mood normal.         Speech: Speech normal.         Behavior: Behavior normal. Behavior is cooperative.         Thought Content: Thought content normal.            Results for orders placed or performed in visit on 06/18/24   XR Ribs & Chest " Right G/E 3 Views     Status: None    Narrative    XR RIBS & CHEST RT 3VW 6/18/2024 2:20 PM     HISTORY: Fall from roller skates, initial encounter. Pain.  COMPARISON: Chest x-ray 1/6/2023       Impression    IMPRESSION: No acute right rib fracture. No pleural effusion. No  pneumothorax. No infiltrate. Postoperative changes cervical spine.  Left shoulder arthroplasty.    LILA COE MD         SYSTEM ID:  [id number]         Signed Electronically by: NADINE GUTIERREZ PA-C

## 2024-06-30 ENCOUNTER — HEALTH MAINTENANCE LETTER (OUTPATIENT)
Age: 59
End: 2024-06-30

## 2024-07-09 DIAGNOSIS — F64.9 GENDER DYSPHORIA: ICD-10-CM

## 2024-07-09 RX ORDER — SPIRONOLACTONE 50 MG/1
100 TABLET, FILM COATED ORAL DAILY
Qty: 180 TABLET | Refills: 0 | Status: SHIPPED | OUTPATIENT
Start: 2024-07-09

## 2024-07-25 NOTE — PROGRESS NOTES
"Kylie Austin is a 54 year old female who is being evaluated via a billable video visit.      The patient has been notified of following:     \"This video visit will be conducted via a call between you and your physician/provider. We have found that certain health care needs can be provided without the need for an in-person physical exam.  This service lets us provide the care you need with a video conversation.  If a prescription is necessary we can send it directly to your pharmacy.  If lab work is needed we can place an order for that and you can then stop by our lab to have the test done at a later time.    If during the course of the call the physician/provider feels a video visit is not appropriate, you will not be charged for this service.\"     Patient has given verbal consent for Video visit? Yes    Patient would like the video invitation sent by: Send to e-mail at: [email or web address]    Video Start Time: 13:02    Kylie Austin complains of  No chief complaint on file.      I have reviewed and updated the patient's Past Medical History, Social History, Family History and Medication List.    ALLERGIES  Amiodarone; Fluoxetine; and Tetracycline    Additional provider notes:       Video-Visit Details    Type of service:  Video Visit    Video End Time (time video stopped): 13:12    Originating Location (pt. Location): Home    Distant Location (provider location):  [facility]     Mode of Communication:  Video Conference via [employer]    Patient is doing well. No complaints.  Showed 135 AFE, 30 ERs  Plan to advance to next phase of PT.     F/U in three months with XR.    Omari Tobin MD        "
[id number]: previous_biopsy_has_been_previously_biopsied

## 2024-07-28 DIAGNOSIS — M81.0 AGE-RELATED OSTEOPOROSIS WITHOUT CURRENT PATHOLOGICAL FRACTURE: ICD-10-CM

## 2024-07-29 RX ORDER — ALENDRONATE SODIUM 70 MG/1
TABLET ORAL
Qty: 12 TABLET | Refills: 0 | Status: SHIPPED | OUTPATIENT
Start: 2024-07-29

## 2024-08-22 ENCOUNTER — PATIENT OUTREACH (OUTPATIENT)
Dept: CARE COORDINATION | Facility: CLINIC | Age: 59
End: 2024-08-22
Payer: COMMERCIAL

## 2024-10-25 DIAGNOSIS — F64.9 GENDER DYSPHORIA: ICD-10-CM

## 2024-10-25 RX ORDER — ESTRADIOL 2 MG/1
TABLET ORAL
Qty: 180 TABLET | Refills: 0 | Status: SHIPPED | OUTPATIENT
Start: 2024-10-25

## 2024-10-25 RX ORDER — SPIRONOLACTONE 50 MG/1
100 TABLET, FILM COATED ORAL DAILY
Qty: 180 TABLET | Refills: 0 | Status: SHIPPED | OUTPATIENT
Start: 2024-10-25

## 2024-10-30 ENCOUNTER — TELEPHONE (OUTPATIENT)
Dept: FAMILY MEDICINE | Facility: CLINIC | Age: 59
End: 2024-10-30

## 2024-10-30 ENCOUNTER — OFFICE VISIT (OUTPATIENT)
Dept: FAMILY MEDICINE | Facility: CLINIC | Age: 59
End: 2024-10-30
Payer: COMMERCIAL

## 2024-10-30 VITALS
OXYGEN SATURATION: 97 % | TEMPERATURE: 97.2 F | HEIGHT: 68 IN | RESPIRATION RATE: 20 BRPM | WEIGHT: 215 LBS | HEART RATE: 78 BPM | BODY MASS INDEX: 32.58 KG/M2 | DIASTOLIC BLOOD PRESSURE: 62 MMHG | SYSTOLIC BLOOD PRESSURE: 108 MMHG

## 2024-10-30 DIAGNOSIS — F31.81 BIPOLAR 2 DISORDER (H): ICD-10-CM

## 2024-10-30 DIAGNOSIS — E66.09 CLASS 1 OBESITY DUE TO EXCESS CALORIES WITHOUT SERIOUS COMORBIDITY WITH BODY MASS INDEX (BMI) OF 33.0 TO 33.9 IN ADULT: ICD-10-CM

## 2024-10-30 DIAGNOSIS — L03.031 PARONYCHIA OF TOE, RIGHT: Primary | ICD-10-CM

## 2024-10-30 DIAGNOSIS — E66.811 CLASS 1 OBESITY DUE TO EXCESS CALORIES WITHOUT SERIOUS COMORBIDITY WITH BODY MASS INDEX (BMI) OF 33.0 TO 33.9 IN ADULT: ICD-10-CM

## 2024-10-30 DIAGNOSIS — R21 RASH: ICD-10-CM

## 2024-10-30 PROBLEM — I48.0 PAROXYSMAL ATRIAL FIBRILLATION (H): Status: RESOLVED | Noted: 2022-11-28 | Resolved: 2024-10-30

## 2024-10-30 PROBLEM — E66.01 CLASS 2 SEVERE OBESITY DUE TO EXCESS CALORIES WITH SERIOUS COMORBIDITY IN ADULT (H): Status: RESOLVED | Noted: 2024-01-24 | Resolved: 2024-10-30

## 2024-10-30 PROBLEM — I42.9 SECONDARY CARDIOMYOPATHY (H): Status: RESOLVED | Noted: 2022-11-28 | Resolved: 2024-10-30

## 2024-10-30 PROBLEM — E66.812 CLASS 2 SEVERE OBESITY DUE TO EXCESS CALORIES WITH SERIOUS COMORBIDITY IN ADULT (H): Status: RESOLVED | Noted: 2024-01-24 | Resolved: 2024-10-30

## 2024-10-30 PROCEDURE — 99214 OFFICE O/P EST MOD 30 MIN: CPT | Performed by: STUDENT IN AN ORGANIZED HEALTH CARE EDUCATION/TRAINING PROGRAM

## 2024-10-30 RX ORDER — CLOTRIMAZOLE 1 %
CREAM (GRAM) TOPICAL 2 TIMES DAILY
Qty: 15 G | Refills: 0 | Status: SHIPPED | OUTPATIENT
Start: 2024-10-30

## 2024-10-30 NOTE — PATIENT INSTRUCTIONS
Shravan Espinal,    Thank you for allowing [facility] to manage your care.          I sent your prescriptions to your pharmacy.    .     For your convenience, test results are released as soon as they are available  Please allow 1-2 business days for me to send you a comment about your results.  If not done so, I encourage you to login into [employer] ([email or web address] to review your results in real time.     If you have any questions or concerns, please feel free to call us at [phone].    Sincerely,    Dr. Lancaster    Did you know?      You can schedule a video visit for follow-up appointments as well as future appointments for certain conditions.  Please see the below link.     [email or web address]    If you have not already done so,  I encourage you to sign up for [employer] ([email or web address]).  This will allow you to review your results, securely communicate with a provider, and schedule virtual visits as well.

## 2024-10-30 NOTE — TELEPHONE ENCOUNTER
Forms/Letter Request    Type of form/letter: OTHER: medical       Do we have the form/letter: Yes: 10/30/24    Who is the form from? Patient    Where did/will the form come from? Patient or family brought in       When is form/letter needed by: asap    How would you like the form/letter returned: Fax : [phone]    Patient Notified form requests are processed in 5-7 business days:Yes    Could we send this information to you in [employer] or would you prefer to receive a phone call?:   Patient would like to be contacted via [employer]

## 2024-10-30 NOTE — PROGRESS NOTES
"  Assessment & Plan     Paronychia of toe, right  Uncontrolled.  - amoxicillin-clavulanate (AUGMENTIN) 875-125 MG tablet; Take 1 tablet by mouth 2 times daily for 7 days.  The risks, benefits and treatment options of prescribed medications or other treatments have been discussed with the patient. The patient verbalized their understanding and should call or follow up if no improvement or if they develop further problems  Follow-up with PCP  Rash  uncontrolled  - start clotrimazole (LOTRIMIN) 1 % external cream; Apply topically 2 times daily.    Bipolar 2 disorder (H)  Stable, managed by psych    Class 1 obesity due to excess calories without serious comorbidity with body mass index (BMI) of 33.0 to 33.9 in adult  Stable.            William Espinal is a 59 year old, presenting for the following health issues:  Right Armpit and Right Toe      10/30/2024     1:08 PM   Additional Questions   Roomed by Jessica CONTRERAS         10/30/2024     1:08 PM   Patient Reported Additional Medications   Patient reports taking the following new medications No new medications to add     History of Present Illness       Reason for visit:  Infected toe and welt in right armpit  Symptom onset:  3-7 days ago  Symptoms include:  Toe is warm and i had puss come out and the welt itches all the time  Symptom intensity:  Mild  Symptom progression:  Worsening  Had these symptoms before:  No  What makes it worse:  Walking  What makes it better:  Feet up   She is taking medications regularly.  Patient has bipolar disorder for which she currently uses Abilify and  Currently she sees Psychiatrist and Psychologist.  She has  obesity that is stable.        Review of Systems  Constitutional, HEENT, cardiovascular, pulmonary, gi and gu systems are negative, except as otherwise noted.      Objective    /62   Pulse 78   Temp 97.2  F (36.2  C) (Tympanic)   Resp 20   Ht 1.715 m (5' 7.5\")   Wt 97.5 kg (215 lb)   LMP  (LMP Unknown)   SpO2 97%   " BMI 33.18 kg/m    Body mass index is 33.18 kg/m .  Physical Exam   GENERAL: alert and no distress  Right Great toe: erythema along the nail bed. There is mild tenderness to palpation . There is no drainage  Right armpit:small area of erythema without tenderness or edema        Signed Electronically by: Karolina Lancaster MD

## 2024-11-03 DIAGNOSIS — M81.0 AGE-RELATED OSTEOPOROSIS WITHOUT CURRENT PATHOLOGICAL FRACTURE: ICD-10-CM

## 2024-11-04 RX ORDER — ALENDRONATE SODIUM 70 MG/1
TABLET ORAL
Qty: 12 TABLET | Refills: 0 | Status: SHIPPED | OUTPATIENT
Start: 2024-11-04

## 2024-11-13 ASSESSMENT — ANXIETY QUESTIONNAIRES
3. WORRYING TOO MUCH ABOUT DIFFERENT THINGS: SEVERAL DAYS
IF YOU CHECKED OFF ANY PROBLEMS ON THIS QUESTIONNAIRE, HOW DIFFICULT HAVE THESE PROBLEMS MADE IT FOR YOU TO DO YOUR WORK, TAKE CARE OF THINGS AT HOME, OR GET ALONG WITH OTHER PEOPLE: NOT DIFFICULT AT ALL
4. TROUBLE RELAXING: SEVERAL DAYS
7. FEELING AFRAID AS IF SOMETHING AWFUL MIGHT HAPPEN: SEVERAL DAYS
7. FEELING AFRAID AS IF SOMETHING AWFUL MIGHT HAPPEN: SEVERAL DAYS
1. FEELING NERVOUS, ANXIOUS, OR ON EDGE: SEVERAL DAYS
GAD7 TOTAL SCORE: 6
GAD7 TOTAL SCORE: 6
2. NOT BEING ABLE TO STOP OR CONTROL WORRYING: SEVERAL DAYS
5. BEING SO RESTLESS THAT IT IS HARD TO SIT STILL: SEVERAL DAYS
6. BECOMING EASILY ANNOYED OR IRRITABLE: NOT AT ALL
8. IF YOU CHECKED OFF ANY PROBLEMS, HOW DIFFICULT HAVE THESE MADE IT FOR YOU TO DO YOUR WORK, TAKE CARE OF THINGS AT HOME, OR GET ALONG WITH OTHER PEOPLE?: NOT DIFFICULT AT ALL
GAD7 TOTAL SCORE: 6

## 2024-11-14 ENCOUNTER — ANCILLARY PROCEDURE (OUTPATIENT)
Dept: MAMMOGRAPHY | Facility: CLINIC | Age: 59
End: 2024-11-14
Attending: PHYSICIAN ASSISTANT
Payer: COMMERCIAL

## 2024-11-14 ENCOUNTER — OFFICE VISIT (OUTPATIENT)
Dept: FAMILY MEDICINE | Facility: CLINIC | Age: 59
End: 2024-11-14
Payer: COMMERCIAL

## 2024-11-14 VITALS
HEART RATE: 74 BPM | BODY MASS INDEX: 33.74 KG/M2 | SYSTOLIC BLOOD PRESSURE: 102 MMHG | WEIGHT: 215 LBS | DIASTOLIC BLOOD PRESSURE: 60 MMHG | RESPIRATION RATE: 18 BRPM | OXYGEN SATURATION: 97 % | HEIGHT: 67 IN | TEMPERATURE: 98.2 F

## 2024-11-14 DIAGNOSIS — F31.81 BIPOLAR 2 DISORDER (H): ICD-10-CM

## 2024-11-14 DIAGNOSIS — M79.89 LEG SWELLING: ICD-10-CM

## 2024-11-14 DIAGNOSIS — Z12.31 VISIT FOR SCREENING MAMMOGRAM: ICD-10-CM

## 2024-11-14 DIAGNOSIS — M81.0 AGE-RELATED OSTEOPOROSIS WITHOUT CURRENT PATHOLOGICAL FRACTURE: ICD-10-CM

## 2024-11-14 DIAGNOSIS — Z12.5 SCREENING FOR PROSTATE CANCER: ICD-10-CM

## 2024-11-14 DIAGNOSIS — Z79.899 TRANSGENDER PERSON ON HORMONE THERAPY: ICD-10-CM

## 2024-11-14 DIAGNOSIS — Z13.29 SCREENING FOR THYROID DISORDER: ICD-10-CM

## 2024-11-14 DIAGNOSIS — R73.03 PREDIABETES: ICD-10-CM

## 2024-11-14 DIAGNOSIS — F64.0 TRANSGENDER PERSON ON HORMONE THERAPY: ICD-10-CM

## 2024-11-14 DIAGNOSIS — Z98.84 S/P LAPAROSCOPIC SLEEVE GASTRECTOMY: ICD-10-CM

## 2024-11-14 DIAGNOSIS — E55.9 VITAMIN D DEFICIENCY: ICD-10-CM

## 2024-11-14 DIAGNOSIS — E78.5 HYPERLIPIDEMIA LDL GOAL <130: Primary | ICD-10-CM

## 2024-11-14 LAB
ANION GAP SERPL CALCULATED.3IONS-SCNC: 10 MMOL/L (ref 7–15)
BUN SERPL-MCNC: 16.5 MG/DL (ref 8–23)
CALCIUM SERPL-MCNC: 9.8 MG/DL (ref 8.8–10.4)
CHLORIDE SERPL-SCNC: 95 MMOL/L (ref 98–107)
CHOLEST SERPL-MCNC: 184 MG/DL
CREAT SERPL-MCNC: 0.81 MG/DL (ref 0.51–0.95)
EGFRCR SERPLBLD CKD-EPI 2021: 82 ML/MIN/1.73M2
ERYTHROCYTE [DISTWIDTH] IN BLOOD BY AUTOMATED COUNT: 13.2 % (ref 10–15)
EST. AVERAGE GLUCOSE BLD GHB EST-MCNC: 111 MG/DL
ESTRADIOL SERPL-MCNC: 43 PG/ML
FASTING STATUS PATIENT QL REPORTED: YES
FASTING STATUS PATIENT QL REPORTED: YES
FERRITIN SERPL-MCNC: 39 NG/ML (ref 11–328)
GLUCOSE SERPL-MCNC: 102 MG/DL (ref 70–99)
HBA1C MFR BLD: 5.5 % (ref 0–5.6)
HCO3 SERPL-SCNC: 29 MMOL/L (ref 22–29)
HCT VFR BLD AUTO: 42.6 % (ref 35–47)
HDLC SERPL-MCNC: 38 MG/DL
HGB BLD-MCNC: 14.2 G/DL (ref 11.7–15.7)
LDLC SERPL CALC-MCNC: 108 MG/DL
MCH RBC QN AUTO: 28.6 PG (ref 26.5–33)
MCHC RBC AUTO-ENTMCNC: 33.3 G/DL (ref 31.5–36.5)
MCV RBC AUTO: 86 FL (ref 78–100)
NONHDLC SERPL-MCNC: 146 MG/DL
PLATELET # BLD AUTO: 427 10E3/UL (ref 150–450)
POTASSIUM SERPL-SCNC: 4.8 MMOL/L (ref 3.4–5.3)
PSA SERPL DL<=0.01 NG/ML-MCNC: <0.01 NG/ML (ref 0–4.5)
RBC # BLD AUTO: 4.96 10E6/UL (ref 3.8–5.2)
SODIUM SERPL-SCNC: 134 MMOL/L (ref 135–145)
TRIGL SERPL-MCNC: 190 MG/DL
TSH SERPL DL<=0.005 MIU/L-ACNC: 1.32 UIU/ML (ref 0.3–4.2)
VIT B12 SERPL-MCNC: 344 PG/ML (ref 232–1245)
VIT D+METAB SERPL-MCNC: 33 NG/ML (ref 20–50)
WBC # BLD AUTO: 10 10E3/UL (ref 4–11)

## 2024-11-14 PROCEDURE — 77067 SCR MAMMO BI INCL CAD: CPT | Mod: TC | Performed by: RADIOLOGY

## 2024-11-14 PROCEDURE — 77063 BREAST TOMOSYNTHESIS BI: CPT | Mod: TC | Performed by: RADIOLOGY

## 2024-11-14 RX ORDER — BUSPIRONE HYDROCHLORIDE 30 MG/1
30 TABLET ORAL 3 TIMES DAILY
Qty: 270 TABLET | Refills: 1 | Status: SHIPPED | OUTPATIENT
Start: 2024-11-14

## 2024-11-14 RX ORDER — SPIRONOLACTONE 50 MG/1
100 TABLET, FILM COATED ORAL DAILY
Qty: 180 TABLET | Refills: 1 | Status: SHIPPED | OUTPATIENT
Start: 2024-11-14

## 2024-11-14 RX ORDER — VENLAFAXINE HYDROCHLORIDE 75 MG/1
225 CAPSULE, EXTENDED RELEASE ORAL EVERY MORNING
Qty: 90 CAPSULE | Refills: 1 | Status: SHIPPED | OUTPATIENT
Start: 2024-11-14

## 2024-11-14 RX ORDER — SIMVASTATIN 40 MG
40 TABLET ORAL AT BEDTIME
Qty: 90 TABLET | Refills: 1 | Status: SHIPPED | OUTPATIENT
Start: 2024-11-14

## 2024-11-14 RX ORDER — FUROSEMIDE 20 MG/1
20 TABLET ORAL EVERY MORNING
Qty: 90 TABLET | Refills: 1 | Status: SHIPPED | OUTPATIENT
Start: 2024-11-14

## 2024-11-14 RX ORDER — ESTRADIOL 2 MG/1
TABLET ORAL
Qty: 180 TABLET | Refills: 3 | Status: SHIPPED | OUTPATIENT
Start: 2024-11-14

## 2024-11-14 RX ORDER — ARIPIPRAZOLE 20 MG/1
20 TABLET ORAL EVERY MORNING
Qty: 90 TABLET | Refills: 1 | Status: SHIPPED | OUTPATIENT
Start: 2024-11-14

## 2024-11-14 NOTE — PROGRESS NOTES
"  Assessment & Plan     Transgender person on hormone therapy    - Estradiol; Future  - spironolactone (ALDACTONE) 50 MG tablet; Take 2 tablets (100 mg) by mouth daily.  - estradiol (ESTRACE) 2 MG tablet; TAKE 1 TO 2 TABLETS BY MOUTH ONCE DAILY  - Estradiol    S/P laparoscopic sleeve gastrectomy    - Ferritin; Future  - CBC with Platelets; Future  - Vitamin B12; Future  - Ferritin  - CBC with Platelets  - Vitamin B12    Hyperlipidemia LDL goal <130    - Lipid panel reflex to direct LDL Fasting; Future  - simvastatin (ZOCOR) 40 MG tablet; Take 1 tablet (40 mg) by mouth at bedtime.  - Lipid panel reflex to direct LDL Fasting    Screening for prostate cancer    - PROSTATE SPEC ANTIGEN SCREEN; Future  - PROSTATE SPEC ANTIGEN SCREEN    Vitamin D deficiency    - Vitamin D Deficiency; Future  - Vitamin D Deficiency    Prediabetes    - Hemoglobin A1c; Future  - Hemoglobin A1c    Leg swelling    - BASIC METABOLIC PANEL; Future  - furosemide (LASIX) 20 MG tablet; Take 1 tablet (20 mg) by mouth every morning.  - BASIC METABOLIC PANEL    Screening for thyroid disorder    - TSH with free T4 reflex; Future  - TSH with free T4 reflex    Age-related osteoporosis without current pathological fracture      Bipolar 2 disorder (H)    - venlafaxine (EFFEXOR XR) 75 MG 24 hr capsule; Take 3 capsules (225 mg) by mouth every morning.  - busPIRone HCl (BUSPAR) 30 MG tablet; Take 1 tablet (30 mg) by mouth 3 times daily.  - ARIPiprazole (ABILIFY) 20 MG tablet; Take 1 tablet (20 mg) by mouth every morning.          BMI  Estimated body mass index is 33.35 kg/m  as calculated from the following:    Height as of this encounter: 1.71 m (5' 7.32\").    Weight as of this encounter: 97.5 kg (215 lb).   Weight management plan: Discussed healthy diet and exercise guidelines history of gastric sleeve in 2018 has lost 80 to 100 pounds      Regular exercise  See Patient Instructions    William Espinal is a 59 year old, presenting for the following " health issues:  Medication Refill and Vaccines  She feels her mental health is stable, especially with recent break-up, moving and the recent election.  Feels  Mental health medications are helping.  Goes to therapy weekly.  Enjoys playing video games and free time.  In agreement to screening labs.  History of gastric sleeve in 2018, history of vitamin deficiency.  Tolerating hormone therapy.  Has mammogram scheduled today.  Has not had leg swelling.  Went to donate plasma and they questioned use of spironolactone.  Needing recertification for medical marijuana.  Feels this helps chronic pain and mental health.  Discussed changes with medical marijuana program.  Risks of smoking discussed.  Follow the rules of the [place] Department of Health to remain certified.  Wanting COVID and flu vaccine.  No other questions or concerns at this time.        11/14/2024     8:12 AM   Additional Questions   Roomed by Caridad CONTRERAS   Prior to immunization administration, verified patients identity using patient s name and date of birth. Please see Immunization Activity for additional information.     Screening Questionnaire for Adult Immunization    Are you sick today?   No   Do you have allergies to medications, food, a vaccine component or latex?   No   Have you ever had a serious reaction after receiving a vaccination? No   Do you have a long-term health problem with heart, lung, kidney, or metabolic disease (e.g., diabetes), asthma, a blood disorder, no spleen, complement component deficiency, a cochlear implant, or a spinal fluid leak?  Are you on long-term aspirin therapy?   Don't Know had atrial fibrillation in 2004   Do you have cancer, leukemia, HIV/AIDS, or any other immune system problem?   No   Do you have a parent, brother, or sister with an immune system problem?   No   In the past 3 months, have you taken medications that affect  your immune system, such as prednisone, other steroids, or anticancer drugs; drugs for  "the treatment of rheumatoid arthritis, Crohn s disease, or psoriasis; or have you had radiation treatments?   No   Have you had a seizure, or a brain or other nervous system problem?   No   During the past year, have you received a transfusion of blood or blood    products, or been given immune (gamma) globulin or antiviral drug?   No   For women: Are you pregnant or is there a chance you could become       pregnant during the next month?   No   Have you received any vaccinations in the past 4 weeks?   No     Immunization questionnaire was positive for at least one answer.  Notified Cleo.      Patient instructed to remain in clinic for 15 minutes afterwards, and to report any adverse reactions.     Screening performed by Caridad Mchugh MA on 11/14/2024 at 8:40 AM.   Medication Refill    History of Present Illness       Reason for visit:  Yearly check up for hormones    She eats 2-3 servings of fruits and vegetables daily.She consumes 0 sweetened beverage(s) daily.She exercises with enough effort to increase her heart rate 9 or less minutes per day.  She exercises with enough effort to increase her heart rate 3 or less days per week.   She is taking medications regularly.     Has mammogram scheduled today        Review of Systems  Constitutional, HEENT, cardiovascular, pulmonary, GI, , musculoskeletal, neuro, skin, endocrine and psych systems are negative, except as otherwise noted.      Objective    /60   Pulse 74   Temp 98.2  F (36.8  C) (Oral)   Resp 18   Ht 1.71 m (5' 7.32\")   Wt 97.5 kg (215 lb)   LMP  (LMP Unknown)   SpO2 97%   BMI 33.35 kg/m    Body mass index is 33.35 kg/m .  Physical Exam   GENERAL: alert and no distress  EYES: Eyes grossly normal to inspection  RESP: lungs clear to auscultation - no rales, rhonchi or wheezes  CV: regular rate and rhythm, normal S1 S2, no S3 or S4, no murmur, click or rub, no peripheral edema  MS: no gross musculoskeletal defects noted, no edema, no CVA " tenderness  NEURO: Normal strength and tone, mentation intact and speech normal  PSYCH: mentation appears normal, affect normal/bright    See orders        Signed Electronically by: ALE DURAND

## 2024-11-18 NOTE — RESULT ENCOUNTER NOTE
Shravan Espinal,    Thank you for your recent office visit.    Here are your recent results.  Normal glucose under 127, you are not diabetic.  Normal kidney function.  Your sodium and chloride are slightly low consider adding a little salt to your diet.  Normal cholesterol for a nondiabetic with the LDL, the bad cholesterol under 190.  Normal prostate level.  Normal thyroid level.  Normal vitamin levels.  Normal CBC and estrogen level.    Feel free to contact me via [employer] or call the clinic at [phone].    Sincerely,    TALIB Yanes, FNP-BC

## 2024-12-05 ENCOUNTER — PATIENT OUTREACH (OUTPATIENT)
Dept: CARE COORDINATION | Facility: CLINIC | Age: 59
End: 2024-12-05
Payer: COMMERCIAL

## 2024-12-06 ENCOUNTER — OFFICE VISIT (OUTPATIENT)
Dept: NEUROSURGERY | Facility: CLINIC | Age: 59
End: 2024-12-06
Payer: COMMERCIAL

## 2024-12-06 ENCOUNTER — ANCILLARY PROCEDURE (OUTPATIENT)
Dept: GENERAL RADIOLOGY | Facility: CLINIC | Age: 59
End: 2024-12-06
Attending: ORTHOPAEDIC SURGERY
Payer: COMMERCIAL

## 2024-12-06 VITALS
WEIGHT: 215 LBS | HEART RATE: 69 BPM | DIASTOLIC BLOOD PRESSURE: 81 MMHG | SYSTOLIC BLOOD PRESSURE: 121 MMHG | BODY MASS INDEX: 32.58 KG/M2 | HEIGHT: 68 IN

## 2024-12-06 DIAGNOSIS — Z98.1 S/P CERVICAL SPINAL FUSION: Primary | ICD-10-CM

## 2024-12-06 DIAGNOSIS — Z98.1 S/P CERVICAL SPINAL FUSION: ICD-10-CM

## 2024-12-06 PROCEDURE — 99214 OFFICE O/P EST MOD 30 MIN: CPT | Mod: GC | Performed by: ORTHOPAEDIC SURGERY

## 2024-12-06 PROCEDURE — 72050 X-RAY EXAM NECK SPINE 4/5VWS: CPT | Mod: GC | Performed by: STUDENT IN AN ORGANIZED HEALTH CARE EDUCATION/TRAINING PROGRAM

## 2024-12-06 RX ORDER — GABAPENTIN 300 MG/1
CAPSULE ORAL
Qty: 90 CAPSULE | Refills: 1 | Status: SHIPPED | OUTPATIENT
Start: 2024-12-06

## 2024-12-06 NOTE — PROGRESS NOTES
Spine Surgery Return Clinic Visit      Chief Complaint:   RECHECK (Cervical spondylosis with myelopathy)      Interval HPI:  Symptom Profile Including: location of symptoms, onset, severity, exacerbating/alleviating factors, previous treatments:        Kylie Austin is a 59 year old female who with a history of C6-T1 ACDF on 10/28/2022 with Dr. Ruiz who presents with left upper extremity radiculopathy.  Patient reports that she is noted increasing numbness in her left upper extremity for the past 2 months.  She reports there is occasional pain but the numbness is always present.  The distribution is on the inside of her arm, her entire forearm, and her entire hand.  She notes she also has known bilateral carpal tunnel but this feels different.  She denies any bowel or bladder continence, issues with balance, or problems with dexterity.  She said this feels similar but is in a different distribution as compared to her previous left upper extremity radiculopathy.            Past Medical History:     Past Medical History:   Diagnosis Date    Anxiety 2005    Brought on by Amioderone    Arthritis 08/2005    Atrial fib/flutter, transient (H)     S/p cardioversion 2004    Bipolar 2 disorder (H)     Bleeding disorder (H) 1987    nose bleeds    Chronic pain     LUE pain    Congestive heart failure (H) 11/18/2004    cured 2005    Depressive disorder     on and off most of my life!    Fibromyalgia     Gender identity disorder Started Rx 2012    H/O hypogonadism Gonadectomy 2013    Hyperlipidemia     Hypertension     Other mental problems     bipolar    Psoriasis     Sleep apnea     Uncomplicated asthma 1964    cured in 4/2001    Vision disorder 06/2005            Past Surgical History:     Past Surgical History:   Procedure Laterality Date    ARTHROPLASTY SHOULDER Left 1/22/2020    Procedure: Left Total shoulder arthroplasty, distal clavicle excision;  Surgeon: Omari Tobin MD;  [facility]     BIOPSY  2005, 8/8/13    Stomach, colon    BLOCK, NERVE, GENICULAR Left 2/28/2023    Procedure: BLOCK, NERVE, GENICULAR;  Surgeon: Mathew Bloom MD;  Location: [facility] OR    COLONOSCOPY  8/8/2013    Procedure: COLONOSCOPY;  COLONSCOPY SCREEN/ SE;  Surgeon: Santino Sun MD;  [facility]    COLONOSCOPY N/A 1/4/2022    Procedure: COLONOSCOPY, WITH POLYPECTOMY AND BIOPSY;  Surgeon: Dallas Velazco MD;  [facility]: [facility]    COLONOSCOPY WITH CO2 INSUFFLATION N/A 11/1/2018    Procedure: COLONOSCOPY WITH CO2 INSUFFLATION;  Surgeon: Santino Sun MD;  [facility]    COLONOSCOPY WITH CO2 INSUFFLATION N/A 12/15/2020    Procedure: COLONOSCOPY, WITH CO2 INSUFFLATION;  Surgeon: Nima Kamara MD;  [facility]    DECOMPRESSION, FUSION CERVICAL ANTERIOR ONE LEVEL, COMBINED N/A 10/28/2022    Procedure: Cervical 6 to Thoracic 1 anterior cervical decompression and fusion with medtronic plate and screws, interbody device, use of fluoroscopy, microscope;  Surgeon: Fernando Ruiz MD;  [facility]    Gender Reassignment  05/2016    GRAFT BONE FROM ILIAC CREST Left 10/28/2022    Procedure: and left sided iliac crest autograft;  Surgeon: Fernando Ruiz MD;  [facility]    HEAD & NECK SURGERY  2015    ablation of nerve from neck to left arm    INJECT EPIDURAL CERVICAL N/A 7/29/2022    Procedure: INJECTION, SPINE, CERVICAL, EPIDURAL T1-T2;  Surgeon: Kenya Ferrell MD;  [facility]    LAPAROSCOPIC GASTRIC SLEEVE N/A 4/10/2018    Procedure: LAPAROSCOPIC GASTRIC SLEEVE;  Laparoscopic Sleeve Gastrectomy;  Surgeon: Jani Shah MD;  [facility]    MANDIBLE SURGERY  1986    ORCHIECTOMY INGUINAL BILATERAL  7/16/2013    Procedure: ORCHIECTOMY INGUINAL BILATERAL;  Bilateral Simple Inguinal Orchiectomy ;  Surgeon: Jan Garrett MD;  [facility]    TONSILLECTOMY              Social History:     Social History     Tobacco Use    Smoking  status: Never     Passive exposure: Never    Smokeless tobacco: Never    Tobacco comments:     NEVER SMOKED! Grew up with heavy smokers which did not help my Asthma!   Substance Use Topics    Alcohol use: Not Currently     Comment: occasional//2 per month            Family History:     Family History   Problem Relation Age of Onset    Breast Cancer Mother     Cancer Father         skin    Diabetes Father         Was on the patch when they were new    Heart Disease Father 55        scd    Coronary Artery Disease Father     Hypertension Father     Hyperlipidemia Father     Diabetes Sister     Diabetes Sister     Thyroid Disease Sister     Osteoporosis Maternal Grandmother     Alzheimer Disease Paternal Grandmother     Diabetes Paternal Grandmother     Mental Illness Paternal Grandmother         alshimers    Osteoporosis Paternal Grandmother     Coronary Artery Disease Paternal Grandfather     Hypertension Paternal Grandfather     Hyperlipidemia Paternal Grandfather     Prostate Cancer Paternal Grandfather     Other Cancer Paternal Grandfather     Depression Daughter     Unknown/Adopted Daughter     Depression Daughter     Mental Illness Daughter         bipolar    Anxiety Disorder Daughter     Anxiety Disorder Daughter     Depression Son     Heart Disease Other     Anesthesia Reaction No family hx of     Deep Vein Thrombosis (DVT) No family hx of             Allergies:     Allergies   Allergen Reactions    Amiodarone      Caused pain fatigue/amplified anxiety and depression    Fluoxetine Other (See Comments)     Night terrors    Tetracycline      Teeth rattle/saliva acidic            Medications:     Current Outpatient Medications   Medication Sig Dispense Refill    alendronate (FOSAMAX) 70 MG tablet TAKE 1 TABLET BY MOUTH ONCE A WEEK IN THE MORNING ON AN EMPTY STOMACH WITH A FULL GLASS OF WATER. DO NOT LIE DOWN FOR 1 HOUR 12 tablet 0    ARIPiprazole (ABILIFY) 20 MG tablet Take 1 tablet (20 mg) by mouth every morning.  "90 tablet 1    busPIRone HCl (BUSPAR) 30 MG tablet Take 1 tablet (30 mg) by mouth 3 times daily. 270 tablet 1    diclofenac (VOLTAREN) 1 % topical gel Apply 4 g topically 4 times daily as needed for moderate pain 200 g 11    estradiol (ESTRACE) 2 MG tablet TAKE 1 TO 2 TABLETS BY MOUTH ONCE DAILY 180 tablet 3    furosemide (LASIX) 20 MG tablet Take 1 tablet (20 mg) by mouth every morning. 90 tablet 1    medical cannabis (Patient's own supply.  Not a prescription) 2 capsules 2 times daily (This is NOT a prescription, and does not certify that the patient has a qualifying medical condition for medical cannabis.  The purpose of this order is  to document that the patient reports taking medical cannabis.)    Oral tablet - takes 1 tablet three times daily   vape pen - as needed throughout the day      simvastatin (ZOCOR) 40 MG tablet Take 1 tablet (40 mg) by mouth at bedtime. 90 tablet 1    spironolactone (ALDACTONE) 50 MG tablet Take 2 tablets (100 mg) by mouth daily. 180 tablet 1    venlafaxine (EFFEXOR XR) 75 MG 24 hr capsule Take 3 capsules (225 mg) by mouth every morning. 90 capsule 1     No current facility-administered medications for this visit.             Review of Systems:   A focused musculoskeletal and neurologic ROS was performed with pertinent positives and negatives noted in the HPI.  Additional systems were also reviewed and are documented at the bottom of the note.         Physical Exam:   Vitals: /81 (BP Location: Right arm, Patient Position: Sitting, Cuff Size: Adult Large)   Pulse 69   Ht 1.715 m (5' 7.5\")   Wt 97.5 kg (215 lb)   LMP  (LMP Unknown)   BMI 33.18 kg/m    Musculoskeletal, Neurologic, and Spine:   Cervical spine:    Appearance -no gross step-offs, kyphosis.    Motor -     C5: Deltoids R 5/5 and L 4+/5 strength    C6: Biceps R 5/5 and L 4/5 strength     C7: Triceps R 5/5 and L 4/5 strength     C8:  R 5/5 and L 4/5 strength     T1: Dorsal interossei R 4/5 and L 4-/5 strength  "       Sensation: intact to light touch in C5-T1.  Endorsing diminished sensation in C6, C7, C8, T1 distributions.           Imaging:   We ordered and independently reviewed new radiographs at this clinic visit. The results were discussed with the patient.     Flexion-extension cervical spine x-rays obtained today demonstrate multilevel cervical spondylosis, most significant at C4-5 and C5-6.  Stable hardware alignment from C6-T1.  Interval creased anterolisthesis of C4 and C5.       Assessment and Plan:     59 year old female status post C 6-T1 ACDF in 10/22 with Dr. Ruiz who presents with adjacent segment disease, left upper extremity radiculopathy most notably in T1 distribution.  Today, her exam and history do not suggest myelopathy.  We discussed the natural history of her condition including treatment options.  Discussed that conservative treatment consists of medications, therapy, and injections.  Discussed recommendation to proceed with conservative treatment as she has not tried any of the above yet.  Patient expressed understanding and is in agreement with this plan.      Plan:  -Referral to physical therapy  - Rx for gabapentin provided  - C7-T1 interlaminar epidural steroid injection  - Follow-up in 6 to 8 weeks for reevaluation       --  Alanis Rawls MD  Orthopedic Surgery PGY-4    Patient seen and discussed with Dr. Ruiz.      Attending MD (Dr. Fernando Ruiz) : I personally performed greater than 50% of the effort related to this patient visit and am responsible for the medical decision making and billing in this case.  I met with the patient, reviewed and verified the history and physical exam of the patient and discussed the patient's management with the other clinical providers involved in this patient's care including any involved residents or physicians assistants. I also personally reviewed the imaging and I personally formulated the treatment plan and diagnosis in their entirety.  I  reviewed the above note and agree with the documented findings and plan of care, which were communicated to the patient.      Fernando Ruiz MD

## 2024-12-06 NOTE — NURSING NOTE
"Kylie Austin's goals for this visit include:   Chief Complaint   Patient presents with    RECHECK     Cervical spondylosis with myelopathy      She requests these members of her care team be copied on today's visit information: yes    PCP: Kristin Miner    Referring Provider:  Kristin Miner PA-C  [id number] [place] [address]    /81 (BP Location: Right arm, Patient Position: Sitting, Cuff Size: Adult Large)   Pulse 69   Ht 1.715 m (5' 7.5\")   Wt 97.5 kg (215 lb)   LMP  (LMP Unknown)   BMI 33.18 kg/m      Do you need any medication refills at today's visit? No  RACHEL Ritter., TONIO ([facility])      "

## 2024-12-06 NOTE — LETTER
12/6/2024      Kylie Austin  [address] [id number]      Dear Colleague,    Thank you for referring your patient, Kylie Austin, to the [facility] [place]. Please see a copy of my visit note below.    Spine Surgery Return Clinic Visit      Chief Complaint:   RECHECK (Cervical spondylosis with myelopathy)      Interval HPI:  Symptom Profile Including: location of symptoms, onset, severity, exacerbating/alleviating factors, previous treatments:        Kylie Austin is a 59 year old female who with a history of C6-T1 ACDF on 10/28/2022 with Dr. Ruiz who presents with left upper extremity radiculopathy.  Patient reports that she is noted increasing numbness in her left upper extremity for the past 2 months.  She reports there is occasional pain but the numbness is always present.  The distribution is on the inside of her arm, her entire forearm, and her entire hand.  She notes she also has known bilateral carpal tunnel but this feels different.  She denies any bowel or bladder continence, issues with balance, or problems with dexterity.  She said this feels similar but is in a different distribution as compared to her previous left upper extremity radiculopathy.            Past Medical History:     Past Medical History:   Diagnosis Date     Anxiety 2005    Brought on by Amioderone     Arthritis 08/2005     Atrial fib/flutter, transient (H)     S/p cardioversion 2004     Bipolar 2 disorder (H)      Bleeding disorder (H) 1987    nose bleeds     Chronic pain     LUE pain     Congestive heart failure (H) 11/18/2004    cured 2005     Depressive disorder     on and off most of my life!     Fibromyalgia      Gender identity disorder Started Rx 2012     H/O hypogonadism Gonadectomy 2013     Hyperlipidemia      Hypertension      Other mental problems     bipolar     Psoriasis      Sleep apnea      Uncomplicated asthma 1964     cured in 4/2001     Vision disorder 06/2005            Past Surgical History:     Past Surgical History:   Procedure Laterality Date     ARTHROPLASTY SHOULDER Left 1/22/2020    Procedure: Left Total shoulder arthroplasty, distal clavicle excision;  Surgeon: Omari Tobin MD;  [facility]     BIOPSY  2005, 8/8/13    Stomach, colon     BLOCK, NERVE, GENICULAR Left 2/28/2023    Procedure: BLOCK, NERVE, GENICULAR;  Surgeon: Mathew Bloom MD;  Location: [facility]     COLONOSCOPY  8/8/2013    Procedure: COLONOSCOPY;  COLONSCOPY SCREEN/ SE;  Surgeon: Santino Sun MD;  [facility]     COLONOSCOPY N/A 1/4/2022    Procedure: COLONOSCOPY, WITH POLYPECTOMY AND BIOPSY;  Surgeon: Dallas Velazco MD;  [facility]     COLONOSCOPY WITH CO2 INSUFFLATION N/A 11/1/2018    Procedure: COLONOSCOPY WITH CO2 INSUFFLATION;  Surgeon: Santino Sun MD;  [facility]     COLONOSCOPY WITH CO2 INSUFFLATION N/A 12/15/2020    Procedure: COLONOSCOPY, WITH CO2 INSUFFLATION;  Surgeon: Nima Kamara MD;  [facility]     DECOMPRESSION, FUSION CERVICAL ANTERIOR ONE LEVEL, COMBINED N/A 10/28/2022    Procedure: Cervical 6 to Thoracic 1 anterior cervical decompression and fusion with medtronic plate and screws, interbody device, use of fluoroscopy, microscope;  Surgeon: Fernando Ruiz MD;  [facility]     Gender Reassignment  05/2016     GRAFT BONE FROM ILIAC CREST Left 10/28/2022    Procedure: and left sided iliac crest autograft;  Surgeon: Fernando Ruiz MD;  [facility]     HEAD & NECK SURGERY  2015    ablation of nerve from neck to left arm     INJECT EPIDURAL CERVICAL N/A 7/29/2022    Procedure: INJECTION, SPINE, CERVICAL, EPIDURAL T1-T2;  Surgeon: Kenya Ferrell MD;  [facility]     LAPAROSCOPIC GASTRIC SLEEVE N/A 4/10/2018    Procedure: LAPAROSCOPIC GASTRIC SLEEVE;  Laparoscopic Sleeve Gastrectomy;  Surgeon: Jani Shah MD;   [facility]     MANDIBLE SURGERY  1986     ORCHIECTOMY INGUINAL BILATERAL  7/16/2013    Procedure: ORCHIECTOMY INGUINAL BILATERAL;  Bilateral Simple Inguinal Orchiectomy ;  Surgeon: Jan Garrett MD;  [facility]     TONSILLECTOMY              Social History:     Social History     Tobacco Use     Smoking status: Never     Passive exposure: Never     Smokeless tobacco: Never     Tobacco comments:     NEVER SMOKED! Grew up with heavy smokers which did not help my Asthma!   Substance Use Topics     Alcohol use: Not Currently     Comment: occasional//2 per month            Family History:     Family History   Problem Relation Age of Onset     Breast Cancer Mother      Cancer Father         skin     Diabetes Father         Was on the patch when they were new     Heart Disease Father 55        scd     Coronary Artery Disease Father      Hypertension Father      Hyperlipidemia Father      Diabetes Sister      Diabetes Sister      Thyroid Disease Sister      Osteoporosis Maternal Grandmother      Alzheimer Disease Paternal Grandmother      Diabetes Paternal Grandmother      Mental Illness Paternal Grandmother         alshimers     Osteoporosis Paternal Grandmother      Coronary Artery Disease Paternal Grandfather      Hypertension Paternal Grandfather      Hyperlipidemia Paternal Grandfather      Prostate Cancer Paternal Grandfather      Other Cancer Paternal Grandfather      Depression Daughter      Unknown/Adopted Daughter      Depression Daughter      Mental Illness Daughter         bipolar     Anxiety Disorder Daughter      Anxiety Disorder Daughter      Depression Son      Heart Disease Other      Anesthesia Reaction No family hx of      Deep Vein Thrombosis (DVT) No family hx of             Allergies:     Allergies   Allergen Reactions     Amiodarone      Caused pain fatigue/amplified anxiety and depression     Fluoxetine Other (See Comments)     Night terrors     Tetracycline      Teeth rattle/saliva  "acidic            Medications:     Current Outpatient Medications   Medication Sig Dispense Refill     alendronate (FOSAMAX) 70 MG tablet TAKE 1 TABLET BY MOUTH ONCE A WEEK IN THE MORNING ON AN EMPTY STOMACH WITH A FULL GLASS OF WATER. DO NOT LIE DOWN FOR 1 HOUR 12 tablet 0     ARIPiprazole (ABILIFY) 20 MG tablet Take 1 tablet (20 mg) by mouth every morning. 90 tablet 1     busPIRone HCl (BUSPAR) 30 MG tablet Take 1 tablet (30 mg) by mouth 3 times daily. 270 tablet 1     diclofenac (VOLTAREN) 1 % topical gel Apply 4 g topically 4 times daily as needed for moderate pain 200 g 11     estradiol (ESTRACE) 2 MG tablet TAKE 1 TO 2 TABLETS BY MOUTH ONCE DAILY 180 tablet 3     furosemide (LASIX) 20 MG tablet Take 1 tablet (20 mg) by mouth every morning. 90 tablet 1     medical cannabis (Patient's own supply.  Not a prescription) 2 capsules 2 times daily (This is NOT a prescription, and does not certify that the patient has a qualifying medical condition for medical cannabis.  The purpose of this order is  to document that the patient reports taking medical cannabis.)    Oral tablet - takes 1 tablet three times daily   vape pen - as needed throughout the day       simvastatin (ZOCOR) 40 MG tablet Take 1 tablet (40 mg) by mouth at bedtime. 90 tablet 1     spironolactone (ALDACTONE) 50 MG tablet Take 2 tablets (100 mg) by mouth daily. 180 tablet 1     venlafaxine (EFFEXOR XR) 75 MG 24 hr capsule Take 3 capsules (225 mg) by mouth every morning. 90 capsule 1     No current facility-administered medications for this visit.             Review of Systems:   A focused musculoskeletal and neurologic ROS was performed with pertinent positives and negatives noted in the HPI.  Additional systems were also reviewed and are documented at the bottom of the note.         Physical Exam:   Vitals: /81 (BP Location: Right arm, Patient Position: Sitting, Cuff Size: Adult Large)   Pulse 69   Ht 1.715 m (5' 7.5\")   Wt 97.5 kg (215 lb)   " LMP  (LMP Unknown)   BMI 33.18 kg/m    Musculoskeletal, Neurologic, and Spine:   Cervical spine:    Appearance -no gross step-offs, kyphosis.    Motor -     C5: Deltoids R 5/5 and L 4+/5 strength    C6: Biceps R 5/5 and L 4/5 strength     C7: Triceps R 5/5 and L 4/5 strength     C8:  R 5/5 and L 4/5 strength     T1: Dorsal interossei R 4/5 and L 4-/5 strength        Sensation: intact to light touch in C5-T1.  Endorsing diminished sensation in C6, C7, C8, T1 distributions.           Imaging:   We ordered and independently reviewed new radiographs at this clinic visit. The results were discussed with the patient.     Flexion-extension cervical spine x-rays obtained today demonstrate multilevel cervical spondylosis, most significant at C4-5 and C5-6.  Stable hardware alignment from C6-T1.  Interval creased anterolisthesis of C4 and C5.       Assessment and Plan:     59 year old female status post C 6-T1 ACDF in 10/22 with Dr. Ruiz who presents with adjacent segment disease, left upper extremity radiculopathy most notably in T1 distribution.  Today, her exam and history do not suggest myelopathy.  We discussed the natural history of her condition including treatment options.  Discussed that conservative treatment consists of medications, therapy, and injections.  Discussed recommendation to proceed with conservative treatment as she has not tried any of the above yet.  Patient expressed understanding and is in agreement with this plan.      Plan:  -Referral to physical therapy  - Rx for gabapentin provided  - C7-T1 interlaminar epidural steroid injection  - Follow-up in 6 to 8 weeks for reevaluation       --  Alanis Rawls MD  Orthopedic Surgery PGY-4    Patient seen and discussed with Dr. Ruiz.      Attending MD (Dr. Fernando Ruiz) : I personally performed greater than 50% of the effort related to this patient visit and am responsible for the medical decision making and billing in this case.  I met  with the patient, reviewed and verified the history and physical exam of the patient and discussed the patient's management with the other clinical providers involved in this patient's care including any involved residents or physicians assistants. I also personally reviewed the imaging and I personally formulated the treatment plan and diagnosis in their entirety.  I reviewed the above note and agree with the documented findings and plan of care, which were communicated to the patient.      Fernando Ruiz MD           Again, thank you for allowing me to participate in the care of your patient.        Sincerely,      Fernando Ruiz MD

## 2024-12-11 ENCOUNTER — THERAPY VISIT (OUTPATIENT)
Dept: PHYSICAL THERAPY | Facility: CLINIC | Age: 59
End: 2024-12-11
Attending: ORTHOPAEDIC SURGERY
Payer: COMMERCIAL

## 2024-12-11 DIAGNOSIS — Z98.1 S/P CERVICAL SPINAL FUSION: ICD-10-CM

## 2024-12-11 PROCEDURE — 97110 THERAPEUTIC EXERCISES: CPT | Mod: GP | Performed by: PHYSICAL THERAPIST

## 2024-12-11 PROCEDURE — 97161 PT EVAL LOW COMPLEX 20 MIN: CPT | Mod: GP | Performed by: PHYSICAL THERAPIST

## 2024-12-11 NOTE — PROGRESS NOTES
"PHYSICAL THERAPY EVALUATION  Type of Visit: Evaluation        Fall Risk Screen:  Fall screen completed by: PT  Have you fallen 2 or more times in the past year?: (Patient-Rptd) No  Have you fallen and had an injury in the past year?: (Patient-Rptd) No  Is patient a fall risk?: No    Subjective         Presenting condition or subjective complaint: (Patient-Rptd) Numbness is left arm with some pain hold my phone, drive with left arm not going numb  Date of onset: 12/06/24 (referred to PT)    Relevant medical history:     Dates & types of surgery: (Patient-Rptd) Left arm shoulder replacement 1/2020, two vertebrae in neck fused 10/28/2022    Prior diagnostic imaging/testing results: (Patient-Rptd) MRI; X-ray; Bone scan     Prior therapy history for the same diagnosis, illness or injury: (Patient-Rptd) No      Pt reports she noted medial L arm and down the arm would go numb a few months ago.  Difficulty grasping steering wheel, tipping to the L, shoveling snow, laying on L side.  Feels a bit better with good pillow support to the neck.  Pain is mild but the like \"grabbing an electric fence\" is more problematic.  \"It comes and goes all day long.\"  Feels different than what led to cervical fusion or carpal tunnel.  Symptoms are all on the L side.  Generally ambidextrous but \"I've given up using my L arm.\"    Prior Level of Function  Transfers: Independent  Ambulation: Independent  ADL: Independent    Living Environment  Social support: (Patient-Rptd) With family members   Type of home: (Patient-Rptd) House; 1 level   Stairs to enter the home: (Patient-Rptd) Yes (Patient-Rptd) 3 Is there a railing: (Patient-Rptd) Yes     Ramp: (Patient-Rptd) No   Stairs inside the home: (Patient-Rptd) No       Help at home: (Patient-Rptd) None  Equipment owned: (Patient-Rptd) Straight Cane     Employment: (Patient-Rptd) No    Hobbies/Interests: (Patient-Rptd) Hand knitted blankets, adult wooden puzzles    Patient goals for therapy: " (Patient-Rptd) I would like to be able to hold something in my left hand without my had going numb       Objective   CERVICAL SPINE EVALUATION  PAIN: see above  POSTURE: forward head and rounded shoulders  GAIT: pt ambulates without device without gross asymmetry  ROM: moderately limited cervical AROM all directions consistent with h/o fusion 2022.  Symptoms down arm with retraction in sitting but not supine.  Pt noted discomfort at end range L SB and L rotation.  Shoulder ROM asymmetric consistent with h/o L TSA.  MYOTOMES: diffusely 4/5 throughout L UE in non myotomal pattern; 5/5 on R.  DTR S: 1+ B biceps, brachioradialis, triceps  DERMATOMES: pt noted sensation to light touch reduced on L compared to R in non dermatomal pattern  NEURAL TENSION: positive for median, ulnar, radial in supine  PALPATION: tender to palpation L paracervicals in sitting but not supine    Assessment & Plan   CLINICAL IMPRESSIONS  Medical Diagnosis: S/P cervical spinal fusion    Treatment Diagnosis: upper extremity paresthesias   Impression/Assessment: Patient is a 59 year old female with upper extremity complaints.  The following significant findings have been identified: Pain, Decreased ROM/flexibility, Decreased strength, Decreased activity tolerance, and Impaired posture. These impairments interfere with their ability to perform self care tasks as compared to previous level of function.     Clinical Decision Making (Complexity):  Clinical Presentation: Stable/Uncomplicated  Clinical Presentation Rationale: based on medical and personal factors listed in PT evaluation  Clinical Decision Making (Complexity): Low complexity    PLAN OF CARE  Treatment Interventions:  Interventions: Manual Therapy, Neuromuscular Re-education, Therapeutic Activity, Therapeutic Exercise    Long Term Goals     PT Goal 1  Goal Description: Lift 5 pounds to shoulder height  Rationale: to maximize safety and independence with performance of ADLs and functional  tasks  Goal Progress: See tolerance below  Target Date: 02/19/25      Frequency of Treatment: every other week  Duration of Treatment: six visits      Education Assessment:   Learner/Method: Patient  Education Comments: Performance in clinic    Risks and benefits of evaluation/treatment have been explained.   Patient/Family/caregiver agrees with Plan of Care.     Evaluation Time:     PT Eval, Low Complexity Minutes (98331): 25     Signing Clinician: MAYRA Lopez [facility]                                                                                   OUTPATIENT PHYSICAL THERAPY      PLAN OF TREATMENT FOR OUTPATIENT REHABILITATION   Patient's Last Name, First Name, JACQUELINE Nunes Kylie Austin YOB: 1965   Provider's Name   [facility]   Medical Record No.  [id number]     Onset Date: 12/06/24 (referred to PT)  Start of Care Date: 12/11/24     Medical Diagnosis:  S/P cervical spinal fusion      PT Treatment Diagnosis:  upper extremity paresthesias Plan of Treatment  Frequency/Duration: every other week/ six visits    Certification date from 12/11/24 to 02/19/25         See note for plan of treatment details and functional goals     Miguel Jefferson PT                         I CERTIFY THE NEED FOR THESE SERVICES FURNISHED UNDER        THIS PLAN OF TREATMENT AND WHILE UNDER MY CARE     (Physician attestation of this document indicates review and certification of the therapy plan).              Referring Provider:  Fernando Ruiz    Initial Assessment  See Epic Evaluation- Start of Care Date: 12/11/24

## 2024-12-26 ENCOUNTER — ANCILLARY PROCEDURE (OUTPATIENT)
Dept: MAMMOGRAPHY | Facility: CLINIC | Age: 59
End: 2024-12-26
Attending: PHYSICIAN ASSISTANT
Payer: COMMERCIAL

## 2024-12-26 DIAGNOSIS — Z12.31 VISIT FOR SCREENING MAMMOGRAM: ICD-10-CM

## 2025-01-06 ENCOUNTER — OFFICE VISIT (OUTPATIENT)
Dept: FAMILY MEDICINE | Facility: CLINIC | Age: 60
End: 2025-01-06
Payer: COMMERCIAL

## 2025-01-06 VITALS
HEIGHT: 67 IN | DIASTOLIC BLOOD PRESSURE: 64 MMHG | HEART RATE: 78 BPM | TEMPERATURE: 97.4 F | OXYGEN SATURATION: 98 % | WEIGHT: 222.8 LBS | SYSTOLIC BLOOD PRESSURE: 100 MMHG | BODY MASS INDEX: 34.97 KG/M2 | RESPIRATION RATE: 20 BRPM

## 2025-01-06 DIAGNOSIS — K91.1 DUMPING SYNDROME: ICD-10-CM

## 2025-01-06 DIAGNOSIS — Z12.11 SCREEN FOR COLON CANCER: ICD-10-CM

## 2025-01-06 DIAGNOSIS — M81.0 AGE-RELATED OSTEOPOROSIS WITHOUT CURRENT PATHOLOGICAL FRACTURE: ICD-10-CM

## 2025-01-06 DIAGNOSIS — F31.81 BIPOLAR 2 DISORDER (H): ICD-10-CM

## 2025-01-06 DIAGNOSIS — Z00.00 ENCOUNTER FOR MEDICARE ANNUAL WELLNESS EXAM: Primary | ICD-10-CM

## 2025-01-06 PROCEDURE — 99213 OFFICE O/P EST LOW 20 MIN: CPT | Mod: 25 | Performed by: PHYSICIAN ASSISTANT

## 2025-01-06 PROCEDURE — 99396 PREV VISIT EST AGE 40-64: CPT | Performed by: PHYSICIAN ASSISTANT

## 2025-01-06 RX ORDER — MIRTAZAPINE 15 MG/1
15 TABLET, FILM COATED ORAL AT BEDTIME
COMMUNITY
Start: 2024-12-30

## 2025-01-06 RX ORDER — ALENDRONATE SODIUM 70 MG/1
TABLET ORAL
Qty: 12 TABLET | Refills: 3 | Status: SHIPPED | OUTPATIENT
Start: 2025-01-06

## 2025-01-06 SDOH — HEALTH STABILITY: PHYSICAL HEALTH: ON AVERAGE, HOW MANY MINUTES DO YOU ENGAGE IN EXERCISE AT THIS LEVEL?: 10 MIN

## 2025-01-06 SDOH — HEALTH STABILITY: PHYSICAL HEALTH: ON AVERAGE, HOW MANY DAYS PER WEEK DO YOU ENGAGE IN MODERATE TO STRENUOUS EXERCISE (LIKE A BRISK WALK)?: 7 DAYS

## 2025-01-06 ASSESSMENT — SOCIAL DETERMINANTS OF HEALTH (SDOH): HOW OFTEN DO YOU GET TOGETHER WITH FRIENDS OR RELATIVES?: ONCE A WEEK

## 2025-01-06 NOTE — PROGRESS NOTES
Preventive Care Visit  [facility] DARLENE Miner PA-C, Family Medicine  Jan 6, 2025      Assessment & Plan       ICD-10-CM    1. Encounter for Medicare annual wellness exam  Z00.00       2. Screen for colon cancer  Z12.11 Colonoscopy Screening  Referral      3. Age-related osteoporosis without current pathological fracture  M81.0 alendronate (FOSAMAX) 70 MG tablet     DX Bone Density      4. Bipolar 2 disorder (H)  F31.81       5. 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 Due for DEXA, ordered    4) Follows psych    5) Her symptoms are consistent with Dumping Syndrome. We discussed ways to avoid symptoms. She denied a referral to nutrition.       Counseling  Appropriate preventive services were addressed with this patient via screening, questionnaire, or discussion as appropriate for fall prevention, nutrition, physical activity, Tobacco-use cessation, social engagement, weight loss and cognition.  Checklist reviewing preventive services available has been given to the patient.  Reviewed patient's diet, addressing concerns and/or questions.   She is at risk for psychosocial distress and has been provided with information to reduce risk.     Return in about 1 year (around 1/6/2026) for your annual physical, a med check, fasting labs, with Kristin, in person.      William Espinal is a 59 year old, presenting for the following:  Physical        1/6/2025     7:59 AM   Additional Questions   Roomed by Edie   Accompanied by na         1/6/2025     7:59 AM   Patient Reported Additional Medications   Patient reports taking the following new medications none           History of Present Illness       Diabetes:   She presents for follow up of diabetes.    She is not checking blood glucose.     She is aware of hypoglycemia symptoms including shakiness and dizziness.    She has no  concerns regarding her diabetes at this time.   She is not experiencing numbness or burning in feet, excessive thirst, blurry vision, weight changes or redness, sores or blisters on feet. The patient has had a diabetic eye exam in the last 12 months.          Hyperlipidemia:  She presents for follow up of hyperlipidemia.   She is taking medication to lower cholesterol. She is not having myalgia or other side effects to statin medications.    Hypertension: She presents for follow up of hypertension.  She does not check blood pressure  regularly outside of the clinic. Outpatient blood pressures have not been over 140/90. She does not follow a low salt diet.      She describes symptoms of low blood sugar  Feeling weak, shaky, sometimes clammy  Needs to drink or eat something sweet and the symptoms resolve       Health Care Directive  Patient has a Health Care Directive on file  Advance care planning document is on file and is current.      1/6/2025   General Health   How would you rate your overall physical health? Good   Feel stress (tense, anxious, or unable to sleep) To some extent   (!) STRESS CONCERN      1/6/2025   Nutrition   Diet: Regular (no restrictions)         1/6/2025   Exercise   Days per week of moderate/strenous exercise 7 days   Average minutes spent exercising at this level 10 min         1/6/2025   Social Factors   Frequency of gathering with friends or relatives Once a week   Worry food won't last until get money to buy more Yes   Food not last or not have enough money for food? Yes   Do you have housing? (Housing is defined as stable permanent housing and does not include staying ouside in a car, in a tent, in an abandoned building, in an overnight shelter, or couch-surfing.) Yes   Are you worried about losing your housing? No   Lack of transportation? No   Unable to get utilities (heat,electricity)? No   (!) FOOD SECURITY CONCERN PRESENT      1/6/2025   Fall Risk   Fallen 2 or more times in the  past year? No   Trouble with walking or balance? No          1/6/2025   Activities of Daily Living- Home Safety   Needs help with the following daily activites None of the above   Safety concerns in the home None of the above         1/6/2025   Dental   Dentist two times every year? Yes         1/6/2025   Hearing Screening   Hearing concerns? None of the above         1/6/2025   Driving Risk Screening   Patient/family members have concerns about driving No         1/6/2025   General Alertness/Fatigue Screening   Have you been more tired than usual lately? No         1/6/2025   Urinary Incontinence Screening   Bothered by leaking urine in past 6 months No         1/6/2025   TB Screening   Were you born outside of the [place]? No         Today's PHQ-2 Score:       1/6/2025     5:39 AM   PHQ-2 ( 1999 [employer])   Q1: Little interest or pleasure in doing things 1   Q2: Feeling down, depressed or hopeless 1   PHQ-2 Score 2    Q1: Little interest or pleasure in doing things Several days   Q2: Feeling down, depressed or hopeless Several days   PHQ-2 Score 2       Patient-reported           1/6/2025   Substance Use   Alcohol more than 3/day or more than 7/wk Not Applicable   Do you have a current opioid prescription? No   How severe/bad is pain from 1 to 10? 3/10   Do you use any other substances recreationally? No     Social History     Tobacco Use    Smoking status: Never     Passive exposure: Never    Smokeless tobacco: Never    Tobacco comments:     NEVER SMOKED! Grew up with heavy smokers which did not help my Asthma!   Vaping Use    Vaping status: Every Day    Substances: THC, CBD, medical cannabis    Devices: Disposable   Substance Use Topics    Alcohol use: Not Currently     Comment: occasional//2 per month    Drug use: Yes     Types: Marijuana     Comment: medical marijuana, daily           11/14/2024   LAST FHS-7 RESULTS   1st degree relative breast or ovarian cancer No   Any relative bilateral breast cancer No   Any male  have breast cancer No   Any ONE woman have BOTH breast AND ovarian cancer No   Any woman with breast cancer before 50yrs No   2 or more relatives with breast AND/OR ovarian cancer No   2 or more relatives with breast AND/OR bowel cancer No        Mammogram Screening - Mammogram every 1-2 years updated in Health Maintenance based on mutual decision making      History of abnormal Pap smear: transgender female       ASCVD Risk   The 10-year ASCVD risk score (Cesario MATA, et al., 2019) is: 2.7%    Values used to calculate the score:      Age: 59 years      Sex: Female      Is Non- : No      Diabetic: No      Tobacco smoker: No      Systolic Blood Pressure: 100 mmHg      Is BP treated: No      HDL Cholesterol: 38 mg/dL      Total Cholesterol: 184 mg/dL        Reviewed and updated as needed this visit by Provider                    Current providers sharing in care for this patient include:  Patient Care Team:  Kristin Miner PA-C as PCP - General (Physician Assistant)  Dallas Lo MD as Referring Physician (Internal Medicine)  Kristin Miner PA-C as Assigned PCP  Aleksandra Sellers RN as Specialty Care Coordinator (Cardiology)  Windy Owens MD (Cardiovascular Disease)  Pierce Kirkpatrick MD as Assigned Neuroscience Provider  Fernando Ruiz MD as Assigned Musculoskeletal Provider    The following health maintenance items are reviewed in Epic and correct as of today:  Health Maintenance   Topic Date Due    COLORECTAL CANCER SCREENING  01/04/2025    MEDICARE ANNUAL WELLNESS VISIT  01/24/2025    DEXA  02/24/2025    A1C  11/14/2025    BMP  11/14/2025    FERRITIN  11/14/2025    LIPID  11/14/2025    PSA  11/14/2025    ANNUAL REVIEW OF HM ORDERS  11/14/2025    MAMMO SCREENING  11/14/2025    CBC  11/14/2025    VITAMIN B12  11/14/2025    VITAMIN D  11/14/2025    GLUCOSE  11/14/2027    ADVANCE CARE PLANNING  01/24/2029    DTAP/TDAP/TD IMMUNIZATION (6 -  "Td or Tdap) 10/23/2033    HEPATITIS C SCREENING  Completed    HIV SCREENING  Completed    PHQ-2 (once per calendar year)  Completed    INFLUENZA VACCINE  Completed    Pneumococcal Vaccine: 50+ Years  Completed    ZOSTER IMMUNIZATION  Completed    RSV VACCINE  Completed    COVID-19 Vaccine  Completed    HPV IMMUNIZATION  Aged Out    MENINGITIS IMMUNIZATION  Aged Out    RSV MONOCLONAL ANTIBODY  Aged Out    URINE DRUG SCREEN  Discontinued    PAP  Discontinued         Review of Systems  Constitutional, neuro, ENT, endocrine, pulmonary, cardiac, gastrointestinal, genitourinary, musculoskeletal, integument and psychiatric systems are negative, except as otherwise noted.     Objective    Exam  /64   Pulse 78   Temp 97.4  F (36.3  C) (Temporal)   Resp 20   Ht 1.713 m (5' 7.44\")   Wt 101.1 kg (222 lb 12.8 oz)   LMP  (LMP Unknown)   SpO2 98%   BMI 34.44 kg/m     Estimated body mass index is 34.44 kg/m  as calculated from the following:    Height as of this encounter: 1.713 m (5' 7.44\").    Weight as of this encounter: 101.1 kg (222 lb 12.8 oz).    Physical Exam  GENERAL: alert and no distress  EYES: Eyes grossly normal to inspection  HENT: ear canals and TM's normal, nose and mouth without ulcers or lesions  NECK: no adenopathy, no asymmetry, masses, or scars  RESP: lungs clear to auscultation - no rales, rhonchi or wheezes  CV: regular rates and rhythm, normal S1 S2, no S3 or S4, and no murmur, click or rub  ABDOMEN: soft, nontender, without hepatosplenomegaly or masses  MS: no gross musculoskeletal defects noted, no edema  SKIN: no suspicious lesions or rashes  NEURO: Normal strength and tone, mentation intact and speech normal  PSYCH: mentation appears normal, affect normal/bright        1/6/2025   Mini Cog   Clock Draw Score 2 Normal   3 Item Recall 3 objects recalled   Mini Cog Total Score 5              Signed Electronically by: Kristin Miner PA-C    "

## 2025-01-07 ENCOUNTER — PATIENT OUTREACH (OUTPATIENT)
Dept: CARE COORDINATION | Facility: CLINIC | Age: 60
End: 2025-01-07
Payer: COMMERCIAL

## 2025-01-09 ENCOUNTER — THERAPY VISIT (OUTPATIENT)
Dept: PHYSICAL THERAPY | Facility: CLINIC | Age: 60
End: 2025-01-09
Payer: COMMERCIAL

## 2025-01-09 ENCOUNTER — RADIOLOGY INJECTION OFFICE VISIT (OUTPATIENT)
Dept: PALLIATIVE MEDICINE | Facility: CLINIC | Age: 60
End: 2025-01-09
Attending: ORTHOPAEDIC SURGERY
Payer: COMMERCIAL

## 2025-01-09 VITALS — SYSTOLIC BLOOD PRESSURE: 132 MMHG | DIASTOLIC BLOOD PRESSURE: 74 MMHG | OXYGEN SATURATION: 96 % | HEART RATE: 89 BPM

## 2025-01-09 DIAGNOSIS — M54.12 CERVICAL RADICULOPATHY: ICD-10-CM

## 2025-01-09 DIAGNOSIS — Z98.1 S/P CERVICAL SPINAL FUSION: ICD-10-CM

## 2025-01-09 DIAGNOSIS — Z98.1 S/P CERVICAL SPINAL FUSION: Primary | ICD-10-CM

## 2025-01-09 RX ORDER — DEXAMETHASONE SODIUM PHOSPHATE 10 MG/ML
10 INJECTION, SOLUTION INTRAMUSCULAR; INTRAVENOUS ONCE
Status: COMPLETED | OUTPATIENT
Start: 2025-01-09 | End: 2025-01-09

## 2025-01-09 RX ADMIN — DEXAMETHASONE SODIUM PHOSPHATE 10 MG: 10 INJECTION, SOLUTION INTRAMUSCULAR; INTRAVENOUS at 21:03

## 2025-01-09 ASSESSMENT — PAIN SCALES - GENERAL
PAINLEVEL_OUTOF10: NO PAIN (0)
PAINLEVEL_OUTOF10: MILD PAIN (3)

## 2025-01-09 NOTE — NURSING NOTE
Pre-procedure Intake  If YES to any questions or NO to having a   Please complete laminated checklist and leave on the computer keyboard for Provider, verbally inform provider if able.    For SCS Trial, RFA's or any sedation procedure:  Have you been fasting? NA  If yes, for how long?     Are you taking any any blood thinners such as Coumadin, Warfarin, Jantoven, Pradaxa Xarelto, Eliquis, Edoxaban, Enoxaparin, Lovenox, Heparin, Arixtra, Fondaparinux, or Fragmin? OR Antiplatelet medication such as Plavix, Brilinta, or Effient?   No   If yes, when did you take your last dose?     Do you take aspirin?  No  If cervical procedure, have you held aspirin for 6 days?       Is the Pt taking any GLP-1 Antagonist (hold needed for sedation patients only)  (semaglutide (Ozempic, Wegovy), dulaglutide (Trulicity), exenatide ER (Bydureon), tirzepatide (Mounjaro), Liraglutide (Saxenda, Victoza), semaglutide (Rybelsus)     No  If yes, when did you take your last dose?     Do you have any allergies to contrast dye, iodine, steroid and/or numbing medications?  NO    Are you currently taking antibiotics or have an active infection?  NO    Have you had a fever/elevated temperature within the past week? NO    Are you currently taking oral steroids? NO    Do you have a ? Yes    Are you pregnant or breastfeeding?  NO    Have you received any vaccinations in the last week? NO    Notify provider and RNs if systolic BP >170, diastolic BP >100, P >100 or O2 sats < 90%

## 2025-01-09 NOTE — NURSING NOTE
Discharge Information    IV Discontiued Time:  NA    Amount of Fluid Infused:  NA    Discharge Criteria = When patient returns to baseline or as per MD order    Consciousness:  Pt is fully awake    Circulation:  BP +/- 20% of pre-procedure level    Respiration:  Patient is able to breathe deeply    O2 Sat:  Patient is able to maintain O2 Sat >92% on room air    Activity:  Moves 4 extremities on command    Ambulation:  Patient is able to stand and walk or stand and pivot into wheelchair    Dressing:  Clean/dry or No Dressing    Notes:   Discharge instructions and AVS given to patient    Patient meets criteria for discharge?  YES    Admitted to PCU?  No    Responsible adult present to accompany patient home?  Yes    Signature/Title:    Lola Cam RN  RN Care Coordinator  [facility]

## 2025-01-09 NOTE — PATIENT INSTRUCTIONS
[facility]   Procedure Discharge Instructions    Today you saw:    Dr. Omari Marcus,         You had a(n):  Cervical epidural steroid injection    Medications used for cervical epidural: Lidocaine, Omnipaque, Dexamethasone, Bupividcaine, normal saline        Be cautious with activities. Numbness and/or weakness in the upper extremities may occur for up to 6-8 hours after the procedure due to effect of the local anesthetic  Do not drive for 6 hours. The effect of the local anesthetic could slow your reflexes.   You may resume your regular activities after 24 hours  Avoid strenuous activity for the first 24 hours  You may shower, however avoid swimming, tub baths or hot tubs for 24 hours following your procedure  You may have a mild to moderate increase in pain for several days following the injection.  It may take up to 14 days for the steroid medication to start working although you may feel the effect as early as a few days after the procedure.     You may use ice packs for 10-15 minutes, 3 to 4 times a day at the injection site for comfort  Do not use heat to painful areas for 6 to 8 hours. This will give the local anesthetic time to wear off and prevent you from accidentally burning your skin.   Unless you have been directed to avoid the use of anti-inflammatory medications (NSAIDS), you may use medications such as ibuprofen, Aleve or Tylenol for pain control if needed.   If you have diabetes, check your blood sugar more frequently than usual as your blood sugar may be higher than normal for 10-14 days following a steroid injection. Contact your doctor who manages your diabetes if your blood sugar is higher than usual  Possible side effects of steroids that you may experience include flushing, elevated blood pressure, increased appetite, mild headaches and restlessness.  All of these symptoms will get better with time.  If you experience any of the following, call the Pain Clinic  during work hours (Mon-Friday 8-4:30 pm) at [phone] or the Provider Line after hours at [phone]:  -Fever over 100 degree F  -Swelling, bleeding, redness, drainage, warmth at the injection site  -Progressive weakness or numbness in your arms  -Loss of bowel or bladder function  -Unusual headache not relieved by Tylenol or other pain reliever  -Unusual new onset of pain that is not improving

## 2025-01-10 NOTE — PROGRESS NOTES
[facility] - Procedure Note    Date of Visit: 1/9/2025    Procedure performed: T1-T2 interlaminar epidural steroid injection with fluoroscopic guidance  Diagnosis: Cervical radiculitis/radiculopathy  : Omari Marcus MD  Anesthesia: none  Complication: Difficulty accessing space at C7-T1  left of midline ultimately changed to T1-2 with appropriate spread    Indications: Kylie Austin is a 59 year old female who is seen for cervical epidural steroid injection. The patient describes neck pain with numbness rad to her UE. The patient has been exhibiting symptoms consistent with cervical intraspinal inflammation and radiculopathy. Symptoms have been persistent, disabling, and intermittently severe. The patient reports minimal improvement with conservative treatment, including meds/pt/surgery .    Cervical MRI rev    Allergies:      Allergies   Allergen Reactions    Amiodarone      Caused pain fatigue/amplified anxiety and depression    Fluoxetine Other (See Comments)     Night terrors    Tetracycline      Teeth rattle/saliva acidic        Vitals:  /74   Pulse 89   LMP  (LMP Unknown)   SpO2 96%     Review of Systems: The patient denies recent fever, chills, illness, use of antibiotics or anticoagulants. All other 10-point review of systems negative.     Procedure: The procedure and risks were explained, and informed written consent was obtained from the patient. Risks include but are not limited to: infection, bleeding, increased pain, and damage to soft tissue, nerve, muscle, and vasculature structures. After getting informed consent, patient was brought into the procedure suite and was placed in a prone position on the procedure table. A Pause for the Cause was performed. Patient was prepped and draped in sterile fashion.     The T1-2 interspace was identified with use of fluoroscopy in AP view. A 25-gauge, 1.5 inch needle was used to anesthetize the skin and  subcutaneous tissue entry site with a total of 2 ml of 1% lidocaine. Under fluoroscopic visualization, a 22-gauge, 3.5 inch Tuohy epidural needle was slowly advanced towards the epidural space a few millimeters left of midline. The latter part of the needle advancement was guided with fluoroscopy in the lateral view. The epidural space was identified using loss of resistance technique. After negative aspiration for heme and cerebrospinal fluid, a total of 1 mL of Omnipaque was injected to confirm needle placement. 9 mL of contrast was wasted. Epidurogram confirmed spread within the posterior epidural space. 2 ml of  NS,1 ml 10mg/ml of dexamethasone, and 1 ml of preservative free 0.25% bupivacaine was injected. The needle was removed.  Images were saved to PACS.    The patient tolerated the procedure well, and there was no evidence of procedural complications. No new sensory or motor deficits were noted following the procedure. The patient was stable and able to ambulate on discharge home. Post-procedure instructions were provided.     Pre-procedure pain score: 3/10 in the neck, 3/10 in the arm  Post-procedure pain score: 0/10 in the neck, 0/10 in the arm    Assessment/Plan: Kylie Austin is a 59 year old female s/p cervical interlaminar epidural steroid injection today for cervical spondylosis and radiculitis/radiculopathy.     Following today's procedure, the patient was advised to contact the [facility] for any of the following:   Fever, chills, or night sweats   New onset of pain, numbness, or weakness   Any questions/concerns regarding the procedure  If unable to contact the Pain Center, the patient was instructed to go to a local Emergency Room for any complications.     Follow-up with provider in 2 weeks for post-procedure evaluation.    Omari Marcus MD   Pain Management    [facility]

## 2025-01-15 ENCOUNTER — TELEPHONE (OUTPATIENT)
Dept: GASTROENTEROLOGY | Facility: CLINIC | Age: 60
End: 2025-01-15
Payer: COMMERCIAL

## 2025-01-15 NOTE — TELEPHONE ENCOUNTER
"Endoscopy Scheduling Screen    Have you had any respiratory illness or flu-like symptoms in the last 10 days?  No    What is your communication preference for Instructions and/or Bowel Prep?   MyChart    What insurance is in the chart?  Other:  bcbs     Ordering/Referring Provider: SEUN MULLER    (If ordering provider performs procedure, schedule with ordering provider unless otherwise instructed. )    BMI: Estimated body mass index is 34.44 kg/m  as calculated from the following:    Height as of 1/6/25: 1.713 m (5' 7.44\").    Weight as of 1/6/25: 101.1 kg (222 lb 12.8 oz).     Sedation Ordered  moderate sedation.   If patient BMI > 50 do not schedule in [facility].    If patient BMI > 45 do not schedule at [facility].    Are you taking methadone or Suboxone?  NO, No RN review required.    Have you been diagnosed and are being treated for severe PTSD or severe anxiety?  NO, No RN review required.    Are you taking any prescription medications for pain 3 or more times per week?   NO, No RN review required.    Do you have a history of malignant hyperthermia?  No    (Females) Are you currently pregnant?   No     Have you been diagnosed or told you have pulmonary hypertension?   No    Do you have an LVAD?  No    Have you been told you have moderate to severe sleep apnea?  Yes. Do you use a CPAP? Yes Where is the patient located?. (RN Review required for scheduling unless scheduling in Hospital.)     Have you been told you have COPD, asthma, or any other lung disease?  No    Do you have any heart conditions?  No     Have you ever had or are you waiting for an organ transplant?  No. Continue scheduling, no site restrictions.    Have you had a stroke or transient ischemic attack (TIA aka \"mini stroke\" in the last 6 months?   No    Have you been diagnosed with or been told you have cirrhosis of the liver?   No.    Are you currently on dialysis?   No    Do you need assistance transferring?   No    BMI: Estimated body mass index " "is 34.44 kg/m  as calculated from the following:    Height as of 1/6/25: 1.713 m (5' 7.44\").    Weight as of 1/6/25: 101.1 kg (222 lb 12.8 oz).     Is patients BMI > 40 and scheduling location [facility]?  No    Do you take an injectable or oral medication for weight loss or diabetes (excluding insulin)?  No    Do you take the medication Naltrexone?  No    Do you take blood thinners?  No       Prep   Are you currently on dialysis or do you have chronic kidney disease?  No    Do you have a diagnosis of diabetes?  No    Do you have a diagnosis of cystic fibrosis (CF)?  No    On a regular basis do you go 3 -5 days between bowel movements?  No    BMI > 40?  No    Preferred Pharmacy:    [id number] - [place], [place] - [address]  [address]  [place] [address]  Phone: [phone] Fax: [phone]    Final Scheduling Details     Procedure scheduled  Colonoscopy    Surgeon:  Leventhal      Date of procedure:  01/27     Pre-OP / PAC:   No - Not required for this site.    Location  [facility] - Per exclusion criteria.    Sedation   Moderate Sedation - Per order.      Patient Reminders:   You will receive a call from a Nurse to review instructions and health history.  This assessment must be completed prior to your procedure.  Failure to complete the Nurse assessment may result in the procedure being cancelled.      On the day of your procedure, please designate an adult(s) who can drive you home stay with you for the next 24 hours. The medicines used in the exam will make you sleepy. You will not be able to drive.      You cannot take public transportation, ride share services, or non-medical taxi service without a responsible caregiver.  Medical transport services are allowed with the requirement that a responsible caregiver will receive you at your destination.  We require that drivers and caregivers are confirmed prior to your procedure.  "

## 2025-01-16 ENCOUNTER — THERAPY VISIT (OUTPATIENT)
Dept: PHYSICAL THERAPY | Facility: CLINIC | Age: 60
End: 2025-01-16
Payer: COMMERCIAL

## 2025-01-16 ENCOUNTER — TELEPHONE (OUTPATIENT)
Dept: GASTROENTEROLOGY | Facility: CLINIC | Age: 60
End: 2025-01-16

## 2025-01-16 DIAGNOSIS — Z98.1 S/P CERVICAL SPINAL FUSION: Primary | ICD-10-CM

## 2025-01-16 NOTE — TELEPHONE ENCOUNTER
Add on    Pre visit planning completed.      Procedure details:    Patient scheduled for Colonoscopy on 1.27.25.     Arrival time: 0700. Procedure time 0800    Facility location: [facility]; [address], [address], [place] [address]. Check in location: Main entrance at registration desk.    Sedation type: Conscious sedation     Pre op exam needed? No.    Indication for procedure: screening      Chart review:     Electronic implanted devices? No    Recent diagnosis of diverticulitis within the last 6 weeks? No      Medication review:    Diabetic? Prediabetic    Anticoagulants? No    Weight loss medication/injectable? No GLP-1 medication per patient's medication list. Nursing to verify with pre-assessment call.    Other medication HOLDING recommendations:  Cannabis/Marijuana: Stop night before procedure.      Prep for procedure:     Bowel prep recommendation: Standard Miralax.   Due to: standard bowel prep    Procedure information and instructions sent via [employer]         Pauline Lora RN  Endoscopy Procedure Pre Assessment   [phone] option 2

## 2025-01-16 NOTE — TELEPHONE ENCOUNTER
Pre assessment completed for upcoming procedure.   (Please see previous telephone encounter notes for complete details)      Procedure details:    Arrival time and facility location reviewed.    Pre op exam needed? No.    Designated  policy reviewed. Instructed to have someone stay 6  hours post procedure.       Medication review:    Medications reviewed. Please see supporting documentation below. Holding recommendations discussed (if applicable).   Cannabis/Marijuana : STOP night before procedure.      Prep for procedure:     Procedure prep instructions reviewed.        Any additional information needed:  N/A      Patient verbalized understanding and had no questions or concerns at this time.      Alivia Martínez LPN  Endoscopy Procedure Pre Assessment   [phone] option 2

## 2025-01-27 ENCOUNTER — HOSPITAL ENCOUNTER (OUTPATIENT)
Facility: CLINIC | Age: 60
Discharge: HOME | End: 2025-01-27
Attending: INTERNAL MEDICINE | Admitting: INTERNAL MEDICINE
Payer: COMMERCIAL

## 2025-01-27 VITALS
OXYGEN SATURATION: 100 % | DIASTOLIC BLOOD PRESSURE: 79 MMHG | SYSTOLIC BLOOD PRESSURE: 140 MMHG | HEART RATE: 72 BPM | RESPIRATION RATE: 15 BRPM

## 2025-01-27 LAB
COLONOSCOPY: NORMAL
GLUCOSE BLDC GLUCOMTR-MCNC: 93 MG/DL (ref 70–99)

## 2025-01-27 PROCEDURE — 45385 COLONOSCOPY W/LESION REMOVAL: CPT | Mod: PT | Performed by: INTERNAL MEDICINE

## 2025-01-27 PROCEDURE — 88305 TISSUE EXAM BY PATHOLOGIST: CPT | Mod: 26 | Performed by: PATHOLOGY

## 2025-01-27 PROCEDURE — 82962 GLUCOSE BLOOD TEST: CPT

## 2025-01-27 PROCEDURE — 99153 MOD SED SAME PHYS/QHP EA: CPT | Performed by: INTERNAL MEDICINE

## 2025-01-27 PROCEDURE — 250N000011 HC RX IP 250 OP 636: Performed by: INTERNAL MEDICINE

## 2025-01-27 PROCEDURE — 45380 COLONOSCOPY AND BIOPSY: CPT | Performed by: INTERNAL MEDICINE

## 2025-01-27 PROCEDURE — G0500 MOD SEDAT ENDO SERVICE >5YRS: HCPCS | Performed by: INTERNAL MEDICINE

## 2025-01-27 PROCEDURE — 88305 TISSUE EXAM BY PATHOLOGIST: CPT | Mod: TC | Performed by: INTERNAL MEDICINE

## 2025-01-27 RX ORDER — FLUMAZENIL 0.1 MG/ML
0.2 INJECTION, SOLUTION INTRAVENOUS
Status: DISCONTINUED | OUTPATIENT
Start: 2025-01-27 | End: 2025-01-27 | Stop reason: HOSPADM

## 2025-01-27 RX ORDER — PROCHLORPERAZINE MALEATE 5 MG/1
10 TABLET ORAL EVERY 6 HOURS PRN
Status: DISCONTINUED | OUTPATIENT
Start: 2025-01-27 | End: 2025-01-27 | Stop reason: HOSPADM

## 2025-01-27 RX ORDER — NALOXONE HYDROCHLORIDE 0.4 MG/ML
0.2 INJECTION, SOLUTION INTRAMUSCULAR; INTRAVENOUS; SUBCUTANEOUS
Status: DISCONTINUED | OUTPATIENT
Start: 2025-01-27 | End: 2025-01-27 | Stop reason: HOSPADM

## 2025-01-27 RX ORDER — ONDANSETRON 2 MG/ML
4 INJECTION INTRAMUSCULAR; INTRAVENOUS EVERY 6 HOURS PRN
Status: DISCONTINUED | OUTPATIENT
Start: 2025-01-27 | End: 2025-01-27 | Stop reason: HOSPADM

## 2025-01-27 RX ORDER — ONDANSETRON 4 MG/1
4 TABLET, ORALLY DISINTEGRATING ORAL EVERY 6 HOURS PRN
Status: DISCONTINUED | OUTPATIENT
Start: 2025-01-27 | End: 2025-01-27 | Stop reason: HOSPADM

## 2025-01-27 RX ORDER — LIDOCAINE 40 MG/G
CREAM TOPICAL
Status: DISCONTINUED | OUTPATIENT
Start: 2025-01-27 | End: 2025-01-27 | Stop reason: HOSPADM

## 2025-01-27 RX ORDER — NALOXONE HYDROCHLORIDE 0.4 MG/ML
0.4 INJECTION, SOLUTION INTRAMUSCULAR; INTRAVENOUS; SUBCUTANEOUS
Status: DISCONTINUED | OUTPATIENT
Start: 2025-01-27 | End: 2025-01-27 | Stop reason: HOSPADM

## 2025-01-27 RX ORDER — ONDANSETRON 2 MG/ML
4 INJECTION INTRAMUSCULAR; INTRAVENOUS
Status: DISCONTINUED | OUTPATIENT
Start: 2025-01-27 | End: 2025-01-27 | Stop reason: HOSPADM

## 2025-01-27 RX ORDER — FENTANYL CITRATE 50 UG/ML
INJECTION, SOLUTION INTRAMUSCULAR; INTRAVENOUS PRN
Status: DISCONTINUED | OUTPATIENT
Start: 2025-01-27 | End: 2025-01-27 | Stop reason: HOSPADM

## 2025-01-27 ASSESSMENT — ACTIVITIES OF DAILY LIVING (ADL)
ADLS_ACUITY_SCORE: 47
ADLS_ACUITY_SCORE: 47

## 2025-01-27 NOTE — OR NURSING
Colonoscopy under conscious sedation.  2 polyps removed with cold snare.  Pt tolerated procedure.

## 2025-01-27 NOTE — H&P
Kylie Nunes Compton  [id number]  female  59 year old      Reason for procedure/surgery: Colonoscopy    Patient Active Problem List   Diagnosis    Gender identity disorder    Hyperlipidemia LDL goal <130    Abnormal results of liver function studies    Chronic pain disorder    Insomnia    Bipolar 2 disorder (H)    H/O hypogonadism    Fibromyalgia    Glenohumeral arthritis    Vitamin D deficiency    Primary osteoarthritis of left shoulder    Anxiety    Cervical spondylosis without myelopathy    Chronic fatigue    S/P laparoscopic sleeve gastrectomy    Glenohumeral arthritis, left    Dependent edema    Status post shoulder surgery    AV block, 2nd degree    Leg swelling    Encounter for long-term (current) use of medications    S/P cervical spinal fusion    Postmenopausal status    Chronic pain of left knee    Prediabetes    Carpal tunnel syndrome of right wrist    Transgender person on hormone therapy    Age-related osteoporosis without current pathological fracture       Past Surgical History:    Past Surgical History:   Procedure Laterality Date    ARTHROPLASTY SHOULDER Left 1/22/2020    Procedure: Left Total shoulder arthroplasty, distal clavicle excision;  Surgeon: Omari Tobin MD;  [facility]    BIOPSY  2005, 8/8/13    Stomach, colon    BLOCK, NERVE, GENICULAR Left 2/28/2023    Procedure: BLOCK, NERVE, GENICULAR;  Surgeon: Mathew Bloom MD;  Location: [facility] OR    COLONOSCOPY  8/8/2013    Procedure: COLONOSCOPY;  COLONSCOPY SCREEN/ SE;  Surgeon: Santino Sun MD;  Location: [facility]    COLONOSCOPY N/A 1/4/2022    Procedure: COLONOSCOPY, WITH POLYPECTOMY AND BIOPSY;  Surgeon: Dallas Velazco MD;  [facility]: [facility]    COLONOSCOPY WITH CO2 INSUFFLATION N/A 11/1/2018    Procedure: COLONOSCOPY WITH CO2 INSUFFLATION;  Surgeon: Santino Sun MD;  [facility]    COLONOSCOPY WITH CO2 INSUFFLATION N/A 12/15/2020    Procedure: COLONOSCOPY, WITH CO2 INSUFFLATION;   Surgeon: Nima Kamara MD;  [facility]    DECOMPRESSION, FUSION CERVICAL ANTERIOR ONE LEVEL, COMBINED N/A 10/28/2022    Procedure: Cervical 6 to Thoracic 1 anterior cervical decompression and fusion with medtronic plate and screws, interbody device, use of fluoroscopy, microscope;  Surgeon: Fernando Ruiz MD;  Location: [facility]    Gender Reassignment  05/2016    GRAFT BONE FROM ILIAC CREST Left 10/28/2022    Procedure: and left sided iliac crest autograft;  Surgeon: Fernando Ruiz MD;  [facility]    HEAD & NECK SURGERY  2015    ablation of nerve from neck to left arm    INJECT EPIDURAL CERVICAL N/A 7/29/2022    Procedure: INJECTION, SPINE, CERVICAL, EPIDURAL T1-T2;  Surgeon: Kenya Ferrell MD;  [facility]    LAPAROSCOPIC GASTRIC SLEEVE N/A 4/10/2018    Procedure: LAPAROSCOPIC GASTRIC SLEEVE;  Laparoscopic Sleeve Gastrectomy;  Surgeon: Jani Shah MD;  [facility]    MANDIBLE SURGERY  1986    ORCHIECTOMY INGUINAL BILATERAL  7/16/2013    Procedure: ORCHIECTOMY INGUINAL BILATERAL;  Bilateral Simple Inguinal Orchiectomy ;  Surgeon: Jan Garrett MD;  [facility]    TONSILLECTOMY         Past Medical History:   Past Medical History:   Diagnosis Date    Anxiety 2005    Brought on by Amioderone    Arthritis 08/2005    Atrial fib/flutter, transient (H)     S/p cardioversion 2004    Bipolar 2 disorder (H)     Bleeding disorder 1987    nose bleeds    Chronic pain     LUE pain    Congestive heart failure (H) 11/18/2004    cured 2005    Depressive disorder     on and off most of my life!    Fibromyalgia     Gender identity disorder Started Rx 2012    H/O hypogonadism Gonadectomy 2013    Hyperlipidemia     Hypertension     Other mental problems     bipolar    Psoriasis     Sleep apnea     Uncomplicated asthma 1964    cured in 4/2001    Vision disorder 06/2005       Social History:   Social History     Tobacco Use    Smoking status: Never     Passive  exposure: Never    Smokeless tobacco: Never    Tobacco comments:     NEVER SMOKED! Grew up with heavy smokers which did not help my Asthma!   Substance Use Topics    Alcohol use: Not Currently     Comment: occasional//2 per month       Family History:   Family History   Problem Relation Age of Onset    Breast Cancer Mother     Cancer Father         skin    Diabetes Father         Was on the patch when they were new    Heart Disease Father 55        scd    Coronary Artery Disease Father     Hypertension Father     Hyperlipidemia Father     Diabetes Sister     Diabetes Sister     Thyroid Disease Sister     Osteoporosis Maternal Grandmother     Alzheimer Disease Paternal Grandmother     Diabetes Paternal Grandmother     Mental Illness Paternal Grandmother         alshimers    Osteoporosis Paternal Grandmother     Coronary Artery Disease Paternal Grandfather     Hypertension Paternal Grandfather     Hyperlipidemia Paternal Grandfather     Prostate Cancer Paternal Grandfather     Other Cancer Paternal Grandfather     Depression Daughter     Unknown/Adopted Daughter     Depression Daughter     Mental Illness Daughter         bipolar    Anxiety Disorder Daughter     Anxiety Disorder Daughter     Depression Son     Heart Disease Other     Anesthesia Reaction No family hx of     Deep Vein Thrombosis (DVT) No family hx of        Allergies:   Allergies   Allergen Reactions    Amiodarone      Caused pain fatigue/amplified anxiety and depression    Fluoxetine Other (See Comments)     Night terrors    Tetracycline      Teeth rattle/saliva acidic       Active Medications:   No current outpatient medications on file.       Systemic Review:   CONSTITUTIONAL: NEGATIVE for fever, chills, change in weight  ENT/MOUTH: NEGATIVE for ear, mouth and throat problems  RESP: NEGATIVE for significant cough or SOB  CV: NEGATIVE for chest pain, palpitations or peripheral edema    Physical Examination:   Vital Signs: /71 (BP Location: Left  arm)   Pulse 70   Resp 15   LMP  (LMP Unknown)   SpO2 98%   GENERAL: healthy, alert and no distress  RESP: Normal respiratory excursion   CV: regular rates and rhythm and no peripheral edema  ABDOMEN: soft, nontender and bowel sounds normal  MS: no gross musculoskeletal defects noted, no edema    Plan: Appropriate to proceed as scheduled.      Thomas M. Leventhal, MD  1/27/2025    PCP:  Kristin Miner

## 2025-01-28 ENCOUNTER — THERAPY VISIT (OUTPATIENT)
Dept: PHYSICAL THERAPY | Facility: CLINIC | Age: 60
End: 2025-01-28
Payer: COMMERCIAL

## 2025-01-28 DIAGNOSIS — Z98.1 S/P CERVICAL SPINAL FUSION: Primary | ICD-10-CM

## 2025-01-28 PROCEDURE — 99207 PR NO CHARGE LOS: CPT | Mod: GP | Performed by: PHYSICAL THERAPIST

## 2025-01-28 NOTE — PROGRESS NOTES
"   01/28/25 0500   Appointment Info   Signing clinician's name / credentials Miguel Jefferson, MAYRA   Total/Authorized Visits 6   Visits Used 4   Medical Diagnosis S/P cervical spinal fusion   PT Tx Diagnosis upper extremity paresthesias   Progress Note/Certification   Start of Care Date 12/11/24   Onset of illness/injury or Date of Surgery 12/06/24  (referred to PT)   Therapy Frequency every other week   Predicted Duration six visits   Certification date from 12/11/24   Certification date to 02/19/25   Progress Note Completed Date 12/11/24   PT Goal 1   Goal Description Lift 5 pounds to shoulder height   Rationale to maximize safety and independence with performance of ADLs and functional tasks   Goal Progress \"Tired\" with two pounds.   Target Date 02/19/25   Subjective Report   Subjective Report Pt reports she had difficulty moving her L hand about a week ago that came on fairly sudden.  Wasn't necessarily painful.  Consulted with Dr Ruiz's team over the phone and was advised for ED but she did not go.  Notes that there has been some improvement over the past week but is just now getting back to where she can hold about two pounds.  \"The neck feels fine.\"   Objective Measure 1   Details No symptoms with cervical ROM all directions.  MMT shoulder flexion, abduction; elbow flexion and extension grossly symmetric.  4/5 wrist flexion and extension L, 5/5 on R.   Biceps, triceps, brachioradialis reflexes 1+ B.   Therapeutic Procedure/Exercise   Therapeutic Procedures: strength, endurance, ROM, flexibility minutes (65773) 5  (also discussion of revised plan of care below)   Ther Proc 1 HEP   Ther Proc 1 - Details Brief review of HEP.  Pt to continue given she likes the program and has found it helpful proximally.  She is noting gradual improvement in ability to hold objects distally but there is noted weakness on exam; see note to referring provider.   Manual Therapy   Manual Therapy 1 Deferred given proximal progress "   Plan   Plan for next session See below.   Total Session Time   Timed Code Treatment Minutes 5   Total Treatment Time (sum of timed and untimed services) 5       Hand strength (pounds) Left Right Comments    strength (position 2) 18 90 Noted similar magnitude with rapid alternating test   Key 2 20    Pinch 2 2 13    Pinch 3 1 12    Pinch 4 1 7    Pinch 5 0 5          PLAN  Pleased that there has been progress proximally but has had recent onset distal weakness.  See chart re: communication between referring provider and patient.  She was not seen in ED and she is noting some progress since then.  However, I would recommend follow up with Dr Ruiz as soon as is possible to understand new presentation.  Also revisited recommendation that pt be seen more urgently if there is marked change.    Beginning/End Dates of Progress Note Reporting Period:  12/11/24 to 01/28/2025    Referring Provider:  Fernando Ruiz

## 2025-02-10 PROBLEM — D12.6 ADENOMATOUS POLYP OF COLON: Status: ACTIVE | Noted: 2025-02-10

## 2025-02-11 ENCOUNTER — PATIENT OUTREACH (OUTPATIENT)
Dept: GASTROENTEROLOGY | Facility: CLINIC | Age: 60
End: 2025-02-11
Payer: COMMERCIAL

## 2025-02-11 DIAGNOSIS — Z98.1 S/P CERVICAL SPINAL FUSION: ICD-10-CM

## 2025-02-12 NOTE — TELEPHONE ENCOUNTER
Called and left message asking patient to call back. Want to confirm how much gabapentin she is currently taking.

## 2025-02-12 NOTE — TELEPHONE ENCOUNTER
Pending Prescriptions:                       Disp   Refills    gabapentin (NEURONTIN) 300 MG capsule [Ph*90 cap*0            Sig: Take 1 capsule (300mg) at night for 1 week, then           1 capsule twice daily for 1 week (morning and           night), then 1 capsule three times daily the           following week if tolerated        Requested Pharmacy: [facility] in [place]    Pt's last office visit: 12/6/24  Next scheduled office visit: 3/7/25      Per the RN/LPN medication refill protocol, writer is unable to refill this request.

## 2025-02-13 RX ORDER — GABAPENTIN 300 MG/1
300 CAPSULE ORAL 3 TIMES DAILY
Qty: 90 CAPSULE | Refills: 0 | Status: SHIPPED | OUTPATIENT
Start: 2025-02-13

## 2025-02-19 ENCOUNTER — OFFICE VISIT (OUTPATIENT)
Dept: FAMILY MEDICINE | Facility: CLINIC | Age: 60
End: 2025-02-19
Payer: COMMERCIAL

## 2025-02-19 ENCOUNTER — ANCILLARY PROCEDURE (OUTPATIENT)
Dept: GENERAL RADIOLOGY | Facility: CLINIC | Age: 60
End: 2025-02-19
Attending: NURSE PRACTITIONER
Payer: COMMERCIAL

## 2025-02-19 VITALS
RESPIRATION RATE: 18 BRPM | OXYGEN SATURATION: 98 % | TEMPERATURE: 98.5 F | HEART RATE: 95 BPM | DIASTOLIC BLOOD PRESSURE: 82 MMHG | SYSTOLIC BLOOD PRESSURE: 138 MMHG | WEIGHT: 239 LBS | BODY MASS INDEX: 36.22 KG/M2 | HEIGHT: 68 IN

## 2025-02-19 DIAGNOSIS — M25.531 RIGHT WRIST PAIN: Primary | ICD-10-CM

## 2025-02-19 DIAGNOSIS — M25.531 RIGHT WRIST PAIN: ICD-10-CM

## 2025-02-19 DIAGNOSIS — S63.501A WRIST SPRAIN, RIGHT, INITIAL ENCOUNTER: ICD-10-CM

## 2025-02-19 DIAGNOSIS — S62.111A CLOSED DISPLACED FRACTURE OF TRIQUETRUM OF RIGHT WRIST, INITIAL ENCOUNTER: Primary | ICD-10-CM

## 2025-02-19 PROCEDURE — 73110 X-RAY EXAM OF WRIST: CPT | Mod: TC | Performed by: STUDENT IN AN ORGANIZED HEALTH CARE EDUCATION/TRAINING PROGRAM

## 2025-02-19 PROCEDURE — 99213 OFFICE O/P EST LOW 20 MIN: CPT | Performed by: NURSE PRACTITIONER

## 2025-02-19 RX ORDER — METFORMIN HYDROCHLORIDE 500 MG/1
500 TABLET, EXTENDED RELEASE ORAL
COMMUNITY
Start: 2025-02-12

## 2025-02-19 ASSESSMENT — PAIN SCALES - GENERAL: PAINLEVEL_OUTOF10: SEVERE PAIN (8)

## 2025-02-19 NOTE — PATIENT INSTRUCTIONS
Recommend applying ice to your wrist.    Wear your wrist brace.    Take over-the-counter Tylenol or ibuprofen twice per day routinely for the next week.    Gentle range of motion.    If the pain continues into next week please send me a MyChart message and let me know.  May need to get you set up for a CT scan of your wrist or into see orthopedics at that time.

## 2025-02-19 NOTE — PROGRESS NOTES
"  Assessment & Plan     Right wrist pain  - XR Wrist Right G/E 3 Views; Future    Wrist sprain, right, initial encounter  X-ray unremarkable.  Wrist brace.  Ice.  Tylenol or ibuprofen as needed.  Gentle range of motion.  Notify if no improvement over the next week and may need CT scan or to follow-up with Ortho.    Xray - Reviewed and interpreted by me.  Negative per my read       Subjective   Kylie is a 59 year old, presenting for the following health issues:  Hand Injury (X 1 week, had some swelling. Fell and slammed hand )        2/19/2025    12:59 PM   Additional Questions   Roomed by BEN Agustin   Accompanied by Anuradha - Partner         2/19/2025    12:59 PM   Patient Reported Additional Medications   Patient reports taking the following new medications advil OTC     History of Present Illness       Reason for visit:  Right hand  Symptom onset:  3-7 days ago   She is taking medications regularly.       Fell about a week ago and broke left elbow.  Was having pain to right wrist and hand at that time as well.  Had x-ray that was unremarkable.  Continues to have right wrist pain.  Is still having some swelling.  Hurts with movement.  Has been taking over-the-counter ibuprofen and that does help to decrease the discomfort.  No numbness or tingling.  No fevers or chills.  Has never injured this wrist in the past.          Objective    /82   Pulse 95   Temp 98.5  F (36.9  C) (Temporal)   Resp 18   Ht 1.715 m (5' 7.5\")   Wt 108.4 kg (239 lb)   LMP  (LMP Unknown)   SpO2 98%   Breastfeeding No   BMI 36.88 kg/m    Body mass index is 36.88 kg/m .  Physical Exam  Constitutional:       Appearance: Normal appearance.   HENT:      Nose: No congestion.   Eyes:      General: Lids are normal.   Pulmonary:      Effort: No tachypnea, bradypnea or respiratory distress.   Musculoskeletal:      Right wrist: Swelling, tenderness and bony tenderness present. No deformity, snuff box tenderness or crepitus. Decreased range of " motion. Abnormal pulse.   Skin:     Coloration: Skin is not ashen, cyanotic, jaundiced or pale.   Neurological:      Mental Status: She is alert.   Psychiatric:         Mood and Affect: Mood normal.         Speech: Speech normal.         Behavior: Behavior normal.                Signed Electronically by: TALIB Pham CNP

## 2025-02-20 ENCOUNTER — OFFICE VISIT (OUTPATIENT)
Dept: ORTHOPEDICS | Facility: CLINIC | Age: 60
End: 2025-02-20
Attending: NURSE PRACTITIONER
Payer: COMMERCIAL

## 2025-02-20 ENCOUNTER — TELEPHONE (OUTPATIENT)
Dept: ORTHOPEDICS | Facility: CLINIC | Age: 60
End: 2025-02-20

## 2025-02-20 VITALS
OXYGEN SATURATION: 98 % | DIASTOLIC BLOOD PRESSURE: 78 MMHG | HEIGHT: 68 IN | HEART RATE: 79 BPM | WEIGHT: 239 LBS | BODY MASS INDEX: 36.22 KG/M2 | SYSTOLIC BLOOD PRESSURE: 126 MMHG

## 2025-02-20 DIAGNOSIS — S62.111A CLOSED DISPLACED FRACTURE OF TRIQUETRUM OF RIGHT WRIST, INITIAL ENCOUNTER: ICD-10-CM

## 2025-02-20 ASSESSMENT — PAIN SCALES - GENERAL: PAINLEVEL_OUTOF10: MODERATE PAIN (4)

## 2025-02-20 NOTE — LETTER
2/20/2025      Kylie Austin  [id number] [address] [id number]      Dear Colleague,    Thank you for referring your patient, Kylie Austin, to the [facility]. Please see a copy of my visit note below.    SUBJECTIVE:   Kylie Austin is a 59 year old female who is seen as an ED referral for evaluation of right hand injury that occurred on 2/13. It has been approximately 7 days since the initial injury.   Also has an avulsion fracture of the left olecranon being treated elsewhere nonoperatively.    HPI: Fell about a week ago and broke left elbow. Mechanism: was walking her dog and slipped on some ice. Was having pain to right wrist and hand at that time as well.  Had x-ray that was unremarkable.  Continues to have right wrist pain.  Is still having some swelling.  Hurts with movement.     Present symptoms: pain ulnar wrist     Treatments tried to this point: wrist brace, rice.    Orthopedic PMH: previous wrist injury.    Review of Systems:  Constitutional:  NEGATIVE for fever, chills, change in weight  Integumentary/Skin:  NEGATIVE for worrisome rashes, moles or lesions  Eyes:  NEGATIVE for vision changes or irritation  ENT/Mouth:  NEGATIVE for ear, mouth and throat problems  Resp:  NEGATIVE for significant cough or SOB  Breast:  NEGATIVE for masses, tenderness or discharge  CV:  NEGATIVE for chest pain, palpitations or peripheral edema  GI:  NEGATIVE for nausea, abdominal pain, heartburn, or change in bowel habits  :  Negative   Musculoskeletal:  See HPI above  Neuro:  NEGATIVE for weakness, dizziness or paresthesias  Endocrine:  NEGATIVE for temperature intolerance, skin/hair changes  Heme/allergy/immune:  NEGATIVE for bleeding problems  Psychiatric:  NEGATIVE for changes in mood or affect    Past Medical History:   Past Medical History:   Diagnosis Date     Anxiety 2005    Brought on by Amioderone     Arthritis 08/2005     Atrial  fib/flutter, transient (H)     S/p cardioversion 2004     Bipolar 2 disorder (H)      Bleeding disorder 1987    nose bleeds     Chronic pain     LUE pain     Congestive heart failure (H) 11/18/2004    cured 2005     Depressive disorder     on and off most of my life!     Fibromyalgia      Gender identity disorder Started Rx 2012     H/O hypogonadism Gonadectomy 2013     Hyperlipidemia      Hypertension      Other mental problems     bipolar     Psoriasis      Sleep apnea      Uncomplicated asthma 1964    cured in 4/2001     Vision disorder 06/2005     Past Surgical History:   Past Surgical History:   Procedure Laterality Date     ARTHROPLASTY SHOULDER Left 1/22/2020    Procedure: Left Total shoulder arthroplasty, distal clavicle excision;  Surgeon: Omari Tobin MD;  [facility]     BIOPSY  2005, 8/8/13    Stomach, colon     BLOCK, NERVE, GENICULAR Left 2/28/2023    Procedure: BLOCK, NERVE, GENICULAR;  Surgeon: Mathew Bloom MD;  Location: [facility] OR     COLONOSCOPY  8/8/2013    Procedure: COLONOSCOPY;  COLONSCOPY SCREEN/ SE;  Surgeon: Santino Sun MD;  [facility]     COLONOSCOPY N/A 1/4/2022    Procedure: COLONOSCOPY, WITH POLYPECTOMY AND BIOPSY;  Surgeon: Dallas Velazco MD;  [facility]     COLONOSCOPY N/A 1/27/2025    Procedure: COLONOSCOPY, WITH POLYPECTOMY AND BIOPSY;  Surgeon: Leventhal, Thomas Michael, MD;  [facility]     COLONOSCOPY WITH CO2 INSUFFLATION N/A 11/1/2018    Procedure: COLONOSCOPY WITH CO2 INSUFFLATION;  Surgeon: Santino Sun MD;  [facility]     COLONOSCOPY WITH CO2 INSUFFLATION N/A 12/15/2020    Procedure: COLONOSCOPY, WITH CO2 INSUFFLATION;  Surgeon: Nima Kamara MD;  [facility]     DECOMPRESSION, FUSION CERVICAL ANTERIOR ONE LEVEL, COMBINED N/A 10/28/2022    Procedure: Cervical 6 to Thoracic 1 anterior cervical decompression and fusion with medtronic plate and screws, interbody device, use of fluoroscopy,  microscope;  Surgeon: Fernando Ruiz MD;  [facility]     Gender Reassignment  05/2016     GRAFT BONE FROM ILIAC CREST Left 10/28/2022    Procedure: and left sided iliac crest autograft;  Surgeon: Fernando Ruiz MD;  [facility]     HEAD & NECK SURGERY  2015    ablation of nerve from neck to left arm     INJECT EPIDURAL CERVICAL N/A 7/29/2022    Procedure: INJECTION, SPINE, CERVICAL, EPIDURAL T1-T2;  Surgeon: Kenya Ferrell MD;  [facility]     LAPAROSCOPIC GASTRIC SLEEVE N/A 4/10/2018    Procedure: LAPAROSCOPIC GASTRIC SLEEVE;  Laparoscopic Sleeve Gastrectomy;  Surgeon: Jani Shah MD;  [facility]     MANDIBLE SURGERY  1986     ORCHIECTOMY INGUINAL BILATERAL  7/16/2013    Procedure: ORCHIECTOMY INGUINAL BILATERAL;  Bilateral Simple Inguinal Orchiectomy ;  Surgeon: Jan Garrett MD;  [facility]     TONSILLECTOMY       Family History:   Family History   Problem Relation Age of Onset     Breast Cancer Mother      Cancer Father         skin     Diabetes Father         Was on the patch when they were new     Heart Disease Father 55        scd     Coronary Artery Disease Father      Hypertension Father      Hyperlipidemia Father      Diabetes Sister      Diabetes Sister      Thyroid Disease Sister      Osteoporosis Maternal Grandmother      Alzheimer Disease Paternal Grandmother      Diabetes Paternal Grandmother      Mental Illness Paternal Grandmother         alshimers     Osteoporosis Paternal Grandmother      Coronary Artery Disease Paternal Grandfather      Hypertension Paternal Grandfather      Hyperlipidemia Paternal Grandfather      Prostate Cancer Paternal Grandfather      Other Cancer Paternal Grandfather      Depression Daughter      Unknown/Adopted Daughter      Depression Daughter      Mental Illness Daughter         bipolar     Anxiety Disorder Daughter      Anxiety Disorder Daughter      Depression Son      Heart Disease Other       "Anesthesia Reaction No family hx of      Deep Vein Thrombosis (DVT) No family hx of      Social History:   Social History     Tobacco Use     Smoking status: Never     Passive exposure: Never     Smokeless tobacco: Never     Tobacco comments:     NEVER SMOKED! Grew up with heavy smokers which did not help my Asthma!   Substance Use Topics     Alcohol use: Not Currently     Comment: occasional//2 per month     OBJECTIVE:  Physical Exam:  /78 (BP Location: Right arm, Patient Position: Sitting, Cuff Size: Adult Regular)   Pulse 79   Ht 1.715 m (5' 7.5\")   Wt 108.4 kg (239 lb)   LMP  (LMP Unknown)   SpO2 98%   BMI 36.88 kg/m    General Appearance: healthy, alert and no distress   Skin: no suspicious lesions or rashes  Neuro: Normal strength and tone, mentation intact and speech normal  Vascular: good pulses, and cappillary refill   Lymph: no lymphadenopathy   Psych:  mentation appears normal and affect normal/bright  Resp: no increased work of breathing     Right Hand Exam:  Inspection: no malrotation of the digits. Swelling noted.  ROM: able to make a full fist  Tender: over the ulnar head, DRUJ, and over the ulnar carpus.  Full wrist range of motion      X-rays:  Reviewed in the office with the patient by myself today and show     EXAM: XR WRIST RIGHT G/E 3 VIEWS  LOCATION: [facility]  DATE: 2/19/2025  Mildly displaced fracture of dorsal triquetrum with soft tissue swelling along the dorsal aspect of the wrist. Mild first CMC and first MCP joint degenerative change.         ASSESSMENT:   Triquetrum fracture.    PLAN:   Non operative treatment.  Use the velcro wrist brace as needed for comfort.  Suspect this may take 6 weeks at least to heal.  Ok to move if painfree.  Avoid lifting, pushing, pulling.    Return to clinic: 5-6 weeks. Reexam. No xray needed.    PREETI Vo MD  Dept. Orthopedic Surgery  [facility]         Again, thank you for allowing me to participate " in the care of your patient.        Sincerely,        Dario Vo MD    Electronically signed

## 2025-02-20 NOTE — PROGRESS NOTES
SUBJECTIVE:   Kylie Austin is a 59 year old female who is seen as an ED referral for evaluation of right hand injury that occurred on 2/13. It has been approximately 7 days since the initial injury.   Also has an avulsion fracture of the left olecranon being treated elsewhere nonoperatively.    HPI: Fell about a week ago and broke left elbow. Mechanism: was walking her dog and slipped on some ice. Was having pain to right wrist and hand at that time as well.  Had x-ray that was unremarkable.  Continues to have right wrist pain.  Is still having some swelling.  Hurts with movement.     Present symptoms: pain ulnar wrist     Treatments tried to this point: wrist brace, rice.    Orthopedic PMH: previous wrist injury.    Review of Systems:  Constitutional:  NEGATIVE for fever, chills, change in weight  Integumentary/Skin:  NEGATIVE for worrisome rashes, moles or lesions  Eyes:  NEGATIVE for vision changes or irritation  ENT/Mouth:  NEGATIVE for ear, mouth and throat problems  Resp:  NEGATIVE for significant cough or SOB  Breast:  NEGATIVE for masses, tenderness or discharge  CV:  NEGATIVE for chest pain, palpitations or peripheral edema  GI:  NEGATIVE for nausea, abdominal pain, heartburn, or change in bowel habits  :  Negative   Musculoskeletal:  See HPI above  Neuro:  NEGATIVE for weakness, dizziness or paresthesias  Endocrine:  NEGATIVE for temperature intolerance, skin/hair changes  Heme/allergy/immune:  NEGATIVE for bleeding problems  Psychiatric:  NEGATIVE for changes in mood or affect    Past Medical History:   Past Medical History:   Diagnosis Date    Anxiety 2005    Brought on by Amioderone    Arthritis 08/2005    Atrial fib/flutter, transient (H)     S/p cardioversion 2004    Bipolar 2 disorder (H)     Bleeding disorder 1987    nose bleeds    Chronic pain     LUE pain    Congestive heart failure (H) 11/18/2004    cured 2005    Depressive disorder     on and off most of my life!    Fibromyalgia      Gender identity disorder Started Rx 2012    H/O hypogonadism Gonadectomy 2013    Hyperlipidemia     Hypertension     Other mental problems     bipolar    Psoriasis     Sleep apnea     Uncomplicated asthma 1964    cured in 4/2001    Vision disorder 06/2005     Past Surgical History:   Past Surgical History:   Procedure Laterality Date    ARTHROPLASTY SHOULDER Left 1/22/2020    Procedure: Left Total shoulder arthroplasty, distal clavicle excision;  Surgeon: Omari Tobin MD;  [facility]    BIOPSY  2005, 8/8/13    Stomach, colon    BLOCK, NERVE, GENICULAR Left 2/28/2023    Procedure: BLOCK, NERVE, GENICULAR;  Surgeon: Mathew Bloom MD;  Location: [facility] OR    COLONOSCOPY  8/8/2013    Procedure: COLONOSCOPY;  COLONSCOPY SCREEN/ SE;  Surgeon: Santino Sun MD;  [facility]    COLONOSCOPY N/A 1/4/2022    Procedure: COLONOSCOPY, WITH POLYPECTOMY AND BIOPSY;  Surgeon: Dallas Velazco MD;  [facility]    COLONOSCOPY N/A 1/27/2025    Procedure: COLONOSCOPY, WITH POLYPECTOMY AND BIOPSY;  Surgeon: Leventhal, Thomas Michael, MD;  [facility]    COLONOSCOPY WITH CO2 INSUFFLATION N/A 11/1/2018    Procedure: COLONOSCOPY WITH CO2 INSUFFLATION;  Surgeon: Santino Sun MD;  [facility]    COLONOSCOPY WITH CO2 INSUFFLATION N/A 12/15/2020    Procedure: COLONOSCOPY, WITH CO2 INSUFFLATION;  Surgeon: Nima Kamara MD;  [facility]    DECOMPRESSION, FUSION CERVICAL ANTERIOR ONE LEVEL, COMBINED N/A 10/28/2022    Procedure: Cervical 6 to Thoracic 1 anterior cervical decompression and fusion with medtronic plate and screws, interbody device, use of fluoroscopy, microscope;  Surgeon: Fernando Ruiz MD;  [facility]    Gender Reassignment  05/2016    GRAFT BONE FROM ILIAC CREST Left 10/28/2022    Procedure: and left sided iliac crest autograft;  Surgeon: Fernando Ruiz MD;  [facility]    HEAD & NECK SURGERY  2015    ablation of  nerve from neck to left arm    INJECT EPIDURAL CERVICAL N/A 7/29/2022    Procedure: INJECTION, SPINE, CERVICAL, EPIDURAL T1-T2;  Surgeon: Kenya Ferrell MD;  [facility]    LAPAROSCOPIC GASTRIC SLEEVE N/A 4/10/2018    Procedure: LAPAROSCOPIC GASTRIC SLEEVE;  Laparoscopic Sleeve Gastrectomy;  Surgeon: Jani Shah MD;  [facility]    MANDIBLE SURGERY  1986    ORCHIECTOMY INGUINAL BILATERAL  7/16/2013    Procedure: ORCHIECTOMY INGUINAL BILATERAL;  Bilateral Simple Inguinal Orchiectomy ;  Surgeon: Jan Garrett MD;  [facility]    TONSILLECTOMY       Family History:   Family History   Problem Relation Age of Onset    Breast Cancer Mother     Cancer Father         skin    Diabetes Father         Was on the patch when they were new    Heart Disease Father 55        scd    Coronary Artery Disease Father     Hypertension Father     Hyperlipidemia Father     Diabetes Sister     Diabetes Sister     Thyroid Disease Sister     Osteoporosis Maternal Grandmother     Alzheimer Disease Paternal Grandmother     Diabetes Paternal Grandmother     Mental Illness Paternal Grandmother         alshimers    Osteoporosis Paternal Grandmother     Coronary Artery Disease Paternal Grandfather     Hypertension Paternal Grandfather     Hyperlipidemia Paternal Grandfather     Prostate Cancer Paternal Grandfather     Other Cancer Paternal Grandfather     Depression Daughter     Unknown/Adopted Daughter     Depression Daughter     Mental Illness Daughter         bipolar    Anxiety Disorder Daughter     Anxiety Disorder Daughter     Depression Son     Heart Disease Other     Anesthesia Reaction No family hx of     Deep Vein Thrombosis (DVT) No family hx of      Social History:   Social History     Tobacco Use    Smoking status: Never     Passive exposure: Never    Smokeless tobacco: Never    Tobacco comments:     NEVER SMOKED! Grew up with heavy smokers which did not help my Asthma!   Substance Use Topics    Alcohol  "use: Not Currently     Comment: occasional//2 per month     OBJECTIVE:  Physical Exam:  /78 (BP Location: Right arm, Patient Position: Sitting, Cuff Size: Adult Regular)   Pulse 79   Ht 1.715 m (5' 7.5\")   Wt 108.4 kg (239 lb)   LMP  (LMP Unknown)   SpO2 98%   BMI 36.88 kg/m    General Appearance: healthy, alert and no distress   Skin: no suspicious lesions or rashes  Neuro: Normal strength and tone, mentation intact and speech normal  Vascular: good pulses, and cappillary refill   Lymph: no lymphadenopathy   Psych:  mentation appears normal and affect normal/bright  Resp: no increased work of breathing     Right Hand Exam:  Inspection: no malrotation of the digits. Swelling noted.  ROM: able to make a full fist  Tender: over the ulnar head, DRUJ, and over the ulnar carpus.  Full wrist range of motion      X-rays:  Reviewed in the office with the patient by myself today and show     EXAM: XR WRIST RIGHT G/E 3 VIEWS  LOCATION: [facility]  DATE: 2/19/2025  Mildly displaced fracture of dorsal triquetrum with soft tissue swelling along the dorsal aspect of the wrist. Mild first CMC and first MCP joint degenerative change.         ASSESSMENT:   Triquetrum fracture.    PLAN:   Non operative treatment.  Use the velcro wrist brace as needed for comfort.  Suspect this may take 6 weeks at least to heal.  Ok to move if painfree.  Avoid lifting, pushing, pulling.    Return to clinic: 5-6 weeks. Reexam. No xray needed.    PREETI Vo MD  Dept. Orthopedic Surgery  [facility]       "

## 2025-02-20 NOTE — TELEPHONE ENCOUNTER
Other: TE sent per protocol Kylie has a  Closed displaced fracture of triquetrum of right wrist, initial encounter. She is scheduled to see Dr. Vo today 02/20/25 at 2:20 pm     Could we send this information to you in [facility] groupMary Alice or would you prefer to receive a phone call?:   Patient would prefer a phone call   Okay to leave a detailed message?: Yes at Home number on file [phone] (home)

## 2025-03-10 DIAGNOSIS — Z98.1 S/P CERVICAL SPINAL FUSION: ICD-10-CM

## 2025-03-10 RX ORDER — GABAPENTIN 300 MG/1
300 CAPSULE ORAL 3 TIMES DAILY
Qty: 90 CAPSULE | Refills: 3 | Status: SHIPPED | OUTPATIENT
Start: 2025-03-10

## 2025-03-10 NOTE — TELEPHONE ENCOUNTER
Pending Prescriptions:                       Disp   Refills    gabapentin (NEURONTIN) 300 MG capsule [Ph*90 cap*0            Sig: Take 1 capsule (300 mg) by mouth 3 times daily.        Requested Pharmacy: [facility] in [place]    Pt's last office visit: 12/6/24  Next scheduled office visit: 5/16/25      Per the RN/LPN medication refill protocol, writer is unable to refill this request.

## 2025-03-31 ENCOUNTER — THERAPY VISIT (OUTPATIENT)
Dept: PHYSICAL THERAPY | Facility: CLINIC | Age: 60
End: 2025-03-31
Payer: COMMERCIAL

## 2025-03-31 DIAGNOSIS — S42.402A ELBOW FRACTURE, LEFT: Primary | ICD-10-CM

## 2025-03-31 PROCEDURE — 97161 PT EVAL LOW COMPLEX 20 MIN: CPT | Mod: GP | Performed by: PHYSICAL THERAPIST

## 2025-03-31 PROCEDURE — 97110 THERAPEUTIC EXERCISES: CPT | Mod: GP | Performed by: PHYSICAL THERAPIST

## 2025-03-31 ASSESSMENT — ACTIVITIES OF DAILY LIVING (ADL)
DRESS: NO DIFFICULTY
ANY_OF_YOUR_USUAL_WORK,_HOUSEWORK_OR_SCHOOL_ACTIVITIES: NO DIFFICULTY
TYING_OR_LACING_SHOES: NO DIFFICULTY
CARRYING_A_SMALL_SUITCASE_WITH_YOUR_AFFECTED_LIMB: MODERATE DIFFICULTY
WASHING_YOUR_HAIR_OR_SCALP: NO DIFFICULTY
SLEEPING: NO DIFFICULTY
PLEASE_INDICATE_YOR_PRIMARY_REASON_FOR_REFERRAL_TO_THERAPY:: ELBOW
LAUNDERING_CLOTHES: NO DIFFICULTY
USING_TOOLS_OR_APPLIANCES: A LITTLE BIT OF DIFFICULTY
DOING_UP_BUTTONS: NO DIFFICULTY
PLACING_AN_OBJECT_ONTO,_OR_REMOVING_IT_FROM_AN_OVERHEAD_SHELF: A LITTLE BIT OF DIFFICULTY
OPENING_DOORS: NO DIFFICULTY
VACUUMING,_SWEEPING,_OR_RAKING: A LITTLE BIT OF DIFFICULTY
OPENING_A_JAR: MODERATE DIFFICULTY
DRIVING: NO DIFFICULTY
YOUR_USUAL_HOBBIES,_RECREATIONAL_OR_SPORTING_ACTIVITIES: NO DIFFICULTY
PUSHING_UP_ON_YOUR_HANDS: MODERATE DIFFICULTY
LIFTING_A_BAG_OF_GROCERIES_TO_WAIST_LEVEL: A LITTLE BIT OF DIFFICULTY
PREPARING_FOOD: NO DIFFICULTY
CLEANING: NO DIFFICULTY
THROWING_A_BALL: MODERATE DIFFICULTY
UEFI_TOTAL_SCORE/80: .8
UEFI_TOTAL_SCORE: 64

## 2025-03-31 NOTE — PROGRESS NOTES
PHYSICAL THERAPY EVALUATION  Type of Visit: Evaluation    Pt presents to PT 6wks and 4days following fall on ice resulted in L comminuted distal ulna fracture and R wrist fracture, both treated conservatively with sling and wrist brace. ROMAT, WBAT, lifting painfree only.           Fall Risk Screen:  Fall screen completed by: PT  Have you fallen 2 or more times in the past year?: No  Have you fallen and had an injury in the past year?: Yes  Is patient a fall risk?: No    Subjective         Presenting condition or subjective complaint: Broke my elbow  Date of onset: 23    Relevant medical history:     Dates & types of surgery: Lower jaw surgey    Prior diagnostic imaging/testing results: X-ray     Prior therapy history for the same diagnosis, illness or injury: No      Prior Level of Function  Transfers:   Ambulation:   ADL:   IADL:     Living Environment  Social support: With a significant other or spouse   Type of home: House; 1 level   Stairs to enter the home: Yes 4 Is there a railing: Yes     Ramp: No   Stairs inside the home: No       Help at home: None  Equipment owned:       Employment: Yes  for auto parts store  Hobbies/Interests: Build models, play video games, walks, hand knitting    Patient goals for therapy: Strengthen the arm    Pain assessment: pain with elbow extension     Objective   PAIN:   INTEGUMENTARY (edema, incisions):   POSTURE:   ROM: full and equal painfree BUE shoulder flx, abd, ER, behind back IR,   AROM elbow R: 0-2-140degs L: 0-10-132degs   STRENGTH: L elbow ext 3/5 with pain, L ER 4/5 all other BUE normal 5/5.    strength  R: 66,67,70 av   L: 40,41,45 av  FLEXIBILITY:   SPECIAL TESTS:   PALPATION: mild L dorsum hand, no TTP L olecranon process but mild abnormality/deformity present  JOINT MOBILITY:   CERVICAL SCREEN: WNL  SHOULDER SCREEN: painfree AROM and PROM BUE    Assessment & Plan   CLINICAL IMPRESSIONS  Medical Diagnosis: L elbow comminuted fracture     Treatment Diagnosis: L elbow fracture   Impression/Assessment: Patient is a 59 year old female with L elbow pain complaints.  The following significant findings have been identified: Pain, Decreased ROM/flexibility, Decreased strength, and Decreased activity tolerance. These impairments interfere with their ability to perform self care tasks, work tasks, recreational activities, and household chores as compared to previous level of function.     Clinical Decision Making (Complexity):  Clinical Presentation: Stable/Uncomplicated  Clinical Presentation Rationale: based on medical and personal factors listed in PT evaluation  Clinical Decision Making (Complexity): Low complexity    PLAN OF CARE  Treatment Interventions:  Modalities: Cryotherapy, Hot Pack  Interventions: Manual Therapy, Neuromuscular Re-education, Therapeutic Activity, Therapeutic Exercise    Long Term Goals     PT Goal 1  Goal Identifier: full strength and ROM  Goal Description: pt will demonstrate full strength and ROM.  Rationale: to maximize safety and independence within the home;to maximize safety and independence within the community  Target Date: 06/09/25      Frequency of Treatment: 1x/wk  Duration of Treatment: 12wks    Recommended Referrals to Other Professionals:   Education Assessment:   Learner/Method: Patient;Demonstration;Pictures/Video  Education Comments: elbow anatomy and mechanics, rehab progression and expectations    Risks and benefits of evaluation/treatment have been explained.   Patient/Family/caregiver agrees with Plan of Care.     Evaluation Time:     PT Eval, Low Complexity Minutes (23030): 15       Signing Clinician: Israel Hatch PT        [facility]                                                                                   OUTPATIENT PHYSICAL THERAPY      PLAN OF TREATMENT FOR OUTPATIENT REHABILITATION   Patient's Last Name, First Name, Kylie Turcios Date of  Birth:  1965   Provider's Name   [facility]   Medical Record No.  [id number]     Onset Date: 02/13/25  Start of Care Date: 03/31/25     Medical Diagnosis:  L elbow comminuted fracture      PT Treatment Diagnosis:  L elbow fracture Plan of Treatment  Frequency/Duration: 1x/wk/ 12wks    Certification date from 03/31/25 to 06/23/25         See note for plan of treatment details and functional goals     Israel Hatch, PT                         I CERTIFY THE NEED FOR THESE SERVICES FURNISHED UNDER        THIS PLAN OF TREATMENT AND WHILE UNDER MY CARE .             Physician Signature               Date    X_____________________________________________________                  Referring Provider:  Mikel Cazares    Initial Assessment  See Epic Evaluation- Start of Care Date: 03/31/25

## 2025-04-14 ENCOUNTER — THERAPY VISIT (OUTPATIENT)
Dept: PHYSICAL THERAPY | Facility: CLINIC | Age: 60
End: 2025-04-14
Payer: COMMERCIAL

## 2025-04-14 DIAGNOSIS — S42.402A ELBOW FRACTURE, LEFT: Primary | ICD-10-CM

## 2025-04-14 PROCEDURE — 97110 THERAPEUTIC EXERCISES: CPT | Mod: GP | Performed by: PHYSICAL THERAPIST

## 2025-04-28 ENCOUNTER — THERAPY VISIT (OUTPATIENT)
Dept: PHYSICAL THERAPY | Facility: CLINIC | Age: 60
End: 2025-04-28
Payer: COMMERCIAL

## 2025-04-28 DIAGNOSIS — S42.402A ELBOW FRACTURE, LEFT: Primary | ICD-10-CM

## 2025-04-28 PROCEDURE — 97110 THERAPEUTIC EXERCISES: CPT | Mod: GP | Performed by: PHYSICAL THERAPIST

## 2025-04-28 NOTE — PROGRESS NOTES
25 0500   Appointment Info   Signing clinician's name / credentials Israel [email or web address] DPT   Total/Authorized Visits 12   Visits Used 3   Medical Diagnosis L elbow comminuted fracture   PT Tx Diagnosis L elbow fracture   Other pertinent information L rTSA '20, C7-T1 fusion   Progress Note/Certification   Start of Care Date 25   Onset of illness/injury or Date of Surgery 25   Therapy Frequency 1x/wk   Predicted Duration 12wks   Certification date from 25   Certification date to 25   Progress Note Due Date 25   Progress Note Completed Date 25   Subjective Report   Subjective Report pt is 10+ wks from L elbow fracture and reports doing some heavy car work all weekend and elbow felt fine, has just mild ache in elbow but no concerns, feels ready to be finished with formal PT.   Objective Measure 1   Objective Measure  strength   Details R: 85,85,88 av L: 66,73,63 av.3   Objective Measure 2   Objective Measure AROM elbow   Details L: 0-6-144degs   Therapeutic Procedure/Exercise   Therapeutic Procedures: strength, endurance, ROM, flexibility minutes (19765) 33   Ther Proc 1 UBE 6mins warmup   Ther Proc 2 elbow flx 4# [id number]   Ther Proc 3 elbow ext 4# [id number]   Ther Proc 4 pronation/supination 4# [id number] each position in standing with arm supported   Ther Proc 5 bent over horiz abd ag [id number]   Skilled Intervention cues, exercise selection and instruction   Patient Response/Progress painfree   Ther Proc 6 bent over extension ag [id number]   Ther Proc 7 bent over row ag [id number]   Total Session Time   Timed Code Treatment Minutes 33   Total Treatment Time (sum of timed and untimed services) 33       UEFS: 70/80 (87.5%)    DISCHARGE  Reason for Discharge: Patient has met all goals.  Patient chooses to discontinue therapy.    Discharge Plan: Patient to continue home program.    Referring Provider:  Mikel Cazares

## 2025-05-16 ENCOUNTER — OFFICE VISIT (OUTPATIENT)
Dept: NEUROSURGERY | Facility: CLINIC | Age: 60
End: 2025-05-16
Payer: COMMERCIAL

## 2025-05-16 VITALS
BODY MASS INDEX: 37.04 KG/M2 | DIASTOLIC BLOOD PRESSURE: 63 MMHG | WEIGHT: 244.4 LBS | HEART RATE: 55 BPM | HEIGHT: 68 IN | SYSTOLIC BLOOD PRESSURE: 106 MMHG

## 2025-05-16 DIAGNOSIS — M47.12 CERVICAL SPONDYLOSIS WITH MYELOPATHY: Primary | ICD-10-CM

## 2025-05-16 DIAGNOSIS — M54.12 CERVICAL RADICULOPATHY: ICD-10-CM

## 2025-05-16 PROCEDURE — 99214 OFFICE O/P EST MOD 30 MIN: CPT | Performed by: ORTHOPAEDIC SURGERY

## 2025-05-16 PROCEDURE — 3078F DIAST BP <80 MM HG: CPT | Performed by: ORTHOPAEDIC SURGERY

## 2025-05-16 PROCEDURE — 3074F SYST BP LT 130 MM HG: CPT | Performed by: ORTHOPAEDIC SURGERY

## 2025-05-16 NOTE — PROGRESS NOTES
Spine Surgery Return Clinic Visit      Chief Complaint:   RECHECK      Interval HPI:  Symptom Profile Including: location of symptoms, onset, severity, exacerbating/alleviating factors, previous treatments:        Kylie Austin is a 59 year old female who returns noting that things are a little better than they were in January but still quite bothersome.  She couldn't  or use or lift things with the left arm when she was here last. She did PT and had an injection as well but is not sure if those helped.  Otherwise history is unchanged from last visit.      Patient presents with left upper extremity radiculopathy. Patient reports that she is noted increasing numbness in her left upper extremity for the past 2 months. She reports there is occasional pain but the numbness is always present. The distribution is on the inside of her arm, her entire forearm, and her entire hand. She notes she also has known bilateral carpal tunnel but this feels different. She denies any bowel or bladder continence, issues with balance, or problems with dexterity. She said this feels similar but is in a different distribution as compared to her previous left upper extremity radiculopathy.           Past Medical History:     Past Medical History:   Diagnosis Date    Anxiety 2005    Brought on by Amioderone    Arthritis 08/2005    Atrial fib/flutter, transient (H)     S/p cardioversion 2004    Bipolar 2 disorder (H)     Bleeding disorder 1987    nose bleeds    Chronic pain     LUE pain    Congestive heart failure (H) 11/18/2004    cured 2005    Depressive disorder     on and off most of my life!    Fibromyalgia     Gender identity disorder Started Rx 2012    H/O hypogonadism Gonadectomy 2013    Hyperlipidemia     Hypertension     Other mental problems     bipolar    Psoriasis     Sleep apnea     Uncomplicated asthma 1964    cured in 4/2001    Vision disorder 06/2005            Past Surgical History:     Past Surgical  History:   Procedure Laterality Date    ARTHROPLASTY SHOULDER Left 1/22/2020    Procedure: Left Total shoulder arthroplasty, distal clavicle excision;  Surgeon: Omari Tobin MD;  Location: [facility]    BIOPSY  2005, 8/8/13    Stomach, colon    BLOCK, NERVE, GENICULAR Left 2/28/2023    Procedure: BLOCK, NERVE, GENICULAR;  Surgeon: Mathew Bloom MD;  Location: [facility]    COLONOSCOPY  8/8/2013    Procedure: COLONOSCOPY;  COLONSCOPY SCREEN/ SE;  Surgeon: Santino Sun MD;  Location: [facility]    COLONOSCOPY N/A 1/4/2022    Procedure: COLONOSCOPY, WITH POLYPECTOMY AND BIOPSY;  Surgeon: Dallas Velazco MD;  Location: [facility]    COLONOSCOPY N/A 1/27/2025    Procedure: COLONOSCOPY, WITH POLYPECTOMY AND BIOPSY;  Surgeon: Leventhal, Thomas Michael, MD;  Location: [facility]    COLONOSCOPY WITH CO2 INSUFFLATION N/A 11/1/2018    Procedure: COLONOSCOPY WITH CO2 INSUFFLATION;  Surgeon: Santino Sun MD;  Location: [facility]    COLONOSCOPY WITH CO2 INSUFFLATION N/A 12/15/2020    Procedure: COLONOSCOPY, WITH CO2 INSUFFLATION;  Surgeon: Nima Kamara MD;  Location: [facility]    DECOMPRESSION, FUSION CERVICAL ANTERIOR ONE LEVEL, COMBINED N/A 10/28/2022    Procedure: Cervical 6 to Thoracic 1 anterior cervical decompression and fusion with medtronic plate and screws, interbody device, use of fluoroscopy, microscope;  Surgeon: Fernando Ruiz MD;  Location: [facility]    Gender Reassignment  05/2016    GRAFT BONE FROM ILIAC CREST Left 10/28/2022    Procedure: and left sided iliac crest autograft;  Surgeon: Fernando Ruiz MD;  Location: [facility]    HEAD & NECK SURGERY  2015    ablation of nerve from neck to left arm    INJECT EPIDURAL CERVICAL N/A 7/29/2022    Procedure: INJECTION, SPINE, CERVICAL, EPIDURAL T1-T2;  Surgeon: Kenya Ferrell MD;  Location: [facility]    LAPAROSCOPIC GASTRIC SLEEVE N/A 4/10/2018    Procedure: LAPAROSCOPIC GASTRIC SLEEVE;  Laparoscopic Sleeve Gastrectomy;   Surgeon: Jani Shah MD;  Location: [facility]    MANDIBLE SURGERY  1986    ORCHIECTOMY INGUINAL BILATERAL  7/16/2013    Procedure: ORCHIECTOMY INGUINAL BILATERAL;  Bilateral Simple Inguinal Orchiectomy ;  Surgeon: Jan Garrett MD;  Location: [facility]    TONSILLECTOMY              Social History:     Social History     Tobacco Use    Smoking status: Never     Passive exposure: Never    Smokeless tobacco: Never    Tobacco comments:     NEVER SMOKED! Grew up with heavy smokers which did not help my Asthma!   Substance Use Topics    Alcohol use: Not Currently     Comment: occasional//2 per month            Family History:     Family History   Problem Relation Age of Onset    Breast Cancer Mother     Cancer Father         skin    Diabetes Father         Was on the patch when they were new    Heart Disease Father 55        scd    Coronary Artery Disease Father     Hypertension Father     Hyperlipidemia Father     Diabetes Sister     Diabetes Sister     Thyroid Disease Sister     Osteoporosis Maternal Grandmother     Alzheimer Disease Paternal Grandmother     Diabetes Paternal Grandmother     Mental Illness Paternal Grandmother         alshimers    Osteoporosis Paternal Grandmother     Coronary Artery Disease Paternal Grandfather     Hypertension Paternal Grandfather     Hyperlipidemia Paternal Grandfather     Prostate Cancer Paternal Grandfather     Other Cancer Paternal Grandfather     Depression Daughter     Unknown/Adopted Daughter     Depression Daughter     Mental Illness Daughter         bipolar    Anxiety Disorder Daughter     Anxiety Disorder Daughter     Depression Son     Heart Disease Other     Anesthesia Reaction No family hx of     Deep Vein Thrombosis (DVT) No family hx of             Allergies:     Allergies   Allergen Reactions    Amiodarone      Caused pain fatigue/amplified anxiety and depression    Fluoxetine Other (See Comments)     Night terrors    Tetracycline      Teeth rattle/saliva  acidic            Medications:     Current Outpatient Medications   Medication Sig Dispense Refill    alendronate (FOSAMAX) 70 MG tablet TAKE 1 TABLET BY MOUTH ONCE A WEEK IN THE MORNING ON AN EMPTY STOMACH WITH A FULL GLASS OF WATER. DO NOT LIE DOWN FOR 1 HOUR. 12 tablet 3    ARIPiprazole (ABILIFY) 20 MG tablet Take 1 tablet (20 mg) by mouth every morning. 90 tablet 1    busPIRone HCl (BUSPAR) 30 MG tablet Take 1 tablet (30 mg) by mouth 3 times daily. 270 tablet 1    diclofenac (VOLTAREN) 1 % topical gel Apply 4 g topically 4 times daily as needed for moderate pain 200 g 11    estradiol (ESTRACE) 2 MG tablet TAKE 1 TO 2 TABLETS BY MOUTH ONCE DAILY 180 tablet 3    furosemide (LASIX) 20 MG tablet Take 1 tablet (20 mg) by mouth every morning. 90 tablet 1    gabapentin (NEURONTIN) 300 MG capsule Take 1 capsule (300 mg) by mouth 3 times daily. 90 capsule 3    medical cannabis (Patient's own supply.  Not a prescription) 2 capsules 2 times daily (This is NOT a prescription, and does not certify that the patient has a qualifying medical condition for medical cannabis.  The purpose of this order is  to document that the patient reports taking medical cannabis.)    Oral tablet - takes 1 tablet three times daily   vape pen - as needed throughout the day      metFORMIN (GLUCOPHAGE XR) 500 MG 24 hr tablet Take 500 mg by mouth.      mirtazapine (REMERON) 15 MG tablet Take 15 mg by mouth at bedtime.      simvastatin (ZOCOR) 40 MG tablet Take 1 tablet (40 mg) by mouth at bedtime. 90 tablet 1    spironolactone (ALDACTONE) 50 MG tablet Take 2 tablets (100 mg) by mouth daily. 180 tablet 1    venlafaxine (EFFEXOR XR) 75 MG 24 hr capsule Take 3 capsules (225 mg) by mouth every morning. 90 capsule 1     No current facility-administered medications for this visit.             Review of Systems:   A focused musculoskeletal and neurologic ROS was performed with pertinent positives and negatives noted in the HPI.  Additional systems were  "also reviewed and are documented at the bottom of the note.         Physical Exam:   Vitals: /63 (BP Location: Right arm, Patient Position: Sitting, Cuff Size: Adult Large)   Pulse 55   Ht 1.715 m (5' 7.5\")   Wt 110.9 kg (244 lb 6.4 oz)   LMP  (LMP Unknown)   BMI 37.71 kg/m    Musculoskeletal, Neurologic, and Spine:   Cervical spine:                          Appearance -no gross step-offs, kyphosis.                          Motor -                           C5: Deltoids R 5/5 and L 4+/5 strength                          C6: Biceps R 5/5 and L 4/5 strength                           C7: Triceps R 5/5 and L 4/5 strength                           C8:  R 5/5 and L 4/5 strength                           T1: Dorsal interossei R 4/5 and L 4-/5 strength                               Sensation: intact to light touch in C5-T1.  Endorsing diminished sensation in C6, C7, C8, T1 distributions.    Spurling is positive on the left side,    Reflexes are 1+ in biceps and triceps bilaterally             Imaging:   We ordered and independently reviewed new radiographs at this clinic visit. The results were discussed with the patient.      Flexion-extension cervical spine x-rays obtained today demonstrate multilevel cervical spondylosis, most significant at C4-5 and C5-6.  Stable hardware alignment from C6-T1.  Interval creased anterolisthesis of C4 and C5.    I reviewed the November 2023 cervical MRI and October 2023 cervical CT scan.  This shows a likely solid arthrodesis from the C6-T1 anterior cervical decompression and fusion with adjacent segment disease with grade 1 anterolisthesis of 5 on 6 and cervical spondylosis resulting in moderate to severe central and foraminal stenosis from C4-6         Assessment and Plan:      59 year old female status post C 6-T1 ACDF in 10/22 with Dr. Ruiz who presents with adjacent segment disease, left upper extremity radiculopathy      I went over the imaging with the patient and " given the concerning left arm radicular complaints, and failure of nonoperative treatment including physical therapy and injections, we discussed doing a revision fusion with extension up to the C4 level to treat the adjacent segment spondylosis.  Given that her current advanced imaging is more than a year old we will need updated CT and MRI scans before this operative date to make sure we do not need to change the surgical plan.  I would plan to reuse a left sided incision and I went over the increased risk of dysphagia given that this is a revision procedure.  I will plan to see the patient back for surgery and we will move forward with operative planning as noted below.    Risks of this surgery include risk of infection, risk of dural tear resulting in CSF leak which might result in headaches, or possible need for lumbar drain, or possible revision surgery in the setting of a persistent leak. Possible nerve root injury resulting in numbness weakness or paralysis into the arms or legs. Possible radiculitis which could result in similar symptoms or could result in significant neurogenic type pain. Risk of incomplete decompression which might require revision surgery in the future.  Risk of adjacent segment problems requiring surgery in the future. Risk of incomplete relief of symptoms possibly requiring revision surgery in the future. Furthermore, although rare, there are risks of major vessel injury such as to the vertebral artery or major organ injury such as to the esophagus or trachea from the surgery.  There are risks of dysphagia or dysphonia which can make it hard to swallow or talk. I explained that in fact most patients will have some period of swallowing difficulty following the surgery.  In some cases these things occur as a result of laryngeal nerve injury, and we discussed this possibility as well. There is a risk of hematoma formation requiring urgent return to the OR for airway compromise.  There is  a risk of blood clots in the legs or the lungs.  Lastly, although rare, there are certainly risks of the anesthetic including stroke heart attack and death.    For anterior cervical fusions we use medtronic screws, plates and interbody devices.      For bone graft we plan to use iliac crest autograft which is harvested through a separate skin and fascial incision.  I have discussed the risks and benefits of these and explained that allograft has a slightly lower healing rate.  Autograft has a higher healing rate, but requires a separate skin and fascial incision and tends to have more discomfort or pain in the recovery.  The patient understands these risks and benefits of the choice.       No further conservative care is indicated, and the surgery is medically necessary for the following reasons:    There is no indication for further physical therapy in this case.  Further physical therapy would delay necessary medical treatment and prolong the patient's suffering with no benefit and the delay would increase the risk of neurologic decline and/or symptom worsening unnecessarily, with no benefit to the patient and possible harm.       The patient has already trialed oral medications as listed in the medication history, and has trialed activity modification such as decreasing their bending and twisting, and decreasing their walking distance.  In spite of this, and in spite of the conservative therapies documented above, the patient has significant functional disability in their activities of daily living such as prolonged sitting and standing, prolonged walking, and daily chores, each of which are very difficult or painful because of this spinal problem.  Therefore the proposed surgery is medically necessary and the patient has failed conservative care.      There are no untreated, underlying mental health conditions (including but not limited to psychological conditions or drug or alcohol abuse) that will preclude an  appropriate recovery from this surgery or that are contraindications to proceeding with surgery.         Respectfully,  Fernando Ruiz MD  Spine Surgery  [facility]

## 2025-05-16 NOTE — Clinical Note
Opioid / Opioid Plus Controlled Substance Agreement    This is an agreement between you and your provider about the safe and appropriate use of controlled substance/opioids prescribed by your care team. Controlled substances are medicines that can cause physical and mental dependence (abuse).    There are strict laws about having and using these medicines. We here at [facility] are committing to working with you in your efforts to get better. To support you in this work, we ll help you schedule regular office appointments for medicine refills. If we must cancel or change your appointment for any reason, we ll make sure you have enough medicine to last until your next appointment.     As a Provider, I will:    Listen carefully to your concerns and treat you with respect.     Recommend a treatment plan that I believe is in your best interest. This plan may involve therapies other than opioid pain medication.     Talk with you often about the possible benefits, and the risk of harm of any medicine that we prescribe for you.     Provide a plan on how to taper (discontinue or go off) using this medicine if the decision is made to stop its use.    As a Patient, I understand that opioid(s):     Are a controlled substance prescribed by my care team to help me function or work and manage my condition(s).     Are strong medicines and can cause serious side effects such as:    Drowsiness, which can seriously affect my driving ability    A lower breathing rate, enough to cause death    Harm to my thinking ability     Depression     Abuse of and addiction to this medicine    Need to be taken exactly as prescribed. Combining opioids with certain medicines or chemicals (such as illegal drugs, sedatives, sleeping pills, and benzodiazepines) can be dangerous or even fatal. If I stop opioids suddenly, I may have severe withdrawal symptoms.    Do not work for all types of pain nor for all patients. If they re not helpful, I may  be asked to stop them.    {Benzo / Stimulant (Optional):114804}    The risks, benefits and side effects of these medicine(s) were explained to me. I agree that:  1. I will take part in other treatments as advised by my care team. This may be psychiatry or counseling, physical therapy, behavioral therapy, group treatment or a referral to a specialist.     2. I will keep all my appointments. I understand that this is part of the monitoring of opioids. My care team may require an office visit for EVERY opioid/controlled substance refill. If I miss appointments or don t follow instructions, my care team may stop my medicine.    3. I will take my medicines as prescribed. I will not change the dose or schedule unless my care team tells me to. There will be no refills if I run out early.     4. I may be asked to come to the clinic and complete a urine drug test or complete a pill count at any time. If I don t give a urine sample or participate in a pill count, the care team may stop my medicine.    5. I will only receive prescriptions from this clinic for chronic pain. If I am treated by another provider for acute pain issues, I will tell them that I am taking opioid pain medication for chronic pain and that I have a treatment agreement with this provider. I will inform my [facility] care team within one business day if I am given a prescription for any pain medication by another healthcare provider. My [facility] care team can contact other providers and pharmacists about my use of any medicines.    6. It is up to me to make sure that I don t run out of my medicines on weekends or holidays. If my care team is willing to refill my opioid prescription without a visit, I must request refills only during office hours. Refills may take up to 3 business days to process. I will use one pharmacy to fill all my opioid and other controlled substance prescriptions. I will notify the clinic about any changes to my  insurance or medication availability.    7. I am responsible for my prescriptions. If the medicine/prescription is lost, stolen or destroyed, it will not be replaced. I also agree not to share controlled substance medicines with anyone.    8. I am aware I should not use any illegal or recreational drugs. I agree not to drink alcohol unless my care team says I can.       9. If I enroll in the [place] [facility] program, I will tell my care team prior to my next refill.     10. I will tell my care team right away if I become pregnant, have a new medical problem treated outside of my regular clinic, or have a change in my medications.    11. I understand that this medicine can affect my thinking, judgment and reaction time. Alcohol and drugs affect the brain and body, which can affect the safety of my driving. Being under the influence of alcohol or drugs can affect my decision-making, behaviors, personal safety, and the safety of others. Driving while impaired (DWI) can occur if a person is driving, operating, or in physical control of a car, motorcycle, boat, snowmobile, ATV, motorbike, off-road vehicle, or any other motor vehicle ([place] Statute 169A.20). I understand the risk if I choose to drive or operate any vehicle or machinery.    I understand that if I do not follow any of the conditions above, my prescriptions or treatment may be stopped or changed.          Opioids  What You Need to Know    What are opioids?   Opioids are pain medicines that must be prescribed by a doctor. They are also known as narcotics.     Examples are:   1. morphine (MS Contin, Latrice)  2. oxycodone (Oxycontin)  3. oxycodone and acetaminophen (Percocet)  4. hydrocodone and acetaminophen (Vicodin, Norco)   5. fentanyl patch (Duragesic)   6. hydromorphone (Dilaudid)   7. methadone  8. codeine (Tylenol #3)     What do opioids do well?   Opioids are best for severe short-term pain such as after a surgery or injury. They may work well  for cancer pain. They may help some people with long-lasting (chronic) pain.     What do opioids NOT do well?   Opioids never get rid of pain entirely, and they don t work well for most patients with chronic pain. Opioids don t reduce swelling, one of the causes of pain.                                    Other ways to manage chronic pain and improve function include:       Treat the health problem that may be causing pain    Anti-inflammation medicines, which reduce swelling and tenderness, such as ibuprofen (Advil, Motrin) or naproxen (Aleve)    Acetaminophen (Tylenol)    Antidepressants and anti-seizure medicines, especially for nerve pain    Topical treatments such as patches or creams    Injections or nerve blocks    Chiropractic or osteopathic treatment    Acupuncture, massage, deep breathing, meditation, visual imagery, aromatherapy    Use heat or ice at the pain site    Physical therapy     Exercise    Stop smoking    Take part in therapy       Risks and side effects     Talk to your doctor before you start or decide to keep taking opioids. Possible side effects include:      Lowering your breathing rate enough to cause death    Overdose, including death, especially if taking higher than prescribed doses    Worse depression symptoms; less pleasure in things you usually enjoy    Feeling tired or sluggish    Slower thoughts or cloudy thinking    Being more sensitive to pain over time; pain is harder to control    Trouble sleeping or restless sleep    Changes in hormone levels (for example, less testosterone)    Changes in sex drive or ability to have sex    Constipation    Unsafe driving    Itching and sweating    Dizziness    Nausea, throwing up and dry mouth    What else should I know about opioids?    Opioids may lead to dependence, tolerance, or addiction.      Dependence means that if you stop or reduce the medicine too quickly, you will have withdrawal symptoms. These include loose poop (diarrhea),  jitters, flu-like symptoms, nervousness and tremors. Dependence is not the same as addiction.                       Tolerance means needing higher doses over time to get the same effect. This may increase the chance of serious side effects.      Addiction is when people improperly use a substance that harms their body, their mind or their relations with others. Use of opiates can cause a relapse of addiction if you have a history of drug or alcohol abuse.      People who have used opioids for a long time may have a lower quality of life, worse depression, higher levels of pain and more visits to doctors.    You can overdose on opioids. Take these steps to lower your risk of overdose:    1. Recognize the signs:  Signs of overdose include decrease or loss of consciousness (blackout), slowed breathing, trouble waking up and blue lips. If someone is worried about overdose, they should call 911.    2. Talk to your doctor about Narcan (naloxone).   If you are at risk for overdose, you may be given a prescription for Narcan. This medicine very quickly reverses the effects of opioids.   If you overdose, a friend or family member can give you Narcan while waiting for the ambulance. They need to know the signs of overdose and how to give Narcan.     3. Don't use alcohol or street drugs.   Taking them with opioids can cause death.    4. Do not take any of these medicines unless your doctor says it s OK. Taking these with opioids can cause death:    Benzodiazepines, such as lorazepam (Ativan), alprazolam (Xanax) or diazepam (Valium)    Muscle relaxers, such as cyclobenzaprine (Flexeril)    Sleeping pills like zolpidem (Ambien)     Other opioids      How to keep you and other people safe while taking opioids:    1. Never share your opioids with others.  Opioid medicines are regulated by the Drug Enforcement Agency ([employer]). Selling or sharing medications is a criminal act.    2. Be sure to store opioids in a secure place, locked up  if possible. Young children can easily swallow them and overdose.    3. When you are traveling with your medicines, keep them in the original bottles. If you use a pill box, be sure you also carry a copy of your medicine list from your clinic or pharmacy.    4. Safe disposal of opioids    Most pharmacies have places to get rid of medicine, called disposal kiosks. Medicine disposal options are also available in every [facility]. Search your county and  medication disposal  to find more options. You can find more details at:  https://[email or web address].[email or web address]./living-green/managing-unwanted-medications     I agree that my provider, clinic care team, and pharmacy may work with any city, state or federal law enforcement agency that investigates the misuse, sale, or other diversion of my controlled medicine. I will allow my provider to discuss my care with, or share a copy of, this agreement with any other treating provider, pharmacy or emergency room where I receive care.    I have read this agreement and have asked questions about anything I did not understand.    _______________________________________________________  Patient Signature - Kylie Austin _____________________                   Date     _______________________________________________________  Provider Signature - Fernando Ruiz MD   _____________________                   Date     _______________________________________________________  Witness Signature (required if provider not present while patient signing)   _____________________                   Date

## 2025-05-16 NOTE — PROCEDURES
PRE-SURGERY EDUCATION  Reviewed pre- and post-operative instructions as outlined in the After Visit Summary/Patient Instructions with patient.   Surgery folder was given to patient    Patient Education Topic: Procedure with Dr. Ruiz   Learner(s): Patient  Knowledge Level: Basic  Readiness to Learn: Ready  Method:  Verbal explanation and Written material   Outcome: Able to verbalize instructions  Barriers to Learning: No barrier    STD/FMLA: Yes  Job Description: Physical Job  Time Off: 6 weeks     Patient had the opportunity for questions to be answered. Advised Patient to call clinic with any questions/concerns. Gave patient antibacterial soap for pre-surgery skin preparation.     FUSION SPINE PRE-SURGICAL CHECKLIST   Intervention/Diagnostic Meets Criteria Details   NDI/MATHIEU  Completed within the last 6 months Yes Confirmation of completion within last 6 months   Nicotine Status  Currently using: None     Yes Fusions  - Used nicotine or smoked within the last year? No  - If patient uses nicotine, RN orders a Nicotine lab test to be completed 2 weeks after smoking cessation. Surgery will need to be scheduled at least 3 weeks after the nicotine lab test  -Used nicotine or smoked: need to document quit date in chart at least 6 weeks prior to surgery.   Physical Therapy   Completed within 6 months   Completed on the corresponding body part  Completed at least 4 weeks (unless provider documented that patient unable to tolerate PT) Yes  Records verified and located Internal   Injection  Completed within last 1 years  Completed on the corresponding body part   Yes  Records verified and located Internal    Injection not completed (do not need to take action)   Imaging  MRI or CT completed within 6 months Yes Records verified and located In process   Chronic Pain or Pain contract  No Yes If yes: RN needs to collect Pain Provider name, organization, last office visit note, and connect with provider to share plan leading  into surgery and planned post-op pain medication timeframe. For involved chronic pain patients, RN to update VI[facility]Y team using <dot>PAINMANAGEMENTSURGERY within separate telephone encounter     Clau ORLANDO RN, BSN  [facility]

## 2025-05-16 NOTE — LETTER
5/16/2025      Kylie Austin  [address] [facility] [id number]      Dear Colleague,    Thank you for referring your patient, Kylie Austin, to the [facility] [place]. Please see a copy of my visit note below.    Spine Surgery Return Clinic Visit    Chief Complaint:   RECHECK    Interval HPI:  Symptom Profile Including: location of symptoms, onset, severity, exacerbating/alleviating factors, previous treatments:        Kylie Austin is a 59 year old female who returns noting that things are a little better than they were in January but still quite bothersome.  She couldn't  or use or lift things with the left arm when she was here last. She did PT and had an injection as well but is not sure if those helped.  Otherwise history is unchanged from last visit.      Patient presents with left upper extremity radiculopathy. Patient reports that she is noted increasing numbness in her left upper extremity for the past 2 months. She reports there is occasional pain but the numbness is always present. The distribution is on the inside of her arm, her entire forearm, and her entire hand. She notes she also has known bilateral carpal tunnel but this feels different. She denies any bowel or bladder continence, issues with balance, or problems with dexterity. She said this feels similar but is in a different distribution as compared to her previous left upper extremity radiculopathy.           Past Medical History:     Past Medical History:   Diagnosis Date    Anxiety 2005    Brought on by Amioderone    Arthritis 08/2005    Atrial fib/flutter, transient (H)     S/p cardioversion 2004    Bipolar 2 disorder (H)     Bleeding disorder 1987    nose bleeds    Chronic pain     LUE pain    Congestive heart failure (H) 11/18/2004    cured 2005    Depressive disorder     on and off most of my life!    Fibromyalgia     Gender identity disorder Started  Rx 2012    H/O hypogonadism Gonadectomy 2013    Hyperlipidemia     Hypertension     Other mental problems     bipolar    Psoriasis     Sleep apnea     Uncomplicated asthma 1964    cured in 4/2001    Vision disorder 06/2005            Past Surgical History:     Past Surgical History:   Procedure Laterality Date    ARTHROPLASTY SHOULDER Left 1/22/2020    Procedure: Left Total shoulder arthroplasty, distal clavicle excision;  Surgeon: Omari Tobin MD;  Location: [facility]    BIOPSY  2005, 8/8/13    Stomach, colon    BLOCK, NERVE, GENICULAR Left 2/28/2023    Procedure: BLOCK, NERVE, GENICULAR;  Surgeon: Mathew Bloom MD;  Location: [facility] OR    COLONOSCOPY  8/8/2013    Procedure: COLONOSCOPY;  COLONSCOPY SCREEN/ SE;  Surgeon: Santino Sun MD;  Location: [facility]    COLONOSCOPY N/A 1/4/2022    Procedure: COLONOSCOPY, WITH POLYPECTOMY AND BIOPSY;  Surgeon: Dallas Velazco MD;  Location: [facility]    COLONOSCOPY N/A 1/27/2025    Procedure: COLONOSCOPY, WITH POLYPECTOMY AND BIOPSY;  Surgeon: Leventhal, Thomas Michael, MD;  Location: [facility]    COLONOSCOPY WITH CO2 INSUFFLATION N/A 11/1/2018    Procedure: COLONOSCOPY WITH CO2 INSUFFLATION;  Surgeon: Santino Sun MD;  Location: [facility]    COLONOSCOPY WITH CO2 INSUFFLATION N/A 12/15/2020    Procedure: COLONOSCOPY, WITH CO2 INSUFFLATION;  Surgeon: Nima Kamara MD;  Location: [facility]    DECOMPRESSION, FUSION CERVICAL ANTERIOR ONE LEVEL, COMBINED N/A 10/28/2022    Procedure: Cervical 6 to Thoracic 1 anterior cervical decompression and fusion with medtronic plate and screws, interbody device, use of fluoroscopy, microscope;  Surgeon: Fernando Ruiz MD;  Location: [facility] OR    Gender Reassignment  05/2016    GRAFT BONE FROM ILIAC CREST Left 10/28/2022    Procedure: and left sided iliac crest autograft;  Surgeon: Fernando Ruiz MD;  Location: [facility]    HEAD & NECK SURGERY  2015    ablation of nerve from neck to left arm     INJECT EPIDURAL CERVICAL N/A 7/29/2022    Procedure: INJECTION, SPINE, CERVICAL, EPIDURAL T1-T2;  Surgeon: Kenya Ferrell MD;  Location: [facility]    LAPAROSCOPIC GASTRIC SLEEVE N/A 4/10/2018    Procedure: LAPAROSCOPIC GASTRIC SLEEVE;  Laparoscopic Sleeve Gastrectomy;  Surgeon: Jani Shah MD;  Location: [facility]    MANDIBLE SURGERY  1986    ORCHIECTOMY INGUINAL BILATERAL  7/16/2013    Procedure: ORCHIECTOMY INGUINAL BILATERAL;  Bilateral Simple Inguinal Orchiectomy ;  Surgeon: Jan Garrett MD;  Location: [facility]    TONSILLECTOMY              Social History:     Social History     Tobacco Use    Smoking status: Never     Passive exposure: Never    Smokeless tobacco: Never    Tobacco comments:     NEVER SMOKED! Grew up with heavy smokers which did not help my Asthma!   Substance Use Topics    Alcohol use: Not Currently     Comment: occasional//2 per month            Family History:     Family History   Problem Relation Age of Onset    Breast Cancer Mother     Cancer Father         skin    Diabetes Father         Was on the patch when they were new    Heart Disease Father 55        scd    Coronary Artery Disease Father     Hypertension Father     Hyperlipidemia Father     Diabetes Sister     Diabetes Sister     Thyroid Disease Sister     Osteoporosis Maternal Grandmother     Alzheimer Disease Paternal Grandmother     Diabetes Paternal Grandmother     Mental Illness Paternal Grandmother         alshimers    Osteoporosis Paternal Grandmother     Coronary Artery Disease Paternal Grandfather     Hypertension Paternal Grandfather     Hyperlipidemia Paternal Grandfather     Prostate Cancer Paternal Grandfather     Other Cancer Paternal Grandfather     Depression Daughter     Unknown/Adopted Daughter     Depression Daughter     Mental Illness Daughter         bipolar    Anxiety Disorder Daughter     Anxiety Disorder Daughter     Depression Son     Heart Disease Other     Anesthesia Reaction No family hx of      Deep Vein Thrombosis (DVT) No family hx of             Allergies:     Allergies   Allergen Reactions    Amiodarone      Caused pain fatigue/amplified anxiety and depression    Fluoxetine Other (See Comments)     Night terrors    Tetracycline      Teeth rattle/saliva acidic            Medications:     Current Outpatient Medications   Medication Sig Dispense Refill    alendronate (FOSAMAX) 70 MG tablet TAKE 1 TABLET BY MOUTH ONCE A WEEK IN THE MORNING ON AN EMPTY STOMACH WITH A FULL GLASS OF WATER. DO NOT LIE DOWN FOR 1 HOUR. 12 tablet 3    ARIPiprazole (ABILIFY) 20 MG tablet Take 1 tablet (20 mg) by mouth every morning. 90 tablet 1    busPIRone HCl (BUSPAR) 30 MG tablet Take 1 tablet (30 mg) by mouth 3 times daily. 270 tablet 1    diclofenac (VOLTAREN) 1 % topical gel Apply 4 g topically 4 times daily as needed for moderate pain 200 g 11    estradiol (ESTRACE) 2 MG tablet TAKE 1 TO 2 TABLETS BY MOUTH ONCE DAILY 180 tablet 3    furosemide (LASIX) 20 MG tablet Take 1 tablet (20 mg) by mouth every morning. 90 tablet 1    gabapentin (NEURONTIN) 300 MG capsule Take 1 capsule (300 mg) by mouth 3 times daily. 90 capsule 3    medical cannabis (Patient's own supply.  Not a prescription) 2 capsules 2 times daily (This is NOT a prescription, and does not certify that the patient has a qualifying medical condition for medical cannabis.  The purpose of this order is  to document that the patient reports taking medical cannabis.)    Oral tablet - takes 1 tablet three times daily   vape pen - as needed throughout the day      metFORMIN (GLUCOPHAGE XR) 500 MG 24 hr tablet Take 500 mg by mouth.      mirtazapine (REMERON) 15 MG tablet Take 15 mg by mouth at bedtime.      simvastatin (ZOCOR) 40 MG tablet Take 1 tablet (40 mg) by mouth at bedtime. 90 tablet 1    spironolactone (ALDACTONE) 50 MG tablet Take 2 tablets (100 mg) by mouth daily. 180 tablet 1    venlafaxine (EFFEXOR XR) 75 MG 24 hr capsule Take 3 capsules (225 mg) by mouth  "every morning. 90 capsule 1     No current facility-administered medications for this visit.             Review of Systems:   A focused musculoskeletal and neurologic ROS was performed with pertinent positives and negatives noted in the HPI.  Additional systems were also reviewed and are documented at the bottom of the note.         Physical Exam:   Vitals: /63 (BP Location: Right arm, Patient Position: Sitting, Cuff Size: Adult Large)   Pulse 55   Ht 1.715 m (5' 7.5\")   Wt 110.9 kg (244 lb 6.4 oz)   LMP  (LMP Unknown)   BMI 37.71 kg/m    Musculoskeletal, Neurologic, and Spine:   Cervical spine:                          Appearance -no gross step-offs, kyphosis.                          Motor -                           C5: Deltoids R 5/5 and L 4+/5 strength                          C6: Biceps R 5/5 and L 4/5 strength                           C7: Triceps R 5/5 and L 4/5 strength                           C8:  R 5/5 and L 4/5 strength                           T1: Dorsal interossei R 4/5 and L 4-/5 strength                               Sensation: intact to light touch in C5-T1.  Endorsing diminished sensation in C6, C7, C8, T1 distributions.    Spurling is positive on the left side,    Reflexes are 1+ in biceps and triceps bilaterally          Imaging:   We ordered and independently reviewed new radiographs at this clinic visit. The results were discussed with the patient.      Flexion-extension cervical spine x-rays obtained today demonstrate multilevel cervical spondylosis, most significant at C4-5 and C5-6.  Stable hardware alignment from C6-T1.  Interval creased anterolisthesis of C4 and C5.    I reviewed the November 2023 cervical MRI and October 2023 cervical CT scan.  This shows a likely solid arthrodesis from the C6-T1 anterior cervical decompression and fusion with adjacent segment disease with grade 1 anterolisthesis of 5 on 6 and cervical spondylosis resulting in moderate to severe central " and foraminal stenosis from C4-6      Assessment and Plan:      59 year old female status post C 6-T1 ACDF in 10/22 with Dr. Ruiz who presents with adjacent segment disease, left upper extremity radiculopathy      I went over the imaging with the patient and given the concerning left arm radicular complaints, and failure of nonoperative treatment including physical therapy and injections, we discussed doing a revision fusion with extension up to the C4 level to treat the adjacent segment spondylosis.  Given that her current advanced imaging is more than a year old we will need updated CT and MRI scans before this operative date to make sure we do not need to change the surgical plan.  I would plan to reuse a left sided incision and I went over the increased risk of dysphagia given that this is a revision procedure.  I will plan to see the patient back for surgery and we will move forward with operative planning as noted below.    Risks of this surgery include risk of infection, risk of dural tear resulting in CSF leak which might result in headaches, or possible need for lumbar drain, or possible revision surgery in the setting of a persistent leak. Possible nerve root injury resulting in numbness weakness or paralysis into the arms or legs. Possible radiculitis which could result in similar symptoms or could result in significant neurogenic type pain. Risk of incomplete decompression which might require revision surgery in the future.  Risk of adjacent segment problems requiring surgery in the future. Risk of incomplete relief of symptoms possibly requiring revision surgery in the future. Furthermore, although rare, there are risks of major vessel injury such as to the vertebral artery or major organ injury such as to the esophagus or trachea from the surgery.  There are risks of dysphagia or dysphonia which can make it hard to swallow or talk. I explained that in fact most patients will have some period of  swallowing difficulty following the surgery.  In some cases these things occur as a result of laryngeal nerve injury, and we discussed this possibility as well. There is a risk of hematoma formation requiring urgent return to the OR for airway compromise.  There is a risk of blood clots in the legs or the lungs.  Lastly, although rare, there are certainly risks of the anesthetic including stroke heart attack and death.    For anterior cervical fusions we use medtronic screws, plates and interbody devices.      For bone graft we plan to use iliac crest autograft which is harvested through a separate skin and fascial incision.  I have discussed the risks and benefits of these and explained that allograft has a slightly lower healing rate.  Autograft has a higher healing rate, but requires a separate skin and fascial incision and tends to have more discomfort or pain in the recovery.  The patient understands these risks and benefits of the choice.       No further conservative care is indicated, and the surgery is medically necessary for the following reasons:    There is no indication for further physical therapy in this case.  Further physical therapy would delay necessary medical treatment and prolong the patient's suffering with no benefit and the delay would increase the risk of neurologic decline and/or symptom worsening unnecessarily, with no benefit to the patient and possible harm.       The patient has already trialed oral medications as listed in the medication history, and has trialed activity modification such as decreasing their bending and twisting, and decreasing their walking distance.  In spite of this, and in spite of the conservative therapies documented above, the patient has significant functional disability in their activities of daily living such as prolonged sitting and standing, prolonged walking, and daily chores, each of which are very difficult or painful because of this spinal problem.   Therefore the proposed surgery is medically necessary and the patient has failed conservative care.      There are no untreated, underlying mental health conditions (including but not limited to psychological conditions or drug or alcohol abuse) that will preclude an appropriate recovery from this surgery or that are contraindications to proceeding with surgery.         Respectfully,  Fernando Ruiz MD  Spine Surgery  [facility]      Again, thank you for allowing me to participate in the care of your patient.        Sincerely,        Fernando Ruiz MD    Electronically signed

## 2025-05-16 NOTE — LETTER
Opioid / Opioid Plus Controlled Substance Agreement    This is an agreement between you and your provider about the safe and appropriate use of controlled substance/opioids prescribed by your care team. Controlled substances are medicines that can cause physical and mental dependence (abuse).    There are strict laws about having and using these medicines. We here at [facility] are committing to working with you in your efforts to get better. To support you in this work, we ll help you schedule regular office appointments for medicine refills. If we must cancel or change your appointment for any reason, we ll make sure you have enough medicine to last until your next appointment.     As a Provider, I will:  Listen carefully to your concerns and treat you with respect.   Recommend a treatment plan that I believe is in your best interest. This plan may involve therapies other than opioid pain medication.   Talk with you often about the possible benefits, and the risk of harm of any medicine that we prescribe for you.   Provide a plan on how to taper (discontinue or go off) using this medicine if the decision is made to stop its use.    As a Patient, I understand that opioid(s):   Are a controlled substance prescribed by my care team to help me function or work and manage my condition(s).   Are strong medicines and can cause serious side effects such as:  Drowsiness, which can seriously affect my driving ability  A lower breathing rate, enough to cause death  Harm to my thinking ability   Depression   Abuse of and addiction to this medicine  Need to be taken exactly as prescribed. Combining opioids with certain medicines or chemicals (such as illegal drugs, sedatives, sleeping pills, and benzodiazepines) can be dangerous or even fatal. If I stop opioids suddenly, I may have severe withdrawal symptoms.  Do not work for all types of pain nor for all patients. If they re not helpful, I may be asked to stop  them.    The risks, benefits and side effects of these medicine(s) were explained to me. I agree that:  I will take part in other treatments as advised by my care team. This may be psychiatry or counseling, physical therapy, behavioral therapy, group treatment or a referral to a specialist.     I will keep all my appointments. I understand that this is part of the monitoring of opioids. My care team may require an office visit for EVERY opioid/controlled substance refill. If I miss appointments or don t follow instructions, my care team may stop my medicine.    I will take my medicines as prescribed. I will not change the dose or schedule unless my care team tells me to. There will be no refills if I run out early.     I may be asked to come to the clinic and complete a urine drug test or complete a pill count at any time. If I don t give a urine sample or participate in a pill count, the care team may stop my medicine.    I will only receive prescriptions from this clinic for chronic pain. If I am treated by another provider for acute pain issues, I will tell them that I am taking opioid pain medication for chronic pain and that I have a treatment agreement with this provider. I will inform my [facility] care team within one business day if I am given a prescription for any pain medication by another healthcare provider. My [facility] care team can contact other providers and pharmacists about my use of any medicines.    It is up to me to make sure that I don t run out of my medicines on weekends or holidays. If my care team is willing to refill my opioid prescription without a visit, I must request refills only during office hours. Refills may take up to 3 business days to process. I will use one pharmacy to fill all my opioid and other controlled substance prescriptions. I will notify the clinic about any changes to my insurance or medication availability.    I am responsible for my prescriptions.  If the medicine/prescription is lost, stolen or destroyed, it will not be replaced. I also agree not to share controlled substance medicines with anyone.    I am aware I should not use any illegal or recreational drugs. I agree not to drink alcohol unless my care team says I can.       If I enroll in the [place] [facility] Cannabis program, I will tell my care team prior to my next refill.     I will tell my care team right away if I become pregnant, have a new medical problem treated outside of my regular clinic, or have a change in my medications.    I understand that this medicine can affect my thinking, judgment and reaction time. Alcohol and drugs affect the brain and body, which can affect the safety of my driving. Being under the influence of alcohol or drugs can affect my decision-making, behaviors, personal safety, and the safety of others. Driving while impaired (DWI) can occur if a person is driving, operating, or in physical control of a car, motorcycle, boat, snowmobile, ATV, motorbike, off-road vehicle, or any other motor vehicle ([place] Statute 169A.20). I understand the risk if I choose to drive or operate any vehicle or machinery.    I understand that if I do not follow any of the conditions above, my prescriptions or treatment may be stopped or changed.          Opioids  What You Need to Know    What are opioids?   Opioids are pain medicines that must be prescribed by a doctor. They are also known as narcotics.     Examples are:   morphine (MS Contin, Latrice)  oxycodone (Oxycontin)  oxycodone and acetaminophen (Percocet)  hydrocodone and acetaminophen (Vicodin, Norco)   fentanyl patch (Duragesic)   hydromorphone (Dilaudid)   methadone  codeine (Tylenol #3)     What do opioids do well?   Opioids are best for severe short-term pain such as after a surgery or injury. They may work well for cancer pain. They may help some people with long-lasting (chronic) pain.     What do opioids NOT do well?   Opioids  never get rid of pain entirely, and they don t work well for most patients with chronic pain. Opioids don t reduce swelling, one of the causes of pain.                                    Other ways to manage chronic pain and improve function include:     Treat the health problem that may be causing pain  Anti-inflammation medicines, which reduce swelling and tenderness, such as ibuprofen (Advil, Motrin) or naproxen (Aleve)  Acetaminophen (Tylenol)  Antidepressants and anti-seizure medicines, especially for nerve pain  Topical treatments such as patches or creams  Injections or nerve blocks  Chiropractic or osteopathic treatment  Acupuncture, massage, deep breathing, meditation, visual imagery, aromatherapy  Use heat or ice at the pain site  Physical therapy   Exercise  Stop smoking  Take part in therapy       Risks and side effects     Talk to your doctor before you start or decide to keep taking opioids. Possible side effects include:    Lowering your breathing rate enough to cause death  Overdose, including death, especially if taking higher than prescribed doses  Worse depression symptoms; less pleasure in things you usually enjoy  Feeling tired or sluggish  Slower thoughts or cloudy thinking  Being more sensitive to pain over time; pain is harder to control  Trouble sleeping or restless sleep  Changes in hormone levels (for example, less testosterone)  Changes in sex drive or ability to have sex  Constipation  Unsafe driving  Itching and sweating  Dizziness  Nausea, throwing up and dry mouth    What else should I know about opioids?    Opioids may lead to dependence, tolerance, or addiction.    Dependence means that if you stop or reduce the medicine too quickly, you will have withdrawal symptoms. These include loose poop (diarrhea), jitters, flu-like symptoms, nervousness and tremors. Dependence is not the same as addiction.                     Tolerance means needing higher doses over time to get the same  effect. This may increase the chance of serious side effects.    Addiction is when people improperly use a substance that harms their body, their mind or their relations with others. Use of opiates can cause a relapse of addiction if you have a history of drug or alcohol abuse.    People who have used opioids for a long time may have a lower quality of life, worse depression, higher levels of pain and more visits to doctors.    You can overdose on opioids. Take these steps to lower your risk of overdose:    Recognize the signs:  Signs of overdose include decrease or loss of consciousness (blackout), slowed breathing, trouble waking up and blue lips. If someone is worried about overdose, they should call 911.    Talk to your doctor about Narcan (naloxone).   If you are at risk for overdose, you may be given a prescription for Narcan. This medicine very quickly reverses the effects of opioids.   If you overdose, a friend or family member can give you Narcan while waiting for the ambulance. They need to know the signs of overdose and how to give Narcan.     Don't use alcohol or street drugs.   Taking them with opioids can cause death.    Do not take any of these medicines unless your doctor says it s OK. Taking these with opioids can cause death:  Benzodiazepines, such as lorazepam (Ativan), alprazolam (Xanax) or diazepam (Valium)  Muscle relaxers, such as cyclobenzaprine (Flexeril)  Sleeping pills like zolpidem (Ambien)   Other opioids      How to keep you and other people safe while taking opioids:    Never share your opioids with others.  Opioid medicines are regulated by the Drug Enforcement Agency ([employer]). Selling or sharing medications is a criminal act.    2. Be sure to store opioids in a secure place, locked up if possible. Young children can easily swallow them and overdose.    3. When you are traveling with your medicines, keep them in the original bottles. If you use a pill box, be sure you also carry a copy  of your medicine list from your clinic or pharmacy.    4. Safe disposal of opioids    Most pharmacies have places to get rid of medicine, called disposal kiosks. Medicine disposal options are also available in every [facility]. Search your county and  medication disposal  to find more options. You can find more details at:  https://www.pca.Pending sale to [facility].[place]./living-green/managing-unwanted-medications     I agree that my provider, clinic care team, and pharmacy may work with any city, state or federal law enforcement agency that investigates the misuse, sale, or other diversion of my controlled medicine. I will allow my provider to discuss my care with, or share a copy of, this agreement with any other treating provider, pharmacy or emergency room where I receive care.    I have read this agreement and have asked questions about anything I did not understand.    _______________________________________________________  Patient Signature - Kylie Austin _____________________                   Date     _______________________________________________________  Provider Signature - Fernando Ruiz MD   _____________________                   Date     _______________________________________________________  Witness Signature (required if provider not present while patient signing)   _____________________                   Date

## 2025-05-16 NOTE — PATIENT INSTRUCTIONS
Patient Instructions    Surgery scheduled at [facility] ([facility]) for Anteriorr Cervical Decompression and Fusion with Dr. Ruiz.    Pre-Operative  Surgical risks: blood clots in the leg or lung, problems urinating, nerve damage, drainage from the incision, infection,stiffness  Pre-operative physical in our Pre-operative Assessment Center (PAC) at our [facility] location. See handout in folder. To be completed within 30 days of surgical date. Surgery scheduler will assist in scheduling this appointment. **May need to be done with a local primary care provider for patients who live several hours away from [place].**  Possibly 2-4 night hospitalization stay (this will also depend on the progress of your recovery)   Shower procedure  Please shower with antimicrobial soap the night before surgery and morning of surgery. Refer to showering instruction handout in folder.  Eating/Drinking  Stop all solid foods 8 hours prior to arrival time of your surgery.  You may drink ONLY CLEAR LIQUIDS up to 2 hour prior to your arrival time  Clear liquids include water, clear juice, black coffee with no milk or creamer, clear tea without milk, Gatorade, clear soda.   **REMINDER: DO NOT drink any clear liquids with pulp, milk, or creamer**   Your surgery may have to be rescheduled if these eating/drinking directions are not followed correctly   Medications  Generally hold Aspirin, NSAIDs (Advil/Ibuprofen, Indocin, Naproxen,Nuprin,Relafen/Nabumetone, Diclofenac,Meloxicam, Aleve, Celebrex) x 7 days prior to surgical date. Please see handout in folder for more details.   You can take Tylenol (Acetaminophen) for pain  Do not exceed 3,000 mg per day   Any other medications prescribed, please discuss at your PAC appointment or with your prescribing provider at your pre-operative physical   You are NOT required to have a COVID test prior to your surgery. However, if you are  experiencing any infection or cold/flu-like symptoms before your procedure, please notify our office right away as surgery may need to be rescheduled.   Please call [phone] if any concerns  You may NOT receive the COVID-19 vaccine 72 hours before or after surgery.    Pain Management  Dealing with pain  As your body heals, you might feel a stabbing, burning, or aching pain. You may also have some numbness.  Everyone feels pain differently, we may ask you to rate your pain using a pain scale. This will let us know how much pain you feel.   Keep in mind that medicine won't take away all of your pain. It helps to try other ways to relax and ease pain.   Things to help with pain  After surgery, we will give you medicine for your pain. These medications work well, but they can make you drowsy, itchy, or sick to your stomach. If we give you narcotics for pain, try to take the pills with food.   For mild to moderate pain, you can take medication such as Tylenol. These can be used with narcotics, but make sure that your narcotic does not contain Tylenol.   Do NOT drive while taking narcotic pain medication  Do NOT drink alcohol while using any pain medication  You can try over the counter Salonpas (4% lidocaine patches) on sore areas after surgery (DO NOT place directly over the incision)   You can take the muscle relaxer with Tylenol to maximize pain control.    May take Senna, Miralax, Metamucil, and Dulcolax to help with constipation. Increasing fiber intake is also helpful.  For spinal fusions: DO NOT take NSAIDS (Advil/Ibuprofen, Naproxen, Motrin, Aleve, Celebrex, etc) for the first 3 months after surgery, unless directed by your doctor.  You can utilize ice as needed along the sides of your incision to alleviate pain/swelling (no longer than 20 minutes at one time)  **Please let us know at least 3 days in advance before you run out of your pain medications so these can be refilled in a timely manner. You may send  a my chart message or call [phone] to leave a message with our care team.**    Incision Care  No submerging incision in water such as pools, hot tubs, baths for at least 6-8 weeks or until incision is healed  You may get your incision wet in the shower. Allow water to run over incision, and gently pat dry. You can cover the bandage/incision with a waterproof dressing or Saran wrap if the incision isn't fully healed or if there are areas of concern.   Remove dressing/bandage as instructed upon discharge. You do not have to cover your incision during showers after the bandage is removed.   If you have some drainage, you can place a clean guaze over the incision. This is normal and should clear up as the incision closes.   Watch for signs of infection  Redness, swelling, warmth, drainage, and fever of 101 degrees or higher  Notify clinic [phone]  If skin glue is used for your incision, this will be examined at 6 weeks post-operatively. Sutures that need to be removed will be examined at 3 weeks.   After surgery, please wear loose fitting clothing to avoid pressure on your incision site   We would recommend protein intake to help with wound healing after surgery. Dietary supplements can include Boost or Ensure protein shakes.   For spinal fusions: we would recommend taking a Vitamin D supplement (2,000 international units) for 6 months after surgery to help bone fusion.     Activity Restrictions  For the first 6-8 weeks, no lifting > 10 pounds, no excessive bending, twisting, or overhead reaching.  Take stairs in moderation   Ok to walk as tolerated, take short frequent walks. You may gradually increase the distance as tolerated.   Avoid bed rest (laying flat on your back) and prolonged sitting for longer than 30 minutes (change positions frequently while awake). Sleep on your side for one month after surgery.   No contact sports until after follow up visit  No high impact activities such as; running/jogging,  snowmobile or [place] riding or any other recreational vehicles  Please call the clinic if you develop any of the following symptoms:  Swelling and/or warmth in one or both legs  Pain or tenderness in your leg, ankle, foot, or arm   Red or discolored skin     Post-Op Follow Up Appointments  6 week post op with xray prior with Dr. Ruiz  Remaining appointments will be determined by the provider   Please call to schedule follow up and xray appointments at [phone]    Resources  If you are currently employed, you will need to be off work for 4-6 weeks for post op recovery and healing.  Please fax any FMLA/short term disability paperwork to [phone]  You may call our clinic when you'd like to return to work and we can provide a work letter  To allow staff adequate time to complete paperwork, please send as soon as possible     [facility] Orthopedic Department   Phone: [phone]

## 2025-05-19 ENCOUNTER — TELEPHONE (OUTPATIENT)
Dept: NEUROSURGERY | Facility: CLINIC | Age: 60
End: 2025-05-19
Payer: COMMERCIAL

## 2025-05-27 ENCOUNTER — ANCILLARY PROCEDURE (OUTPATIENT)
Dept: CT IMAGING | Facility: CLINIC | Age: 60
End: 2025-05-27
Attending: ORTHOPAEDIC SURGERY
Payer: COMMERCIAL

## 2025-05-27 DIAGNOSIS — M47.12 CERVICAL SPONDYLOSIS WITH MYELOPATHY: ICD-10-CM

## 2025-05-27 DIAGNOSIS — M54.12 CERVICAL RADICULOPATHY: ICD-10-CM

## 2025-05-27 PROCEDURE — 72125 CT NECK SPINE W/O DYE: CPT | Mod: TC | Performed by: RADIOLOGY

## 2025-06-04 NOTE — TELEPHONE ENCOUNTER
FUTURE VISIT INFORMATION      SURGERY INFORMATION:  Date: 10/6/25   Location: [facility] OR   Surgeon:  Fernando Ruiz MD   Anesthesia Type:  General  Procedure:   Cervical 4 to 6 anterior cervical decompression and fusion, possible removal of old plate, with medtronic plate and screws, interbody device, use of fluoroscopy, microscope, and right sided iliac crest autograft       RECORDS REQUESTED FROM:       Primary Care Provider: Kristin Cummins PA-C     Pertinent Medical History: congesdtive heart failure, uncomplicated asthma, atrial fib/flutter transient, hyperlipidemia, hypertension, sleep apnea, dependent edema, smoker    Most recent EKG+ Tracing: 10/25/22    Most recent ECHO: 7/15/20    Most recent Cardiac Stress Test: 7/25/14

## 2025-06-20 DIAGNOSIS — E78.5 HYPERLIPIDEMIA LDL GOAL <130: ICD-10-CM

## 2025-06-21 RX ORDER — SIMVASTATIN 40 MG
40 TABLET ORAL AT BEDTIME
Qty: 90 TABLET | Refills: 0 | Status: SHIPPED | OUTPATIENT
Start: 2025-06-21

## 2025-07-05 DIAGNOSIS — Z98.1 S/P CERVICAL SPINAL FUSION: ICD-10-CM

## 2025-07-07 RX ORDER — GABAPENTIN 300 MG/1
300 CAPSULE ORAL 3 TIMES DAILY
Qty: 90 CAPSULE | Refills: 0 | Status: SHIPPED | OUTPATIENT
Start: 2025-07-07

## 2025-07-07 NOTE — TELEPHONE ENCOUNTER
See My Chart request from pharmacy for refill of gabapentin.  Surgery scheduled for oct.  Refilled Gabapentin.  [facility]./Deisy Cha RN.

## 2025-08-04 DIAGNOSIS — Z98.1 S/P CERVICAL SPINAL FUSION: ICD-10-CM

## 2025-08-04 RX ORDER — GABAPENTIN 300 MG/1
300 CAPSULE ORAL 3 TIMES DAILY
Qty: 90 CAPSULE | Refills: 0 | Status: SHIPPED | OUTPATIENT
Start: 2025-08-04

## 2025-08-11 ENCOUNTER — VIRTUAL VISIT (OUTPATIENT)
Dept: FAMILY MEDICINE | Facility: CLINIC | Age: 60
End: 2025-08-11
Payer: COMMERCIAL

## 2025-08-11 DIAGNOSIS — E66.812 CLASS 2 SEVERE OBESITY WITH SERIOUS COMORBIDITY AND BODY MASS INDEX (BMI) OF 38.0 TO 38.9 IN ADULT, UNSPECIFIED OBESITY TYPE (H): ICD-10-CM

## 2025-08-11 DIAGNOSIS — E66.01 CLASS 2 SEVERE OBESITY WITH SERIOUS COMORBIDITY AND BODY MASS INDEX (BMI) OF 38.0 TO 38.9 IN ADULT, UNSPECIFIED OBESITY TYPE (H): ICD-10-CM

## 2025-08-11 DIAGNOSIS — G47.33 OSA (OBSTRUCTIVE SLEEP APNEA): Primary | ICD-10-CM

## 2025-08-11 DIAGNOSIS — R73.03 PREDIABETES: ICD-10-CM

## 2025-08-11 PROCEDURE — 98005 SYNCH AUDIO-VIDEO EST LOW 20: CPT | Performed by: PHYSICIAN ASSISTANT

## 2025-08-15 ENCOUNTER — TRANSFERRED RECORDS (OUTPATIENT)
Dept: HEALTH INFORMATION MANAGEMENT | Facility: CLINIC | Age: 60
End: 2025-08-15
Payer: COMMERCIAL

## 2025-09-22 ENCOUNTER — PRE VISIT (OUTPATIENT)
Dept: SURGERY | Facility: CLINIC | Age: 60
End: 2025-09-22

## (undated) DEVICE — LINEN DRAPE 54X72" 5467

## (undated) DEVICE — SOL ISOPROPYL RUBBING ALCOHOL USP 70% 4OZ HDX-20 I0020

## (undated) DEVICE — BRUSH SURGICAL SCRUB W/4% CHLORHEXIDINE GLUCONATE SOL 4458A

## (undated) DEVICE — NDL INSUFFLATION 150MM VERRES 172016

## (undated) DEVICE — SU ETHILON 3-0 PS-1 18" 1663H

## (undated) DEVICE — STPL ENDO HANDLE GIA ULTRA UNIVERSAL STD EGIAUSTND

## (undated) DEVICE — SYR 50ML LL W/O NDL 309653

## (undated) DEVICE — IMM KIT SHOULDER TMAX MASK FACE 7210559

## (undated) DEVICE — SU VICRYL 0 CT-1 27" UND J260H

## (undated) DEVICE — GLOVE PROTEXIS POWDER FREE 8.5 ORTHOPEDIC 2D73ET85

## (undated) DEVICE — COVER CAMERA IN-LIGHT DISP LT-C02

## (undated) DEVICE — SOL HYDROGEN PEROXIDE 3% 4OZ BOTTLE F0010

## (undated) DEVICE — DRAPE STERI TOWEL LG 1010

## (undated) DEVICE — SU VICRYL 2-0 CT-2 8X18" UND D8144

## (undated) DEVICE — DECANTER VIAL 2006S

## (undated) DEVICE — SU ETHIBOND 2 V-37 4X30" MX69G

## (undated) DEVICE — ESU PENCIL W/HOLSTER E2350H

## (undated) DEVICE — BLADE KNIFE SURG 15 371115

## (undated) DEVICE — DRSG KERLIX FLUFFS X5

## (undated) DEVICE — DRAPE C-ARM W/STRAPS 42X72" 07-CA104

## (undated) DEVICE — SOL WATER IRRIG 1000ML BOTTLE 07139-09

## (undated) DEVICE — RESTRAINT LIMB HOLDER ANKLE/WRIST FOAM W/QUICK RELEASE 2533

## (undated) DEVICE — SUCTION MANIFOLD DORNOCH ULTRA CART UL-CL500

## (undated) DEVICE — SU VICRYL 2-0 CT-1 27" UND J259H

## (undated) DEVICE — PREP DURAPREP 26ML APL 8630

## (undated) DEVICE — DRSG DRAIN 4X4" 7086

## (undated) DEVICE — SU MONOCRYL 3-0 PS-2 18" UND Y497G

## (undated) DEVICE — SU VICRYL 1 CT-1 CR 8X18" J741D

## (undated) DEVICE — DRSG PRIMAPORE 02X3" 7133

## (undated) DEVICE — ESU GROUND PAD ADULT W/CORD E7507

## (undated) DEVICE — SPONGE LAP 18X18" X8435

## (undated) DEVICE — DRAPE C-ARMOR 5 SIDED 5523

## (undated) DEVICE — IOM SUPPLIES/CASE FEE

## (undated) DEVICE — LINEN BACK PACK 5440

## (undated) DEVICE — STPL ENDO RELOAD 60MM MEDIUM THICK PURPLE EGIA60AMT

## (undated) DEVICE — DRAPE POUCH INSTRUMENT 1018

## (undated) DEVICE — POSITIONER HEAD DONUT FOAM 9" LF FP-HEAD9

## (undated) DEVICE — PREP CHLORAPREP W/ORANGE TINT 10.5ML 260715

## (undated) DEVICE — DRSG GAUZE 4X8" NON21842

## (undated) DEVICE — STPL SKIN 35W 059037

## (undated) DEVICE — SYR BULB IRRIG 50ML LATEX FREE 0035280

## (undated) DEVICE — DRAPE U-DRAPE 1015NSD NON-STERILE

## (undated) DEVICE — GLOVE PROTEXIS W/NEU-THERA 7.0  2D73TE70

## (undated) DEVICE — GLOVE PROTEXIS POWDER FREE SMT 7.5  2D72PT75X

## (undated) DEVICE — LINEN ORTHO PACK 5446

## (undated) DEVICE — GLOVE PROTEXIS BLUE W/NEU-THERA 8.0  2D73EB80

## (undated) DEVICE — DRAPE IOBAN INCISE 23X17" 6650EZ

## (undated) DEVICE — GLOVE ESTEEM POWDER FREE 8.0  2D72PL80

## (undated) DEVICE — IMPLANTABLE DEVICE
Type: IMPLANTABLE DEVICE | Site: SPINE CERVICAL | Status: NON-FUNCTIONAL
Removed: 2022-10-28

## (undated) DEVICE — SOL NACL 0.9% IRRIG 1000ML BOTTLE 2F7124

## (undated) DEVICE — BLADE SAW SAGITTAL STRK 20.7X85X0.89MM 2108-109-000S13

## (undated) DEVICE — LIGHT HANDLE X1 31140133

## (undated) DEVICE — CLIP APPLIER ENDO 05MM MED/LG 176630

## (undated) DEVICE — SYR 50ML CATH TIP W/O NDL 309620

## (undated) DEVICE — SU ETHIBOND 1 CTX CR 8/18" CX30D

## (undated) DEVICE — BONE WAX 2.5GM W31G

## (undated) DEVICE — DRSG TEGADERM 4X10" 1627

## (undated) DEVICE — SOL WATER IRRIG 1000ML BOTTLE 2F7114

## (undated) DEVICE — Device

## (undated) DEVICE — PREP CHLORAPREP 26ML TINTED ORANGE  260815

## (undated) DEVICE — TOOL DISSECT MIDAS MR8 14CM MATCH HEAD 3MM MR8-14MH30

## (undated) DEVICE — ANTIFOG SOLUTION W/FOAM PAD 31142527

## (undated) DEVICE — PREP CHLORAPREP W/ORANGE TINT 10.5ML 930715

## (undated) DEVICE — SPONGE SURGIFOAM 100 1974

## (undated) DEVICE — SU FIBERWIRE 2 38" 2 STRAND  AR-7201

## (undated) DEVICE — ESU CLEANER TIP 31142717

## (undated) DEVICE — DRSG AQUACEL AG 3.5X9.75" HYDROFIBER 412011

## (undated) DEVICE — LINEN TOWEL PACK X5 5464

## (undated) DEVICE — ENDO TROCAR 05MM VERSAONE BLADELESS W/STD FIX CAN NONB5STF

## (undated) DEVICE — SPECIMEN CONTAINER 5OZ STERILE 2600SA

## (undated) DEVICE — DRAIN ROUND W/RESERV KIT JACKSON PRATT 10FR 400ML SU130-402D

## (undated) DEVICE — ESU LIGASURE MARYLAND VESSEL LAP 44CM XLONG LF1944

## (undated) DEVICE — DRAPE MAYO STAND 23X54 8337

## (undated) DEVICE — IMM KIT SHOULDER STABILIZATION 7210573

## (undated) DEVICE — BONE CEMENT MIXEVAC III HI VAC KIT  0206-015-000

## (undated) DEVICE — LINEN TOWEL PACK X30 5481

## (undated) DEVICE — ESU ELEC NDL 1" E1552

## (undated) DEVICE — GLOVE PROTEXIS W/NEU-THERA 8.5  2D73TE85

## (undated) DEVICE — DRAPE U SPLIT 74X120" 29440

## (undated) DEVICE — TAPE DURAPORE 3" SILK 1538-3

## (undated) DEVICE — POSITIONER ARMBOARD FOAM 1PAIR LF FP-ARMB1

## (undated) DEVICE — SUCTION IRR SYSTEM W/O TIP INTERPULSE HANDPIECE 0210-100-000

## (undated) DEVICE — SU FIBERWIRE 5 CCS-1 BLUE  AR-7211

## (undated) DEVICE — DRAPE U-POUCH 34X29" 1067

## (undated) DEVICE — STRAP KNEE/BODY 31143004

## (undated) DEVICE — SU SILK 2-0 TIE 12X30" A305H

## (undated) DEVICE — TRAY PAIN INJECTION 97A 640

## (undated) DEVICE — SYR 10ML PERFIX LL 332152

## (undated) DEVICE — ENDO CANNULA 05MM VERSAONE UNIVERSAL UNVCA5STF

## (undated) DEVICE — SUCTION MANIFOLD NEPTUNE 2 SYS 4 PORT 0702-020-000

## (undated) DEVICE — SU MONOCRYL 3-0 PS-1 27" Y936H

## (undated) DEVICE — SPONGE COTTONOID 1/2X1" 80-1402

## (undated) DEVICE — PACK SET-UP STD 9102

## (undated) DEVICE — GLOVE PROTEXIS W/NEU-THERA 7.5  2D73TE75

## (undated) DEVICE — NDL SPINAL 22GA 3.5" QUINCKE 405181

## (undated) DEVICE — LINEN TOWEL PACK X6 WHITE 5487

## (undated) DEVICE — DRSG STERI STRIP 1/2X4" R1547

## (undated) DEVICE — ADH SKIN CLOSURE PREMIERPRO EXOFIN 1.0ML 3470

## (undated) DEVICE — ENDO TROCAR 15MM VERSAONE BLADELESS W/STD FIX CAN NONB15STF

## (undated) DEVICE — BONE CLEANING TIP INTERPULSE  0210-010-000

## (undated) DEVICE — ENDO POUCH 5X9" 15MM ENDOCATCH II 173049

## (undated) RX ORDER — FENTANYL CITRATE 50 UG/ML
INJECTION, SOLUTION INTRAMUSCULAR; INTRAVENOUS
Status: DISPENSED
Start: 2022-10-28

## (undated) RX ORDER — CELECOXIB 200 MG/1
CAPSULE ORAL
Status: DISPENSED
Start: 2020-01-22

## (undated) RX ORDER — BUPIVACAINE HYDROCHLORIDE AND EPINEPHRINE 2.5; 5 MG/ML; UG/ML
INJECTION, SOLUTION INFILTRATION; PERINEURAL
Status: DISPENSED
Start: 2022-10-28

## (undated) RX ORDER — PROPOFOL 10 MG/ML
INJECTION, EMULSION INTRAVENOUS
Status: DISPENSED
Start: 2020-01-22

## (undated) RX ORDER — SIMETHICONE 40MG/0.6ML
SUSPENSION, DROPS(FINAL DOSAGE FORM)(ML) ORAL
Status: DISPENSED
Start: 2020-12-15

## (undated) RX ORDER — LIDOCAINE HYDROCHLORIDE 20 MG/ML
INJECTION, SOLUTION EPIDURAL; INFILTRATION; INTRACAUDAL; PERINEURAL
Status: DISPENSED
Start: 2020-01-22

## (undated) RX ORDER — PROPOFOL 10 MG/ML
INJECTION, EMULSION INTRAVENOUS
Status: DISPENSED
Start: 2022-10-28

## (undated) RX ORDER — EPHEDRINE SULFATE 50 MG/ML
INJECTION, SOLUTION INTRAMUSCULAR; INTRAVENOUS; SUBCUTANEOUS
Status: DISPENSED
Start: 2020-01-22

## (undated) RX ORDER — ACETAMINOPHEN 325 MG/1
TABLET ORAL
Status: DISPENSED
Start: 2020-01-22

## (undated) RX ORDER — FENTANYL CITRATE-0.9 % NACL/PF 10 MCG/ML
PLASTIC BAG, INJECTION (ML) INTRAVENOUS
Status: DISPENSED
Start: 2022-10-28

## (undated) RX ORDER — FENTANYL CITRATE 50 UG/ML
INJECTION, SOLUTION INTRAMUSCULAR; INTRAVENOUS
Status: DISPENSED
Start: 2020-12-15

## (undated) RX ORDER — ALBUTEROL SULFATE 90 UG/1
AEROSOL, METERED RESPIRATORY (INHALATION)
Status: DISPENSED
Start: 2022-10-28

## (undated) RX ORDER — CEFAZOLIN SODIUM 2 G/100ML
INJECTION, SOLUTION INTRAVENOUS
Status: DISPENSED
Start: 2020-01-22

## (undated) RX ORDER — BUPIVACAINE HYDROCHLORIDE 2.5 MG/ML
INJECTION, SOLUTION EPIDURAL; INFILTRATION; INTRACAUDAL
Status: DISPENSED
Start: 2018-04-10

## (undated) RX ORDER — LIDOCAINE HYDROCHLORIDE 20 MG/ML
INJECTION, SOLUTION EPIDURAL; INFILTRATION; INTRACAUDAL; PERINEURAL
Status: DISPENSED
Start: 2018-04-10

## (undated) RX ORDER — SIMETHICONE 40MG/0.6ML
SUSPENSION, DROPS(FINAL DOSAGE FORM)(ML) ORAL
Status: DISPENSED
Start: 2022-01-04

## (undated) RX ORDER — CEFAZOLIN SODIUM 2 G/100ML
INJECTION, SOLUTION INTRAVENOUS
Status: DISPENSED
Start: 2018-04-10

## (undated) RX ORDER — PHENYLEPHRINE HCL IN 0.9% NACL 1 MG/10 ML
SYRINGE (ML) INTRAVENOUS
Status: DISPENSED
Start: 2020-01-22

## (undated) RX ORDER — OXYCODONE HYDROCHLORIDE 5 MG/1
TABLET ORAL
Status: DISPENSED
Start: 2022-10-28

## (undated) RX ORDER — FENTANYL CITRATE 50 UG/ML
INJECTION, SOLUTION INTRAMUSCULAR; INTRAVENOUS
Status: DISPENSED
Start: 2025-01-27

## (undated) RX ORDER — HYDRALAZINE HYDROCHLORIDE 20 MG/ML
INJECTION INTRAMUSCULAR; INTRAVENOUS
Status: DISPENSED
Start: 2018-04-10

## (undated) RX ORDER — PROPOFOL 10 MG/ML
INJECTION, EMULSION INTRAVENOUS
Status: DISPENSED
Start: 2018-04-10

## (undated) RX ORDER — GABAPENTIN 300 MG/1
CAPSULE ORAL
Status: DISPENSED
Start: 2020-01-22

## (undated) RX ORDER — DEXAMETHASONE SODIUM PHOSPHATE 4 MG/ML
INJECTION, SOLUTION INTRA-ARTICULAR; INTRALESIONAL; INTRAMUSCULAR; INTRAVENOUS; SOFT TISSUE
Status: DISPENSED
Start: 2020-01-22

## (undated) RX ORDER — EPINEPHRINE 1 MG/ML
INJECTION, SOLUTION INTRAMUSCULAR; SUBCUTANEOUS
Status: DISPENSED
Start: 2020-12-15

## (undated) RX ORDER — ONDANSETRON 2 MG/ML
INJECTION INTRAMUSCULAR; INTRAVENOUS
Status: DISPENSED
Start: 2020-01-22

## (undated) RX ORDER — FENTANYL CITRATE 50 UG/ML
INJECTION, SOLUTION INTRAMUSCULAR; INTRAVENOUS
Status: DISPENSED
Start: 2018-04-10

## (undated) RX ORDER — DEXAMETHASONE SODIUM PHOSPHATE 4 MG/ML
INJECTION, SOLUTION INTRA-ARTICULAR; INTRALESIONAL; INTRAMUSCULAR; INTRAVENOUS; SOFT TISSUE
Status: DISPENSED
Start: 2018-04-10

## (undated) RX ORDER — ACETAMINOPHEN 325 MG/1
TABLET ORAL
Status: DISPENSED
Start: 2022-10-28

## (undated) RX ORDER — IOPAMIDOL 408 MG/ML
INJECTION, SOLUTION INTRATHECAL
Status: DISPENSED
Start: 2022-07-29

## (undated) RX ORDER — FENTANYL CITRATE 50 UG/ML
INJECTION, SOLUTION INTRAMUSCULAR; INTRAVENOUS
Status: DISPENSED
Start: 2018-11-01

## (undated) RX ORDER — LIDOCAINE HYDROCHLORIDE 20 MG/ML
INJECTION, SOLUTION EPIDURAL; INFILTRATION; INTRACAUDAL; PERINEURAL
Status: DISPENSED
Start: 2022-10-28

## (undated) RX ORDER — BUPIVACAINE HYDROCHLORIDE 5 MG/ML
INJECTION, SOLUTION EPIDURAL; INTRACAUDAL
Status: DISPENSED
Start: 2022-07-29

## (undated) RX ORDER — BUPIVACAINE HYDROCHLORIDE 2.5 MG/ML
INJECTION, SOLUTION EPIDURAL; INFILTRATION; INTRACAUDAL
Status: DISPENSED
Start: 2020-01-22

## (undated) RX ORDER — FENTANYL CITRATE 50 UG/ML
INJECTION, SOLUTION INTRAMUSCULAR; INTRAVENOUS
Status: DISPENSED
Start: 2020-01-22

## (undated) RX ORDER — CEFAZOLIN SODIUM/WATER 2 G/20 ML
SYRINGE (ML) INTRAVENOUS
Status: DISPENSED
Start: 2022-10-28

## (undated) RX ORDER — BUPIVACAINE HYDROCHLORIDE 5 MG/ML
INJECTION, SOLUTION EPIDURAL; INTRACAUDAL
Status: DISPENSED
Start: 2018-12-07

## (undated) RX ORDER — TRIAMCINOLONE ACETONIDE 40 MG/ML
INJECTION, SUSPENSION INTRA-ARTICULAR; INTRAMUSCULAR
Status: DISPENSED
Start: 2018-12-07

## (undated) RX ORDER — ONDANSETRON 2 MG/ML
INJECTION INTRAMUSCULAR; INTRAVENOUS
Status: DISPENSED
Start: 2022-10-28

## (undated) RX ORDER — FENTANYL CITRATE 50 UG/ML
INJECTION, SOLUTION INTRAMUSCULAR; INTRAVENOUS
Status: DISPENSED
Start: 2022-07-29

## (undated) RX ORDER — LIDOCAINE HYDROCHLORIDE 10 MG/ML
INJECTION, SOLUTION EPIDURAL; INFILTRATION; INTRACAUDAL; PERINEURAL
Status: DISPENSED
Start: 2022-07-29

## (undated) RX ORDER — VANCOMYCIN HYDROCHLORIDE 1 G/20ML
INJECTION, POWDER, LYOPHILIZED, FOR SOLUTION INTRAVENOUS
Status: DISPENSED
Start: 2022-10-28

## (undated) RX ORDER — BUPIVACAINE HYDROCHLORIDE 2.5 MG/ML
INJECTION, SOLUTION INFILTRATION; PERINEURAL
Status: DISPENSED
Start: 2020-12-15

## (undated) RX ORDER — HYDROMORPHONE HYDROCHLORIDE 1 MG/ML
INJECTION, SOLUTION INTRAMUSCULAR; INTRAVENOUS; SUBCUTANEOUS
Status: DISPENSED
Start: 2022-10-28

## (undated) RX ORDER — DEXAMETHASONE SODIUM PHOSPHATE 10 MG/ML
INJECTION, SOLUTION INTRAMUSCULAR; INTRAVENOUS
Status: DISPENSED
Start: 2022-07-29

## (undated) RX ORDER — KETAMINE HCL IN 0.9 % NACL 50 MG/5 ML
SYRINGE (ML) INTRAVENOUS
Status: DISPENSED
Start: 2020-01-22

## (undated) RX ORDER — SIMETHICONE 40MG/0.6ML
SUSPENSION, DROPS(FINAL DOSAGE FORM)(ML) ORAL
Status: DISPENSED
Start: 2025-01-27

## (undated) RX ORDER — DEXAMETHASONE SODIUM PHOSPHATE 4 MG/ML
INJECTION, SOLUTION INTRA-ARTICULAR; INTRALESIONAL; INTRAMUSCULAR; INTRAVENOUS; SOFT TISSUE
Status: DISPENSED
Start: 2022-10-28

## (undated) RX ORDER — ONDANSETRON 2 MG/ML
INJECTION INTRAMUSCULAR; INTRAVENOUS
Status: DISPENSED
Start: 2018-04-10

## (undated) RX ORDER — LIDOCAINE HYDROCHLORIDE 20 MG/ML
INJECTION, SOLUTION EPIDURAL; INFILTRATION; INTRACAUDAL; PERINEURAL
Status: DISPENSED
Start: 2022-07-29

## (undated) RX ORDER — SIMETHICONE 40MG/0.6ML
SUSPENSION, DROPS(FINAL DOSAGE FORM)(ML) ORAL
Status: DISPENSED
Start: 2018-11-01